# Patient Record
Sex: FEMALE | Race: BLACK OR AFRICAN AMERICAN | NOT HISPANIC OR LATINO | Employment: OTHER | URBAN - METROPOLITAN AREA
[De-identification: names, ages, dates, MRNs, and addresses within clinical notes are randomized per-mention and may not be internally consistent; named-entity substitution may affect disease eponyms.]

---

## 2018-08-12 LAB
CHOLESTEROL, TOTAL: 132
CREAT SERPL-MCNC: 274.1 MG/DL
HDLC SERPL-MCNC: 46 MG/DL
HEMOGLOBIN A1: 7.9
LDLC SERPL CALC-MCNC: 78 MG/DL
MICROALBUMIN URINE: 9.4
MICROALBUMIN/CREATININE RATIO: 3.4
NON HDL CHOL. (LDL+VLDL): 86
TRIGL SERPL-MCNC: 72 MG/DL

## 2018-11-21 ENCOUNTER — INITIAL CONSULT (OUTPATIENT)
Dept: HEMATOLOGY/ONCOLOGY | Facility: CLINIC | Age: 63
End: 2018-11-21
Payer: COMMERCIAL

## 2018-11-21 VITALS
HEART RATE: 106 BPM | BODY MASS INDEX: 44.04 KG/M2 | RESPIRATION RATE: 18 BRPM | DIASTOLIC BLOOD PRESSURE: 70 MMHG | OXYGEN SATURATION: 99 % | HEIGHT: 64 IN | WEIGHT: 257.94 LBS | TEMPERATURE: 98 F | SYSTOLIC BLOOD PRESSURE: 142 MMHG

## 2018-11-21 DIAGNOSIS — Z85.3 HISTORY OF BREAST CANCER IN FEMALE: ICD-10-CM

## 2018-11-21 PROCEDURE — 99204 OFFICE O/P NEW MOD 45 MIN: CPT | Mod: S$GLB,,, | Performed by: INTERNAL MEDICINE

## 2018-11-21 PROCEDURE — 3008F BODY MASS INDEX DOCD: CPT | Mod: CPTII,S$GLB,, | Performed by: INTERNAL MEDICINE

## 2018-11-21 PROCEDURE — 99999 PR PBB SHADOW E&M-NEW PATIENT-LVL III: CPT | Mod: PBBFAC,,, | Performed by: INTERNAL MEDICINE

## 2018-11-21 NOTE — PROGRESS NOTES
Subjective:       Patient ID: Regine Alfred is a 63 y.o. female.    Chief Complaint: new consult    HPI     The patient is self-referred.    REASON FOR REFERRAL:  History of breast cancer, assumption of care.    HISTORY OF PRESENT ILLNESS:  Mrs. Alfred is a very pleasant 63-year-old female   who recently relocated to [place] from [place] to live closer to her   family.  She has a history of a stage I triple negative breast cancer that was   diagnosed a year ago.  She underwent a left modified radical mastectomy in   [facility] on 2017 and had a stage IA carcinoma measuring 1.2 cm in   greatest diameter.  Resection margins were clear, while four sentinel lymph   nodes were negative.  Postoperatively, she received four cycles of docetaxel and   cyclophosphamide.  The first cycle was administered on 2017 and she   completed her treatment by 2018.  She has been followed expectantly and   has been MERRY since then.  As mentioned above, she recently relocated to [place] and she wants us to assume her oncologic care.    PAST MEDICAL HISTORY:  As above.  She has a history of hypertension and   diabetes.  She is on metformin and glipizide.  She also has a history of severe   vitamin D deficiency and she states that she almost became unable to walk and   had extensive physical therapy lasting 2-1/2 years.  She subsequently improved   and is now able to walk without a cane.    FAMILY HISTORY:  There is no cancer in the immediate family, however, a maternal   aunt  of breast cancer and a third paternal cousin had breast cancer.    SOCIAL HISTORY:  She is .  She used to work as a  and she has   not looked for a job yet here in [place].  She does not smoke and does not   drink alcohol.    GYN HISTORY:  Menarche was at 11 and first live birth at 21.  She underwent a   hysterectomy 12 years ago.      Review of Systems    Overall she feels well.  She complains of mild fatigue  since she finished her chemotherapy and also mentions that she has been experiencing memory loss that she attributes to her chemotherapy.  She denies any anxiety, depression, easy bruising, fevers, chills, night  sweats, weight loss, nausea, vomiting, diarrhea, constipation, diplopia, blurred vision, headache, chest pain, palpitations, shortness of breath, breast pain, abdominal pain, extremity pain, or difficulty ambulating.  The remainder of the ten-point ROS, including general, skin, lymph, H/N, cardiorespiratory, GI, , Neuro, Endocrine, and psychiatric is negative.     Objective:      Physical Exam      She is alert, oriented to time, place, person, pleasant, well      nourished, in no acute physical distress.                                    VITAL SIGNS:  Reviewed                                      HEENT:  Normal.  There are no nasal, oral, lip, gingival, auricular, lid,    or conjunctival lesions.  Mucosae are moist and pink, and there is no        thrush.  Pupils are equal, reactive to light and accommodation.              Extraocular muscle movements are intact.  Dentition is good.  There is no frontal or maxillary tenderness.                                     NECK:  Supple without JVD, adenopathy, or thyromegaly.                       LUNGS:  Clear to auscultation without wheezing, rales, or rhonchi.           CARDIOVASCULAR:  Reveals an S1, S2, no murmurs, no rubs, no gallops.         ABDOMEN:  Soft, nontender, without organomegaly.  Bowel sounds are    present.                                                                     EXTREMITIES:  No cyanosis, clubbing, or edema.                               BREASTS:  She is status post left mastectomy with a well-healed periareolar incision.    There are no masses in the right breast.     LYMPHATIC:  There is no cervical, axillary, or supraclavicular adenopathy.   SKIN:  Warm and moist, without petechiae, rashes, induration, or ecchymoses.            NEUROLOGIC:  DTRs are 0-1+ bilaterally, symmetrical, motor function is 5/5,  and cranial nerves are  within normal limits.    Assessment:       1. History of breast cancer in female, clinically MERRY, doing well.      2.    History of hypertension and diabetes.   Plan:        I had a long discussion with her; her care has been very reasonable and appropriate, and I told her so.  We went over the ASCO guidelines for follow up.  I will see her every three months for the next year, and then every 4 months.  She is not due for a mammogram until August 2019.  We will arrange as the time gets closer.  Her multiple questions were answered to her satisfaction.    I spent approximately 45 minutes reviewing the available records and evaluating the patient, out of which over 50% of the time was spent face to face with the patient in counseling and coordinating this patient's care.

## 2018-11-26 ENCOUNTER — OFFICE VISIT (OUTPATIENT)
Dept: FAMILY MEDICINE | Facility: CLINIC | Age: 63
End: 2018-11-26
Payer: COMMERCIAL

## 2018-11-26 VITALS
WEIGHT: 259.25 LBS | RESPIRATION RATE: 17 BRPM | BODY MASS INDEX: 44.26 KG/M2 | DIASTOLIC BLOOD PRESSURE: 82 MMHG | HEIGHT: 64 IN | TEMPERATURE: 98 F | HEART RATE: 102 BPM | SYSTOLIC BLOOD PRESSURE: 146 MMHG | OXYGEN SATURATION: 97 %

## 2018-11-26 DIAGNOSIS — J30.9 CHRONIC ALLERGIC RHINITIS: ICD-10-CM

## 2018-11-26 DIAGNOSIS — E55.9 VITAMIN D DEFICIENCY: ICD-10-CM

## 2018-11-26 DIAGNOSIS — G43.709 CHRONIC MIGRAINE WITHOUT AURA WITHOUT STATUS MIGRAINOSUS, NOT INTRACTABLE: ICD-10-CM

## 2018-11-26 DIAGNOSIS — M48.062 SPINAL STENOSIS OF LUMBAR REGION WITH NEUROGENIC CLAUDICATION: ICD-10-CM

## 2018-11-26 DIAGNOSIS — Z23 NEED FOR TETANUS BOOSTER: ICD-10-CM

## 2018-11-26 DIAGNOSIS — E11.65 TYPE 2 DIABETES MELLITUS WITH HYPERGLYCEMIA, WITHOUT LONG-TERM CURRENT USE OF INSULIN: ICD-10-CM

## 2018-11-26 DIAGNOSIS — Z90.12 S/P LEFT MASTECTOMY: ICD-10-CM

## 2018-11-26 DIAGNOSIS — Z91.89 AT RISK FOR GLAUCOMA: ICD-10-CM

## 2018-11-26 DIAGNOSIS — Z00.00 WELL ADULT EXAM: Primary | ICD-10-CM

## 2018-11-26 DIAGNOSIS — Z85.3 HISTORY OF BREAST CANCER IN FEMALE: ICD-10-CM

## 2018-11-26 DIAGNOSIS — I10 ESSENTIAL HYPERTENSION: ICD-10-CM

## 2018-11-26 DIAGNOSIS — Z11.59 NEED FOR HEPATITIS C SCREENING TEST: ICD-10-CM

## 2018-11-26 PROCEDURE — 99386 PREV VISIT NEW AGE 40-64: CPT | Mod: 25,S$GLB,, | Performed by: FAMILY MEDICINE

## 2018-11-26 PROCEDURE — 90471 IMMUNIZATION ADMIN: CPT | Mod: S$GLB,,, | Performed by: FAMILY MEDICINE

## 2018-11-26 PROCEDURE — 90715 TDAP VACCINE 7 YRS/> IM: CPT | Mod: S$GLB,,, | Performed by: FAMILY MEDICINE

## 2018-11-26 PROCEDURE — 99999 PR PBB SHADOW E&M-EST. PATIENT-LVL V: CPT | Mod: PBBFAC,,, | Performed by: FAMILY MEDICINE

## 2018-11-26 RX ORDER — GLIPIZIDE 5 MG/1
5 TABLET, FILM COATED, EXTENDED RELEASE ORAL
Qty: 30 TABLET | Refills: 2 | Status: SHIPPED | OUTPATIENT
Start: 2018-11-26 | End: 2018-12-21 | Stop reason: SDUPTHER

## 2018-11-26 RX ORDER — IPRATROPIUM BROMIDE 21 UG/1
2 SPRAY, METERED NASAL 2 TIMES DAILY
Qty: 30 ML | Refills: 5 | Status: SHIPPED | OUTPATIENT
Start: 2018-11-26 | End: 2020-06-08

## 2018-11-26 NOTE — PROGRESS NOTES
Chief Complaint   Patient presents with    [facility]       Regine Alfred is a 63 y.o. female who presents to [facility].  Chronic medical issues, if present, have been documented.  Acute medical issues, if present have been documented in the Chief Complaint.     Diabetes   She presents for her initial diabetic visit. She has type 2 diabetes mellitus. Her disease course has been stable. There are no hypoglycemic associated symptoms. Pertinent negatives for hypoglycemia include no headaches, nervousness/anxiousness or sweats. There are no diabetic associated symptoms. Pertinent negatives for diabetes include no blurred vision, no chest pain, no fatigue and no weakness. There are no hypoglycemic complications. Symptoms are stable. There are no diabetic complications. Pertinent negatives for diabetic complications include no CVA, PVD or retinopathy. Risk factors for coronary artery disease include diabetes mellitus, hypertension, obesity and post-menopausal. Current diabetic treatment includes oral agent (dual therapy). She is compliant with treatment most of the time. Her weight is stable. She is following a generally healthy diet. When asked about meal planning, she reported none. She has not had a previous visit with a dietitian. She participates in exercise intermittently. There is no change in her home blood glucose trend. An ACE inhibitor/angiotensin II receptor blocker is being taken. She does not see a podiatrist.Eye exam is current.   Hypertension   This is a chronic problem. The current episode started more than 1 year ago. The problem is unchanged. The problem is controlled. Pertinent negatives include no anxiety, blurred vision, chest pain, headaches, malaise/fatigue, neck pain, orthopnea, palpitations, peripheral edema, PND, shortness of breath or sweats. Agents associated with hypertension include steroids. Risk factors for coronary artery disease include diabetes mellitus, obesity and  "post-menopausal state. Past treatments include angiotensin blockers and diuretics. There are no compliance problems.  There is no history of angina, kidney disease, CAD/MI, CVA, heart failure, left ventricular hypertrophy, PVD or retinopathy.       ROS  Review of Systems   Constitutional: Negative.  Negative for activity change, appetite change, chills, fatigue, fever and malaise/fatigue.   HENT: Negative for congestion, ear pain, hearing loss, postnasal drip, rhinorrhea, sinus pressure, sore throat and trouble swallowing.    Eyes: Negative.  Negative for blurred vision, pain and visual disturbance.   Respiratory: Negative for cough, chest tightness and shortness of breath.    Cardiovascular: Negative for chest pain, palpitations, orthopnea, leg swelling and PND.   Gastrointestinal: Positive for constipation (occasional) and diarrhea (occasional). Negative for abdominal distention, abdominal pain, anal bleeding, blood in stool, nausea, rectal pain and vomiting.   Endocrine: Negative.    Genitourinary: Negative.  Negative for difficulty urinating, dysuria, flank pain, menstrual problem, pelvic pain and urgency.   Musculoskeletal: Positive for back pain. Negative for arthralgias, gait problem, myalgias, neck pain and neck stiffness.   Skin: Negative.  Negative for rash.   Allergic/Immunologic: Negative.  Negative for environmental allergies, food allergies and immunocompromised state.   Neurological: Negative.  Negative for weakness, light-headedness and headaches.   Hematological: Negative.    Psychiatric/Behavioral: Negative.  Negative for decreased concentration, dysphoric mood and sleep disturbance. The patient is not nervous/anxious.        Physical Exam  Vitals:    11/26/18 0849   BP: (!) 146/82   Pulse: 102   Resp: 17   Temp: 98.3 °F (36.8 °C)    Body mass index is 44.5 kg/m².  Weight: 117.6 kg (259 lb 4.2 oz)   Height: 5' 4" (162.6 cm)     Physical Exam   Constitutional: She is oriented to person, place, and " time. She appears well-developed and well-nourished. She is active and cooperative.  Non-toxic appearance. She does not have a sickly appearance. She does not appear ill. No distress.   HENT:   Head: Normocephalic and atraumatic.   Right Ear: Hearing, tympanic membrane, external ear and ear canal normal. No tenderness. No foreign bodies. Tympanic membrane is not injected, not scarred, not perforated, not erythematous, not retracted and not bulging. No decreased hearing is noted.   Left Ear: Hearing, tympanic membrane, external ear and ear canal normal. No tenderness. No foreign bodies. Tympanic membrane is not injected, not scarred, not perforated, not erythematous, not retracted and not bulging. No decreased hearing is noted.   Nose: Nose normal. No rhinorrhea or nasal deformity.   Mouth/Throat: Uvula is midline, oropharynx is clear and moist and mucous membranes are normal. She does not have dentures. No dental caries.   Eyes: Conjunctivae, EOM and lids are normal. Pupils are equal, round, and reactive to light. Right eye exhibits no chemosis, no discharge and no exudate. No foreign body present in the right eye. Left eye exhibits no chemosis, no discharge and no exudate. No foreign body present in the left eye. No scleral icterus.   Neck: Normal range of motion and full passive range of motion without pain. Neck supple. No thyroid mass and no thyromegaly present.   Cardiovascular: Normal rate, regular rhythm, S1 normal, S2 normal and normal heart sounds. Exam reveals no gallop and no friction rub.   No murmur heard.  Pulmonary/Chest: Effort normal. She has no decreased breath sounds. She has no wheezes. She has no rhonchi. She has no rales. She exhibits no mass, no tenderness and no deformity.   Abdominal: Soft. Normal appearance and bowel sounds are normal. She exhibits no distension, no ascites and no mass. There is no hepatosplenomegaly. There is no tenderness. There is no rigidity, no rebound and no guarding.    Musculoskeletal: Normal range of motion.        Lumbar back: She exhibits tenderness. She exhibits normal range of motion, no bony tenderness, no swelling, no edema, no deformity, no laceration, no pain, no spasm and normal pulse.        Right upper leg: She exhibits tenderness. She exhibits no bony tenderness, no swelling, no edema, no deformity and no laceration.        Left upper leg: She exhibits tenderness. She exhibits no bony tenderness, no swelling, no edema, no deformity and no laceration.   Lymphadenopathy:        Head (right side): No submental, no submandibular, no tonsillar, no preauricular and no posterior auricular adenopathy present.        Head (left side): No submental, no submandibular, no tonsillar, no preauricular and no posterior auricular adenopathy present.     She has no cervical adenopathy.   Neurological: She is alert and oriented to person, place, and time. She has normal strength. No cranial nerve deficit or sensory deficit. She exhibits normal muscle tone. She displays no seizure activity.   Skin: Skin is warm, dry and intact. No rash noted. She is not diaphoretic. No pallor.   Psychiatric: She has a normal mood and affect. Her speech is normal and behavior is normal. Judgment and thought content normal. Cognition and memory are normal. She is attentive.   Vitals reviewed.      Assessment & Plan    1. Well adult exam  Discussed age and gender appropriate screenings at this visit and encouraged a healthy diet with low saturated fats, and increased physical activity.  Counseled on medically appropriate vaccines based on age and current health condition.  Screening test will be ordered and once completed, patient will be notified of results when available.  If necessary, will follow up to discuss and manage further.   - CBC auto differential; Future  - Comprehensive metabolic panel; Future  - Lipid panel; Future  - TSH; Future  - T4, free; Future    2. Type 2 diabetes mellitus with  hyperglycemia, without long-term current use of insulin  Patient is encouraged to follow a diet low in carbohydrates and simple sugars.  Discussed simple vs. complex carbohydrates as well as eating times of certain meals. Advised to focus on good food choices and increased physical activity and encouraged to adhere to medication regimen and lifestyle adjustments, and to check glucose level as recommended.  Contact office if glucose levels are not improving over time.  Screening blood test is due at this time.     - Hemoglobin A1c; Future  - glipiZIDE (GLUCOTROL) 5 MG TR24; Take 1 tablet (5 mg total) by mouth daily with breakfast.  Dispense: 30 tablet; Refill: 2    3. Essential hypertension  Patient was counseled and encouraged to maintain a low sodium diet, as well as increasing physical activity.  Recommend random BP checks at home on a regular basis.  Repeat BP at end of visit was not necessary. Will continue medication at this time, and follow up in 3-6 months, or sooner if blood pressure begins to increase.       4. Spinal stenosis of lumbar region with neurogenic claudication  Managed with gabapentin; will continue medication for chronic pain.  Will refer to Pain Management if necessary.    5. Chronic migraine without aura without status migrainosus, not intractable  Stable; no therapeutic changes at this time.  Referral to appropriate specialist for evaluation and management placed in Epic.   - Ambulatory referral to Neurology    6. Chronic allergic rhinitis  The current medical regimen is effective at this time and no changes to present plan or medications will be made at this visit.     - ipratropium (ATROVENT) 0.03 % nasal spray; 2 sprays by Nasal route 2 (two) times daily.  Dispense: 30 mL; Refill: 5    7. Need for hepatitis C screening test  Patient advised that a one time screen for Hepatitis C is recommended and will be ordered today. Once results available, patient will be notified of results.   -  Hepatitis C antibody; Future    8. History of breast cancer in female  Diagnosed and treated in [place]; completed chemotherapy.  Managed by Breast Surgery.    9. S/P left mastectomy  Managed by Breast Surgeon.    10. Vitamin D deficiency  Screening test will be ordered and once results available patient will be notified of results and managed accordingly.  Patient was advised to begin an OTC Vitamin D3 regimen of 1681-2194 IU daily as most people of color have a Vitamin D3 deficiency due to poor absorption of UV rays, which is necessary to activate the inactive Vitamin D2 to the active Vitamin D3, and prescription medication is not effective.   - Vitamin D; Future    11. At risk for glaucoma  Referral to appropriate specialist for evaluation and management placed in [facility].   - Ambulatory referral to Ophthalmology    12. Need for tetanus booster  Recommended vaccines were given to boost immunity and prevent spread of communicable diseases.   - (In Office Administered) Tdap Vaccine      Follow up documented    ACTIVE MEDICAL ISSUES:  Documented in Problem List    PAST MEDICAL HISTORY  Documented  .  PAST SURGICAL HISTORY:  Documented    SOCIAL HISTORY:  Documented    FAMILY HISTORY:  Documented    ALLERGIES AND MEDICATIONS: updated and reviewed.  Documented    Health Maintenance       Date Due Completion Date    Hepatitis C Screening 1955 ---    Lipid Panel 1955 ---    TETANUS VACCINE 01/12/1975 ---    Mammogram 01/12/1997 ---    Colonoscopy 01/12/2007 ---    Zoster Vaccine 01/12/2017 ---    Influenza Vaccine 08/01/2018 ---

## 2018-11-26 NOTE — PROGRESS NOTES
Tdap vaccine administered IM to right deltoid. VIS form given. Tolerated well. No adverse reaction noted.

## 2018-11-27 ENCOUNTER — TELEPHONE (OUTPATIENT)
Dept: ADMINISTRATIVE | Facility: HOSPITAL | Age: 63
End: 2018-11-27

## 2018-11-29 ENCOUNTER — LAB VISIT (OUTPATIENT)
Dept: LAB | Facility: HOSPITAL | Age: 63
End: 2018-11-29
Attending: FAMILY MEDICINE
Payer: COMMERCIAL

## 2018-11-29 DIAGNOSIS — E55.9 VITAMIN D DEFICIENCY: ICD-10-CM

## 2018-11-29 DIAGNOSIS — Z11.59 NEED FOR HEPATITIS C SCREENING TEST: ICD-10-CM

## 2018-11-29 DIAGNOSIS — Z00.00 WELL ADULT EXAM: ICD-10-CM

## 2018-11-29 DIAGNOSIS — E11.65 TYPE 2 DIABETES MELLITUS WITH HYPERGLYCEMIA, WITHOUT LONG-TERM CURRENT USE OF INSULIN: ICD-10-CM

## 2018-11-29 LAB
25(OH)D3+25(OH)D2 SERPL-MCNC: 61 NG/ML
ALBUMIN SERPL BCP-MCNC: 3.9 G/DL
ALP SERPL-CCNC: 80 U/L
ALT SERPL W/O P-5'-P-CCNC: 30 U/L
ANION GAP SERPL CALC-SCNC: 14 MMOL/L
AST SERPL-CCNC: 26 U/L
BASOPHILS # BLD AUTO: 0.03 K/UL
BASOPHILS NFR BLD: 0.5 %
BILIRUB SERPL-MCNC: 0.5 MG/DL
BUN SERPL-MCNC: 13 MG/DL
CALCIUM SERPL-MCNC: 10.1 MG/DL
CHLORIDE SERPL-SCNC: 102 MMOL/L
CHOLEST SERPL-MCNC: 155 MG/DL
CHOLEST/HDLC SERPL: 3.1 {RATIO}
CO2 SERPL-SCNC: 25 MMOL/L
CREAT SERPL-MCNC: 0.8 MG/DL
DIFFERENTIAL METHOD: ABNORMAL
EOSINOPHIL # BLD AUTO: 0.2 K/UL
EOSINOPHIL NFR BLD: 3.5 %
ERYTHROCYTE [DISTWIDTH] IN BLOOD BY AUTOMATED COUNT: 14.8 %
EST. GFR  (AFRICAN AMERICAN): >60 ML/MIN/1.73 M^2
EST. GFR  (NON AFRICAN AMERICAN): >60 ML/MIN/1.73 M^2
ESTIMATED AVG GLUCOSE: 166 MG/DL
GLUCOSE SERPL-MCNC: 151 MG/DL
HBA1C MFR BLD HPLC: 7.4 %
HCT VFR BLD AUTO: 38.7 %
HCV AB SERPL QL IA: NEGATIVE
HDLC SERPL-MCNC: 50 MG/DL
HDLC SERPL: 32.3 %
HGB BLD-MCNC: 12.1 G/DL
IMM GRANULOCYTES # BLD AUTO: 0.04 K/UL
IMM GRANULOCYTES NFR BLD AUTO: 0.6 %
LDLC SERPL CALC-MCNC: 89.4 MG/DL
LYMPHOCYTES # BLD AUTO: 1.5 K/UL
LYMPHOCYTES NFR BLD: 23.1 %
MCH RBC QN AUTO: 25.2 PG
MCHC RBC AUTO-ENTMCNC: 31.3 G/DL
MCV RBC AUTO: 81 FL
MONOCYTES # BLD AUTO: 0.4 K/UL
MONOCYTES NFR BLD: 7 %
NEUTROPHILS # BLD AUTO: 4.1 K/UL
NEUTROPHILS NFR BLD: 65.3 %
NONHDLC SERPL-MCNC: 105 MG/DL
NRBC BLD-RTO: 0 /100 WBC
PLATELET # BLD AUTO: 315 K/UL
PMV BLD AUTO: 10.1 FL
POTASSIUM SERPL-SCNC: 3.7 MMOL/L
PROT SERPL-MCNC: 7.5 G/DL
RBC # BLD AUTO: 4.81 M/UL
SODIUM SERPL-SCNC: 141 MMOL/L
T4 FREE SERPL-MCNC: 0.94 NG/DL
TRIGL SERPL-MCNC: 78 MG/DL
TSH SERPL DL<=0.005 MIU/L-ACNC: 2.04 UIU/ML
WBC # BLD AUTO: 6.31 K/UL

## 2018-11-29 PROCEDURE — 83036 HEMOGLOBIN GLYCOSYLATED A1C: CPT

## 2018-11-29 PROCEDURE — 84439 ASSAY OF FREE THYROXINE: CPT

## 2018-11-29 PROCEDURE — 84443 ASSAY THYROID STIM HORMONE: CPT

## 2018-11-29 PROCEDURE — 36415 COLL VENOUS BLD VENIPUNCTURE: CPT

## 2018-11-29 PROCEDURE — 80061 LIPID PANEL: CPT

## 2018-11-29 PROCEDURE — 80053 COMPREHEN METABOLIC PANEL: CPT

## 2018-11-29 PROCEDURE — 82306 VITAMIN D 25 HYDROXY: CPT

## 2018-11-29 PROCEDURE — 86803 HEPATITIS C AB TEST: CPT

## 2018-11-29 PROCEDURE — 85025 COMPLETE CBC W/AUTO DIFF WBC: CPT

## 2018-12-10 ENCOUNTER — OFFICE VISIT (OUTPATIENT)
Dept: SURGERY | Facility: CLINIC | Age: 63
End: 2018-12-10
Payer: COMMERCIAL

## 2018-12-10 VITALS
BODY MASS INDEX: 44.39 KG/M2 | HEIGHT: 64 IN | HEART RATE: 101 BPM | DIASTOLIC BLOOD PRESSURE: 67 MMHG | SYSTOLIC BLOOD PRESSURE: 140 MMHG | TEMPERATURE: 98 F | WEIGHT: 260 LBS

## 2018-12-10 DIAGNOSIS — Z85.3 PERSONAL HISTORY OF BREAST CANCER: Primary | ICD-10-CM

## 2018-12-10 DIAGNOSIS — Z12.31 ENCOUNTER FOR SCREENING MAMMOGRAM FOR BREAST CANCER: ICD-10-CM

## 2018-12-10 PROCEDURE — 3008F BODY MASS INDEX DOCD: CPT | Mod: CPTII,S$GLB,, | Performed by: SURGERY

## 2018-12-10 PROCEDURE — 99204 OFFICE O/P NEW MOD 45 MIN: CPT | Mod: S$GLB,,, | Performed by: SURGERY

## 2018-12-10 PROCEDURE — 99999 PR PBB SHADOW E&M-EST. PATIENT-LVL III: CPT | Mod: PBBFAC,,, | Performed by: SURGERY

## 2018-12-10 RX ORDER — HYDROCHLOROTHIAZIDE 25 MG/1
25 TABLET ORAL DAILY
COMMUNITY
End: 2019-02-26 | Stop reason: SDUPTHER

## 2018-12-10 RX ORDER — LOSARTAN POTASSIUM 50 MG/1
TABLET ORAL
COMMUNITY
End: 2019-02-26 | Stop reason: SDUPTHER

## 2018-12-10 RX ORDER — NAPROXEN SODIUM 550 MG/1
TABLET ORAL
COMMUNITY
End: 2018-12-11 | Stop reason: SDUPTHER

## 2018-12-10 RX ORDER — GABAPENTIN 300 MG/1
300 CAPSULE ORAL 4 TIMES DAILY
COMMUNITY
End: 2018-12-11 | Stop reason: SDUPTHER

## 2018-12-10 RX ORDER — ACETAMINOPHEN 500 MG
1 TABLET ORAL DAILY
Status: ON HOLD | COMMUNITY
End: 2022-07-29 | Stop reason: HOSPADM

## 2018-12-10 RX ORDER — METFORMIN HYDROCHLORIDE 1000 MG/1
1000 TABLET ORAL 2 TIMES DAILY WITH MEALS
COMMUNITY
End: 2019-02-26 | Stop reason: SDUPTHER

## 2018-12-10 NOTE — PROGRESS NOTES
New Breast Cancer  History and Physical  [facility]  Department of Surgery    REFERRING PROVIDER: Aaareferral Self  No address on file    CHIEF COMPLAINT: left breast cancer    Subjective:      Regine Alfred is a 63 y.o. postmenopausal female who is a new patient to this practice, here to establish care for previously-treated left breast cancer.  The patient was initially treated in [place], [place]. Moved to [place] in 2018. Her breast cancer was first diagnosed on screening mammogram in 2017. Underwent core biopsy that showed Triple negative invasive ductal carcinoma.  She underwent left simple mastectomy, axillary SLNB on 17 for stage IA invasive ductal carcinoma ([id number]) with 0/5 lymph nodes positive. She underwent chemotherapy with TC x4 cycles. No radiation.  She had a follow up screening bilateral mammogram on 18, which was negative for any new suspicious changes.      She has a past medical history of HTN, DMT2, and migraines. 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     Patient does routinely do self breast exams.  Patient has not noted a change on breast exam.  Patient denies nipple discharge on the right. Patient admits to to previous breast biopsy. Patient admits to a personal history of breast cancer.      GYN History:  Age of menarche was 11. Hysterectomy in , left ovaries. Believes she went through menopause with chemotherapy last year.  Patient denies hormonal therapy. Patient is . Age of first live birth was 21. Patient did not breast feed.    FAMILY History:  Maternal aunt breast cancer ( age 65)  Paternal cousin breast cancer ( age 39, the late 90s)  Maternal cousin with ovarian cancer (diagnosed 38,  age 39)  Maternal uncle with prostate (diagnosed and  of in his 80s, treatment unknown)      No past medical history on file.  Past Surgical History:   Procedure Laterality Date    BREAST SURGERY       "breast ca lt side     HYSTERECTOMY      TONSILLECTOMY       Current Outpatient Medications on File Prior to Visit   Medication Sig Dispense Refill    cholecalciferol, vitamin D3, (VITAMIN D3) 2,000 unit Cap       gabapentin (NEURONTIN) 300 MG capsule       glipiZIDE (GLUCOTROL) 5 MG TR24 Take 1 tablet (5 mg total) by mouth daily with breakfast. 30 tablet 2    hydroCHLOROthiazide (HYDRODIURIL) 25 MG tablet       ipratropium (ATROVENT) 0.03 % nasal spray 2 sprays by Nasal route 2 (two) times daily. 30 mL 5    losartan (COZAAR) 50 MG tablet       metFORMIN (GLUCOPHAGE) 1000 MG tablet       naproxen sodium (ANAPROX) 550 MG tablet        No current facility-administered medications on file prior to visit.      Social History     Socioeconomic History    Marital status: Single     Spouse name: Not on file    Number of children: Not on file    Years of education: Not on file    Highest education level: Not on file   Social Needs    Financial resource strain: Not on file    Food insecurity - worry: Not on file    Food insecurity - inability: Not on file    Transportation needs - medical: Not on file    Transportation needs - non-medical: Not on file   Occupational History    Not on file   Tobacco Use    Smoking status: Never Smoker    Smokeless tobacco: Never Used   Substance and Sexual Activity    Alcohol use: No     Frequency: Never    Drug use: No    Sexual activity: Not on file   Other Topics Concern    Not on file   Social History Narrative    Not on file     Family History   Problem Relation Age of Onset    Dementia Mother     Lung cancer Father         Review of Systems  Review of Systems   Constitutional: Positive for fatigue. Negative for chills, fever and unexpected weight change (wait gain with decadron before chemo).        Admits to "chemo brain," trouble remembering things. Getting better.    Respiratory: Negative for cough, chest tightness and shortness of breath.  " "  Cardiovascular: Negative for chest pain, palpitations and leg swelling.   Endocrine: Positive for heat intolerance.        Hot flashes   Genitourinary:        Irritable bowel syndrome        Objective:   PHYSICAL EXAM:  BP (!) 140/67 (BP Location: Right arm, Patient Position: Sitting, BP Method: X-Large (Automatic))   Pulse 101   Temp 98.4 °F (36.9 °C) (Oral)   Ht 5' 4" (1.626 m)   Wt 117.9 kg (260 lb)   BMI 44.63 kg/m²     Physical Exam   Constitutional: She is oriented to person, place, and time. She appears well-developed and well-nourished.   Neck: Neck supple. No thyromegaly present.   Cardiovascular: Normal rate and regular rhythm.    Pulmonary/Chest: Effort normal and breath sounds normal. No respiratory distress. Right breast exhibits no inverted nipple, no mass, no nipple discharge, no skin change and no tenderness. Left breast exhibits tenderness (under the arm). Left breast exhibits no mass, no nipple discharge and no skin change.       Well-healed left simple mastectomy scar that extends into the axilla. Normal breast exam on the right.    Abdominal: Soft. She exhibits no distension. There is no tenderness.   Musculoskeletal: She exhibits no edema.   Neurological: She is alert and oriented to person, place, and time.   Skin: Skin is warm and dry.           Radiology review: due for right mammogram June 2018    Assessment:      Regine Alfred is a 63 y.o. postmenopausal female with recently diagnosed carcinoma of the left breast.      Plan:       - Will see the patient back with screening mammogram on the right in June 2018  - Follow up if any new concerns arise  - discussed genetic testing  "

## 2018-12-10 NOTE — PROGRESS NOTES
SUBJECTIVE:  Patient ID: Regine Alfred   MRN: [id number]  Referred By: Dr. Azikiwe K. Lombard  Chief Complaint: Consult    History of Present Illness:   63 y.o. female with migraines, T2DM, HTN, morbid obesity, hx of breast cancer s/p left mastectomy, and spinal stenosis, who presents to clinic alone for evaluation of headaches. Migraines initially in her teens and early twenties, reports she has been under the care of a Neurologist for the last 40 years to control her migraines.  She had a hysterectomy 10 years ago, and migraines have been significantly better since.  Migraines are occurring 1-2 times per month on average.  Migraines typically wake her from her sleep around 1-3 AM, rarely do they begin during the daytime.  Typically last hours in duration, occasionally her migraines will return the next day or later in the day.  Migraines are described as a severe, stabbing, pounding pain typically located behind one eye or the other and radiates into the occipitalis, can be located on the left or right, always unilateral.  Associated symptoms include nausea, vomiting, diarrhea, photo/phonophobia, sensitive to certain colors of green. She typically treats her migraines with naproxen 550 mg, if naproxen does not abort her migraine, she will then use sumatriptan.    Triggers - weather changes, severe fatigue   Family Hx of Migraines <-- mother     Treatments Tried and Response  Naproxen 550 mg -   Sumatriptan 100 mg tabs - helping   Gabapentin - helps     Current Medications:    cholecalciferol, vitamin D3, (VITAMIN D3) 2,000 unit Cap, , Disp: , Rfl:     gabapentin (NEURONTIN) 300 MG capsule, Take 1 capsule (300 mg total) by mouth 4 (four) times daily., Disp: 360 capsule, Rfl: 3    glipiZIDE (GLUCOTROL) 5 MG TR24, Take 1 tablet (5 mg total) by mouth daily with breakfast., Disp: 30 tablet, Rfl: 2    hydroCHLOROthiazide (HYDRODIURIL) 25 MG tablet, , Disp: , Rfl:     ipratropium (ATROVENT) 0.03 % nasal spray, 2  "sprays by Nasal route 2 (two) times daily., Disp: 30 mL, Rfl: 5    losartan (COZAAR) 50 MG tablet, , Disp: , Rfl:     metFORMIN (GLUCOPHAGE) 1000 MG tablet, , Disp: , Rfl:     naproxen sodium (ANAPROX) 550 MG tablet, Take 1 tablet (550 mg total) by mouth 2 (two) times daily as needed., Disp: 40 tablet, Rfl: 5    sumatriptan (IMITREX) 100 MG tablet, Take 1 tab at onset of migraine, may repeat dose in 2 hours if needed. Max 2 tabs/day. Max 3 days/week., Disp: 9 tablet, Rfl: 11    Review of Systems - as per HPI, otherwise a balanced 10 systems review is negative.    OBJECTIVE:  Vitals:  /75 (BP Location: Right arm, Patient Position: Sitting, BP Method: X-Large (Automatic))   Pulse 93   Ht 5' 5" (1.651 m)   Wt 113.4 kg (250 lb) Comment: stated  BMI 41.60 kg/m²     Physical Exam   Constitutional: She appears well-developed and well-nourished. She is well groomed. NAD  HENT:    Head: Normocephalic and atraumatic, oral and nasal mucosa intact.  Frontalis was NTTP, temporalis was NTTP   Eyes: Conjunctivae and EOM are normal. Pupils are equal, round, and reactive to light   Neck: Neck supple. Occiput and trapezius NTTP bilaterally.  DROM of neck in all directions.    Musculoskeletal: Normal range of motion. No joint stiffness. No vertebral point tenderness.  Skin: Skin is warm and dry.  Psychiatric: Normal mood and affect.     Neuro exam:    Mental status:  The patient is alert and oriented to person, place and time.  Language is intact and fluent  Remote and recent memory are intact  Normal attention and concentration  Mood is stable    Cranial Nerves:  Pupils are equal and reactive to light.    Extraocular movements are intact and without nystagmus.    Visual fields are full to confrontation testing.   Facial movement is symmetric.  Facial sensation is intact.    Hearing is intact to finger rub   Uvula in midline. Tongue in midline without fasciculation.   Shoulder shrug symmetrical.  Motor:  Normal muscle " bulk and symmetry. No fasciculations were noted.   Tremor not apparent   Pronator drift not apparent.    strength was strong and symmetric   Finger extension strength was moderate and symmetric   RUE:appropriate against gravity and medium force as tested 5-/5  LUE: appropriate against gravity and medium force as tested 5-/5  RLE:appropriate against gravity and medium force as tested 5-/5              LLE: appropriate against gravity and medium force as tested 5-/5    Reflexes:  Right Brachioradialis 2+  Left Brachioradialis 2+  Right Biceps 2+  Left Biceps 2+  Right Patellar1+  Left Patellar 1+    Peoples was negative bilaterally      Sensory:  RUE  intact light touch  LUE intact light touch    RLE intact light touch  LLE intact light touch    Review of Data:   Notes from breast surgery, family medicine reviewed   Labs:  Lab Visit on 11/29/2018   Component Date Value Ref Range Status    Hemoglobin A1C 11/29/2018 7.4* 4.0 - 5.6 % Final    Estimated Avg Glucose 11/29/2018 166* 68 - 131 mg/dL Final    Vit D, 25-Hydroxy 11/29/2018 61  30 - 96 ng/mL Final    WBC 11/29/2018 6.31  3.90 - 12.70 K/uL Final    RBC 11/29/2018 4.81  4.00 - 5.40 M/uL Final    Hemoglobin 11/29/2018 12.1  12.0 - 16.0 g/dL Final    Hematocrit 11/29/2018 38.7  37.0 - 48.5 % Final    MCV 11/29/2018 81* 82 - 98 fL Final    MCH 11/29/2018 25.2* 27.0 - 31.0 pg Final    MCHC 11/29/2018 31.3* 32.0 - 36.0 g/dL Final    RDW 11/29/2018 14.8* 11.5 - 14.5 % Final    Platelets 11/29/2018 315  150 - 350 K/uL Final    MPV 11/29/2018 10.1  9.2 - 12.9 fL Final    Immature Granulocytes 11/29/2018 0.6* 0.0 - 0.5 % Final    Gran # (ANC) 11/29/2018 4.1  1.8 - 7.7 K/uL Final    Immature Grans (Abs) 11/29/2018 0.04  0.00 - 0.04 K/uL Final    Lymph # 11/29/2018 1.5  1.0 - 4.8 K/uL Final    Mono # 11/29/2018 0.4  0.3 - 1.0 K/uL Final    Eos # 11/29/2018 0.2  0.0 - 0.5 K/uL Final    Baso # 11/29/2018 0.03  0.00 - 0.20 K/uL Final    nRBC 11/29/2018 0   0 /100 WBC Final    Gran% 11/29/2018 65.3  38.0 - 73.0 % Final    Lymph% 11/29/2018 23.1  18.0 - 48.0 % Final    Mono% 11/29/2018 7.0  4.0 - 15.0 % Final    Eosinophil% 11/29/2018 3.5  0.0 - 8.0 % Final    Basophil% 11/29/2018 0.5  0.0 - 1.9 % Final    Differential Method 11/29/2018 Automated   Final    Sodium 11/29/2018 141  136 - 145 mmol/L Final    Potassium 11/29/2018 3.7  3.5 - 5.1 mmol/L Final    Chloride 11/29/2018 102  95 - 110 mmol/L Final    CO2 11/29/2018 25  23 - 29 mmol/L Final    Glucose 11/29/2018 151* 70 - 110 mg/dL Final    BUN, Bld 11/29/2018 13  8 - 23 mg/dL Final    Creatinine 11/29/2018 0.8  0.5 - 1.4 mg/dL Final    Calcium 11/29/2018 10.1  8.7 - 10.5 mg/dL Final    Total Protein 11/29/2018 7.5  6.0 - 8.4 g/dL Final    Albumin 11/29/2018 3.9  3.5 - 5.2 g/dL Final    Total Bilirubin 11/29/2018 0.5  0.1 - 1.0 mg/dL Final    Alkaline Phosphatase 11/29/2018 80  55 - 135 U/L Final    AST 11/29/2018 26  10 - 40 U/L Final    ALT 11/29/2018 30  10 - 44 U/L Final    Anion Gap 11/29/2018 14  8 - 16 mmol/L Final    eGFR if African American 11/29/2018 >60.0  >60 mL/min/1.73 m^2 Final    eGFR if non African American 11/29/2018 >60.0  >60 mL/min/1.73 m^2 Final    Cholesterol 11/29/2018 155  120 - 199 mg/dL Final    Triglycerides 11/29/2018 78  30 - 150 mg/dL Final    HDL 11/29/2018 50  40 - 75 mg/dL Final    LDL Cholesterol 11/29/2018 89.4  63.0 - 159.0 mg/dL Final    HDL/Chol Ratio 11/29/2018 32.3  20.0 - 50.0 % Final    Total Cholesterol/HDL Ratio 11/29/2018 3.1  2.0 - 5.0 Final    Non-HDL Cholesterol 11/29/2018 105  mg/dL Final    TSH 11/29/2018 2.045  0.400 - 4.000 uIU/mL Final    Free T4 11/29/2018 0.94  0.71 - 1.51 ng/dL Final    Hepatitis C Ab 11/29/2018 Negative   Final   Telephone on 11/27/2018   Component Date Value Ref Range Status    Hemoglobin A1 08/12/2018 7.9   Final    Cholesterol, Total 08/12/2018 132   Final    HDL 08/12/2018 46  mg/dL Final    LDL  Cholesterol 08/12/2018 78  mg/dL Final    Triglycerides 08/12/2018 72  mg/dL Final    Non HDL Chol. (LDL+VLDL) 08/12/2018 86   Final    Microalb, Ur 08/12/2018 9.4   Final    Creatinine 08/12/2018 274.1  mg/dL Final    Microalb Creat Ratio 08/12/2018 3.4   Final     Note: I have independently reviewed any/all imaging/labs/tests and agree with the report (s) as documented.  Any discrepancies will be as noted/demarcated by free text.  JO KENT 12/11/2018    ASSESSMENT:  1. Migraine without aura and without status migrainosus, not intractable    2. Essential hypertension    3. Type 2 diabetes mellitus with hyperglycemia, without long-term current use of insulin    4. Spinal stenosis of lumbar region with neurogenic claudication    5. Morbid obesity with BMI of 40.0-44.9, adult       PLAN:  - Continue Gabapentin 300 mg QID for migraine prevention   - For migraine abortive - refilled sumatriptan 100 mg tabs  - For rescue - refilled naproxen 550 mg tabs   - Offered to start CGRP inhibitor which she will consider, literature on Aimovig provided to patient   - Start tracking headaches via Migraine Kojo mahesh on phone   - HTN - blood pressure well controlled on current regimen, management per PCP   - T2DM - A1c 7.4 on 11/29, management per PCP   - Spinal Stenosis - symptoms well controlled on gabapentin 300 mg QID   - Morbid Obesity - encouraged her to begin exercising regularly and follow a healthy diet   - RTC in 1 year or sooner if needed      Orders Placed This Encounter    gabapentin (NEURONTIN) 300 MG capsule    naproxen sodium (ANAPROX) 550 MG tablet    sumatriptan (IMITREX) 100 MG tablet     I have discussed realistic goals of care with patient at length as well as medication options, and need for lifestyle adjustment. I have explained that treatment will take time. We have agreed that the goal will be to reduce frequency/intensity/quantity of HA, not to be completely HA free. I have explained my non  narcotic policy regarding headache treatment.    Patient to track frequency of headaches.  Patient agreeable to work on lifestyle adjustments.    Questions and concerns were sought and answered to the patient's stated verbal satisfaction.  The patient verbalizes understanding and agreement with the above stated treatment plan.     CC: Azikiwe K Lombard, MD Elizabeth Vulevich, FNP-C  [facility]  [phone]    Dr. Mota was available during today's encounter.

## 2018-12-11 ENCOUNTER — OFFICE VISIT (OUTPATIENT)
Dept: NEUROLOGY | Facility: CLINIC | Age: 63
End: 2018-12-11
Payer: COMMERCIAL

## 2018-12-11 VITALS
WEIGHT: 250 LBS | SYSTOLIC BLOOD PRESSURE: 128 MMHG | DIASTOLIC BLOOD PRESSURE: 75 MMHG | BODY MASS INDEX: 41.65 KG/M2 | HEART RATE: 93 BPM | HEIGHT: 65 IN

## 2018-12-11 DIAGNOSIS — G43.009 MIGRAINE WITHOUT AURA AND WITHOUT STATUS MIGRAINOSUS, NOT INTRACTABLE: Primary | ICD-10-CM

## 2018-12-11 DIAGNOSIS — E11.65 TYPE 2 DIABETES MELLITUS WITH HYPERGLYCEMIA, WITHOUT LONG-TERM CURRENT USE OF INSULIN: ICD-10-CM

## 2018-12-11 DIAGNOSIS — M48.062 SPINAL STENOSIS OF LUMBAR REGION WITH NEUROGENIC CLAUDICATION: ICD-10-CM

## 2018-12-11 DIAGNOSIS — I10 ESSENTIAL HYPERTENSION: ICD-10-CM

## 2018-12-11 DIAGNOSIS — E66.01 MORBID OBESITY WITH BMI OF 40.0-44.9, ADULT: ICD-10-CM

## 2018-12-11 PROCEDURE — 3008F BODY MASS INDEX DOCD: CPT | Mod: CPTII,S$GLB,, | Performed by: NURSE PRACTITIONER

## 2018-12-11 PROCEDURE — 99999 PR PBB SHADOW E&M-EST. PATIENT-LVL III: CPT | Mod: PBBFAC,,, | Performed by: NURSE PRACTITIONER

## 2018-12-11 PROCEDURE — 99204 OFFICE O/P NEW MOD 45 MIN: CPT | Mod: S$GLB,,, | Performed by: NURSE PRACTITIONER

## 2018-12-11 PROCEDURE — 3045F PR MOST RECENT HEMOGLOBIN A1C LEVEL 7.0-9.0%: CPT | Mod: CPTII,S$GLB,, | Performed by: NURSE PRACTITIONER

## 2018-12-11 RX ORDER — SUMATRIPTAN SUCCINATE 100 MG/1
100 TABLET ORAL CONTINUOUS PRN
COMMUNITY
End: 2018-12-11 | Stop reason: SDUPTHER

## 2018-12-11 RX ORDER — NAPROXEN SODIUM 550 MG/1
550 TABLET ORAL 2 TIMES DAILY PRN
Qty: 40 TABLET | Refills: 5 | Status: SHIPPED | OUTPATIENT
Start: 2018-12-11 | End: 2019-11-07 | Stop reason: SDUPTHER

## 2018-12-11 RX ORDER — SUMATRIPTAN SUCCINATE 100 MG/1
TABLET ORAL
Qty: 9 TABLET | Refills: 11 | Status: SHIPPED | OUTPATIENT
Start: 2018-12-11 | End: 2019-11-07 | Stop reason: SDUPTHER

## 2018-12-11 RX ORDER — GABAPENTIN 300 MG/1
300 CAPSULE ORAL 4 TIMES DAILY
Qty: 360 CAPSULE | Refills: 3 | Status: SHIPPED | OUTPATIENT
Start: 2018-12-11 | End: 2019-04-26

## 2018-12-11 NOTE — LETTER
December 11, 2018      Azikiwe K. Lombard, MD  [address] [place] [address]           [facility]  [address]  [place] [address]  Phone: [phone]  Fax: [phone]          Patient: Regine Alfred   MR Number: [id number]   YOB: 1955   Date of Visit: 12/11/2018       Dear Dr. Azikiwe K. Lombard:    Thank you for referring Regine Alfred to me for evaluation. Attached you will find relevant portions of my assessment and plan of care.    If you have questions, please do not hesitate to call me. I look forward to following Regine Alfred along with you.    Sincerely,    Riana Bueno, P    Enclosure  CC:  No Recipients    If you would like to receive this communication electronically, please contact [email or web address] or [phone] to request more information on [employer] access.    For providers and/or their staff who would like to refer a patient to [facility], please contact us through our one-stop-shop provider referral line, [facility], at [phone].    If you feel you have received this communication in error or would no longer like to receive these types of communications, please e-mail [email or web address]

## 2018-12-17 ENCOUNTER — PATIENT MESSAGE (OUTPATIENT)
Dept: FAMILY MEDICINE | Facility: CLINIC | Age: 63
End: 2018-12-17

## 2018-12-17 DIAGNOSIS — E11.65 TYPE 2 DIABETES MELLITUS WITH HYPERGLYCEMIA, WITHOUT LONG-TERM CURRENT USE OF INSULIN: Primary | ICD-10-CM

## 2018-12-19 NOTE — TELEPHONE ENCOUNTER
Spoke with patient was told message was sent to provider still waiting for response another message meredith sent to him , patient verbalized understand .

## 2018-12-19 NOTE — TELEPHONE ENCOUNTER
----- Message from Siria Brown sent at 12/19/2018 10:32 AM CST -----  Contact: Self  Pt is calling to speak with staff regarding medication. She emailed on Monday and didn't hear anything back yet. Please call pt at [phone]

## 2018-12-21 RX ORDER — GLIPIZIDE 5 MG/1
10 TABLET, FILM COATED, EXTENDED RELEASE ORAL
Qty: 60 TABLET | Refills: 2 | Status: SHIPPED | OUTPATIENT
Start: 2018-12-21 | End: 2019-03-09 | Stop reason: SDUPTHER

## 2018-12-28 ENCOUNTER — OFFICE VISIT (OUTPATIENT)
Dept: OPHTHALMOLOGY | Facility: CLINIC | Age: 63
End: 2018-12-28
Payer: COMMERCIAL

## 2018-12-28 DIAGNOSIS — H52.13 MYOPIA OF BOTH EYES WITH ASTIGMATISM AND PRESBYOPIA: ICD-10-CM

## 2018-12-28 DIAGNOSIS — H52.203 MYOPIA OF BOTH EYES WITH ASTIGMATISM AND PRESBYOPIA: ICD-10-CM

## 2018-12-28 DIAGNOSIS — H25.13 NUCLEAR SCLEROTIC CATARACT OF BOTH EYES: ICD-10-CM

## 2018-12-28 DIAGNOSIS — H40.023 OPEN ANGLE WITH BORDERLINE FINDINGS AND HIGH GLAUCOMA RISK IN BOTH EYES: Primary | ICD-10-CM

## 2018-12-28 DIAGNOSIS — H35.361 FAMILIAL DRUSEN OF MACULA OF BOTH EYES: ICD-10-CM

## 2018-12-28 DIAGNOSIS — H35.362 FAMILIAL DRUSEN OF MACULA OF BOTH EYES: ICD-10-CM

## 2018-12-28 DIAGNOSIS — H52.4 MYOPIA OF BOTH EYES WITH ASTIGMATISM AND PRESBYOPIA: ICD-10-CM

## 2018-12-28 PROCEDURE — 92004 COMPRE OPH EXAM NEW PT 1/>: CPT | Mod: S$GLB,,, | Performed by: OPHTHALMOLOGY

## 2018-12-28 PROCEDURE — 92004 PR EYE EXAM, NEW PATIENT,COMPREHESV: ICD-10-PCS | Mod: S$GLB,,, | Performed by: OPHTHALMOLOGY

## 2018-12-28 PROCEDURE — 92020 GONIOSCOPY: CPT | Mod: S$GLB,,, | Performed by: OPHTHALMOLOGY

## 2018-12-28 PROCEDURE — 92250 COLOR FUNDUS PHOTOGRAPHY - OU - BOTH EYES: ICD-10-PCS | Mod: S$GLB,,, | Performed by: OPHTHALMOLOGY

## 2018-12-28 PROCEDURE — 99999 PR PBB SHADOW E&M-EST. PATIENT-LVL II: CPT | Mod: PBBFAC,,, | Performed by: OPHTHALMOLOGY

## 2018-12-28 PROCEDURE — 92020 PR SPECIAL EYE EVAL,GONIOSCOPY: ICD-10-PCS | Mod: S$GLB,,, | Performed by: OPHTHALMOLOGY

## 2018-12-28 PROCEDURE — 92250 FUNDUS PHOTOGRAPHY W/I&R: CPT | Mod: S$GLB,,, | Performed by: OPHTHALMOLOGY

## 2018-12-28 PROCEDURE — 99999 PR PBB SHADOW E&M-EST. PATIENT-LVL II: ICD-10-PCS | Mod: PBBFAC,,, | Performed by: OPHTHALMOLOGY

## 2018-12-28 NOTE — PROGRESS NOTES
HPI     Pt here for Glaucoma Consult per Dr. Lombard;  Pt states her mother has glaucoma. Pt states she just moved her from   [place] and see was seeing Dr. Soren Neely there. Pt brought in notes   from previous office visit there.     Meds: No GTTS        Last edited by Margie Lakhani on 12/28/2018  8:11 AM. (History)            Assessment /Plan     For exam results, see Encounter Report.    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   First photos   2018   Treatment / Drops started   none           Family history    + mother / grtand father / uncle / cousins         Glaucoma meds    nne        H/O adverse rxn to glaucoma drops    none        LASERS    none        GLAUCOMA SURGERIES    none        OTHER EYE SURGERIES    none        CDR    0.8/0.7        Tbase    18-20  od // 16-20 os        Tmax    20/20 (outside records)            Ttarget    ?             HVF    / test 20/ to  20/ - ? od // ? os        Gonio    +3-4 ou         CCT    588/585        OCT    ? test 20/ to 20/ - RNFL - / od // / os        HRT    ? test 20? to 20? - MR - ? od // ? os        Disc photos    2018     - Ttoday    18/18  - Test done today    Establish care / gonio / DFE / photos     2. Dominate familial druse   Mostly outside the arcades ou    See photos - 2018     3. NS   Mild -monitor     4. Breast CA       PLAN   F/U with HVF / OCT

## 2019-01-09 ENCOUNTER — OFFICE VISIT (OUTPATIENT)
Dept: URGENT CARE | Facility: CLINIC | Age: 64
End: 2019-01-09
Payer: COMMERCIAL

## 2019-01-09 VITALS
WEIGHT: 250 LBS | TEMPERATURE: 98 F | BODY MASS INDEX: 41.65 KG/M2 | HEART RATE: 103 BPM | OXYGEN SATURATION: 97 % | DIASTOLIC BLOOD PRESSURE: 72 MMHG | SYSTOLIC BLOOD PRESSURE: 155 MMHG | RESPIRATION RATE: 16 BRPM | HEIGHT: 65 IN

## 2019-01-09 DIAGNOSIS — H69.92 EUSTACHIAN TUBE DYSFUNCTION, LEFT: ICD-10-CM

## 2019-01-09 DIAGNOSIS — L08.9 INFECTED CYST OF SKIN: Primary | ICD-10-CM

## 2019-01-09 DIAGNOSIS — L02.214 ABSCESS OF GROIN, LEFT: ICD-10-CM

## 2019-01-09 DIAGNOSIS — L72.9 INFECTED CYST OF SKIN: Primary | ICD-10-CM

## 2019-01-09 DIAGNOSIS — J30.9 ALLERGIC RHINITIS, UNSPECIFIED SEASONALITY, UNSPECIFIED TRIGGER: ICD-10-CM

## 2019-01-09 PROCEDURE — 3008F PR BODY MASS INDEX (BMI) DOCUMENTED: ICD-10-PCS | Mod: CPTII,S$GLB,, | Performed by: NURSE PRACTITIONER

## 2019-01-09 PROCEDURE — 3078F DIAST BP <80 MM HG: CPT | Mod: CPTII,S$GLB,, | Performed by: NURSE PRACTITIONER

## 2019-01-09 PROCEDURE — 3077F PR MOST RECENT SYSTOLIC BLOOD PRESSURE >= 140 MM HG: ICD-10-PCS | Mod: CPTII,S$GLB,, | Performed by: NURSE PRACTITIONER

## 2019-01-09 PROCEDURE — 99214 OFFICE O/P EST MOD 30 MIN: CPT | Mod: S$GLB,,, | Performed by: NURSE PRACTITIONER

## 2019-01-09 PROCEDURE — 3078F PR MOST RECENT DIASTOLIC BLOOD PRESSURE < 80 MM HG: ICD-10-PCS | Mod: CPTII,S$GLB,, | Performed by: NURSE PRACTITIONER

## 2019-01-09 PROCEDURE — 3077F SYST BP >= 140 MM HG: CPT | Mod: CPTII,S$GLB,, | Performed by: NURSE PRACTITIONER

## 2019-01-09 PROCEDURE — 3008F BODY MASS INDEX DOCD: CPT | Mod: CPTII,S$GLB,, | Performed by: NURSE PRACTITIONER

## 2019-01-09 PROCEDURE — 99214 PR OFFICE/OUTPT VISIT, EST, LEVL IV, 30-39 MIN: ICD-10-PCS | Mod: S$GLB,,, | Performed by: NURSE PRACTITIONER

## 2019-01-09 RX ORDER — SULFAMETHOXAZOLE AND TRIMETHOPRIM 800; 160 MG/1; MG/1
1 TABLET ORAL 2 TIMES DAILY
Qty: 14 TABLET | Refills: 0 | Status: SHIPPED | OUTPATIENT
Start: 2019-01-09 | End: 2019-01-16

## 2019-01-09 RX ORDER — BENZONATATE 100 MG/1
200 CAPSULE ORAL 3 TIMES DAILY PRN
Qty: 30 CAPSULE | Refills: 0 | Status: SHIPPED | OUTPATIENT
Start: 2019-01-09 | End: 2019-04-26

## 2019-01-09 RX ORDER — MUPIROCIN 20 MG/G
OINTMENT TOPICAL
Qty: 22 G | Refills: 0 | Status: SHIPPED | OUTPATIENT
Start: 2019-01-09 | End: 2019-04-26

## 2019-01-09 NOTE — PROGRESS NOTES
"Subjective:       Patient ID: Regine Alfred is a 63 y.o. female.    Vitals:  height is 5' 5" (1.651 m) and weight is 113.4 kg (250 lb). Her temperature is 98 °F (36.7 °C). Her blood pressure is 155/72 (abnormal) and her pulse is 103. Her respiration is 16 and oxygen saturation is 97%.     Chief Complaint: Otalgia and Abscess    Left ear pain, dry cough, and abscess on groin for 1 week       Otalgia    There is pain in the left ear. This is a new problem. The current episode started in the past 7 days. The problem occurs constantly. The problem has been unchanged. There has been no fever. Associated symptoms include coughing. Pertinent negatives include no rash, sore throat or vomiting. She has tried NSAIDs for the symptoms. The treatment provided mild relief.   Abscess   Associated Symptoms: no fever, no chills      Constitution: Negative for chills, sweating, fatigue and fever.   HENT: Positive for ear pain and postnasal drip. Negative for congestion, sinus pain, sinus pressure, sore throat and voice change.    Neck: Negative for painful lymph nodes.   Eyes: Negative for eye redness.   Respiratory: Positive for cough. Negative for chest tightness, sputum production, bloody sputum, COPD, shortness of breath, stridor, wheezing and asthma.    Gastrointestinal: Negative for nausea and vomiting.   Musculoskeletal: Negative for muscle ache.   Skin: Positive for erythema and abscess. Negative for rash.   Allergic/Immunologic: Negative for seasonal allergies and asthma.   Hematologic/Lymphatic: Negative for swollen lymph nodes.       Objective:      Physical Exam   Constitutional: She is oriented to person, place, and time. She appears well-developed and well-nourished. She is cooperative.  Non-toxic appearance. She does not appear ill. No distress.   NAD   Afebrile    HENT:   Head: Normocephalic and atraumatic. Head is without abrasion, without contusion and without laceration.   Right Ear: Hearing, tympanic membrane, " external ear and ear canal normal.   Left Ear: Hearing, external ear and ear canal normal. No drainage, swelling or tenderness. No foreign bodies. No mastoid tenderness. Tympanic membrane is not injected, not scarred, not perforated, not erythematous, not retracted and not bulging. A middle ear effusion (clear) is present. No decreased hearing is noted.   Nose: Mucosal edema and rhinorrhea (clear) present. No sinus tenderness or nasal deformity. No epistaxis. Right sinus exhibits no maxillary sinus tenderness and no frontal sinus tenderness. Left sinus exhibits no maxillary sinus tenderness and no frontal sinus tenderness.   Mouth/Throat: Uvula is midline and mucous membranes are normal. No trismus in the jaw. Normal dentition. No uvula swelling. Posterior oropharyngeal edema and posterior oropharyngeal erythema present. No oropharyngeal exudate or tonsillar abscesses.   Cobblestone appearance to posterior pharynx.    Eyes: Conjunctivae, EOM and lids are normal. Pupils are equal, round, and reactive to light. No scleral icterus.   Sclera clear bilat   Neck: Trachea normal, normal range of motion, full passive range of motion without pain and phonation normal. Neck supple.   Cardiovascular: Normal rate, regular rhythm, normal heart sounds, intact distal pulses and normal pulses.   Pulmonary/Chest: Effort normal and breath sounds normal. No stridor. No respiratory distress.   Abdominal: Soft. Normal appearance and bowel sounds are normal. She exhibits no distension. There is no tenderness.   Musculoskeletal: Normal range of motion. She exhibits no edema or deformity.   Lymphadenopathy:        Head (right side): No submandibular and no tonsillar adenopathy present.        Head (left side): No submandibular and no tonsillar adenopathy present.     She has no cervical adenopathy.        Right cervical: No superficial cervical and no posterior cervical adenopathy present.       Left cervical: No superficial cervical and  "no posterior cervical adenopathy present.   Neurological: She is alert and oriented to person, place, and time. She exhibits normal muscle tone. Coordination normal.   Skin: Skin is warm, dry and intact. Capillary refill takes less than 2 seconds. Lesion noted. No abrasion, no bruising, no burn, no ecchymosis, no laceration and no rash noted. She is not diaphoretic. There is erythema. No pallor.        Psychiatric: She has a normal mood and affect. Her speech is normal and behavior is normal. Judgment and thought content normal. Cognition and memory are normal.   Nursing note and vitals reviewed.        Assessment:       1. Infected cyst of skin    2. Abscess of groin, left    3. Eustachian tube dysfunction, left    4. Allergic rhinitis, unspecified seasonality, unspecified trigger        Plan:         Infected cyst of skin  Comments:  groin   Orders:  -     mupirocin (BACTROBAN) 2 % ointment; Apply to affected area 2 times daily  Dispense: 22 g; Refill: 0  -     sulfamethoxazole-trimethoprim 800-160mg (BACTRIM DS) 800-160 mg Tab; Take 1 tablet by mouth 2 (two) times daily. for 7 days  Dispense: 14 tablet; Refill: 0  -     Incision & Drainage    Abscess of groin, left  -     mupirocin (BACTROBAN) 2 % ointment; Apply to affected area 2 times daily  Dispense: 22 g; Refill: 0  -     sulfamethoxazole-trimethoprim 800-160mg (BACTRIM DS) 800-160 mg Tab; Take 1 tablet by mouth 2 (two) times daily. for 7 days  Dispense: 14 tablet; Refill: 0  -     Incision & Drainage    Eustachian tube dysfunction, left    Allergic rhinitis, unspecified seasonality, unspecified trigger  -     benzonatate (TESSALON PERLES) 100 MG capsule; Take 2 capsules (200 mg total) by mouth 3 (three) times daily as needed for Cough.  Dispense: 30 capsule; Refill: 0    Incision & Drainage  Date/Time: 1/9/2019 11:46 AM  Performed by: Christie Jiménez NP  Authorized by: Christie Jiménez NP     Time out: Immediately prior to procedure a "time out" was called to " verify the correct patient, procedure, equipment, support staff and site/side marked as required.    Consent Done?:  Yes (Verbal)    Type:  Abscess  Body area:  Anogenital (left groin)  Anesthesia:  Local infiltration  Local anesthetic: lidocaine 2% without epinephrine  Anesthetic total (ml):  3  Risk factor:  Underlying major vessel and underlying major nerve  Scalpel size:  11  Incision type:  Single straight  Complexity:  Simple  Drainage:  Purulent and bloody  Drainage amount: small.  Wound treatment:  Incision, wound left open, drainage, expression of material and removal of cyst capsule  Patient tolerance:  Patient tolerated the procedure well with no immediate complications    Cyst completely removed.          Her Tetanus is UTD   Discussed home wound care and warning s.s for reevaluation.     Patient Instructions   Zyrtec   Nasal spray that you have at home.   Take Coricidin HBP for decongestant instead of OTC cough/cold medicine due to your high blood pressure.     Bactrim DS for infected cyst.   Clean wound 2 times per day with antibacterial soap and water and apply Bactroban ointment. Monitor for s/s of infection as we discussed in clinic today.   Follow up for any s/s of infection- increased pain, swelling, redness, drainage with pus, fever or chills.       Please return here or go to the Emergency Department for any concerns or worsening of condition.  If you were prescribed antibiotics, please take them to completion.  If you were prescribed a narcotic medication, do not drive or operate heavy equipment or machinery while taking these medications.  Please follow up with your primary care doctor or specialist as needed.    If you  smoke, please stop smoking.      Abscess (Incision & Drainage)  An abscess is sometimes called a boil. It happens when bacteria get trapped under the skin and start to grow. Pus forms inside the abscess as the body responds to the bacteria. An abscess can happen with an insect  bite, ingrown hair, blocked oil gland, pimple, cyst, or puncture wound.  Your healthcare provider has drained the pus from your abscess. If the abscess pocket was large, your healthcare provider may have put in gauze packing. Your provider will need to remove it on your next visit. He or she may also replace it at that time. You may not need antibiotics to treat a simple abscess, unless the infection is spreading into the skin around the wound (cellulitis).  The wound will take about 1 to 2 weeks to heal, depending on the size of the abscess. Healthy tissue will grow from the bottom and sides of the opening until it seals over.  Home care  These tips can help your wound heal:  · The wound may drain for the first 2 days. Cover the wound with a clean dry dressing. Change the dressing if it becomes soaked with blood or pus.  · If a gauze packing was placed inside the abscess pocket, you may be told to remove it yourself. You may do this in the shower. Once the packing is removed, you should wash the area in the shower, or clean the area as directed by your provider. Continue to do this until the skin opening has closed. Make sure you wash your hands after changing the packing or cleaning the wound.  · If you were prescribed antibiotics, take them as directed until they are all gone.  · You may use acetaminophen or ibuprofen to control pain, unless another pain medicine was prescribed. If you have liver disease or ever had a stomach ulcer, talk with your doctor before using these medicines.  Follow-up care  Follow up with your healthcare provider, or as advised. If a gauze packing was put in your wound, it should be removed in 1 to 2 days. Check your wound every day for any signs that the infection is getting worse. The signs are listed below.  When to seek medical advice  Call your healthcare provider right away if any of these occur:  · Increasing redness or swelling  · Red streaks in the skin leading away from the  wound  · Increasing local pain or swelling  · Continued pus draining from the wound 2 days after treatment  · Fever of 100.4ºF (38ºC) or higher, or as directed by your healthcare provider  · Boil returns when you are at home  Date Last Reviewed: 9/1/2016  © 1537-4079 [employer]. [address] [place] [address], [place] [address]. All rights reserved. This information is not intended as a substitute for professional medical care. Always follow your healthcare professional's instructions.        Allergic Rhinitis  Allergic rhinitis is an allergic reaction that affects the nose, and often the eyes. Its often known as nasal allergies. Nasal allergies are often due to things in the environment that are breathed in. Depending what you are sensitive to, nasal allergies may occur only during certain seasons. Or they may occur year round. Common indoor allergens include house dust mites, mold, cockroaches, and pet dander. Outdoor allergens include pollen from trees, grasses, and weeds.   Symptoms include a drippy, stuffy, and itchy nose. They also include sneezing and red and itchy eyes. You may feel tired more often. Severe allergies may also affect your breathing and trigger a condition called asthma.   Tests can be done to see what allergens are affecting you. You may be referred to an allergy specialist for testing and further evaluation.  Home care  Your healthcare provider may prescribe medicines to help relieve allergy symptoms. These may include oral medicines, nasal sprays, or eye drops.  Ask your provider for advice on how to avoid substances that you are allergic to. Below are a few tips for each type of allergen.  Pet dander:  · Do not have pets with fur and feathers.  · If you can't avoid having a pet, keep it out of your bedroom and off upholstered furniture.  Pollen:  · When pollen counts are high, keep windows of your car and home closed. If possible, use an air conditioner instead.  · Wear a filter  mask when mowing or doing yard work.  House dust mites:  · Wash bedding every week in warm water and detergent and dry on a hot setting.  · Cover the mattress, box spring, and pillows with allergy covers.   · If possible, sleep in a room with no carpet, curtains, or upholstered furniture.  Cockroaches:  · Store food in sealed containers.  · Remove garbage from the home promptly.  · Fix water leaks  Mold:  · Keep humidity low by using a dehumidifier or air conditioner. Keep the dehumidifier and air conditioner clean and free of mold.  · Clean moldy areas with bleach and water.  In general:  · Vacuum once or twice a week. If possible, use a vacuum with a high-efficiency particulate air (HEPA) filter.  · Do not smoke. Avoid cigarette smoke. Cigarette smoke is an irritant that can make symptoms worse.  Follow-up care  Follow up as advised by the healthcare provider or our staff. If you were referred to an allergy specialist, make this appointment promptly.  When to seek medical advice  Call your healthcare provider right away if the following occur:  · Coughing or wheezing  · Fever greater than 100.4°F (38°C)  · Hives (raised red bumps)  · Continuing symptoms, new symptoms, or worsening symptoms  Call 911 right away if you have:  · Trouble breathing  · Severe swelling of the face or severe itching of the eyes or mouth  Date Last Reviewed: 3/1/2017  © 7289-1049 [employer]. [address] [place] [address]. All rights reserved. This information is not intended as a substitute for professional medical care. Always follow your healthcare professional's instructions.

## 2019-01-09 NOTE — PATIENT INSTRUCTIONS
Zyrtec   Nasal spray that you have at home.   Take Coricidin HBP for decongestant instead of OTC cough/cold medicine due to your high blood pressure.     Bactrim DS for infected cyst.   Clean wound 2 times per day with antibacterial soap and water and apply Bactroban ointment. Monitor for s/s of infection as we discussed in clinic today.   Follow up for any s/s of infection- increased pain, swelling, redness, drainage with pus, fever or chills.       Please return here or go to the Emergency Department for any concerns or worsening of condition.  If you were prescribed antibiotics, please take them to completion.  If you were prescribed a narcotic medication, do not drive or operate heavy equipment or machinery while taking these medications.  Please follow up with your primary care doctor or specialist as needed.    If you  smoke, please stop smoking.      Abscess (Incision & Drainage)  An abscess is sometimes called a boil. It happens when bacteria get trapped under the skin and start to grow. Pus forms inside the abscess as the body responds to the bacteria. An abscess can happen with an insect bite, ingrown hair, blocked oil gland, pimple, cyst, or puncture wound.  Your healthcare provider has drained the pus from your abscess. If the abscess pocket was large, your healthcare provider may have put in gauze packing. Your provider will need to remove it on your next visit. He or she may also replace it at that time. You may not need antibiotics to treat a simple abscess, unless the infection is spreading into the skin around the wound (cellulitis).  The wound will take about 1 to 2 weeks to heal, depending on the size of the abscess. Healthy tissue will grow from the bottom and sides of the opening until it seals over.  Home care  These tips can help your wound heal:  · The wound may drain for the first 2 days. Cover the wound with a clean dry dressing. Change the dressing if it becomes soaked with blood or  pus.  · If a gauze packing was placed inside the abscess pocket, you may be told to remove it yourself. You may do this in the shower. Once the packing is removed, you should wash the area in the shower, or clean the area as directed by your provider. Continue to do this until the skin opening has closed. Make sure you wash your hands after changing the packing or cleaning the wound.  · If you were prescribed antibiotics, take them as directed until they are all gone.  · You may use acetaminophen or ibuprofen to control pain, unless another pain medicine was prescribed. If you have liver disease or ever had a stomach ulcer, talk with your doctor before using these medicines.  Follow-up care  Follow up with your healthcare provider, or as advised. If a gauze packing was put in your wound, it should be removed in 1 to 2 days. Check your wound every day for any signs that the infection is getting worse. The signs are listed below.  When to seek medical advice  Call your healthcare provider right away if any of these occur:  · Increasing redness or swelling  · Red streaks in the skin leading away from the wound  · Increasing local pain or swelling  · Continued pus draining from the wound 2 days after treatment  · Fever of 100.4ºF (38ºC) or higher, or as directed by your healthcare provider  · Boil returns when you are at home  Date Last Reviewed: 9/1/2016 © 2000-2017 [employer]. [address] [place] [address]. All rights reserved. This information is not intended as a substitute for professional medical care. Always follow your healthcare professional's instructions.        Allergic Rhinitis  Allergic rhinitis is an allergic reaction that affects the nose, and often the eyes. Its often known as nasal allergies. Nasal allergies are often due to things in the environment that are breathed in. Depending what you are sensitive to, nasal allergies may occur only during certain seasons. Or they may  occur year round. Common indoor allergens include house dust mites, mold, cockroaches, and pet dander. Outdoor allergens include pollen from trees, grasses, and weeds.   Symptoms include a drippy, stuffy, and itchy nose. They also include sneezing and red and itchy eyes. You may feel tired more often. Severe allergies may also affect your breathing and trigger a condition called asthma.   Tests can be done to see what allergens are affecting you. You may be referred to an allergy specialist for testing and further evaluation.  Home care  Your healthcare provider may prescribe medicines to help relieve allergy symptoms. These may include oral medicines, nasal sprays, or eye drops.  Ask your provider for advice on how to avoid substances that you are allergic to. Below are a few tips for each type of allergen.  Pet dander:  · Do not have pets with fur and feathers.  · If you can't avoid having a pet, keep it out of your bedroom and off upholstered furniture.  Pollen:  · When pollen counts are high, keep windows of your car and home closed. If possible, use an air conditioner instead.  · Wear a filter mask when mowing or doing yard work.  House dust mites:  · Wash bedding every week in warm water and detergent and dry on a hot setting.  · Cover the mattress, box spring, and pillows with allergy covers.   · If possible, sleep in a room with no carpet, curtains, or upholstered furniture.  Cockroaches:  · Store food in sealed containers.  · Remove garbage from the home promptly.  · Fix water leaks  Mold:  · Keep humidity low by using a dehumidifier or air conditioner. Keep the dehumidifier and air conditioner clean and free of mold.  · Clean moldy areas with bleach and water.  In general:  · Vacuum once or twice a week. If possible, use a vacuum with a high-efficiency particulate air (HEPA) filter.  · Do not smoke. Avoid cigarette smoke. Cigarette smoke is an irritant that can make symptoms worse.  Follow-up care  Follow  up as advised by the healthcare provider or our staff. If you were referred to an allergy specialist, make this appointment promptly.  When to seek medical advice  Call your healthcare provider right away if the following occur:  · Coughing or wheezing  · Fever greater than 100.4°F (38°C)  · Hives (raised red bumps)  · Continuing symptoms, new symptoms, or worsening symptoms  Call 911 right away if you have:  · Trouble breathing  · Severe swelling of the face or severe itching of the eyes or mouth  Date Last Reviewed: 3/1/2017  © 6212-9342 [employer]. [address] [place] [address]. All rights reserved. This information is not intended as a substitute for professional medical care. Always follow your healthcare professional's instructions.

## 2019-01-25 ENCOUNTER — PATIENT MESSAGE (OUTPATIENT)
Dept: FAMILY MEDICINE | Facility: CLINIC | Age: 64
End: 2019-01-25

## 2019-01-25 DIAGNOSIS — M48.062 SPINAL STENOSIS OF LUMBAR REGION WITH NEUROGENIC CLAUDICATION: Primary | ICD-10-CM

## 2019-01-29 ENCOUNTER — PATIENT MESSAGE (OUTPATIENT)
Dept: FAMILY MEDICINE | Facility: CLINIC | Age: 64
End: 2019-01-29

## 2019-01-29 RX ORDER — GABAPENTIN 600 MG/1
600 TABLET ORAL 3 TIMES DAILY
Qty: 90 TABLET | Refills: 2 | Status: SHIPPED | OUTPATIENT
Start: 2019-01-29 | End: 2019-04-21 | Stop reason: SDUPTHER

## 2019-01-29 NOTE — TELEPHONE ENCOUNTER
Spoke with patient. States she sent a message on 1/25/2019 letting Dr.Lombard know she changed her medication regimen and she has not heard a response. Would like to know if he is ok with what she changed and if so to send in a new prescription or does he have something else he would like for her to do. Please advise.

## 2019-01-29 NOTE — TELEPHONE ENCOUNTER
----- Message from Daniel Park sent at 1/29/2019 11:38 AM CST -----  Contact: Self/[phone]  The patient would like to speak to the staff regarding a message she sent on 1/25.        Thank you

## 2019-02-04 ENCOUNTER — OFFICE VISIT (OUTPATIENT)
Dept: HEMATOLOGY/ONCOLOGY | Facility: CLINIC | Age: 64
End: 2019-02-04
Payer: COMMERCIAL

## 2019-02-04 VITALS
HEIGHT: 65 IN | HEART RATE: 107 BPM | SYSTOLIC BLOOD PRESSURE: 142 MMHG | DIASTOLIC BLOOD PRESSURE: 79 MMHG | RESPIRATION RATE: 16 BRPM | OXYGEN SATURATION: 96 % | WEIGHT: 264.31 LBS | BODY MASS INDEX: 44.04 KG/M2 | TEMPERATURE: 99 F

## 2019-02-04 DIAGNOSIS — Z85.3 HISTORY OF BREAST CANCER IN FEMALE: Primary | ICD-10-CM

## 2019-02-04 PROCEDURE — 3008F BODY MASS INDEX DOCD: CPT | Mod: CPTII,S$GLB,, | Performed by: INTERNAL MEDICINE

## 2019-02-04 PROCEDURE — 3008F PR BODY MASS INDEX (BMI) DOCUMENTED: ICD-10-PCS | Mod: CPTII,S$GLB,, | Performed by: INTERNAL MEDICINE

## 2019-02-04 PROCEDURE — 99999 PR PBB SHADOW E&M-EST. PATIENT-LVL III: CPT | Mod: PBBFAC,,, | Performed by: INTERNAL MEDICINE

## 2019-02-04 PROCEDURE — 3077F SYST BP >= 140 MM HG: CPT | Mod: CPTII,S$GLB,, | Performed by: INTERNAL MEDICINE

## 2019-02-04 PROCEDURE — 3077F PR MOST RECENT SYSTOLIC BLOOD PRESSURE >= 140 MM HG: ICD-10-PCS | Mod: CPTII,S$GLB,, | Performed by: INTERNAL MEDICINE

## 2019-02-04 PROCEDURE — 3078F DIAST BP <80 MM HG: CPT | Mod: CPTII,S$GLB,, | Performed by: INTERNAL MEDICINE

## 2019-02-04 PROCEDURE — 99214 PR OFFICE/OUTPT VISIT, EST, LEVL IV, 30-39 MIN: ICD-10-PCS | Mod: S$GLB,,, | Performed by: INTERNAL MEDICINE

## 2019-02-04 PROCEDURE — 99214 OFFICE O/P EST MOD 30 MIN: CPT | Mod: S$GLB,,, | Performed by: INTERNAL MEDICINE

## 2019-02-04 PROCEDURE — 3078F PR MOST RECENT DIASTOLIC BLOOD PRESSURE < 80 MM HG: ICD-10-PCS | Mod: CPTII,S$GLB,, | Performed by: INTERNAL MEDICINE

## 2019-02-04 PROCEDURE — 99999 PR PBB SHADOW E&M-EST. PATIENT-LVL III: ICD-10-PCS | Mod: PBBFAC,,, | Performed by: INTERNAL MEDICINE

## 2019-02-04 RX ORDER — NAPROXEN SODIUM 220 MG/1
81 TABLET, FILM COATED ORAL DAILY
COMMUNITY
End: 2022-07-29

## 2019-02-04 NOTE — PROGRESS NOTES
Subjective:       Patient ID: Regine Alfred is a 64 y.o. female.    Chief Complaint: No chief complaint on file.    HPI      Mrs. Alfred returns today for follow up.  Briefly, she is a very pleasant 63-year-old female who recently relocated to [place] from [place] to live closer to her family.  She has a history of a stage I triple negative breast cancer that was   diagnosed a year ago.  She underwent a left modified radical mastectomy in [facility] on 11/01/2017 and had a stage IA carcinoma measuring 1.2 cm in greatest diameter.  Resection margins were clear, while four sentinel lymph   nodes were negative.  Postoperatively, she received four cycles of docetaxel and cyclophosphamide.  The first cycle was administered on 11/30/2017 and she completed her treatment by 02/01/2018.  She has been followed expectantly and   has been MERRY since then.  As mentioned above, last year she relocated to [place] and I have assumed her care.      Review of Systems    Overall she feels well.  She only complains of symptoms related to her fibromyalgia. .  She denies any anxiety, depression, easy bruising, fevers, chills, night  sweats, weight loss, nausea, vomiting, diarrhea, constipation, diplopia, blurred vision, headache, chest pain, palpitations, shortness of breath, breast pain, abdominal pain, extremity pain, or difficulty ambulating.  The remainder of the ten-point ROS, including general, skin, lymph, H/N, cardiorespiratory, GI, , Neuro, Endocrine, and psychiatric is negative.     Objective:      Physical Exam      She is alert, oriented to time, place, person, pleasant, well      nourished, in no acute physical distress.                                    VITAL SIGNS:  Reviewed                                      HEENT:  Normal.  There are no nasal, oral, lip, gingival, auricular, lid,    or conjunctival lesions.  Mucosae are moist and pink, and there is no        thrush.  Pupils are equal, reactive to  light and accommodation.              Extraocular muscle movements are intact.  Dentition is good.  There is no frontal or maxillary tenderness.                                     NECK:  Supple without JVD, adenopathy, but she does have thyromegaly.                       LUNGS:  Clear to auscultation without wheezing, rales, or rhonchi.           CARDIOVASCULAR:  Reveals an S1, S2, no murmurs, no rubs, no gallops.         ABDOMEN:  Soft, nontender, without organomegaly.  Bowel sounds are    present.                                                                     EXTREMITIES:  No cyanosis, clubbing, or edema.                               BREASTS:  She is status post left mastectomy with a well-healed periareolar incision.    There are no masses in the right breast.     LYMPHATIC:  There is no cervical, axillary, or supraclavicular adenopathy.   SKIN:  Warm and moist, without petechiae, rashes, induration, or ecchymoses.           NEUROLOGIC:  DTRs are 0-1+ bilaterally, symmetrical, motor function is 5/5,  and cranial nerves are  within normal limits.    Assessment:       1. History of breast cancer in female, clinically MERRY, doing well.      2.    History of hypertension and diabetes.   3.      Thyromegaly  Plan:        Mrs Alfred is doing well, and remains MERRY.  I have asked her to return and see me in four months with a mammogram.  In regards to her thyromegaly, I asked her to address that through her PCP, who is being ccd on this note.   Her multiple questions were answered to her satisfaction.

## 2019-02-21 ENCOUNTER — HOSPITAL ENCOUNTER (OUTPATIENT)
Dept: RADIOLOGY | Facility: HOSPITAL | Age: 64
Discharge: HOME | End: 2019-02-21
Attending: INTERNAL MEDICINE
Payer: COMMERCIAL

## 2019-02-21 DIAGNOSIS — Z85.3 HISTORY OF BREAST CANCER IN FEMALE: ICD-10-CM

## 2019-02-21 PROCEDURE — 77065 MAMMO DIGITAL DIAGNOSTIC RIGHT WITH TOMOSYNTHESIS_CAD: ICD-10-PCS | Mod: 26,,, | Performed by: RADIOLOGY

## 2019-02-21 PROCEDURE — 77061 MAMMO DIGITAL DIAGNOSTIC RIGHT WITH TOMOSYNTHESIS_CAD: ICD-10-PCS | Mod: 26,,, | Performed by: RADIOLOGY

## 2019-02-21 PROCEDURE — 77061 BREAST TOMOSYNTHESIS UNI: CPT | Mod: 26,,, | Performed by: RADIOLOGY

## 2019-02-21 PROCEDURE — 77065 DX MAMMO INCL CAD UNI: CPT | Mod: TC,PO

## 2019-02-21 PROCEDURE — 77065 DX MAMMO INCL CAD UNI: CPT | Mod: 26,,, | Performed by: RADIOLOGY

## 2019-02-26 ENCOUNTER — OFFICE VISIT (OUTPATIENT)
Dept: FAMILY MEDICINE | Facility: CLINIC | Age: 64
End: 2019-02-26
Payer: COMMERCIAL

## 2019-02-26 VITALS
RESPIRATION RATE: 17 BRPM | HEIGHT: 65 IN | SYSTOLIC BLOOD PRESSURE: 128 MMHG | HEART RATE: 100 BPM | TEMPERATURE: 98 F | BODY MASS INDEX: 43.12 KG/M2 | WEIGHT: 258.81 LBS | OXYGEN SATURATION: 98 % | DIASTOLIC BLOOD PRESSURE: 72 MMHG

## 2019-02-26 DIAGNOSIS — I10 ESSENTIAL HYPERTENSION: Primary | ICD-10-CM

## 2019-02-26 DIAGNOSIS — G43.009 MIGRAINE WITHOUT AURA AND WITHOUT STATUS MIGRAINOSUS, NOT INTRACTABLE: ICD-10-CM

## 2019-02-26 DIAGNOSIS — M79.7 FIBROMYALGIA: ICD-10-CM

## 2019-02-26 DIAGNOSIS — E04.1 THYROID NODULE: ICD-10-CM

## 2019-02-26 DIAGNOSIS — E11.65 TYPE 2 DIABETES MELLITUS WITH HYPERGLYCEMIA, WITHOUT LONG-TERM CURRENT USE OF INSULIN: ICD-10-CM

## 2019-02-26 DIAGNOSIS — M48.062 SPINAL STENOSIS OF LUMBAR REGION WITH NEUROGENIC CLAUDICATION: ICD-10-CM

## 2019-02-26 PROCEDURE — 99214 PR OFFICE/OUTPT VISIT, EST, LEVL IV, 30-39 MIN: ICD-10-PCS | Mod: S$GLB,,, | Performed by: FAMILY MEDICINE

## 2019-02-26 PROCEDURE — 3078F DIAST BP <80 MM HG: CPT | Mod: CPTII,S$GLB,, | Performed by: FAMILY MEDICINE

## 2019-02-26 PROCEDURE — 3074F PR MOST RECENT SYSTOLIC BLOOD PRESSURE < 130 MM HG: ICD-10-PCS | Mod: CPTII,S$GLB,, | Performed by: FAMILY MEDICINE

## 2019-02-26 PROCEDURE — 99999 PR PBB SHADOW E&M-EST. PATIENT-LVL IV: CPT | Mod: PBBFAC,,, | Performed by: FAMILY MEDICINE

## 2019-02-26 PROCEDURE — 3045F PR MOST RECENT HEMOGLOBIN A1C LEVEL 7.0-9.0%: CPT | Mod: CPTII,S$GLB,, | Performed by: FAMILY MEDICINE

## 2019-02-26 PROCEDURE — 3074F SYST BP LT 130 MM HG: CPT | Mod: CPTII,S$GLB,, | Performed by: FAMILY MEDICINE

## 2019-02-26 PROCEDURE — 3078F PR MOST RECENT DIASTOLIC BLOOD PRESSURE < 80 MM HG: ICD-10-PCS | Mod: CPTII,S$GLB,, | Performed by: FAMILY MEDICINE

## 2019-02-26 PROCEDURE — 3008F PR BODY MASS INDEX (BMI) DOCUMENTED: ICD-10-PCS | Mod: CPTII,S$GLB,, | Performed by: FAMILY MEDICINE

## 2019-02-26 PROCEDURE — 3008F BODY MASS INDEX DOCD: CPT | Mod: CPTII,S$GLB,, | Performed by: FAMILY MEDICINE

## 2019-02-26 PROCEDURE — 99214 OFFICE O/P EST MOD 30 MIN: CPT | Mod: S$GLB,,, | Performed by: FAMILY MEDICINE

## 2019-02-26 PROCEDURE — 3045F PR MOST RECENT HEMOGLOBIN A1C LEVEL 7.0-9.0%: ICD-10-PCS | Mod: CPTII,S$GLB,, | Performed by: FAMILY MEDICINE

## 2019-02-26 PROCEDURE — 99999 PR PBB SHADOW E&M-EST. PATIENT-LVL IV: ICD-10-PCS | Mod: PBBFAC,,, | Performed by: FAMILY MEDICINE

## 2019-02-26 RX ORDER — METFORMIN HYDROCHLORIDE 1000 MG/1
1000 TABLET ORAL 2 TIMES DAILY WITH MEALS
Qty: 180 TABLET | Refills: 1 | Status: SHIPPED | OUTPATIENT
Start: 2019-02-26 | End: 2019-08-19 | Stop reason: SDUPTHER

## 2019-02-26 RX ORDER — LOSARTAN POTASSIUM 50 MG/1
50 TABLET ORAL DAILY
Qty: 90 TABLET | Refills: 0 | Status: SHIPPED | OUTPATIENT
Start: 2019-02-26 | End: 2019-05-21 | Stop reason: SDUPTHER

## 2019-02-26 RX ORDER — HYDROCHLOROTHIAZIDE 25 MG/1
25 TABLET ORAL DAILY
Qty: 90 TABLET | Refills: 1 | Status: SHIPPED | OUTPATIENT
Start: 2019-02-26 | End: 2019-08-19 | Stop reason: SDUPTHER

## 2019-02-26 NOTE — PROGRESS NOTES
Chief Complaint   Patient presents with    Hypertension     3 months follow up    Fibromyalgia    Otalgia     lt ear       Regine Alfred is a 64 y.o. female who presents per the Chief Complaint.  Pt is known to me and was last seen by me on 11/26/2018.  All known chronic medical issues have been documented.       Hypertension   This is a chronic problem. The current episode started more than 1 year ago. The problem is unchanged. The problem is controlled. Pertinent negatives include no anxiety, blurred vision, chest pain, headaches, malaise/fatigue, neck pain, orthopnea, palpitations, peripheral edema, PND, shortness of breath or sweats. Agents associated with hypertension include steroids. Risk factors for coronary artery disease include diabetes mellitus, obesity and post-menopausal state. Past treatments include angiotensin blockers and diuretics. The current treatment provides moderate improvement. There are no compliance problems.  There is no history of angina, kidney disease, CAD/MI, CVA, heart failure, left ventricular hypertrophy, PVD or retinopathy. There is no history of chronic renal disease, coarctation of the aorta, hyperaldosteronism, hypercortisolism, hyperparathyroidism, a hypertension causing med, pheochromocytoma, renovascular disease, sleep apnea or a thyroid problem.   Fibromyalgia   This is a chronic problem. The current episode started more than 1 year ago. The problem occurs daily. The problem has been waxing and waning. Associated symptoms include arthralgias, coughing and myalgias. Pertinent negatives include no abdominal pain, anorexia, change in bowel habit, chest pain, chills, congestion, diaphoresis, fatigue, fever, headaches, joint swelling, nausea, neck pain, numbness, rash, sore throat, swollen glands, urinary symptoms, vertigo, visual change, vomiting or weakness. The symptoms are aggravated by bending, exertion, standing and walking. She has tried acetaminophen, oral  narcotics, NSAIDs, rest and position changes for the symptoms. The treatment provided moderate relief.   Otalgia    There is pain in the left ear. This is a recurrent problem. The current episode started 1 to 4 weeks ago. The problem occurs hourly. The problem has been waxing and waning. There has been no fever. The pain is at a severity of 2/10. The pain is mild. Associated symptoms include coughing, diarrhea and rhinorrhea. Pertinent negatives include no abdominal pain, ear discharge, headaches, hearing loss, neck pain, rash, sore throat or vomiting. She has tried nothing for the symptoms. There is no history of a chronic ear infection, hearing loss or a tympanostomy tube.   Diabetes   She presents for her follow-up diabetic visit. She has type 2 diabetes mellitus. Her disease course has been stable. There are no hypoglycemic associated symptoms. Pertinent negatives for hypoglycemia include no confusion, dizziness, headaches, nervousness/anxiousness, seizures or sweats. There are no diabetic associated symptoms. Pertinent negatives for diabetes include no blurred vision, no chest pain, no fatigue, no polydipsia, no polyuria, no visual change and no weakness. There are no hypoglycemic complications. Symptoms are stable. There are no diabetic complications. Pertinent negatives for diabetic complications include no CVA, PVD or retinopathy. Risk factors for coronary artery disease include diabetes mellitus, hypertension, obesity and post-menopausal. Current diabetic treatment includes oral agent (monotherapy). She is compliant with treatment most of the time. Her weight is stable. She is following a generally healthy diet. When asked about meal planning, she reported none. She has not had a previous visit with a dietitian. She participates in exercise intermittently. There is no change in her home blood glucose trend. An ACE inhibitor/angiotensin II receptor blocker is being taken. She does not see a podiatrist.Eye  "exam is current.        ROS  Review of Systems   Constitutional: Positive for activity change. Negative for appetite change, chills, diaphoresis, fatigue, fever, malaise/fatigue and unexpected weight change.   HENT: Positive for ear pain, postnasal drip and rhinorrhea. Negative for congestion, dental problem, drooling, ear discharge, facial swelling, hearing loss, mouth sores, nosebleeds, sinus pressure, sneezing, sore throat and trouble swallowing.    Eyes: Negative for blurred vision, pain, discharge and visual disturbance.   Respiratory: Positive for cough. Negative for choking, chest tightness, shortness of breath and wheezing.    Cardiovascular: Negative for chest pain, palpitations, orthopnea, leg swelling and PND.   Gastrointestinal: Positive for constipation and diarrhea. Negative for abdominal pain, anorexia, blood in stool, change in bowel habit, nausea and vomiting.   Endocrine: Negative for polydipsia and polyuria.   Genitourinary: Negative for difficulty urinating, dysuria, frequency, hematuria, menstrual problem and urgency.   Musculoskeletal: Positive for arthralgias, gait problem and myalgias. Negative for back pain, joint swelling and neck pain.   Skin: Negative.  Negative for rash.   Allergic/Immunologic: Negative for environmental allergies and food allergies.   Neurological: Negative for dizziness, vertigo, seizures, syncope, weakness, light-headedness, numbness and headaches.   Psychiatric/Behavioral: Negative.  Negative for confusion, decreased concentration, dysphoric mood and sleep disturbance. The patient is not nervous/anxious.        Physical Exam  Vitals:    02/26/19 0855   BP: 128/72   Pulse: 100   Resp: 17   Temp: 98 °F (36.7 °C)    Body mass index is 43.07 kg/m².  Weight: 117.4 kg (258 lb 13.1 oz)   Height: 5' 5" (165.1 cm)     Physical Exam   Constitutional: She is oriented to person, place, and time. She appears well-developed and well-nourished. She is active and cooperative.  " Non-toxic appearance. She does not have a sickly appearance. She does not appear ill. No distress.   HENT:   Head: Normocephalic and atraumatic.   Right Ear: Hearing and external ear normal. No decreased hearing is noted.   Left Ear: Hearing and external ear normal. No decreased hearing is noted.   Nose: Nose normal. No rhinorrhea or nasal deformity.   Mouth/Throat: Uvula is midline and oropharynx is clear and moist. She does not have dentures. Normal dentition.   Eyes: Conjunctivae, EOM and lids are normal. Pupils are equal, round, and reactive to light. Right eye exhibits no chemosis, no discharge and no exudate. No foreign body present in the right eye. Left eye exhibits no chemosis, no discharge and no exudate. No foreign body present in the left eye. No scleral icterus.   Neck: Normal range of motion and full passive range of motion without pain. Neck supple.   Cardiovascular: Normal rate, regular rhythm, S1 normal, S2 normal and normal heart sounds. Exam reveals no gallop and no friction rub.   No murmur heard.  Pulmonary/Chest: Effort normal and breath sounds normal. No accessory muscle usage. No respiratory distress. She has no decreased breath sounds. She has no wheezes. She has no rhonchi. She has no rales.   Abdominal: Soft. Normal appearance. She exhibits no distension. There is no hepatosplenomegaly. There is no tenderness. There is no rigidity, no rebound and no guarding.   Musculoskeletal:        Lumbar back: She exhibits decreased range of motion, tenderness and pain. She exhibits no bony tenderness, no swelling, no edema, no deformity, no laceration, no spasm and normal pulse.        Right upper leg: She exhibits tenderness. She exhibits no bony tenderness, no swelling, no edema, no deformity and no laceration.        Left upper leg: She exhibits tenderness. She exhibits no bony tenderness, no swelling, no edema, no deformity and no laceration.   Neurological: She is alert and oriented to person,  place, and time. She has normal strength. No cranial nerve deficit or sensory deficit. She exhibits normal muscle tone. She displays no seizure activity. Coordination and gait normal.   Skin: Skin is warm, dry and intact. No rash noted. She is not diaphoretic.   Psychiatric: She has a normal mood and affect. Her speech is normal and behavior is normal. Judgment and thought content normal. Cognition and memory are normal. She is attentive.       Assessment & Plan    1. Essential hypertension  Patient was counseled and encouraged to maintain a low sodium diet, as well as increasing physical activity.  Recommend random BP checks at home on a regular basis.  Repeat BP at end of visit was not necessary. Will continue medication at this time, and follow up in 3-6 months, or sooner if blood pressure begins to increase.     - losartan (COZAAR) 50 MG tablet; Take 1 tablet (50 mg total) by mouth once daily.  Dispense: 90 tablet; Refill: 0  - hydroCHLOROthiazide (HYDRODIURIL) 25 MG tablet; Take 1 tablet (25 mg total) by mouth once daily.  Dispense: 90 tablet; Refill: 1    2. Type 2 diabetes mellitus with hyperglycemia, without long-term current use of insulin  Patient is encouraged to follow a diet low in carbohydrates and simple sugars.  Discussed simple vs. complex carbohydrates as well as eating times of certain meals. Advised to focus on good food choices and increased physical activity and encouraged to adhere to medication regimen and/or lifestyle adjustments, and to check glucose level as recommended.  Contact office if glucose levels are not improving over time.  Screening blood test is due in 3 months.     - metFORMIN (GLUCOPHAGE) 1000 MG tablet; Take 1 tablet (1,000 mg total) by mouth 2 (two) times daily with meals.  Dispense: 180 tablet; Refill: 1    3. Fibromyalgia      4. Spinal stenosis of lumbar region with neurogenic claudication  Stable; no therapeutic changes at this time.  MRI shows disc disease; patient not  interested in surgery at this time.    5. Migraine without aura and without status migrainosus, not intractable  Managed by Neurology.  Stable; no therapeutic changes at this time.     6. Thyroid nodule  Will order imaging to further evaluate and manage accordingly.  Nodules detected on previous ultrasound in 2006.  - US Soft Tissue Head Neck Thyroid; Future      Follow up documented    ACTIVE MEDICAL ISSUES:  Documented in Problem List    PAST MEDICAL HISTORY  Documented    PAST SURGICAL HISTORY:  Documented    SOCIAL HISTORY:  Documented    FAMILY HISTORY:  Documented    ALLERGIES AND MEDICATIONS: updated and reviewed.  Documented    Health Maintenance       Date Due Completion Date    Foot Exam 01/12/1967 ---    Low Dose Statin 01/12/1978 ---    Zoster Vaccine 01/12/2017 ---    Hemoglobin A1c 05/29/2019 11/29/2018    Lipid Panel 11/29/2019 11/29/2018    Eye Exam 12/28/2019 12/28/2018    Mammogram 02/21/2021 2/21/2019    Colonoscopy 07/09/2023 7/9/2018    TETANUS VACCINE 11/26/2028 11/26/2018    Override on 1/30/2008: Done

## 2019-03-09 DIAGNOSIS — E11.65 TYPE 2 DIABETES MELLITUS WITH HYPERGLYCEMIA, WITHOUT LONG-TERM CURRENT USE OF INSULIN: ICD-10-CM

## 2019-03-11 ENCOUNTER — TELEPHONE (OUTPATIENT)
Dept: FAMILY MEDICINE | Facility: CLINIC | Age: 64
End: 2019-03-11

## 2019-03-11 NOTE — TELEPHONE ENCOUNTER
----- Message from Erika Roa sent at 3/11/2019 10:56 AM CDT -----  Contact: Self   Patient is asking to speak with Ms.Daisha in ref to coming to drop off forms for disability. Please call at [phone].  She's bringing them over today.

## 2019-03-11 NOTE — TELEPHONE ENCOUNTER
Patient states at last appointment with Dr. Lombard she told him she was going to apply for disability. She was advised to bring in paperwork.Explained to patient that provider has up to 5 business days to complete paperwork.Verbalzied understanding.

## 2019-03-12 RX ORDER — GLIPIZIDE 5 MG/1
TABLET, FILM COATED, EXTENDED RELEASE ORAL
Qty: 60 TABLET | Refills: 5 | Status: SHIPPED | OUTPATIENT
Start: 2019-03-12 | End: 2019-09-03 | Stop reason: SDUPTHER

## 2019-03-20 ENCOUNTER — TELEPHONE (OUTPATIENT)
Dept: FAMILY MEDICINE | Facility: CLINIC | Age: 64
End: 2019-03-20

## 2019-03-20 NOTE — TELEPHONE ENCOUNTER
----- Message from Andrzej Mcmullen sent at 3/20/2019 10:55 AM CDT -----  Contact: Self/[phone]  Type: Patient Call Back    Who called: Patient    What is the request in detail:  Patient called to follow up on Disability paperwork that was requested. Thank you.    Can the clinic reply by [facility]? No    Best call back number: [phone]

## 2019-03-20 NOTE — TELEPHONE ENCOUNTER
Spoke with patient asking if  Disability form is ready to  , was told to patient provider is not here at this present time message will sent to him , please reply

## 2019-03-22 ENCOUNTER — PATIENT MESSAGE (OUTPATIENT)
Dept: NEUROLOGY | Facility: CLINIC | Age: 64
End: 2019-03-22

## 2019-03-22 ENCOUNTER — TELEPHONE (OUTPATIENT)
Dept: FAMILY MEDICINE | Facility: CLINIC | Age: 64
End: 2019-03-22

## 2019-03-22 NOTE — TELEPHONE ENCOUNTER
Patient picked up paperwork and brought it back with some clarification needed per the Social Security office. Advised patient will give to provider and will call when completed. Verbalized understanding.

## 2019-03-25 ENCOUNTER — PATIENT MESSAGE (OUTPATIENT)
Dept: NEUROLOGY | Facility: CLINIC | Age: 64
End: 2019-03-25

## 2019-03-25 ENCOUNTER — TELEPHONE (OUTPATIENT)
Dept: HEMATOLOGY/ONCOLOGY | Facility: CLINIC | Age: 64
End: 2019-03-25

## 2019-03-25 NOTE — TELEPHONE ENCOUNTER
Returned call to patient to discuss her documents and her social security disability completion request.   Explained to patient that Dr. Campbell will be happy to complete though does not feel her memory changes are solely triggered from history of chemo. He advised patient to have a complete, thorough evaluation and see if neurology referral recommended and make sure no other medical issues factoring, she voiced understanding. Patient insisted that he complete the documents, as they had discussed in detail at her previous visit and feels she is unable to return to work. She stated that her PCP is completing additional documents in regards to some physical disabilities that are also causing her inability to perform some of her job duties.     Explained to patient that documents to be completed and will contact her when completed. She expressed gratitude.

## 2019-03-25 NOTE — TELEPHONE ENCOUNTER
----- Message from Danielle Land sent at 3/25/2019 10:09 AM CDT -----  Contact: Pt   Pt called to speak with nurse Macey have some questions about some paper work on 3/12 Callback#[phone]  Thank You  ELPIDIO Land

## 2019-04-01 ENCOUNTER — TELEPHONE (OUTPATIENT)
Dept: FAMILY MEDICINE | Facility: CLINIC | Age: 64
End: 2019-04-01

## 2019-04-01 ENCOUNTER — HOSPITAL ENCOUNTER (OUTPATIENT)
Dept: RADIOLOGY | Facility: HOSPITAL | Age: 64
Discharge: HOME | End: 2019-04-01
Attending: FAMILY MEDICINE
Payer: COMMERCIAL

## 2019-04-01 DIAGNOSIS — E04.1 THYROID NODULE: ICD-10-CM

## 2019-04-01 PROCEDURE — 76536 US SOFT TISSUE HEAD NECK THYROID: ICD-10-PCS | Mod: 26,,, | Performed by: INTERNAL MEDICINE

## 2019-04-01 PROCEDURE — 76536 US EXAM OF HEAD AND NECK: CPT | Mod: 26,,, | Performed by: INTERNAL MEDICINE

## 2019-04-01 PROCEDURE — 76536 US EXAM OF HEAD AND NECK: CPT | Mod: TC

## 2019-04-01 NOTE — TELEPHONE ENCOUNTER
Spoke to patient. Notified of paperwork completed and ready to be picked up.Verbalized understanding.

## 2019-04-01 NOTE — TELEPHONE ENCOUNTER
----- Message from Katlin Barone sent at 4/1/2019  3:09 PM CDT -----  Contact: self  ..Type: Patient Call Back    Who called:self    What is the request in detail: Pt calling to get the status of disability form she dropped off on 03/19/2019    Can the clinic reply by [facility]? No    Would the patient rather a call back or a response via [facility]? Call back    Best call back number:[phone]

## 2019-04-03 ENCOUNTER — PATIENT MESSAGE (OUTPATIENT)
Dept: HEMATOLOGY/ONCOLOGY | Facility: CLINIC | Age: 64
End: 2019-04-03

## 2019-04-04 DIAGNOSIS — R41.89 NEUROCOGNITIVE DEFICITS: Primary | ICD-10-CM

## 2019-04-04 DIAGNOSIS — R29.818 NEUROCOGNITIVE DEFICITS: Primary | ICD-10-CM

## 2019-04-07 ENCOUNTER — PATIENT MESSAGE (OUTPATIENT)
Dept: FAMILY MEDICINE | Facility: CLINIC | Age: 64
End: 2019-04-07

## 2019-04-07 DIAGNOSIS — E04.1 THYROID NODULE: Primary | ICD-10-CM

## 2019-04-08 ENCOUNTER — OFFICE VISIT (OUTPATIENT)
Dept: PODIATRY | Facility: CLINIC | Age: 64
End: 2019-04-08
Payer: COMMERCIAL

## 2019-04-08 VITALS
SYSTOLIC BLOOD PRESSURE: 122 MMHG | BODY MASS INDEX: 42.99 KG/M2 | HEIGHT: 65 IN | WEIGHT: 258 LBS | DIASTOLIC BLOOD PRESSURE: 68 MMHG | HEART RATE: 81 BPM

## 2019-04-08 DIAGNOSIS — E11.49 OTHER DIABETIC NEUROLOGICAL COMPLICATION ASSOCIATED WITH TYPE 2 DIABETES MELLITUS: Primary | ICD-10-CM

## 2019-04-08 PROCEDURE — 3008F PR BODY MASS INDEX (BMI) DOCUMENTED: ICD-10-PCS | Mod: CPTII,S$GLB,, | Performed by: PODIATRIST

## 2019-04-08 PROCEDURE — 99203 PR OFFICE/OUTPT VISIT, NEW, LEVL III, 30-44 MIN: ICD-10-PCS | Mod: S$GLB,,, | Performed by: PODIATRIST

## 2019-04-08 PROCEDURE — 99203 OFFICE O/P NEW LOW 30 MIN: CPT | Mod: S$GLB,,, | Performed by: PODIATRIST

## 2019-04-08 PROCEDURE — 3074F SYST BP LT 130 MM HG: CPT | Mod: CPTII,S$GLB,, | Performed by: PODIATRIST

## 2019-04-08 PROCEDURE — 3045F PR MOST RECENT HEMOGLOBIN A1C LEVEL 7.0-9.0%: ICD-10-PCS | Mod: CPTII,S$GLB,, | Performed by: PODIATRIST

## 2019-04-08 PROCEDURE — 3074F PR MOST RECENT SYSTOLIC BLOOD PRESSURE < 130 MM HG: ICD-10-PCS | Mod: CPTII,S$GLB,, | Performed by: PODIATRIST

## 2019-04-08 PROCEDURE — 3078F PR MOST RECENT DIASTOLIC BLOOD PRESSURE < 80 MM HG: ICD-10-PCS | Mod: CPTII,S$GLB,, | Performed by: PODIATRIST

## 2019-04-08 PROCEDURE — 99999 PR PBB SHADOW E&M-EST. PATIENT-LVL III: CPT | Mod: PBBFAC,,, | Performed by: PODIATRIST

## 2019-04-08 PROCEDURE — 99999 PR PBB SHADOW E&M-EST. PATIENT-LVL III: ICD-10-PCS | Mod: PBBFAC,,, | Performed by: PODIATRIST

## 2019-04-08 PROCEDURE — 3078F DIAST BP <80 MM HG: CPT | Mod: CPTII,S$GLB,, | Performed by: PODIATRIST

## 2019-04-08 PROCEDURE — 3008F BODY MASS INDEX DOCD: CPT | Mod: CPTII,S$GLB,, | Performed by: PODIATRIST

## 2019-04-08 PROCEDURE — 3045F PR MOST RECENT HEMOGLOBIN A1C LEVEL 7.0-9.0%: CPT | Mod: CPTII,S$GLB,, | Performed by: PODIATRIST

## 2019-04-09 ENCOUNTER — PATIENT MESSAGE (OUTPATIENT)
Dept: FAMILY MEDICINE | Facility: CLINIC | Age: 64
End: 2019-04-09

## 2019-04-09 NOTE — PROGRESS NOTES
Subjective:      Patient ID: Regine Alfred is a 64 y.o. female.    Chief Complaint: Diabetic Foot Exam (2/26/19 dr azikwe lombard) and Heel Pain    Regine is a 64 y.o. female who presents to the clinic upon referral from Dr. Mccollum  for evaluation and treatment of diabetic feet. Regine has a past medical history of Breast cancer (2017), Diabetes mellitus, type 2, and Hypertension. Patient relates no major problem with feet. Only complaints today consist of routine diabetic foot evaluation as well as right heel pain which has resolved on its own..    PCP: Azikiwe K Lombard, MD    Date Last Seen by PCP:   Chief Complaint   Patient presents with    Diabetic Foot Exam     2/26/19 dr azikwe lombard    Heel Pain         Current shoe gear: Casual shoes    Hemoglobin A1C   Date Value Ref Range Status   11/29/2018 7.4 (H) 4.0 - 5.6 % Final     Comment:     ADA Screening Guidelines:  5.7-6.4%  Consistent with prediabetes  >or=6.5%  Consistent with diabetes  High levels of fetal hemoglobin interfere with the HbA1C  assay. Heterozygous hemoglobin variants (HbS, HgC, etc)do  not significantly interfere with this assay.   However, presence of multiple variants may affect accuracy.             Review of Systems   Constitution: Negative for chills and fever.   HENT: Negative for congestion and tinnitus.    Eyes: Negative for double vision and visual disturbance.   Cardiovascular: Negative for chest pain and claudication.   Respiratory: Negative for hemoptysis and shortness of breath.    Endocrine: Negative for cold intolerance and heat intolerance.   Hematologic/Lymphatic: Negative for adenopathy and bleeding problem.   Skin: Negative for color change and nail changes.   Musculoskeletal: Positive for back pain and joint pain. Negative for myalgias and stiffness.   Gastrointestinal: Negative for nausea and vomiting.   Genitourinary: Negative for dysuria and hematuria.   Neurological: Positive for paresthesias. Negative for  numbness.   Psychiatric/Behavioral: Negative for altered mental status and suicidal ideas.   Allergic/Immunologic: Negative for environmental allergies and persistent infections.           Objective:      Physical Exam   Constitutional: She is oriented to person, place, and time. She appears well-developed and well-nourished.   Cardiovascular:   Pulses:       Dorsalis pedis pulses are 2+ on the right side, and 2+ on the left side.        Posterior tibial pulses are 2+ on the right side, and 2+ on the left side.   Pulmonary/Chest: Effort normal.   Musculoskeletal: Normal range of motion.   Inspection and palpation of the muscles joints and bones of both lower extremities reveal that muscle strength for the anterior lateral and posterior muscle groups and intrinsic muscle groups of the foot are all 5 over 5 symmetrical.  Ankle subtalar midtarsal and digital joint range of motion are within normal limits, nonpainful, without crepitus or effusion.  Patient exhibits a normal angle and base of gait.  Palpation of the tendons reveal no defects.   Neurological: She is alert and oriented to person, place, and time.   Sharp dull light touch vibratory proprioceptive sensation are intact bilaterally.  Deep tendon reflexes to patellar and Achilles tendon are symmetrical 2 over 4 bilaterally.  No ankle clonus or Babinski reflexes noted bilaterally.  Coordination is normal to both feet and lower extremities.   Skin: Skin is warm and dry. Capillary refill takes 2 to 3 seconds.   Skin turgor is normal bilaterally.  Skin texture is well hydrated to both lower extremities.  No lesions or rashes or wounds appreciated bilaterally.  Nail plates 1 through 5 bilaterally are within normal limits for length and thickness.  No nail clubbing or incurvation noted.   Psychiatric: She has a normal mood and affect.   Vitals reviewed.            Assessment:       Encounter Diagnosis   Name Primary?    Other diabetic neurological complication  associated with type 2 diabetes mellitus Yes         Plan:       Regine was seen today for diabetic foot exam and heel pain.    Diagnoses and all orders for this visit:    Other diabetic neurological complication associated with type 2 diabetes mellitus      I counseled the patient on her conditions, their implications and medical management.      Shoe inspection. Diabetic Foot Education. Patient reminded of the importance of good nutrition and blood sugar control to help prevent podiatric complications of diabetes. Patient instructed on proper foot hygeine. We discussed wearing proper shoe gear, daily foot inspections and Diabetic foot education in detail.    Return to clinic in 12 months or sooner if problems arise   .

## 2019-04-19 ENCOUNTER — PATIENT MESSAGE (OUTPATIENT)
Dept: FAMILY MEDICINE | Facility: CLINIC | Age: 64
End: 2019-04-19

## 2019-04-21 DIAGNOSIS — M48.062 SPINAL STENOSIS OF LUMBAR REGION WITH NEUROGENIC CLAUDICATION: ICD-10-CM

## 2019-04-22 RX ORDER — GABAPENTIN 600 MG/1
TABLET ORAL
Qty: 90 TABLET | Refills: 0 | Status: SHIPPED | OUTPATIENT
Start: 2019-04-22 | End: 2019-05-21 | Stop reason: SDUPTHER

## 2019-04-23 ENCOUNTER — TELEPHONE (OUTPATIENT)
Dept: SURGERY | Facility: CLINIC | Age: 64
End: 2019-04-23

## 2019-04-23 ENCOUNTER — TELEPHONE (OUTPATIENT)
Dept: ENDOCRINOLOGY | Facility: CLINIC | Age: 64
End: 2019-04-23

## 2019-04-23 ENCOUNTER — TELEPHONE (OUTPATIENT)
Dept: FAMILY MEDICINE | Facility: CLINIC | Age: 64
End: 2019-04-23

## 2019-04-23 DIAGNOSIS — E04.1 THYROID NODULE: Primary | ICD-10-CM

## 2019-04-23 NOTE — TELEPHONE ENCOUNTER
Pt called to schedule appt with Dr. Marrero for consult for thyroid nodule. Pt scheduled for poss FNA with Dr. Bishop, as well.

## 2019-04-23 NOTE — TELEPHONE ENCOUNTER
----- Message from Erika Roa sent at 4/23/2019  9:22 AM CDT -----  Contact: Self   Type: Patient Call Back    Who called: Self     What is the request in detail: Patient is asking to speak privately with one of the nurses, Daisha is possible. If not anyone else is fine     Can the clinic reply by [facility]? Call     Would the patient rather a call back or a response via [facility]?  Call     Best call back number: [phone]    Additional Information:  Patient says she was messaged about a test she needs to take but hasnt had success in contacting any one. She wants to add that if you all are able to schedule please do and she is located in [facility] so if she can go to another [facility] for the testing she would love to.

## 2019-04-23 NOTE — TELEPHONE ENCOUNTER
Called pt to advise Dr. Bishop recommended that she stop taking her daily Baby ASA 81 mg, the day before and the day of her possible FNA of the thyroid. Pt stated she  Will make a note of it.

## 2019-04-23 NOTE — TELEPHONE ENCOUNTER
Spoke to patient. States that she called the referral team and they told her that is not the correct number for her to call. She then received a different number tried it and they told her the same thing. She was given a 3rd number and that number was disconnected. Apologized to patient for any inconvenience this may have caused her. Number provided to Dr. Rowe office.She will call.

## 2019-04-26 ENCOUNTER — CLINICAL SUPPORT (OUTPATIENT)
Dept: OPHTHALMOLOGY | Facility: CLINIC | Age: 64
End: 2019-04-26
Payer: COMMERCIAL

## 2019-04-26 ENCOUNTER — OFFICE VISIT (OUTPATIENT)
Dept: OPHTHALMOLOGY | Facility: CLINIC | Age: 64
End: 2019-04-26
Payer: COMMERCIAL

## 2019-04-26 DIAGNOSIS — H52.4 MYOPIA OF BOTH EYES WITH ASTIGMATISM AND PRESBYOPIA: ICD-10-CM

## 2019-04-26 DIAGNOSIS — H40.023 OPEN ANGLE WITH BORDERLINE FINDINGS AND HIGH GLAUCOMA RISK IN BOTH EYES: Primary | ICD-10-CM

## 2019-04-26 DIAGNOSIS — H40.023 OPEN ANGLE WITH BORDERLINE FINDINGS AND HIGH GLAUCOMA RISK IN BOTH EYES: ICD-10-CM

## 2019-04-26 DIAGNOSIS — H25.13 NUCLEAR SCLEROTIC CATARACT OF BOTH EYES: ICD-10-CM

## 2019-04-26 DIAGNOSIS — H35.361 FAMILIAL DRUSEN OF MACULA OF BOTH EYES: ICD-10-CM

## 2019-04-26 DIAGNOSIS — H52.203 MYOPIA OF BOTH EYES WITH ASTIGMATISM AND PRESBYOPIA: ICD-10-CM

## 2019-04-26 DIAGNOSIS — H35.362 FAMILIAL DRUSEN OF MACULA OF BOTH EYES: ICD-10-CM

## 2019-04-26 DIAGNOSIS — H52.13 MYOPIA OF BOTH EYES WITH ASTIGMATISM AND PRESBYOPIA: ICD-10-CM

## 2019-04-26 PROCEDURE — 92012 INTRM OPH EXAM EST PATIENT: CPT | Mod: S$GLB,,, | Performed by: OPHTHALMOLOGY

## 2019-04-26 PROCEDURE — 92083 HUMPHREY VISUAL FIELD - OU - BOTH EYES: ICD-10-PCS | Mod: S$GLB,,, | Performed by: OPHTHALMOLOGY

## 2019-04-26 PROCEDURE — 92133 POSTERIOR SEGMENT OCT OPTIC NERVE(OCULAR COHERENCE TOMOGRAPHY) - OU - BOTH EYES: ICD-10-PCS | Mod: S$GLB,,, | Performed by: OPHTHALMOLOGY

## 2019-04-26 PROCEDURE — 92133 CPTRZD OPH DX IMG PST SGM ON: CPT | Mod: S$GLB,,, | Performed by: OPHTHALMOLOGY

## 2019-04-26 PROCEDURE — 99999 PR PBB SHADOW E&M-EST. PATIENT-LVL II: CPT | Mod: PBBFAC,,, | Performed by: OPHTHALMOLOGY

## 2019-04-26 PROCEDURE — 92012 PR EYE EXAM, EST PATIENT,INTERMED: ICD-10-PCS | Mod: S$GLB,,, | Performed by: OPHTHALMOLOGY

## 2019-04-26 PROCEDURE — 92083 EXTENDED VISUAL FIELD XM: CPT | Mod: S$GLB,,, | Performed by: OPHTHALMOLOGY

## 2019-04-26 PROCEDURE — 99999 PR PBB SHADOW E&M-EST. PATIENT-LVL II: ICD-10-PCS | Mod: PBBFAC,,, | Performed by: OPHTHALMOLOGY

## 2019-04-29 NOTE — PROGRESS NOTES
CC: 63 yo  female, here for AE    HPI: Regnie is overall well today. Moved to [place] from [place].  She is a . Last pap smear was 2018 and NILM/HRHPV negative. Denies history of abnormal pap smears. She is s/p supracervical hysterectomy for fibroids (LSC). Also had myomectomy (open). Has known right sided hydrosalpinx - last had US in 2017 - 9.7 x 3.0 x 4.2 cm - stable from CT in . No pain.     She has a history of a stage I triple negative breast cancer.  She underwent a left modified radical mastectomy in [facility] on 2017 and had a stage IA carcinoma measuring 1.2 cm in greatest diameter.  Resection margins were clear, while four sentinel lymph nodes were negative.  Postoperatively, she received four cycles of docetaxel and cyclophosphamide.  The first cycle was administered on 2017 and she completed her treatment by 2018.  She has been followed expectantly and has been MERRY since then.      Reports diarrhea, constipation and headaches.     She is a former nurse - worked in nephrology. Not SA, but hopes to be in the future. No vaginal bleeding. Walks on occasion. Suffers from fibromyalgia, making walking more difficult.     Gynecologic exam  genital itching: No  genital lesions: No  genital odor: No  genital rash: No  missed menses: No  pelvic pain: No  vaginal bleeding: No  vaginal discharge: No  abdominal pain: No  anorexia: No  back pain: No  chills: No  constipation: Yes  diarrhea: Yes  discolored urine: No  dysuria: No  fever: No  flank pain: No  frequency: No  headaches: Yes  hematuria: No  nausea: No  painful intercourse: No  rash: No  urgency: No  vomiting: No  STD: No  abdominal surgery: No   section: No  Ectopic pregnancy: No  Endometriosis: No  herpes simplex: No  gynecological surgery: Yes  menorrhagia: Yes  metrorrhagia: Yes  miscarriage: Yes  ovarian cysts: No  perineal abscess: No  PID: No  terminated pregnancy: Yes  vaginosis: No    Past  Medical History:   Diagnosis Date    Breast cancer 2017    Cataract     Diabetes mellitus, type 2     Type 2    Fibromyalgia     Glaucoma     Hypertension        Past Surgical History:   Procedure Laterality Date    BREAST BIOPSY      BREAST SURGERY      breast ca lt side     LAPAROSCOPIC SUPRACERVICAL HYSTERECTOMY  2005    fibroids    MASTECTOMY Left 2017    chemo    MYOMECTOMY      TONSILLECTOMY      VAGINAL DELIVERY         OB History        4    Para   4    Term   4            AB        Living           SAB        TAB        Ectopic        Multiple        Live Births                     Current Outpatient Medications on File Prior to Visit   Medication Sig Dispense Refill    aspirin 81 MG Chew       cholecalciferol, vitamin D3, (VITAMIN D3) 2,000 unit Cap Take 1 capsule by mouth once daily.       gabapentin (NEURONTIN) 600 MG tablet TAKE 1 TABLET(600 MG) BY MOUTH THREE TIMES DAILY 90 tablet 0    glipiZIDE (GLUCOTROL) 5 MG TR24 TAKE 2 TABLETS(10 MG) BY MOUTH DAILY WITH BREAKFAST 60 tablet 5    hydroCHLOROthiazide (HYDRODIURIL) 25 MG tablet Take 1 tablet (25 mg total) by mouth once daily. 90 tablet 1    ipratropium (ATROVENT) 0.03 % nasal spray 2 sprays by Nasal route 2 (two) times daily. 30 mL 5    losartan (COZAAR) 50 MG tablet Take 1 tablet (50 mg total) by mouth once daily. 90 tablet 0    metFORMIN (GLUCOPHAGE) 1000 MG tablet Take 1 tablet (1,000 mg total) by mouth 2 (two) times daily with meals. 180 tablet 1    naproxen sodium (ANAPROX) 550 MG tablet Take 1 tablet (550 mg total) by mouth 2 (two) times daily as needed. 40 tablet 5    sumatriptan (IMITREX) 100 MG tablet Take 1 tab at onset of migraine, may repeat dose in 2 hours if needed. Max 2 tabs/day. Max 3 days/week. 9 tablet 11     No current facility-administered medications on file prior to visit.          ROS:  GENERAL: Feeling well overall.   SKIN: Denies rash or lesions.   HEAD: Denies head injury or headache.   "  CHEST: Denies chest pain or shortness of breath.   CARDIOVASCULAR: Denies palpitations or left sided chest pain.   ABDOMEN: No abdominal pain  REPRODUCTIVE: See HPI.   HEMATOLOGIC: No easy bruisability or excessive bleeding.   MUSCULOSKELETAL: Denies joint pain or swelling.     Physical Exam:   /84   Ht 5' 5" (1.651 m)   Wt 118 kg (260 lb 2.3 oz)   BMI 43.29 kg/m²   General: No distress, well appearing  HEENT: normocephalic, atraumatic   Heart: Regular rate  Lungs: No increased work of breathing  Abdomen: soft, nontender, no masses  MS: lower extremeties symmetrical, no edema  Pelvic Exam:     GENITALIA: Normal external genitalia, no lesions   URETHRA: normal appearing   Bladder non tender   VAGINA: normal vaginal mucosa, no lesions   CERVIX: no lesions visible, no CMT    UTERUS: surgically absent   ADNEXA: There is some right sided fullness, although difficult to palpate given body habitus, non tender  PSYCH: Normal affect, mood appropriate     ASSESSMENT/PLAN: 65 yo  here for WWE and problem visit -hydrosalpinx. She is s/p supracervical hysterectomy for uterine fibroids. Has known hydrosalpinx since  (post hysterectomy). She has history of breast cancer, negative genetic screening.     WWE  1. Last pap 2018 NILM/HRHPV negative. Reviewed ASCCP guidelines, but she prefers to continue pap smear screening annually. Pap with HPV co-testing collected and sent. She is also hesitant about stopping pap smear screening at age 67.  2. Mammogram annually per oncology  3. Colonoscopy up to date  4. Exercise recommended  5. Routine health maintenance per PCP    Hydrosalpinx   1. She has known right sided hydrosalpinx, stable since  - measuring up to 9 cm. Overall asymptomatic. We discussed expectant management if stable vs surgical management. We did discuss option for tumor markers.   2. Will obtain baseline US today. If stable from prior without any suspicious findings, will proceed with expectant " management.     Yana Herrmann MD  Obstetrics and Gynecology  [facility]

## 2019-04-30 ENCOUNTER — TELEPHONE (OUTPATIENT)
Dept: OBSTETRICS AND GYNECOLOGY | Facility: CLINIC | Age: 64
End: 2019-04-30

## 2019-04-30 ENCOUNTER — OFFICE VISIT (OUTPATIENT)
Dept: OBSTETRICS AND GYNECOLOGY | Facility: CLINIC | Age: 64
End: 2019-04-30
Payer: COMMERCIAL

## 2019-04-30 ENCOUNTER — HOSPITAL ENCOUNTER (OUTPATIENT)
Dept: RADIOLOGY | Facility: OTHER | Age: 64
Discharge: HOME | End: 2019-04-30
Attending: OBSTETRICS & GYNECOLOGY
Payer: COMMERCIAL

## 2019-04-30 VITALS
DIASTOLIC BLOOD PRESSURE: 84 MMHG | WEIGHT: 260.13 LBS | BODY MASS INDEX: 43.34 KG/M2 | SYSTOLIC BLOOD PRESSURE: 138 MMHG | HEIGHT: 65 IN

## 2019-04-30 DIAGNOSIS — N70.11 HYDROSALPINX: ICD-10-CM

## 2019-04-30 DIAGNOSIS — Z01.419 WELL WOMAN EXAM: ICD-10-CM

## 2019-04-30 DIAGNOSIS — N70.11 HYDROSALPINX: Primary | ICD-10-CM

## 2019-04-30 PROCEDURE — 3075F SYST BP GE 130 - 139MM HG: CPT | Mod: CPTII,S$GLB,, | Performed by: OBSTETRICS & GYNECOLOGY

## 2019-04-30 PROCEDURE — 88175 CYTOPATH C/V AUTO FLUID REDO: CPT

## 2019-04-30 PROCEDURE — 87624 HPV HI-RISK TYP POOLED RSLT: CPT

## 2019-04-30 PROCEDURE — 76856 US EXAM PELVIC COMPLETE: CPT | Mod: 26,,, | Performed by: RADIOLOGY

## 2019-04-30 PROCEDURE — 3079F DIAST BP 80-89 MM HG: CPT | Mod: CPTII,S$GLB,, | Performed by: OBSTETRICS & GYNECOLOGY

## 2019-04-30 PROCEDURE — 99999 PR PBB SHADOW E&M-EST. PATIENT-LVL III: CPT | Mod: PBBFAC,,, | Performed by: OBSTETRICS & GYNECOLOGY

## 2019-04-30 PROCEDURE — 76830 US PELVIS COMP WITH TRANSVAG NON-OB (XPD): ICD-10-PCS | Mod: 26,,, | Performed by: RADIOLOGY

## 2019-04-30 PROCEDURE — 3079F PR MOST RECENT DIASTOLIC BLOOD PRESSURE 80-89 MM HG: ICD-10-PCS | Mod: CPTII,S$GLB,, | Performed by: OBSTETRICS & GYNECOLOGY

## 2019-04-30 PROCEDURE — 99386 PREV VISIT NEW AGE 40-64: CPT | Mod: 25,S$GLB,, | Performed by: OBSTETRICS & GYNECOLOGY

## 2019-04-30 PROCEDURE — 99386 PR PREVENTIVE VISIT,NEW,40-64: ICD-10-PCS | Mod: 25,S$GLB,, | Performed by: OBSTETRICS & GYNECOLOGY

## 2019-04-30 PROCEDURE — 99999 PR PBB SHADOW E&M-EST. PATIENT-LVL III: ICD-10-PCS | Mod: PBBFAC,,, | Performed by: OBSTETRICS & GYNECOLOGY

## 2019-04-30 PROCEDURE — 76830 TRANSVAGINAL US NON-OB: CPT | Mod: TC

## 2019-04-30 PROCEDURE — 3075F PR MOST RECENT SYSTOLIC BLOOD PRESS GE 130-139MM HG: ICD-10-PCS | Mod: CPTII,S$GLB,, | Performed by: OBSTETRICS & GYNECOLOGY

## 2019-04-30 PROCEDURE — 76830 TRANSVAGINAL US NON-OB: CPT | Mod: 26,,, | Performed by: RADIOLOGY

## 2019-04-30 PROCEDURE — 76856 US PELVIS COMP WITH TRANSVAG NON-OB (XPD): ICD-10-PCS | Mod: 26,,, | Performed by: RADIOLOGY

## 2019-04-30 NOTE — TELEPHONE ENCOUNTER
Spoke with patient. Reviewed US results. Hydrosalpinx stable from prior, and possibly even slightly smaller. She is asymptomatic. Will proceed with expectant management.     FINDINGS:  Uterus:    Absent.    Neither ovary is identified on today's exam.  There is a dilated tubular structure in the right adnexa measuring 7.1 x 1.9 x 1.6 cm.    Free Fluid:    None.      Impression       Dilated right adnexal tubular structure may indicate a dilated fallopian tube.  Neither ovary is identified on today's exam.    Prior hysterectomy.

## 2019-05-06 ENCOUNTER — TELEPHONE (OUTPATIENT)
Dept: FAMILY MEDICINE | Facility: CLINIC | Age: 64
End: 2019-05-06

## 2019-05-06 LAB
HPV HR 12 DNA CVX QL NAA+PROBE: NEGATIVE
HPV16 AG SPEC QL: NEGATIVE
HPV18 DNA SPEC QL NAA+PROBE: NEGATIVE

## 2019-05-08 ENCOUNTER — TELEPHONE (OUTPATIENT)
Dept: HEMATOLOGY/ONCOLOGY | Facility: CLINIC | Age: 64
End: 2019-05-08

## 2019-05-08 NOTE — TELEPHONE ENCOUNTER
Contacted patient to let her know that her message was received.  Outside records available as patient had her annual high risk breast mammogram completed on 2/21/2019 with no evidence of malignancy patient due once again on or after 2/22/2019. Patient to keep 4 month follow up as scheduled with Dr. Campbell for physical examination. She expressed gratitude.

## 2019-05-08 NOTE — TELEPHONE ENCOUNTER
----- Message from Tiffany Pompa sent at 5/8/2019 10:09 AM CDT -----  Contact: Patient  Pt would like a call regarding 06/20 mammo appt, pt states that she just had a mammo on 02/21. And she wants to confirm this was not a scheduling error.      Contact:: [phone]

## 2019-05-14 ENCOUNTER — PATIENT OUTREACH (OUTPATIENT)
Dept: ADMINISTRATIVE | Facility: HOSPITAL | Age: 64
End: 2019-05-14

## 2019-05-14 DIAGNOSIS — E11.9 DIABETES MELLITUS WITHOUT COMPLICATION: Primary | ICD-10-CM

## 2019-05-20 ENCOUNTER — APPOINTMENT (OUTPATIENT)
Dept: RADIOLOGY | Facility: CLINIC | Age: 64
End: 2019-05-20
Attending: INTERNAL MEDICINE
Payer: COMMERCIAL

## 2019-05-20 ENCOUNTER — INITIAL CONSULT (OUTPATIENT)
Dept: SURGERY | Facility: CLINIC | Age: 64
End: 2019-05-20
Attending: SURGERY
Payer: COMMERCIAL

## 2019-05-20 VITALS
BODY MASS INDEX: 43.75 KG/M2 | SYSTOLIC BLOOD PRESSURE: 149 MMHG | HEART RATE: 98 BPM | WEIGHT: 262.56 LBS | DIASTOLIC BLOOD PRESSURE: 86 MMHG | HEIGHT: 65 IN | RESPIRATION RATE: 18 BRPM | TEMPERATURE: 98 F

## 2019-05-20 DIAGNOSIS — E04.1 THYROID NODULE: ICD-10-CM

## 2019-05-20 DIAGNOSIS — E04.1 THYROID NODULE: Primary | ICD-10-CM

## 2019-05-20 PROCEDURE — 88173 CYTOPATH EVAL FNA REPORT: CPT | Performed by: PATHOLOGY

## 2019-05-20 PROCEDURE — 10006 FNA BX W/US GDN EA ADDL: CPT | Mod: LT,S$GLB,, | Performed by: INTERNAL MEDICINE

## 2019-05-20 PROCEDURE — 3077F PR MOST RECENT SYSTOLIC BLOOD PRESSURE >= 140 MM HG: ICD-10-PCS | Mod: CPTII,S$GLB,, | Performed by: SURGERY

## 2019-05-20 PROCEDURE — 3079F PR MOST RECENT DIASTOLIC BLOOD PRESSURE 80-89 MM HG: ICD-10-PCS | Mod: CPTII,S$GLB,, | Performed by: SURGERY

## 2019-05-20 PROCEDURE — 99204 PR OFFICE/OUTPT VISIT, NEW, LEVL IV, 45-59 MIN: ICD-10-PCS | Mod: S$GLB,,, | Performed by: SURGERY

## 2019-05-20 PROCEDURE — 10006 US FINE NEEDLE ASPIRATION BIOPSY , ADDL LESION: ICD-10-PCS | Mod: LT,S$GLB,, | Performed by: INTERNAL MEDICINE

## 2019-05-20 PROCEDURE — 88173 CYTOPATH EVAL FNA REPORT: CPT | Mod: 26,,, | Performed by: PATHOLOGY

## 2019-05-20 PROCEDURE — 99204 OFFICE O/P NEW MOD 45 MIN: CPT | Mod: S$GLB,,, | Performed by: SURGERY

## 2019-05-20 PROCEDURE — 88173 CYTOLOGY SPECIMEN-FNA NON-RADIOLOGY CLINICIAN PERFORMED W/O ON SITE: ICD-10-PCS | Mod: 26,,, | Performed by: PATHOLOGY

## 2019-05-20 PROCEDURE — 3008F BODY MASS INDEX DOCD: CPT | Mod: CPTII,S$GLB,, | Performed by: SURGERY

## 2019-05-20 PROCEDURE — 10005 PR FINE NEEDLE ASP BIOPSY, W/US GUIDANCE, 1ST LESION: ICD-10-PCS | Mod: RT,S$GLB,, | Performed by: INTERNAL MEDICINE

## 2019-05-20 PROCEDURE — 3077F SYST BP >= 140 MM HG: CPT | Mod: CPTII,S$GLB,, | Performed by: SURGERY

## 2019-05-20 PROCEDURE — 3079F DIAST BP 80-89 MM HG: CPT | Mod: CPTII,S$GLB,, | Performed by: SURGERY

## 2019-05-20 PROCEDURE — 10005 FNA BX W/US GDN 1ST LES: CPT | Mod: RT,S$GLB,, | Performed by: INTERNAL MEDICINE

## 2019-05-20 PROCEDURE — 3008F PR BODY MASS INDEX (BMI) DOCUMENTED: ICD-10-PCS | Mod: CPTII,S$GLB,, | Performed by: SURGERY

## 2019-05-20 NOTE — PROGRESS NOTES
Consult Note  Endocrine Surgery    Visit Diagnosis: Thyroid nodule [E04.1]    SUBJECTIVE:     Regine Alfred is a 64 y.o. female seen at the request of Dr. Azikiwe K. Lombard today in the Endocrine Surgery Clinic for evaluation of thyroid nodule(s). Her history dates back several years when thyroid nodules were noted(no prior biopsies).  Most recently the patient relocated here from [place] and established with a new PCP.  Subsequent evaluation included neck ultrasound. Regine Alfred complains of nothing currently other than choking with water intermittently(once a month) . The patient denies hot/cold intolerance, fatigue, unexplained weight changes, difficulty with shortness of breath especially with postural changes and palpable neck mass. She does not have a history of head and neck radiation therapy. She does have a family history of thyroid disease(granmother-maternal with goiter s/p thyroidectomy and mother with thyroid medication requirement). She does not have a family history of other known endocrinopathies. She is not taking thyroid hormone supplementation(though patient with hypothyroidism on meds for approximately 2 years in her 20's). She has not undergone radioactive iodine treatment.      Review of patient's allergies indicates:   Allergen Reactions    Codeine Shortness Of Breath     Pt states she had a reaction as a teenager and was taken to the ER.    Lisinopril      cough    Nortriptyline      sleepy       Current Outpatient Medications   Medication Sig Dispense Refill    aspirin 81 MG Chew       cholecalciferol, vitamin D3, (VITAMIN D3) 2,000 unit Cap Take 1 capsule by mouth once daily.       gabapentin (NEURONTIN) 600 MG tablet TAKE 1 TABLET(600 MG) BY MOUTH THREE TIMES DAILY 90 tablet 0    glipiZIDE (GLUCOTROL) 5 MG TR24 TAKE 2 TABLETS(10 MG) BY MOUTH DAILY WITH BREAKFAST 60 tablet 5    hydroCHLOROthiazide (HYDRODIURIL) 25 MG tablet Take 1 tablet (25 mg total) by mouth once daily.  90 tablet 1    ipratropium (ATROVENT) 0.03 % nasal spray 2 sprays by Nasal route 2 (two) times daily. 30 mL 5    losartan (COZAAR) 50 MG tablet Take 1 tablet (50 mg total) by mouth once daily. 90 tablet 0    metFORMIN (GLUCOPHAGE) 1000 MG tablet Take 1 tablet (1,000 mg total) by mouth 2 (two) times daily with meals. 180 tablet 1    naproxen sodium (ANAPROX) 550 MG tablet Take 1 tablet (550 mg total) by mouth 2 (two) times daily as needed. 40 tablet 5    sumatriptan (IMITREX) 100 MG tablet Take 1 tab at onset of migraine, may repeat dose in 2 hours if needed. Max 2 tabs/day. Max 3 days/week. 9 tablet 11     No current facility-administered medications for this visit.        Past Medical History:   Diagnosis Date    Breast cancer 2017    Cataract     Diabetes mellitus, type 2     Type 2    Fibromyalgia     Glaucoma     Hypertension      Past Surgical History:   Procedure Laterality Date    BREAST BIOPSY      BREAST SURGERY      breast ca lt side     LAPAROSCOPIC SUPRACERVICAL HYSTERECTOMY  2005    fibroids    MASTECTOMY Left 2017    chemo    MYOMECTOMY      TONSILLECTOMY      VAGINAL DELIVERY       Social History     Tobacco Use    Smoking status: Never Smoker    Smokeless tobacco: Never Used   Substance Use Topics    Alcohol use: No     Frequency: Never    Drug use: No          Review of Systems:    Review of Systems  Constitutional: negative for chills, fatigue, fevers, malaise and night sweats  Eyes: negative for icterus and irritation  Respiratory: negative for cough and dyspnea on exertion  Cardiovascular: negative for chest pain and palpitations  Gastrointestinal: negative for constipation, diarrhea and dysphagia  Genitourinary:negative for dysuria, hematuria and nocturia  Integument/breast: negative for rash and skin color change  Hematologic/lymphatic: negative for bleeding and easy bruising  Neurological: negative for gait problems, headaches and seizures  Behavioral/Psych: negative for  "anxiety and depression  Endocrine: negative    ENT ROS: negative      OBJECTIVE:     Vital Signs:  BP (!) 149/86 (BP Location: Right arm, Patient Position: Sitting, BP Method: Large (Automatic))   Pulse 98   Temp 97.8 °F (36.6 °C)   Resp 18   Ht 5' 5" (1.651 m)   Wt 119.1 kg (262 lb 9.1 oz)   BMI 43.69 kg/m²    Body mass index is 43.69 kg/m².      Physical Exam:    General:  no distress, see vitals for BMI    Eyes:  conjunctivae/corneas clear   Neck: trachea midline and symmetric, no adenopathy    Thyroid:  thyroid enlarged and nodular   Lung: clear to auscultation bilaterally   Heart:  regular rate and rhythm   Abdomen: soft, non-tender; bowel sounds normal; no masses,  no organomegaly   Skin/Extremities: warm and well-perfused   Pulses: 2+ and symmetric   Neuro: normal without focal findings and mental status, speech normal, alert and oriented x3       Laboratory/Radiologic Studies    Studies:  Date:       Results:    Ultrasound:  4/1/2019 FINDINGS:  The thyroid is enlarged in size.    Right lobe of the thyroid measures 7 x 2.2 x 3.1 cm.  Hypoechoic solid nodule measuring 1.6 x 0.7 x 1.2 cm.  It meets TIRAD 4 criteria and FNA is recommended.  Two additional subcentimeter nodules which do not meet criteria for FNA or follow-up.    Left lobe of the thyroid measures 7.1 x 2.2 x 3 cm.  There is a 2.4 x 1.3 x 2.4 cm mixed solid and cystic nodule with macrocalcifications and irregular/lobulated margins which is TIRADS 4 and FNA is recommended.  1.3 x 1.5 x 1.5 cm isoechoic solid nodule with punctate calcifications 1.7 x 1 x 1.5 cm solid isoechoic nodule.  Both nodules meet TIRADS 4 criteria and follow up is recommended.    Isthmus: Measures 0.8 cm in thickness. Hypoechoic solid nodule measuring 1.3 x 0.6 x 1.2 cm.  This nodule meets TIRADS 4 criteria and follow up ultrasound is recommended    Cervical lymph nodes demonstrate normal morphology and size           Component Value Date    TSH 2.045 11/29/2018    Free " T4 0.94 11/29/2018    Vit D, 25-Hydroxy 61 11/29/2018    Sodium 141 11/29/2018    Potassium 3.7 11/29/2018    Chloride 102 11/29/2018    CO2 25 11/29/2018    Glucose 151 (H) 11/29/2018    BUN, Bld 13 11/29/2018    Creatinine 0.8 11/29/2018    Creatinine 274.1 08/12/2018    Calcium 10.1 11/29/2018    Anion Gap 14 11/29/2018    eGFR if African American >60.0 11/29/2018    eGFR if non African American >60.0 11/29/2018       ASSESSMENT/PLAN:       Assessment    Ms. Alfred is a 64 y.o. female who presents with evidence of Thyroid nodule [E04.1].    Plan    During this visit, lab and imaging results were reviewed with the patient. Thyroid anatomy and physiology were reviewed and she was given a copy of our patient education booklet, Understanding Thyroid Disease. The above testing and examination support the diagnosis of multinodular thyroid goiter.     Thyroid FNA of the dominant nodules is recommended. Potential complications of this option was reviewed with the patient. Further recommendations determined following final pathology.

## 2019-05-20 NOTE — LETTER
Endocrine/General Surgery  [address] [place] [address]  Phone: [phone]  Fax: [phone] May 21, 2019         Azikiwe K. Lombard, MD  [address] [place] [address]    Patient: Regine Alfred   YOB: 1955   Date of Visit: 5/20/2019     Dear Dr. Lombard:    I saw Ms. Alfred in clinic. Attached you will find a copy of my note.    As you know, she is a 64 y.o. female who presented with enlarging nodules in the setting of multi-nodular goiter. I was able to discuss the typical workup thyroid nodules. The current plan includes fine-needle aspiration clinic today.  Further recommendations to be determined pending final pathology.    If you have any questions or concerns, please don't hesitate to contact my office. Again, thank you kindly for this referral.    Sincerely,      Leticia Rowe MD

## 2019-05-20 NOTE — Clinical Note
May 21, 2019      Azikiwe K. Lombard, MD  [address] [place] [address]           [facility] [address] [facility] [address]  [address] [place] [address], [address]  [facility] [address]  Phone: [phone]  Fax: [phone]          Patient: Regine Alfred   MR Number: [id number]   YOB: 1955   Date of Visit: 5/20/2019       Dear Dr. Azikiwe K. Lombard:    Thank you for referring Regine Alfred to me for evaluation. Attached you will find relevant portions of my assessment and plan of care.    If you have questions, please do not hesitate to call me. I look forward to following Regine Alfred along with you.    Sincerely,    Leticia Rowe MD    Enclosure  CC:  No Recipients    If you would like to receive this communication electronically, please contact [email or web address] or [phone] to request more information on [email or web address] Link access.    For providers and/or their staff who would like to refer a patient to [facility], please contact us through our one-stop-shop provider referral line, [facility], at [phone].    If you feel you have received this communication in error or would no longer like to receive these types of communications, please e-mail [email or web address]

## 2019-05-21 DIAGNOSIS — I10 ESSENTIAL HYPERTENSION: ICD-10-CM

## 2019-05-21 DIAGNOSIS — M48.062 SPINAL STENOSIS OF LUMBAR REGION WITH NEUROGENIC CLAUDICATION: ICD-10-CM

## 2019-05-21 RX ORDER — LOSARTAN POTASSIUM 50 MG/1
TABLET ORAL
Qty: 90 TABLET | Refills: 0 | Status: SHIPPED | OUTPATIENT
Start: 2019-05-21 | End: 2019-08-19 | Stop reason: SDUPTHER

## 2019-05-21 RX ORDER — GABAPENTIN 600 MG/1
TABLET ORAL
Qty: 90 TABLET | Refills: 2 | Status: SHIPPED | OUTPATIENT
Start: 2019-05-21 | End: 2019-08-19 | Stop reason: SDUPTHER

## 2019-05-23 ENCOUNTER — TELEPHONE (OUTPATIENT)
Dept: SURGERY | Facility: CLINIC | Age: 64
End: 2019-05-23

## 2019-05-23 DIAGNOSIS — E04.1 THYROID NODULE: Primary | ICD-10-CM

## 2019-05-23 NOTE — TELEPHONE ENCOUNTER
Called to review pathology with patient(see below)- Had FNA x2:    FINAL PATHOLOGIC DIAGNOSIS  THYROID, RIGHT, FINE NEEDLE ASPIRATION:  [facility] Thyroid Cytology Category: Benign.  Benign follicular cells, thin watery colloid, and acute and chronic inflammatory cells, compatible with  hyperplastic/adenomatoid nodule.  No cytologic features of papillary thyroid carcinoma.      FINAL PATHOLOGIC DIAGNOSIS  THYROID, LEFT, FINE NEEDLE ASPIRATION:  [facility] Thyroid Cytology Category: Benign.  Benign follicular cells, macrophages, thin watery colloid, blood, acute and chronic inflammatory cells,  compatible with hyperplastic/adenomatoid nodule.  No cytologic features of papillary thyroid carcinoma.    Would recommend repeat US in one year to ensure stability.  Patient voiced her understanding.

## 2019-05-27 ENCOUNTER — PATIENT MESSAGE (OUTPATIENT)
Dept: NEUROLOGY | Facility: CLINIC | Age: 64
End: 2019-05-27

## 2019-05-28 ENCOUNTER — OFFICE VISIT (OUTPATIENT)
Dept: FAMILY MEDICINE | Facility: CLINIC | Age: 64
End: 2019-05-28
Payer: COMMERCIAL

## 2019-05-28 ENCOUNTER — LAB VISIT (OUTPATIENT)
Dept: LAB | Facility: HOSPITAL | Age: 64
End: 2019-05-28
Payer: COMMERCIAL

## 2019-05-28 VITALS
SYSTOLIC BLOOD PRESSURE: 146 MMHG | WEIGHT: 260.13 LBS | TEMPERATURE: 99 F | OXYGEN SATURATION: 97 % | RESPIRATION RATE: 17 BRPM | BODY MASS INDEX: 43.34 KG/M2 | HEART RATE: 104 BPM | HEIGHT: 65 IN | DIASTOLIC BLOOD PRESSURE: 78 MMHG

## 2019-05-28 DIAGNOSIS — E11.9 DIABETES MELLITUS WITHOUT COMPLICATION: ICD-10-CM

## 2019-05-28 DIAGNOSIS — E11.65 TYPE 2 DIABETES MELLITUS WITH HYPERGLYCEMIA, WITHOUT LONG-TERM CURRENT USE OF INSULIN: Primary | ICD-10-CM

## 2019-05-28 DIAGNOSIS — D50.9 MICROCYTIC ANEMIA: ICD-10-CM

## 2019-05-28 DIAGNOSIS — G43.009 MIGRAINE WITHOUT AURA AND WITHOUT STATUS MIGRAINOSUS, NOT INTRACTABLE: ICD-10-CM

## 2019-05-28 DIAGNOSIS — M48.062 SPINAL STENOSIS OF LUMBAR REGION WITH NEUROGENIC CLAUDICATION: ICD-10-CM

## 2019-05-28 DIAGNOSIS — I10 ESSENTIAL HYPERTENSION: ICD-10-CM

## 2019-05-28 DIAGNOSIS — E04.1 THYROID NODULE: ICD-10-CM

## 2019-05-28 DIAGNOSIS — M79.7 FIBROMYALGIA: ICD-10-CM

## 2019-05-28 DIAGNOSIS — K21.9 GASTROESOPHAGEAL REFLUX DISEASE, ESOPHAGITIS PRESENCE NOT SPECIFIED: ICD-10-CM

## 2019-05-28 LAB
BASOPHILS # BLD AUTO: 0.04 K/UL (ref 0–0.2)
BASOPHILS NFR BLD: 0.5 % (ref 0–1.9)
DIFFERENTIAL METHOD: ABNORMAL
EOSINOPHIL # BLD AUTO: 0.2 K/UL (ref 0–0.5)
EOSINOPHIL NFR BLD: 2 % (ref 0–8)
ERYTHROCYTE [DISTWIDTH] IN BLOOD BY AUTOMATED COUNT: 14.9 % (ref 11.5–14.5)
ESTIMATED AVG GLUCOSE: 186 MG/DL (ref 68–131)
HBA1C MFR BLD HPLC: 8.1 % (ref 4–5.6)
HCT VFR BLD AUTO: 40.4 % (ref 37–48.5)
HGB BLD-MCNC: 12.4 G/DL (ref 12–16)
IMM GRANULOCYTES # BLD AUTO: 0.04 K/UL (ref 0–0.04)
IMM GRANULOCYTES NFR BLD AUTO: 0.5 % (ref 0–0.5)
LYMPHOCYTES # BLD AUTO: 1.9 K/UL (ref 1–4.8)
LYMPHOCYTES NFR BLD: 22.7 % (ref 18–48)
MCH RBC QN AUTO: 25.2 PG (ref 27–31)
MCHC RBC AUTO-ENTMCNC: 30.7 G/DL (ref 32–36)
MCV RBC AUTO: 82 FL (ref 82–98)
MONOCYTES # BLD AUTO: 0.6 K/UL (ref 0.3–1)
MONOCYTES NFR BLD: 7 % (ref 4–15)
NEUTROPHILS # BLD AUTO: 5.5 K/UL (ref 1.8–7.7)
NEUTROPHILS NFR BLD: 67.3 % (ref 38–73)
NRBC BLD-RTO: 0 /100 WBC
PLATELET # BLD AUTO: 381 K/UL (ref 150–350)
PMV BLD AUTO: 10.5 FL (ref 9.2–12.9)
RBC # BLD AUTO: 4.93 M/UL (ref 4–5.4)
WBC # BLD AUTO: 8.19 K/UL (ref 3.9–12.7)

## 2019-05-28 PROCEDURE — 3078F DIAST BP <80 MM HG: CPT | Mod: CPTII,S$GLB,, | Performed by: FAMILY MEDICINE

## 2019-05-28 PROCEDURE — 3077F PR MOST RECENT SYSTOLIC BLOOD PRESSURE >= 140 MM HG: ICD-10-PCS | Mod: CPTII,S$GLB,, | Performed by: FAMILY MEDICINE

## 2019-05-28 PROCEDURE — 99214 OFFICE O/P EST MOD 30 MIN: CPT | Mod: S$GLB,,, | Performed by: FAMILY MEDICINE

## 2019-05-28 PROCEDURE — 3045F PR MOST RECENT HEMOGLOBIN A1C LEVEL 7.0-9.0%: ICD-10-PCS | Mod: CPTII,S$GLB,, | Performed by: FAMILY MEDICINE

## 2019-05-28 PROCEDURE — 3078F PR MOST RECENT DIASTOLIC BLOOD PRESSURE < 80 MM HG: ICD-10-PCS | Mod: CPTII,S$GLB,, | Performed by: FAMILY MEDICINE

## 2019-05-28 PROCEDURE — 3045F PR MOST RECENT HEMOGLOBIN A1C LEVEL 7.0-9.0%: CPT | Mod: CPTII,S$GLB,, | Performed by: FAMILY MEDICINE

## 2019-05-28 PROCEDURE — 36415 COLL VENOUS BLD VENIPUNCTURE: CPT | Mod: PO

## 2019-05-28 PROCEDURE — 83036 HEMOGLOBIN GLYCOSYLATED A1C: CPT

## 2019-05-28 PROCEDURE — 3008F PR BODY MASS INDEX (BMI) DOCUMENTED: ICD-10-PCS | Mod: CPTII,S$GLB,, | Performed by: FAMILY MEDICINE

## 2019-05-28 PROCEDURE — 85025 COMPLETE CBC W/AUTO DIFF WBC: CPT

## 2019-05-28 PROCEDURE — 99999 PR PBB SHADOW E&M-EST. PATIENT-LVL III: ICD-10-PCS | Mod: PBBFAC,,, | Performed by: FAMILY MEDICINE

## 2019-05-28 PROCEDURE — 99999 PR PBB SHADOW E&M-EST. PATIENT-LVL III: CPT | Mod: PBBFAC,,, | Performed by: FAMILY MEDICINE

## 2019-05-28 PROCEDURE — 3008F BODY MASS INDEX DOCD: CPT | Mod: CPTII,S$GLB,, | Performed by: FAMILY MEDICINE

## 2019-05-28 PROCEDURE — 99214 PR OFFICE/OUTPT VISIT, EST, LEVL IV, 30-39 MIN: ICD-10-PCS | Mod: S$GLB,,, | Performed by: FAMILY MEDICINE

## 2019-05-28 PROCEDURE — 3077F SYST BP >= 140 MM HG: CPT | Mod: CPTII,S$GLB,, | Performed by: FAMILY MEDICINE

## 2019-05-28 RX ORDER — PANTOPRAZOLE SODIUM 20 MG/1
20 TABLET, DELAYED RELEASE ORAL DAILY
Qty: 30 TABLET | Refills: 5 | Status: SHIPPED | OUTPATIENT
Start: 2019-05-28 | End: 2019-08-29 | Stop reason: SDUPTHER

## 2019-05-28 RX ORDER — ONDANSETRON 8 MG/1
8 TABLET, ORALLY DISINTEGRATING ORAL EVERY 8 HOURS PRN
Qty: 30 TABLET | Refills: 2 | Status: SHIPPED | OUTPATIENT
Start: 2019-05-28 | End: 2021-12-17 | Stop reason: SDUPTHER

## 2019-05-28 NOTE — PROGRESS NOTES
Chief Complaint   Patient presents with    Hypertension    Fibromyalgia    Diabetes       Regine Alfred is a 64 y.o. female who presents per the Chief Complaint.  Pt is known to me and was last seen by me on 2/26/2019.  All known chronic medical issues have been documented.       Hypertension   This is a chronic problem. The current episode started more than 1 year ago. The problem is unchanged. The problem is controlled. Pertinent negatives include no anxiety, blurred vision, chest pain, headaches, malaise/fatigue, neck pain, orthopnea, palpitations, peripheral edema, PND, shortness of breath or sweats. Agents associated with hypertension include steroids. Risk factors for coronary artery disease include diabetes mellitus, obesity and post-menopausal state. Past treatments include angiotensin blockers and diuretics. The current treatment provides moderate improvement. There are no compliance problems.  There is no history of angina, kidney disease, CAD/MI, CVA, heart failure, left ventricular hypertrophy, PVD or retinopathy. There is no history of chronic renal disease, coarctation of the aorta, hyperaldosteronism, hypercortisolism, hyperparathyroidism, a hypertension causing med, pheochromocytoma, renovascular disease, sleep apnea or a thyroid problem.   Fibromyalgia   This is a chronic problem. The current episode started more than 1 year ago. The problem occurs daily. The problem has been waxing and waning. Associated symptoms include abdominal pain, arthralgias, coughing and myalgias. Pertinent negatives include no anorexia, change in bowel habit, chest pain, chills, congestion, diaphoresis, fatigue, fever, headaches, joint swelling, nausea, neck pain, numbness, rash, sore throat, swollen glands, urinary symptoms, vertigo, visual change, vomiting or weakness. The symptoms are aggravated by bending, exertion, standing and walking. She has tried acetaminophen, oral narcotics, NSAIDs, rest and position  changes for the symptoms. The treatment provided moderate relief.   Diabetes   She presents for her follow-up diabetic visit. She has type 2 diabetes mellitus. Her disease course has been stable. There are no hypoglycemic associated symptoms. Pertinent negatives for hypoglycemia include no confusion, dizziness, headaches, nervousness/anxiousness, seizures or sweats. There are no diabetic associated symptoms. Pertinent negatives for diabetes include no blurred vision, no chest pain, no fatigue, no polydipsia, no polyuria, no visual change and no weakness. There are no hypoglycemic complications. Symptoms are stable. There are no diabetic complications. Pertinent negatives for diabetic complications include no CVA, PVD or retinopathy. Risk factors for coronary artery disease include diabetes mellitus, hypertension, obesity and post-menopausal. Current diabetic treatment includes oral agent (monotherapy). She is compliant with treatment most of the time. Her weight is stable. She is following a generally healthy diet. When asked about meal planning, she reported none. She has not had a previous visit with a dietitian. She participates in exercise intermittently. There is no change in her home blood glucose trend. An ACE inhibitor/angiotensin II receptor blocker is being taken. She does not see a podiatrist.Eye exam is current.   Otalgia    There is pain in the left ear. This is a recurrent problem. The current episode started 1 to 4 weeks ago. The problem occurs hourly. The problem has been waxing and waning. There has been no fever. The pain is at a severity of 2/10. The pain is mild. Associated symptoms include abdominal pain, coughing, diarrhea and rhinorrhea. Pertinent negatives include no ear discharge, headaches, hearing loss, neck pain, rash, sore throat or vomiting. She has tried nothing for the symptoms. There is no history of a chronic ear infection, hearing loss or a tympanostomy tube.        ROS  Review of  "Systems   Constitutional: Negative.  Negative for activity change, appetite change, chills, diaphoresis, fatigue, fever, malaise/fatigue and unexpected weight change.   HENT: Positive for ear pain and rhinorrhea. Negative for congestion, ear discharge, hearing loss, nosebleeds, postnasal drip, sinus pressure, sneezing, sore throat and trouble swallowing.    Eyes: Negative for blurred vision, pain, discharge and visual disturbance.   Respiratory: Positive for cough. Negative for choking, chest tightness, shortness of breath and wheezing.    Cardiovascular: Negative for chest pain, palpitations, orthopnea, leg swelling and PND.   Gastrointestinal: Positive for abdominal pain, constipation and diarrhea. Negative for anorexia, blood in stool, change in bowel habit, nausea and vomiting.   Endocrine: Negative for polydipsia and polyuria.   Genitourinary: Negative for difficulty urinating, dysuria, frequency, hematuria, menstrual problem and urgency.   Musculoskeletal: Positive for arthralgias and myalgias. Negative for back pain, gait problem, joint swelling and neck pain.   Skin: Negative.  Negative for rash.   Allergic/Immunologic: Negative for environmental allergies and food allergies.   Neurological: Negative.  Negative for dizziness, vertigo, seizures, syncope, weakness, light-headedness, numbness and headaches.   Psychiatric/Behavioral: Negative.  Negative for confusion, decreased concentration, dysphoric mood and sleep disturbance. The patient is not nervous/anxious.        Physical Exam  Vitals:    05/28/19 0845   BP: (!) 146/78   Pulse: 104   Resp: 17   Temp: 98.5 °F (36.9 °C)    Body mass index is 43.29 kg/m².  Weight: 118 kg (260 lb 2.3 oz)   Height: 5' 5" (165.1 cm)     Physical Exam   Constitutional: She is oriented to person, place, and time. She appears well-developed and well-nourished. She is active and cooperative.  Non-toxic appearance. She does not have a sickly appearance. She does not appear ill. No " distress.   HENT:   Head: Normocephalic and atraumatic.   Right Ear: Hearing and external ear normal. No decreased hearing is noted.   Left Ear: Hearing and external ear normal. No decreased hearing is noted.   Nose: Nose normal. No rhinorrhea or nasal deformity.   Mouth/Throat: Uvula is midline and oropharynx is clear and moist. She does not have dentures. Normal dentition.   Eyes: Pupils are equal, round, and reactive to light. Conjunctivae, EOM and lids are normal. Right eye exhibits no chemosis, no discharge and no exudate. No foreign body present in the right eye. Left eye exhibits no chemosis, no discharge and no exudate. No foreign body present in the left eye. No scleral icterus.   Neck: Normal range of motion and full passive range of motion without pain. Neck supple.   Cardiovascular: Normal rate, regular rhythm, S1 normal, S2 normal and normal heart sounds. Exam reveals no gallop and no friction rub.   No murmur heard.  Pulmonary/Chest: Effort normal and breath sounds normal. No accessory muscle usage. No respiratory distress. She has no decreased breath sounds. She has no wheezes. She has no rhonchi. She has no rales.   Abdominal: Soft. Normal appearance. She exhibits no distension. There is no hepatosplenomegaly. There is no tenderness. There is no rigidity, no rebound and no guarding.   Musculoskeletal:        Lumbar back: She exhibits decreased range of motion, tenderness and pain. She exhibits no bony tenderness, no swelling, no edema, no deformity, no laceration, no spasm and normal pulse.        Right upper leg: She exhibits tenderness. She exhibits no bony tenderness, no swelling, no edema, no deformity and no laceration.        Left upper leg: She exhibits tenderness. She exhibits no bony tenderness, no swelling, no edema, no deformity and no laceration.   Neurological: She is alert and oriented to person, place, and time. She has normal strength. No cranial nerve deficit or sensory deficit. She  exhibits normal muscle tone. She displays no seizure activity. Coordination and gait normal.   Skin: Skin is warm, dry and intact. No rash noted. She is not diaphoretic.   Psychiatric: She has a normal mood and affect. Her speech is normal and behavior is normal. Judgment and thought content normal. Cognition and memory are normal. She is attentive.       Assessment & Plan    Discussion of plan of care including treatment options regarding health and wellness were reviewed and discussed with patient.  Any changes to medication or treatment plan, as well as any screening blood test, imaging, or referrals to specialist, are documented.  Follow up as indicated.     1. Type 2 diabetes mellitus with hyperglycemia, without long-term current use of insulin  Patient is encouraged to follow a diet low in carbohydrates and simple sugars.  Discussed simple vs. complex carbohydrates as well as eating times of certain meals. Advised to focus on good food choices and increased physical activity and encouraged to adhere to medication regimen and/or lifestyle adjustments, and to check glucose level as recommended.  Contact office if glucose levels are not improving over time.  Screening blood test is due at this time.       2. Essential hypertension  Patient was counseled and encouraged to maintain a low sodium diet, as well as increasing physical activity.  Recommend random BP checks at home on a regular basis.  Repeat BP at end of visit was not necessary. Will continue medication at this time, and follow up in 3-6 months, or sooner if blood pressure begins to increase.       3. Fibromyalgia  Stable; no therapeutic changes at this time.  Encouraged to use NSAID therapy as needed.    4. Spinal stenosis of lumbar region with neurogenic claudication  Stable; no therapeutic changes at this time.     5. Migraine without aura and without status migrainosus, not intractable  Stable; no therapeutic changes at this time.  Medication  prescribed for use only as symptoms require.   - ondansetron (ZOFRAN-ODT) 8 MG TbDL; Take 1 tablet (8 mg total) by mouth every 8 (eight) hours as needed.  Dispense: 30 tablet; Refill: 2    6. Microcytic anemia  Will recheck blood in 3 months; asymptomatic.  - CBC auto differential; Future    7. Gastroesophageal reflux disease, esophagitis presence not specified  Patient was advised to start medication and to decrease intake of caffeine and spicy foods and to elevate head while lying down.  Also advised to avoid lying down within 3 hours of last meal.  If symptoms continue with treatment, follow up for further evaluation.     - pantoprazole (PROTONIX) 20 MG tablet; Take 1 tablet (20 mg total) by mouth once daily.  Dispense: 30 tablet; Refill: 5    8. Thyroid nodule  Recent biopsy benign; will manage with routine imaging every 6 months to a year.       Follow up in about 3 months (around 8/28/2019), or if symptoms worsen or fail to improve.        ACTIVE MEDICAL ISSUES:  Documented in Problem List    PAST MEDICAL HISTORY  Documented    PAST SURGICAL HISTORY:  Documented    SOCIAL HISTORY:  Documented    FAMILY HISTORY:  Documented    ALLERGIES AND MEDICATIONS: updated and reviewed.  Documented    Health Maintenance       Date Due Completion Date    Foot Exam 01/12/1967 ---    Shingles Vaccine (1 of 2) 01/12/2007 ---    Hemoglobin A1c 05/29/2019 11/29/2018    Influenza Vaccine 08/01/2019 8/28/2018    Override on 8/28/2018: Done    Lipid Panel 11/29/2019 11/29/2018    Eye Exam 04/26/2020 4/26/2019    Mammogram 02/21/2021 2/21/2019    Colonoscopy 07/09/2023 7/9/2018    TETANUS VACCINE 11/26/2028 11/26/2018    Override on 1/30/2008: Done

## 2019-05-29 ENCOUNTER — OFFICE VISIT (OUTPATIENT)
Dept: NEUROLOGY | Facility: CLINIC | Age: 64
End: 2019-05-29
Attending: INTERNAL MEDICINE
Payer: COMMERCIAL

## 2019-05-29 ENCOUNTER — LAB VISIT (OUTPATIENT)
Dept: LAB | Facility: OTHER | Age: 64
End: 2019-05-29
Attending: PSYCHIATRY & NEUROLOGY
Payer: COMMERCIAL

## 2019-05-29 VITALS
BODY MASS INDEX: 43.45 KG/M2 | HEIGHT: 65 IN | WEIGHT: 260.81 LBS | SYSTOLIC BLOOD PRESSURE: 128 MMHG | DIASTOLIC BLOOD PRESSURE: 79 MMHG | HEART RATE: 89 BPM

## 2019-05-29 DIAGNOSIS — M79.7 FIBROMYALGIA: ICD-10-CM

## 2019-05-29 DIAGNOSIS — R41.9 UNSP SYMPTOMS AND SIGNS W COGNITIVE FUNCTIONS AND AWARENESS: ICD-10-CM

## 2019-05-29 DIAGNOSIS — R41.9 UNSP SYMPTOMS AND SIGNS W COGNITIVE FUNCTIONS AND AWARENESS: Primary | ICD-10-CM

## 2019-05-29 DIAGNOSIS — E11.65 TYPE 2 DIABETES MELLITUS WITH HYPERGLYCEMIA, WITHOUT LONG-TERM CURRENT USE OF INSULIN: ICD-10-CM

## 2019-05-29 DIAGNOSIS — Z85.3 HISTORY OF BREAST CANCER IN FEMALE: ICD-10-CM

## 2019-05-29 DIAGNOSIS — I10 ESSENTIAL HYPERTENSION: ICD-10-CM

## 2019-05-29 DIAGNOSIS — Z92.21 HISTORY OF ANTINEOPLASTIC CHEMOTHERAPY: ICD-10-CM

## 2019-05-29 DIAGNOSIS — G43.009 MIGRAINE WITHOUT AURA AND WITHOUT STATUS MIGRAINOSUS, NOT INTRACTABLE: ICD-10-CM

## 2019-05-29 LAB
FOLATE SERPL-MCNC: 7.9 NG/ML (ref 4–24)
VIT B12 SERPL-MCNC: 353 PG/ML (ref 210–950)

## 2019-05-29 PROCEDURE — 82607 VITAMIN B-12: CPT

## 2019-05-29 PROCEDURE — 3008F PR BODY MASS INDEX (BMI) DOCUMENTED: ICD-10-PCS | Mod: CPTII,S$GLB,, | Performed by: PSYCHIATRY & NEUROLOGY

## 2019-05-29 PROCEDURE — 3045F PR MOST RECENT HEMOGLOBIN A1C LEVEL 7.0-9.0%: ICD-10-PCS | Mod: CPTII,S$GLB,, | Performed by: PSYCHIATRY & NEUROLOGY

## 2019-05-29 PROCEDURE — 99999 PR PBB SHADOW E&M-EST. PATIENT-LVL III: CPT | Mod: PBBFAC,,, | Performed by: PSYCHIATRY & NEUROLOGY

## 2019-05-29 PROCEDURE — 3008F BODY MASS INDEX DOCD: CPT | Mod: CPTII,S$GLB,, | Performed by: PSYCHIATRY & NEUROLOGY

## 2019-05-29 PROCEDURE — 82746 ASSAY OF FOLIC ACID SERUM: CPT

## 2019-05-29 PROCEDURE — 3074F PR MOST RECENT SYSTOLIC BLOOD PRESSURE < 130 MM HG: ICD-10-PCS | Mod: CPTII,S$GLB,, | Performed by: PSYCHIATRY & NEUROLOGY

## 2019-05-29 PROCEDURE — 99215 OFFICE O/P EST HI 40 MIN: CPT | Mod: S$GLB,,, | Performed by: PSYCHIATRY & NEUROLOGY

## 2019-05-29 PROCEDURE — 36415 COLL VENOUS BLD VENIPUNCTURE: CPT

## 2019-05-29 PROCEDURE — 99215 PR OFFICE/OUTPT VISIT, EST, LEVL V, 40-54 MIN: ICD-10-PCS | Mod: S$GLB,,, | Performed by: PSYCHIATRY & NEUROLOGY

## 2019-05-29 PROCEDURE — 99999 PR PBB SHADOW E&M-EST. PATIENT-LVL III: ICD-10-PCS | Mod: PBBFAC,,, | Performed by: PSYCHIATRY & NEUROLOGY

## 2019-05-29 PROCEDURE — 3078F DIAST BP <80 MM HG: CPT | Mod: CPTII,S$GLB,, | Performed by: PSYCHIATRY & NEUROLOGY

## 2019-05-29 PROCEDURE — 3078F PR MOST RECENT DIASTOLIC BLOOD PRESSURE < 80 MM HG: ICD-10-PCS | Mod: CPTII,S$GLB,, | Performed by: PSYCHIATRY & NEUROLOGY

## 2019-05-29 PROCEDURE — 3045F PR MOST RECENT HEMOGLOBIN A1C LEVEL 7.0-9.0%: CPT | Mod: CPTII,S$GLB,, | Performed by: PSYCHIATRY & NEUROLOGY

## 2019-05-29 PROCEDURE — 3074F SYST BP LT 130 MM HG: CPT | Mod: CPTII,S$GLB,, | Performed by: PSYCHIATRY & NEUROLOGY

## 2019-05-29 NOTE — PATIENT INSTRUCTIONS
Discussed with patient. Her memory difficulties are minimal but primarily related to difficulty with multitasking, attention span and concentration difficulty and difficulty with retrieval time.  She is otherwise quite functional and independent.  Symptom onset was following chemotherapy about year ago with gradual improvement however she is not quite back to her usual baseline.  Advised B12 folate levels today.  Advised walking for exercise and continue present level of activities.  OTC B12 supplementation sublingual 2500 mcg daily.  Advised her that her cognitive difficulties are most likely post chemotherapy and should gradually improve over but may not completely resolved.  She may be left with some difficulty with processing time and with multitasking.  She does not want to be on any memory medications at this as she has been gradually improving.  Will follow up in 6 months.  She is advised to continue the headache Clinic for her migraine headaches.

## 2019-05-29 NOTE — PROGRESS NOTES
Subjective:       Patient ID: Regine Alfred is a 64 y.o. female.    Chief Complaint:  Memory Loss      History of Present Illness  HPI   This is a 64-year-old female who was referred for evaluation of memory difficulties.  The patient reports that she 1st noted cognitive difficulties after she was started on chemotherapy following diagnosis of breast cancer.  She underwent a left mastectomy in November 2017 and subsequently had 4 sessions of chemotherapy.  Since then she reported intermittent difficulty with focusing and attention span, not remembering names or conversation she may have had but she did have delayed recall.  She did report that she had difficulty recalling recent information but eventually was able to do so.  The patient is otherwise able to take care of her day-to-day needs at home without any problems.  She came to the clinic alone.  She has no difficulty with driving.  She manages her medications, her meals and her finances without any problems.  She reports that she was initially told that symptoms would resolve within 6 months or so, but they have continued dose slowly getting better.  The patient also has chronic pain related to fibromyalgia and lumbar spine disease and history of migraine headaches being followed by the headache Clinic.  Her headaches have been stable.  Vascular risk factors include type 2 diabetes and hypertension.  Her last hemoglobin A1c done yesterday was 8.1.       Review of Systems  Review of Systems   Constitutional: Negative.    HENT: Negative.  Negative for hearing loss.    Eyes: Negative.  Negative for visual disturbance.   Respiratory: Negative.  Negative for shortness of breath.    Cardiovascular: Negative.  Negative for chest pain and palpitations.   Gastrointestinal: Negative.    Genitourinary: Negative.    Musculoskeletal: Positive for arthralgias, back pain and myalgias. Negative for gait problem and neck pain.   Skin: Negative.    Neurological: Negative.   Negative for tremors, seizures, syncope, speech difficulty, weakness, numbness and headaches.   Psychiatric/Behavioral: Positive for decreased concentration and sleep disturbance. Negative for confusion.       Objective:      Neurologic Exam     Mental Status   Oriented to person, place, and time.   Registration: recalls 3 of 3 objects. Follows 3 step commands.   Attention: normal. Concentration: normal.   Speech: speech is normal   Level of consciousness: alert  Knowledge: good.   Able to name object. Able to read. Able to repeat. Able to write. Normal comprehension.   Patient is alert, verbal and coherent and in no distress.  She is able to give an adequate recent and past medical history.  Affect is appropriate and mood is even.     Cranial Nerves   Cranial nerves II through XII intact.     CN II   Visual fields full to confrontation.     CN III, IV, VI   Pupils are equal, round, and reactive to light.  Extraocular motions are normal.   Right pupil: Size: 3 mm. Shape: regular. Reactivity: brisk. Consensual response: intact. Accommodation: intact.   Left pupil: Size: 3 mm. Shape: regular. Reactivity: brisk. Consensual response: intact. Accommodation: intact.   CN III: no CN III palsy  CN VI: no CN VI palsy  Nystagmus: none   Diplopia: none  Ophthalmoparesis: none    CN V   Facial sensation intact.     CN VII   Facial expression full, symmetric.     CN VIII   CN VIII normal.     CN IX, X   CN IX normal.     CN XI   CN XI normal.     CN XII   CN XII normal.   Fundus examination:  Sharp disc margins.  Normal vessels on nondilated exam.     Motor Exam   Muscle bulk: normal  Overall muscle tone: normal    Strength   Strength 5/5 throughout. Examination of extremities revealed normal tone and power in both upper and lower extremities with no focal weakness, involuntary movements or atrophy.     Sensory Exam   Light touch normal.   Proprioception normal.   Pinprick normal.     Gait, Coordination, and Reflexes      Gait  Gait: normal    Coordination   Romberg: negative  Finger to nose coordination: normal    Tremor   Resting tremor: absent  Intention tremor: absent  Action tremor: absent    Reflexes   Right brachioradialis: 1+  Left brachioradialis: 1+  Right biceps: 1+  Left biceps: 1+  Right triceps: 1+  Left triceps: 1+  Right patellar: 1+  Left patellar: 1+  Right achilles: 1+  Left achilles: 1+  Right plantar: normal  Left plantar: normal      Physical Exam   Constitutional: She is oriented to person, place, and time. She appears well-developed and well-nourished.   HENT:   Head: Normocephalic and atraumatic.   Eyes: Pupils are equal, round, and reactive to light. EOM are normal.   Neck: Normal range of motion. Neck supple. Carotid bruit is not present.   Cardiovascular: Normal rate and regular rhythm.   Neurological: She is oriented to person, place, and time. She has normal strength. She has a normal Finger-Nose-Finger Test and a normal Romberg Test. Gait normal.   Reflex Scores:       Tricep reflexes are 1+ on the right side and 1+ on the left side.       Bicep reflexes are 1+ on the right side and 1+ on the left side.       Brachioradialis reflexes are 1+ on the right side and 1+ on the left side.       Patellar reflexes are 1+ on the right side and 1+ on the left side.       Achilles reflexes are 1+ on the right side and 1+ on the left side.  Psychiatric: Her speech is normal.   Vitals reviewed.        Assessment:        1. Unsp symptoms and signs w cognitive functions and awareness    2. History of antineoplastic chemotherapy    3. History of breast cancer in female    4. Type 2 diabetes mellitus with hyperglycemia, without long-term current use of insulin    5. Migraine without aura and without status migrainosus, not intractable    6. Fibromyalgia    7. Essential hypertension            Plan:       Discussed with patient. Her memory difficulties are minimal but primarily related to difficulty with  multitasking, attention span and concentration difficulty and difficulty with retrieval time.  She is otherwise quite functional and independent.  Symptom onset was following chemotherapy about year ago with gradual improvement however she is not quite back to her usual baseline.  Advised B12 folate levels today.  Advised walking for exercise and continue present level of activities.  OTC B12 supplementation sublingual 2500 mcg daily.  Advised her that her cognitive difficulties are most likely post chemotherapy and should gradually improve over but may not completely resolved.  She may be left with some difficulty with processing time and with multitasking.  She does not want to be on any memory medications at this as she has been gradually improving.  Will follow up in 6 months.  She is advised to continue the headache Clinic for her migraine headaches.

## 2019-05-30 ENCOUNTER — PATIENT MESSAGE (OUTPATIENT)
Dept: NEUROLOGY | Facility: CLINIC | Age: 64
End: 2019-05-30

## 2019-06-24 ENCOUNTER — OFFICE VISIT (OUTPATIENT)
Dept: HEMATOLOGY/ONCOLOGY | Facility: CLINIC | Age: 64
End: 2019-06-24
Payer: COMMERCIAL

## 2019-06-24 VITALS
BODY MASS INDEX: 43.67 KG/M2 | WEIGHT: 262.13 LBS | OXYGEN SATURATION: 96 % | TEMPERATURE: 99 F | SYSTOLIC BLOOD PRESSURE: 138 MMHG | RESPIRATION RATE: 18 BRPM | HEIGHT: 65 IN | DIASTOLIC BLOOD PRESSURE: 63 MMHG | HEART RATE: 99 BPM

## 2019-06-24 DIAGNOSIS — Z85.3 HISTORY OF BREAST CANCER IN FEMALE: Primary | ICD-10-CM

## 2019-06-24 PROCEDURE — 3008F PR BODY MASS INDEX (BMI) DOCUMENTED: ICD-10-PCS | Mod: CPTII,S$GLB,, | Performed by: INTERNAL MEDICINE

## 2019-06-24 PROCEDURE — 99999 PR PBB SHADOW E&M-EST. PATIENT-LVL IV: ICD-10-PCS | Mod: PBBFAC,,, | Performed by: INTERNAL MEDICINE

## 2019-06-24 PROCEDURE — 3075F PR MOST RECENT SYSTOLIC BLOOD PRESS GE 130-139MM HG: ICD-10-PCS | Mod: CPTII,S$GLB,, | Performed by: INTERNAL MEDICINE

## 2019-06-24 PROCEDURE — 3075F SYST BP GE 130 - 139MM HG: CPT | Mod: CPTII,S$GLB,, | Performed by: INTERNAL MEDICINE

## 2019-06-24 PROCEDURE — 99999 PR PBB SHADOW E&M-EST. PATIENT-LVL IV: CPT | Mod: PBBFAC,,, | Performed by: INTERNAL MEDICINE

## 2019-06-24 PROCEDURE — 3078F DIAST BP <80 MM HG: CPT | Mod: CPTII,S$GLB,, | Performed by: INTERNAL MEDICINE

## 2019-06-24 PROCEDURE — 99213 PR OFFICE/OUTPT VISIT, EST, LEVL III, 20-29 MIN: ICD-10-PCS | Mod: S$GLB,,, | Performed by: INTERNAL MEDICINE

## 2019-06-24 PROCEDURE — 3078F PR MOST RECENT DIASTOLIC BLOOD PRESSURE < 80 MM HG: ICD-10-PCS | Mod: CPTII,S$GLB,, | Performed by: INTERNAL MEDICINE

## 2019-06-24 PROCEDURE — 3008F BODY MASS INDEX DOCD: CPT | Mod: CPTII,S$GLB,, | Performed by: INTERNAL MEDICINE

## 2019-06-24 PROCEDURE — 99213 OFFICE O/P EST LOW 20 MIN: CPT | Mod: S$GLB,,, | Performed by: INTERNAL MEDICINE

## 2019-06-24 NOTE — PROGRESS NOTES
Subjective:       Patient ID: Regine Alfred is a 64 y.o. female.    Chief Complaint: No chief complaint on file.    HPI      Mrs. Alfred returns today for follow up.  Briefly, she is a 64-year-old female who has a history of a stage I triple negative breast cancer that was diagnosed in late 2017.  She underwent a left modified radical mastectomy in [facility] on 11/01/2017 and had a stage IA carcinoma measuring 1.2 cm in greatest diameter.  Resection margins were clear, while four sentinel lymph nodes were negative.  Postoperatively, she received four cycles of docetaxel and cyclophosphamide.  The first cycle was administered on 11/30/2017 and she completed her treatment by 02/01/2018.  She has been followed expectantly and   has been MERRY since then.  As mentioned above, last year she relocated to [place] and we have assumed her care.    Hr mammogram in February 2019 was read as BIRADS II, and a one year follow up was recommended.  Last months she underwent an FNA of her tyroid; there was no evidence of malignancy.      Review of Systems    Overall she feels well.  She complains of being forgetful at times and if experiencing intermittent left chest wall pains.   She denies any anxiety, depression, easy bruising, fevers, chills, night  sweats, weight loss, nausea, vomiting, diarrhea, constipation, diplopia, blurred vision, headache, chest pain, palpitations, shortness of breath, breast pain, abdominal pain, extremity pain, or difficulty ambulating.  The remainder of the ten-point ROS, including general, skin, lymph, H/N, cardiorespiratory, GI, , Neuro, Endocrine, and psychiatric is negative.     Objective:      Physical Exam      She is alert, oriented to time, place, person, pleasant, well      nourished, in no acute physical distress.                                    VITAL SIGNS:  Reviewed                                      HEENT:  Normal.  There are no nasal, oral, lip, gingival, auricular, lid,     or conjunctival lesions.  Mucosae are moist and pink, and there is no        thrush.  Pupils are equal, reactive to light and accommodation.              Extraocular muscle movements are intact.  Dentition is good.  There is no frontal or maxillary tenderness.                                     NECK:  Supple without JVD, adenopathy, but she does have thyromegaly.                       LUNGS:  Clear to auscultation without wheezing, rales, or rhonchi.           CARDIOVASCULAR:  Reveals an S1, S2, no murmurs, no rubs, no gallops.         ABDOMEN:  Soft, nontender, without organomegaly.  Bowel sounds are    present.                                                                     EXTREMITIES:  No cyanosis, clubbing, or edema.                               BREASTS:  She is status post left mastectomy with a well-healed periareolar incision.    There are no masses in the right breast.     LYMPHATIC:  There is no cervical, axillary, or supraclavicular adenopathy.   SKIN:  Warm and moist, without petechiae, rashes, induration, or ecchymoses.           NEUROLOGIC:  DTRs are 0-1+ bilaterally, symmetrical, motor function is 5/5,  and cranial nerves are  within normal limits.    Assessment:       1. History of breast cancer in female, clinically MERRY, doing well.      2.    History of hypertension and diabetes.   3.      Thyromegaly  Plan:        Mrs Alfred is doing well, and remains MERRY.  I have asked her to return and see me in four months with a mammogram.  In regards to her thyromegaly, I asked her to address that through her PCP, who is being ccd on this note.   Her multiple questions were answered to her satisfaction.

## 2019-08-19 DIAGNOSIS — M48.062 SPINAL STENOSIS OF LUMBAR REGION WITH NEUROGENIC CLAUDICATION: ICD-10-CM

## 2019-08-19 DIAGNOSIS — E11.65 TYPE 2 DIABETES MELLITUS WITH HYPERGLYCEMIA, WITHOUT LONG-TERM CURRENT USE OF INSULIN: ICD-10-CM

## 2019-08-19 DIAGNOSIS — I10 ESSENTIAL HYPERTENSION: ICD-10-CM

## 2019-08-21 DIAGNOSIS — E11.65 TYPE 2 DIABETES MELLITUS WITH HYPERGLYCEMIA, WITHOUT LONG-TERM CURRENT USE OF INSULIN: ICD-10-CM

## 2019-08-21 DIAGNOSIS — M48.062 SPINAL STENOSIS OF LUMBAR REGION WITH NEUROGENIC CLAUDICATION: ICD-10-CM

## 2019-08-21 DIAGNOSIS — I10 ESSENTIAL HYPERTENSION: ICD-10-CM

## 2019-08-21 NOTE — PROGRESS NOTES
HPI     DLS: 4/26/19    Pt here for 4 month check; (HRT machine not working)  Pt states she had ocular migraines last week.     Meds; No GTTS     1. Glaucoma Suspect   2. Drusen OU   3. NS OU   4. Hx Breast CA    Last edited by Margie Lakhani on 8/23/2019  9:01 AM. (History)            Assessment /Plan     For exam results, see Encounter Report.    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   First photos   2018   Treatment / Drops started   none           Family history    + mother / grtand father / uncle / cousins         Glaucoma meds    nne        H/O adverse rxn to glaucoma drops    none        LASERS    none        GLAUCOMA SURGERIES    none        OTHER EYE SURGERIES    none        CDR    0.8/0.7        Tbase    18-20  od // 16-20 os        Tmax    20/20 (outside records)            Ttarget    ?             HVF    1 test 2019 to  2019 - full od // full os        Gonio    +3-4 ou         CCT    588/585        OCT    1 test 2019 to 2019 - RNFL -  Bord TIod // nl  os        HRT    ? test 20? to 20? - MR - ? od // ? os        Disc photos    2018     - Ttoday    19/17  - Test done today     IOP // HRT - not working today     2. Dominate familial drusen   Mostly outside the arcades ou    See photos - 2018     3. NS   Mild -monitor     4. flaoter (one floater - string like)    Warned of signs of PVD and RD    Pt to call if marked increase in floaters or flashes or curtain defect    5. Breast CA       PLAN   F/U with HRT / gonio  - 6 months -   cosider decreasing visits to every 9 months

## 2019-08-21 NOTE — TELEPHONE ENCOUNTER
----- Message from Isa Worthy sent at 8/21/2019 12:34 PM CDT -----  Contact: self [phone]  .Type: RX Refill Request    Who Called: self    Refill or New Rx: refill    RX Name and Strength: metFORMIN (GLUCOPHAGE) 1000 MG tablet, losartan (COZAAR) 50 MG tablet, gabapentin (NEURONTIN) 600 MG tablet & hydroCHLOROthiazide (HYDRODIURIL) 25 MG tablet      Is this a 30 day or 90 day RX: 30 day    Preferred Pharmacy with phone number: .  [id number] - [place], [place] - [address]  [address] [facility] [address]  Phone: [phone] Fax: [phone]    Local or Mail Order: local     Would the patient rather a call back or a response via [facility]? Call back     Best Call Back Number: [phone]

## 2019-08-22 NOTE — TELEPHONE ENCOUNTER
----- Message from Ioana Almeida sent at 8/22/2019 11:03 AM CDT -----  Contact: Self   Type: Patient Call Back    What is the request in detail: Pt calling to speak to Daisha regarding status on medications.    Can the clinic reply by [facility]? No    Would the patient rather a call back or a response via [facility]? Call back     Best call back number: [phone]

## 2019-08-22 NOTE — TELEPHONE ENCOUNTER
Let pt know refill requests had been sent.  States she has been waiting 4 days and is going to run out of medication.  I let her know I would remind doctor.

## 2019-08-23 ENCOUNTER — OFFICE VISIT (OUTPATIENT)
Dept: OPHTHALMOLOGY | Facility: CLINIC | Age: 64
End: 2019-08-23
Payer: COMMERCIAL

## 2019-08-23 DIAGNOSIS — H35.361 FAMILIAL DRUSEN OF MACULA OF BOTH EYES: ICD-10-CM

## 2019-08-23 DIAGNOSIS — H52.203 MYOPIA OF BOTH EYES WITH ASTIGMATISM AND PRESBYOPIA: ICD-10-CM

## 2019-08-23 DIAGNOSIS — H52.4 MYOPIA OF BOTH EYES WITH ASTIGMATISM AND PRESBYOPIA: ICD-10-CM

## 2019-08-23 DIAGNOSIS — H25.13 NUCLEAR SCLEROTIC CATARACT OF BOTH EYES: ICD-10-CM

## 2019-08-23 DIAGNOSIS — H52.13 MYOPIA OF BOTH EYES WITH ASTIGMATISM AND PRESBYOPIA: ICD-10-CM

## 2019-08-23 DIAGNOSIS — H40.023 OPEN ANGLE WITH BORDERLINE FINDINGS AND HIGH GLAUCOMA RISK IN BOTH EYES: Primary | ICD-10-CM

## 2019-08-23 DIAGNOSIS — H35.362 FAMILIAL DRUSEN OF MACULA OF BOTH EYES: ICD-10-CM

## 2019-08-23 PROCEDURE — 99999 PR PBB SHADOW E&M-EST. PATIENT-LVL II: CPT | Mod: PBBFAC,,, | Performed by: OPHTHALMOLOGY

## 2019-08-23 PROCEDURE — 92012 INTRM OPH EXAM EST PATIENT: CPT | Mod: S$GLB,,, | Performed by: OPHTHALMOLOGY

## 2019-08-23 PROCEDURE — 99999 PR PBB SHADOW E&M-EST. PATIENT-LVL II: ICD-10-PCS | Mod: PBBFAC,,, | Performed by: OPHTHALMOLOGY

## 2019-08-23 PROCEDURE — 92012 PR EYE EXAM, EST PATIENT,INTERMED: ICD-10-PCS | Mod: S$GLB,,, | Performed by: OPHTHALMOLOGY

## 2019-08-23 RX ORDER — METFORMIN HYDROCHLORIDE 1000 MG/1
TABLET ORAL
Qty: 180 TABLET | Refills: 0 | Status: SHIPPED | OUTPATIENT
Start: 2019-08-23 | End: 2019-11-18 | Stop reason: SDUPTHER

## 2019-08-23 RX ORDER — GABAPENTIN 600 MG/1
TABLET ORAL
Qty: 90 TABLET | Refills: 0 | Status: SHIPPED | OUTPATIENT
Start: 2019-08-23 | End: 2019-09-17 | Stop reason: SDUPTHER

## 2019-08-23 RX ORDER — LOSARTAN POTASSIUM 50 MG/1
TABLET ORAL
Qty: 90 TABLET | Refills: 0 | Status: SHIPPED | OUTPATIENT
Start: 2019-08-23 | End: 2019-11-18 | Stop reason: SDUPTHER

## 2019-08-23 RX ORDER — HYDROCHLOROTHIAZIDE 25 MG/1
TABLET ORAL
Qty: 90 TABLET | Refills: 0 | Status: SHIPPED | OUTPATIENT
Start: 2019-08-23 | End: 2019-11-18 | Stop reason: SDUPTHER

## 2019-08-26 RX ORDER — LOSARTAN POTASSIUM 50 MG/1
TABLET ORAL
Qty: 90 TABLET | Refills: 0 | OUTPATIENT
Start: 2019-08-26

## 2019-08-26 RX ORDER — HYDROCHLOROTHIAZIDE 25 MG/1
25 TABLET ORAL DAILY
Qty: 90 TABLET | Refills: 1 | OUTPATIENT
Start: 2019-08-26

## 2019-08-26 RX ORDER — GABAPENTIN 600 MG/1
TABLET ORAL
Qty: 90 TABLET | Refills: 2 | OUTPATIENT
Start: 2019-08-26

## 2019-08-26 RX ORDER — METFORMIN HYDROCHLORIDE 1000 MG/1
1000 TABLET ORAL 2 TIMES DAILY WITH MEALS
Qty: 180 TABLET | Refills: 1 | OUTPATIENT
Start: 2019-08-26

## 2019-08-28 ENCOUNTER — LAB VISIT (OUTPATIENT)
Dept: LAB | Facility: HOSPITAL | Age: 64
End: 2019-08-28
Attending: FAMILY MEDICINE
Payer: COMMERCIAL

## 2019-08-28 DIAGNOSIS — E11.65 TYPE 2 DIABETES MELLITUS WITH HYPERGLYCEMIA, WITHOUT LONG-TERM CURRENT USE OF INSULIN: ICD-10-CM

## 2019-08-28 LAB
ESTIMATED AVG GLUCOSE: 206 MG/DL (ref 68–131)
HBA1C MFR BLD HPLC: 8.8 % (ref 4–5.6)

## 2019-08-28 PROCEDURE — 36415 COLL VENOUS BLD VENIPUNCTURE: CPT

## 2019-08-28 PROCEDURE — 83036 HEMOGLOBIN GLYCOSYLATED A1C: CPT

## 2019-08-29 ENCOUNTER — OFFICE VISIT (OUTPATIENT)
Dept: FAMILY MEDICINE | Facility: CLINIC | Age: 64
End: 2019-08-29
Payer: COMMERCIAL

## 2019-08-29 VITALS
DIASTOLIC BLOOD PRESSURE: 80 MMHG | HEART RATE: 106 BPM | RESPIRATION RATE: 20 BRPM | SYSTOLIC BLOOD PRESSURE: 146 MMHG | HEIGHT: 65 IN | TEMPERATURE: 98 F | BODY MASS INDEX: 43.19 KG/M2 | WEIGHT: 259.25 LBS | OXYGEN SATURATION: 97 %

## 2019-08-29 DIAGNOSIS — E11.65 TYPE 2 DIABETES MELLITUS WITH HYPERGLYCEMIA, WITHOUT LONG-TERM CURRENT USE OF INSULIN: Primary | ICD-10-CM

## 2019-08-29 DIAGNOSIS — Z23 NEED FOR INFLUENZA VACCINATION: ICD-10-CM

## 2019-08-29 DIAGNOSIS — M48.062 SPINAL STENOSIS OF LUMBAR REGION WITH NEUROGENIC CLAUDICATION: ICD-10-CM

## 2019-08-29 DIAGNOSIS — I10 ESSENTIAL HYPERTENSION: ICD-10-CM

## 2019-08-29 DIAGNOSIS — M79.7 FIBROMYALGIA: ICD-10-CM

## 2019-08-29 DIAGNOSIS — K21.9 GASTROESOPHAGEAL REFLUX DISEASE, ESOPHAGITIS PRESENCE NOT SPECIFIED: ICD-10-CM

## 2019-08-29 DIAGNOSIS — Z00.00 WELL ADULT EXAM: ICD-10-CM

## 2019-08-29 DIAGNOSIS — L21.9 SEBORRHEIC DERMATITIS OF SCALP: ICD-10-CM

## 2019-08-29 PROCEDURE — 3077F PR MOST RECENT SYSTOLIC BLOOD PRESSURE >= 140 MM HG: ICD-10-PCS | Mod: CPTII,S$GLB,, | Performed by: FAMILY MEDICINE

## 2019-08-29 PROCEDURE — 90686 FLU VACCINE (QUAD) GREATER THAN OR EQUAL TO 3YO PRESERVATIVE FREE IM: ICD-10-PCS | Mod: S$GLB,,, | Performed by: FAMILY MEDICINE

## 2019-08-29 PROCEDURE — 99999 PR PBB SHADOW E&M-EST. PATIENT-LVL IV: CPT | Mod: PBBFAC,,, | Performed by: FAMILY MEDICINE

## 2019-08-29 PROCEDURE — 3045F PR MOST RECENT HEMOGLOBIN A1C LEVEL 7.0-9.0%: CPT | Mod: CPTII,S$GLB,, | Performed by: FAMILY MEDICINE

## 2019-08-29 PROCEDURE — 3008F BODY MASS INDEX DOCD: CPT | Mod: CPTII,S$GLB,, | Performed by: FAMILY MEDICINE

## 2019-08-29 PROCEDURE — 99999 PR PBB SHADOW E&M-EST. PATIENT-LVL IV: ICD-10-PCS | Mod: PBBFAC,,, | Performed by: FAMILY MEDICINE

## 2019-08-29 PROCEDURE — 3077F SYST BP >= 140 MM HG: CPT | Mod: CPTII,S$GLB,, | Performed by: FAMILY MEDICINE

## 2019-08-29 PROCEDURE — 99214 PR OFFICE/OUTPT VISIT, EST, LEVL IV, 30-39 MIN: ICD-10-PCS | Mod: 25,S$GLB,, | Performed by: FAMILY MEDICINE

## 2019-08-29 PROCEDURE — 3008F PR BODY MASS INDEX (BMI) DOCUMENTED: ICD-10-PCS | Mod: CPTII,S$GLB,, | Performed by: FAMILY MEDICINE

## 2019-08-29 PROCEDURE — 90471 IMMUNIZATION ADMIN: CPT | Mod: S$GLB,,, | Performed by: FAMILY MEDICINE

## 2019-08-29 PROCEDURE — 3079F PR MOST RECENT DIASTOLIC BLOOD PRESSURE 80-89 MM HG: ICD-10-PCS | Mod: CPTII,S$GLB,, | Performed by: FAMILY MEDICINE

## 2019-08-29 PROCEDURE — 99214 OFFICE O/P EST MOD 30 MIN: CPT | Mod: 25,S$GLB,, | Performed by: FAMILY MEDICINE

## 2019-08-29 PROCEDURE — 3045F PR MOST RECENT HEMOGLOBIN A1C LEVEL 7.0-9.0%: ICD-10-PCS | Mod: CPTII,S$GLB,, | Performed by: FAMILY MEDICINE

## 2019-08-29 PROCEDURE — 3079F DIAST BP 80-89 MM HG: CPT | Mod: CPTII,S$GLB,, | Performed by: FAMILY MEDICINE

## 2019-08-29 PROCEDURE — 90471 FLU VACCINE (QUAD) GREATER THAN OR EQUAL TO 3YO PRESERVATIVE FREE IM: ICD-10-PCS | Mod: S$GLB,,, | Performed by: FAMILY MEDICINE

## 2019-08-29 PROCEDURE — 90686 IIV4 VACC NO PRSV 0.5 ML IM: CPT | Mod: S$GLB,,, | Performed by: FAMILY MEDICINE

## 2019-08-29 RX ORDER — PANTOPRAZOLE SODIUM 20 MG/1
20 TABLET, DELAYED RELEASE ORAL DAILY
Qty: 30 TABLET | Refills: 2 | Status: SHIPPED | OUTPATIENT
Start: 2019-08-29 | End: 2021-03-07

## 2019-08-29 RX ORDER — LANCETS
EACH MISCELLANEOUS
Qty: 200 EACH | Refills: 3 | Status: SHIPPED | OUTPATIENT
Start: 2019-08-29 | End: 2019-11-01

## 2019-08-29 RX ORDER — CLOBETASOL PROPIONATE 0.05 G/100ML
SHAMPOO TOPICAL WEEKLY
Qty: 118 ML | Refills: 5 | Status: SHIPPED | OUTPATIENT
Start: 2019-08-29 | End: 2020-09-01 | Stop reason: SDUPTHER

## 2019-08-29 NOTE — PROGRESS NOTES
Chief Complaint   Patient presents with    Hypertension     3 mo follow up     Diabetes     3 mo follow up     Diabetic Foot Exam       Regine Alfred is a 64 y.o. female who presents per the Chief Complaint.  Pt is known to me and was last seen by me on 5/28/2019.  All known chronic medical issues have been documented.       Hypertension   This is a chronic problem. The current episode started more than 1 year ago. The problem is unchanged. The problem is controlled. Associated symptoms include headaches. Pertinent negatives include no anxiety, blurred vision, chest pain, malaise/fatigue, neck pain, orthopnea, palpitations, peripheral edema, PND, shortness of breath or sweats. Agents associated with hypertension include steroids. Risk factors for coronary artery disease include diabetes mellitus, obesity and post-menopausal state. Past treatments include angiotensin blockers and diuretics. The current treatment provides moderate improvement. There are no compliance problems.  There is no history of angina, kidney disease, CAD/MI, CVA, heart failure, left ventricular hypertrophy, PVD or retinopathy. There is no history of chronic renal disease, coarctation of the aorta, hyperaldosteronism, hypercortisolism, hyperparathyroidism, a hypertension causing med, pheochromocytoma, renovascular disease, sleep apnea or a thyroid problem.   Diabetes   She presents for her follow-up diabetic visit. She has type 2 diabetes mellitus. No MedicAlert identification noted. The initial diagnosis of diabetes was made 10 years ago. Her disease course has been stable. Hypoglycemia symptoms include confusion and headaches. Pertinent negatives for hypoglycemia include no dizziness, hunger, mood changes, nervousness/anxiousness, pallor, seizures, sleepiness, speech difficulty, sweats or tremors. Associated symptoms include fatigue. Pertinent negatives for diabetes include no blurred vision, no chest pain, no foot paresthesias, no foot  ulcerations, no polydipsia, no polyphagia, no polyuria, no visual change, no weakness and no weight loss. There are no hypoglycemic complications. Pertinent negatives for hypoglycemia complications include no blackouts, no hospitalization, no nocturnal hypoglycemia, no required assistance and no required glucagon injection. Symptoms are stable. There are no diabetic complications. Pertinent negatives for diabetic complications include no autonomic neuropathy, CVA, heart disease, impotence, nephropathy, peripheral neuropathy, PVD or retinopathy. Risk factors for coronary artery disease include hypertension, obesity, post-menopausal, stress and diabetes mellitus. Current diabetic treatment includes oral agent (dual therapy). She is compliant with treatment all of the time. Her weight is fluctuating minimally. She is following a generally healthy diet. Meal planning includes avoidance of concentrated sweets. She has not had a previous visit with a dietitian. She participates in exercise intermittently. She monitors blood glucose at home 1-2 x per day. Blood glucose monitoring compliance is good. There is no change in her home blood glucose trend. An ACE inhibitor/angiotensin II receptor blocker is being taken. She sees a podiatrist.Eye exam is current.   Fibromyalgia   This is a chronic problem. The current episode started more than 1 year ago. The problem occurs daily. The problem has been waxing and waning. Associated symptoms include arthralgias, fatigue, headaches and myalgias. Pertinent negatives include no abdominal pain, anorexia, change in bowel habit, chest pain, chills, congestion, coughing, diaphoresis, fever, joint swelling, nausea, neck pain, numbness, rash, sore throat, swollen glands, urinary symptoms, vertigo, visual change, vomiting or weakness. The symptoms are aggravated by bending, exertion, standing and walking. She has tried acetaminophen, oral narcotics, NSAIDs, rest and position changes for the  symptoms. The treatment provided moderate relief.   Otalgia    There is pain in the left ear. This is a recurrent problem. The current episode started 1 to 4 weeks ago. The problem occurs hourly. The problem has been waxing and waning. There has been no fever. The pain is at a severity of 2/10. The pain is mild. Associated symptoms include headaches. Pertinent negatives include no abdominal pain, coughing, diarrhea, ear discharge, hearing loss, neck pain, rash, rhinorrhea, sore throat or vomiting. She has tried nothing for the symptoms. There is no history of a chronic ear infection, hearing loss or a tympanostomy tube.        ROS  Review of Systems   Constitutional: Positive for fatigue. Negative for activity change, appetite change, chills, diaphoresis, fever, malaise/fatigue, unexpected weight change and weight loss.   HENT: Negative.  Negative for congestion, dental problem, ear discharge, ear pain, hearing loss, nosebleeds, postnasal drip, rhinorrhea, sinus pressure, sneezing, sore throat and trouble swallowing.    Eyes: Negative for blurred vision, pain, discharge and visual disturbance.   Respiratory: Negative for cough, choking, chest tightness, shortness of breath and wheezing.    Cardiovascular: Positive for leg swelling. Negative for chest pain, palpitations, orthopnea and PND.   Gastrointestinal: Negative for abdominal pain, anorexia, blood in stool, change in bowel habit, constipation, diarrhea, nausea and vomiting.   Endocrine: Negative for polydipsia, polyphagia and polyuria.   Genitourinary: Negative for difficulty urinating, dysuria, frequency, hematuria, impotence, menstrual problem and urgency.   Musculoskeletal: Positive for arthralgias and myalgias. Negative for back pain, gait problem, joint swelling and neck pain.   Skin: Negative.  Negative for pallor and rash.   Allergic/Immunologic: Negative for environmental allergies and food allergies.   Neurological: Positive for headaches. Negative for  "dizziness, vertigo, tremors, seizures, syncope, speech difficulty, weakness, light-headedness and numbness.   Psychiatric/Behavioral: Positive for confusion. Negative for decreased concentration, dysphoric mood and sleep disturbance. The patient is not nervous/anxious.        Physical Exam  Vitals:    08/29/19 0847   BP: (!) 146/80   Pulse: 106   Resp: 20   Temp: 98.4 °F (36.9 °C)    Body mass index is 43.14 kg/m².  Weight: 117.6 kg (259 lb 4.2 oz)   Height: 5' 5" (165.1 cm)     Physical Exam   Constitutional: She is oriented to person, place, and time. She appears well-developed and well-nourished. She is active and cooperative.  Non-toxic appearance. She does not have a sickly appearance. She does not appear ill. No distress.   HENT:   Head: Normocephalic and atraumatic.   Right Ear: Hearing and external ear normal. No decreased hearing is noted.   Left Ear: Hearing and external ear normal. No decreased hearing is noted.   Nose: Nose normal. No rhinorrhea or nasal deformity.   Mouth/Throat: Uvula is midline and oropharynx is clear and moist. She does not have dentures. Normal dentition.   Eyes: Pupils are equal, round, and reactive to light. Conjunctivae, EOM and lids are normal. Right eye exhibits no chemosis, no discharge and no exudate. No foreign body present in the right eye. Left eye exhibits no chemosis, no discharge and no exudate. No foreign body present in the left eye. No scleral icterus.   Neck: Normal range of motion and full passive range of motion without pain. Neck supple.   Cardiovascular: Normal rate, regular rhythm, S1 normal, S2 normal and normal heart sounds. Exam reveals no gallop and no friction rub.   No murmur heard.  Pulmonary/Chest: Effort normal and breath sounds normal. No accessory muscle usage. No respiratory distress. She has no decreased breath sounds. She has no wheezes. She has no rhonchi. She has no rales.   Abdominal: Soft. Normal appearance. She exhibits no distension. There " is no hepatosplenomegaly. There is no tenderness. There is no rigidity, no rebound and no guarding.   Musculoskeletal:        Lumbar back: She exhibits decreased range of motion, tenderness and pain. She exhibits no bony tenderness, no swelling, no edema, no deformity, no laceration, no spasm and normal pulse.        Right upper leg: She exhibits tenderness. She exhibits no bony tenderness, no swelling, no edema, no deformity and no laceration.        Left upper leg: She exhibits tenderness. She exhibits no bony tenderness, no swelling, no edema, no deformity and no laceration.   Neurological: She is alert and oriented to person, place, and time. She has normal strength. No cranial nerve deficit or sensory deficit. She exhibits normal muscle tone. She displays no seizure activity. Coordination and gait normal.   Skin: Skin is warm, dry and intact. No rash noted. She is not diaphoretic.   Psychiatric: She has a normal mood and affect. Her speech is normal and behavior is normal. Judgment and thought content normal. Cognition and memory are normal. She is attentive.       Assessment & Plan    Discussion of plan of care including treatment options regarding health and wellness were reviewed and discussed with patient.  Any changes to medication or treatment plan, as well as any screening blood test, imaging, or referrals to specialist, are documented.  Follow up as indicated.     1. Type 2 diabetes mellitus with hyperglycemia, without long-term current use of insulin  Patient is encouraged to follow a diet low in carbohydrates and simple sugars.  Discussed simple vs. complex carbohydrates as well as eating times of certain meals. Advised to focus on good food choices and increased physical activity and encouraged to adhere to medication regimen and/or lifestyle adjustments, and to check glucose level as recommended.  Contact office if glucose levels are not improving over time.  Screening blood test is due in 3-4  months.     - lancets Misc; To check BG 2 times daily, to use with insurance preferred meter.  Dispense: 200 each; Refill: 3  - blood sugar diagnostic Strp; To check BG 2 times daily, to use with insurance preferred meter  Dispense: 200 strip; Refill: 3  - Hemoglobin A1c; Future    2. Essential hypertension  Patient was counseled and encouraged to maintain a low sodium diet, as well as increasing physical activity.  Recommend random BP checks at home on a regular basis.  Repeat BP at end of visit was not necessary. Will continue medication at this time, and follow up in 3-6 months, or sooner if blood pressure begins to increase.       3. Fibromyalgia  Stable; no therapeutic changes at this time.     4. Spinal stenosis of lumbar region with neurogenic claudication  Stable; no therapeutic changes at this time.     5. Gastroesophageal reflux disease, esophagitis presence not specified  Patient was advised to continue medication and to decrease intake of caffeine and spicy foods and to elevate head while lying down.  Also advised to avoid lying down within 3 hours of last meal.  If symptoms continue with treatment, follow up for further evaluation.     - pantoprazole (PROTONIX) 20 MG tablet; Take 1 tablet (20 mg total) by mouth once daily.  Dispense: 30 tablet; Refill: 2    6. Seborrheic dermatitis of scalp  The current medical regimen is effective at this time and no changes to present plan or medications will be made at this visit.     - clobetasol (CLOBEX) 0.05 % shampoo; Apply topically once a week.  Dispense: 118 mL; Refill: 5    7. Well adult exam  Screening test will be ordered and once results available patient will be notified of results and managed accordingly.    - CBC auto differential; Future  - Comprehensive metabolic panel; Future  - Lipid panel; Future  - Vitamin D; Future  - TSH; Future  - T4, free; Future    8. Need for influenza vaccination  Patient requested Flu vaccine, and was consented and vaccine  given in office without complication.   - Influenza - Quadrivalent (6 months+) (PF)       Follow up in about 3 months (around 11/29/2019), or if symptoms worsen or fail to improve.        ACTIVE MEDICAL ISSUES:  Documented in Problem List    PAST MEDICAL HISTORY  Documented    PAST SURGICAL HISTORY:  Documented    SOCIAL HISTORY:  Documented    FAMILY HISTORY:  Documented    ALLERGIES AND MEDICATIONS: updated and reviewed.  Documented    Health Maintenance       Date Due Completion Date    Foot Exam 01/12/1965 ---    Shingles Vaccine (1 of 2) 01/12/2007 ---    Influenza Vaccine (1) 09/01/2019 8/28/2018    Hemoglobin A1c 11/28/2019 8/28/2019    Lipid Panel 11/29/2019 11/29/2018    Eye Exam 08/23/2020 8/23/2019    Mammogram 02/21/2021 2/21/2019    Colonoscopy 07/09/2023 7/9/2018    TETANUS VACCINE 11/26/2028 11/26/2018

## 2019-08-29 NOTE — PROGRESS NOTES
Flu vaccine administered IM to right deltoid. VIS form given .Tolerated well. No adverse reaction noted.

## 2019-08-30 ENCOUNTER — PATIENT MESSAGE (OUTPATIENT)
Dept: FAMILY MEDICINE | Facility: CLINIC | Age: 64
End: 2019-08-30

## 2019-08-30 DIAGNOSIS — E11.65 TYPE 2 DIABETES MELLITUS WITH HYPERGLYCEMIA, WITHOUT LONG-TERM CURRENT USE OF INSULIN: Primary | ICD-10-CM

## 2019-09-03 DIAGNOSIS — E11.65 TYPE 2 DIABETES MELLITUS WITH HYPERGLYCEMIA, WITHOUT LONG-TERM CURRENT USE OF INSULIN: ICD-10-CM

## 2019-09-04 NOTE — TELEPHONE ENCOUNTER
----- Message from Tanya Wolf sent at 9/4/2019  1:18 PM CDT -----  Contact: Patient ph [phone]  Type: Patient Call Back    Who called: Patient    What is the request in detail: Would like to speak to Daisha in regards to a refill on glipiZIDE (GLUCOTROL) 5 MG TR24 that she's having a problem getting. Please call pt in regards to the medication.     Would the patient rather a call back or a response via [facility]? Call back    Best call back number: [phone]    Additional Information: Patient wants Daisha to call her. No other nurse.

## 2019-09-04 NOTE — TELEPHONE ENCOUNTER
Spoke to patient. States that the pharmacy informed her they have been waiting for provider to approve refill request. States it has been 3 days. Informed patient request was just sent yesterday.Advised patient in the future if refills are needed it is best to call and inform office.Verbalized understanding. Also states she needs a prescription for lancing device for the One Touch Delica Plus. Please advise.

## 2019-09-05 ENCOUNTER — PATIENT MESSAGE (OUTPATIENT)
Dept: FAMILY MEDICINE | Facility: CLINIC | Age: 64
End: 2019-09-05

## 2019-09-05 RX ORDER — GLIPIZIDE 5 MG/1
TABLET, FILM COATED, EXTENDED RELEASE ORAL
Qty: 60 TABLET | Refills: 0 | Status: SHIPPED | OUTPATIENT
Start: 2019-09-05 | End: 2019-10-01 | Stop reason: SDUPTHER

## 2019-09-05 RX ORDER — LANCETS 26 GAUGE
1 EACH MISCELLANEOUS 3 TIMES DAILY
Qty: 1 EACH | Refills: 0 | Status: SHIPPED | OUTPATIENT
Start: 2019-09-05 | End: 2020-03-09

## 2019-09-13 ENCOUNTER — CLINICAL SUPPORT (OUTPATIENT)
Dept: FAMILY MEDICINE | Facility: CLINIC | Age: 64
End: 2019-09-13
Payer: COMMERCIAL

## 2019-09-13 VITALS — DIASTOLIC BLOOD PRESSURE: 70 MMHG | SYSTOLIC BLOOD PRESSURE: 136 MMHG

## 2019-09-13 DIAGNOSIS — I10 ESSENTIAL HYPERTENSION: Primary | ICD-10-CM

## 2019-09-13 PROCEDURE — 99999 PR PBB SHADOW E&M-EST. PATIENT-LVL I: CPT | Mod: PBBFAC,,,

## 2019-09-13 PROCEDURE — 99999 PR PBB SHADOW E&M-EST. PATIENT-LVL I: ICD-10-PCS | Mod: PBBFAC,,,

## 2019-09-13 NOTE — PROGRESS NOTES
Regine Alfred 64 y.o. female is here today for Blood Pressure check.   History of HTN yes.    Review of patient's allergies indicates:   Allergen Reactions    Codeine Shortness Of Breath     Pt states she had a reaction as a teenager and was taken to the ER.    Lisinopril      cough    Nortriptyline      sleepy     Creatinine   Date Value Ref Range Status   11/29/2018 0.8 0.5 - 1.4 mg/dL Final   08/12/2018 274.1 mg/dL Final     Sodium   Date Value Ref Range Status   11/29/2018 141 136 - 145 mmol/L Final     Potassium   Date Value Ref Range Status   11/29/2018 3.7 3.5 - 5.1 mmol/L Final   ]  Patient verifies taking blood pressure medications on a regular basis at the same time of the day.     Current Outpatient Medications:     aspirin 81 MG Chew, Take 81 mg by mouth once daily. , Disp: , Rfl:     blood sugar diagnostic Strp, To check BG 2 times daily, to use with insurance preferred meter, Disp: 200 strip, Rfl: 3    cholecalciferol, vitamin D3, (VITAMIN D3) 2,000 unit Cap, Take 1 capsule by mouth once daily. , Disp: , Rfl:     clobetasol (CLOBEX) 0.05 % shampoo, Apply topically once a week., Disp: 118 mL, Rfl: 5    cyanocobalamin, vitamin B-12, (VITAMIN B-12 ORAL), Take by mouth once daily., Disp: , Rfl:     gabapentin (NEURONTIN) 600 MG tablet, TAKE 1 TABLET(600 MG) BY MOUTH THREE TIMES DAILY, Disp: 90 tablet, Rfl: 0    glipiZIDE (GLUCOTROL) 5 MG TR24, TAKE 2 TABLETS(10 MG) BY MOUTH DAILY WITH BREAKFAST, Disp: 60 tablet, Rfl: 0    hydroCHLOROthiazide (HYDRODIURIL) 25 MG tablet, TAKE 1 TABLET(25 MG) BY MOUTH EVERY DAY, Disp: 90 tablet, Rfl: 0    ipratropium (ATROVENT) 0.03 % nasal spray, 2 sprays by Nasal route 2 (two) times daily., Disp: 30 mL, Rfl: 5    lancets Misc, To check BG 2 times daily, to use with insurance preferred meter., Disp: 200 each, Rfl: 3    lancing device with lancets Kit, 1 each by Misc.(Non-Drug; Combo Route) route 3 (three) times daily., Disp: 1 each, Rfl: 0    losartan  (COZAAR) 50 MG tablet, TAKE 1 TABLET(50 MG) BY MOUTH EVERY DAY, Disp: 90 tablet, Rfl: 0    metFORMIN (GLUCOPHAGE) 1000 MG tablet, TAKE 1 TABLET(1000 MG) BY MOUTH TWICE DAILY WITH MEALS, Disp: 180 tablet, Rfl: 0    naproxen sodium (ANAPROX) 550 MG tablet, Take 1 tablet (550 mg total) by mouth 2 (two) times daily as needed., Disp: 40 tablet, Rfl: 5    ondansetron (ZOFRAN-ODT) 8 MG TbDL, Take 1 tablet (8 mg total) by mouth every 8 (eight) hours as needed., Disp: 30 tablet, Rfl: 2    pantoprazole (PROTONIX) 20 MG tablet, Take 1 tablet (20 mg total) by mouth once daily., Disp: 30 tablet, Rfl: 2    sumatriptan (IMITREX) 100 MG tablet, Take 1 tab at onset of migraine, may repeat dose in 2 hours if needed. Max 2 tabs/day. Max 3 days/week., Disp: 9 tablet, Rfl: 11  Does patient have record of home blood pressure readings no. Readings have been averaging not done.   Last dose of blood pressure medication was taken at yesterday evening.  Patient is asymptomatic.   Complains of no complaints.    Vitals:    09/13/19 0855   BP: 136/70   BP Location: Right arm   Patient Position: Sitting   BP Method: Large (Manual)         Dr. Lombard informed of nurse visit.

## 2019-09-17 DIAGNOSIS — M48.062 SPINAL STENOSIS OF LUMBAR REGION WITH NEUROGENIC CLAUDICATION: ICD-10-CM

## 2019-09-17 RX ORDER — GABAPENTIN 600 MG/1
TABLET ORAL
Qty: 90 TABLET | Refills: 0 | Status: SHIPPED | OUTPATIENT
Start: 2019-09-17 | End: 2019-10-14 | Stop reason: SDUPTHER

## 2019-10-01 DIAGNOSIS — E11.65 TYPE 2 DIABETES MELLITUS WITH HYPERGLYCEMIA, WITHOUT LONG-TERM CURRENT USE OF INSULIN: ICD-10-CM

## 2019-10-02 RX ORDER — GLIPIZIDE 5 MG/1
TABLET, FILM COATED, EXTENDED RELEASE ORAL
Qty: 60 TABLET | Refills: 0 | Status: SHIPPED | OUTPATIENT
Start: 2019-10-02 | End: 2019-10-29 | Stop reason: SDUPTHER

## 2019-10-11 ENCOUNTER — OFFICE VISIT (OUTPATIENT)
Dept: URGENT CARE | Facility: CLINIC | Age: 64
End: 2019-10-11
Payer: COMMERCIAL

## 2019-10-11 VITALS
HEIGHT: 65 IN | WEIGHT: 260.13 LBS | RESPIRATION RATE: 20 BRPM | SYSTOLIC BLOOD PRESSURE: 158 MMHG | TEMPERATURE: 98 F | DIASTOLIC BLOOD PRESSURE: 79 MMHG | HEART RATE: 103 BPM | OXYGEN SATURATION: 99 % | BODY MASS INDEX: 43.34 KG/M2
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
## 2019-10-11 DIAGNOSIS — M54.12 CERVICAL RADICULOPATHY: ICD-10-CM

## 2019-10-11 DIAGNOSIS — R03.0 ELEVATED BP WITHOUT DIAGNOSIS OF HYPERTENSION: ICD-10-CM

## 2019-10-11 DIAGNOSIS — M62.838 TRAPEZIUS MUSCLE SPASM: ICD-10-CM

## 2019-10-11 PROCEDURE — 3078F DIAST BP <80 MM HG: CPT | Mod: CPTII,S$GLB,, | Performed by: NURSE PRACTITIONER

## 2019-10-11 PROCEDURE — 99214 OFFICE O/P EST MOD 30 MIN: CPT | Mod: S$GLB,,, | Performed by: NURSE PRACTITIONER

## 2019-10-11 PROCEDURE — 99214 PR OFFICE/OUTPT VISIT, EST, LEVL IV, 30-39 MIN: ICD-10-PCS | Mod: S$GLB,,, | Performed by: NURSE PRACTITIONER

## 2019-10-11 PROCEDURE — 3008F BODY MASS INDEX DOCD: CPT | Mod: CPTII,S$GLB,, | Performed by: NURSE PRACTITIONER

## 2019-10-11 PROCEDURE — 3008F PR BODY MASS INDEX (BMI) DOCUMENTED: ICD-10-PCS | Mod: CPTII,S$GLB,, | Performed by: NURSE PRACTITIONER

## 2019-10-11 PROCEDURE — 3077F PR MOST RECENT SYSTOLIC BLOOD PRESSURE >= 140 MM HG: ICD-10-PCS | Mod: CPTII,S$GLB,, | Performed by: NURSE PRACTITIONER

## 2019-10-11 PROCEDURE — 3077F SYST BP >= 140 MM HG: CPT | Mod: CPTII,S$GLB,, | Performed by: NURSE PRACTITIONER

## 2019-10-11 PROCEDURE — 3078F PR MOST RECENT DIASTOLIC BLOOD PRESSURE < 80 MM HG: ICD-10-PCS | Mod: CPTII,S$GLB,, | Performed by: NURSE PRACTITIONER

## 2019-10-11 RX ORDER — METHOCARBAMOL 750 MG/1
1500 TABLET, FILM COATED ORAL 4 TIMES DAILY
Qty: 56 TABLET | Refills: 0 | Status: SHIPPED | OUTPATIENT
Start: 2019-10-11 | End: 2019-10-18

## 2019-10-11 RX ORDER — LANCETS 33 GAUGE
EACH MISCELLANEOUS
Refills: 3 | COMMUNITY
Start: 2019-09-27 | End: 2022-01-26

## 2019-10-11 RX ORDER — TIZANIDINE 4 MG/1
4 TABLET ORAL
COMMUNITY
Start: 2019-10-05 | End: 2020-02-06

## 2019-10-11 RX ORDER — CELECOXIB 100 MG/1
100 CAPSULE ORAL
COMMUNITY
Start: 2019-10-05 | End: 2019-11-01

## 2019-10-11 RX ORDER — HYDROCODONE BITARTRATE AND ACETAMINOPHEN 5; 325 MG/1; MG/1
2 TABLET ORAL
COMMUNITY
Start: 2019-10-05 | End: 2019-10-17
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
## 2019-10-14 DIAGNOSIS — M48.062 SPINAL STENOSIS OF LUMBAR REGION WITH NEUROGENIC CLAUDICATION: ICD-10-CM

## 2019-10-14 RX ORDER — GABAPENTIN 600 MG/1
TABLET ORAL
Qty: 90 TABLET | Refills: 0 | Status: SHIPPED | OUTPATIENT
Start: 2019-10-14 | End: 2019-11-18 | Stop reason: SDUPTHER

## 2019-10-18 ENCOUNTER — TELEPHONE (OUTPATIENT)
Dept: FAMILY MEDICINE | Facility: CLINIC | Age: 64
End: 2019-10-18

## 2019-10-18 NOTE — TELEPHONE ENCOUNTER
Patient was prescribedClobetasol Propionate 0.05% shampoo , Pharmacy is stating that med need's PA, sent to [facility] for PA .

## 2019-10-29 DIAGNOSIS — E11.65 TYPE 2 DIABETES MELLITUS WITH HYPERGLYCEMIA, WITHOUT LONG-TERM CURRENT USE OF INSULIN: ICD-10-CM

## 2019-10-30 RX ORDER — GLIPIZIDE 5 MG/1
TABLET, FILM COATED, EXTENDED RELEASE ORAL
Qty: 60 TABLET | Refills: 0 | Status: SHIPPED | OUTPATIENT
Start: 2019-10-30 | End: 2019-12-06 | Stop reason: SDUPTHER

## 2019-11-01 ENCOUNTER — OFFICE VISIT (OUTPATIENT)
Dept: HEMATOLOGY/ONCOLOGY | Facility: CLINIC | Age: 64
End: 2019-11-01
Payer: COMMERCIAL

## 2019-11-01 VITALS
DIASTOLIC BLOOD PRESSURE: 75 MMHG | SYSTOLIC BLOOD PRESSURE: 162 MMHG | WEIGHT: 257.69 LBS | HEART RATE: 91 BPM | BODY MASS INDEX: 42.93 KG/M2 | TEMPERATURE: 98 F | HEIGHT: 65 IN | OXYGEN SATURATION: 99 %

## 2019-11-01 DIAGNOSIS — Z85.3 HISTORY OF BREAST CANCER IN FEMALE: Primary | ICD-10-CM

## 2019-11-01 PROCEDURE — 3078F PR MOST RECENT DIASTOLIC BLOOD PRESSURE < 80 MM HG: ICD-10-PCS | Mod: CPTII,S$GLB,, | Performed by: INTERNAL MEDICINE

## 2019-11-01 PROCEDURE — 3078F DIAST BP <80 MM HG: CPT | Mod: CPTII,S$GLB,, | Performed by: INTERNAL MEDICINE

## 2019-11-01 PROCEDURE — 99213 OFFICE O/P EST LOW 20 MIN: CPT | Mod: S$GLB,,, | Performed by: INTERNAL MEDICINE

## 2019-11-01 PROCEDURE — 3008F PR BODY MASS INDEX (BMI) DOCUMENTED: ICD-10-PCS | Mod: CPTII,S$GLB,, | Performed by: INTERNAL MEDICINE

## 2019-11-01 PROCEDURE — 99999 PR PBB SHADOW E&M-EST. PATIENT-LVL IV: ICD-10-PCS | Mod: PBBFAC,,, | Performed by: INTERNAL MEDICINE

## 2019-11-01 PROCEDURE — 3077F SYST BP >= 140 MM HG: CPT | Mod: CPTII,S$GLB,, | Performed by: INTERNAL MEDICINE

## 2019-11-01 PROCEDURE — 99999 PR PBB SHADOW E&M-EST. PATIENT-LVL IV: CPT | Mod: PBBFAC,,, | Performed by: INTERNAL MEDICINE

## 2019-11-01 PROCEDURE — 3077F PR MOST RECENT SYSTOLIC BLOOD PRESSURE >= 140 MM HG: ICD-10-PCS | Mod: CPTII,S$GLB,, | Performed by: INTERNAL MEDICINE

## 2019-11-01 PROCEDURE — 99213 PR OFFICE/OUTPT VISIT, EST, LEVL III, 20-29 MIN: ICD-10-PCS | Mod: S$GLB,,, | Performed by: INTERNAL MEDICINE

## 2019-11-01 PROCEDURE — 3008F BODY MASS INDEX DOCD: CPT | Mod: CPTII,S$GLB,, | Performed by: INTERNAL MEDICINE
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
## 2019-11-05 ENCOUNTER — TELEPHONE (OUTPATIENT)
Dept: FAMILY MEDICINE | Facility: CLINIC | Age: 64
End: 2019-11-05

## 2019-11-05 ENCOUNTER — TELEPHONE (OUTPATIENT)
Dept: OBSTETRICS AND GYNECOLOGY | Facility: CLINIC | Age: 64
End: 2019-11-05

## 2019-11-05 NOTE — TELEPHONE ENCOUNTER
Michelle Vallejo,    This patient is a breast cancer survivor and would prefer to see Dr. Allen.     Thank you!

## 2019-11-05 NOTE — TELEPHONE ENCOUNTER
----- Message from Zeus Villar sent at 11/5/2019  1:35 PM CST -----  Contact: Self  Type: Patient Call Back    Who called:Self    What is the request in detail: Patient states there was an increase in blood pressure (162/75) and she noticed the increase occurs when she is in pain. She also adjusted her (gabapentin (NEURONTIN) 600 MG tablet(1800MG PER DAY) to 2400 MG per day. She wants to be advise on if she should be adjusting losartan (COZAAR) 50 MG tablet and hydroCHLOROthiazide (HYDRODIURIL) 25 MG tablet.    Can the clinic reply by [facility]?No    Would the patient rather a call back or a response via [facility]? Callback    Best call back number:[phone]    Additional Information:n/a
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
## 2019-11-05 NOTE — TELEPHONE ENCOUNTER
----- Message from Paulina Hoff MA sent at 11/5/2019  3:33 PM CST -----  Patient request call back in reference to information on  [facility]  Please contact to further discuss and advise       Can the clinic reply by [facility]:  yes     What Number to Call Back if not in [facility]:
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
## 2019-11-07 ENCOUNTER — OFFICE VISIT (OUTPATIENT)
Dept: NEUROLOGY | Facility: CLINIC | Age: 64
End: 2019-11-07
Payer: COMMERCIAL

## 2019-11-07 VITALS
BODY MASS INDEX: 42.71 KG/M2 | HEART RATE: 98 BPM | DIASTOLIC BLOOD PRESSURE: 80 MMHG | HEIGHT: 65 IN | WEIGHT: 256.38 LBS | SYSTOLIC BLOOD PRESSURE: 136 MMHG

## 2019-11-07 DIAGNOSIS — M79.7 FIBROMYALGIA: ICD-10-CM

## 2019-11-07 DIAGNOSIS — I10 ESSENTIAL HYPERTENSION: ICD-10-CM

## 2019-11-07 DIAGNOSIS — E11.65 TYPE 2 DIABETES MELLITUS WITH HYPERGLYCEMIA, WITHOUT LONG-TERM CURRENT USE OF INSULIN: ICD-10-CM

## 2019-11-07 DIAGNOSIS — E66.01 MORBID OBESITY: ICD-10-CM

## 2019-11-07 DIAGNOSIS — G43.009 MIGRAINE WITHOUT AURA AND WITHOUT STATUS MIGRAINOSUS, NOT INTRACTABLE: Primary | ICD-10-CM

## 2019-11-07 PROCEDURE — 99999 PR PBB SHADOW E&M-EST. PATIENT-LVL III: CPT | Mod: PBBFAC,,, | Performed by: NURSE PRACTITIONER

## 2019-11-07 PROCEDURE — 99999 PR PBB SHADOW E&M-EST. PATIENT-LVL III: ICD-10-PCS | Mod: PBBFAC,,, | Performed by: NURSE PRACTITIONER

## 2019-11-07 PROCEDURE — 99214 PR OFFICE/OUTPT VISIT, EST, LEVL IV, 30-39 MIN: ICD-10-PCS | Mod: S$GLB,,, | Performed by: NURSE PRACTITIONER

## 2019-11-07 PROCEDURE — 3079F PR MOST RECENT DIASTOLIC BLOOD PRESSURE 80-89 MM HG: ICD-10-PCS | Mod: CPTII,S$GLB,, | Performed by: NURSE PRACTITIONER

## 2019-11-07 PROCEDURE — 99214 OFFICE O/P EST MOD 30 MIN: CPT | Mod: S$GLB,,, | Performed by: NURSE PRACTITIONER

## 2019-11-07 PROCEDURE — 3079F DIAST BP 80-89 MM HG: CPT | Mod: CPTII,S$GLB,, | Performed by: NURSE PRACTITIONER

## 2019-11-07 PROCEDURE — 3008F BODY MASS INDEX DOCD: CPT | Mod: CPTII,S$GLB,, | Performed by: NURSE PRACTITIONER

## 2019-11-07 PROCEDURE — 3008F PR BODY MASS INDEX (BMI) DOCUMENTED: ICD-10-PCS | Mod: CPTII,S$GLB,, | Performed by: NURSE PRACTITIONER

## 2019-11-07 PROCEDURE — 3075F PR MOST RECENT SYSTOLIC BLOOD PRESS GE 130-139MM HG: ICD-10-PCS | Mod: CPTII,S$GLB,, | Performed by: NURSE PRACTITIONER

## 2019-11-07 PROCEDURE — 3075F SYST BP GE 130 - 139MM HG: CPT | Mod: CPTII,S$GLB,, | Performed by: NURSE PRACTITIONER

## 2019-11-07 RX ORDER — TOPIRAMATE 25 MG/1
TABLET ORAL
Qty: 75 TABLET | Refills: 0 | Status: SHIPPED | OUTPATIENT
Start: 2019-11-07 | End: 2019-12-06

## 2019-11-07 RX ORDER — SUMATRIPTAN SUCCINATE 100 MG/1
TABLET ORAL
Qty: 9 TABLET | Refills: 11 | Status: SHIPPED | OUTPATIENT
Start: 2019-11-07 | End: 2021-04-06 | Stop reason: SDUPTHER

## 2019-11-07 RX ORDER — NAPROXEN SODIUM 550 MG/1
550 TABLET ORAL 2 TIMES DAILY PRN
Qty: 40 TABLET | Refills: 5 | Status: SHIPPED | OUTPATIENT
Start: 2019-11-07 | End: 2021-05-24 | Stop reason: SDUPTHER

## 2019-11-15 ENCOUNTER — TELEPHONE (OUTPATIENT)
Dept: INFUSION THERAPY | Facility: HOSPITAL | Age: 64
End: 2019-11-15

## 2019-11-15 ENCOUNTER — PATIENT MESSAGE (OUTPATIENT)
Dept: NEUROLOGY | Facility: CLINIC | Age: 64
End: 2019-11-15

## 2019-11-15 ENCOUNTER — OFFICE VISIT (OUTPATIENT)
Dept: OBSTETRICS AND GYNECOLOGY | Facility: CLINIC | Age: 64
End: 2019-11-15
Attending: OBSTETRICS & GYNECOLOGY
Payer: COMMERCIAL

## 2019-11-15 VITALS
DIASTOLIC BLOOD PRESSURE: 60 MMHG | HEIGHT: 65 IN | WEIGHT: 258.19 LBS | BODY MASS INDEX: 43.02 KG/M2 | SYSTOLIC BLOOD PRESSURE: 130 MMHG

## 2019-11-15 DIAGNOSIS — Z17.1 MALIGNANT NEOPLASM OF BREAST IN FEMALE, ESTROGEN RECEPTOR NEGATIVE, UNSPECIFIED LATERALITY, UNSPECIFIED SITE OF BREAST: ICD-10-CM

## 2019-11-15 DIAGNOSIS — C50.919 MALIGNANT NEOPLASM OF BREAST IN FEMALE, ESTROGEN RECEPTOR NEGATIVE, UNSPECIFIED LATERALITY, UNSPECIFIED SITE OF BREAST: ICD-10-CM

## 2019-11-15 DIAGNOSIS — R41.3 MEMORY LOSS: Primary | ICD-10-CM

## 2019-11-15 PROCEDURE — 99214 OFFICE O/P EST MOD 30 MIN: CPT | Mod: 25,S$GLB,, | Performed by: OBSTETRICS & GYNECOLOGY

## 2019-11-15 PROCEDURE — 99214 PR OFFICE/OUTPT VISIT, EST, LEVL IV, 30-39 MIN: ICD-10-PCS | Mod: 25,S$GLB,, | Performed by: OBSTETRICS & GYNECOLOGY

## 2019-11-15 RX ORDER — CYANOCOBALAMIN 1000 UG/ML
1000 INJECTION, SOLUTION INTRAMUSCULAR; SUBCUTANEOUS
Qty: 1 ML | Refills: 3 | Status: SHIPPED | OUTPATIENT
Start: 2019-11-15 | End: 2020-02-07

## 2019-11-15 RX ORDER — CYANOCOBALAMIN 1000 UG/ML
1000 INJECTION, SOLUTION INTRAMUSCULAR; SUBCUTANEOUS
Status: COMPLETED | OUTPATIENT
Start: 2019-11-15 | End: 2019-11-15

## 2019-11-15 RX ADMIN — CYANOCOBALAMIN 1000 MCG: 1000 INJECTION, SOLUTION INTRAMUSCULAR; SUBCUTANEOUS at 01:11

## 2019-11-18 DIAGNOSIS — I10 ESSENTIAL HYPERTENSION: ICD-10-CM

## 2019-11-18 DIAGNOSIS — M48.062 SPINAL STENOSIS OF LUMBAR REGION WITH NEUROGENIC CLAUDICATION: ICD-10-CM

## 2019-11-18 DIAGNOSIS — E11.65 TYPE 2 DIABETES MELLITUS WITH HYPERGLYCEMIA, WITHOUT LONG-TERM CURRENT USE OF INSULIN: ICD-10-CM

## 2019-11-18 RX ORDER — GABAPENTIN 600 MG/1
TABLET ORAL
Qty: 90 TABLET | Refills: 0 | Status: SHIPPED | OUTPATIENT
Start: 2019-11-18 | End: 2019-12-10 | Stop reason: SDUPTHER

## 2019-11-18 RX ORDER — LOSARTAN POTASSIUM 50 MG/1
TABLET ORAL
Qty: 90 TABLET | Refills: 0 | Status: SHIPPED | OUTPATIENT
Start: 2019-11-18 | End: 2020-02-07

## 2019-11-18 RX ORDER — METFORMIN HYDROCHLORIDE 1000 MG/1
TABLET ORAL
Qty: 180 TABLET | Refills: 0 | Status: SHIPPED | OUTPATIENT
Start: 2019-11-18 | End: 2019-12-06 | Stop reason: SDUPTHER

## 2019-11-18 RX ORDER — HYDROCHLOROTHIAZIDE 25 MG/1
TABLET ORAL
Qty: 90 TABLET | Refills: 0 | Status: SHIPPED | OUTPATIENT
Start: 2019-11-18 | End: 2019-12-06 | Stop reason: SDUPTHER

## 2019-11-18 NOTE — PROGRESS NOTES
SUBJECTIVE:   64 y.o. female  presents to establish care in survivorship. . No LMP recorded. Patient has had a hysterectomy..  She has a history of triple negative breast cancer..  She was diagnosed initially treated in [facility] and moved to [place] in 2018  She had a left breast mastectomy in 2017.  Then had 4 cycles of adjuvant chemotherapy and no radiation  She wants to discuss meeting with a nurse navigator.  She would like to hear about breast cancer support groups and would like to meet with a SW.  She would like a new bra prosthesis.    She is most bothered by chemo brain.  She reports that she was not able to continue her job because she could not concentrate.  She reports that her memory is bad-she was missing guidelines she forgets where she is when she is driving at  times  She saw Dr. Smalls and he said that she did.  Not have dementia she started on B12 sublingual.   She was also seen in the migraine Clinic  She reports having is a history of severe vitamin-D deficiency that impacted her ability to walk.  She has supplement with vitamin-D in her last level was normal    Past Medical History:   Diagnosis Date    Breast cancer     Cataract     Diabetes mellitus, type 2     Type 2    Fibromyalgia     Glaucoma     Hypertension      Past Surgical History:   Procedure Laterality Date    BREAST BIOPSY      BREAST SURGERY      breast ca lt side     HYSTERECTOMY      supacervical hysterectomy    LAPAROSCOPIC SUPRACERVICAL HYSTERECTOMY  2005    fibroids    MASTECTOMY Left 2017    chemo    MYOMECTOMY      TONSILLECTOMY      VAGINAL DELIVERY       Social History     Socioeconomic History    Marital status: Single     Spouse name: Not on file    Number of children: Not on file    Years of education: Not on file    Highest education level: Not on file   Occupational History    Not on file   Social Needs    Financial resource strain: Somewhat hard     Food insecurity:     Worry: Not on file     Inability: Never true    Transportation needs:     Medical: No     Non-medical: No   Tobacco Use    Smoking status: Never Smoker    Smokeless tobacco: Never Used   Substance and Sexual Activity    Alcohol use: No     Frequency: Never    Drug use: No    Sexual activity: Not Currently   Lifestyle    Physical activity:     Days per week: 1 day     Minutes per session: 20 min    Stress: To some extent   Relationships    Social connections:     Talks on phone: More than three times a week     Gets together: Once a week     Attends Buddhism service: Not on file     Active member of club or organization: Yes     Attends meetings of clubs or organizations: More than 4 times per year     Relationship status:    Other Topics Concern    Not on file   Social History Narrative    Not on file     Family History   Problem Relation Age of Onset    Dementia Mother     Glaucoma Mother     Lung cancer Father     Breast cancer Maternal Aunt     Glaucoma Maternal Uncle     Blindness Maternal Grandfather     Glaucoma Maternal Grandfather     Breast cancer Cousin 39        paternal     Amblyopia Neg Hx     Cataracts Neg Hx     Macular degeneration Neg Hx     Retinal detachment Neg Hx     Strabismus Neg Hx     Cancer Neg Hx     Colon cancer Neg Hx     Ovarian cancer Neg Hx      OB History    Para Term  AB Living   4 1 1   3 1   SAB TAB Ectopic Multiple Live Births                  # Outcome Date GA Lbr Frankie/2nd Weight Sex Delivery Anes PTL Lv   4 AB            3 AB            2 AB            1 Term                    Current Outpatient Medications   Medication Sig Dispense Refill    aspirin 81 MG Chew Take 81 mg by mouth once daily.       blood sugar diagnostic Strp To check BG 2 times daily, to use with insurance preferred meter 200 strip 3    cholecalciferol, vitamin D3, (VITAMIN D3) 2,000 unit Cap Take 1 capsule by mouth once daily.        clobetasol (CLOBEX) 0.05 % shampoo Apply topically once a week. 118 mL 5    gabapentin (NEURONTIN) 600 MG tablet TAKE 1 TABLET BY MOUTH THREE TIMES DAILY (Patient taking differently: Take 2,400 mg by mouth once daily. ) 90 tablet 0    glipiZIDE (GLUCOTROL) 5 MG TR24 TAKE 2 TABLETS BY MOUTH EVERY MORNING WITH BREAKFAST 60 tablet 0    hydroCHLOROthiazide (HYDRODIURIL) 25 MG tablet TAKE 1 TABLET(25 MG) BY MOUTH EVERY DAY 90 tablet 0    ipratropium (ATROVENT) 0.03 % nasal spray 2 sprays by Nasal route 2 (two) times daily. 30 mL 5    lancing device with lancets Kit 1 each by Misc.(Non-Drug; Combo Route) route 3 (three) times daily. 1 each 0    losartan (COZAAR) 50 MG tablet TAKE 1 TABLET(50 MG) BY MOUTH EVERY DAY 90 tablet 0    metFORMIN (GLUCOPHAGE) 1000 MG tablet TAKE 1 TABLET(1000 MG) BY MOUTH TWICE DAILY WITH MEALS 180 tablet 0    naproxen sodium (ANAPROX) 550 MG tablet Take 1 tablet (550 mg total) by mouth 2 (two) times daily as needed. 40 tablet 5    ondansetron (ZOFRAN-ODT) 8 MG TbDL Take 1 tablet (8 mg total) by mouth every 8 (eight) hours as needed. 30 tablet 2    ONETOUCH DELICA PLUS LANCET 33 gauge Misc USE TO TEST BID  3    pantoprazole (PROTONIX) 20 MG tablet Take 1 tablet (20 mg total) by mouth once daily. 30 tablet 2    sumatriptan (IMITREX) 100 MG tablet Take 1 tab at onset of migraine, may repeat dose in 2 hours if needed. Max 2 tabs/day. Max 3 days/week. 9 tablet 11    topiramate (TOPAMAX) 25 MG tablet Take 1 tab nightly x 1 week, then 2 tabs nightly x 1 week, then 3 tabs PM x 1 week, then 4 tabs nightly. 75 tablet 0    cyanocobalamin 1,000 mcg/mL injection Inject 1 mL (1,000 mcg total) into the muscle every 28 days. 1 mL 3    tiZANidine (ZANAFLEX) 4 MG tablet Take 4 mg by mouth.       No current facility-administered medications for this visit.      Allergies: Codeine; Lisinopril; and Nortriptyline     The 10-year ASCVD risk score (Abram ZHAO Jr., et al., 2013) is: 15.2%    Values used to  calculate the score:      Age: 64 years      Sex: Female      Is Non- : Yes      Diabetic: Yes      Tobacco smoker: No      Systolic Blood Pressure: 130 mmHg      Is BP treated: Yes      HDL Cholesterol: 50 mg/dL      Total Cholesterol: 155 mg/dL      ROS:  Constitutional: no weight loss, weight gain, fever, fatigue  Eyes:  No vision changes, glasses/contacts  ENT/Mouth: No ulcers, sinus problems, ears ringing, headache  Cardiovascular: No inability to lie flat, chest pain, exercise intolerance, swelling, heart palpitations  Respiratory: No wheezing, coughing blood, shortness of breath, or cough  Gastrointestinal: No diarrhea, bloody stool, nausea/vomiting, constipation, gas, hemorrhoids  Genitourinary: No blood in urine, painful urination, urgency of urination, frequency of urination, incomplete emptying, incontinence, abnormal bleeding, painful periods, heavy periods, vaginal discharge, vaginal odor, painful intercourse, sexual problems, bleeding after intercourse.  Musculoskeletal: No muscle weakness, +fibromyalgia  Skin/Breast: +breast cancer  Neurological: No passing out, seizures, numbness, headache  Endocrine: + diabetes, hypothyroid, hyperthyroid, hot flashes, hair loss, abnormal hair growth, acne  Psychiatric: No depression, crying  Hematologic: No bruises, bleeding, swollen lymph nodes, anemia.      Physical Exam    deferred  ASSESSMENT:   Breast cancer  Memory loss  Fibromyalgia    PLAN:   Face to face time 35 minutes- majority spent in counseling and arranging follow up  Recommend B 12 injection today then restart her sublingual B 12  Nurse navigator met with patient for over 45 minutes- arranging for bra prosthesis, consult with Dr. Bourgeois, discussed support groups with allyn  Counseled patient on diet and exercise- she is planning to start water aerobics

## 2019-11-21 ENCOUNTER — INITIAL CONSULT (OUTPATIENT)
Dept: PSYCHIATRY | Facility: CLINIC | Age: 64
End: 2019-11-21
Payer: COMMERCIAL

## 2019-11-21 DIAGNOSIS — G47.01 INSOMNIA DUE TO MEDICAL CONDITION: Primary | Chronic | ICD-10-CM

## 2019-11-21 PROBLEM — F51.01 PRIMARY INSOMNIA: Chronic | Status: ACTIVE | Noted: 2019-11-21

## 2019-11-21 PROCEDURE — 99999 PR PBB SHADOW E&M-EST. PATIENT-LVL II: ICD-10-PCS | Mod: PBBFAC,,, | Performed by: PSYCHOLOGIST

## 2019-11-21 PROCEDURE — 90791 PSYCH DIAGNOSTIC EVALUATION: CPT | Mod: ,,, | Performed by: PSYCHOLOGIST

## 2019-11-21 PROCEDURE — 99999 PR PBB SHADOW E&M-EST. PATIENT-LVL II: CPT | Mod: PBBFAC,,, | Performed by: PSYCHOLOGIST

## 2019-11-21 PROCEDURE — 90791 PR PSYCHIATRIC DIAGNOSTIC EVALUATION: ICD-10-PCS | Mod: ,,, | Performed by: PSYCHOLOGIST

## 2019-11-21 NOTE — PROGRESS NOTES
PSYCHO-ONCOLOGY INTAKE    Diagnostic Interview - CPT 31197    Date: 11/21/2019  Site: [facility]     Evaluation Length (direct face-to-face time):  1 hour     Referral Source: Riana Allen, *   Oncologist: VICTORIA Lund MD   PCP: Azikiwe K Lombard, MD    Clinical status of patient: Outpatient    Regine Osorio, a 64 y.o. female, seen for initial evaluation visit.  Met with patient.    Chief complaint/reason for encounter: adjustment to illness, sleep    Medical/Surgical History:    Patient Active Problem List   Diagnosis    History of breast cancer in female    Type 2 diabetes mellitus with hyperglycemia, without long-term current use of insulin    Essential hypertension    S/P left mastectomy    Spinal stenosis of lumbar region with neurogenic claudication    Vitamin D deficiency    Migraine without aura and without status migrainosus, not intractable    Thyroid nodule    Fibromyalgia    Microcytic anemia    Unsp symptoms and signs w cognitive functions and awareness    History of antineoplastic chemotherapy    Seborrheic dermatitis of scalp    Insomnia due to medical condition       Health Behaviors:       ETOH Use: No       Tobacco Use: No   Illicit Drug Use:  No     Prescription Misuse:No   Caffeine: minimal (1 cup coffee in am)   Exercise:The patient engages in 2x/week PT.   Advanced directives:No     Family History:   Psychiatric illness: Yes (mother, several maternal aunts, son-bipolar disorder)    Alcohol/Drug Abuse: No     Suicide: No      Past Psychiatric History:   Inpatient treatment: No     Outpatient treatment: No     Prior substance abuse treatment: No     Suicide Attempts: No     Psychotropic Medications:  Current: none       Past: none    Current medications as per below, allergies reviewed in chart.    Current Outpatient Medications   Medication    aspirin 81 MG Chew    blood sugar diagnostic Strp    cholecalciferol, vitamin D3, (VITAMIN D3) 2,000 unit Cap     "clobetasol (CLOBEX) 0.05 % shampoo    cyanocobalamin 1,000 mcg/mL injection    gabapentin (NEURONTIN) 600 MG tablet    glipiZIDE (GLUCOTROL) 5 MG TR24    hydroCHLOROthiazide (HYDRODIURIL) 25 MG tablet    ipratropium (ATROVENT) 0.03 % nasal spray    lancing device with lancets Kit    losartan (COZAAR) 50 MG tablet    metFORMIN (GLUCOPHAGE) 1000 MG tablet    naproxen sodium (ANAPROX) 550 MG tablet    ondansetron (ZOFRAN-ODT) 8 MG TbDL    ONETOUCH DELICA PLUS LANCET 33 gauge Misc    pantoprazole (PROTONIX) 20 MG tablet    sumatriptan (IMITREX) 100 MG tablet    tiZANidine (ZANAFLEX) 4 MG tablet    topiramate (TOPAMAX) 25 MG tablet     No current facility-administered medications for this visit.         CAM Therapies: None     Screening:   Daya: Denies Psychosis: Denies    Generalized anxiety: Denies   Panic Disorder: Denies    Social/specific phobia: Denies    Trauma: Denies (but does acknowledge "different" and "odd" childhood due to mother's bipolar disorder)      Social situation/Stressors: Regine Osorio lives alone in [place], [place].  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 She has not worked in 3 years due to mobility difficulties after Vitamin D deficiency and then breast cancer diagnosis/treatment.  Regine Osorio has been  1x ( 40+ years ago) and has 1 son and 3 children.  The patient reports good social support in other places, but minimal local social support (a few new acquaintances through genealogy research, extended family that she is "getting to know."). She has not seen her son in several years ("disappeared" due to struggles with PTSD/bipolar after  discharge).  Regine Osorio's hobbies include genealogy .  Additional stressors: financial strain, does not know where son is, mother's death from dementia January 2018, lack of local social supports    Strengths:Able to vocalize needs, Values and " traditions, Motivation, readiness for change and Setting and pursuing goals, hopes, dreams, aspirations  Liabilities: Complicated medical survivorship and illness, Lack of social supports and Financial strain    Current Evaluation:     Mental Status Exam: Regine Osorio arrived promptly for the assessment session. The patient was fully cooperative throughout the interview and was an adequate historian   Appearance: age appropriate, casually  dressed, well groomed  Behavior/Cooperation: friendly and cooperative  Speech: normal in rate, volume, and tone and appropriate quality, quantity and organization of sentences  Mood: euthymic  Affect: euthymic  Thought Process: goal-directed, logical  Thought Content: normal, no suicidality, no homicidality, delusions, or paranoia;did not appear to be responding to internal stimuli during the interview.   Orientation: grossly intact  Memory: Grossly intact  Attention Span/Concentration: Attends to interview without distraction; reports no difficulty  Fund of Knowledge: average  Estimate of Intelligence: above average from verbal skills and history  Cognition: grossly intact  Insight: patient has awareness of illness; good insight into own behavior and behavior of others  Judgment: the patient's behavior is adequate to circumstances    Distress Score    Distress Score: 2        Practical Problems Physical Problems                                                   Family Problems                                         Emotional Problems                                                         Spiritual/Religions Concerns               Other Problems            MMSE:     Pain: 6   Pain catastrophizing: PCS not completed    History of present illness:  She has a history of triple negative breast cancer. She had a left breast mastectomy in November of 2017.  Then had 4 cycles of adjuvant chemotherapy and no radiation She was diagnosed initially treated in [facility] and moved to  "[place] in September of 2018.  She also reports a history of migraine and fibromyalgia.     Rgeine Osorio has adjusted to illness adequately  primarily through active coping strategies. She has engaged in appropriate information gathering. Ms Osorio describes significant psychosocial strain related to her difficulty connecting with a nurse navigator and survivorship care after moving to [place]. She states she was very worried about missing needed follow-up care and difficulty obtaining prostheses, etc. The patient has good friend support elsewhere, but limited local support. She had started to wonder if she should move back to [place] ("but I don't have the finances to do that").  Illness-related psychosocial stressors include financial strain , absence from work, difficulty meeting family responsibilities and changes in ability to engage in leisure activities.  The patient has a limited but growing partnership with her [facility] oncology treatment team. The patient reports the following barriers to cancer care: move from [place] and challenges establishing survivorship care here.   Symptoms:   · Mood: depressed mood, insomnia and fatigue; "feeling much less overwhelmed now that I am connected to a team"  no prior and no SI/HI; PHQ-9=7  · Anxiety: denied; no prior;    · Substance abuse: denied  · Cognitive functioning: "chemo brain" prevented her from returning to her job ("I kept missing deadlines and couldn't keep things straight.")  · Health behaviors: noncontributory  · Sleep: patient reports chronically disrupted sleep (since childhood) due to ocular migraines which wake her from sleep in the early morning hours 1-2x/week ; gets 2-4 hours sleep on those nights, 6-8 hours on non-migraine nights, occasional "catch-up" weekends where she "sleeps for most of 2 days";  restless sleep  and non-restorative sleep , no sleep onset difficulty  and early AM awakening without return to sleep, (+) EDS , no reported apneic " events and occasional naps, AM caffeine and (+) sleep hygiene considerations no use of OTC/melatonin/hypnotics/benzodiazepines ; she has had several sleep studies during which no apnea was diagnosed  · Pain: Ms. Osorio reports 6/10 pain due to fibromyalgia and LBP. Pain interferes with exercise, walking, and social activities    Assessment - Diagnosis - Goals:       ICD-10-CM ICD-9-CM   1. Insomnia due to medical condition G47.01 327.01       Plan: referral to nurse navigator, breast cancer support group    Summary and Recommendations  Regine Osorio is a 64 y.o. female referred by Dr. Allen for psychological evaluation and treatment.  Ms. Osorio appears to be coping adequately (though possibly not ideally) with her survivorship despite many biopsychosocial strains. She is not currently interested in supportive psychotherapy or CBTi. She was given information for other cancer supportive care services to be contacted, as needed. Given patient's long history of disrupted sleep and the interactions between her sleep, migraines and fibromyalgia pain, she may benefit from a referral to sleep medicine. She may also benefit from participation in the functional restoration or healthy back program once her current physical therapy plan has been completed.     GOALS:   RTC as needed  Breast cancer support group  Sleep medicine evaluation    Checo Velasco, PhD  Clinical Psychologist  [place] License #[id number]

## 2019-11-21 NOTE — LETTER
November 23, 2019        Riana Allen MD  [address]  [address]  [place] [address]             [facility]  [address] [address]  Phone: [phone]  Fax: [phone]   Patient: Regine Osorio   MR Number: [id number]   YOB: 1955   Date of Visit: 11/21/2019       Dear Dr. Allen:    Thank you for referring Regine Osorio to me for evaluation. Below are the relevant portions of my assessment and plan of care.     Regine Osorio is a 64 y.o. female referred by Dr. Allen for psychological evaluation and treatment.  Ms. Osorio appears to be coping adequately (though possibly not ideally) with her survivorship despite many biopsychosocial strains. She is not currently interested in supportive psychotherapy or CBTi. She was given information for other cancer supportive care services to be contacted, as needed. Given patient's long history of disrupted sleep and the interactions between her sleep, migraines and fibromyalgia pain, she may benefit from a referral to sleep medicine. She may also benefit from participation in the functional restoration or healthy back program once her current physical therapy plan has been completed.      If you have questions, please do not hesitate to call me. I look forward to following Regine along with you.    Sincerely,      Checo Bourgeois, PhD           CC  Corinne E Coniglio, MATTHEW Bhat, [facility]

## 2019-11-23 PROBLEM — G47.01 INSOMNIA DUE TO MEDICAL CONDITION: Chronic | Status: ACTIVE | Noted: 2019-11-21

## 2019-11-25 ENCOUNTER — TELEPHONE (OUTPATIENT)
Dept: HEMATOLOGY/ONCOLOGY | Facility: CLINIC | Age: 64
End: 2019-11-25

## 2019-11-25 ENCOUNTER — PATIENT MESSAGE (OUTPATIENT)
Dept: NEUROLOGY | Facility: CLINIC | Age: 64
End: 2019-11-25

## 2019-11-25 DIAGNOSIS — Z90.12 HISTORY OF LEFT MASTECTOMY: ICD-10-CM

## 2019-11-25 DIAGNOSIS — Z85.3 HISTORY OF BREAST CANCER IN FEMALE: Primary | ICD-10-CM

## 2019-11-25 NOTE — TELEPHONE ENCOUNTER
Called patient today to discuss her inability to be connected with navigation services in past. Patient states she tried on multiple occasions to have her provider's office send her contact info to a nurse navigator, but never was able to be connected to one. She states that she finally heard about Dr. Allen's survivorship clinic through a coworker. She is now in need of a new breast prosthesis and isn't sure where to turn. She also had interest in finding a breast cancer support group. We discussed at length the available resources such as support group and PT. Apologized profusely for the lack of navigation services during her care.     Patient was emailed a new prosthesis prescription and my contact info, as well as the contact info for Corinne Coniglio, the breast cancer nurse navigator. Encouraged patient to call anytime with questions/concerns/feeback on her experience. Verbalized understanding to all information.

## 2019-12-02 ENCOUNTER — LAB VISIT (OUTPATIENT)
Dept: LAB | Facility: HOSPITAL | Age: 64
End: 2019-12-02
Attending: FAMILY MEDICINE
Payer: COMMERCIAL

## 2019-12-02 ENCOUNTER — TELEPHONE (OUTPATIENT)
Dept: SURGERY | Facility: CLINIC | Age: 64
End: 2019-12-02

## 2019-12-02 DIAGNOSIS — Z00.00 WELL ADULT EXAM: ICD-10-CM

## 2019-12-02 DIAGNOSIS — E11.65 TYPE 2 DIABETES MELLITUS WITH HYPERGLYCEMIA, WITHOUT LONG-TERM CURRENT USE OF INSULIN: ICD-10-CM

## 2019-12-02 LAB
25(OH)D3+25(OH)D2 SERPL-MCNC: 70 NG/ML (ref 30–96)
ALBUMIN SERPL BCP-MCNC: 4.3 G/DL (ref 3.5–5.2)
ALP SERPL-CCNC: 71 U/L (ref 55–135)
ALT SERPL W/O P-5'-P-CCNC: 60 U/L (ref 10–44)
ANION GAP SERPL CALC-SCNC: 13 MMOL/L (ref 8–16)
AST SERPL-CCNC: 59 U/L (ref 10–40)
BASOPHILS # BLD AUTO: 0.03 K/UL (ref 0–0.2)
BASOPHILS NFR BLD: 0.4 % (ref 0–1.9)
BILIRUB SERPL-MCNC: 0.6 MG/DL (ref 0.1–1)
BUN SERPL-MCNC: 13 MG/DL (ref 8–23)
CALCIUM SERPL-MCNC: 10.3 MG/DL (ref 8.7–10.5)
CHLORIDE SERPL-SCNC: 99 MMOL/L (ref 95–110)
CHOLEST SERPL-MCNC: 151 MG/DL (ref 120–199)
CHOLEST/HDLC SERPL: 3.6 {RATIO} (ref 2–5)
CO2 SERPL-SCNC: 29 MMOL/L (ref 23–29)
CREAT SERPL-MCNC: 0.8 MG/DL (ref 0.5–1.4)
DIFFERENTIAL METHOD: ABNORMAL
EOSINOPHIL # BLD AUTO: 0.3 K/UL (ref 0–0.5)
EOSINOPHIL NFR BLD: 3.6 % (ref 0–8)
ERYTHROCYTE [DISTWIDTH] IN BLOOD BY AUTOMATED COUNT: 14.5 % (ref 11.5–14.5)
EST. GFR  (AFRICAN AMERICAN): >60 ML/MIN/1.73 M^2
EST. GFR  (NON AFRICAN AMERICAN): >60 ML/MIN/1.73 M^2
ESTIMATED AVG GLUCOSE: 183 MG/DL (ref 68–131)
GLUCOSE SERPL-MCNC: 129 MG/DL (ref 70–110)
HBA1C MFR BLD HPLC: 8 % (ref 4–5.6)
HCT VFR BLD AUTO: 37.2 % (ref 37–48.5)
HDLC SERPL-MCNC: 42 MG/DL (ref 40–75)
HDLC SERPL: 27.8 % (ref 20–50)
HGB BLD-MCNC: 12.1 G/DL (ref 12–16)
LDLC SERPL CALC-MCNC: 91.8 MG/DL (ref 63–159)
LYMPHOCYTES # BLD AUTO: 2 K/UL (ref 1–4.8)
LYMPHOCYTES NFR BLD: 27.5 % (ref 18–48)
MCH RBC QN AUTO: 25.6 PG (ref 27–31)
MCHC RBC AUTO-ENTMCNC: 32.5 G/DL (ref 32–36)
MCV RBC AUTO: 79 FL (ref 82–98)
MONOCYTES # BLD AUTO: 0.5 K/UL (ref 0.3–1)
MONOCYTES NFR BLD: 7.3 % (ref 4–15)
NEUTROPHILS # BLD AUTO: 4.5 K/UL (ref 1.8–7.7)
NEUTROPHILS NFR BLD: 61.2 % (ref 38–73)
NONHDLC SERPL-MCNC: 109 MG/DL
PLATELET # BLD AUTO: 334 K/UL (ref 150–350)
PMV BLD AUTO: 9.8 FL (ref 9.2–12.9)
POTASSIUM SERPL-SCNC: 3.7 MMOL/L (ref 3.5–5.1)
PROT SERPL-MCNC: 7.5 G/DL (ref 6–8.4)
RBC # BLD AUTO: 4.72 M/UL (ref 4–5.4)
SODIUM SERPL-SCNC: 141 MMOL/L (ref 136–145)
T4 FREE SERPL-MCNC: 0.96 NG/DL (ref 0.71–1.51)
TRIGL SERPL-MCNC: 86 MG/DL (ref 30–150)
TSH SERPL DL<=0.005 MIU/L-ACNC: 1.58 UIU/ML (ref 0.4–4)
WBC # BLD AUTO: 7.42 K/UL (ref 3.9–12.7)

## 2019-12-02 PROCEDURE — 85025 COMPLETE CBC W/AUTO DIFF WBC: CPT

## 2019-12-02 PROCEDURE — 84443 ASSAY THYROID STIM HORMONE: CPT

## 2019-12-02 PROCEDURE — 80053 COMPREHEN METABOLIC PANEL: CPT

## 2019-12-02 PROCEDURE — 80061 LIPID PANEL: CPT

## 2019-12-02 PROCEDURE — 83036 HEMOGLOBIN GLYCOSYLATED A1C: CPT

## 2019-12-02 PROCEDURE — 82306 VITAMIN D 25 HYDROXY: CPT

## 2019-12-02 PROCEDURE — 36415 COLL VENOUS BLD VENIPUNCTURE: CPT

## 2019-12-02 PROCEDURE — 84439 ASSAY OF FREE THYROXINE: CPT

## 2019-12-02 NOTE — TELEPHONE ENCOUNTER
Pt called regarding not receiving e mail for MARY Carpio. Reached out to Shey, she forwarded me the e mail and I resent it to the patient. Called patient to let her know I sent the order for the prosthesis in the e mail along with place she can go to get the prosthesis.Asked her to call with any other concerns. Pt verbalized understanding.

## 2019-12-06 ENCOUNTER — OFFICE VISIT (OUTPATIENT)
Dept: FAMILY MEDICINE | Facility: CLINIC | Age: 64
End: 2019-12-06
Payer: COMMERCIAL

## 2019-12-06 VITALS
WEIGHT: 256.38 LBS | OXYGEN SATURATION: 97 % | SYSTOLIC BLOOD PRESSURE: 138 MMHG | HEIGHT: 65 IN | TEMPERATURE: 98 F | HEART RATE: 95 BPM | DIASTOLIC BLOOD PRESSURE: 80 MMHG | BODY MASS INDEX: 42.71 KG/M2 | RESPIRATION RATE: 20 BRPM

## 2019-12-06 DIAGNOSIS — M48.062 SPINAL STENOSIS OF LUMBAR REGION WITH NEUROGENIC CLAUDICATION: ICD-10-CM

## 2019-12-06 DIAGNOSIS — G43.009 MIGRAINE WITHOUT AURA AND WITHOUT STATUS MIGRAINOSUS, NOT INTRACTABLE: ICD-10-CM

## 2019-12-06 DIAGNOSIS — I10 ESSENTIAL HYPERTENSION: ICD-10-CM

## 2019-12-06 DIAGNOSIS — E11.65 TYPE 2 DIABETES MELLITUS WITH HYPERGLYCEMIA, WITHOUT LONG-TERM CURRENT USE OF INSULIN: Primary | ICD-10-CM

## 2019-12-06 DIAGNOSIS — Z92.21 HISTORY OF ANTINEOPLASTIC CHEMOTHERAPY: ICD-10-CM

## 2019-12-06 PROBLEM — E55.9 VITAMIN D DEFICIENCY: Status: RESOLVED | Noted: 2018-11-26 | Resolved: 2019-12-06

## 2019-12-06 PROCEDURE — 99214 OFFICE O/P EST MOD 30 MIN: CPT | Mod: S$GLB,,, | Performed by: FAMILY MEDICINE

## 2019-12-06 PROCEDURE — 99214 PR OFFICE/OUTPT VISIT, EST, LEVL IV, 30-39 MIN: ICD-10-PCS | Mod: S$GLB,,, | Performed by: FAMILY MEDICINE

## 2019-12-06 RX ORDER — GLIPIZIDE 5 MG/1
5 TABLET, FILM COATED, EXTENDED RELEASE ORAL 2 TIMES DAILY
Qty: 60 TABLET | Refills: 5 | Status: SHIPPED | OUTPATIENT
Start: 2019-12-06 | End: 2020-06-02

## 2019-12-06 RX ORDER — METFORMIN HYDROCHLORIDE 1000 MG/1
1000 TABLET ORAL 2 TIMES DAILY WITH MEALS
Qty: 60 TABLET | Refills: 2 | Status: SHIPPED | OUTPATIENT
Start: 2019-12-06 | End: 2020-03-08

## 2019-12-06 RX ORDER — HYDROCHLOROTHIAZIDE 25 MG/1
25 TABLET ORAL DAILY
Qty: 30 TABLET | Refills: 5 | Status: SHIPPED | OUTPATIENT
Start: 2019-12-06 | End: 2020-06-02

## 2019-12-06 NOTE — PROGRESS NOTES
Chief Complaint   Patient presents with    Diabetes     follow up     Results     recent labs        Regine Osorio is a 64 y.o. female who presents per the Chief Complaint.  Pt is known to me and was last seen by me on 8/29/2019.  All known chronic medical issues have been documented.       Diabetes   She presents for her follow-up diabetic visit. She has type 2 diabetes mellitus. No MedicAlert identification noted. Her disease course has been stable. Pertinent negatives for hypoglycemia include no confusion, dizziness, headaches, hunger, mood changes, nervousness/anxiousness, pallor, seizures, sleepiness, speech difficulty, sweats or tremors. Associated symptoms include fatigue. Pertinent negatives for diabetes include no blurred vision, no chest pain, no foot paresthesias, no foot ulcerations, no polydipsia, no polyphagia, no polyuria, no visual change, no weakness and no weight loss. Pertinent negatives for hypoglycemia complications include no blackouts, no hospitalization, no nocturnal hypoglycemia, no required assistance and no required glucagon injection. Symptoms are stable. Pertinent negatives for diabetic complications include no autonomic neuropathy, CVA, heart disease, impotence, nephropathy, peripheral neuropathy, PVD or retinopathy. Risk factors for coronary artery disease include hypertension, obesity, stress and diabetes mellitus. Current diabetic treatment includes oral agent (dual therapy). She is compliant with treatment all of the time. Her weight is stable. She is following a generally healthy diet. When asked about meal planning, she reported none. She has not had a previous visit with a dietitian. She participates in exercise intermittently. She monitors blood glucose at home 1-2 x per day. Blood glucose monitoring compliance is excellent. Her home blood glucose trend is decreasing steadily. She sees a podiatrist.Eye exam is current.        ROS  Review of Systems   Constitutional:  "Positive for fatigue. Negative for activity change, appetite change, chills, diaphoresis, fever, unexpected weight change and weight loss.   HENT: Negative.  Negative for congestion, dental problem, ear discharge, ear pain, hearing loss, nosebleeds, postnasal drip, rhinorrhea, sinus pressure, sneezing, sore throat and trouble swallowing.    Eyes: Negative.  Negative for blurred vision, pain, discharge and visual disturbance.   Respiratory: Negative for cough, choking, chest tightness, shortness of breath and wheezing.    Cardiovascular: Positive for leg swelling. Negative for chest pain and palpitations.   Gastrointestinal: Negative for abdominal pain, blood in stool, constipation, diarrhea, nausea and vomiting.   Endocrine: Negative.  Negative for polydipsia, polyphagia and polyuria.   Genitourinary: Negative.  Negative for difficulty urinating, dysuria, flank pain, frequency, hematuria, impotence, menstrual problem, pelvic pain and urgency.   Musculoskeletal: Positive for arthralgias and myalgias. Negative for back pain, gait problem, joint swelling, neck pain and neck stiffness.   Skin: Negative.  Negative for pallor and rash.   Allergic/Immunologic: Negative.  Negative for environmental allergies, food allergies and immunocompromised state.   Neurological: Negative.  Negative for dizziness, tremors, seizures, syncope, speech difficulty, weakness, light-headedness, numbness and headaches.   Hematological: Negative.    Psychiatric/Behavioral: Negative.  Negative for confusion, decreased concentration, dysphoric mood and sleep disturbance. The patient is not nervous/anxious.        Physical Exam  Vitals:    12/06/19 0906   BP: 138/80   Pulse: 95   Resp: 20   Temp: 98.4 °F (36.9 °C)    Body mass index is 42.67 kg/m².  Weight: 116.3 kg (256 lb 6.3 oz)   Height: 5' 5" (165.1 cm)     Physical Exam   Constitutional: She is oriented to person, place, and time. She appears well-developed and well-nourished. She is active " and cooperative.  Non-toxic appearance. She does not have a sickly appearance. She does not appear ill. No distress.   HENT:   Head: Normocephalic and atraumatic.   Right Ear: Hearing and external ear normal. No decreased hearing is noted.   Left Ear: Hearing and external ear normal. No decreased hearing is noted.   Nose: Nose normal. No rhinorrhea or nasal deformity.   Mouth/Throat: Uvula is midline and oropharynx is clear and moist. She does not have dentures. Normal dentition.   Eyes: Pupils are equal, round, and reactive to light. Conjunctivae, EOM and lids are normal. Right eye exhibits no chemosis, no discharge and no exudate. No foreign body present in the right eye. Left eye exhibits no chemosis, no discharge and no exudate. No foreign body present in the left eye. No scleral icterus.   Neck: Normal range of motion and full passive range of motion without pain. Neck supple.   Cardiovascular: Normal rate, regular rhythm, S1 normal, S2 normal and normal heart sounds. Exam reveals no gallop and no friction rub.   No murmur heard.  Pulmonary/Chest: Effort normal and breath sounds normal. No accessory muscle usage. No respiratory distress. She has no decreased breath sounds. She has no wheezes. She has no rhonchi. She has no rales.   Abdominal: Soft. Normal appearance. She exhibits no distension. There is no hepatosplenomegaly. There is no tenderness. There is no rigidity, no rebound and no guarding.   Musculoskeletal:        Lumbar back: She exhibits decreased range of motion, tenderness and pain. She exhibits no bony tenderness, no swelling, no edema, no deformity, no laceration, no spasm and normal pulse.        Right upper leg: She exhibits tenderness. She exhibits no bony tenderness, no swelling, no edema, no deformity and no laceration.        Left upper leg: She exhibits tenderness. She exhibits no bony tenderness, no swelling, no edema, no deformity and no laceration.   Neurological: She is alert and  oriented to person, place, and time. She has normal strength. No cranial nerve deficit or sensory deficit. She exhibits normal muscle tone. She displays no seizure activity. Coordination and gait normal.   Skin: Skin is warm, dry and intact. No rash noted. She is not diaphoretic.   Psychiatric: She has a normal mood and affect. Her speech is normal and behavior is normal. Judgment and thought content normal. Cognition and memory are normal. She is attentive.       Assessment & Plan    Discussion of plan of care including treatment options regarding health and wellness were reviewed and discussed with patient.  Any changes to medication or treatment plan, as well as any screening blood test, imaging, or referrals to specialist, are documented.  Follow up as indicated.     1. Type 2 diabetes mellitus with hyperglycemia, without long-term current use of insulin  Patient is encouraged to follow a diet low in carbohydrates and simple sugars.  Discussed simple vs. complex carbohydrates as well as eating times of certain meals. Advised to focus on good food choices and increased physical activity and encouraged to adhere to medication regimen and/or lifestyle adjustments, and to check glucose level as recommended.  Contact office if glucose levels are not improving over time.  Screening blood test is due in 3 months.     - glipiZIDE (GLUCOTROL) 5 MG TR24; Take 1 tablet (5 mg total) by mouth 2 (two) times daily.  Dispense: 60 tablet; Refill: 5  - metFORMIN (GLUCOPHAGE) 1000 MG tablet; Take 1 tablet (1,000 mg total) by mouth 2 (two) times daily with meals.  Dispense: 60 tablet; Refill: 2  - Hemoglobin A1c; Future    2. Essential hypertension  Patient was counseled and encouraged to maintain a low sodium diet, as well as increasing physical activity.  Recommend random BP checks at home on a regular basis.  Repeat BP at end of visit was not necessary. Will continue medication at this time, and follow up in 3-6 months, or  sooner if blood pressure begins to increase.     - hydroCHLOROthiazide (HYDRODIURIL) 25 MG tablet; Take 1 tablet (25 mg total) by mouth once daily.  Dispense: 30 tablet; Refill: 5    3. Spinal stenosis of lumbar region with neurogenic claudication  Stable; no therapeutic changes at this time.   DME ordered to assist with performance of ADL's.   - CANE FOR HOME USE    4. Migraine without aura and without status migrainosus, not intractable  Managed by Neurology.  Stable; no therapeutic changes at this time.     5. 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       Follow up in about 3 months (around 3/6/2020).        ACTIVE MEDICAL ISSUES:  Documented in Problem List    PAST MEDICAL HISTORY  Documented    PAST SURGICAL HISTORY:  Documented    SOCIAL HISTORY:  Documented    FAMILY HISTORY:  Documented    ALLERGIES AND MEDICATIONS: updated and reviewed.  Documented    Health Maintenance       Date Due Completion Date    Foot Exam 01/12/1967 ---    Shingles Vaccine (1 of 2) 01/12/2007 ---    Hemoglobin A1c 03/02/2020 12/2/2019    Eye Exam 08/23/2020 8/23/2019    Lipid Panel 12/02/2020 12/2/2019    Mammogram 02/21/2021 2/21/2019    Colonoscopy 07/09/2023 7/9/2018    TETANUS VACCINE 11/26/2028 11/26/2018

## 2019-12-06 NOTE — PROGRESS NOTES
Health Maintenance Due   Topic     Foot Exam  4/8/2019 Done with podiatry      Zoster: hx chickenpox ; inform pt can get vaccine at pharmacy.

## 2019-12-08 ENCOUNTER — PATIENT MESSAGE (OUTPATIENT)
Dept: OBSTETRICS AND GYNECOLOGY | Facility: CLINIC | Age: 64
End: 2019-12-08

## 2019-12-10 DIAGNOSIS — M48.062 SPINAL STENOSIS OF LUMBAR REGION WITH NEUROGENIC CLAUDICATION: ICD-10-CM

## 2019-12-10 RX ORDER — GABAPENTIN 600 MG/1
TABLET ORAL
Qty: 90 TABLET | Refills: 0 | Status: SHIPPED | OUTPATIENT
Start: 2019-12-10 | End: 2020-01-06

## 2019-12-19 ENCOUNTER — CLINICAL SUPPORT (OUTPATIENT)
Dept: OBSTETRICS AND GYNECOLOGY | Facility: CLINIC | Age: 64
End: 2019-12-19
Payer: COMMERCIAL

## 2019-12-19 ENCOUNTER — OFFICE VISIT (OUTPATIENT)
Dept: URGENT CARE | Facility: CLINIC | Age: 64
End: 2019-12-19
Payer: COMMERCIAL

## 2019-12-19 VITALS
HEART RATE: 116 BPM | WEIGHT: 256 LBS | OXYGEN SATURATION: 98 % | TEMPERATURE: 99 F | HEIGHT: 65 IN | SYSTOLIC BLOOD PRESSURE: 118 MMHG | BODY MASS INDEX: 42.65 KG/M2 | RESPIRATION RATE: 16 BRPM | DIASTOLIC BLOOD PRESSURE: 80 MMHG

## 2019-12-19 DIAGNOSIS — H66.002 NON-RECURRENT ACUTE SUPPURATIVE OTITIS MEDIA OF LEFT EAR WITHOUT SPONTANEOUS RUPTURE OF TYMPANIC MEMBRANE: Primary | ICD-10-CM

## 2019-12-19 DIAGNOSIS — H69.92 EUSTACHIAN TUBE DYSFUNCTION, LEFT: ICD-10-CM

## 2019-12-19 DIAGNOSIS — E53.8 B12 DEFICIENCY: Primary | ICD-10-CM

## 2019-12-19 DIAGNOSIS — H92.02 LEFT EAR PAIN: ICD-10-CM

## 2019-12-19 PROCEDURE — 96372 PR INJECTION,THERAP/PROPH/DIAG2ST, IM OR SUBCUT: ICD-10-PCS | Mod: S$GLB,,, | Performed by: OBSTETRICS & GYNECOLOGY

## 2019-12-19 PROCEDURE — 99214 PR OFFICE/OUTPT VISIT, EST, LEVL IV, 30-39 MIN: ICD-10-PCS | Mod: S$GLB,,, | Performed by: NURSE PRACTITIONER

## 2019-12-19 PROCEDURE — 96372 THER/PROPH/DIAG INJ SC/IM: CPT | Mod: S$GLB,,, | Performed by: OBSTETRICS & GYNECOLOGY

## 2019-12-19 PROCEDURE — 99214 OFFICE O/P EST MOD 30 MIN: CPT | Mod: S$GLB,,, | Performed by: NURSE PRACTITIONER

## 2019-12-19 RX ORDER — AMOXICILLIN AND CLAVULANATE POTASSIUM 875; 125 MG/1; MG/1
1 TABLET, FILM COATED ORAL 2 TIMES DAILY
Qty: 20 TABLET | Refills: 0 | Status: SHIPPED | OUTPATIENT
Start: 2019-12-19 | End: 2019-12-29

## 2019-12-19 RX ORDER — CYANOCOBALAMIN 1000 UG/ML
1000 INJECTION, SOLUTION INTRAMUSCULAR; SUBCUTANEOUS
Status: COMPLETED | OUTPATIENT
Start: 2019-12-19 | End: 2019-12-19

## 2019-12-19 RX ADMIN — CYANOCOBALAMIN 1000 MCG: 1000 INJECTION, SOLUTION INTRAMUSCULAR; SUBCUTANEOUS at 08:12

## 2019-12-19 NOTE — PROGRESS NOTES
"Subjective:       Patient ID: Regine Osorio is a 64 y.o. female.    Vitals:  height is 5' 5" (1.651 m) and weight is 116.1 kg (256 lb). Her temperature is 98.5 °F (36.9 °C). Her blood pressure is 118/80 and her pulse is 116 (abnormal). Her respiration is 16 and oxygen saturation is 98%.     Chief Complaint: Otalgia (left ear 2 days ago naproxen )    Left ear pain for 2 days.  States it has been becoming worse over the last 3 weeks.  She does have eustachian tube dysfunction and has been taking Zyrtec and using Flonase daily without relief.    Otalgia    There is pain in the left ear. This is a new problem. The current episode started in the past 7 days. The problem has been waxing and waning. There has been no fever. The pain is at a severity of 3/10. The pain is mild. Pertinent negatives include no coughing, ear discharge, headaches, neck pain, rash, sore throat or vomiting. She has tried NSAIDs for the symptoms. The treatment provided mild relief.       Constitution: Negative for chills, sweating, fatigue and fever.   HENT: Positive for ear pain. Negative for ear discharge, congestion, sinus pain, sinus pressure, sore throat and voice change.    Neck: Positive for painful lymph nodes. Negative for neck pain.   Eyes: Negative for eye redness.   Respiratory: Negative for chest tightness, cough, sputum production, bloody sputum, COPD, shortness of breath, stridor, wheezing and asthma.    Gastrointestinal: Negative for nausea and vomiting.   Musculoskeletal: Negative for muscle ache.   Skin: Negative for rash.   Allergic/Immunologic: Negative for seasonal allergies and asthma.   Neurological: Negative for headaches.   Hematologic/Lymphatic: Positive for swollen lymph nodes.       Objective:      Physical Exam   Constitutional: She is oriented to person, place, and time. She appears well-developed and well-nourished. She is cooperative.  Non-toxic appearance. She does not have a sickly appearance. She does not " appear ill. No distress.   HENT:   Head: Normocephalic and atraumatic.   Right Ear: Hearing, external ear and ear canal normal. A middle ear effusion is present.   Left Ear: Hearing, external ear and ear canal normal. Tympanic membrane is injected. Tympanic membrane mobility is abnormal.   Nose: Rhinorrhea present. No mucosal edema or nasal deformity. No epistaxis. Right sinus exhibits no maxillary sinus tenderness and no frontal sinus tenderness. Left sinus exhibits no maxillary sinus tenderness and no frontal sinus tenderness.   Mouth/Throat: Uvula is midline and mucous membranes are normal. No trismus in the jaw. Normal dentition. No uvula swelling. Posterior oropharyngeal erythema and cobblestoning present. No oropharyngeal exudate or posterior oropharyngeal edema.   Eyes: Pupils are equal, round, and reactive to light. Conjunctivae, EOM and lids are normal. No scleral icterus.   Neck: Trachea normal, normal range of motion, full passive range of motion without pain and phonation normal. Neck supple. No spinous process tenderness and no muscular tenderness present. No neck rigidity. No edema, no erythema and normal range of motion present.   Cardiovascular: Regular rhythm, normal heart sounds and normal pulses.   Pulmonary/Chest: Effort normal and breath sounds normal. No respiratory distress. She has no decreased breath sounds. She has no rhonchi.   Abdominal: Normal appearance.   Musculoskeletal: Normal range of motion. She exhibits no edema or deformity.   Lymphadenopathy:        Head (left side): Preauricular and posterior auricular adenopathy present.     She has cervical adenopathy.        Left cervical: Superficial cervical and posterior cervical adenopathy present.   Neurological: She is alert and oriented to person, place, and time. She exhibits normal muscle tone. Coordination normal.   Skin: Skin is warm, dry, intact, not diaphoretic and not pale. Capillary refill takes less than 2 seconds.    Psychiatric: She has a normal mood and affect. Her speech is normal and behavior is normal. Judgment and thought content normal. Cognition and memory are normal.   Nursing note and vitals reviewed.        Assessment:       1. Non-recurrent acute suppurative otitis media of left ear without spontaneous rupture of tympanic membrane    2. Eustachian tube dysfunction, left    3. Left ear pain        Plan:         Non-recurrent acute suppurative otitis media of left ear without spontaneous rupture of tympanic membrane  -     Ambulatory referral to ENT  -     amoxicillin-clavulanate 875-125mg (AUGMENTIN) 875-125 mg per tablet; Take 1 tablet by mouth 2 (two) times daily. for 10 days  Dispense: 20 tablet; Refill: 0    Eustachian tube dysfunction, left  -     Ambulatory referral to ENT    Left ear pain  -     Ambulatory referral to ENT         Patient Instructions     referral line number is [phone]  Please return here or go to the Emergency Department for any concerns or worsening of condition.  If you were prescribed antibiotics, please take them to completion.  If you were prescribed a narcotic medication, do not drive or operate heavy equipment or machinery while taking these medications.  Please follow up with your primary care doctor or specialist as needed.    If you  smoke, please stop smoking.    Middle Ear Infection (Adult)  You have an infection of the middle ear, the space behind the eardrum. This is also called acute otitis media (AOM). Sometimes it is caused by the common cold. This is because congestion can block the internal passage (eustachian tube) that drains fluid from the middle ear. When the middle ear fills with fluid, bacteria can grow there and cause an infection. Oral antibiotics are used to treat this illness, not ear drops. Symptoms usually start to improve within 1 to 2 days of treatment.    Home care  The following are general care guidelines:  · Finish all of the antibiotic medicine  given, even though you may feel better after the first few days.  · You may use over-the-counter medicine, such as acetaminophen or ibuprofen, to control pain and fever, unless something else was prescribed. If you have chronic liver or kidney disease or have ever had a stomach ulcer or gastrointestinal bleeding, talk with your healthcare provider before using these medicines. Do not give aspirin to anyone under 18 years of age who has a fever. It may cause severe illness or death.  Follow-up care  Follow up with your healthcare provider, or as advised, in 2 weeks if all symptoms have not gotten better, or if hearing doesn't go back to normal within 1 month.  When to seek medical advice  Call your healthcare provider right away if any of these occur:  · Ear pain gets worse or does not improve after 3 days of treatment  · Unusual drowsiness or confusion  · Neck pain, stiff neck, or headache  · Fluid or blood draining from the ear canal  · Fever of 100.4°F (38°C) or as advised   · Seizure  Date Last Reviewed: 6/1/2016 © 2000-2017 [employer]. [address] [place] [address], [place] [address]. All rights reserved. This information is not intended as a substitute for professional medical care. Always follow your healthcare professional's instructions.

## 2019-12-19 NOTE — PATIENT INSTRUCTIONS
referral line number is [phone]  Please return here or go to the Emergency Department for any concerns or worsening of condition.  If you were prescribed antibiotics, please take them to completion.  If you were prescribed a narcotic medication, do not drive or operate heavy equipment or machinery while taking these medications.  Please follow up with your primary care doctor or specialist as needed.    If you  smoke, please stop smoking.    Middle Ear Infection (Adult)  You have an infection of the middle ear, the space behind the eardrum. This is also called acute otitis media (AOM). Sometimes it is caused by the common cold. This is because congestion can block the internal passage (eustachian tube) that drains fluid from the middle ear. When the middle ear fills with fluid, bacteria can grow there and cause an infection. Oral antibiotics are used to treat this illness, not ear drops. Symptoms usually start to improve within 1 to 2 days of treatment.    Home care  The following are general care guidelines:  · Finish all of the antibiotic medicine given, even though you may feel better after the first few days.  · You may use over-the-counter medicine, such as acetaminophen or ibuprofen, to control pain and fever, unless something else was prescribed. If you have chronic liver or kidney disease or have ever had a stomach ulcer or gastrointestinal bleeding, talk with your healthcare provider before using these medicines. Do not give aspirin to anyone under 18 years of age who has a fever. It may cause severe illness or death.  Follow-up care  Follow up with your healthcare provider, or as advised, in 2 weeks if all symptoms have not gotten better, or if hearing doesn't go back to normal within 1 month.  When to seek medical advice  Call your healthcare provider right away if any of these occur:  · Ear pain gets worse or does not improve after 3 days of treatment  · Unusual drowsiness or confusion  · Neck  pain, stiff neck, or headache  · Fluid or blood draining from the ear canal  · Fever of 100.4°F (38°C) or as advised   · Seizure  Date Last Reviewed: 6/1/2016  © 3803-3442 [employer]. [address] [place] [address], [place] [address]. All rights reserved. This information is not intended as a substitute for professional medical care. Always follow your healthcare professional's instructions.

## 2019-12-19 NOTE — PROGRESS NOTES
Patient arrives today for her B12  injection. No complaints today, notes increased energy after injection but has declined since last dose. Dose #2 today.     B12 1000mcg administered IM to RIGHT deltoid using aseptic technique and 22 gauge 1.5 inch needle.     Site secured with Band-Aid, needle tip remains intact, patient tolerated well without pain. Patient observed for 15 minutes post injection.      Patient's next injection scheduled prior to clinic departure. MATTHEW Clements

## 2019-12-20 ENCOUNTER — DOCUMENTATION ONLY (OUTPATIENT)
Dept: HEMATOLOGY/ONCOLOGY | Facility: CLINIC | Age: 64
End: 2019-12-20

## 2019-12-20 NOTE — PROGRESS NOTES
Received referral from the clinic that patient was in need of resources. Reached out and spoke to the patient. She stated that she currently has no income due to not being employed. She is on [employer] which will  in February. [employer] currently only covers her at 80%. She stated that she had multiple receipts for medications and dme that she was hoping to get assistance with. She recently obtained prescriptions for new prothesis. She reports not being on treatment since 2018. Currently, unaware of resources for patients not on active treatment. Made her aware. I have reached out to [facility] to see if they can assist in anyway or see if the could provide me with any resources that may. Patient is aware. Provided her my direct number so that she can follow up with me as needed.

## 2020-01-04 DIAGNOSIS — M48.062 SPINAL STENOSIS OF LUMBAR REGION WITH NEUROGENIC CLAUDICATION: ICD-10-CM

## 2020-01-06 RX ORDER — GABAPENTIN 600 MG/1
TABLET ORAL
Qty: 90 TABLET | Refills: 0 | Status: SHIPPED | OUTPATIENT
Start: 2020-01-06 | End: 2020-02-07

## 2020-01-13 ENCOUNTER — PATIENT MESSAGE (OUTPATIENT)
Dept: OBSTETRICS AND GYNECOLOGY | Facility: CLINIC | Age: 65
End: 2020-01-13

## 2020-01-21 ENCOUNTER — PATIENT MESSAGE (OUTPATIENT)
Dept: FAMILY MEDICINE | Facility: CLINIC | Age: 65
End: 2020-01-21

## 2020-01-27 ENCOUNTER — CLINICAL SUPPORT (OUTPATIENT)
Dept: AUDIOLOGY | Facility: CLINIC | Age: 65
End: 2020-01-27
Payer: COMMERCIAL

## 2020-01-27 ENCOUNTER — OFFICE VISIT (OUTPATIENT)
Dept: FAMILY MEDICINE | Facility: CLINIC | Age: 65
End: 2020-01-27
Payer: COMMERCIAL

## 2020-01-27 ENCOUNTER — OFFICE VISIT (OUTPATIENT)
Dept: OTOLARYNGOLOGY | Facility: CLINIC | Age: 65
End: 2020-01-27
Payer: COMMERCIAL

## 2020-01-27 VITALS — HEART RATE: 95 BPM | DIASTOLIC BLOOD PRESSURE: 73 MMHG | SYSTOLIC BLOOD PRESSURE: 128 MMHG

## 2020-01-27 VITALS
HEART RATE: 97 BPM | WEIGHT: 255.75 LBS | HEIGHT: 65 IN | RESPIRATION RATE: 20 BRPM | TEMPERATURE: 98 F | SYSTOLIC BLOOD PRESSURE: 130 MMHG | DIASTOLIC BLOOD PRESSURE: 80 MMHG | OXYGEN SATURATION: 97 % | BODY MASS INDEX: 42.61 KG/M2

## 2020-01-27 DIAGNOSIS — R74.8 ELEVATED LIVER ENZYMES: ICD-10-CM

## 2020-01-27 DIAGNOSIS — R10.11 RUQ PAIN: ICD-10-CM

## 2020-01-27 DIAGNOSIS — R09.82 POST-NASAL DRIP: ICD-10-CM

## 2020-01-27 DIAGNOSIS — L29.9 ITCHING OF EAR: ICD-10-CM

## 2020-01-27 DIAGNOSIS — Z90.89 S/P T&A (STATUS POST TONSILLECTOMY AND ADENOIDECTOMY): ICD-10-CM

## 2020-01-27 DIAGNOSIS — H90.3 BILATERAL SENSORINEURAL HEARING LOSS: Primary | ICD-10-CM

## 2020-01-27 DIAGNOSIS — M48.062 SPINAL STENOSIS OF LUMBAR REGION WITH NEUROGENIC CLAUDICATION: Primary | ICD-10-CM

## 2020-01-27 DIAGNOSIS — I10 ESSENTIAL HYPERTENSION: ICD-10-CM

## 2020-01-27 DIAGNOSIS — E11.65 TYPE 2 DIABETES MELLITUS WITH HYPERGLYCEMIA, WITHOUT LONG-TERM CURRENT USE OF INSULIN: ICD-10-CM

## 2020-01-27 DIAGNOSIS — Z98.890 HISTORY OF MYRINGOTOMY: ICD-10-CM

## 2020-01-27 DIAGNOSIS — Z91.09 HISTORY OF ENVIRONMENTAL ALLERGIES: Primary | ICD-10-CM

## 2020-01-27 DIAGNOSIS — M79.7 FIBROMYALGIA: ICD-10-CM

## 2020-01-27 DIAGNOSIS — Z86.69 HISTORY OF EAR INFECTIONS: ICD-10-CM

## 2020-01-27 DIAGNOSIS — H90.3 SENSORINEURAL HEARING LOSS, BILATERAL: ICD-10-CM

## 2020-01-27 DIAGNOSIS — N28.1 RENAL CYST: ICD-10-CM

## 2020-01-27 DIAGNOSIS — H69.92 NEGATIVE MIDDLE EAR PRESSURE OF LEFT EAR: ICD-10-CM

## 2020-01-27 DIAGNOSIS — Z86.69 HISTORY OF HEARING LOSS: ICD-10-CM

## 2020-01-27 PROCEDURE — 99999 PR PBB SHADOW E&M-EST. PATIENT-LVL III: CPT | Mod: PBBFAC,,, | Performed by: FAMILY MEDICINE

## 2020-01-27 PROCEDURE — 99214 PR OFFICE/OUTPT VISIT, EST, LEVL IV, 30-39 MIN: ICD-10-PCS | Mod: S$GLB,,, | Performed by: FAMILY MEDICINE

## 2020-01-27 PROCEDURE — 3079F DIAST BP 80-89 MM HG: CPT | Mod: CPTII,S$GLB,, | Performed by: FAMILY MEDICINE

## 2020-01-27 PROCEDURE — 92567 PR TYMPA2METRY: ICD-10-PCS | Mod: S$GLB,,, | Performed by: AUDIOLOGIST

## 2020-01-27 PROCEDURE — 99999 PR PBB SHADOW E&M-EST. PATIENT-LVL I: ICD-10-PCS | Mod: PBBFAC,,, | Performed by: AUDIOLOGIST

## 2020-01-27 PROCEDURE — 3078F DIAST BP <80 MM HG: CPT | Mod: CPTII,S$GLB,, | Performed by: OTOLARYNGOLOGY

## 2020-01-27 PROCEDURE — 99999 PR PBB SHADOW E&M-EST. PATIENT-LVL V: ICD-10-PCS | Mod: PBBFAC,,, | Performed by: OTOLARYNGOLOGY

## 2020-01-27 PROCEDURE — 3074F PR MOST RECENT SYSTOLIC BLOOD PRESSURE < 130 MM HG: ICD-10-PCS | Mod: CPTII,S$GLB,, | Performed by: OTOLARYNGOLOGY

## 2020-01-27 PROCEDURE — 99999 PR PBB SHADOW E&M-EST. PATIENT-LVL I: CPT | Mod: PBBFAC,,, | Performed by: AUDIOLOGIST

## 2020-01-27 PROCEDURE — 99999 PR PBB SHADOW E&M-EST. PATIENT-LVL V: CPT | Mod: PBBFAC,,, | Performed by: OTOLARYNGOLOGY

## 2020-01-27 PROCEDURE — 3075F PR MOST RECENT SYSTOLIC BLOOD PRESS GE 130-139MM HG: ICD-10-PCS | Mod: CPTII,S$GLB,, | Performed by: FAMILY MEDICINE

## 2020-01-27 PROCEDURE — 92557 COMPREHENSIVE HEARING TEST: CPT | Mod: S$GLB,,, | Performed by: AUDIOLOGIST

## 2020-01-27 PROCEDURE — 3008F PR BODY MASS INDEX (BMI) DOCUMENTED: ICD-10-PCS | Mod: CPTII,S$GLB,, | Performed by: FAMILY MEDICINE

## 2020-01-27 PROCEDURE — 92557 PR COMPREHENSIVE HEARING TEST: ICD-10-PCS | Mod: S$GLB,,, | Performed by: AUDIOLOGIST

## 2020-01-27 PROCEDURE — 3074F SYST BP LT 130 MM HG: CPT | Mod: CPTII,S$GLB,, | Performed by: OTOLARYNGOLOGY

## 2020-01-27 PROCEDURE — 3075F SYST BP GE 130 - 139MM HG: CPT | Mod: CPTII,S$GLB,, | Performed by: FAMILY MEDICINE

## 2020-01-27 PROCEDURE — 3079F PR MOST RECENT DIASTOLIC BLOOD PRESSURE 80-89 MM HG: ICD-10-PCS | Mod: CPTII,S$GLB,, | Performed by: FAMILY MEDICINE

## 2020-01-27 PROCEDURE — 99203 OFFICE O/P NEW LOW 30 MIN: CPT | Mod: S$GLB,,, | Performed by: OTOLARYNGOLOGY

## 2020-01-27 PROCEDURE — 3078F PR MOST RECENT DIASTOLIC BLOOD PRESSURE < 80 MM HG: ICD-10-PCS | Mod: CPTII,S$GLB,, | Performed by: OTOLARYNGOLOGY

## 2020-01-27 PROCEDURE — 99203 PR OFFICE/OUTPT VISIT, NEW, LEVL III, 30-44 MIN: ICD-10-PCS | Mod: S$GLB,,, | Performed by: OTOLARYNGOLOGY

## 2020-01-27 PROCEDURE — 99999 PR PBB SHADOW E&M-EST. PATIENT-LVL III: ICD-10-PCS | Mod: PBBFAC,,, | Performed by: FAMILY MEDICINE

## 2020-01-27 PROCEDURE — 3008F BODY MASS INDEX DOCD: CPT | Mod: CPTII,S$GLB,, | Performed by: FAMILY MEDICINE

## 2020-01-27 PROCEDURE — 99214 OFFICE O/P EST MOD 30 MIN: CPT | Mod: S$GLB,,, | Performed by: FAMILY MEDICINE

## 2020-01-27 PROCEDURE — 92567 TYMPANOMETRY: CPT | Mod: S$GLB,,, | Performed by: AUDIOLOGIST

## 2020-01-27 NOTE — PATIENT INSTRUCTIONS
Audiometry reviewed: mild mid-frequency SNHL + negative AS pressure   Gentle middle ear insufflation techniques encouraged; E.T literature provided  Trial of Astelin nasal spray ( written Rx) ; one spray @ lateral nostril BID for allergic rhinitis sx     ( may use with Flonase, one spray @ nostril BID)   Discontinue Atrovent nasal spray for now  Consider allergy consultation pending course; [phone]  Written Rx for fluocinolone 0.01% otic oil drops 20 ml; 4 drops to affected ear canals BID x 1-2 weeks  Nasopharyngoscopy on return prn

## 2020-01-27 NOTE — PROGRESS NOTES
Health Maintenance Due   Topic     Foot Exam  Done 4/2019     Zoster: hx chickenpox ; inform pt can get vaccine at pharmacy.

## 2020-01-27 NOTE — PROGRESS NOTES
Regine Osorio was seen today in the clinic for an audiologic evaluation.  Patients main complaint was itching of the left ear.  Ms. Osorio reported that she has always had trouble with her left ear and reported having several ear infections in that ear as a child. Mrs. Osorio denied any true hearing difficulties and denied tinnitus.    Tympanometry revealed Type A in the right ear and Type A in the left ear.  Audiogram results revealed normal hearing (250-500 Hz) to a mild sensorineural hearing loss (0996-1875 Hz) rising to normal limits (2188-5281 Hz) in the right ear and normal hearing (250-500 Hz) to a mild sensorineural hearing loss (8321-5729 Hz) to normal hearing (2638-2889 Hz) in the left ear.  Speech reception thresholds were noted at 30 dB in the right ear and 30 dB in the left ear.  Speech discrimination scores were 100% in the right ear and 96% in the left ear.    Recommendations:  1. Otologic evaluation  2. Annual audiogram  3. Noise protection when in noise

## 2020-01-27 NOTE — PROGRESS NOTES
No chief complaint on file.      Regine Osorio is a 65 y.o. female who presents per the Chief Complaint.  Pt is known to me and was last seen by me on 12/6/2019.  All known chronic medical issues have been documented.       Diabetes   She presents for her follow-up diabetic visit. She has type 2 diabetes mellitus. No MedicAlert identification noted. The initial diagnosis of diabetes was made 10 years ago. Her disease course has been stable. Hypoglycemia symptoms include headaches. Pertinent negatives for hypoglycemia include no confusion, dizziness, hunger, mood changes, nervousness/anxiousness, pallor, seizures, sleepiness, speech difficulty, sweats or tremors. Associated symptoms include fatigue. Pertinent negatives for diabetes include no blurred vision, no chest pain, no foot paresthesias, no foot ulcerations, no polydipsia, no polyphagia, no polyuria, no visual change, no weakness and no weight loss. There are no hypoglycemic complications. Pertinent negatives for hypoglycemia complications include no blackouts, no hospitalization, no nocturnal hypoglycemia, no required assistance and no required glucagon injection. Symptoms are stable. There are no diabetic complications. Pertinent negatives for diabetic complications include no autonomic neuropathy, CVA, heart disease, impotence, nephropathy, peripheral neuropathy, PVD or retinopathy. Risk factors for coronary artery disease include hypertension, obesity, stress and diabetes mellitus. Current diabetic treatment includes oral agent (dual therapy). She is compliant with treatment all of the time. Her weight is stable. She is following a generally healthy diet. When asked about meal planning, she reported none. She has not had a previous visit with a dietitian. She participates in exercise intermittently. She monitors blood glucose at home 1-2 x per day. Blood glucose monitoring compliance is excellent. Her home blood glucose trend is decreasing steadily. An  ACE inhibitor/angiotensin II receptor blocker is being taken. She sees a podiatrist.Eye exam is current.   Hypertension   This is a chronic problem. The current episode started more than 1 year ago. The problem is unchanged. The problem is controlled. Associated symptoms include headaches and neck pain. Pertinent negatives include no anxiety, blurred vision, chest pain, malaise/fatigue, orthopnea, palpitations, peripheral edema, PND, shortness of breath or sweats. Agents associated with hypertension include steroids. Risk factors for coronary artery disease include diabetes mellitus, obesity and post-menopausal state. Past treatments include angiotensin blockers and diuretics. The current treatment provides moderate improvement. There are no compliance problems.  There is no history of angina, kidney disease, CAD/MI, CVA, heart failure, left ventricular hypertrophy, PVD or retinopathy. There is no history of chronic renal disease, coarctation of the aorta, hyperaldosteronism, hypercortisolism, hyperparathyroidism, a hypertension causing med, pheochromocytoma, renovascular disease, sleep apnea or a thyroid problem.   Fibromyalgia   This is a chronic problem. The current episode started more than 1 year ago. The problem occurs daily. The problem has been waxing and waning. Associated symptoms include arthralgias, fatigue, headaches, myalgias and neck pain. Pertinent negatives include no abdominal pain, anorexia, change in bowel habit, chest pain, chills, congestion, coughing, diaphoresis, fever, joint swelling, nausea, numbness, rash, sore throat, swollen glands, urinary symptoms, vertigo, visual change, vomiting or weakness. The symptoms are aggravated by bending, exertion, standing and walking. She has tried acetaminophen, oral narcotics, NSAIDs, rest and position changes for the symptoms. The treatment provided moderate relief.        ROS  Review of Systems   Constitutional: Positive for fatigue. Negative for  "activity change, chills, diaphoresis, fever, malaise/fatigue, unexpected weight change and weight loss.   HENT: Positive for rhinorrhea. Negative for congestion, ear discharge, ear pain, hearing loss, sore throat and trouble swallowing.    Eyes: Negative for blurred vision, discharge and visual disturbance.   Respiratory: Negative for cough, chest tightness, shortness of breath and wheezing.    Cardiovascular: Negative for chest pain, palpitations, orthopnea and PND.   Gastrointestinal: Negative for abdominal pain, anorexia, blood in stool, change in bowel habit, constipation, diarrhea, nausea and vomiting.   Endocrine: Negative for polydipsia, polyphagia and polyuria.   Genitourinary: Negative for difficulty urinating, dysuria, hematuria, impotence and menstrual problem.   Musculoskeletal: Positive for arthralgias, back pain, myalgias and neck pain. Negative for joint swelling.   Skin: Negative for pallor and rash.   Neurological: Positive for headaches. Negative for dizziness, vertigo, tremors, seizures, speech difficulty, weakness and numbness.   Psychiatric/Behavioral: Negative for confusion and dysphoric mood. The patient is not nervous/anxious.        Physical Exam  Vitals:    01/27/20 1014   BP: 130/80   Pulse: 97   Resp: 20   Temp: 98.3 °F (36.8 °C)    Body mass index is 42.56 kg/m².  Weight: 116 kg (255 lb 11.7 oz)   Height: 5' 5" (165.1 cm)     Physical Exam   Constitutional: She is oriented to person, place, and time. She appears well-developed and well-nourished. She is active and cooperative.  Non-toxic appearance. She does not have a sickly appearance. She does not appear ill. No distress.   HENT:   Head: Normocephalic and atraumatic.   Right Ear: Hearing and external ear normal. No decreased hearing is noted.   Left Ear: Hearing and external ear normal. No decreased hearing is noted.   Nose: Nose normal. No rhinorrhea or nasal deformity.   Mouth/Throat: Uvula is midline and oropharynx is clear and " moist. She does not have dentures. Normal dentition.   Eyes: Pupils are equal, round, and reactive to light. Conjunctivae, EOM and lids are normal. Right eye exhibits no chemosis, no discharge and no exudate. No foreign body present in the right eye. Left eye exhibits no chemosis, no discharge and no exudate. No foreign body present in the left eye. No scleral icterus.   Neck: Normal range of motion and full passive range of motion without pain. Neck supple.   Cardiovascular: Normal rate, regular rhythm, S1 normal, S2 normal and normal heart sounds. Exam reveals no gallop and no friction rub.   No murmur heard.  Pulmonary/Chest: Effort normal and breath sounds normal. No accessory muscle usage. No respiratory distress. She has no decreased breath sounds. She has no wheezes. She has no rhonchi. She has no rales.   Abdominal: Soft. Normal appearance. She exhibits no distension. There is no hepatosplenomegaly. There is no tenderness. There is no rigidity, no rebound and no guarding.   Musculoskeletal:        Lumbar back: She exhibits decreased range of motion, tenderness and pain. She exhibits no bony tenderness, no swelling, no edema, no deformity, no laceration, no spasm and normal pulse.        Right upper leg: She exhibits tenderness. She exhibits no bony tenderness, no swelling, no edema, no deformity and no laceration.        Left upper leg: She exhibits tenderness. She exhibits no bony tenderness, no swelling, no edema, no deformity and no laceration.   Neurological: She is alert and oriented to person, place, and time. She has normal strength. No cranial nerve deficit or sensory deficit. She exhibits normal muscle tone. She displays no seizure activity. Coordination and gait normal.   Skin: Skin is warm, dry and intact. No rash noted. She is not diaphoretic.   Psychiatric: She has a normal mood and affect. Her speech is normal and behavior is normal. Judgment and thought content normal. Cognition and memory are  normal. She is attentive.       Assessment & Plan    Discussion of plan of care including treatment options regarding health and wellness were reviewed and discussed with patient.  Any changes to medication or treatment plan, as well as any screening blood test, imaging, or referrals to specialist, are documented.  Follow up as indicated.     1. Spinal stenosis of lumbar region with neurogenic claudication  Recent MRI shows continued multilevel disc disease; progressed since last MRI.  Managed by Pain Specialist; she will be referred by them to specialist to discuss further evaluation or treatment.  Recommended patient be evaluated by Neurosurgery.        2. Type 2 diabetes mellitus with hyperglycemia, without long-term current use of insulin  Patient is encouraged to follow a diet low in carbohydrates and simple sugars.  Discussed simple vs. complex carbohydrates as well as eating times of certain meals. Advised to focus on good food choices and increased physical activity and encouraged to adhere to medication regimen and/or lifestyle adjustments, and to check glucose level as recommended.  Contact office if glucose levels are not improving over time.  Screening blood test is due in 2 months.     - Comprehensive metabolic panel; Future  - Microalbumin/creatinine urine ratio    3. Essential hypertension  Patient was counseled and encouraged to maintain a low sodium diet, as well as increasing physical activity.  Recommend random BP checks at home on a regular basis.  Repeat BP at end of visit was not necessary. Will continue medication at this time, and follow up in 3-6 months, or sooner if blood pressure begins to increase.       4. Elevated liver enzymes  May be associated with RUQ pain.  Will order imaging to further evaluate and manage accordingly.   - CT Abdomen Without Contrast; Future  - Comprehensive metabolic panel; Future    5. RUQ pain  Will order imaging to further evaluate and manage accordingly.   -  CT Abdomen Without Contrast; Future  - Comprehensive metabolic panel; Future    6. Fibromyalgia  Stable; no therapeutic changes at this time.      7. Renal cyst  Stable; no therapeutic changes at this time.   - CT Abdomen Without Contrast; Future       Follow up in about 2 months (around 3/27/2020), or if symptoms worsen or fail to improve.        ACTIVE MEDICAL ISSUES:  Documented in Problem List    PAST MEDICAL HISTORY  Documented    PAST SURGICAL HISTORY:  Documented    SOCIAL HISTORY:  Documented    FAMILY HISTORY:  Documented    ALLERGIES AND MEDICATIONS: updated and reviewed.  Documented    Health Maintenance       Date Due Completion Date    Foot Exam 01/12/1967 ---    HIV Screening 01/12/1972 ---    Shingles Vaccine (1 of 2) 01/12/2007 ---    Hemoglobin A1c 03/02/2020 12/2/2019    Eye Exam 08/23/2020 8/23/2019    Lipid Panel 12/02/2020 12/2/2019    Mammogram 02/21/2021 2/21/2019    Colonoscopy 07/09/2023 7/9/2018    TETANUS VACCINE 11/26/2028 11/26/2018

## 2020-01-27 NOTE — LETTER
January 28, 2020      Mary Cervantes, REZA  [address] [facility] [place] [address]           [facility] - Otorhinolaryngology  [address]  [place] [address]  Phone: [phone]  Fax: [phone]          Patient: Regine Osorio   MR Number: [id number]   YOB: 1955   Date of Visit: 1/27/2020       Dear Mary Cervantes:    Thank you for referring Regine Osorio to me for evaluation. Attached you will find relevant portions of my assessment and plan of care.    If you have questions, please do not hesitate to call me. I look forward to following Regine Osorio along with you.    Sincerely,    Austin Edwards III, MD    Enclosure  CC:  No Recipients    If you would like to receive this communication electronically, please contact [email or web address] or [phone] to request more information on [employer] Link access.    For providers and/or their staff who would like to refer a patient to [facility], please contact us through our one-stop-shop provider referral line, [facility], at [phone].    If you feel you have received this communication in error or would no longer like to receive these types of communications, please e-mail [email or web address]

## 2020-01-28 ENCOUNTER — TELEPHONE (OUTPATIENT)
Dept: FAMILY MEDICINE | Facility: CLINIC | Age: 65
End: 2020-01-28

## 2020-01-28 NOTE — PROGRESS NOTES
Subjective:       Patient ID: Regine Osorio is a 65 y.o. female.    Chief Complaint: Cerumen Impaction and Otalgia    HPI: Ms. Cooper is a 65-year-old  female with a history of type 2 diabetes, migrsaines, essential hypertension and breast cancer who resided in [place] for many years.  She now lives here.  Her chief complaint is itching of her left ear canal. She also indicates an itching sensation deep in her left ear perhaps related to her throat.  She indicates being born with ear infections requiring lanced sings and PE intubation procedures as a child.  Procedures were painful and she later avoided otologic care for years.  She indicates postnasal drip symptoms presently.  She coughs up some mucus at times now.    She indicates a sore throat sensation in the roof of her mouth.    She was recently evaluated for left-sided facial swelling at the [facility] urgent care center 12/19/2019, for pain left ear x7 days the severity measuring 3/10.  She admitted to eustachian tube dysfunction and utilization of Zyrtec and Atrovent nasal spray.  She was from a with Flonase spray as well.  She was diagnosed with left ear pain, left eustachian tube dysfunction and non recurrent acute suppurative otitis media of the left ear.    She was treated with a 10 day course of Augmentin.  She was given an ambulatory referral to the ENT service.    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    She remembers previous allergy testing and positive responses to we trees and grass pollens.  She has never received immunotherapy.      Past Medical History:   Diagnosis Date    Breast cancer 2017    Cataract     Diabetes mellitus, type 2     Type 2    Fibromyalgia     Glaucoma     Hypertension      Past Surgical History:   Procedure Laterality Date    BREAST BIOPSY      BREAST SURGERY      breast ca lt side     HYSTERECTOMY      supacervical hysterectomy     LAPAROSCOPIC SUPRACERVICAL HYSTERECTOMY  2005    fibroids    MASTECTOMY Left 2017    chemo    MYOMECTOMY      TONSILLECTOMY      VAGINAL DELIVERY       Current Outpatient Medications on File Prior to Visit   Medication Sig Dispense Refill    aspirin 81 MG Chew Take 81 mg by mouth once daily.       blood sugar diagnostic Strp To check BG 2 times daily, to use with insurance preferred meter 200 strip 3    cholecalciferol, vitamin D3, (VITAMIN D3) 2,000 unit Cap Take 1 capsule by mouth once daily.       clobetasol (CLOBEX) 0.05 % shampoo Apply topically once a week. 118 mL 5    cyanocobalamin 1,000 mcg/mL injection Inject 1 mL (1,000 mcg total) into the muscle every 28 days. 1 mL 3    cyanocobalamin/cobamamide (B12 SL) Place 3,000 mcg under the tongue once daily.      gabapentin (NEURONTIN) 600 MG tablet TAKE 1 TABLET BY MOUTH THREE TIMES DAILY 90 tablet 0    glipiZIDE (GLUCOTROL) 5 MG TR24 Take 1 tablet (5 mg total) by mouth 2 (two) times daily. 60 tablet 5    hydroCHLOROthiazide (HYDRODIURIL) 25 MG tablet Take 1 tablet (25 mg total) by mouth once daily. 30 tablet 5    ipratropium (ATROVENT) 0.03 % nasal spray 2 sprays by Nasal route 2 (two) times daily. 30 mL 5    lancing device with lancets Kit 1 each by Misc.(Non-Drug; Combo Route) route 3 (three) times daily. 1 each 0    losartan (COZAAR) 50 MG tablet TAKE 1 TABLET BY MOUTH EVERY DAY 90 tablet 0    metFORMIN (GLUCOPHAGE) 1000 MG tablet Take 1 tablet (1,000 mg total) by mouth 2 (two) times daily with meals. 60 tablet 2    naproxen sodium (ANAPROX) 550 MG tablet Take 1 tablet (550 mg total) by mouth 2 (two) times daily as needed. 40 tablet 5    ondansetron (ZOFRAN-ODT) 8 MG TbDL Take 1 tablet (8 mg total) by mouth every 8 (eight) hours as needed. 30 tablet 2    ONETOUCH DELICA PLUS LANCET 33 gauge Misc USE TO TEST BID  3    pantoprazole (PROTONIX) 20 MG tablet Take 1 tablet (20 mg total) by mouth once daily. 30 tablet 2    sumatriptan (IMITREX)  100 MG tablet Take 1 tab at onset of migraine, may repeat dose in 2 hours if needed. Max 2 tabs/day. Max 3 days/week. 9 tablet 11    tiZANidine (ZANAFLEX) 4 MG tablet Take 4 mg by mouth.       No current facility-administered medications on file prior to visit.            Review of Systems     Other:  Negative for rash.         The patient completed an audiometric study performed by the [facility] audiology service today.  The study is duplicated below and the results are reviewed with her in detail  Objective:       Blood pressure 128/73 pulse 95 height 5 ft 5 in weight 255 lb  General:  Alert and oriented lady in no acute distress  Both ears were examined under the microscope in the micro procedure room  Physical Exam   Constitutional: She is oriented to person, place, and time. She appears well-developed and well-nourished.   HENT:   Head: Normocephalic.   Right Ear: Tympanic membrane and external ear normal. No drainage. No foreign bodies. No mastoid tenderness. Tympanic membrane is not perforated. No decreased hearing is noted.   Left Ear: Tympanic membrane and external ear normal. No drainage. No foreign bodies. No mastoid tenderness. Tympanic membrane is not perforated. No decreased hearing is noted.   Ears:    Nose: Nose normal. No nasal deformity, septal deviation or nasal septal hematoma. No epistaxis. Right sinus exhibits no maxillary sinus tenderness and no frontal sinus tenderness. Left sinus exhibits no maxillary sinus tenderness and no frontal sinus tenderness.   Mouth/Throat: Uvula is midline, oropharynx is clear and moist and mucous membranes are normal. No oral lesions. No trismus in the jaw. No uvula swelling. No oropharyngeal exudate or tonsillar abscesses.       Neck: Neck supple. No tracheal deviation present. No thyromegaly present.   Pulmonary/Chest: Effort normal. No stridor.   Lymphadenopathy:     She has no cervical adenopathy.   Neurological: She is alert and oriented to person, place, and  time.   Skin: No rash noted.       Assessment:       1. History of environmental allergies : grasses, trees, weeds   2. Itching of ear    3. History of ear infections    4. Post-nasal drip    5. Negative middle ear pressure of left ear    6. Sensorineural hearing loss, bilateral    7. S/P T&A (hx of  tonsillectomy and adenoidectomy)    8. History of myringotomies/PE tubes    9. History of hearing loss since childhood        Plan:     Audiometry reviewed: mild mid-frequency SNHL + negative AS pressure   Gentle middle ear insufflation techniques encouraged; E.T literature provided  Trial of Astelin nasal spray ( written Rx) ; one spray @ lateral nostril BID for allergic rhinitis sx     ( may use with Flonase, one spray @ nostril BID)   Discontinue Atrovent nasal spray for now  Consider allergy consultation pending course; [phone]  Written Rx for fluocinolone 0.01% otic oil drops 20 ml; 4 drops to affected ear canals BID x 1-2 weeks  Nasopharyngoscopy on return prn

## 2020-01-28 NOTE — TELEPHONE ENCOUNTER
Patient was seen yesterday. Order placed for micro for urine. Pt stated was noted to get at time of next labs. Placed note for next lab to collect urine.

## 2020-02-06 ENCOUNTER — TELEPHONE (OUTPATIENT)
Dept: SURGERY | Facility: CLINIC | Age: 65
End: 2020-02-06

## 2020-02-06 ENCOUNTER — OFFICE VISIT (OUTPATIENT)
Dept: NEUROLOGY | Facility: CLINIC | Age: 65
End: 2020-02-06
Payer: COMMERCIAL

## 2020-02-06 ENCOUNTER — HOSPITAL ENCOUNTER (OUTPATIENT)
Dept: RADIOLOGY | Facility: HOSPITAL | Age: 65
Discharge: HOME | End: 2020-02-06
Attending: FAMILY MEDICINE
Payer: COMMERCIAL

## 2020-02-06 VITALS
HEART RATE: 99 BPM | BODY MASS INDEX: 42.56 KG/M2 | DIASTOLIC BLOOD PRESSURE: 80 MMHG | HEIGHT: 65 IN | SYSTOLIC BLOOD PRESSURE: 135 MMHG

## 2020-02-06 DIAGNOSIS — R10.11 RUQ PAIN: ICD-10-CM

## 2020-02-06 DIAGNOSIS — E66.01 MORBID OBESITY: ICD-10-CM

## 2020-02-06 DIAGNOSIS — E11.65 TYPE 2 DIABETES MELLITUS WITH HYPERGLYCEMIA, WITHOUT LONG-TERM CURRENT USE OF INSULIN: ICD-10-CM

## 2020-02-06 DIAGNOSIS — M79.7 FIBROMYALGIA: ICD-10-CM

## 2020-02-06 DIAGNOSIS — R74.8 ELEVATED LIVER ENZYMES: ICD-10-CM

## 2020-02-06 DIAGNOSIS — G43.009 MIGRAINE WITHOUT AURA AND WITHOUT STATUS MIGRAINOSUS, NOT INTRACTABLE: Primary | ICD-10-CM

## 2020-02-06 DIAGNOSIS — I10 ESSENTIAL HYPERTENSION: ICD-10-CM

## 2020-02-06 DIAGNOSIS — N28.1 RENAL CYST: ICD-10-CM

## 2020-02-06 PROCEDURE — 74150 CT ABDOMEN WITHOUT CONTRAST: ICD-10-PCS | Mod: 26,,, | Performed by: RADIOLOGY

## 2020-02-06 PROCEDURE — 3008F BODY MASS INDEX DOCD: CPT | Mod: CPTII,S$GLB,, | Performed by: NURSE PRACTITIONER

## 2020-02-06 PROCEDURE — 3075F PR MOST RECENT SYSTOLIC BLOOD PRESS GE 130-139MM HG: ICD-10-PCS | Mod: CPTII,S$GLB,, | Performed by: NURSE PRACTITIONER

## 2020-02-06 PROCEDURE — 99999 PR PBB SHADOW E&M-EST. PATIENT-LVL III: CPT | Mod: PBBFAC,,, | Performed by: NURSE PRACTITIONER

## 2020-02-06 PROCEDURE — 3008F PR BODY MASS INDEX (BMI) DOCUMENTED: ICD-10-PCS | Mod: CPTII,S$GLB,, | Performed by: NURSE PRACTITIONER

## 2020-02-06 PROCEDURE — 3052F HG A1C>EQUAL 8.0%<EQUAL 9.0%: CPT | Mod: CPTII,S$GLB,, | Performed by: NURSE PRACTITIONER

## 2020-02-06 PROCEDURE — 3079F DIAST BP 80-89 MM HG: CPT | Mod: CPTII,S$GLB,, | Performed by: NURSE PRACTITIONER

## 2020-02-06 PROCEDURE — 3052F PR MOST RECENT HEMOGLOBIN A1C LEVEL 8.0 - < 9.0%: ICD-10-PCS | Mod: CPTII,S$GLB,, | Performed by: NURSE PRACTITIONER

## 2020-02-06 PROCEDURE — 3075F SYST BP GE 130 - 139MM HG: CPT | Mod: CPTII,S$GLB,, | Performed by: NURSE PRACTITIONER

## 2020-02-06 PROCEDURE — 3079F PR MOST RECENT DIASTOLIC BLOOD PRESSURE 80-89 MM HG: ICD-10-PCS | Mod: CPTII,S$GLB,, | Performed by: NURSE PRACTITIONER

## 2020-02-06 PROCEDURE — 74150 CT ABDOMEN W/O CONTRAST: CPT | Mod: TC

## 2020-02-06 PROCEDURE — 74150 CT ABDOMEN W/O CONTRAST: CPT | Mod: 26,,, | Performed by: RADIOLOGY

## 2020-02-06 PROCEDURE — 99214 PR OFFICE/OUTPT VISIT, EST, LEVL IV, 30-39 MIN: ICD-10-PCS | Mod: S$GLB,,, | Performed by: NURSE PRACTITIONER

## 2020-02-06 PROCEDURE — 99214 OFFICE O/P EST MOD 30 MIN: CPT | Mod: S$GLB,,, | Performed by: NURSE PRACTITIONER

## 2020-02-06 PROCEDURE — 99999 PR PBB SHADOW E&M-EST. PATIENT-LVL III: ICD-10-PCS | Mod: PBBFAC,,, | Performed by: NURSE PRACTITIONER
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
## 2020-02-06 NOTE — TELEPHONE ENCOUNTER
Pt called for a f/u appt.  Per Dr. Rowe's note from 5/23/2019, she needs 'a repeat US in one year to ensure stability'.  Order is in Epic.  Thyroid US scheduled for Monday 2/10/2020 at 2:45pm at the [facility] on [facility].  She verbalized understanding and is aware of appt date, time, and location.

## 2020-02-06 NOTE — TELEPHONE ENCOUNTER
----- Message from Ronaldo Morgan sent at 2/6/2020 10:16 AM CST -----  Pt would like a call back regarding scheduling follow up appointment       Pt can be reached at [phone]

## 2020-02-07 ENCOUNTER — OFFICE VISIT (OUTPATIENT)
Dept: OBSTETRICS AND GYNECOLOGY | Facility: CLINIC | Age: 65
End: 2020-02-07
Attending: OBSTETRICS & GYNECOLOGY
Payer: MEDICAID

## 2020-02-07 ENCOUNTER — TELEPHONE (OUTPATIENT)
Dept: OBSTETRICS AND GYNECOLOGY | Facility: CLINIC | Age: 65
End: 2020-02-07

## 2020-02-07 VITALS
DIASTOLIC BLOOD PRESSURE: 64 MMHG | RESPIRATION RATE: 18 BRPM | BODY MASS INDEX: 42.61 KG/M2 | WEIGHT: 255.75 LBS | SYSTOLIC BLOOD PRESSURE: 132 MMHG | HEIGHT: 65 IN

## 2020-02-07 DIAGNOSIS — M48.062 SPINAL STENOSIS OF LUMBAR REGION WITH NEUROGENIC CLAUDICATION: ICD-10-CM

## 2020-02-07 DIAGNOSIS — I10 ESSENTIAL HYPERTENSION: ICD-10-CM

## 2020-02-07 DIAGNOSIS — C50.919 MALIGNANT NEOPLASM OF FEMALE BREAST, UNSPECIFIED ESTROGEN RECEPTOR STATUS, UNSPECIFIED LATERALITY, UNSPECIFIED SITE OF BREAST: Primary | ICD-10-CM

## 2020-02-07 DIAGNOSIS — E11.65 TYPE 2 DIABETES MELLITUS WITH HYPERGLYCEMIA, WITHOUT LONG-TERM CURRENT USE OF INSULIN: ICD-10-CM

## 2020-02-07 PROCEDURE — 99213 OFFICE O/P EST LOW 20 MIN: CPT | Mod: S$GLB,,, | Performed by: OBSTETRICS & GYNECOLOGY

## 2020-02-07 PROCEDURE — 99999 PR PBB SHADOW E&M-EST. PATIENT-LVL III: ICD-10-PCS | Mod: PBBFAC,,, | Performed by: OBSTETRICS & GYNECOLOGY

## 2020-02-07 PROCEDURE — 99999 PR PBB SHADOW E&M-EST. PATIENT-LVL III: CPT | Mod: PBBFAC,,, | Performed by: OBSTETRICS & GYNECOLOGY

## 2020-02-07 PROCEDURE — 99213 PR OFFICE/OUTPT VISIT, EST, LEVL III, 20-29 MIN: ICD-10-PCS | Mod: S$GLB,,, | Performed by: OBSTETRICS & GYNECOLOGY

## 2020-02-07 PROCEDURE — 99213 OFFICE O/P EST LOW 20 MIN: CPT | Mod: PBBFAC | Performed by: OBSTETRICS & GYNECOLOGY

## 2020-02-07 RX ORDER — GABAPENTIN 600 MG/1
TABLET ORAL
Qty: 90 TABLET | Refills: 0 | Status: SHIPPED | OUTPATIENT
Start: 2020-02-07 | End: 2020-05-04

## 2020-02-07 RX ORDER — LOSARTAN POTASSIUM 50 MG/1
TABLET ORAL
Qty: 90 TABLET | Refills: 0 | Status: SHIPPED | OUTPATIENT
Start: 2020-02-07 | End: 2020-05-04

## 2020-02-07 NOTE — TELEPHONE ENCOUNTER
RN phoned patient to discuss her appt with PJ Kim next Tuesday 02/11/2020. RN had accidentally mentioned 10am and scheduled incorrectly. RN attempted to reconfirm appt for patient next Tuesday, 02/11/2020 at 11:20am. RN lvm to this effect, requesting opportunity for patient to confirm. MATTHEW Clements.

## 2020-02-07 NOTE — PROGRESS NOTES
"SUBJECTIVE:   65 y.o. female  presents today for follow up and to discuss "chemo brain and memory loss".   No LMP recorded. Patient has had a hysterectomy..  She reports that she is taking B12 daily.  She did 2 B12 injections and did not notice much improvement. She is interested in weight loss. She will be starting water aerobics  She is concerned because she loses her insurance at the end of the month. She has not an income since 2018.   She reports that her memory is better- not as bad as when she was going through chemo but it still bothers her.       Past Medical History:   Diagnosis Date    Breast cancer 2017    Cataract     Diabetes mellitus, type 2     Type 2    Fibromyalgia     Glaucoma     Hypertension     Renal cyst, right 2020    Steatosis of liver 2020     Past Surgical History:   Procedure Laterality Date    BREAST BIOPSY      BREAST SURGERY      breast ca lt side     HYSTERECTOMY      supacervical hysterectomy    LAPAROSCOPIC SUPRACERVICAL HYSTERECTOMY  2005    fibroids    MASTECTOMY Left 2017    chemo    MYOMECTOMY      TONSILLECTOMY      VAGINAL DELIVERY       Social History     Socioeconomic History    Marital status:      Spouse name: Not on file    Number of children: 1    Years of education: Not on file    Highest education level: Not on file   Occupational History    Not on file   Social Needs    Financial resource strain: Somewhat hard    Food insecurity:     Worry: Not on file     Inability: Never true    Transportation needs:     Medical: No     Non-medical: No   Tobacco Use    Smoking status: Never Smoker    Smokeless tobacco: Never Used   Substance and Sexual Activity    Alcohol use: No     Frequency: Never    Drug use: No    Sexual activity: Not Currently   Lifestyle    Physical activity:     Days per week: 1 day     Minutes per session: 20 min    Stress: To some extent   Relationships    Social connections:     Talks on " phone: More than three times a week     Gets together: Once a week     Attends Yarsanism service: Not on file     Active member of club or organization: Yes     Attends meetings of clubs or organizations: More than 4 times per year     Relationship status:    Other Topics Concern    Patient feels they ought to cut down on drinking/drug use Not Asked    Patient annoyed by others criticizing their drinking/drug use Not Asked    Patient has felt bad or guilty about drinking/drug use Not Asked    Patient has had a drink/used drugs as an eye opener in the AM Not Asked   Social History Narrative        1 son (estranged)    3 grandchildren ([place])    Born and raised in [place] (moved to [facility] )     Family History   Problem Relation Age of Onset    Dementia Mother     Glaucoma Mother     Bipolar disorder Mother     Lung cancer Father     Bipolar disorder Son     Post-traumatic stress disorder Son     Breast cancer Maternal Aunt     Bipolar disorder Maternal Aunt     Glaucoma Maternal Uncle     Blindness Maternal Grandfather     Glaucoma Maternal Grandfather     Breast cancer Cousin 39        paternal     Amblyopia Neg Hx     Cataracts Neg Hx     Macular degeneration Neg Hx     Retinal detachment Neg Hx     Strabismus Neg Hx     Cancer Neg Hx     Colon cancer Neg Hx     Ovarian cancer Neg Hx      OB History    Para Term  AB Living   4 1 1   3 1   SAB TAB Ectopic Multiple Live Births                  # Outcome Date GA Lbr Frankie/2nd Weight Sex Delivery Anes PTL Lv   4 AB            3 AB            2 AB            1 Term                    Current Outpatient Medications   Medication Sig Dispense Refill    aspirin 81 MG Chew Take 81 mg by mouth once daily.       blood sugar diagnostic Strp To check BG 2 times daily, to use with insurance preferred meter 200 strip 3    cholecalciferol, vitamin D3, (VITAMIN D3) 2,000 unit Cap Take 1 capsule by mouth once daily.        clobetasol (CLOBEX) 0.05 % shampoo Apply topically once a week. 118 mL 5    cyanocobalamin/cobamamide (B12 SL) Place 3,000 mcg under the tongue once daily.      gabapentin (NEURONTIN) 600 MG tablet TAKE 1 TABLET BY MOUTH THREE TIMES DAILY 90 tablet 0    glipiZIDE (GLUCOTROL) 5 MG TR24 Take 1 tablet (5 mg total) by mouth 2 (two) times daily. 60 tablet 5    hydroCHLOROthiazide (HYDRODIURIL) 25 MG tablet Take 1 tablet (25 mg total) by mouth once daily. 30 tablet 5    ipratropium (ATROVENT) 0.03 % nasal spray 2 sprays by Nasal route 2 (two) times daily. 30 mL 5    lancing device with lancets Kit 1 each by Misc.(Non-Drug; Combo Route) route 3 (three) times daily. 1 each 0    losartan (COZAAR) 50 MG tablet TAKE 1 TABLET BY MOUTH EVERY DAY 90 tablet 0    metFORMIN (GLUCOPHAGE) 1000 MG tablet Take 1 tablet (1,000 mg total) by mouth 2 (two) times daily with meals. 60 tablet 2    naproxen sodium (ANAPROX) 550 MG tablet Take 1 tablet (550 mg total) by mouth 2 (two) times daily as needed. 40 tablet 5    ondansetron (ZOFRAN-ODT) 8 MG TbDL Take 1 tablet (8 mg total) by mouth every 8 (eight) hours as needed. 30 tablet 2    ONETOUCH DELICA PLUS LANCET 33 gauge Misc USE TO TEST BID  3    sumatriptan (IMITREX) 100 MG tablet Take 1 tab at onset of migraine, may repeat dose in 2 hours if needed. Max 2 tabs/day. Max 3 days/week. 9 tablet 11    pantoprazole (PROTONIX) 20 MG tablet Take 1 tablet (20 mg total) by mouth once daily. 30 tablet 2     No current facility-administered medications for this visit.      Allergies: Codeine; Lisinopril; and Nortriptyline     The 10-year ASCVD risk score (Abram ZHAO Jr., et al., 2013) is: 17.2%    Values used to calculate the score:      Age: 65 years      Sex: Female      Is Non- : Yes      Diabetic: Yes      Tobacco smoker: No      Systolic Blood Pressure: 132 mmHg      Is BP treated: Yes      HDL Cholesterol: 42 mg/dL      Total Cholesterol: 151  mg/dL      ROS:  Constitutional: no weight loss, weight gain, fever,+ fatigue  Eyes:  No vision changes, glasses/contacts  ENT/Mouth: No ulcers, sinus problems, ears ringing, headache  Cardiovascular: No inability to lie flat, chest pain, exercise intolerance, swelling, heart palpitations  Respiratory: No wheezing, coughing blood, shortness of breath, or cough  Gastrointestinal: No diarrhea, bloody stool, nausea/vomiting, constipation, gas, hemorrhoids  Genitourinary: No blood in urine, painful urination, urgency of urination, frequency of urination, incomplete emptying, incontinence, abnormal bleeding, painful periods, heavy periods, vaginal discharge, vaginal odor, painful intercourse, sexual problems, bleeding after intercourse.  Musculoskeletal: No muscle weakness  Skin/Breast:+breast cancer  Neurological: No passing out, seizures, numbness, headache  Endocrine: +diabetes, hypothyroid, hyperthyroid, hot flashes, hair loss, abnormal hair growth, acne  Psychiatric: No depression, crying, +memory issues  Hematologic: No bruises, bleeding, swollen lymph nodes, anemia.      Physical Exam  deferred    ASSESSMENT:   Breast cancer  Fatigue  diabetes    PLAN:   Face to face time 25 minutes - majority spent in counseling and arranging follow up  Referred patient to Brenda Sagastume to discuss meal planning and weight loss  Counseled her on importance of diabetes control

## 2020-02-09 ENCOUNTER — PATIENT OUTREACH (OUTPATIENT)
Dept: ADMINISTRATIVE | Facility: OTHER | Age: 65
End: 2020-02-09

## 2020-02-10 ENCOUNTER — HOSPITAL ENCOUNTER (OUTPATIENT)
Dept: RADIOLOGY | Facility: HOSPITAL | Age: 65
Discharge: HOME | End: 2020-02-10
Attending: SURGERY
Payer: COMMERCIAL

## 2020-02-10 DIAGNOSIS — E04.1 THYROID NODULE: ICD-10-CM

## 2020-02-10 PROCEDURE — 76536 US SOFT TISSUE HEAD NECK THYROID: ICD-10-PCS | Mod: 26,,, | Performed by: INTERNAL MEDICINE

## 2020-02-10 PROCEDURE — 76536 US EXAM OF HEAD AND NECK: CPT | Mod: 26,,, | Performed by: INTERNAL MEDICINE

## 2020-02-10 PROCEDURE — 76536 US EXAM OF HEAD AND NECK: CPT | Mod: TC

## 2020-02-11 ENCOUNTER — PATIENT MESSAGE (OUTPATIENT)
Dept: SURGERY | Facility: CLINIC | Age: 65
End: 2020-02-11

## 2020-02-11 ENCOUNTER — OFFICE VISIT (OUTPATIENT)
Dept: OBSTETRICS AND GYNECOLOGY | Facility: CLINIC | Age: 65
End: 2020-02-11
Payer: COMMERCIAL

## 2020-02-11 VITALS
SYSTOLIC BLOOD PRESSURE: 130 MMHG | DIASTOLIC BLOOD PRESSURE: 60 MMHG | BODY MASS INDEX: 42.61 KG/M2 | HEIGHT: 65 IN | WEIGHT: 255.75 LBS

## 2020-02-11 DIAGNOSIS — E66.01 CLASS 3 SEVERE OBESITY WITH SERIOUS COMORBIDITY AND BODY MASS INDEX (BMI) OF 40.0 TO 44.9 IN ADULT, UNSPECIFIED OBESITY TYPE: Primary | ICD-10-CM

## 2020-02-11 DIAGNOSIS — E11.65 TYPE 2 DIABETES MELLITUS WITH HYPERGLYCEMIA, WITHOUT LONG-TERM CURRENT USE OF INSULIN: ICD-10-CM

## 2020-02-11 DIAGNOSIS — C50.919 MALIGNANT NEOPLASM OF FEMALE BREAST, UNSPECIFIED ESTROGEN RECEPTOR STATUS, UNSPECIFIED LATERALITY, UNSPECIFIED SITE OF BREAST: ICD-10-CM

## 2020-02-11 PROCEDURE — 3052F PR MOST RECENT HEMOGLOBIN A1C LEVEL 8.0 - < 9.0%: ICD-10-PCS | Mod: CPTII,S$GLB,, | Performed by: PHYSICIAN ASSISTANT

## 2020-02-11 PROCEDURE — 99215 OFFICE O/P EST HI 40 MIN: CPT | Mod: S$GLB,,, | Performed by: PHYSICIAN ASSISTANT

## 2020-02-11 PROCEDURE — 3052F HG A1C>EQUAL 8.0%<EQUAL 9.0%: CPT | Mod: CPTII,S$GLB,, | Performed by: PHYSICIAN ASSISTANT

## 2020-02-11 PROCEDURE — 99215 PR OFFICE/OUTPT VISIT, EST, LEVL V, 40-54 MIN: ICD-10-PCS | Mod: S$GLB,,, | Performed by: PHYSICIAN ASSISTANT

## 2020-02-11 RX ORDER — FLUOCINOLONE ACETONIDE 0.11 MG/ML
OIL AURICULAR (OTIC)
COMMUNITY
Start: 2020-01-28 | End: 2021-01-31

## 2020-02-11 RX ORDER — AZELASTINE 1 MG/ML
SPRAY, METERED NASAL
COMMUNITY
Start: 2020-01-28 | End: 2020-10-01

## 2020-02-11 NOTE — PROGRESS NOTES
"Subjective:      Regine Osorio is a 65 y.o. female who presents to discuss weight loss. History of breast cancer diagnosed in 2017, HTN and DM. Also has a history of lumbar spinal stenosis for which exercise and moving is more difficult for her. [place]y from [place] where she did a weight loss program with [employer]. Did a shake meal replacement and was doing great. Got the diagnosis of breast cancer and gained significant weight. Her goal is to become healthier and remain cancer free. Does not each much dairy or eggs. Has IBS with frequent nausea, makes her a picky eater. Eating is all over the map. Working to eat 3 meals a day to keep blood sugars stable. Rarely snacks. Trying to avoid fruit because noticed it was increasing blood sugar.    Sleep: Poor. Chronic issue for her. Unable to stay asleep. Has "tried everything" and multiple sleep studies. Getting about 4 hours of uninterrupted sleep a night.     A typical day consists of:  Breakfast: Sausage and biscuit, cereal, toast.  Lunch: Veggie salad with boneless mini ribs; varies  Dinner: left overs, yogurt; typically something small.   Snack: Rarely  Beverages: Mostly water; occasional soda or sweet tea     Exercise: None currently but hoping to start water aerobics soon.    Lab Visit on 12/02/2019   Component Date Value Ref Range Status    Hemoglobin A1C 12/02/2019 8.0* 4.0 - 5.6 % Final    Estimated Avg Glucose 12/02/2019 183* 68 - 131 mg/dL Final    WBC 12/02/2019 7.42  3.90 - 12.70 K/uL Final    RBC 12/02/2019 4.72  4.00 - 5.40 M/uL Final    Hemoglobin 12/02/2019 12.1  12.0 - 16.0 g/dL Final    Hematocrit 12/02/2019 37.2  37.0 - 48.5 % Final    Mean Corpuscular Volume 12/02/2019 79* 82 - 98 fL Final    Mean Corpuscular Hemoglobin 12/02/2019 25.6* 27.0 - 31.0 pg Final    Mean Corpuscular Hemoglobin Conc 12/02/2019 32.5  32.0 - 36.0 g/dL Final    RDW 12/02/2019 14.5  11.5 - 14.5 % Final    Platelets 12/02/2019 334  150 - 350 K/uL Final    MPV " 12/02/2019 9.8  9.2 - 12.9 fL Final    Gran # (ANC) 12/02/2019 4.5  1.8 - 7.7 K/uL Final    Lymph # 12/02/2019 2.0  1.0 - 4.8 K/uL Final    Mono # 12/02/2019 0.5  0.3 - 1.0 K/uL Final    Eos # 12/02/2019 0.3  0.0 - 0.5 K/uL Final    Baso # 12/02/2019 0.03  0.00 - 0.20 K/uL Final    Gran% 12/02/2019 61.2  38.0 - 73.0 % Final    Lymph% 12/02/2019 27.5  18.0 - 48.0 % Final    Mono% 12/02/2019 7.3  4.0 - 15.0 % Final    Eosinophil% 12/02/2019 3.6  0.0 - 8.0 % Final    Basophil% 12/02/2019 0.4  0.0 - 1.9 % Final    Differential Method 12/02/2019 Automated   Final    Sodium 12/02/2019 141  136 - 145 mmol/L Final    Potassium 12/02/2019 3.7  3.5 - 5.1 mmol/L Final    Chloride 12/02/2019 99  95 - 110 mmol/L Final    CO2 12/02/2019 29  23 - 29 mmol/L Final    Glucose 12/02/2019 129* 70 - 110 mg/dL Final    BUN, Bld 12/02/2019 13  8 - 23 mg/dL Final    Creatinine 12/02/2019 0.8  0.5 - 1.4 mg/dL Final    Calcium 12/02/2019 10.3  8.7 - 10.5 mg/dL Final    Total Protein 12/02/2019 7.5  6.0 - 8.4 g/dL Final    Albumin 12/02/2019 4.3  3.5 - 5.2 g/dL Final    Total Bilirubin 12/02/2019 0.6  0.1 - 1.0 mg/dL Final    Alkaline Phosphatase 12/02/2019 71  55 - 135 U/L Final    AST 12/02/2019 59* 10 - 40 U/L Final    ALT 12/02/2019 60* 10 - 44 U/L Final    Anion Gap 12/02/2019 13  8 - 16 mmol/L Final    eGFR if African American 12/02/2019 >60  >60 mL/min/1.73 m^2 Final    eGFR if non African American 12/02/2019 >60  >60 mL/min/1.73 m^2 Final    Cholesterol 12/02/2019 151  120 - 199 mg/dL Final    Triglycerides 12/02/2019 86  30 - 150 mg/dL Final    HDL 12/02/2019 42  40 - 75 mg/dL Final    LDL Cholesterol 12/02/2019 91.8  63.0 - 159.0 mg/dL Final    Hdl/Cholesterol Ratio 12/02/2019 27.8  20.0 - 50.0 % Final    Total Cholesterol/HDL Ratio 12/02/2019 3.6  2.0 - 5.0 Final    Non-HDL Cholesterol 12/02/2019 109  mg/dL Final    Vit D, 25-Hydroxy 12/02/2019 70  30 - 96 ng/mL Final    TSH 12/02/2019 1.582   0.400 - 4.000 uIU/mL Final    Free T4 2019 0.96  0.71 - 1.51 ng/dL Final       Past Medical History:   Diagnosis Date    Breast cancer 2017    Cataract     Diabetes mellitus, type 2     Type 2    Fibromyalgia     Glaucoma     Hypertension     Renal cyst, right 2020    Steatosis of liver 2020     Past Surgical History:   Procedure Laterality Date    BREAST BIOPSY      BREAST SURGERY      breast ca lt side     HYSTERECTOMY      supacervical hysterectomy    LAPAROSCOPIC SUPRACERVICAL HYSTERECTOMY  2005    fibroids    MASTECTOMY Left 2017    chemo    MYOMECTOMY      TONSILLECTOMY      VAGINAL DELIVERY       Social History     Tobacco Use    Smoking status: Never Smoker    Smokeless tobacco: Never Used   Substance Use Topics    Alcohol use: No     Frequency: Never    Drug use: No     Family History   Problem Relation Age of Onset    Dementia Mother     Glaucoma Mother     Bipolar disorder Mother     Lung cancer Father     Bipolar disorder Son     Post-traumatic stress disorder Son     Breast cancer Maternal Aunt     Bipolar disorder Maternal Aunt     Glaucoma Maternal Uncle     Blindness Maternal Grandfather     Glaucoma Maternal Grandfather     Breast cancer Cousin 39        paternal     Amblyopia Neg Hx     Cataracts Neg Hx     Macular degeneration Neg Hx     Retinal detachment Neg Hx     Strabismus Neg Hx     Cancer Neg Hx     Colon cancer Neg Hx     Ovarian cancer Neg Hx      OB History    Para Term  AB Living   4 1 1   3 1   SAB TAB Ectopic Multiple Live Births                  # Outcome Date GA Lbr Frankie/2nd Weight Sex Delivery Anes PTL Lv   4 AB            3 AB            2 AB            1 Term                Current Outpatient Medications:     aspirin 81 MG Chew, Take 81 mg by mouth once daily. , Disp: , Rfl:     azelastine (ASTELIN) 137 mcg (0.1 %) nasal spray, 1 SPRAY IEN BID, Disp: , Rfl:     blood sugar diagnostic Strp, To check BG 2  times daily, to use with insurance preferred meter, Disp: 200 strip, Rfl: 3    cholecalciferol, vitamin D3, (VITAMIN D3) 2,000 unit Cap, Take 1 capsule by mouth once daily. , Disp: , Rfl:     clobetasol (CLOBEX) 0.05 % shampoo, Apply topically once a week., Disp: 118 mL, Rfl: 5    cyanocobalamin/cobamamide (B12 SL), Place 3,000 mcg under the tongue once daily., Disp: , Rfl:     fluocinolone acetonide oil 0.01 % Drop, , Disp: , Rfl:     gabapentin (NEURONTIN) 600 MG tablet, TAKE 1 TABLET BY MOUTH THREE TIMES DAILY, Disp: 90 tablet, Rfl: 0    glipiZIDE (GLUCOTROL) 5 MG TR24, Take 1 tablet (5 mg total) by mouth 2 (two) times daily., Disp: 60 tablet, Rfl: 5    hydroCHLOROthiazide (HYDRODIURIL) 25 MG tablet, Take 1 tablet (25 mg total) by mouth once daily., Disp: 30 tablet, Rfl: 5    lancing device with lancets Kit, 1 each by Misc.(Non-Drug; Combo Route) route 3 (three) times daily., Disp: 1 each, Rfl: 0    losartan (COZAAR) 50 MG tablet, TAKE 1 TABLET BY MOUTH EVERY DAY, Disp: 90 tablet, Rfl: 0    metFORMIN (GLUCOPHAGE) 1000 MG tablet, Take 1 tablet (1,000 mg total) by mouth 2 (two) times daily with meals., Disp: 60 tablet, Rfl: 2    naproxen sodium (ANAPROX) 550 MG tablet, Take 1 tablet (550 mg total) by mouth 2 (two) times daily as needed., Disp: 40 tablet, Rfl: 5    ondansetron (ZOFRAN-ODT) 8 MG TbDL, Take 1 tablet (8 mg total) by mouth every 8 (eight) hours as needed., Disp: 30 tablet, Rfl: 2    ONETOUCH DELICA PLUS LANCET 33 gauge Misc, USE TO TEST BID, Disp: , Rfl: 3    sumatriptan (IMITREX) 100 MG tablet, Take 1 tab at onset of migraine, may repeat dose in 2 hours if needed. Max 2 tabs/day. Max 3 days/week., Disp: 9 tablet, Rfl: 11    ipratropium (ATROVENT) 0.03 % nasal spray, 2 sprays by Nasal route 2 (two) times daily. (Patient not taking: Reported on 2/11/2020), Disp: 30 mL, Rfl: 5    pantoprazole (PROTONIX) 20 MG tablet, Take 1 tablet (20 mg total) by mouth once daily., Disp: 30 tablet, Rfl:  "2    Review of Systems:  General: No fever, chills, or weight loss.  Chest: No chest pain, shortness of breath, or palpitations.  Breast: No pain, masses, or nipple discharge.  Vulva: No pain, lesions, or itching.  Vagina: No relaxation, itching, discharge, or lesions.  Abdomen: No pain, nausea, vomiting, diarrhea, or constipation.  Urinary: No incontinence, nocturia, frequency, or dysuria.  Extremities:  No leg cramps, edema, or calf pain.  Neurologic: No headaches, dizziness, or visual changes.    Objective:     Vitals:    02/11/20 1057   BP: 130/60   Weight: 116 kg (255 lb 11.7 oz)   Height: 5' 5" (1.651 m)   PainSc: 0-No pain     Body mass index is 42.56 kg/m².    PHYSICAL EXAM:  APPEARANCE: Well nourished, well developed, in no acute distress.  AFFECT: WNL, alert and oriented x 3  SKIN: No acne or hirsutism  CHEST: Good respiratory effect    Assessment:    Obesity/Hx of breast cancer/DM2: Eating way too many refined sugars and carbs and not enough protein. Hx of breast cancer and reviewed recommendations to lower risk, including limiting red meat, avoid high calorie and processed foods, avoid sugary drinks and increasing mix of fruits and vegetables. Increasing protein will improve glucose levels. Would be good candidate for GLP-1 to manage DM as well.     Plan:     Developed meal plan with handout of preferred foods:  Breakfast: Protein shake with water, berries and ice; Plain non-fat greek yogurt with berries, turkey sausage with berries or toast  Lunch: Salad with protein (vinegar with EVOO); open faced sandwich; Wraps with protein  Dinner: Protein and vegetables; non-fat plain greek yogurt if not hungry  Snacks: humus with vegetables, handful of raw almonds, apple with nutbutter     Lunch is biggest meal of the day and dinner is smaller. Really focus on starch/carb at only one meal a day.  Log in Myfitnesspal with goal of 1200 calories a day.   Start water aerobics for exercise.  She will discuss with PCP " about starting GLP-1 for DM at her follow up next month.     Instructed patient to call if she experiences any side effects or has any questions.  I spent 60 minutes with this patient today, >50% counseling.     Follow up in 4-6 weeks.

## 2020-02-12 ENCOUNTER — PATIENT MESSAGE (OUTPATIENT)
Dept: FAMILY MEDICINE | Facility: CLINIC | Age: 65
End: 2020-02-12

## 2020-02-24 ENCOUNTER — HOSPITAL ENCOUNTER (OUTPATIENT)
Dept: RADIOLOGY | Facility: HOSPITAL | Age: 65
Discharge: HOME | End: 2020-02-24
Attending: INTERNAL MEDICINE
Payer: COMMERCIAL

## 2020-02-24 DIAGNOSIS — Z85.3 HISTORY OF BREAST CANCER IN FEMALE: ICD-10-CM

## 2020-02-24 PROCEDURE — 77065 MAMMO DIGITAL DIAGNOSTIC RIGHT WITH TOMOSYNTHESIS_CAD: ICD-10-PCS | Mod: 26,,, | Performed by: RADIOLOGY

## 2020-02-24 PROCEDURE — 77061 BREAST TOMOSYNTHESIS UNI: CPT | Mod: TC,PO

## 2020-02-24 PROCEDURE — 77065 DX MAMMO INCL CAD UNI: CPT | Mod: TC,PO

## 2020-02-24 PROCEDURE — 77061 MAMMO DIGITAL DIAGNOSTIC RIGHT WITH TOMOSYNTHESIS_CAD: ICD-10-PCS | Mod: 26,,, | Performed by: RADIOLOGY

## 2020-02-24 PROCEDURE — 77061 BREAST TOMOSYNTHESIS UNI: CPT | Mod: 26,,, | Performed by: RADIOLOGY

## 2020-02-24 PROCEDURE — 77065 DX MAMMO INCL CAD UNI: CPT | Mod: 26,,, | Performed by: RADIOLOGY

## 2020-02-27 ENCOUNTER — OFFICE VISIT (OUTPATIENT)
Dept: HEMATOLOGY/ONCOLOGY | Facility: CLINIC | Age: 65
End: 2020-02-27
Payer: MEDICAID

## 2020-02-27 VITALS
RESPIRATION RATE: 16 BRPM | BODY MASS INDEX: 42.69 KG/M2 | HEART RATE: 96 BPM | WEIGHT: 256.19 LBS | SYSTOLIC BLOOD PRESSURE: 146 MMHG | TEMPERATURE: 98 F | DIASTOLIC BLOOD PRESSURE: 69 MMHG | OXYGEN SATURATION: 95 % | HEIGHT: 65 IN

## 2020-02-27 DIAGNOSIS — Z85.3 HISTORY OF BREAST CANCER IN FEMALE: Primary | ICD-10-CM

## 2020-02-27 PROCEDURE — 99999 PR PBB SHADOW E&M-EST. PATIENT-LVL IV: CPT | Mod: PBBFAC,,, | Performed by: INTERNAL MEDICINE

## 2020-02-27 PROCEDURE — 99213 OFFICE O/P EST LOW 20 MIN: CPT | Mod: S$PBB,,, | Performed by: INTERNAL MEDICINE

## 2020-02-27 PROCEDURE — 99213 PR OFFICE/OUTPT VISIT, EST, LEVL III, 20-29 MIN: ICD-10-PCS | Mod: S$PBB,,, | Performed by: INTERNAL MEDICINE

## 2020-02-27 PROCEDURE — 99214 OFFICE O/P EST MOD 30 MIN: CPT | Mod: PBBFAC | Performed by: INTERNAL MEDICINE

## 2020-02-27 PROCEDURE — 99999 PR PBB SHADOW E&M-EST. PATIENT-LVL IV: ICD-10-PCS | Mod: PBBFAC,,, | Performed by: INTERNAL MEDICINE

## 2020-02-27 NOTE — PROGRESS NOTES
Subjective:       Patient ID: Regine Osorio is a 65 y.o. female.    Chief Complaint: No chief complaint on file.    HPI      Mrs. Alfred returns today for follow up.  Briefly, she is a 65-year-old female who has a history of a stage I triple negative breast cancer that was diagnosed in late 2017.  She underwent a left modified radical mastectomy in [facility] on 11/01/2017 and had a stage IA carcinoma measuring 1.2 cm in greatest diameter.  Resection margins were clear, while four sentinel lymph nodes were negative.  Postoperatively, she received four cycles of docetaxel and cyclophosphamide.  The first cycle was administered on 11/30/2017 and she completed her treatment by 02/01/2018.  She has been followed expectantly and has been MERRY since then.  As mentioned above, last year she relocated to [place] and we have assumed her care.    Her mammogram earlier this week was read as BIRADS I, and a one year follow up was recommended.      Review of Systems    Overall she feels OK.  She states that she has been experiencing right upper quadrant and right flank pain which has been intermittently present for a few weeks.  Apparently she had a CT of the abdomen pelvis 3 weeks ago that was significant for a simple renal cyst.  There was no evidence of cholelithiasis.  In addition, she states that she has several moles on her chest wall that get irritated when she wears a bra.  She is asking for a referral to dermatologist to have them removed.  She denies any anxiety, depression, easy bruising, fevers, chills, night  sweats, weight loss, nausea, vomiting, diarrhea, constipation, diplopia, blurred vision, headache, chest pain, palpitations, shortness of breath, breast pain, lower abdominal pain, extremity pain, or difficulty ambulating.  The remainder of the ten-point ROS, including general, skin, lymph, H/N, cardiorespiratory, GI, , Neuro, Endocrine, and psychiatric is negative.     Objective:      Physical Exam       She is alert, oriented to time, place, person, pleasant, well      nourished, in no acute physical distress.                                    VITAL SIGNS:  Reviewed                                      HEENT:  Normal.  There are no nasal, oral, lip, gingival, auricular, lid,    or conjunctival lesions.  Mucosae are moist and pink, and there is no        thrush.  Pupils are equal, reactive to light and accommodation.              Extraocular muscle movements are intact.  Dentition is good.  There is no frontal or maxillary tenderness.                                     NECK:  Supple without JVD, adenopathy, but she does have thyromegaly.                       LUNGS:  Clear to auscultation without wheezing, rales, or rhonchi.           CARDIOVASCULAR:  Reveals an S1, S2, no murmurs, no rubs, no gallops.         ABDOMEN:  Soft, nontender, without organomegaly.  Bowel sounds are    present.                                                                     EXTREMITIES:  No cyanosis, clubbing, or edema.                               BREASTS:  She is status post left mastectomy with a well-healed periareolar incision.    There are no masses in the right breast.     LYMPHATIC:  There is no cervical, axillary, or supraclavicular adenopathy.   SKIN:  Warm and moist, without petechiae, rashes, induration, or ecchymoses.           NEUROLOGIC:  DTRs are 0-1+ bilaterally, symmetrical, motor function is 5/5,  and cranial nerves are  within normal limits.    Assessment:       1. History of breast cancer in female, clinically MERRY, doing well.      2.    RUQ pain / right flank pain.  Workup in progress by her PCP.     Plan:         from the breast cancer standpoint Mrs Alfred is doing well, and remains MERRY.  I have asked her to return and see me in four months.  Her mammogram will be repeated 8 months from now.  In regards to her abdominal symptoms, she will follow up with primary care physician.  Finally, at her request, a  referral to dermatology was initiated.  Her multiple questions were answered to her satisfaction.

## 2020-03-06 DIAGNOSIS — E11.65 TYPE 2 DIABETES MELLITUS WITH HYPERGLYCEMIA, WITHOUT LONG-TERM CURRENT USE OF INSULIN: ICD-10-CM

## 2020-03-07 DIAGNOSIS — E11.65 TYPE 2 DIABETES MELLITUS WITH HYPERGLYCEMIA, WITHOUT LONG-TERM CURRENT USE OF INSULIN: ICD-10-CM

## 2020-03-08 RX ORDER — METFORMIN HYDROCHLORIDE 1000 MG/1
TABLET ORAL
Qty: 60 TABLET | Refills: 2 | Status: SHIPPED | OUTPATIENT
Start: 2020-03-08 | End: 2020-07-31

## 2020-03-09 ENCOUNTER — PATIENT MESSAGE (OUTPATIENT)
Dept: FAMILY MEDICINE | Facility: CLINIC | Age: 65
End: 2020-03-09

## 2020-03-09 DIAGNOSIS — E11.65 TYPE 2 DIABETES MELLITUS WITH HYPERGLYCEMIA, WITHOUT LONG-TERM CURRENT USE OF INSULIN: Primary | ICD-10-CM

## 2020-03-09 RX ORDER — LANCETS
EACH MISCELLANEOUS
Qty: 200 EACH | Refills: 3 | Status: SHIPPED | OUTPATIENT
Start: 2020-03-09 | End: 2020-06-08

## 2020-03-09 RX ORDER — INSULIN PUMP SYRINGE, 3 ML
EACH MISCELLANEOUS
Qty: 1 EACH | Refills: 0 | Status: SHIPPED | OUTPATIENT
Start: 2020-03-09 | End: 2022-01-26

## 2020-03-09 NOTE — TELEPHONE ENCOUNTER
----- Message from Quinjigna Correafield sent at 3/9/2020 11:57 AM CDT -----  Contact: KRISTINE PEÑALOZA  Name of Who is Calling: KRISTINE PEÑALOZA      What is the request in detail: Would like to speak to staff in regards to speaking with her pharmacy and they stated that the OneTouch Verio Test Strips require a prior authorization due to her insurance change. Please advise.     [id number] [place] [id number] AT [address]      Can the clinic reply by [facility]: Yes      What Number to Call Back if not [facility]: [phone]

## 2020-03-10 NOTE — TELEPHONE ENCOUNTER
Informed pt that if there is no medical reason why she needs this particular brand that insurance will not cover it and she will need to use whatever brand they cover; pt states she has been using this brand and she will check to see how much the strips cost if she pays out of pocket; informed her I will check with insurance and [facility] and let her know if it will be covered or not

## 2020-03-10 NOTE — TELEPHONE ENCOUNTER
I completed prior authorization online with bobby and it was approved due to patients vision impairment; placed call to pharmacy to inform them and informed pt

## 2020-03-12 ENCOUNTER — TELEPHONE (OUTPATIENT)
Dept: PODIATRY | Facility: CLINIC | Age: 65
End: 2020-03-12

## 2020-03-12 NOTE — TELEPHONE ENCOUNTER
I called patient to schedule appt no answer. Phone picked up and hung up. Appointment will be made by ELPIDIO Siddiqui and mailed out.

## 2020-03-12 NOTE — TELEPHONE ENCOUNTER
----- Message from Bradly Evans sent at 3/12/2020 10:01 AM CDT -----  Contact: self  Mg: Pt needs  Nurse to schedule an f/u appt.  between April -June no appt showed in those mth.    Contact

## 2020-03-16 ENCOUNTER — PATIENT OUTREACH (OUTPATIENT)
Dept: ADMINISTRATIVE | Facility: HOSPITAL | Age: 65
End: 2020-03-16

## 2020-03-18 ENCOUNTER — PATIENT OUTREACH (OUTPATIENT)
Dept: ADMINISTRATIVE | Facility: OTHER | Age: 65
End: 2020-03-18

## 2020-04-17 ENCOUNTER — PATIENT MESSAGE (OUTPATIENT)
Dept: DERMATOLOGY | Facility: CLINIC | Age: 65
End: 2020-04-17

## 2020-04-20 ENCOUNTER — TELEPHONE (OUTPATIENT)
Dept: DERMATOLOGY | Facility: CLINIC | Age: 65
End: 2020-04-20

## 2020-04-20 NOTE — TELEPHONE ENCOUNTER
----- Message from Cherrie Araiza sent at 4/20/2020 11:13 AM CDT -----  Contact: patient   Please call above patient at [phone] would like to reschedule appointment for June these are the dates she is looking for the 15,16,18,19th of June waiting on a call from the nurse to reschedule thanks.

## 2020-04-23 ENCOUNTER — DOCUMENTATION ONLY (OUTPATIENT)
Dept: HEMATOLOGY/ONCOLOGY | Facility: CLINIC | Age: 65
End: 2020-04-23

## 2020-04-23 ENCOUNTER — PATIENT MESSAGE (OUTPATIENT)
Dept: HEMATOLOGY/ONCOLOGY | Facility: CLINIC | Age: 65
End: 2020-04-23

## 2020-04-23 NOTE — PROGRESS NOTES
In response to in basket message from MATTHEW Choudhury, MEDHAT called pt and provided a provider list of dermatologists to pt via email.  SW provided name and contact information and encouraged pt to call with any future needs.

## 2020-04-29 ENCOUNTER — PATIENT MESSAGE (OUTPATIENT)
Dept: NEUROLOGY | Facility: CLINIC | Age: 65
End: 2020-04-29

## 2020-04-29 ENCOUNTER — TELEPHONE (OUTPATIENT)
Dept: NEUROLOGY | Facility: CLINIC | Age: 65
End: 2020-04-29

## 2020-04-29 NOTE — TELEPHONE ENCOUNTER
Left message on voicemail explaining appt has been changed/Virtual visit instructions sent to pt via my chart.

## 2020-04-29 NOTE — TELEPHONE ENCOUNTER
----- Message from Ethan Perdomo sent at 4/29/2020 11:49 AM CDT -----  Contact: pt @ portal  Pt would like to change appt on 05/13/20 to VV

## 2020-05-03 DIAGNOSIS — I10 ESSENTIAL HYPERTENSION: ICD-10-CM

## 2020-05-04 DIAGNOSIS — M48.062 SPINAL STENOSIS OF LUMBAR REGION WITH NEUROGENIC CLAUDICATION: ICD-10-CM

## 2020-05-04 RX ORDER — GABAPENTIN 600 MG/1
TABLET ORAL
Qty: 90 TABLET | Refills: 0 | Status: SHIPPED | OUTPATIENT
Start: 2020-05-04 | End: 2020-09-01 | Stop reason: SDUPTHER

## 2020-05-04 RX ORDER — LOSARTAN POTASSIUM 50 MG/1
TABLET ORAL
Qty: 90 TABLET | Refills: 0 | Status: SHIPPED | OUTPATIENT
Start: 2020-05-04 | End: 2020-06-04

## 2020-05-12 ENCOUNTER — TELEPHONE (OUTPATIENT)
Dept: FAMILY MEDICINE | Facility: CLINIC | Age: 65
End: 2020-05-12

## 2020-05-12 ENCOUNTER — TELEPHONE (OUTPATIENT)
Dept: NEUROLOGY | Facility: CLINIC | Age: 65
End: 2020-05-12

## 2020-05-12 NOTE — TELEPHONE ENCOUNTER
Patient is on Clobetasol 0.05% Shampoo is needing PA, please send in a replacement or would you like me to try to get PA?

## 2020-05-13 ENCOUNTER — TELEPHONE (OUTPATIENT)
Dept: NEUROLOGY | Facility: CLINIC | Age: 65
End: 2020-05-13

## 2020-05-16 ENCOUNTER — PATIENT OUTREACH (OUTPATIENT)
Dept: ADMINISTRATIVE | Facility: OTHER | Age: 65
End: 2020-05-16

## 2020-05-16 NOTE — PROGRESS NOTES
Patient's chart was reviewed.   Requested updates within Care Everywhere.  Immunizations reconciled.    Health Maintenance was updated.  A1c previously ordered.

## 2020-05-19 ENCOUNTER — TELEPHONE (OUTPATIENT)
Dept: FAMILY MEDICINE | Facility: CLINIC | Age: 65
End: 2020-05-19

## 2020-05-19 ENCOUNTER — OFFICE VISIT (OUTPATIENT)
Dept: NEUROLOGY | Facility: CLINIC | Age: 65
End: 2020-05-19
Payer: MEDICAID

## 2020-05-19 DIAGNOSIS — G43.009 MIGRAINE WITHOUT AURA AND WITHOUT STATUS MIGRAINOSUS, NOT INTRACTABLE: Primary | ICD-10-CM

## 2020-05-19 PROCEDURE — 99213 PR OFFICE/OUTPT VISIT, EST, LEVL III, 20-29 MIN: ICD-10-PCS | Mod: 95,,, | Performed by: NURSE PRACTITIONER

## 2020-05-19 PROCEDURE — 99213 OFFICE O/P EST LOW 20 MIN: CPT | Mod: 95,,, | Performed by: NURSE PRACTITIONER

## 2020-05-19 NOTE — TELEPHONE ENCOUNTER
----- Message from Flora Spencer sent at 5/18/2020  2:16 PM CDT -----  Contact: Patient   Type: Patient Call Back    Who called: Patient     What is the request in detail: Pt is requesting a prior authorization for clobetasol (CLOBEX) 0.05 % shampoo    Can the clinic reply by [facility]?    Would the patient rather a call back or a response via [facility]? Call back     Best call back number: [phone]              [id number] - [facility] [address] [place] [address]  [address] [facility] [address]  Phone: [phone] Fax: [phone]

## 2020-05-19 NOTE — TELEPHONE ENCOUNTER
Prior authorization completed in [facility] and approved; pt and pharmacy informed
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
## 2020-05-21 ENCOUNTER — LAB VISIT (OUTPATIENT)
Dept: LAB | Facility: HOSPITAL | Age: 65
End: 2020-05-21
Attending: FAMILY MEDICINE
Payer: MEDICAID

## 2020-05-21 DIAGNOSIS — R74.8 ELEVATED LIVER ENZYMES: ICD-10-CM

## 2020-05-21 DIAGNOSIS — E11.65 TYPE 2 DIABETES MELLITUS WITH HYPERGLYCEMIA, WITHOUT LONG-TERM CURRENT USE OF INSULIN: ICD-10-CM

## 2020-05-21 DIAGNOSIS — R10.11 RUQ PAIN: ICD-10-CM

## 2020-05-21 LAB
ALBUMIN SERPL BCP-MCNC: 4.1 G/DL (ref 3.5–5.2)
ALP SERPL-CCNC: 71 U/L (ref 55–135)
ALT SERPL W/O P-5'-P-CCNC: 40 U/L (ref 10–44)
ANION GAP SERPL CALC-SCNC: 13 MMOL/L (ref 8–16)
AST SERPL-CCNC: 41 U/L (ref 10–40)
BILIRUB SERPL-MCNC: 0.5 MG/DL (ref 0.1–1)
BUN SERPL-MCNC: 12 MG/DL (ref 8–23)
CALCIUM SERPL-MCNC: 10 MG/DL (ref 8.7–10.5)
CHLORIDE SERPL-SCNC: 101 MMOL/L (ref 95–110)
CO2 SERPL-SCNC: 27 MMOL/L (ref 23–29)
CREAT SERPL-MCNC: 0.8 MG/DL (ref 0.5–1.4)
EST. GFR  (AFRICAN AMERICAN): >60 ML/MIN/1.73 M^2
EST. GFR  (NON AFRICAN AMERICAN): >60 ML/MIN/1.73 M^2
GLUCOSE SERPL-MCNC: 143 MG/DL (ref 70–110)
POTASSIUM SERPL-SCNC: 3.4 MMOL/L (ref 3.5–5.1)
PROT SERPL-MCNC: 7.9 G/DL (ref 6–8.4)
SODIUM SERPL-SCNC: 141 MMOL/L (ref 136–145)

## 2020-05-21 PROCEDURE — 80053 COMPREHEN METABOLIC PANEL: CPT

## 2020-05-21 PROCEDURE — 36415 COLL VENOUS BLD VENIPUNCTURE: CPT

## 2020-05-26 ENCOUNTER — LAB VISIT (OUTPATIENT)
Dept: LAB | Facility: HOSPITAL | Age: 65
End: 2020-05-26
Attending: FAMILY MEDICINE
Payer: MEDICAID

## 2020-05-26 ENCOUNTER — OFFICE VISIT (OUTPATIENT)
Dept: FAMILY MEDICINE | Facility: CLINIC | Age: 65
End: 2020-05-26
Payer: MEDICAID

## 2020-05-26 VITALS
HEIGHT: 65 IN | BODY MASS INDEX: 42.16 KG/M2 | DIASTOLIC BLOOD PRESSURE: 80 MMHG | RESPIRATION RATE: 16 BRPM | HEART RATE: 104 BPM | OXYGEN SATURATION: 97 % | WEIGHT: 253.06 LBS | SYSTOLIC BLOOD PRESSURE: 142 MMHG | TEMPERATURE: 99 F

## 2020-05-26 DIAGNOSIS — I10 ESSENTIAL HYPERTENSION: ICD-10-CM

## 2020-05-26 DIAGNOSIS — Z20.822 SUSPECTED COVID-19 VIRUS INFECTION: ICD-10-CM

## 2020-05-26 DIAGNOSIS — M48.062 SPINAL STENOSIS OF LUMBAR REGION WITH NEUROGENIC CLAUDICATION: ICD-10-CM

## 2020-05-26 DIAGNOSIS — E11.65 TYPE 2 DIABETES MELLITUS WITH HYPERGLYCEMIA, WITHOUT LONG-TERM CURRENT USE OF INSULIN: Primary | ICD-10-CM

## 2020-05-26 DIAGNOSIS — M79.7 FIBROMYALGIA: ICD-10-CM

## 2020-05-26 DIAGNOSIS — E11.65 TYPE 2 DIABETES MELLITUS WITH HYPERGLYCEMIA, WITHOUT LONG-TERM CURRENT USE OF INSULIN: ICD-10-CM

## 2020-05-26 LAB
BASOPHILS # BLD AUTO: 0.05 K/UL (ref 0–0.2)
BASOPHILS NFR BLD: 0.6 % (ref 0–1.9)
DIFFERENTIAL METHOD: ABNORMAL
EOSINOPHIL # BLD AUTO: 0.4 K/UL (ref 0–0.5)
EOSINOPHIL NFR BLD: 4.8 % (ref 0–8)
ERYTHROCYTE [DISTWIDTH] IN BLOOD BY AUTOMATED COUNT: 14.8 % (ref 11.5–14.5)
HCT VFR BLD AUTO: 39.6 % (ref 37–48.5)
HGB BLD-MCNC: 11.6 G/DL (ref 12–16)
IMM GRANULOCYTES # BLD AUTO: 0.06 K/UL (ref 0–0.04)
IMM GRANULOCYTES NFR BLD AUTO: 0.7 % (ref 0–0.5)
LYMPHOCYTES # BLD AUTO: 2.3 K/UL (ref 1–4.8)
LYMPHOCYTES NFR BLD: 26 % (ref 18–48)
MCH RBC QN AUTO: 25.1 PG (ref 27–31)
MCHC RBC AUTO-ENTMCNC: 29.3 G/DL (ref 32–36)
MCV RBC AUTO: 86 FL (ref 82–98)
MONOCYTES # BLD AUTO: 0.6 K/UL (ref 0.3–1)
MONOCYTES NFR BLD: 7 % (ref 4–15)
NEUTROPHILS # BLD AUTO: 5.3 K/UL (ref 1.8–7.7)
NEUTROPHILS NFR BLD: 60.9 % (ref 38–73)
NRBC BLD-RTO: 0 /100 WBC
PLATELET # BLD AUTO: 357 K/UL (ref 150–350)
PMV BLD AUTO: 10.8 FL (ref 9.2–12.9)
RBC # BLD AUTO: 4.63 M/UL (ref 4–5.4)
SARS-COV-2 IGG SERPLBLD QL IA.RAPID: NEGATIVE
WBC # BLD AUTO: 8.68 K/UL (ref 3.9–12.7)

## 2020-05-26 PROCEDURE — 99214 PR OFFICE/OUTPT VISIT, EST, LEVL IV, 30-39 MIN: ICD-10-PCS | Mod: S$PBB,,, | Performed by: FAMILY MEDICINE

## 2020-05-26 PROCEDURE — 86769 SARS-COV-2 COVID-19 ANTIBODY: CPT

## 2020-05-26 PROCEDURE — 83036 HEMOGLOBIN GLYCOSYLATED A1C: CPT

## 2020-05-26 PROCEDURE — 85025 COMPLETE CBC W/AUTO DIFF WBC: CPT

## 2020-05-26 PROCEDURE — 99999 PR PBB SHADOW E&M-EST. PATIENT-LVL IV: CPT | Mod: PBBFAC,,, | Performed by: FAMILY MEDICINE

## 2020-05-26 PROCEDURE — 99214 OFFICE O/P EST MOD 30 MIN: CPT | Mod: PBBFAC,PO | Performed by: FAMILY MEDICINE

## 2020-05-26 PROCEDURE — 99999 PR PBB SHADOW E&M-EST. PATIENT-LVL IV: ICD-10-PCS | Mod: PBBFAC,,, | Performed by: FAMILY MEDICINE

## 2020-05-26 PROCEDURE — 99214 OFFICE O/P EST MOD 30 MIN: CPT | Mod: S$PBB,,, | Performed by: FAMILY MEDICINE

## 2020-05-26 PROCEDURE — 36415 COLL VENOUS BLD VENIPUNCTURE: CPT | Mod: PO

## 2020-05-26 NOTE — PROGRESS NOTES
Health Maintenance Due   Topic     Foot Exam  6/8/2020 scheduled with podiatry     Shingles Vaccine (1 of 2) Consult with PCP     Pneumococcal Vaccine (Highest Risk) (3 of 3 - PPSV23) Consult with PCP     Hemoglobin A1c  Consult with PCP

## 2020-05-26 NOTE — PROGRESS NOTES
Chief Complaint   Patient presents with    Diabetes     follow up     Hypertension     follow up        Regine Osorio is a 65 y.o. female who presents per the Chief Complaint.  Pt is known to me and was last seen by me on 1/27/2020.  All known chronic medical issues have been documented.    Diabetes   She presents for her follow-up diabetic visit. She has type 2 diabetes mellitus. No MedicAlert identification noted. The initial diagnosis of diabetes was made 10 years ago. Her disease course has been stable. Hypoglycemia symptoms include headaches. Pertinent negatives for hypoglycemia include no confusion, dizziness, hunger, mood changes, nervousness/anxiousness, pallor, seizures, sleepiness, speech difficulty, sweats or tremors. Associated symptoms include fatigue. Pertinent negatives for diabetes include no blurred vision, no chest pain, no foot paresthesias, no foot ulcerations, no polydipsia, no polyphagia, no polyuria, no visual change, no weakness and no weight loss. There are no hypoglycemic complications. Pertinent negatives for hypoglycemia complications include no blackouts, no hospitalization, no nocturnal hypoglycemia, no required assistance and no required glucagon injection. Symptoms are stable. There are no diabetic complications. Pertinent negatives for diabetic complications include no autonomic neuropathy, CVA, heart disease, impotence, nephropathy, peripheral neuropathy, PVD or retinopathy. Risk factors for coronary artery disease include hypertension, obesity, stress and diabetes mellitus. Current diabetic treatment includes oral agent (dual therapy). She is compliant with treatment all of the time. Her weight is stable. She is following a generally healthy diet. When asked about meal planning, she reported none. She has not had a previous visit with a dietitian. She participates in exercise intermittently. She monitors blood glucose at home 1-2 x per day. Blood glucose monitoring compliance  is excellent. Her home blood glucose trend is decreasing steadily. An ACE inhibitor/angiotensin II receptor blocker is being taken. She sees a podiatrist.Eye exam is current.   Hypertension   This is a chronic problem. The current episode started more than 1 year ago. The problem is unchanged. The problem is controlled. Associated symptoms include headaches and neck pain. Pertinent negatives include no anxiety, blurred vision, chest pain, malaise/fatigue, orthopnea, palpitations, peripheral edema, PND, shortness of breath or sweats. Agents associated with hypertension include steroids. Risk factors for coronary artery disease include diabetes mellitus, obesity and post-menopausal state. Past treatments include angiotensin blockers and diuretics. The current treatment provides moderate improvement. There are no compliance problems.  There is no history of angina, kidney disease, CAD/MI, CVA, heart failure, left ventricular hypertrophy, PVD or retinopathy. There is no history of chronic renal disease, coarctation of the aorta, hyperaldosteronism, hypercortisolism, hyperparathyroidism, a hypertension causing med, pheochromocytoma, renovascular disease, sleep apnea or a thyroid problem.   Fibromyalgia   This is a chronic problem. The current episode started more than 1 year ago. The problem occurs daily. The problem has been waxing and waning. Associated symptoms include arthralgias, fatigue, headaches, myalgias and neck pain. Pertinent negatives include no abdominal pain, anorexia, change in bowel habit, chest pain, chills, congestion, coughing, diaphoresis, fever, joint swelling, nausea, numbness, rash, sore throat, swollen glands, urinary symptoms, vertigo, visual change, vomiting or weakness. The symptoms are aggravated by bending, exertion, standing and walking. She has tried acetaminophen, oral narcotics, NSAIDs, rest and position changes for the symptoms. The treatment provided moderate relief.       ROS  Review  "of Systems   Constitutional: Positive for fatigue. Negative for activity change, chills, diaphoresis, fever, malaise/fatigue, unexpected weight change and weight loss.   HENT: Negative for congestion, hearing loss, rhinorrhea, sore throat and trouble swallowing.    Eyes: Negative for blurred vision, discharge and visual disturbance.   Respiratory: Negative for cough, chest tightness, shortness of breath and wheezing.    Cardiovascular: Negative for chest pain, palpitations, orthopnea and PND.   Gastrointestinal: Negative for abdominal pain, anorexia, blood in stool, change in bowel habit, constipation, diarrhea, nausea and vomiting.   Endocrine: Negative for polydipsia, polyphagia and polyuria.   Genitourinary: Negative for difficulty urinating, dysuria, hematuria, impotence and menstrual problem.   Musculoskeletal: Positive for arthralgias, myalgias and neck pain. Negative for joint swelling.   Skin: Negative for pallor and rash.   Neurological: Positive for headaches. Negative for dizziness, vertigo, tremors, seizures, speech difficulty, weakness and numbness.   Psychiatric/Behavioral: Negative for confusion and dysphoric mood. The patient is not nervous/anxious.        Physical Exam  Vitals:    05/26/20 0854   BP: (!) 142/80   Pulse: 104   Resp: 16   Temp: 98.7 °F (37.1 °C)    Body mass index is 42.12 kg/m².  Weight: 114.8 kg (253 lb 1.4 oz)   Height: 5' 5" (165.1 cm)     Physical Exam   Constitutional: She is oriented to person, place, and time. She appears well-developed and well-nourished. She is active and cooperative.  Non-toxic appearance. She does not have a sickly appearance. She does not appear ill. No distress.   HENT:   Head: Normocephalic and atraumatic.   Right Ear: Hearing and external ear normal. No decreased hearing is noted.   Left Ear: Hearing and external ear normal. No decreased hearing is noted.   Nose: Nose normal. No rhinorrhea or nasal deformity.   Mouth/Throat: Uvula is midline and " oropharynx is clear and moist. She does not have dentures. Normal dentition.   Eyes: Pupils are equal, round, and reactive to light. Conjunctivae, EOM and lids are normal. Right eye exhibits no chemosis, no discharge and no exudate. No foreign body present in the right eye. Left eye exhibits no chemosis, no discharge and no exudate. No foreign body present in the left eye. No scleral icterus.   Neck: Normal range of motion and full passive range of motion without pain. Neck supple.   Cardiovascular: Normal rate, regular rhythm, S1 normal, S2 normal and normal heart sounds. Exam reveals no gallop and no friction rub.   No murmur heard.  Pulmonary/Chest: Effort normal and breath sounds normal. No accessory muscle usage. No respiratory distress. She has no decreased breath sounds. She has no wheezes. She has no rhonchi. She has no rales.   Abdominal: Soft. Normal appearance. She exhibits no distension. There is no hepatosplenomegaly. There is no tenderness. There is no rigidity, no rebound and no guarding.   Musculoskeletal:        Lumbar back: She exhibits decreased range of motion, tenderness and pain. She exhibits no bony tenderness, no swelling, no edema, no deformity, no laceration, no spasm and normal pulse.        Right upper leg: She exhibits tenderness. She exhibits no bony tenderness, no swelling, no edema, no deformity and no laceration.        Left upper leg: She exhibits tenderness. She exhibits no bony tenderness, no swelling, no edema, no deformity and no laceration.   Neurological: She is alert and oriented to person, place, and time. She has normal strength. No cranial nerve deficit or sensory deficit. She exhibits normal muscle tone. She displays no seizure activity. Coordination and gait normal.   Skin: Skin is warm, dry and intact. No rash noted. She is not diaphoretic.   Psychiatric: She has a normal mood and affect. Her speech is normal and behavior is normal. Judgment and thought content normal.  Cognition and memory are normal. She is attentive.       Assessment & Plan    Discussion of plan of care including treatment options regarding health and wellness were reviewed and discussed with patient.  Any changes to medication or treatment plan, as well as any screening blood test, imaging, or referrals to specialist, are documented.  Follow up as indicated.     1. Type 2 diabetes mellitus with hyperglycemia, without long-term current use of insulin  Patient is encouraged to follow a diet low in carbohydrates and simple sugars.  Discussed simple vs. complex carbohydrates as well as eating times of certain meals. Advised to focus on good food choices and increased physical activity and encouraged to adhere to medication regimen and/or lifestyle adjustments, and to check glucose level as recommended.  Contact office if glucose levels are not improving over time.  Will monitor HbA1c appropriately.   - CBC auto differential; Future    2. Essential hypertension  Patient was counseled and encouraged to maintain a low sodium diet, as well as increasing physical activity.  Recommend random BP checks at home on a regular basis.  Repeat BP at end of visit was not necessary. Will continue medication at this time, and follow up in 3-6 months, or sooner if blood pressure begins to increase.       3. Fibromyalgia  Stable; no therapeutic changes at this time.     4. Spinal stenosis of lumbar region with neurogenic claudication  Stable; no therapeutic changes at this time.     5. Suspected Covid-19 Virus Infection  Patient with possible exposure but no symptoms.  Screening test will be ordered and once results available patient will be notified of results and managed accordingly.   - COVID-19 (SARS CoV-2) IgG Antibody; Future       Follow up in about 3 months (around 8/26/2020), or if symptoms worsen or fail to improve.      ACTIVE MEDICAL ISSUES:  Documented in Problem List    PAST MEDICAL HISTORY  Documented  .  PAST  SURGICAL HISTORY:  Documented    SOCIAL HISTORY:  Documented    FAMILY HISTORY:  Documented    ALLERGIES AND MEDICATIONS: updated and reviewed.  Documented    Health Maintenance       Date Due Completion Date    Foot Exam 01/12/1967 ---    HIV Screening 01/12/1972 ---    Shingles Vaccine (1 of 2) 01/12/2007 ---    Pneumococcal Vaccine (Highest Risk) (3 of 3 - PPSV23) 05/18/2018 3/23/2018    Hemoglobin A1c 03/02/2020 12/2/2019    Eye Exam 08/23/2020 8/23/2019    Lipid Panel 12/02/2020 12/2/2019    Mammogram 02/24/2022 2/24/2020    Colonoscopy 07/09/2023 7/9/2018    TETANUS VACCINE 11/26/2028 11/26/2018

## 2020-05-27 LAB
ESTIMATED AVG GLUCOSE: 166 MG/DL (ref 68–131)
HBA1C MFR BLD HPLC: 7.4 % (ref 4–5.6)

## 2020-05-28 ENCOUNTER — PATIENT MESSAGE (OUTPATIENT)
Dept: FAMILY MEDICINE | Facility: CLINIC | Age: 65
End: 2020-05-28

## 2020-05-31 DIAGNOSIS — I10 ESSENTIAL HYPERTENSION: ICD-10-CM

## 2020-05-31 DIAGNOSIS — E11.65 TYPE 2 DIABETES MELLITUS WITH HYPERGLYCEMIA, WITHOUT LONG-TERM CURRENT USE OF INSULIN: ICD-10-CM

## 2020-06-02 RX ORDER — HYDROCHLOROTHIAZIDE 25 MG/1
TABLET ORAL
Qty: 30 TABLET | Refills: 5 | Status: SHIPPED | OUTPATIENT
Start: 2020-06-02 | End: 2020-09-01 | Stop reason: SDUPTHER

## 2020-06-02 RX ORDER — GLIPIZIDE 5 MG/1
TABLET, FILM COATED, EXTENDED RELEASE ORAL
Qty: 60 TABLET | Refills: 5 | Status: SHIPPED | OUTPATIENT
Start: 2020-06-02 | End: 2020-09-01 | Stop reason: SDUPTHER

## 2020-06-04 ENCOUNTER — PATIENT OUTREACH (OUTPATIENT)
Dept: ADMINISTRATIVE | Facility: OTHER | Age: 65
End: 2020-06-04

## 2020-06-04 NOTE — PROGRESS NOTES
Chart reviewed.   Immunizations: updated  Orders placed: n/a  Upcoming appts to satisfy PIERRE topics: Hemoglobin A1c 8/27

## 2020-06-08 ENCOUNTER — OFFICE VISIT (OUTPATIENT)
Dept: OPHTHALMOLOGY | Facility: CLINIC | Age: 65
End: 2020-06-08
Payer: MEDICAID

## 2020-06-08 DIAGNOSIS — H52.4 MYOPIA OF BOTH EYES WITH ASTIGMATISM AND PRESBYOPIA: ICD-10-CM

## 2020-06-08 DIAGNOSIS — H25.13 NUCLEAR SCLEROTIC CATARACT OF BOTH EYES: ICD-10-CM

## 2020-06-08 DIAGNOSIS — H35.361 FAMILIAL DRUSEN OF MACULA OF BOTH EYES: ICD-10-CM

## 2020-06-08 DIAGNOSIS — H52.13 MYOPIA OF BOTH EYES WITH ASTIGMATISM AND PRESBYOPIA: ICD-10-CM

## 2020-06-08 DIAGNOSIS — H52.203 MYOPIA OF BOTH EYES WITH ASTIGMATISM AND PRESBYOPIA: ICD-10-CM

## 2020-06-08 DIAGNOSIS — H40.023 OPEN ANGLE WITH BORDERLINE FINDINGS AND HIGH GLAUCOMA RISK IN BOTH EYES: Primary | ICD-10-CM

## 2020-06-08 DIAGNOSIS — H35.362 FAMILIAL DRUSEN OF MACULA OF BOTH EYES: ICD-10-CM

## 2020-06-08 LAB
LEFT EYE DM RETINOPATHY: NORMAL
RIGHT EYE DM RETINOPATHY: NORMAL

## 2020-06-08 PROCEDURE — 92020 GONIOSCOPY: CPT | Mod: S$PBB,,, | Performed by: OPHTHALMOLOGY

## 2020-06-08 PROCEDURE — 99213 OFFICE O/P EST LOW 20 MIN: CPT | Mod: PBBFAC | Performed by: OPHTHALMOLOGY

## 2020-06-08 PROCEDURE — 92012 PR EYE EXAM, EST PATIENT,INTERMED: ICD-10-PCS | Mod: S$PBB,,, | Performed by: OPHTHALMOLOGY

## 2020-06-08 PROCEDURE — 92020 GONIOSCOPY: CPT | Mod: PBBFAC | Performed by: OPHTHALMOLOGY

## 2020-06-08 PROCEDURE — 92020 PR SPECIAL EYE EVAL,GONIOSCOPY: ICD-10-PCS | Mod: S$PBB,,, | Performed by: OPHTHALMOLOGY

## 2020-06-08 PROCEDURE — 92133 HEIDELBERG RETINA TOMOGRAPHY (HRT) - OU - BOTH EYES: ICD-10-PCS | Mod: 26,S$PBB,, | Performed by: OPHTHALMOLOGY

## 2020-06-08 PROCEDURE — 92133 CPTRZD OPH DX IMG PST SGM ON: CPT | Mod: PBBFAC | Performed by: OPHTHALMOLOGY

## 2020-06-08 PROCEDURE — 99999 PR PBB SHADOW E&M-EST. PATIENT-LVL III: CPT | Mod: PBBFAC,,, | Performed by: OPHTHALMOLOGY

## 2020-06-08 PROCEDURE — 92012 INTRM OPH EXAM EST PATIENT: CPT | Mod: S$PBB,,, | Performed by: OPHTHALMOLOGY

## 2020-06-08 PROCEDURE — 99999 PR PBB SHADOW E&M-EST. PATIENT-LVL III: ICD-10-PCS | Mod: PBBFAC,,, | Performed by: OPHTHALMOLOGY

## 2020-06-08 NOTE — PROGRESS NOTES
HPI     Patient here for 10 mo f/u for gonio/HRT. Patient c/o ocular migraines od   > os and denies any other complaints.    Open angle w borderline findings ou  High risk glaucoma ou    HTN  Migraine wo aura and wo status migrainosus, not intractable  DM  Thyroid nodule    Last edited by Keegan Almeida on 6/8/2020 10:37 AM. (History)              Assessment /Plan     For exam results, see Encounter Report.    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   First photos   2018   Treatment / Drops started   none           Family history    + mother / grtand father / uncle / cousins         Glaucoma meds    nne        H/O adverse rxn to glaucoma drops    none        LASERS    none        GLAUCOMA SURGERIES    none        OTHER EYE SURGERIES    none        CDR    0.8/0.7        Tbase    18-20  od // 16-20 os        Tmax    20/20 (outside records)            Ttarget    ?             HVF    1 test 2019 to  2019 - full od // full os        Gonio    +3-4 ou         CCT    588/585        OCT    1 test 2019 to 2019 - RNFL -  Bord TIod // nl  os        HRT    1 test 2020 to 2020 -  MR -  nl od // nl os /// CDR 0.63 od // 0.51 os        Disc photos    2018     - Ttoday    18/18  - Test done today     IOP // HRT - gonio     2. Dominate familial drusen   Mostly outside the arcades ou    See photos - 2018     3. NS   Mild -monitor     4. flaoter (one floater - string like)    Warned of signs of PVD and RD    Pt to call if marked increase in floaters or flashes or curtain defect    5. Breast CA       PLAN   F/U -6- 9 months with HVF / DFE / OCT

## 2020-06-26 NOTE — PROGRESS NOTES
Subjective:       Patient ID: Regine Osorio is a 65 y.o. female.    Chief Complaint: No chief complaint on file.    HPI      Mrs. Alfred returns today for follow up.  Briefly, she is a 65-year-old female who has a history of a stage I triple negative breast cancer that was diagnosed in late 2017.  She underwent a left modified radical mastectomy in [facility] on 11/01/2017 and had a stage IA carcinoma measuring 1.2 cm in greatest diameter.  Resection margins were clear, while four sentinel lymph nodes were negative.  Postoperatively, she received four cycles of docetaxel and cyclophosphamide.  The first cycle was administered on 11/30/2017 and she completed her treatment by 02/01/2018.  She has been followed expectantly and has been MERRY since then.  As mentioned above, last year she relocated to [place] and we have assumed her care.    Her mammogram last February was read as BIRADS I, and a one year follow up was recommended.      Review of Systems    Overall she feels OK.  She states that she has been experiencing intermittent pain in mild discomfort on her left chest wall and the left axilla.  She denies any anxiety, depression, easy bruising, fevers, chills, night  sweats, weight loss, nausea, vomiting, diarrhea, constipation, diplopia, blurred vision, headache, chest pain, palpitations, shortness of breath, breast pain, lower abdominal pain, extremity pain, or difficulty ambulating.  The remainder of the ten-point ROS, including general, skin, lymph, H/N, cardiorespiratory, GI, , Neuro, Endocrine, and psychiatric is negative.     Objective:      Physical Exam      She is alert, oriented to time, place, person, pleasant, well      nourished, in no acute physical distress.                                    VITAL SIGNS:  Reviewed                                      HEENT:  Normal.  There are no nasal, oral, lip, gingival, auricular, lid,    or conjunctival lesions.  Mucosae are moist and pink, and  there is no        thrush.  Pupils are equal, reactive to light and accommodation.              Extraocular muscle movements are intact.  Dentition is good.  There is no frontal or maxillary tenderness.                                     NECK:  Supple without JVD, adenopathy, but she does have thyromegaly.                       LUNGS:  Clear to auscultation without wheezing, rales, or rhonchi.           CARDIOVASCULAR:  Reveals an S1, S2, no murmurs, no rubs, no gallops.         ABDOMEN:  Soft, nontender, without organomegaly.  Bowel sounds are    present.                                                                     EXTREMITIES:  No cyanosis, clubbing, or edema.                               BREASTS:  She is status post left mastectomy with a well-healed periareolar incision.    There are no masses in the right breast.     LYMPHATIC:  There is no cervical, axillary, or supraclavicular adenopathy.   SKIN:  Warm and moist, without petechiae, rashes, induration, or ecchymoses.           NEUROLOGIC:  DTRs are 0-1+ bilaterally, symmetrical, motor function is 5/5,  and cranial nerves are  within normal limits.    Assessment:       1. History of breast cancer in female, clinically MERRY, doing well.      2    Plan:         from the breast cancer standpoint Mrs Alfred is doing well, and remains MERRY.  I have asked her to return and see me in four months.  Her mammogram will be repeated 8 months from now.  Her multiple questions were answered to her satisfaction.

## 2020-06-29 ENCOUNTER — OFFICE VISIT (OUTPATIENT)
Dept: HEMATOLOGY/ONCOLOGY | Facility: CLINIC | Age: 65
End: 2020-06-29
Payer: MEDICAID

## 2020-06-29 VITALS
DIASTOLIC BLOOD PRESSURE: 68 MMHG | BODY MASS INDEX: 42.28 KG/M2 | OXYGEN SATURATION: 98 % | TEMPERATURE: 98 F | WEIGHT: 253.75 LBS | SYSTOLIC BLOOD PRESSURE: 138 MMHG | HEIGHT: 65 IN | RESPIRATION RATE: 18 BRPM | HEART RATE: 100 BPM

## 2020-06-29 DIAGNOSIS — Z85.3 HISTORY OF BREAST CANCER IN FEMALE: Primary | ICD-10-CM

## 2020-06-29 PROCEDURE — 99213 PR OFFICE/OUTPT VISIT, EST, LEVL III, 20-29 MIN: ICD-10-PCS | Mod: S$PBB,,, | Performed by: INTERNAL MEDICINE

## 2020-06-29 PROCEDURE — 99213 OFFICE O/P EST LOW 20 MIN: CPT | Mod: S$PBB,,, | Performed by: INTERNAL MEDICINE

## 2020-06-29 PROCEDURE — 99999 PR PBB SHADOW E&M-EST. PATIENT-LVL V: ICD-10-PCS | Mod: PBBFAC,,, | Performed by: INTERNAL MEDICINE

## 2020-06-29 PROCEDURE — 99999 PR PBB SHADOW E&M-EST. PATIENT-LVL V: CPT | Mod: PBBFAC,,, | Performed by: INTERNAL MEDICINE

## 2020-06-29 PROCEDURE — 99215 OFFICE O/P EST HI 40 MIN: CPT | Mod: PBBFAC | Performed by: INTERNAL MEDICINE

## 2020-08-10 ENCOUNTER — TELEPHONE (OUTPATIENT)
Dept: NEUROLOGY | Facility: CLINIC | Age: 65
End: 2020-08-10

## 2020-08-10 NOTE — TELEPHONE ENCOUNTER
----- Message from Lizzie Nash sent at 8/10/2020  3:50 PM CDT -----  Contact: self @ [phone]  Pt says sh loc returning Vicky's call concerning her appt on 8-20-20 being rescheduled for 8-21-20.  Pls call.

## 2020-08-18 ENCOUNTER — PATIENT OUTREACH (OUTPATIENT)
Dept: ADMINISTRATIVE | Facility: HOSPITAL | Age: 65
End: 2020-08-18

## 2020-08-20 ENCOUNTER — PATIENT OUTREACH (OUTPATIENT)
Dept: ADMINISTRATIVE | Facility: OTHER | Age: 65
End: 2020-08-20

## 2020-08-21 ENCOUNTER — OFFICE VISIT (OUTPATIENT)
Dept: NEUROLOGY | Facility: CLINIC | Age: 65
End: 2020-08-21
Payer: MEDICAID

## 2020-08-21 ENCOUNTER — TELEPHONE (OUTPATIENT)
Dept: SLEEP MEDICINE | Facility: CLINIC | Age: 65
End: 2020-08-21

## 2020-08-21 DIAGNOSIS — G43.009 MIGRAINE WITHOUT AURA AND WITHOUT STATUS MIGRAINOSUS, NOT INTRACTABLE: Primary | ICD-10-CM

## 2020-08-21 DIAGNOSIS — M79.7 FIBROMYALGIA: ICD-10-CM

## 2020-08-21 DIAGNOSIS — E66.01 MORBID OBESITY: ICD-10-CM

## 2020-08-21 DIAGNOSIS — G47.00 INSOMNIA, UNSPECIFIED TYPE: ICD-10-CM

## 2020-08-21 DIAGNOSIS — I10 ESSENTIAL HYPERTENSION: ICD-10-CM

## 2020-08-21 PROCEDURE — 99214 OFFICE O/P EST MOD 30 MIN: CPT | Mod: 95,,, | Performed by: NURSE PRACTITIONER

## 2020-08-21 PROCEDURE — 99214 PR OFFICE/OUTPT VISIT, EST, LEVL IV, 30-39 MIN: ICD-10-PCS | Mod: 95,,, | Performed by: NURSE PRACTITIONER

## 2020-08-21 NOTE — PROGRESS NOTES
Care Everywhere: updated  Immunization: updated  Health Maintenance: updated  Media Review:   Legacy Review:   Order placed:   Upcoming appts:hemoglobin 8/27/2020
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
## 2020-08-21 NOTE — Clinical Note
3 month f/up with me... I put in a referral to sleep  medicine as well if you don't mind scheduling that consult. Thanks!

## 2020-08-21 NOTE — TELEPHONE ENCOUNTER
----- Message from Vicky Ham MA sent at 8/21/2020 11:33 AM CDT -----  Please contact pt in regards to scheduling an appt for sleep consult/referral placed.     Thanks!

## 2020-08-27 ENCOUNTER — LAB VISIT (OUTPATIENT)
Dept: LAB | Facility: HOSPITAL | Age: 65
End: 2020-08-27
Attending: FAMILY MEDICINE
Payer: MEDICAID

## 2020-08-27 DIAGNOSIS — E11.65 TYPE 2 DIABETES MELLITUS WITH HYPERGLYCEMIA, WITHOUT LONG-TERM CURRENT USE OF INSULIN: ICD-10-CM

## 2020-08-27 LAB
ESTIMATED AVG GLUCOSE: 163 MG/DL (ref 68–131)
HBA1C MFR BLD HPLC: 7.3 % (ref 4–5.6)

## 2020-08-27 PROCEDURE — 83036 HEMOGLOBIN GLYCOSYLATED A1C: CPT

## 2020-08-27 PROCEDURE — 36415 COLL VENOUS BLD VENIPUNCTURE: CPT

## 2020-08-28 ENCOUNTER — OFFICE VISIT (OUTPATIENT)
Dept: SLEEP MEDICINE | Facility: CLINIC | Age: 65
End: 2020-08-28
Payer: MEDICAID

## 2020-08-28 DIAGNOSIS — G47.00 INSOMNIA, UNSPECIFIED TYPE: ICD-10-CM

## 2020-08-28 DIAGNOSIS — G47.01 INSOMNIA DUE TO MEDICAL CONDITION: ICD-10-CM

## 2020-08-28 DIAGNOSIS — E11.65 TYPE 2 DIABETES MELLITUS WITH HYPERGLYCEMIA, WITHOUT LONG-TERM CURRENT USE OF INSULIN: ICD-10-CM

## 2020-08-28 DIAGNOSIS — G47.33 OSA (OBSTRUCTIVE SLEEP APNEA): Primary | ICD-10-CM

## 2020-08-28 DIAGNOSIS — M79.7 FIBROMYALGIA: ICD-10-CM

## 2020-08-28 DIAGNOSIS — I10 ESSENTIAL HYPERTENSION: ICD-10-CM

## 2020-08-28 PROCEDURE — 99203 OFFICE O/P NEW LOW 30 MIN: CPT | Mod: 95,,, | Performed by: INTERNAL MEDICINE

## 2020-08-28 PROCEDURE — 99203 PR OFFICE/OUTPT VISIT, NEW, LEVL III, 30-44 MIN: ICD-10-PCS | Mod: 95,,, | Performed by: INTERNAL MEDICINE

## 2020-08-28 NOTE — LETTER
August 28, 2020      Riana Bueno, FN  [address]  [facility] [address]           [id number]  [address]  [facility] [address]  Phone: [phone]          Patient: Regine Osorio   MR Number: [id number]   YOB: 1955   Date of Visit: 8/28/2020       Dear Riana Bueno:    Thank you for referring Regine Osorio to me for evaluation. Attached you will find relevant portions of my assessment and plan of care.    If you have questions, please do not hesitate to call me. I look forward to following Regine Osorio along with you.    Sincerely,    Angelina Stallings MD    Enclosure  CC:  No Recipients    If you would like to receive this communication electronically, please contact [email or web address] or [phone] to request more information on [employer] Link access.    For providers and/or their staff who would like to refer a patient to [facility], please contact us through our one-stop-shop provider referral line, [facility], at [phone].    If you feel you have received this communication in error or would no longer like to receive these types of communications, please e-mail [email or web address]

## 2020-08-31 ENCOUNTER — TELEPHONE (OUTPATIENT)
Dept: SLEEP MEDICINE | Facility: OTHER | Age: 65
End: 2020-08-31

## 2020-09-01 ENCOUNTER — OFFICE VISIT (OUTPATIENT)
Dept: FAMILY MEDICINE | Facility: CLINIC | Age: 65
End: 2020-09-01
Payer: MEDICAID

## 2020-09-01 ENCOUNTER — TELEPHONE (OUTPATIENT)
Dept: SLEEP MEDICINE | Facility: OTHER | Age: 65
End: 2020-09-01

## 2020-09-01 VITALS
TEMPERATURE: 99 F | HEIGHT: 65 IN | BODY MASS INDEX: 41.47 KG/M2 | HEART RATE: 105 BPM | DIASTOLIC BLOOD PRESSURE: 80 MMHG | SYSTOLIC BLOOD PRESSURE: 138 MMHG | WEIGHT: 248.88 LBS | OXYGEN SATURATION: 98 % | RESPIRATION RATE: 16 BRPM

## 2020-09-01 DIAGNOSIS — I10 ESSENTIAL HYPERTENSION: ICD-10-CM

## 2020-09-01 DIAGNOSIS — E11.65 TYPE 2 DIABETES MELLITUS WITH HYPERGLYCEMIA, WITHOUT LONG-TERM CURRENT USE OF INSULIN: Primary | ICD-10-CM

## 2020-09-01 DIAGNOSIS — M48.062 SPINAL STENOSIS OF LUMBAR REGION WITH NEUROGENIC CLAUDICATION: ICD-10-CM

## 2020-09-01 DIAGNOSIS — L21.9 SEBORRHEIC DERMATITIS OF SCALP: ICD-10-CM

## 2020-09-01 DIAGNOSIS — Z00.00 WELL ADULT EXAM: ICD-10-CM

## 2020-09-01 PROCEDURE — 99999 PR PBB SHADOW E&M-EST. PATIENT-LVL V: ICD-10-PCS | Mod: PBBFAC,,, | Performed by: FAMILY MEDICINE

## 2020-09-01 PROCEDURE — 99214 PR OFFICE/OUTPT VISIT, EST, LEVL IV, 30-39 MIN: ICD-10-PCS | Mod: S$PBB,,, | Performed by: FAMILY MEDICINE

## 2020-09-01 PROCEDURE — 99214 OFFICE O/P EST MOD 30 MIN: CPT | Mod: S$PBB,,, | Performed by: FAMILY MEDICINE

## 2020-09-01 PROCEDURE — 99215 OFFICE O/P EST HI 40 MIN: CPT | Mod: PBBFAC,PO | Performed by: FAMILY MEDICINE

## 2020-09-01 PROCEDURE — 99999 PR PBB SHADOW E&M-EST. PATIENT-LVL V: CPT | Mod: PBBFAC,,, | Performed by: FAMILY MEDICINE

## 2020-09-01 RX ORDER — METFORMIN HYDROCHLORIDE 1000 MG/1
1000 TABLET ORAL 2 TIMES DAILY WITH MEALS
Qty: 60 TABLET | Refills: 2 | Status: SHIPPED | OUTPATIENT
Start: 2020-09-01 | End: 2020-12-01 | Stop reason: SDUPTHER

## 2020-09-01 RX ORDER — HYDROCHLOROTHIAZIDE 25 MG/1
25 TABLET ORAL DAILY
Qty: 30 TABLET | Refills: 2 | Status: SHIPPED | OUTPATIENT
Start: 2020-09-01 | End: 2020-12-01 | Stop reason: SDUPTHER

## 2020-09-01 RX ORDER — CLOBETASOL PROPIONATE 0.05 G/100ML
SHAMPOO TOPICAL WEEKLY
Qty: 118 ML | Refills: 5 | Status: SHIPPED | OUTPATIENT
Start: 2020-09-01 | End: 2021-08-02 | Stop reason: SDUPTHER

## 2020-09-01 RX ORDER — LOSARTAN POTASSIUM 50 MG/1
50 TABLET ORAL DAILY
Qty: 90 TABLET | Refills: 0 | Status: SHIPPED | OUTPATIENT
Start: 2020-09-01 | End: 2020-12-01 | Stop reason: SDUPTHER

## 2020-09-01 RX ORDER — GABAPENTIN 600 MG/1
600 TABLET ORAL 3 TIMES DAILY
Qty: 90 TABLET | Refills: 0 | Status: SHIPPED | OUTPATIENT
Start: 2020-09-01 | End: 2021-02-17 | Stop reason: SDUPTHER

## 2020-09-01 RX ORDER — GLIPIZIDE 5 MG/1
5 TABLET, FILM COATED, EXTENDED RELEASE ORAL 2 TIMES DAILY
Qty: 60 TABLET | Refills: 2 | Status: SHIPPED | OUTPATIENT
Start: 2020-09-01 | End: 2020-12-01 | Stop reason: SDUPTHER

## 2020-09-01 NOTE — PROGRESS NOTES
Health Maintenance Due   Topic     Foot Exam  Foot prep to be examine today if PCP have time       Shingles Vaccine (1 of 2) hx chickenpox ; inform pt can get vaccine at pharmacy.    Pneumococcal Vaccine (Highest Risk) (3 of 3 - PPSV23) Consult with PCP     Influenza Vaccine (1) Will call pt once flu vaccine come in to schedule with nurse.

## 2020-09-01 NOTE — PROGRESS NOTES
Chief Complaint   Patient presents with    Hypertension     follow up     Diabetes     follow up     Diabetic Foot Exam       Regine Osorio is a 65 y.o. female who presents per the Chief Complaint.  Pt is known to me and was last seen by me on 5/26/2020.  All known chronic medical issues have been documented.    Hypertension  This is a chronic problem. The current episode started more than 1 year ago. The problem is unchanged. The problem is controlled. Associated symptoms include neck pain. Pertinent negatives include no anxiety, blurred vision, chest pain, headaches, malaise/fatigue, orthopnea, palpitations, peripheral edema, PND, shortness of breath or sweats. Agents associated with hypertension include steroids. Risk factors for coronary artery disease include diabetes mellitus, obesity and post-menopausal state. Past treatments include angiotensin blockers and diuretics. The current treatment provides moderate improvement. There are no compliance problems.  There is no history of angina, kidney disease, CAD/MI, CVA, heart failure, left ventricular hypertrophy, PVD or retinopathy. There is no history of chronic renal disease, coarctation of the aorta, hyperaldosteronism, hypercortisolism, hyperparathyroidism, a hypertension causing med, pheochromocytoma, renovascular disease, sleep apnea or a thyroid problem.   Diabetes  She presents for her follow-up diabetic visit. She has type 2 diabetes mellitus. No MedicAlert identification noted. The initial diagnosis of diabetes was made 10 years ago. Her disease course has been stable. Hypoglycemia symptoms include hunger. Pertinent negatives for hypoglycemia include no confusion, dizziness, headaches, mood changes, nervousness/anxiousness, pallor, seizures, sleepiness, speech difficulty, sweats or tremors. Associated symptoms include fatigue and polyphagia. Pertinent negatives for diabetes include no blurred vision, no chest pain, no foot paresthesias, no foot  ulcerations, no polydipsia, no polyuria, no visual change, no weakness and no weight loss. There are no hypoglycemic complications. Pertinent negatives for hypoglycemia complications include no blackouts, no hospitalization, no nocturnal hypoglycemia, no required assistance and no required glucagon injection. Symptoms are stable. There are no diabetic complications. Pertinent negatives for diabetic complications include no autonomic neuropathy, CVA, heart disease, impotence, nephropathy, peripheral neuropathy, PVD or retinopathy. Risk factors for coronary artery disease include hypertension, obesity, post-menopausal, stress and diabetes mellitus. Current diabetic treatment includes oral agent (dual therapy). She is compliant with treatment all of the time. Her weight is stable. She is following a generally healthy diet. When asked about meal planning, she reported none. She has not had a previous visit with a dietitian. She rarely participates in exercise. She monitors blood glucose at home 1-2 x per day. Blood glucose monitoring compliance is excellent. There is no change in her home blood glucose trend. An ACE inhibitor/angiotensin II receptor blocker is being taken. She sees a podiatrist.Eye exam is current.   Fibromyalgia  This is a chronic problem. The current episode started more than 1 year ago. The problem occurs daily. The problem has been waxing and waning. Associated symptoms include arthralgias, fatigue, myalgias and neck pain. Pertinent negatives include no abdominal pain, anorexia, change in bowel habit, chest pain, chills, congestion, coughing, diaphoresis, fever, headaches, joint swelling, nausea, numbness, rash, sore throat, swollen glands, urinary symptoms, vertigo, visual change, vomiting or weakness. The symptoms are aggravated by bending, exertion, standing and walking. She has tried acetaminophen, oral narcotics, NSAIDs, rest and position changes for the symptoms. The treatment provided  "moderate relief.       ROS  Review of Systems   Constitutional: Positive for fatigue. Negative for activity change, appetite change, chills, diaphoresis, fever, malaise/fatigue, unexpected weight change and weight loss.   HENT: Negative.  Negative for congestion, ear pain, hearing loss, nosebleeds, postnasal drip, rhinorrhea, sinus pressure, sneezing, sore throat and trouble swallowing.    Eyes: Negative for blurred vision, pain, discharge and visual disturbance.   Respiratory: Negative for cough, choking, chest tightness, shortness of breath and wheezing.    Cardiovascular: Negative for chest pain, palpitations, orthopnea, leg swelling and PND.   Gastrointestinal: Negative for abdominal pain, anorexia, blood in stool, change in bowel habit, constipation, diarrhea, nausea and vomiting.   Endocrine: Positive for polyphagia. Negative for polydipsia and polyuria.   Genitourinary: Negative for difficulty urinating, dysuria, frequency, hematuria, impotence, menstrual problem and urgency.   Musculoskeletal: Positive for arthralgias, myalgias and neck pain. Negative for back pain, gait problem and joint swelling.   Skin: Negative.  Negative for pallor and rash.   Allergic/Immunologic: Negative for environmental allergies and food allergies.   Neurological: Negative.  Negative for dizziness, vertigo, tremors, seizures, syncope, speech difficulty, weakness, light-headedness, numbness and headaches.   Psychiatric/Behavioral: Negative.  Negative for confusion, decreased concentration, dysphoric mood and sleep disturbance. The patient is not nervous/anxious.        Physical Exam  Vitals:    09/01/20 0909   BP: 138/80   Pulse:    Resp:    Temp:     Body mass index is 41.42 kg/m².  Weight: 112.9 kg (248 lb 14.4 oz)   Height: 5' 5" (165.1 cm)     Physical Exam  Constitutional:       General: She is not in acute distress.     Appearance: Normal appearance. She is well-developed. She is not ill-appearing, toxic-appearing or " diaphoretic.   HENT:      Head: Normocephalic and atraumatic.      Right Ear: Hearing and external ear normal. No decreased hearing noted.      Left Ear: Hearing and external ear normal. No decreased hearing noted.      Nose: Nose normal. No nasal deformity or rhinorrhea.      Mouth/Throat:      Dentition: Normal dentition. Does not have dentures.      Pharynx: Uvula midline.   Eyes:      General: Lids are normal. No scleral icterus.        Right eye: No foreign body or discharge.         Left eye: No foreign body or discharge.      Conjunctiva/sclera: Conjunctivae normal.      Right eye: No chemosis or exudate.     Left eye: No chemosis or exudate.     Pupils: Pupils are equal, round, and reactive to light.   Neck:      Musculoskeletal: Full passive range of motion without pain, normal range of motion and neck supple.   Cardiovascular:      Rate and Rhythm: Normal rate and regular rhythm.      Heart sounds: Normal heart sounds, S1 normal and S2 normal. No murmur. No friction rub. No gallop.    Pulmonary:      Effort: Pulmonary effort is normal. No accessory muscle usage or respiratory distress.      Breath sounds: Normal breath sounds. No decreased breath sounds, wheezing, rhonchi or rales.   Abdominal:      General: There is no distension.      Palpations: Abdomen is soft. Abdomen is not rigid.      Tenderness: There is no abdominal tenderness. There is no guarding or rebound.   Musculoskeletal:      Lumbar back: She exhibits decreased range of motion, tenderness and pain. She exhibits no bony tenderness, no swelling, no edema, no deformity, no laceration, no spasm and normal pulse.      Right upper leg: She exhibits tenderness. She exhibits no bony tenderness, no swelling, no edema, no deformity and no laceration.      Left upper leg: She exhibits tenderness. She exhibits no bony tenderness, no swelling, no edema, no deformity and no laceration.      Right foot: Normal range of motion. No deformity, bunion,  Charcot foot, foot drop or prominent metatarsal heads.      Left foot: Normal range of motion. No deformity, bunion, Charcot foot, foot drop or prominent metatarsal heads.   Feet:      Right foot:      Protective Sensation: 7 sites tested. 7 sites sensed.      Skin integrity: Skin integrity normal.      Toenail Condition: Right toenails are normal.      Left foot:      Protective Sensation: 7 sites tested. 7 sites sensed.      Skin integrity: Skin integrity normal.      Toenail Condition: Left toenails are normal.   Skin:     General: Skin is warm and dry.      Findings: No rash.   Neurological:      Mental Status: She is alert and oriented to person, place, and time.      Cranial Nerves: No cranial nerve deficit.      Sensory: No sensory deficit.      Motor: No abnormal muscle tone or seizure activity.      Coordination: Coordination normal.      Gait: Gait normal.   Psychiatric:         Attention and Perception: She is attentive.         Speech: Speech normal.         Behavior: Behavior normal. Behavior is cooperative.         Thought Content: Thought content normal.         Judgment: Judgment normal.       Assessment & Plan    Discussion of plan of care including treatment options regarding health and wellness were reviewed and discussed with patient.  Any changes to medication or treatment plan, as well as any screening blood test, imaging, or referrals to specialist, are documented.  Follow up as indicated.     1. Type 2 diabetes mellitus with hyperglycemia, without long-term current use of insulin  Patient is encouraged to follow a diet low in carbohydrates and simple sugars.  Discussed simple vs. complex carbohydrates as well as eating times of certain meals. Advised to focus on good food choices and increased physical activity and encouraged to adhere to medication regimen and/or lifestyle adjustments, and to check glucose level as recommended.  Contact office if glucose levels are not improving over time.   Will monitor HbA1c appropriately.  Sensory exam of the foot is normal, tested with the monofilament. Good pulses, no lesions or ulcers, good peripheral pulses.   - glipiZIDE (GLUCOTROL) 5 MG TR24; Take 1 tablet (5 mg total) by mouth 2 (two) times a day.  Dispense: 60 tablet; Refill: 2  - metFORMIN (GLUCOPHAGE) 1000 MG tablet; Take 1 tablet (1,000 mg total) by mouth 2 (two) times daily with meals.  Dispense: 60 tablet; Refill: 2    2. Essential hypertension  Patient was counseled and encouraged to maintain a low sodium diet, as well as increasing physical activity.  Recommend random BP checks at home on a regular basis.  Repeat BP at end of visit was not necessary. Will continue medication at this time, and follow up in 3-6 months, or sooner if blood pressure begins to increase.     - hydroCHLOROthiazide (HYDRODIURIL) 25 MG tablet; Take 1 tablet (25 mg total) by mouth once daily.  Dispense: 30 tablet; Refill: 2  - losartan (COZAAR) 50 MG tablet; Take 1 tablet (50 mg total) by mouth once daily.  Dispense: 90 tablet; Refill: 0    3. Spinal stenosis of lumbar region with neurogenic claudication  The current medical regimen will be continued at this time as discussed.  Medication prescribed for use only as symptoms require.   - gabapentin (NEURONTIN) 600 MG tablet; Take 1 tablet (600 mg total) by mouth 3 (three) times daily.  Dispense: 90 tablet; Refill: 0    4. Seborrheic dermatitis of scalp  Medication prescribed for use only as symptoms require.   - clobetasoL (CLOBEX) 0.05 % shampoo; Apply topically once a week.  Dispense: 118 mL; Refill: 5    5. Well adult exam  Due for annual exam in 3 months.  Screening test will be ordered and once results available patient will be notified of results and managed accordingly.   - CBC auto differential; Future  - Comprehensive metabolic panel; Future  - Vitamin D; Future  - TSH; Future  - T4, free; Future  - Hemoglobin A1C; Future  - Lipid Panel; Future       Follow up in about 3  months (around 12/1/2020).      ACTIVE MEDICAL ISSUES:  Documented in Problem List    PAST MEDICAL HISTORY  Documented  .  PAST SURGICAL HISTORY:  Documented    SOCIAL HISTORY:  Documented    FAMILY HISTORY:  Documented    ALLERGIES AND MEDICATIONS: updated and reviewed.  Documented    Health Maintenance       Date Due Completion Date    Foot Exam 01/12/1967 ---    HIV Screening 01/12/1972 ---    Shingles Vaccine (1 of 2) 01/12/2007 ---    Pneumococcal Vaccine (Highest Risk) (3 of 3 - PPSV23) 05/18/2018 3/23/2018    Influenza Vaccine (1) 09/01/2020 8/29/2019    Hemoglobin A1c 11/27/2020 8/27/2020    Lipid Panel 12/02/2020 12/2/2019    Eye Exam 06/08/2021 6/8/2020    Mammogram 02/24/2022 2/24/2020    Colorectal Cancer Screening 07/09/2023 7/9/2018    TETANUS VACCINE 11/26/2028 11/26/2018

## 2020-09-03 ENCOUNTER — TELEPHONE (OUTPATIENT)
Dept: HEMATOLOGY/ONCOLOGY | Facility: CLINIC | Age: 65
End: 2020-09-03

## 2020-09-03 ENCOUNTER — TELEPHONE (OUTPATIENT)
Dept: NEUROLOGY | Facility: CLINIC | Age: 65
End: 2020-09-03

## 2020-09-03 NOTE — TELEPHONE ENCOUNTER
Message fwd to .       ----- Message from Luz Hernández sent at 9/3/2020 12:31 PM CDT -----  Regarding: PT  Contact: PT  PT called to make a 4 month follow up appointment. Please call back to schedule     Callback: [phone]

## 2020-09-03 NOTE — TELEPHONE ENCOUNTER
----- Message from Mary Harrington sent at 9/3/2020  1:01 PM CDT -----  Regarding: FW: PT  Contact: PT  Dr. Campbell didn't route chart back in June- due for follow up in Oct   Thanks  ----- Message -----  From: Luz Hernández  Sent: 9/3/2020  12:31 PM CDT  To: Kevon Anaya Staff  Subject: PT                                               PT called to make a 4 month follow up appointment. Please call back to schedule     Callback: [phone]

## 2020-09-03 NOTE — TELEPHONE ENCOUNTER
----- Message from Ethan Perdomo sent at 9/3/2020 12:35 PM CDT -----  Contact: pt @ [phone]  Pt calling to schedule 3 month f/u as [facility] would not allow me to schedule, pt using medicaid.

## 2020-09-04 ENCOUNTER — HOSPITAL ENCOUNTER (OUTPATIENT)
Dept: SLEEP MEDICINE | Facility: OTHER | Age: 65
Discharge: HOME | End: 2020-09-04
Attending: INTERNAL MEDICINE
Payer: MEDICAID

## 2020-09-04 DIAGNOSIS — M79.7 FIBROMYALGIA: ICD-10-CM

## 2020-09-04 DIAGNOSIS — G47.00 INSOMNIA, UNSPECIFIED TYPE: ICD-10-CM

## 2020-09-04 DIAGNOSIS — I10 ESSENTIAL HYPERTENSION: ICD-10-CM

## 2020-09-04 DIAGNOSIS — G47.01 INSOMNIA DUE TO MEDICAL CONDITION: ICD-10-CM

## 2020-09-04 DIAGNOSIS — E11.65 TYPE 2 DIABETES MELLITUS WITH HYPERGLYCEMIA, WITHOUT LONG-TERM CURRENT USE OF INSULIN: ICD-10-CM

## 2020-09-04 DIAGNOSIS — G47.33 OSA (OBSTRUCTIVE SLEEP APNEA): ICD-10-CM

## 2020-09-04 PROCEDURE — 95810 POLYSOM 6/> YRS 4/> PARAM: CPT | Mod: 26,,, | Performed by: INTERNAL MEDICINE

## 2020-09-04 PROCEDURE — 95810 POLYSOM 6/> YRS 4/> PARAM: CPT

## 2020-09-04 PROCEDURE — 95810 PR POLYSOMNOGRAPHY, 4 OR MORE: ICD-10-PCS | Mod: 26,,, | Performed by: INTERNAL MEDICINE

## 2020-09-05 NOTE — PROGRESS NOTES
Regine Cooper  to [facility] on 9/04/2020 for an overnight baseline study. Pt educated on setup and CPAP and answered all of patients questions prior to the start of the study.      Soft snoring observed. Pt did not meet criteria for CPAP. Pt have very few events, mainly hypopneas.     EKG Lead II appears in NSR with some PAC's.    TIR to fix leg lead and nasal cannula.    Technical issues with thorax belt and leg leads    Post study information given to pt in AM.

## 2020-09-08 ENCOUNTER — CLINICAL SUPPORT (OUTPATIENT)
Dept: FAMILY MEDICINE | Facility: CLINIC | Age: 65
End: 2020-09-08
Payer: MEDICAID

## 2020-09-08 DIAGNOSIS — Z23 FLU VACCINE NEED: Primary | ICD-10-CM

## 2020-09-08 PROCEDURE — 90686 IIV4 VACC NO PRSV 0.5 ML IM: CPT | Mod: PBBFAC,PO

## 2020-09-08 PROCEDURE — 99999 PR PBB SHADOW E&M-EST. PATIENT-LVL I: CPT | Mod: PBBFAC,,,

## 2020-09-08 PROCEDURE — 99999 PR PBB SHADOW E&M-EST. PATIENT-LVL I: ICD-10-PCS | Mod: PBBFAC,,,

## 2020-09-08 NOTE — PROGRESS NOTES
Pt tolerated flu vaccine to left deltoid without difficulty; no adverse reaction noted; VIS given

## 2020-09-10 ENCOUNTER — TELEPHONE (OUTPATIENT)
Dept: FAMILY MEDICINE | Facility: CLINIC | Age: 65
End: 2020-09-10

## 2020-09-10 ENCOUNTER — PATIENT MESSAGE (OUTPATIENT)
Dept: SLEEP MEDICINE | Facility: CLINIC | Age: 65
End: 2020-09-10

## 2020-09-10 NOTE — PROCEDURES
[facility]  [facility]  Tel: [phone]    Regine Osorio  1955  BMI 41.3  Study date 2020  [id number]      Diagnostic polysomnogram  Hypopnea definition used AASM 1b (4% desat)    Sleep parameters:  Total recording time 429 minutes, total sleep time 266 minutes and sleep efficiency 62%.   Sleep latency 49.5 minutes and REM sleep latency 232 minutes.   Stage NREM1 17.1%, stage NREM2 76.3%, stage NREM 3 0.2% and REM 6.4% of total sleep time.   Wake after sleep onset 113.5 minutes.    Respiratory parameters:  23 obstructive respiratory events were present with AHI 5.2 events per hour of sleep.   Oxygen saturation cyndee with events was 81%.   Baseline oxygen saturation ranged from 94% to 97%.   Baseline hypoxemia was not present.   mild snoring was present.   The Respiratory Effort Related Arousal Index was 2.3 events per hour of sleep.   Events were more frequent during REM sleep, with REM AHI 52.9 events per hour of REM sleep.   Sleep in the supine position was present.    The supine AHI was 14.1 and lateral AHI 5.4.            EM periodic limb movements during sleep were present with Periodic Limb Movement Index 20.5 per hour of sleep.   11 were associated with arousal, with Periodic Limb Movement Arousal Index 2.5 per hour of sleep.       ECG:  Mean pulse rate during sleep was 75 beats per minute with Occasional PVCs    EEG:  Expanded seizure montage was not used.   No seizure activity was noted.    Impression:  mild Obstructive Sleep Apnea  mild Periodic Limb Movement Disorder      Recommendations:  Trial of home auto CPAP is recommended.   The patient will be followed up after the initiation of therapy to assess for symptom resolution, tolerance and compliance., Patient has follow up with Sleep Medicine and Treatment could also be considered with mandibular repositioning device.       (This Sleep Study was interpreted by a Board Certified Sleep Specialist who conducted an  epoch-by-epoch review of the entire raw data recording.)     (The indication for this sleep study was reviewed and deemed appropriate by AASM Practice Parameters or other reasons by a Board Certified Sleep Specialist.)

## 2020-09-10 NOTE — TELEPHONE ENCOUNTER
Prior authorization completed in covermymeds for onetouch verio test strips; awaiting response from insurance

## 2020-09-16 ENCOUNTER — TELEPHONE (OUTPATIENT)
Dept: FAMILY MEDICINE | Facility: CLINIC | Age: 65
End: 2020-09-16

## 2020-09-16 NOTE — TELEPHONE ENCOUNTER
Pt called stating she received letter regarding prior authorization and that a peer to peer is needed, ref# [id number]; phone number [phone]; I called and LM requesting peer to peer; voicemail states should receive return call in 48-72 hours    Pt also stated that letter said they will cover 50 strips for 30 day period; I called pharmacy to change directions to once daily with qty 50 and was told not covered

## 2020-09-16 NOTE — TELEPHONE ENCOUNTER
----- Message from Isa Worthy sent at 9/15/2020 10:59 AM CDT -----  Regarding: self [phone]  .Type: Patient Call Back    Who called: self     What is the request in detail: Pt its requesting to speak with Bailey in regards to PA onetouch verio test strips    Can the clinic reply by [facility]? Call back     Would the patient rather a call back or a response via [facility]?  Call back     Best call back number: [phone]

## 2020-09-18 ENCOUNTER — PATIENT MESSAGE (OUTPATIENT)
Dept: SLEEP MEDICINE | Facility: CLINIC | Age: 65
End: 2020-09-18

## 2020-09-18 DIAGNOSIS — I10 ESSENTIAL HYPERTENSION: ICD-10-CM

## 2020-09-18 DIAGNOSIS — G47.01 INSOMNIA DUE TO MEDICAL CONDITION: ICD-10-CM

## 2020-09-18 DIAGNOSIS — G47.33 OSA (OBSTRUCTIVE SLEEP APNEA): Primary | ICD-10-CM

## 2020-09-22 ENCOUNTER — PATIENT MESSAGE (OUTPATIENT)
Dept: SLEEP MEDICINE | Facility: CLINIC | Age: 65
End: 2020-09-22

## 2020-10-23 NOTE — PROGRESS NOTES
Subjective:       Patient ID: Regine Osroio is a 65 y.o. female.    Chief Complaint: No chief complaint on file.    HPI      Mrs. Alfred returns today for follow up.  Briefly, she is a 65-year-old female who has a history of a stage I triple negative breast cancer that was diagnosed in late 2017.  She underwent a left modified radical mastectomy in [facility] on 11/01/2017 and had a stage IA carcinoma measuring 1.2 cm in greatest diameter.  Resection margins were clear, while four sentinel lymph nodes were negative.  Postoperatively, she received four cycles of docetaxel and cyclophosphamide.  The first cycle was administered on 11/30/2017 and she completed her treatment by 02/01/2018.  She has been followed expectantly and has been MERRY since then.  As mentioned above, last year she relocated to [place] and we have assumed her care.    Her mammogram last February was read as BIRADS I, and a one year follow up was recommended.      Review of Systems    Overall she feels OK.  She states that she has been experiencing intermittent pain in mild discomfort on her left chest wall and the left axilla.  She denies any anxiety, depression, easy bruising, fevers, chills, night  sweats, weight loss, nausea, vomiting, diarrhea, constipation, diplopia, blurred vision, headache, chest pain, palpitations, shortness of breath, breast pain, lower abdominal pain, extremity pain, or difficulty ambulating.  The remainder of the ten-point ROS, including general, skin, lymph, H/N, cardiorespiratory, GI, , Neuro, Endocrine, and psychiatric is negative.     Objective:      Physical Exam      She is alert, oriented to time, place, person, pleasant, well      nourished, in no acute physical distress.                                    VITAL SIGNS:  Reviewed                                      HEENT:  Normal.  There are no nasal, oral, lip, gingival, auricular, lid,    or conjunctival lesions.  Mucosae are moist and pink, and  there is no        thrush.  Pupils are equal, reactive to light and accommodation.              Extraocular muscle movements are intact.  Dentition is good.  There is no frontal or maxillary tenderness.                                     NECK:  Supple without JVD, adenopathy, but she does have thyromegaly.                       LUNGS:  Clear to auscultation without wheezing, rales, or rhonchi.           CARDIOVASCULAR:  Reveals an S1, S2, no murmurs, no rubs, no gallops.         ABDOMEN:  Soft, nontender, without organomegaly.  Bowel sounds are    present.                                                                     EXTREMITIES:  No cyanosis, clubbing, or edema.                               BREASTS:  She is status post left mastectomy with a well-healed periareolar incision.    There are no masses in the right breast.     LYMPHATIC:  There is no cervical, axillary, or supraclavicular adenopathy.   SKIN:  Warm and moist, without petechiae, rashes, induration, or ecchymoses.           NEUROLOGIC:  DTRs are 0-1+ bilaterally, symmetrical, motor function is 5/5,  and cranial nerves are  within normal limits.    Assessment:       1. History of breast cancer in female, clinically MERRY, doing well.      2    Plan:         From the breast cancer standpoint Mrs Alfred is doing well, and remains MERRY.  I have asked her to return and see me in four months with her yearly mammogram.  Her multiple questions were answered to her satisfaction.

## 2020-10-26 ENCOUNTER — OFFICE VISIT (OUTPATIENT)
Dept: HEMATOLOGY/ONCOLOGY | Facility: CLINIC | Age: 65
End: 2020-10-26
Payer: MEDICAID

## 2020-10-26 VITALS
BODY MASS INDEX: 40.91 KG/M2 | OXYGEN SATURATION: 96 % | HEART RATE: 94 BPM | HEIGHT: 65 IN | TEMPERATURE: 98 F | SYSTOLIC BLOOD PRESSURE: 132 MMHG | RESPIRATION RATE: 16 BRPM | WEIGHT: 245.56 LBS | DIASTOLIC BLOOD PRESSURE: 63 MMHG

## 2020-10-26 DIAGNOSIS — Z85.3 HISTORY OF BREAST CANCER IN FEMALE: Primary | ICD-10-CM

## 2020-10-26 PROCEDURE — 99213 OFFICE O/P EST LOW 20 MIN: CPT | Mod: S$PBB,,, | Performed by: INTERNAL MEDICINE

## 2020-10-26 PROCEDURE — 99999 PR PBB SHADOW E&M-EST. PATIENT-LVL V: ICD-10-PCS | Mod: PBBFAC,,, | Performed by: INTERNAL MEDICINE

## 2020-10-26 PROCEDURE — 99215 OFFICE O/P EST HI 40 MIN: CPT | Mod: PBBFAC | Performed by: INTERNAL MEDICINE

## 2020-10-26 PROCEDURE — 99999 PR PBB SHADOW E&M-EST. PATIENT-LVL V: CPT | Mod: PBBFAC,,, | Performed by: INTERNAL MEDICINE

## 2020-10-26 PROCEDURE — 99213 PR OFFICE/OUTPT VISIT, EST, LEVL III, 20-29 MIN: ICD-10-PCS | Mod: S$PBB,,, | Performed by: INTERNAL MEDICINE

## 2020-11-06 ENCOUNTER — TELEPHONE (OUTPATIENT)
Dept: SLEEP MEDICINE | Facility: CLINIC | Age: 65
End: 2020-11-06

## 2020-11-06 NOTE — TELEPHONE ENCOUNTER
----- Message from Andre Baptiste sent at 11/6/2020 11:53 AM CST -----  Regarding: call back  Ms.Le Centeno called in to speak with someone at the office regarding scheduling an appointment. She would like a call back and can be reached at    [phone]

## 2020-11-24 ENCOUNTER — LAB VISIT (OUTPATIENT)
Dept: LAB | Facility: HOSPITAL | Age: 65
End: 2020-11-24
Attending: FAMILY MEDICINE
Payer: MEDICAID

## 2020-11-24 DIAGNOSIS — Z00.00 WELL ADULT EXAM: ICD-10-CM

## 2020-11-24 LAB
25(OH)D3+25(OH)D2 SERPL-MCNC: 60 NG/ML (ref 30–96)
ALBUMIN SERPL BCP-MCNC: 4 G/DL (ref 3.5–5.2)
ALP SERPL-CCNC: 61 U/L (ref 55–135)
ALT SERPL W/O P-5'-P-CCNC: 37 U/L (ref 10–44)
ANION GAP SERPL CALC-SCNC: 12 MMOL/L (ref 8–16)
AST SERPL-CCNC: 38 U/L (ref 10–40)
BASOPHILS # BLD AUTO: 0.03 K/UL (ref 0–0.2)
BASOPHILS NFR BLD: 0.4 % (ref 0–1.9)
BILIRUB SERPL-MCNC: 0.7 MG/DL (ref 0.1–1)
BUN SERPL-MCNC: 9 MG/DL (ref 8–23)
CALCIUM SERPL-MCNC: 9.3 MG/DL (ref 8.7–10.5)
CHLORIDE SERPL-SCNC: 102 MMOL/L (ref 95–110)
CHOLEST SERPL-MCNC: 145 MG/DL (ref 120–199)
CHOLEST/HDLC SERPL: 3.5 {RATIO} (ref 2–5)
CO2 SERPL-SCNC: 29 MMOL/L (ref 23–29)
CREAT SERPL-MCNC: 0.7 MG/DL (ref 0.5–1.4)
DIFFERENTIAL METHOD: ABNORMAL
EOSINOPHIL # BLD AUTO: 0.3 K/UL (ref 0–0.5)
EOSINOPHIL NFR BLD: 3.7 % (ref 0–8)
ERYTHROCYTE [DISTWIDTH] IN BLOOD BY AUTOMATED COUNT: 14.6 % (ref 11.5–14.5)
EST. GFR  (AFRICAN AMERICAN): >60 ML/MIN/1.73 M^2
EST. GFR  (NON AFRICAN AMERICAN): >60 ML/MIN/1.73 M^2
ESTIMATED AVG GLUCOSE: 126 MG/DL (ref 68–131)
GLUCOSE SERPL-MCNC: 107 MG/DL (ref 70–110)
HBA1C MFR BLD HPLC: 6 % (ref 4–5.6)
HCT VFR BLD AUTO: 39.3 % (ref 37–48.5)
HDLC SERPL-MCNC: 42 MG/DL (ref 40–75)
HDLC SERPL: 29 % (ref 20–50)
HGB BLD-MCNC: 11.9 G/DL (ref 12–16)
IMM GRANULOCYTES # BLD AUTO: 0.02 K/UL (ref 0–0.04)
IMM GRANULOCYTES NFR BLD AUTO: 0.3 % (ref 0–0.5)
LDLC SERPL CALC-MCNC: 83.6 MG/DL (ref 63–159)
LYMPHOCYTES # BLD AUTO: 1.9 K/UL (ref 1–4.8)
LYMPHOCYTES NFR BLD: 27.5 % (ref 18–48)
MCH RBC QN AUTO: 25.3 PG (ref 27–31)
MCHC RBC AUTO-ENTMCNC: 30.3 G/DL (ref 32–36)
MCV RBC AUTO: 83 FL (ref 82–98)
MONOCYTES # BLD AUTO: 0.4 K/UL (ref 0.3–1)
MONOCYTES NFR BLD: 6.4 % (ref 4–15)
NEUTROPHILS # BLD AUTO: 4.2 K/UL (ref 1.8–7.7)
NEUTROPHILS NFR BLD: 61.7 % (ref 38–73)
NONHDLC SERPL-MCNC: 103 MG/DL
NRBC BLD-RTO: 0 /100 WBC
PLATELET # BLD AUTO: 356 K/UL (ref 150–350)
PMV BLD AUTO: 10.4 FL (ref 9.2–12.9)
POTASSIUM SERPL-SCNC: 3.6 MMOL/L (ref 3.5–5.1)
PROT SERPL-MCNC: 7.4 G/DL (ref 6–8.4)
RBC # BLD AUTO: 4.71 M/UL (ref 4–5.4)
SODIUM SERPL-SCNC: 143 MMOL/L (ref 136–145)
T4 FREE SERPL-MCNC: 0.92 NG/DL (ref 0.71–1.51)
TRIGL SERPL-MCNC: 97 MG/DL (ref 30–150)
TSH SERPL DL<=0.005 MIU/L-ACNC: 1.22 UIU/ML (ref 0.4–4)
WBC # BLD AUTO: 6.77 K/UL (ref 3.9–12.7)

## 2020-11-24 PROCEDURE — 83036 HEMOGLOBIN GLYCOSYLATED A1C: CPT

## 2020-11-24 PROCEDURE — 84439 ASSAY OF FREE THYROXINE: CPT

## 2020-11-24 PROCEDURE — 85025 COMPLETE CBC W/AUTO DIFF WBC: CPT

## 2020-11-24 PROCEDURE — 84443 ASSAY THYROID STIM HORMONE: CPT

## 2020-11-24 PROCEDURE — 80053 COMPREHEN METABOLIC PANEL: CPT

## 2020-11-24 PROCEDURE — 36415 COLL VENOUS BLD VENIPUNCTURE: CPT

## 2020-11-24 PROCEDURE — 80061 LIPID PANEL: CPT

## 2020-11-24 PROCEDURE — 82306 VITAMIN D 25 HYDROXY: CPT

## 2020-11-30 ENCOUNTER — TELEPHONE (OUTPATIENT)
Dept: OBSTETRICS AND GYNECOLOGY | Facility: CLINIC | Age: 65
End: 2020-11-30

## 2020-11-30 DIAGNOSIS — R10.2 PELVIC PAIN: Primary | ICD-10-CM

## 2020-11-30 NOTE — TELEPHONE ENCOUNTER
Patient phones to schedule her annual well woman exam. She would like to repeat pelvic ultrasound given last year's findings (dilated right fallopian tube) as right pelvis is causing mild and intermittent discomfort.     Patient's Pelvic US scheduled for 12/4/20 at 1245, [facility] location and Dr. Allen's appt for WWE scheduled 12/11/20 at 1430. Appts made per patient's requested availability.     Patient is aware to phone our office if symptoms worsen or persist for prompt follow-up. She is also aware of s/s warranting ER attention. MATTHEW Clements.

## 2020-11-30 NOTE — TELEPHONE ENCOUNTER
----- Message from Tiffanie Foss MA sent at 11/27/2020  2:01 PM CST -----    ----- Message -----  From: Francesco Preciado  Sent: 11/27/2020   1:55 PM CST  To: Tiffany RAHMAN Staff    PLEASE CALL PT SHE IS HAVING SOME  PROBLEMS NEEDS TO SCHEDULE A APPT NOTHING AVAILABLE THAT I CAN SCHEDULE [phone]

## 2020-12-01 ENCOUNTER — OFFICE VISIT (OUTPATIENT)
Dept: FAMILY MEDICINE | Facility: CLINIC | Age: 65
End: 2020-12-01
Payer: MEDICAID

## 2020-12-01 ENCOUNTER — TELEPHONE (OUTPATIENT)
Dept: HEMATOLOGY/ONCOLOGY | Facility: CLINIC | Age: 65
End: 2020-12-01

## 2020-12-01 VITALS
WEIGHT: 240.94 LBS | SYSTOLIC BLOOD PRESSURE: 132 MMHG | HEIGHT: 65 IN | BODY MASS INDEX: 40.14 KG/M2 | DIASTOLIC BLOOD PRESSURE: 80 MMHG | RESPIRATION RATE: 16 BRPM | HEART RATE: 107 BPM | OXYGEN SATURATION: 98 % | TEMPERATURE: 99 F

## 2020-12-01 DIAGNOSIS — I10 ESSENTIAL HYPERTENSION: Primary | ICD-10-CM

## 2020-12-01 DIAGNOSIS — G43.009 MIGRAINE WITHOUT AURA AND WITHOUT STATUS MIGRAINOSUS, NOT INTRACTABLE: ICD-10-CM

## 2020-12-01 DIAGNOSIS — M79.7 FIBROMYALGIA: ICD-10-CM

## 2020-12-01 DIAGNOSIS — G47.33 OBSTRUCTIVE SLEEP APNEA HYPOPNEA, MILD: ICD-10-CM

## 2020-12-01 DIAGNOSIS — E11.65 TYPE 2 DIABETES MELLITUS WITH HYPERGLYCEMIA, WITHOUT LONG-TERM CURRENT USE OF INSULIN: ICD-10-CM

## 2020-12-01 PROCEDURE — 99213 OFFICE O/P EST LOW 20 MIN: CPT | Mod: PBBFAC,PO | Performed by: FAMILY MEDICINE

## 2020-12-01 PROCEDURE — 99214 OFFICE O/P EST MOD 30 MIN: CPT | Mod: S$PBB,,, | Performed by: FAMILY MEDICINE

## 2020-12-01 PROCEDURE — 99999 PR PBB SHADOW E&M-EST. PATIENT-LVL III: CPT | Mod: PBBFAC,,, | Performed by: FAMILY MEDICINE

## 2020-12-01 PROCEDURE — 99214 PR OFFICE/OUTPT VISIT, EST, LEVL IV, 30-39 MIN: ICD-10-PCS | Mod: S$PBB,,, | Performed by: FAMILY MEDICINE

## 2020-12-01 PROCEDURE — 99999 PR PBB SHADOW E&M-EST. PATIENT-LVL III: ICD-10-PCS | Mod: PBBFAC,,, | Performed by: FAMILY MEDICINE

## 2020-12-01 RX ORDER — LOSARTAN POTASSIUM 50 MG/1
50 TABLET ORAL DAILY
Qty: 90 TABLET | Refills: 0 | Status: SHIPPED | OUTPATIENT
Start: 2020-12-01 | End: 2021-03-05 | Stop reason: SDUPTHER

## 2020-12-01 RX ORDER — HYDROCHLOROTHIAZIDE 25 MG/1
25 TABLET ORAL DAILY
Qty: 90 TABLET | Refills: 1 | Status: SHIPPED | OUTPATIENT
Start: 2020-12-01 | End: 2021-03-05 | Stop reason: SDUPTHER

## 2020-12-01 RX ORDER — METFORMIN HYDROCHLORIDE 1000 MG/1
1000 TABLET ORAL 2 TIMES DAILY WITH MEALS
Qty: 180 TABLET | Refills: 1 | Status: SHIPPED | OUTPATIENT
Start: 2020-12-01 | End: 2021-07-27

## 2020-12-01 RX ORDER — GLIPIZIDE 5 MG/1
5 TABLET, FILM COATED, EXTENDED RELEASE ORAL 2 TIMES DAILY
Qty: 180 TABLET | Refills: 1 | Status: SHIPPED | OUTPATIENT
Start: 2020-12-01 | End: 2021-05-26 | Stop reason: SDUPTHER

## 2020-12-01 NOTE — PROGRESS NOTES
Chief Complaint   Patient presents with    3 MO F/U    STOMACH ISSUES       Regine Osorio is a 65 y.o. female who presents per the Chief Complaint.  Pt is known to me and was last seen by me on 9/1/2020.  All known chronic medical issues have been documented.    Hypertension  This is a chronic problem. The current episode started more than 1 year ago. The problem is unchanged. The problem is controlled. Associated symptoms include neck pain. Pertinent negatives include no anxiety, blurred vision, chest pain, headaches, malaise/fatigue, orthopnea, palpitations, peripheral edema, PND, shortness of breath or sweats. Agents associated with hypertension include steroids. Risk factors for coronary artery disease include diabetes mellitus, obesity and post-menopausal state. Past treatments include angiotensin blockers and diuretics. The current treatment provides moderate improvement. There are no compliance problems.  There is no history of angina, kidney disease, CAD/MI, CVA, heart failure, left ventricular hypertrophy, PVD or retinopathy. There is no history of chronic renal disease, coarctation of the aorta, hyperaldosteronism, hypercortisolism, hyperparathyroidism, a hypertension causing med, pheochromocytoma, renovascular disease, sleep apnea or a thyroid problem.   Diabetes  She presents for her follow-up diabetic visit. She has type 2 diabetes mellitus. No MedicAlert identification noted. The initial diagnosis of diabetes was made 10 years ago. Her disease course has been stable. Hypoglycemia symptoms include hunger. Pertinent negatives for hypoglycemia include no confusion, dizziness, headaches, mood changes, nervousness/anxiousness, pallor, seizures, sleepiness, speech difficulty, sweats or tremors. Associated symptoms include fatigue and polyphagia. Pertinent negatives for diabetes include no blurred vision, no chest pain, no foot paresthesias, no foot ulcerations, no polydipsia, no polyuria, no visual  change, no weakness and no weight loss. There are no hypoglycemic complications. Pertinent negatives for hypoglycemia complications include no blackouts, no hospitalization, no nocturnal hypoglycemia, no required assistance and no required glucagon injection. Symptoms are stable. There are no diabetic complications. Pertinent negatives for diabetic complications include no autonomic neuropathy, CVA, heart disease, impotence, nephropathy, peripheral neuropathy, PVD or retinopathy. Risk factors for coronary artery disease include hypertension, obesity, post-menopausal, stress and diabetes mellitus. Current diabetic treatment includes oral agent (dual therapy). She is compliant with treatment all of the time. Her weight is stable. She is following a generally healthy diet. When asked about meal planning, she reported none. She has not had a previous visit with a dietitian. She rarely participates in exercise. She monitors blood glucose at home 1-2 x per day. Blood glucose monitoring compliance is excellent. There is no change in her home blood glucose trend. An ACE inhibitor/angiotensin II receptor blocker is being taken. She sees a podiatrist.Eye exam is current.   Fibromyalgia  This is a chronic problem. The current episode started more than 1 year ago. The problem occurs daily. The problem has been waxing and waning. Associated symptoms include arthralgias, fatigue, myalgias and neck pain. Pertinent negatives include no abdominal pain, anorexia, change in bowel habit, chest pain, chills, congestion, coughing, diaphoresis, fever, headaches, joint swelling, nausea, numbness, rash, sore throat, swollen glands, urinary symptoms, vertigo, visual change, vomiting or weakness. The symptoms are aggravated by bending, exertion, standing and walking. She has tried acetaminophen, oral narcotics, NSAIDs, rest and position changes for the symptoms. The treatment provided moderate relief.     ROS  Review of Systems  "  Constitutional: Positive for fatigue. Negative for chills, diaphoresis, fever, malaise/fatigue and weight loss.   HENT: Negative for congestion and sore throat.    Eyes: Negative for blurred vision.   Respiratory: Negative for cough and shortness of breath.    Cardiovascular: Negative for chest pain, palpitations, orthopnea and PND.   Gastrointestinal: Negative for abdominal pain, anorexia, change in bowel habit, nausea and vomiting.   Endocrine: Positive for polyphagia. Negative for polydipsia and polyuria.   Genitourinary: Negative for impotence.   Musculoskeletal: Positive for arthralgias, myalgias and neck pain. Negative for joint swelling.   Skin: Negative for pallor and rash.   Neurological: Negative for dizziness, vertigo, tremors, seizures, speech difficulty, weakness, numbness and headaches.   Psychiatric/Behavioral: Negative for confusion. The patient is not nervous/anxious.      Physical Exam  Vitals:    12/01/20 0918   BP: 132/80   Pulse: 107   Resp: 16   Temp: 98.6 °F (37 °C)    Body mass index is 40.1 kg/m².  Weight: 109.3 kg (240 lb 15.4 oz)   Height: 5' 5" (165.1 cm)     Physical Exam  Constitutional:       General: She is not in acute distress.     Appearance: Normal appearance. She is well-developed. She is not ill-appearing, toxic-appearing or diaphoretic.   HENT:      Head: Normocephalic and atraumatic.      Right Ear: Hearing and external ear normal. No decreased hearing noted.      Left Ear: Hearing and external ear normal. No decreased hearing noted.      Nose: Nose normal. No nasal deformity or rhinorrhea.      Mouth/Throat:      Dentition: Normal dentition. Does not have dentures.      Pharynx: Uvula midline.   Eyes:      General: Lids are normal. No scleral icterus.        Right eye: No foreign body or discharge.         Left eye: No foreign body or discharge.      Conjunctiva/sclera: Conjunctivae normal.      Right eye: No chemosis or exudate.     Left eye: No chemosis or exudate.     " Pupils: Pupils are equal, round, and reactive to light.   Neck:      Musculoskeletal: Full passive range of motion without pain, normal range of motion and neck supple.   Cardiovascular:      Rate and Rhythm: Normal rate and regular rhythm.      Heart sounds: Normal heart sounds, S1 normal and S2 normal. No murmur. No friction rub. No gallop.    Pulmonary:      Effort: Pulmonary effort is normal. No accessory muscle usage or respiratory distress.      Breath sounds: Normal breath sounds. No decreased breath sounds, wheezing, rhonchi or rales.   Abdominal:      General: There is no distension.      Palpations: Abdomen is soft. Abdomen is not rigid.      Tenderness: There is no abdominal tenderness. There is no guarding or rebound.       Musculoskeletal:      Lumbar back: She exhibits decreased range of motion, tenderness and pain. She exhibits no bony tenderness, no swelling, no edema, no deformity, no laceration, no spasm and normal pulse.      Right upper leg: She exhibits tenderness. She exhibits no bony tenderness, no swelling, no edema, no deformity and no laceration.      Left upper leg: She exhibits tenderness. She exhibits no bony tenderness, no swelling, no edema, no deformity and no laceration.      Right foot: Normal range of motion. No deformity, bunion, Charcot foot, foot drop or prominent metatarsal heads.      Left foot: Normal range of motion. No deformity, bunion, Charcot foot, foot drop or prominent metatarsal heads.   Feet:      Right foot:      Protective Sensation: 7 sites tested. 7 sites sensed.      Skin integrity: Skin integrity normal.      Toenail Condition: Right toenails are normal.      Left foot:      Protective Sensation: 7 sites tested. 7 sites sensed.      Skin integrity: Skin integrity normal.      Toenail Condition: Left toenails are normal.   Skin:     General: Skin is warm and dry.      Findings: No rash.   Neurological:      Mental Status: She is alert and oriented to person,  place, and time.      Cranial Nerves: No cranial nerve deficit.      Sensory: No sensory deficit.      Motor: No abnormal muscle tone or seizure activity.      Coordination: Coordination normal.      Gait: Gait normal.   Psychiatric:         Attention and Perception: She is attentive.         Speech: Speech normal.         Behavior: Behavior normal. Behavior is cooperative.         Thought Content: Thought content normal.         Judgment: Judgment normal.       Assessment & Plan    Discussion of plan of care including treatment options regarding health and wellness were reviewed and discussed with patient.  Any changes to medication or treatment plan, as well as any screening blood test, imaging, or referrals to specialist, are documented.  Follow up as indicated.     1. Essential hypertension  Patient was counseled and encouraged to maintain a low sodium diet, as well as increasing physical activity.  Recommend random BP checks at home on a regular basis.  Repeat BP at end of visit was not necessary. Will continue medication at this time, and follow up in 3-6 months, or sooner if blood pressure begins to increase.     - losartan (COZAAR) 50 MG tablet; Take 1 tablet (50 mg total) by mouth once daily.  Dispense: 90 tablet; Refill: 0  - hydroCHLOROthiazide (HYDRODIURIL) 25 MG tablet; Take 1 tablet (25 mg total) by mouth once daily.  Dispense: 90 tablet; Refill: 1    2. Type 2 diabetes mellitus with hyperglycemia, without long-term current use of insulin  Patient is encouraged to follow a diet low in carbohydrates and simple sugars.  Discussed simple vs. complex carbohydrates as well as eating times of certain meals. Advised to focus on good food choices and increased physical activity and encouraged to adhere to medication regimen and/or lifestyle adjustments, and to check glucose level as recommended.  Contact office if glucose levels are not improving over time.  Will monitor HbA1c appropriately.   - glipiZIDE  (GLUCOTROL) 5 MG TR24; Take 1 tablet (5 mg total) by mouth 2 (two) times a day.  Dispense: 180 tablet; Refill: 1  - metFORMIN (GLUCOPHAGE) 1000 MG tablet; Take 1 tablet (1,000 mg total) by mouth 2 (two) times daily with meals.  Dispense: 180 tablet; Refill: 1  - Hemoglobin A1C; Future  - Basic Metabolic Panel; Future    3. Fibromyalgia  Stable; no therapeutic changes at this time.     4. Migraine without aura and without status migrainosus, not intractable  Stable; no therapeutic changes at this time.  The current medical regimen will be continued at this time as discussed.     5. Obstructive sleep apnea hypopnea, mild  Stable; no therapeutic changes at this time.  Patient recently was started on CPAP with noted benefit.       Follow up in about 3 months (around 3/1/2021) for Chronic Disease Management.      ACTIVE MEDICAL ISSUES:  Documented in Problem List    PAST MEDICAL HISTORY  Documented    PAST SURGICAL HISTORY:  Documented    SOCIAL HISTORY:  Documented    FAMILY HISTORY:  Documented    ALLERGIES AND MEDICATIONS: updated and reviewed.  Documented    Health Maintenance       Date Due Completion Date    Foot Exam 01/12/1967 ---    HIV Screening 01/12/1972 ---    Shingles Vaccine (1 of 2) 01/12/2007 ---    Pneumococcal Vaccine (Highest Risk) (3 of 3 - PPSV23) 05/18/2018 3/23/2018    Hemoglobin A1c 02/24/2021 11/24/2020    Eye Exam 06/08/2021 6/8/2020    Lipid Panel 11/24/2021 11/24/2020    Mammogram 02/24/2022 2/24/2020    Colorectal Cancer Screening 07/09/2023 7/9/2018    TETANUS VACCINE 11/26/2028 11/26/2018

## 2020-12-01 NOTE — TELEPHONE ENCOUNTER
"----- Message from Farida Elizabeth sent at 12/1/2020  7:57 AM CST -----  Scheduling Request    Patient Status: Establish  Scheduling Appt :Regine  Time/Date Preference: Mornings unavailable on Wednesdays   [employer] Active User?: Yes   Relationship to Patient?: Self   Contact Preference?: [phone]  Treating Provider: Dr Campbell   Do you feel you need to be seen today? No     Additional Notes:  4 mth follow up in Feb for Labs & Establish     "Thank you for all that you do for our patients'"           "

## 2020-12-03 ENCOUNTER — PATIENT MESSAGE (OUTPATIENT)
Dept: FAMILY MEDICINE | Facility: CLINIC | Age: 65
End: 2020-12-03

## 2020-12-03 ENCOUNTER — PATIENT OUTREACH (OUTPATIENT)
Dept: ADMINISTRATIVE | Facility: OTHER | Age: 65
End: 2020-12-03

## 2020-12-04 ENCOUNTER — OFFICE VISIT (OUTPATIENT)
Dept: NEUROLOGY | Facility: CLINIC | Age: 65
End: 2020-12-04
Payer: MEDICAID

## 2020-12-04 ENCOUNTER — HOSPITAL ENCOUNTER (OUTPATIENT)
Dept: RADIOLOGY | Facility: OTHER | Age: 65
Discharge: HOME | End: 2020-12-04
Attending: OBSTETRICS & GYNECOLOGY
Payer: MEDICAID

## 2020-12-04 DIAGNOSIS — G47.33 OBSTRUCTIVE SLEEP APNEA HYPOPNEA, MILD: ICD-10-CM

## 2020-12-04 DIAGNOSIS — G43.009 MIGRAINE WITHOUT AURA AND WITHOUT STATUS MIGRAINOSUS, NOT INTRACTABLE: Primary | ICD-10-CM

## 2020-12-04 DIAGNOSIS — I10 ESSENTIAL HYPERTENSION: ICD-10-CM

## 2020-12-04 DIAGNOSIS — Z85.3 HISTORY OF BREAST CANCER IN FEMALE: ICD-10-CM

## 2020-12-04 DIAGNOSIS — R10.2 PELVIC PAIN: ICD-10-CM

## 2020-12-04 DIAGNOSIS — E11.65 TYPE 2 DIABETES MELLITUS WITH HYPERGLYCEMIA, WITHOUT LONG-TERM CURRENT USE OF INSULIN: ICD-10-CM

## 2020-12-04 PROCEDURE — 76856 US PELVIS COMP WITH TRANSVAG NON-OB (XPD): ICD-10-PCS | Mod: 26,,, | Performed by: RADIOLOGY

## 2020-12-04 PROCEDURE — 76830 TRANSVAGINAL US NON-OB: CPT | Mod: TC

## 2020-12-04 PROCEDURE — 76830 TRANSVAGINAL US NON-OB: CPT | Mod: 26,,, | Performed by: RADIOLOGY

## 2020-12-04 PROCEDURE — 76856 US EXAM PELVIC COMPLETE: CPT | Mod: 26,,, | Performed by: RADIOLOGY

## 2020-12-04 PROCEDURE — 76830 US PELVIS COMP WITH TRANSVAG NON-OB (XPD): ICD-10-PCS | Mod: 26,,, | Performed by: RADIOLOGY

## 2020-12-04 PROCEDURE — 99214 PR OFFICE/OUTPT VISIT, EST, LEVL IV, 30-39 MIN: ICD-10-PCS | Mod: 95,,, | Performed by: NURSE PRACTITIONER

## 2020-12-04 PROCEDURE — 99214 OFFICE O/P EST MOD 30 MIN: CPT | Mod: 95,,, | Performed by: NURSE PRACTITIONER

## 2020-12-04 RX ORDER — RIMEGEPANT SULFATE 75 MG/75MG
75 TABLET, ORALLY DISINTEGRATING ORAL DAILY PRN
Qty: 8 TABLET | Refills: 2 | Status: SHIPPED | OUTPATIENT
Start: 2020-12-04 | End: 2021-02-24
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
## 2020-12-06 ENCOUNTER — PATIENT MESSAGE (OUTPATIENT)
Dept: FAMILY MEDICINE | Facility: CLINIC | Age: 65
End: 2020-12-06

## 2020-12-07 ENCOUNTER — PATIENT MESSAGE (OUTPATIENT)
Dept: PHARMACY | Facility: CLINIC | Age: 65
End: 2020-12-07

## 2020-12-07 ENCOUNTER — IMMUNIZATION (OUTPATIENT)
Dept: PHARMACY | Facility: CLINIC | Age: 65
End: 2020-12-07
Payer: MEDICAID

## 2020-12-07 ENCOUNTER — OFFICE VISIT (OUTPATIENT)
Dept: SLEEP MEDICINE | Facility: CLINIC | Age: 65
End: 2020-12-07
Payer: MEDICAID

## 2020-12-07 VITALS
BODY MASS INDEX: 40.04 KG/M2 | DIASTOLIC BLOOD PRESSURE: 71 MMHG | SYSTOLIC BLOOD PRESSURE: 126 MMHG | HEIGHT: 65 IN | HEART RATE: 91 BPM | WEIGHT: 240.31 LBS

## 2020-12-07 DIAGNOSIS — E11.65 TYPE 2 DIABETES MELLITUS WITH HYPERGLYCEMIA, WITHOUT LONG-TERM CURRENT USE OF INSULIN: ICD-10-CM

## 2020-12-07 DIAGNOSIS — M79.7 FIBROMYALGIA: ICD-10-CM

## 2020-12-07 DIAGNOSIS — G47.33 OSA (OBSTRUCTIVE SLEEP APNEA): Primary | ICD-10-CM

## 2020-12-07 DIAGNOSIS — I10 ESSENTIAL HYPERTENSION: ICD-10-CM

## 2020-12-07 PROCEDURE — 99215 OFFICE O/P EST HI 40 MIN: CPT | Mod: PBBFAC | Performed by: INTERNAL MEDICINE

## 2020-12-07 PROCEDURE — 99213 OFFICE O/P EST LOW 20 MIN: CPT | Mod: S$PBB,,, | Performed by: INTERNAL MEDICINE

## 2020-12-07 PROCEDURE — 99999 PR PBB SHADOW E&M-EST. PATIENT-LVL V: CPT | Mod: PBBFAC,,, | Performed by: INTERNAL MEDICINE

## 2020-12-07 PROCEDURE — 99999 PR PBB SHADOW E&M-EST. PATIENT-LVL V: ICD-10-PCS | Mod: PBBFAC,,, | Performed by: INTERNAL MEDICINE

## 2020-12-07 PROCEDURE — 99213 PR OFFICE/OUTPT VISIT, EST, LEVL III, 20-29 MIN: ICD-10-PCS | Mod: S$PBB,,, | Performed by: INTERNAL MEDICINE

## 2020-12-07 NOTE — PROGRESS NOTES
Subjective:       Patient ID: Regine Osorio is a 65 y.o. female.    Chief Complaint: Sleeping Problem    HPI     Regine Osorio returns for PAP follow up for compliance documentation    Regine Osorio has a history of mild Obstructive Sleep Apnea diagnosed in 9/2020 (AHI 5.2).   The last PAP titration was NA with AHI NA at NA cm H20.   Device is 1-2 month(s) old.   Current setting 5-11 cm H20.   D[facility] is [facility].      Regine Osorio is using CPAP 100% of nights and averages 5 hours and 6 minutes.   Device use greater than 4 hours is 93.3%.   Patient does not have problems with the pressure setting.   Mask interface issues are not experienced.   A nasal mask interface is used.   The patient does not experience side effects from therapy.   The patient experiences the following benefits of therapy:  reduced morbidity, reduced accident risk, reduced mortality, reduced cardiovascular risk and less sleep disruption   There are not equipment issues.   Mean pressure 6.5, average peak pressure 8.2 and 90th percentile pressure 8.2.   Estimated AHI 1.2.        Scheduled Meds:  Continuous Infusions:  PRN Meds:.           Importance of PAP compliance discussed.   Care and use of equipment discussed.   Download and relevant sleep studies reviewed with patient    Discussed therapeutic goals for positive airway pressure therapy(CPAP or BiPAP).  Ideal is usage 100% of nights for at least 6 hours per night.  Minimum usage is 70% of night for at least 4 hours per night used    The patient was given open opportunity to ask questions and/or express concerns about treatment plan.   All questions/concerns were discussed.            EPWORTH SLEEPINESS SCALE 8/25/2020   Sitting and reading 3   Watching TV 3   Sitting, inactive in a public place (e.g. a theatre or a meeting) 3   As a passenger in a car for an hour without a break 3   Lying down to rest in the afternoon when circumstances permit 2   Sitting and talking to someone 1    Sitting quietly after a lunch without alcohol 2   In a car, while stopped for a few minutes in traffic 1   Total score 18         Current Outpatient Medications:     aspirin 81 MG Chew, Take 81 mg by mouth once daily. , Disp: , Rfl:     azelastine (ASTELIN) 137 mcg (0.1 %) nasal spray, 1 SPRAY IN EACH NOSTRIL TWICE DAILY, Disp: 30 mL, Rfl: 2    blood sugar diagnostic (ONETOUCH VERIO TEST STRIPS) Strp, Check blood glucose 3 times daily, Disp: 100 each, Rfl: 5    blood sugar diagnostic Strp, To check BG 2 times daily, to use with insurance preferred meter, Disp: 200 strip, Rfl: 3    blood sugar diagnostic Strp, To check BG 2 times daily, to use with insurance preferred meter, Disp: 200 each, Rfl: 3    blood-glucose meter kit, To check BG 2 times daily, to use with insurance preferred meter, Disp: 1 each, Rfl: 0    cholecalciferol, vitamin D3, (VITAMIN D3) 2,000 unit Cap, Take 1 capsule by mouth once daily. , Disp: , Rfl:     clobetasoL (CLOBEX) 0.05 % shampoo, Apply topically once a week., Disp: 118 mL, Rfl: 5    cyanocobalamin/cobamamide (B12 SL), Place 3,000 mcg under the tongue once daily., Disp: , Rfl:     fluocinolone acetonide oil 0.01 % Drop, as needed. , Disp: , Rfl:     gabapentin (NEURONTIN) 600 MG tablet, Take 1 tablet (600 mg total) by mouth 3 (three) times daily., Disp: 90 tablet, Rfl: 0    glipiZIDE (GLUCOTROL) 5 MG TR24, Take 1 tablet (5 mg total) by mouth 2 (two) times a day., Disp: 180 tablet, Rfl: 1    hydroCHLOROthiazide (HYDRODIURIL) 25 MG tablet, Take 1 tablet (25 mg total) by mouth once daily., Disp: 90 tablet, Rfl: 1    losartan (COZAAR) 50 MG tablet, Take 1 tablet (50 mg total) by mouth once daily., Disp: 90 tablet, Rfl: 0    metFORMIN (GLUCOPHAGE) 1000 MG tablet, Take 1 tablet (1,000 mg total) by mouth 2 (two) times daily with meals., Disp: 180 tablet, Rfl: 1    naproxen sodium (ANAPROX) 550 MG tablet, Take 1 tablet (550 mg total) by mouth 2 (two) times daily as needed.,  Disp: 40 tablet, Rfl: 5    ondansetron (ZOFRAN-ODT) 8 MG TbDL, Take 1 tablet (8 mg total) by mouth every 8 (eight) hours as needed., Disp: 30 tablet, Rfl: 2    ONETOUCH DELICA LANC DEVICE Kit, USE TO TEST BLOOD SUGAR THREE TIMES DAILY, Disp: 1 each, Rfl: 0    ONETOUCH DELICA PLUS LANCET 33 gauge Misc, USE TO TEST BID, Disp: , Rfl: 3    rimegepant (NURTEC) 75 mg odt, Take 1 tablet (75 mg total) by mouth daily as needed for Migraine. Place ODT tablet on the tongue; alternatively the ODT tablet may be placed under the tongue, Disp: 8 tablet, Rfl: 2    sumatriptan (IMITREX) 100 MG tablet, Take 1 tab at onset of migraine, may repeat dose in 2 hours if needed. Max 2 tabs/day. Max 3 days/week., Disp: 9 tablet, Rfl: 11    pantoprazole (PROTONIX) 20 MG tablet, Take 1 tablet (20 mg total) by mouth once daily., Disp: 30 tablet, Rfl: 2    varicella-zoster gE-AS01B, PF, (SHINGRIX, PF,) 50 mcg/0.5 mL injection, Inject 0.5 mLs into the muscle once. for 1 dose, Disp: 1 each, Rfl: 0     Review of patient's allergies indicates:   Allergen Reactions    Codeine Shortness Of Breath     Pt states she had a reaction as a teenager and was taken to the ER.    Lisinopril      cough         Past Medical History:   Diagnosis Date    Breast cancer 2017    Cataract     Diabetes mellitus, type 2     Type 2    Fibromyalgia     Glaucoma     Hypertension     Renal cyst, right 02/06/2020    Steatosis of liver 02/06/2020       Past Surgical History:   Procedure Laterality Date    BREAST BIOPSY      BREAST SURGERY      breast ca lt side     HYSTERECTOMY      supacervical hysterectomy    LAPAROSCOPIC SUPRACERVICAL HYSTERECTOMY  2005    fibroids    MASTECTOMY Left 2017    chemo    MYOMECTOMY      TONSILLECTOMY      VAGINAL DELIVERY         Family History   Problem Relation Age of Onset    Dementia Mother     Glaucoma Mother     Bipolar disorder Mother     Lung cancer Father     Bipolar disorder Son     Post-traumatic stress  disorder Son     Breast cancer Maternal Aunt     Bipolar disorder Maternal Aunt     Glaucoma Maternal Uncle     Blindness Maternal Grandfather     Glaucoma Maternal Grandfather     Breast cancer Cousin 39        paternal     Amblyopia Neg Hx     Cataracts Neg Hx     Macular degeneration Neg Hx     Retinal detachment Neg Hx     Strabismus Neg Hx     Cancer Neg Hx     Colon cancer Neg Hx     Ovarian cancer Neg Hx        Social History     Socioeconomic History    Marital status:      Spouse name: Not on file    Number of children: 1    Years of education: Not on file    Highest education level: Not on file   Occupational History    Not on file   Social Needs    Financial resource strain: Somewhat hard    Food insecurity     Worry: Not on file     Inability: Never true    Transportation needs     Medical: No     Non-medical: No   Tobacco Use    Smoking status: Never Smoker    Smokeless tobacco: Never Used   Substance and Sexual Activity    Alcohol use: No     Frequency: Never    Drug use: No    Sexual activity: Not Currently   Lifestyle    Physical activity     Days per week: 1 day     Minutes per session: 20 min    Stress: To some extent   Relationships    Social connections     Talks on phone: More than three times a week     Gets together: Once a week     Attends Congregation service: Not on file     Active member of club or organization: Yes     Attends meetings of clubs or organizations: More than 4 times per year     Relationship status:    Other Topics Concern    Patient feels they ought to cut down on drinking/drug use Not Asked    Patient annoyed by others criticizing their drinking/drug use Not Asked    Patient has felt bad or guilty about drinking/drug use Not Asked    Patient has had a drink/used drugs as an eye opener in the AM Not Asked   Social History Narrative        1 son (estranged)    3 grandchildren ([place])    Born and raised in [place]  (moved to [facility] 2017)           Old medical records.    Vitals:    12/07/20 0855   BP: 126/71   Pulse: 91              The patient was given open opportunity to ask questions and/or express concerns about treatment plan.   All questions/concerns were discussed.   Driving precautions were provided.     Two patient identifiers used prior to evaluation.    Thank you for referring Regine Osorio for evaluation.           Review of Systems      Objective:      Physical Exam    Assessment:       1. KENDRA (obstructive sleep apnea)    2. Essential hypertension    3. Fibromyalgia    4. Type 2 diabetes mellitus with hyperglycemia, without long-term current use of insulin        Plan:       No change.   Download reviewed.   Reviewed treatment options.   Follow up 1 year or sooner if sleepiness does not improve.    16-minute visit. >50% spent counseling patient and coordination of care.

## 2020-12-09 ENCOUNTER — TELEPHONE (OUTPATIENT)
Dept: PHARMACY | Facility: CLINIC | Age: 65
End: 2020-12-09

## 2020-12-09 DIAGNOSIS — G43.009 MIGRAINE WITHOUT AURA AND WITHOUT STATUS MIGRAINOSUS, NOT INTRACTABLE: Primary | ICD-10-CM

## 2020-12-11 ENCOUNTER — OFFICE VISIT (OUTPATIENT)
Dept: HEMATOLOGY/ONCOLOGY | Facility: CLINIC | Age: 65
End: 2020-12-11
Payer: MEDICAID

## 2020-12-11 VITALS
DIASTOLIC BLOOD PRESSURE: 60 MMHG | BODY MASS INDEX: 39.67 KG/M2 | HEART RATE: 93 BPM | SYSTOLIC BLOOD PRESSURE: 132 MMHG | OXYGEN SATURATION: 96 % | HEIGHT: 65 IN | TEMPERATURE: 98 F | WEIGHT: 238.13 LBS

## 2020-12-11 DIAGNOSIS — Z85.3 HISTORY OF BREAST CANCER: ICD-10-CM

## 2020-12-11 DIAGNOSIS — Z11.51 SCREENING FOR HUMAN PAPILLOMAVIRUS (HPV): Primary | ICD-10-CM

## 2020-12-11 DIAGNOSIS — Z01.419 ENCOUNTER FOR GYNECOLOGICAL EXAMINATION WITHOUT ABNORMAL FINDING: ICD-10-CM

## 2020-12-11 DIAGNOSIS — Z01.419 WELL WOMAN EXAM WITH ROUTINE GYNECOLOGICAL EXAM: ICD-10-CM

## 2020-12-11 DIAGNOSIS — N70.11 HYDROSALPINX: ICD-10-CM

## 2020-12-11 DIAGNOSIS — Z12.4 PAP SMEAR FOR CERVICAL CANCER SCREENING: ICD-10-CM

## 2020-12-11 PROCEDURE — 99999 PR PBB SHADOW E&M-EST. PATIENT-LVL III: CPT | Mod: PBBFAC,,, | Performed by: OBSTETRICS & GYNECOLOGY

## 2020-12-11 PROCEDURE — 99213 OFFICE O/P EST LOW 20 MIN: CPT | Mod: PBBFAC | Performed by: OBSTETRICS & GYNECOLOGY

## 2020-12-11 PROCEDURE — 99396 PREV VISIT EST AGE 40-64: CPT | Mod: S$PBB,,, | Performed by: OBSTETRICS & GYNECOLOGY

## 2020-12-11 PROCEDURE — 87624 HPV HI-RISK TYP POOLED RSLT: CPT

## 2020-12-11 PROCEDURE — 99396 PR PREVENTIVE VISIT,EST,40-64: ICD-10-PCS | Mod: S$PBB,,, | Performed by: OBSTETRICS & GYNECOLOGY

## 2020-12-11 PROCEDURE — 99999 PR PBB SHADOW E&M-EST. PATIENT-LVL III: ICD-10-PCS | Mod: PBBFAC,,, | Performed by: OBSTETRICS & GYNECOLOGY

## 2020-12-11 PROCEDURE — 88175 CYTOPATH C/V AUTO FLUID REDO: CPT

## 2020-12-11 NOTE — PROGRESS NOTES
SUBJECTIVE:   65 y.o. female   for annual routine Pap and checkup. No LMP recorded. Patient has had a hysterectomy..  She originally scheduled this appointment secondary to pelvic pain on right greater than left. She has a known hydrosalpinx on the right. She reports 2 weeks ago having 2 hours of pain in the right lower quadrant that would come and go. She has not had any pain since. 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  She said they used to be very sharp mentally and is very frustrating  She reports having trouble with recall and is forgetful. She was seeing a Neurologist who  1 month after her initial appointment. She tried Vitamin B12 per his recommendation with no improvement.  .    She has tried acupuncture in the past for her fibromyalgia with no improvement  She was started on CPAP recently  She desires pap smear.   Past Medical History:   Diagnosis Date    Breast cancer 2017    Cataract     Diabetes mellitus, type 2     Type 2    Fibromyalgia     Glaucoma     Hypertension     Renal cyst, right 2020    Steatosis of liver 2020     Past Surgical History:   Procedure Laterality Date    BREAST BIOPSY      BREAST SURGERY      breast ca lt side     HYSTERECTOMY      supacervical hysterectomy    LAPAROSCOPIC SUPRACERVICAL HYSTERECTOMY  2005    fibroids    MASTECTOMY Left 2017    chemo    MYOMECTOMY      TONSILLECTOMY      VAGINAL DELIVERY       Social History     Socioeconomic History    Marital status:      Spouse name: Not on file    Number of children: 1    Years of education: Not on file    Highest education level: Not on file   Occupational History    Not on file   Social Needs    Financial resource strain: Somewhat hard    Food insecurity     Worry: Not on file     Inability: Never true    Transportation needs     Medical: No     Non-medical: No   Tobacco Use    Smoking  status: Never Smoker    Smokeless tobacco: Never Used   Substance and Sexual Activity    Alcohol use: No     Frequency: Never    Drug use: No    Sexual activity: Not Currently   Lifestyle    Physical activity     Days per week: 1 day     Minutes per session: 20 min    Stress: To some extent   Relationships    Social connections     Talks on phone: More than three times a week     Gets together: Once a week     Attends Caodaism service: Not on file     Active member of club or organization: Yes     Attends meetings of clubs or organizations: More than 4 times per year     Relationship status:    Other Topics Concern    Patient feels they ought to cut down on drinking/drug use Not Asked    Patient annoyed by others criticizing their drinking/drug use Not Asked    Patient has felt bad or guilty about drinking/drug use Not Asked    Patient has had a drink/used drugs as an eye opener in the AM Not Asked   Social History Narrative        1 son (estranged)    3 grandchildren ([place])    Born and raised in [place] (moved to [facility] )     Family History   Problem Relation Age of Onset    Dementia Mother     Glaucoma Mother     Bipolar disorder Mother     Lung cancer Father     Bipolar disorder Son     Post-traumatic stress disorder Son     Breast cancer Maternal Aunt     Bipolar disorder Maternal Aunt     Glaucoma Maternal Uncle     Blindness Maternal Grandfather     Glaucoma Maternal Grandfather     Breast cancer Cousin 39        paternal     Amblyopia Neg Hx     Cataracts Neg Hx     Macular degeneration Neg Hx     Retinal detachment Neg Hx     Strabismus Neg Hx     Cancer Neg Hx     Colon cancer Neg Hx     Ovarian cancer Neg Hx      OB History    Para Term  AB Living   4 1 1   3 1   SAB TAB Ectopic Multiple Live Births                  # Outcome Date GA Lbr Frankie/2nd Weight Sex Delivery Anes PTL Lv   4 AB            3 AB            2 AB            1 Term                     Current Outpatient Medications   Medication Sig Dispense Refill    aspirin 81 MG Chew Take 81 mg by mouth once daily.       azelastine (ASTELIN) 137 mcg (0.1 %) nasal spray 1 SPRAY IN EACH NOSTRIL TWICE DAILY 30 mL 2    blood sugar diagnostic (ONETOUCH VERIO TEST STRIPS) Strp Check blood glucose 3 times daily 100 each 5    blood sugar diagnostic Strp To check BG 2 times daily, to use with insurance preferred meter 200 strip 3    blood sugar diagnostic Strp To check BG 2 times daily, to use with insurance preferred meter 200 each 3    blood-glucose meter kit To check BG 2 times daily, to use with insurance preferred meter 1 each 0    cholecalciferol, vitamin D3, (VITAMIN D3) 2,000 unit Cap Take 1 capsule by mouth once daily.       clobetasoL (CLOBEX) 0.05 % shampoo Apply topically once a week. 118 mL 5    cyanocobalamin/cobamamide (B12 SL) Place 3,000 mcg under the tongue once daily.      fluocinolone acetonide oil 0.01 % Drop as needed.       gabapentin (NEURONTIN) 600 MG tablet Take 1 tablet (600 mg total) by mouth 3 (three) times daily. 90 tablet 0    glipiZIDE (GLUCOTROL) 5 MG TR24 Take 1 tablet (5 mg total) by mouth 2 (two) times a day. 180 tablet 1    hydroCHLOROthiazide (HYDRODIURIL) 25 MG tablet Take 1 tablet (25 mg total) by mouth once daily. 90 tablet 1    losartan (COZAAR) 50 MG tablet Take 1 tablet (50 mg total) by mouth once daily. 90 tablet 0    metFORMIN (GLUCOPHAGE) 1000 MG tablet Take 1 tablet (1,000 mg total) by mouth 2 (two) times daily with meals. 180 tablet 1    naproxen sodium (ANAPROX) 550 MG tablet Take 1 tablet (550 mg total) by mouth 2 (two) times daily as needed. 40 tablet 5    ondansetron (ZOFRAN-ODT) 8 MG TbDL Take 1 tablet (8 mg total) by mouth every 8 (eight) hours as needed. 30 tablet 2    ONETOUCH DELICA LANC DEVICE Kit USE TO TEST BLOOD SUGAR THREE TIMES DAILY 1 each 0    ONETOUCH DELICA PLUS LANCET 33 gauge Misc USE TO TEST BID  3    pantoprazole  (PROTONIX) 20 MG tablet Take 1 tablet (20 mg total) by mouth once daily. 30 tablet 2    rimegepant (NURTEC) 75 mg odt Take 1 tablet (75 mg total) by mouth daily as needed for Migraine. Place ODT tablet on the tongue; alternatively the ODT tablet may be placed under the tongue 8 tablet 2    sumatriptan (IMITREX) 100 MG tablet Take 1 tab at onset of migraine, may repeat dose in 2 hours if needed. Max 2 tabs/day. Max 3 days/week. 9 tablet 11     No current facility-administered medications for this visit.      Allergies: Codeine and Lisinopril     The 10-year ASCVD risk score (Abram ZHAO Jr., et al., 2013) is: 16.6%    Values used to calculate the score:      Age: 65 years      Sex: Female      Is Non- : Yes      Diabetic: Yes      Tobacco smoker: No      Systolic Blood Pressure: 132 mmHg      Is BP treated: Yes      HDL Cholesterol: 42 mg/dL      Total Cholesterol: 145 mg/dL      ROS:  Constitutional: no weight loss, weight gain, fever, fatigue  Eyes:  No vision changes, glasses/contacts  ENT/Mouth: No ulcers, sinus problems, ears ringing, headache  Cardiovascular: No inability to lie flat, chest pain, exercise intolerance, swelling, heart palpitations  Respiratory: No wheezing, coughing blood, shortness of breath, or cough  Gastrointestinal: No diarrhea, bloody stool, nausea/vomiting, constipation, gas, hemorrhoids  Genitourinary: No blood in urine, painful urination, urgency of urination, frequency of urination, incomplete emptying, incontinence, abnormal bleeding, painful periods, heavy periods, vaginal discharge, vaginal odor, painful intercourse, sexual problems, bleeding after intercourse, +pelvic pain.  Musculoskeletal: No muscle weakness, + fibromyalgia  Skin/Breast: +breast cancer  Neurological: No passing out, seizures, numbness, headache  Endocrine: No diabetes, hypothyroid, hyperthyroid, hot flashes, hair loss, abnormal hair growth, acne  Psychiatric: No depression,  crying  Hematologic: No bruises, bleeding, swollen lymph nodes, anemia.      Physical Exam:   Constitutional: She is oriented to person, place, and time. She appears well-developed and well-nourished.      Neck: Normal range of motion. No tracheal deviation present. No thyromegaly present.    Cardiovascular: Exam reveals no edema.     Pulmonary/Chest: Effort normal. She exhibits no mass, no tenderness, no deformity and no retraction. Right breast exhibits no inverted nipple, no mass, no nipple discharge, no skin change, no tenderness, presence, no bleeding and no swelling. Left breast exhibits absence. Breasts are asymmetrical.        Abdominal: Soft. She exhibits no distension and no mass. There is no abdominal tenderness. There is no rebound and no guarding. No hernia. Hernia confirmed negative in the left inguinal area.     Genitourinary:    Vagina normal.   Rectum:      No external hemorrhoid.   There is no rash, tenderness or lesion on the right labia. There is no rash, tenderness or lesion on the left labia. Uterus is absent. Cervix is normal. No no adexnal prolapse. Right adnexum displays fullness. Right adnexum displays no mass and no tenderness. Left adnexum displays no mass, no tenderness and no fullness. No tenderness, bleeding, rectocele, cystocele or unspecified prolapse of vaginal walls in the vagina. Cervix exhibits no motion tenderness, no discharge and no friability. negative for vaginal discharge          Musculoskeletal: Normal range of motion and moves all extremeties. No edema.       Neurological: She is alert and oriented to person, place, and time.    Skin: No rash noted. No erythema. No pallor.    Psychiatric: She has a normal mood and affect. Her behavior is normal. Judgment and thought content normal.         ASSESSMENT:   well woman  History of breast cancer  Hydrosalpinx  PLAN:   mammogram  pap smear and HPV per patient request  Will discuss chemo brain with acupuncturise  Reviewed  ultrasound results with patent- hydrosalpinx present- patient was recently symptomatic but no longer is. She does not desire surgery at this time  return annually or prn

## 2020-12-16 ENCOUNTER — PATIENT MESSAGE (OUTPATIENT)
Dept: NEUROLOGY | Facility: CLINIC | Age: 65
End: 2020-12-16

## 2020-12-17 ENCOUNTER — PATIENT MESSAGE (OUTPATIENT)
Dept: NEUROLOGY | Facility: CLINIC | Age: 65
End: 2020-12-17

## 2020-12-17 NOTE — TELEPHONE ENCOUNTER
Called and notified patient of new script Ubrelvy sent into [facility]. Explained directions of use, side effects, etc. Patient verbalized understanding. Denies new questions at this time.

## 2020-12-18 LAB
HPV HR 12 DNA SPEC QL NAA+PROBE: NEGATIVE
HPV16 AG SPEC QL: NEGATIVE
HPV18 DNA SPEC QL NAA+PROBE: NEGATIVE

## 2020-12-28 ENCOUNTER — TELEPHONE (OUTPATIENT)
Dept: PODIATRY | Facility: CLINIC | Age: 65
End: 2020-12-28

## 2020-12-28 NOTE — TELEPHONE ENCOUNTER
Spoke with patient regarding scheduling of appointment.----- Message from Mele Evans sent at 12/28/2020 10:44 AM CST -----  Regarding: Appointment Inquiry  Pt called requesting a call back to schedule an appointment with physician for Annual Diabetic Foot Check       [phone] (home)

## 2021-01-05 ENCOUNTER — TELEPHONE (OUTPATIENT)
Dept: FAMILY MEDICINE | Facility: CLINIC | Age: 66
End: 2021-01-05

## 2021-01-19 ENCOUNTER — PATIENT OUTREACH (OUTPATIENT)
Dept: ADMINISTRATIVE | Facility: OTHER | Age: 66
End: 2021-01-19

## 2021-01-21 ENCOUNTER — OFFICE VISIT (OUTPATIENT)
Dept: PODIATRY | Facility: CLINIC | Age: 66
End: 2021-01-21
Payer: MEDICAID

## 2021-01-21 VITALS
HEIGHT: 65 IN | HEART RATE: 82 BPM | BODY MASS INDEX: 39.65 KG/M2 | RESPIRATION RATE: 18 BRPM | DIASTOLIC BLOOD PRESSURE: 67 MMHG | SYSTOLIC BLOOD PRESSURE: 125 MMHG | WEIGHT: 238 LBS

## 2021-01-21 DIAGNOSIS — E11.49 OTHER DIABETIC NEUROLOGICAL COMPLICATION ASSOCIATED WITH TYPE 2 DIABETES MELLITUS: Primary | ICD-10-CM

## 2021-01-21 DIAGNOSIS — E11.65 TYPE 2 DIABETES MELLITUS WITH HYPERGLYCEMIA, WITHOUT LONG-TERM CURRENT USE OF INSULIN: ICD-10-CM

## 2021-01-21 PROCEDURE — 99999 PR PBB SHADOW E&M-EST. PATIENT-LVL V: ICD-10-PCS | Mod: PBBFAC,,, | Performed by: PODIATRIST

## 2021-01-21 PROCEDURE — 99213 OFFICE O/P EST LOW 20 MIN: CPT | Mod: S$PBB,,, | Performed by: PODIATRIST

## 2021-01-21 PROCEDURE — 99999 PR PBB SHADOW E&M-EST. PATIENT-LVL V: CPT | Mod: PBBFAC,,, | Performed by: PODIATRIST

## 2021-01-21 PROCEDURE — 99213 PR OFFICE/OUTPT VISIT, EST, LEVL III, 20-29 MIN: ICD-10-PCS | Mod: S$PBB,,, | Performed by: PODIATRIST

## 2021-01-21 PROCEDURE — 99215 OFFICE O/P EST HI 40 MIN: CPT | Mod: PBBFAC | Performed by: PODIATRIST

## 2021-01-21 RX ORDER — KETOCONAZOLE 20 MG/G
CREAM TOPICAL DAILY
Qty: 1 TUBE | Refills: 2 | Status: SHIPPED | OUTPATIENT
Start: 2021-01-21 | End: 2022-03-14

## 2021-01-25 ENCOUNTER — OFFICE VISIT (OUTPATIENT)
Dept: OTOLARYNGOLOGY | Facility: CLINIC | Age: 66
End: 2021-01-25
Payer: MEDICAID

## 2021-01-25 ENCOUNTER — CLINICAL SUPPORT (OUTPATIENT)
Dept: AUDIOLOGY | Facility: CLINIC | Age: 66
End: 2021-01-25
Payer: MEDICAID

## 2021-01-25 VITALS — SYSTOLIC BLOOD PRESSURE: 141 MMHG | HEART RATE: 85 BPM | DIASTOLIC BLOOD PRESSURE: 83 MMHG

## 2021-01-25 DIAGNOSIS — H90.3 SENSORINEURAL HEARING LOSS, BILATERAL: ICD-10-CM

## 2021-01-25 DIAGNOSIS — H90.3 SENSORINEURAL HEARING LOSS, BILATERAL: Primary | ICD-10-CM

## 2021-01-25 DIAGNOSIS — Z86.69 HISTORY OF EUSTACHIAN TUBE DYSFUNCTION: Primary | ICD-10-CM

## 2021-01-25 DIAGNOSIS — H92.02 EAR DISCOMFORT, LEFT: ICD-10-CM

## 2021-01-25 DIAGNOSIS — L29.9 ITCHING OF EAR: ICD-10-CM

## 2021-01-25 DIAGNOSIS — Z96.22 S/P MYRINGOTOMY WITH INSERTION OF TUBE: ICD-10-CM

## 2021-01-25 DIAGNOSIS — Z91.09 HISTORY OF AIRBORNE ALLERGIES: ICD-10-CM

## 2021-01-25 LAB
FINAL PATHOLOGIC DIAGNOSIS: NORMAL
Lab: NORMAL

## 2021-01-25 PROCEDURE — 99999 PR PBB SHADOW E&M-EST. PATIENT-LVL IV: ICD-10-PCS | Mod: PBBFAC,,, | Performed by: OTOLARYNGOLOGY

## 2021-01-25 PROCEDURE — 99999 PR PBB SHADOW E&M-EST. PATIENT-LVL IV: CPT | Mod: PBBFAC,,, | Performed by: OTOLARYNGOLOGY

## 2021-01-25 PROCEDURE — 99213 OFFICE O/P EST LOW 20 MIN: CPT | Mod: S$PBB,,, | Performed by: OTOLARYNGOLOGY

## 2021-01-25 PROCEDURE — 99214 OFFICE O/P EST MOD 30 MIN: CPT | Mod: PBBFAC,25 | Performed by: OTOLARYNGOLOGY

## 2021-01-25 PROCEDURE — 92557 COMPREHENSIVE HEARING TEST: CPT | Mod: PBBFAC | Performed by: AUDIOLOGIST

## 2021-01-25 PROCEDURE — 99213 PR OFFICE/OUTPT VISIT, EST, LEVL III, 20-29 MIN: ICD-10-PCS | Mod: S$PBB,,, | Performed by: OTOLARYNGOLOGY

## 2021-01-25 PROCEDURE — 92567 TYMPANOMETRY: CPT | Mod: PBBFAC | Performed by: AUDIOLOGIST

## 2021-01-26 ENCOUNTER — PATIENT MESSAGE (OUTPATIENT)
Dept: HEMATOLOGY/ONCOLOGY | Facility: CLINIC | Age: 66
End: 2021-01-26

## 2021-01-29 ENCOUNTER — PATIENT MESSAGE (OUTPATIENT)
Dept: OBSTETRICS AND GYNECOLOGY | Facility: CLINIC | Age: 66
End: 2021-01-29

## 2021-02-08 ENCOUNTER — PATIENT MESSAGE (OUTPATIENT)
Dept: OBSTETRICS AND GYNECOLOGY | Facility: CLINIC | Age: 66
End: 2021-02-08

## 2021-02-17 DIAGNOSIS — M48.062 SPINAL STENOSIS OF LUMBAR REGION WITH NEUROGENIC CLAUDICATION: ICD-10-CM

## 2021-02-17 RX ORDER — GABAPENTIN 600 MG/1
600 TABLET ORAL 3 TIMES DAILY
Qty: 90 TABLET | Refills: 0 | Status: SHIPPED | OUTPATIENT
Start: 2021-02-17 | End: 2021-04-30 | Stop reason: SDUPTHER

## 2021-02-19 ENCOUNTER — PATIENT OUTREACH (OUTPATIENT)
Dept: ADMINISTRATIVE | Facility: HOSPITAL | Age: 66
End: 2021-02-19

## 2021-02-23 ENCOUNTER — PATIENT OUTREACH (OUTPATIENT)
Dept: ADMINISTRATIVE | Facility: OTHER | Age: 66
End: 2021-02-23

## 2021-02-24 ENCOUNTER — IMMUNIZATION (OUTPATIENT)
Dept: PHARMACY | Facility: CLINIC | Age: 66
End: 2021-02-24
Payer: MEDICAID

## 2021-02-24 ENCOUNTER — OFFICE VISIT (OUTPATIENT)
Dept: ALLERGY | Facility: CLINIC | Age: 66
End: 2021-02-24
Payer: MEDICAID

## 2021-02-24 VITALS — BODY MASS INDEX: 39.12 KG/M2 | HEIGHT: 65 IN | TEMPERATURE: 98 F | WEIGHT: 234.81 LBS

## 2021-02-24 DIAGNOSIS — J31.0 CHRONIC RHINITIS: ICD-10-CM

## 2021-02-24 DIAGNOSIS — R05.9 COUGH: Primary | ICD-10-CM

## 2021-02-24 DIAGNOSIS — L29.9 ITCHING: ICD-10-CM

## 2021-02-24 DIAGNOSIS — Z86.69 HISTORY OF EUSTACHIAN TUBE DYSFUNCTION: ICD-10-CM

## 2021-02-24 PROCEDURE — 99205 OFFICE O/P NEW HI 60 MIN: CPT | Mod: S$PBB,,, | Performed by: STUDENT IN AN ORGANIZED HEALTH CARE EDUCATION/TRAINING PROGRAM

## 2021-02-24 PROCEDURE — 99205 PR OFFICE/OUTPT VISIT, NEW, LEVL V, 60-74 MIN: ICD-10-PCS | Mod: S$PBB,,, | Performed by: STUDENT IN AN ORGANIZED HEALTH CARE EDUCATION/TRAINING PROGRAM

## 2021-02-24 PROCEDURE — 99215 OFFICE O/P EST HI 40 MIN: CPT | Mod: PBBFAC,PO | Performed by: STUDENT IN AN ORGANIZED HEALTH CARE EDUCATION/TRAINING PROGRAM

## 2021-02-24 PROCEDURE — 99999 PR PBB SHADOW E&M-EST. PATIENT-LVL V: CPT | Mod: PBBFAC,,, | Performed by: STUDENT IN AN ORGANIZED HEALTH CARE EDUCATION/TRAINING PROGRAM

## 2021-02-24 PROCEDURE — 99999 PR PBB SHADOW E&M-EST. PATIENT-LVL V: ICD-10-PCS | Mod: PBBFAC,,, | Performed by: STUDENT IN AN ORGANIZED HEALTH CARE EDUCATION/TRAINING PROGRAM

## 2021-02-24 RX ORDER — ACETIC ACID 20.65 MG/ML
4 SOLUTION AURICULAR (OTIC)
COMMUNITY
Start: 2021-02-10 | End: 2024-03-25

## 2021-02-24 RX ORDER — FLUTICASONE PROPIONATE 50 MCG
2 SPRAY, SUSPENSION (ML) NASAL 2 TIMES DAILY
Qty: 31.6 ML | Refills: 5 | Status: SHIPPED | OUTPATIENT
Start: 2021-02-24 | End: 2022-01-26

## 2021-02-24 RX ORDER — CETIRIZINE HYDROCHLORIDE 10 MG/1
10 TABLET ORAL DAILY
COMMUNITY
End: 2021-02-24 | Stop reason: DRUGHIGH

## 2021-02-24 RX ORDER — CETIRIZINE HYDROCHLORIDE 10 MG/1
20 TABLET ORAL DAILY
Qty: 60 TABLET | Refills: 3 | Status: SHIPPED | OUTPATIENT
Start: 2021-02-24 | End: 2021-11-22

## 2021-02-24 RX ORDER — GUAIFENESIN/DEXTROMETHORPHAN 100-10MG/5
5 SYRUP ORAL
COMMUNITY
End: 2023-05-16

## 2021-02-24 RX ORDER — BENZONATATE 200 MG/1
CAPSULE ORAL
Qty: 50 CAPSULE | Refills: 1 | Status: SHIPPED | OUTPATIENT
Start: 2021-02-24 | End: 2022-09-11

## 2021-02-26 ENCOUNTER — HOSPITAL ENCOUNTER (OUTPATIENT)
Dept: RADIOLOGY | Facility: HOSPITAL | Age: 66
Discharge: HOME | End: 2021-02-26
Attending: INTERNAL MEDICINE
Payer: MEDICAID

## 2021-02-26 DIAGNOSIS — Z85.3 HISTORY OF BREAST CANCER IN FEMALE: ICD-10-CM

## 2021-02-26 PROCEDURE — 77065 DX MAMMO INCL CAD UNI: CPT | Mod: 26,RT,, | Performed by: RADIOLOGY

## 2021-02-26 PROCEDURE — 77065 MAMMO DIGITAL DIAGNOSTIC RIGHT WITH TOMO: ICD-10-PCS | Mod: 26,RT,, | Performed by: RADIOLOGY

## 2021-02-26 PROCEDURE — 77061 MAMMO DIGITAL DIAGNOSTIC RIGHT WITH TOMO: ICD-10-PCS | Mod: 26,RT,, | Performed by: RADIOLOGY

## 2021-02-26 PROCEDURE — 77061 BREAST TOMOSYNTHESIS UNI: CPT | Mod: 26,RT,, | Performed by: RADIOLOGY

## 2021-02-26 PROCEDURE — 77061 BREAST TOMOSYNTHESIS UNI: CPT | Mod: TC,RT

## 2021-03-01 ENCOUNTER — LAB VISIT (OUTPATIENT)
Dept: LAB | Facility: HOSPITAL | Age: 66
End: 2021-03-01
Attending: FAMILY MEDICINE
Payer: MEDICAID

## 2021-03-01 ENCOUNTER — OFFICE VISIT (OUTPATIENT)
Dept: HEMATOLOGY/ONCOLOGY | Facility: CLINIC | Age: 66
End: 2021-03-01
Payer: MEDICAID

## 2021-03-01 ENCOUNTER — OFFICE VISIT (OUTPATIENT)
Dept: SLEEP MEDICINE | Facility: CLINIC | Age: 66
End: 2021-03-01
Payer: MEDICAID

## 2021-03-01 ENCOUNTER — TELEPHONE (OUTPATIENT)
Dept: NEUROLOGY | Facility: CLINIC | Age: 66
End: 2021-03-01

## 2021-03-01 VITALS
RESPIRATION RATE: 18 BRPM | SYSTOLIC BLOOD PRESSURE: 138 MMHG | DIASTOLIC BLOOD PRESSURE: 66 MMHG | HEIGHT: 65 IN | BODY MASS INDEX: 39.27 KG/M2 | TEMPERATURE: 98 F | HEART RATE: 71 BPM | OXYGEN SATURATION: 100 % | WEIGHT: 235.69 LBS

## 2021-03-01 DIAGNOSIS — I10 ESSENTIAL HYPERTENSION: ICD-10-CM

## 2021-03-01 DIAGNOSIS — G47.33 OSA (OBSTRUCTIVE SLEEP APNEA): Primary | ICD-10-CM

## 2021-03-01 DIAGNOSIS — E11.65 TYPE 2 DIABETES MELLITUS WITH HYPERGLYCEMIA, WITHOUT LONG-TERM CURRENT USE OF INSULIN: ICD-10-CM

## 2021-03-01 DIAGNOSIS — Z85.3 HISTORY OF BREAST CANCER IN FEMALE: Primary | ICD-10-CM

## 2021-03-01 DIAGNOSIS — M79.7 FIBROMYALGIA: ICD-10-CM

## 2021-03-01 LAB
ANION GAP SERPL CALC-SCNC: 10 MMOL/L (ref 8–16)
BUN SERPL-MCNC: 10 MG/DL (ref 8–23)
CALCIUM SERPL-MCNC: 9.8 MG/DL (ref 8.7–10.5)
CHLORIDE SERPL-SCNC: 104 MMOL/L (ref 95–110)
CO2 SERPL-SCNC: 28 MMOL/L (ref 23–29)
CREAT SERPL-MCNC: 0.7 MG/DL (ref 0.5–1.4)
EST. GFR  (AFRICAN AMERICAN): >60 ML/MIN/1.73 M^2
EST. GFR  (NON AFRICAN AMERICAN): >60 ML/MIN/1.73 M^2
ESTIMATED AVG GLUCOSE: 111 MG/DL (ref 68–131)
GLUCOSE SERPL-MCNC: 93 MG/DL (ref 70–110)
HBA1C MFR BLD: 5.5 % (ref 4–5.6)
POTASSIUM SERPL-SCNC: 3.9 MMOL/L (ref 3.5–5.1)
SODIUM SERPL-SCNC: 142 MMOL/L (ref 136–145)

## 2021-03-01 PROCEDURE — 99212 OFFICE O/P EST SF 10 MIN: CPT | Mod: 95,,, | Performed by: INTERNAL MEDICINE

## 2021-03-01 PROCEDURE — 36415 COLL VENOUS BLD VENIPUNCTURE: CPT

## 2021-03-01 PROCEDURE — 99999 PR PBB SHADOW E&M-EST. PATIENT-LVL V: CPT | Mod: PBBFAC,,, | Performed by: INTERNAL MEDICINE

## 2021-03-01 PROCEDURE — 99212 PR OFFICE/OUTPT VISIT, EST, LEVL II, 10-19 MIN: ICD-10-PCS | Mod: 95,,, | Performed by: INTERNAL MEDICINE

## 2021-03-01 PROCEDURE — 99999 PR PBB SHADOW E&M-EST. PATIENT-LVL V: ICD-10-PCS | Mod: PBBFAC,,, | Performed by: INTERNAL MEDICINE

## 2021-03-01 PROCEDURE — 99213 OFFICE O/P EST LOW 20 MIN: CPT | Mod: S$PBB,,, | Performed by: INTERNAL MEDICINE

## 2021-03-01 PROCEDURE — 83036 HEMOGLOBIN GLYCOSYLATED A1C: CPT

## 2021-03-01 PROCEDURE — 80048 BASIC METABOLIC PNL TOTAL CA: CPT

## 2021-03-01 PROCEDURE — 99213 PR OFFICE/OUTPT VISIT, EST, LEVL III, 20-29 MIN: ICD-10-PCS | Mod: S$PBB,,, | Performed by: INTERNAL MEDICINE

## 2021-03-01 PROCEDURE — 99215 OFFICE O/P EST HI 40 MIN: CPT | Mod: PBBFAC | Performed by: INTERNAL MEDICINE

## 2021-03-03 ENCOUNTER — PATIENT MESSAGE (OUTPATIENT)
Dept: ALLERGY | Facility: CLINIC | Age: 66
End: 2021-03-03

## 2021-03-04 ENCOUNTER — LAB VISIT (OUTPATIENT)
Dept: LAB | Facility: HOSPITAL | Age: 66
End: 2021-03-04
Attending: FAMILY MEDICINE
Payer: MEDICAID

## 2021-03-04 DIAGNOSIS — J31.0 CHRONIC RHINITIS: ICD-10-CM

## 2021-03-04 LAB — IGE SERPL-ACNC: <35 IU/ML (ref 0–100)

## 2021-03-04 PROCEDURE — 86003 ALLG SPEC IGE CRUDE XTRC EA: CPT | Performed by: STUDENT IN AN ORGANIZED HEALTH CARE EDUCATION/TRAINING PROGRAM

## 2021-03-04 PROCEDURE — 36415 COLL VENOUS BLD VENIPUNCTURE: CPT | Mod: PN | Performed by: STUDENT IN AN ORGANIZED HEALTH CARE EDUCATION/TRAINING PROGRAM

## 2021-03-04 PROCEDURE — 86003 ALLG SPEC IGE CRUDE XTRC EA: CPT | Mod: 59 | Performed by: STUDENT IN AN ORGANIZED HEALTH CARE EDUCATION/TRAINING PROGRAM

## 2021-03-04 PROCEDURE — 82785 ASSAY OF IGE: CPT | Performed by: STUDENT IN AN ORGANIZED HEALTH CARE EDUCATION/TRAINING PROGRAM

## 2021-03-05 ENCOUNTER — OFFICE VISIT (OUTPATIENT)
Dept: FAMILY MEDICINE | Facility: CLINIC | Age: 66
End: 2021-03-05
Payer: MEDICAID

## 2021-03-05 VITALS
DIASTOLIC BLOOD PRESSURE: 80 MMHG | OXYGEN SATURATION: 96 % | RESPIRATION RATE: 16 BRPM | TEMPERATURE: 98 F | WEIGHT: 231.5 LBS | HEART RATE: 100 BPM | SYSTOLIC BLOOD PRESSURE: 128 MMHG | BODY MASS INDEX: 38.57 KG/M2 | HEIGHT: 65 IN

## 2021-03-05 DIAGNOSIS — M79.7 FIBROMYALGIA: ICD-10-CM

## 2021-03-05 DIAGNOSIS — E11.65 TYPE 2 DIABETES MELLITUS WITH HYPERGLYCEMIA, WITHOUT LONG-TERM CURRENT USE OF INSULIN: Primary | ICD-10-CM

## 2021-03-05 DIAGNOSIS — I10 ESSENTIAL HYPERTENSION: ICD-10-CM

## 2021-03-05 PROCEDURE — 99214 OFFICE O/P EST MOD 30 MIN: CPT | Mod: S$PBB,,, | Performed by: FAMILY MEDICINE

## 2021-03-05 PROCEDURE — 99214 PR OFFICE/OUTPT VISIT, EST, LEVL IV, 30-39 MIN: ICD-10-PCS | Mod: S$PBB,,, | Performed by: FAMILY MEDICINE

## 2021-03-05 PROCEDURE — 99215 OFFICE O/P EST HI 40 MIN: CPT | Mod: PBBFAC,PO | Performed by: FAMILY MEDICINE

## 2021-03-05 PROCEDURE — 99999 PR PBB SHADOW E&M-EST. PATIENT-LVL V: CPT | Mod: PBBFAC,,, | Performed by: FAMILY MEDICINE

## 2021-03-05 PROCEDURE — 99999 PR PBB SHADOW E&M-EST. PATIENT-LVL V: ICD-10-PCS | Mod: PBBFAC,,, | Performed by: FAMILY MEDICINE

## 2021-03-05 RX ORDER — LOSARTAN POTASSIUM 50 MG/1
50 TABLET ORAL DAILY
Qty: 90 TABLET | Refills: 0 | Status: SHIPPED | OUTPATIENT
Start: 2021-03-05 | End: 2021-04-30 | Stop reason: SDUPTHER

## 2021-03-05 RX ORDER — HYDROCHLOROTHIAZIDE 25 MG/1
25 TABLET ORAL DAILY
Qty: 90 TABLET | Refills: 0 | Status: SHIPPED | OUTPATIENT
Start: 2021-03-05 | End: 2021-08-25

## 2021-03-08 LAB
A ALTERNATA IGE QN: <0.1 KU/L
A FUMIGATUS IGE QN: <0.1 KU/L
BAHIA GRASS IGE QN: <0.1 KU/L
BERMUDA GRASS IGE QN: <0.1 KU/L
CAT DANDER IGE QN: <0.1 KU/L
CEDAR IGE QN: <0.1 KU/L
COTTONWOOD IGE QN: <0.1 KU/L
D FARINAE IGE QN: <0.1 KU/L
D PTERONYSS IGE QN: <0.1 KU/L
DEPRECATED A ALTERNATA IGE RAST QL: NORMAL
DEPRECATED A FUMIGATUS IGE RAST QL: NORMAL
DEPRECATED BAHIA GRASS IGE RAST QL: NORMAL
DEPRECATED BERMUDA GRASS IGE RAST QL: NORMAL
DEPRECATED CAT DANDER IGE RAST QL: NORMAL
DEPRECATED CEDAR IGE RAST QL: NORMAL
DEPRECATED COTTONWOOD IGE RAST QL: NORMAL
DEPRECATED D FARINAE IGE RAST QL: NORMAL
DEPRECATED D PTERONYSS IGE RAST QL: NORMAL
DEPRECATED DOG DANDER IGE RAST QL: NORMAL
DEPRECATED ENGL PLANTAIN IGE RAST QL: NORMAL
DEPRECATED JOHNSON GRASS IGE RAST QL: NORMAL
DEPRECATED PECAN/HICK TREE IGE RAST QL: NORMAL
DEPRECATED ROACH IGE RAST QL: ABNORMAL
DEPRECATED SILVER BIRCH IGE RAST QL: NORMAL
DEPRECATED TIMOTHY IGE RAST QL: NORMAL
DEPRECATED WEST RAGWEED IGE RAST QL: NORMAL
DEPRECATED WHITE OAK IGE RAST QL: NORMAL
DOG DANDER IGE QN: <0.1 KU/L
ENGL PLANTAIN IGE QN: <0.1 KU/L
JOHNSON GRASS IGE QN: <0.1 KU/L
PECAN/HICK TREE IGE QN: <0.1 KU/L
ROACH IGE QN: 0.16 KU/L
SILVER BIRCH IGE QN: <0.1 KU/L
TIMOTHY IGE QN: <0.1 KU/L
WEST RAGWEED IGE QN: <0.1 KU/L
WHITE OAK IGE QN: <0.1 KU/L

## 2021-03-11 ENCOUNTER — PATIENT MESSAGE (OUTPATIENT)
Dept: SURGERY | Facility: CLINIC | Age: 66
End: 2021-03-11

## 2021-03-11 ENCOUNTER — TELEPHONE (OUTPATIENT)
Dept: FAMILY MEDICINE | Facility: CLINIC | Age: 66
End: 2021-03-11

## 2021-03-11 DIAGNOSIS — E11.65 TYPE 2 DIABETES MELLITUS WITH HYPERGLYCEMIA, WITHOUT LONG-TERM CURRENT USE OF INSULIN: Primary | ICD-10-CM

## 2021-03-14 ENCOUNTER — IMMUNIZATION (OUTPATIENT)
Dept: INTERNAL MEDICINE | Facility: CLINIC | Age: 66
End: 2021-03-14
Payer: MEDICAID

## 2021-03-14 DIAGNOSIS — Z23 NEED FOR VACCINATION: Primary | ICD-10-CM

## 2021-03-14 PROCEDURE — 0031A COVID-19,VECTOR-NR,RS-AD26,PF,0.5 ML DOSE VACCINE (JANSSEN): CPT | Mod: PBBFAC

## 2021-03-15 ENCOUNTER — PATIENT MESSAGE (OUTPATIENT)
Dept: FAMILY MEDICINE | Facility: CLINIC | Age: 66
End: 2021-03-15

## 2021-03-17 ENCOUNTER — OFFICE VISIT (OUTPATIENT)
Dept: ALLERGY | Facility: CLINIC | Age: 66
End: 2021-03-17
Payer: MEDICAID

## 2021-03-17 VITALS — WEIGHT: 232.13 LBS | BODY MASS INDEX: 38.67 KG/M2 | TEMPERATURE: 97 F | HEIGHT: 65 IN

## 2021-03-17 DIAGNOSIS — L29.9 ITCHING: ICD-10-CM

## 2021-03-17 DIAGNOSIS — J31.0 CHRONIC NONALLERGIC RHINITIS: Primary | ICD-10-CM

## 2021-03-17 DIAGNOSIS — R05.9 COUGH: ICD-10-CM

## 2021-03-17 DIAGNOSIS — Z86.69 HISTORY OF EAR PAIN: ICD-10-CM

## 2021-03-17 PROCEDURE — 99214 OFFICE O/P EST MOD 30 MIN: CPT | Mod: PBBFAC,PO | Performed by: STUDENT IN AN ORGANIZED HEALTH CARE EDUCATION/TRAINING PROGRAM

## 2021-03-17 PROCEDURE — 99215 OFFICE O/P EST HI 40 MIN: CPT | Mod: S$PBB,,, | Performed by: STUDENT IN AN ORGANIZED HEALTH CARE EDUCATION/TRAINING PROGRAM

## 2021-03-17 PROCEDURE — 99999 PR PBB SHADOW E&M-EST. PATIENT-LVL IV: ICD-10-PCS | Mod: PBBFAC,,, | Performed by: STUDENT IN AN ORGANIZED HEALTH CARE EDUCATION/TRAINING PROGRAM

## 2021-03-17 PROCEDURE — 99999 PR PBB SHADOW E&M-EST. PATIENT-LVL IV: CPT | Mod: PBBFAC,,, | Performed by: STUDENT IN AN ORGANIZED HEALTH CARE EDUCATION/TRAINING PROGRAM

## 2021-03-17 PROCEDURE — 99215 PR OFFICE/OUTPT VISIT, EST, LEVL V, 40-54 MIN: ICD-10-PCS | Mod: S$PBB,,, | Performed by: STUDENT IN AN ORGANIZED HEALTH CARE EDUCATION/TRAINING PROGRAM

## 2021-03-22 DIAGNOSIS — E04.1 THYROID NODULE: Primary | ICD-10-CM

## 2021-04-06 ENCOUNTER — OFFICE VISIT (OUTPATIENT)
Dept: NEUROLOGY | Facility: CLINIC | Age: 66
End: 2021-04-06
Payer: MEDICAID

## 2021-04-06 DIAGNOSIS — E11.65 TYPE 2 DIABETES MELLITUS WITH HYPERGLYCEMIA, WITHOUT LONG-TERM CURRENT USE OF INSULIN: ICD-10-CM

## 2021-04-06 DIAGNOSIS — G47.33 OBSTRUCTIVE SLEEP APNEA HYPOPNEA, MILD: ICD-10-CM

## 2021-04-06 DIAGNOSIS — I10 ESSENTIAL HYPERTENSION: ICD-10-CM

## 2021-04-06 DIAGNOSIS — G43.009 MIGRAINE WITHOUT AURA AND WITHOUT STATUS MIGRAINOSUS, NOT INTRACTABLE: Primary | ICD-10-CM

## 2021-04-06 PROCEDURE — 99214 OFFICE O/P EST MOD 30 MIN: CPT | Mod: 95,,, | Performed by: NURSE PRACTITIONER

## 2021-04-06 PROCEDURE — 99214 PR OFFICE/OUTPT VISIT, EST, LEVL IV, 30-39 MIN: ICD-10-PCS | Mod: 95,,, | Performed by: NURSE PRACTITIONER

## 2021-04-06 RX ORDER — SUMATRIPTAN SUCCINATE 100 MG/1
TABLET ORAL
Qty: 9 TABLET | Refills: 11 | Status: SHIPPED | OUTPATIENT
Start: 2021-04-06 | End: 2022-07-26

## 2021-04-07 ENCOUNTER — PATIENT OUTREACH (OUTPATIENT)
Dept: ADMINISTRATIVE | Facility: OTHER | Age: 66
End: 2021-04-07

## 2021-04-09 ENCOUNTER — HOSPITAL ENCOUNTER (OUTPATIENT)
Dept: RADIOLOGY | Facility: HOSPITAL | Age: 66
Discharge: HOME | End: 2021-04-09
Attending: FAMILY MEDICINE
Payer: MEDICAID

## 2021-04-09 ENCOUNTER — CLINICAL SUPPORT (OUTPATIENT)
Dept: OPHTHALMOLOGY | Facility: CLINIC | Age: 66
End: 2021-04-09
Payer: MEDICAID

## 2021-04-09 ENCOUNTER — OFFICE VISIT (OUTPATIENT)
Dept: OPHTHALMOLOGY | Facility: CLINIC | Age: 66
End: 2021-04-09
Payer: MEDICAID

## 2021-04-09 DIAGNOSIS — H52.203 MYOPIA OF BOTH EYES WITH ASTIGMATISM AND PRESBYOPIA: ICD-10-CM

## 2021-04-09 DIAGNOSIS — H35.362 FAMILIAL DRUSEN OF MACULA OF BOTH EYES: ICD-10-CM

## 2021-04-09 DIAGNOSIS — E04.1 THYROID NODULE: ICD-10-CM

## 2021-04-09 DIAGNOSIS — H52.13 MYOPIA OF BOTH EYES WITH ASTIGMATISM AND PRESBYOPIA: ICD-10-CM

## 2021-04-09 DIAGNOSIS — H35.361 FAMILIAL DRUSEN OF MACULA OF BOTH EYES: ICD-10-CM

## 2021-04-09 DIAGNOSIS — H40.023 OPEN ANGLE WITH BORDERLINE FINDINGS AND HIGH GLAUCOMA RISK IN BOTH EYES: Primary | ICD-10-CM

## 2021-04-09 DIAGNOSIS — H25.13 NUCLEAR SCLEROTIC CATARACT OF BOTH EYES: ICD-10-CM

## 2021-04-09 DIAGNOSIS — H52.4 MYOPIA OF BOTH EYES WITH ASTIGMATISM AND PRESBYOPIA: ICD-10-CM

## 2021-04-09 PROCEDURE — 76536 US EXAM OF HEAD AND NECK: CPT | Mod: TC

## 2021-04-09 PROCEDURE — 76536 US SOFT TISSUE HEAD NECK THYROID: ICD-10-PCS | Mod: 26,,, | Performed by: RADIOLOGY

## 2021-04-09 PROCEDURE — 92083 HUMPHREY VISUAL FIELD - OU - BOTH EYES: ICD-10-PCS | Mod: 26,S$PBB,, | Performed by: OPHTHALMOLOGY

## 2021-04-09 PROCEDURE — 99999 PR PBB SHADOW E&M-EST. PATIENT-LVL III: CPT | Mod: PBBFAC,,, | Performed by: OPHTHALMOLOGY

## 2021-04-09 PROCEDURE — 99999 PR PBB SHADOW E&M-EST. PATIENT-LVL III: ICD-10-PCS | Mod: PBBFAC,,, | Performed by: OPHTHALMOLOGY

## 2021-04-09 PROCEDURE — 99213 OFFICE O/P EST LOW 20 MIN: CPT | Mod: PBBFAC | Performed by: OPHTHALMOLOGY

## 2021-04-09 PROCEDURE — 92014 PR EYE EXAM, EST PATIENT,COMPREHESV: ICD-10-PCS | Mod: S$PBB,,, | Performed by: OPHTHALMOLOGY

## 2021-04-09 PROCEDURE — 92133 CPTRZD OPH DX IMG PST SGM ON: CPT | Mod: PBBFAC | Performed by: OPHTHALMOLOGY

## 2021-04-09 PROCEDURE — 92014 COMPRE OPH EXAM EST PT 1/>: CPT | Mod: S$PBB,,, | Performed by: OPHTHALMOLOGY

## 2021-04-09 PROCEDURE — 76536 US EXAM OF HEAD AND NECK: CPT | Mod: 26,,, | Performed by: RADIOLOGY

## 2021-04-09 PROCEDURE — 92133 POSTERIOR SEGMENT OCT OPTIC NERVE(OCULAR COHERENCE TOMOGRAPHY) - OU - BOTH EYES: ICD-10-PCS | Mod: 26,S$PBB,, | Performed by: OPHTHALMOLOGY

## 2021-04-09 PROCEDURE — 92083 EXTENDED VISUAL FIELD XM: CPT | Mod: PBBFAC | Performed by: OPHTHALMOLOGY

## 2021-04-12 ENCOUNTER — TELEPHONE (OUTPATIENT)
Dept: NEUROLOGY | Facility: CLINIC | Age: 66
End: 2021-04-12

## 2021-04-12 NOTE — TELEPHONE ENCOUNTER
----- Message from Sarahi Willson sent at 4/12/2021  9:26 AM CDT -----  Contact: Regine #[phone]  Pt needs to schedule an appt in July for a fu for headaches

## 2021-04-28 DIAGNOSIS — M48.062 SPINAL STENOSIS OF LUMBAR REGION WITH NEUROGENIC CLAUDICATION: ICD-10-CM

## 2021-04-28 DIAGNOSIS — I10 ESSENTIAL HYPERTENSION: ICD-10-CM

## 2021-04-28 RX ORDER — LOSARTAN POTASSIUM 50 MG/1
50 TABLET ORAL DAILY
Qty: 90 TABLET | Refills: 1 | Status: CANCELLED | OUTPATIENT
Start: 2021-04-28

## 2021-04-28 RX ORDER — GABAPENTIN 600 MG/1
600 TABLET ORAL 3 TIMES DAILY
Qty: 90 TABLET | Refills: 5 | Status: CANCELLED | OUTPATIENT
Start: 2021-04-28

## 2021-04-29 ENCOUNTER — PATIENT MESSAGE (OUTPATIENT)
Dept: FAMILY MEDICINE | Facility: CLINIC | Age: 66
End: 2021-04-29

## 2021-04-29 DIAGNOSIS — M48.062 SPINAL STENOSIS OF LUMBAR REGION WITH NEUROGENIC CLAUDICATION: ICD-10-CM

## 2021-04-30 DIAGNOSIS — I10 ESSENTIAL HYPERTENSION: ICD-10-CM

## 2021-04-30 DIAGNOSIS — M48.062 SPINAL STENOSIS OF LUMBAR REGION WITH NEUROGENIC CLAUDICATION: ICD-10-CM

## 2021-04-30 RX ORDER — GABAPENTIN 600 MG/1
600 TABLET ORAL 3 TIMES DAILY
Qty: 90 TABLET | Refills: 0 | Status: CANCELLED | OUTPATIENT
Start: 2021-04-30

## 2021-05-02 RX ORDER — GABAPENTIN 600 MG/1
600 TABLET ORAL 3 TIMES DAILY
Qty: 90 TABLET | Refills: 0 | Status: SHIPPED | OUTPATIENT
Start: 2021-05-02 | End: 2021-07-29

## 2021-05-02 RX ORDER — LOSARTAN POTASSIUM 50 MG/1
50 TABLET ORAL DAILY
Qty: 90 TABLET | Refills: 0 | Status: SHIPPED | OUTPATIENT
Start: 2021-05-02 | End: 2021-09-14 | Stop reason: SDUPTHER

## 2021-05-23 ENCOUNTER — PATIENT MESSAGE (OUTPATIENT)
Dept: NEUROLOGY | Facility: CLINIC | Age: 66
End: 2021-05-23

## 2021-05-23 DIAGNOSIS — G43.009 MIGRAINE WITHOUT AURA AND WITHOUT STATUS MIGRAINOSUS, NOT INTRACTABLE: ICD-10-CM

## 2021-05-24 ENCOUNTER — PATIENT MESSAGE (OUTPATIENT)
Dept: NEUROLOGY | Facility: CLINIC | Age: 66
End: 2021-05-24

## 2021-05-24 RX ORDER — NAPROXEN SODIUM 550 MG/1
550 TABLET ORAL 2 TIMES DAILY PRN
Qty: 20 TABLET | Refills: 0 | Status: SHIPPED | OUTPATIENT
Start: 2021-05-24 | End: 2021-05-24

## 2021-05-24 RX ORDER — NAPROXEN SODIUM 550 MG/1
550 TABLET ORAL 2 TIMES DAILY PRN
Qty: 20 TABLET | Refills: 0 | Status: SHIPPED | OUTPATIENT
Start: 2021-05-24 | End: 2021-07-13 | Stop reason: SDUPTHER

## 2021-05-25 ENCOUNTER — PATIENT MESSAGE (OUTPATIENT)
Dept: FAMILY MEDICINE | Facility: CLINIC | Age: 66
End: 2021-05-25

## 2021-05-25 DIAGNOSIS — E11.65 TYPE 2 DIABETES MELLITUS WITH HYPERGLYCEMIA, WITHOUT LONG-TERM CURRENT USE OF INSULIN: ICD-10-CM

## 2021-05-26 ENCOUNTER — PATIENT MESSAGE (OUTPATIENT)
Dept: NEUROLOGY | Facility: CLINIC | Age: 66
End: 2021-05-26

## 2021-05-26 DIAGNOSIS — E11.65 TYPE 2 DIABETES MELLITUS WITH HYPERGLYCEMIA, WITHOUT LONG-TERM CURRENT USE OF INSULIN: ICD-10-CM

## 2021-05-26 RX ORDER — GLIPIZIDE 5 MG/1
5 TABLET, FILM COATED, EXTENDED RELEASE ORAL 2 TIMES DAILY
Qty: 180 TABLET | Refills: 1 | OUTPATIENT
Start: 2021-05-26

## 2021-05-26 RX ORDER — GLIPIZIDE 5 MG/1
5 TABLET, FILM COATED, EXTENDED RELEASE ORAL 2 TIMES DAILY
Qty: 180 TABLET | Refills: 1 | Status: SHIPPED | OUTPATIENT
Start: 2021-05-26 | End: 2021-11-26

## 2021-06-10 ENCOUNTER — LAB VISIT (OUTPATIENT)
Dept: LAB | Facility: HOSPITAL | Age: 66
End: 2021-06-10
Attending: FAMILY MEDICINE
Payer: MEDICAID

## 2021-06-10 DIAGNOSIS — E11.65 TYPE 2 DIABETES MELLITUS WITH HYPERGLYCEMIA, WITHOUT LONG-TERM CURRENT USE OF INSULIN: ICD-10-CM

## 2021-06-10 LAB
ESTIMATED AVG GLUCOSE: 114 MG/DL (ref 68–131)
HBA1C MFR BLD: 5.6 % (ref 4–5.6)

## 2021-06-10 PROCEDURE — 83036 HEMOGLOBIN GLYCOSYLATED A1C: CPT | Performed by: FAMILY MEDICINE

## 2021-06-10 PROCEDURE — 36415 COLL VENOUS BLD VENIPUNCTURE: CPT | Mod: PN | Performed by: FAMILY MEDICINE

## 2021-06-15 ENCOUNTER — OFFICE VISIT (OUTPATIENT)
Dept: FAMILY MEDICINE | Facility: CLINIC | Age: 66
End: 2021-06-15
Payer: MEDICAID

## 2021-06-15 VITALS
RESPIRATION RATE: 16 BRPM | SYSTOLIC BLOOD PRESSURE: 134 MMHG | DIASTOLIC BLOOD PRESSURE: 80 MMHG | TEMPERATURE: 99 F | HEIGHT: 65 IN | HEART RATE: 90 BPM | WEIGHT: 226.63 LBS | OXYGEN SATURATION: 97 % | BODY MASS INDEX: 37.76 KG/M2

## 2021-06-15 DIAGNOSIS — I10 ESSENTIAL HYPERTENSION: ICD-10-CM

## 2021-06-15 DIAGNOSIS — G43.009 MIGRAINE WITHOUT AURA AND WITHOUT STATUS MIGRAINOSUS, NOT INTRACTABLE: ICD-10-CM

## 2021-06-15 DIAGNOSIS — M48.02 CERVICAL STENOSIS OF SPINE: ICD-10-CM

## 2021-06-15 DIAGNOSIS — M48.062 SPINAL STENOSIS OF LUMBAR REGION WITH NEUROGENIC CLAUDICATION: ICD-10-CM

## 2021-06-15 DIAGNOSIS — E11.65 TYPE 2 DIABETES MELLITUS WITH HYPERGLYCEMIA, WITHOUT LONG-TERM CURRENT USE OF INSULIN: Primary | ICD-10-CM

## 2021-06-15 PROCEDURE — 99999 PR PBB SHADOW E&M-EST. PATIENT-LVL V: ICD-10-PCS | Mod: PBBFAC,,, | Performed by: FAMILY MEDICINE

## 2021-06-15 PROCEDURE — 99214 PR OFFICE/OUTPT VISIT, EST, LEVL IV, 30-39 MIN: ICD-10-PCS | Mod: S$PBB,,, | Performed by: FAMILY MEDICINE

## 2021-06-15 PROCEDURE — 99999 PR PBB SHADOW E&M-EST. PATIENT-LVL V: CPT | Mod: PBBFAC,,, | Performed by: FAMILY MEDICINE

## 2021-06-15 PROCEDURE — 99215 OFFICE O/P EST HI 40 MIN: CPT | Mod: PBBFAC,PO | Performed by: FAMILY MEDICINE

## 2021-06-15 PROCEDURE — 99214 OFFICE O/P EST MOD 30 MIN: CPT | Mod: S$PBB,,, | Performed by: FAMILY MEDICINE

## 2021-06-28 ENCOUNTER — OFFICE VISIT (OUTPATIENT)
Dept: HEMATOLOGY/ONCOLOGY | Facility: CLINIC | Age: 66
End: 2021-06-28
Payer: MEDICAID

## 2021-06-28 VITALS
HEART RATE: 84 BPM | HEIGHT: 65 IN | BODY MASS INDEX: 38.05 KG/M2 | OXYGEN SATURATION: 94 % | DIASTOLIC BLOOD PRESSURE: 72 MMHG | SYSTOLIC BLOOD PRESSURE: 132 MMHG | TEMPERATURE: 98 F | RESPIRATION RATE: 18 BRPM | WEIGHT: 228.38 LBS

## 2021-06-28 DIAGNOSIS — Z85.3 HISTORY OF BREAST CANCER IN FEMALE: Primary | ICD-10-CM

## 2021-06-28 PROCEDURE — 99999 PR PBB SHADOW E&M-EST. PATIENT-LVL V: CPT | Mod: PBBFAC,,, | Performed by: INTERNAL MEDICINE

## 2021-06-28 PROCEDURE — 99213 PR OFFICE/OUTPT VISIT, EST, LEVL III, 20-29 MIN: ICD-10-PCS | Mod: S$PBB,,, | Performed by: INTERNAL MEDICINE

## 2021-06-28 PROCEDURE — 99215 OFFICE O/P EST HI 40 MIN: CPT | Mod: PBBFAC | Performed by: INTERNAL MEDICINE

## 2021-06-28 PROCEDURE — 99213 OFFICE O/P EST LOW 20 MIN: CPT | Mod: S$PBB,,, | Performed by: INTERNAL MEDICINE

## 2021-06-28 PROCEDURE — 99999 PR PBB SHADOW E&M-EST. PATIENT-LVL V: ICD-10-PCS | Mod: PBBFAC,,, | Performed by: INTERNAL MEDICINE

## 2021-07-05 ENCOUNTER — PATIENT MESSAGE (OUTPATIENT)
Dept: HEMATOLOGY/ONCOLOGY | Facility: CLINIC | Age: 66
End: 2021-07-05

## 2021-07-06 ENCOUNTER — PATIENT MESSAGE (OUTPATIENT)
Dept: FAMILY MEDICINE | Facility: CLINIC | Age: 66
End: 2021-07-06

## 2021-07-12 ENCOUNTER — OFFICE VISIT (OUTPATIENT)
Dept: HEMATOLOGY/ONCOLOGY | Facility: CLINIC | Age: 66
End: 2021-07-12
Payer: MEDICAID

## 2021-07-12 VITALS
HEIGHT: 65 IN | WEIGHT: 226.88 LBS | SYSTOLIC BLOOD PRESSURE: 126 MMHG | BODY MASS INDEX: 37.8 KG/M2 | HEART RATE: 76 BPM | DIASTOLIC BLOOD PRESSURE: 66 MMHG

## 2021-07-12 DIAGNOSIS — N76.4 VULVAR ABSCESS: Primary | ICD-10-CM

## 2021-07-12 PROCEDURE — 87070 CULTURE OTHR SPECIMN AEROBIC: CPT | Performed by: PHYSICIAN ASSISTANT

## 2021-07-12 PROCEDURE — 99213 OFFICE O/P EST LOW 20 MIN: CPT | Mod: PBBFAC | Performed by: PHYSICIAN ASSISTANT

## 2021-07-12 PROCEDURE — 87075 CULTR BACTERIA EXCEPT BLOOD: CPT | Performed by: PHYSICIAN ASSISTANT

## 2021-07-12 PROCEDURE — 99999 PR PBB SHADOW E&M-EST. PATIENT-LVL III: ICD-10-PCS | Mod: PBBFAC,,, | Performed by: PHYSICIAN ASSISTANT

## 2021-07-12 PROCEDURE — 99999 PR PBB SHADOW E&M-EST. PATIENT-LVL III: CPT | Mod: PBBFAC,,, | Performed by: PHYSICIAN ASSISTANT

## 2021-07-12 PROCEDURE — 99213 PR OFFICE/OUTPT VISIT, EST, LEVL III, 20-29 MIN: ICD-10-PCS | Mod: S$PBB,,, | Performed by: PHYSICIAN ASSISTANT

## 2021-07-12 PROCEDURE — 99213 OFFICE O/P EST LOW 20 MIN: CPT | Mod: S$PBB,,, | Performed by: PHYSICIAN ASSISTANT

## 2021-07-12 RX ORDER — MUPIROCIN 20 MG/G
OINTMENT TOPICAL 2 TIMES DAILY
Qty: 15 G | Refills: 0 | Status: SHIPPED | OUTPATIENT
Start: 2021-07-12 | End: 2021-07-27

## 2021-07-12 RX ORDER — SULFAMETHOXAZOLE AND TRIMETHOPRIM 800; 160 MG/1; MG/1
1 TABLET ORAL 2 TIMES DAILY
Qty: 14 TABLET | Refills: 0 | Status: SHIPPED | OUTPATIENT
Start: 2021-07-12 | End: 2021-07-19

## 2021-07-13 ENCOUNTER — OFFICE VISIT (OUTPATIENT)
Dept: NEUROLOGY | Facility: CLINIC | Age: 66
End: 2021-07-13
Payer: MEDICAID

## 2021-07-13 DIAGNOSIS — E11.65 TYPE 2 DIABETES MELLITUS WITH HYPERGLYCEMIA, WITHOUT LONG-TERM CURRENT USE OF INSULIN: ICD-10-CM

## 2021-07-13 DIAGNOSIS — I10 ESSENTIAL HYPERTENSION: ICD-10-CM

## 2021-07-13 DIAGNOSIS — G43.009 MIGRAINE WITHOUT AURA AND WITHOUT STATUS MIGRAINOSUS, NOT INTRACTABLE: Primary | ICD-10-CM

## 2021-07-13 PROCEDURE — 99214 OFFICE O/P EST MOD 30 MIN: CPT | Mod: 95,,, | Performed by: NURSE PRACTITIONER

## 2021-07-13 PROCEDURE — 99214 PR OFFICE/OUTPT VISIT, EST, LEVL IV, 30-39 MIN: ICD-10-PCS | Mod: 95,,, | Performed by: NURSE PRACTITIONER

## 2021-07-13 RX ORDER — NAPROXEN SODIUM 550 MG/1
550 TABLET ORAL 2 TIMES DAILY PRN
Qty: 30 TABLET | Refills: 5 | Status: SHIPPED | OUTPATIENT
Start: 2021-07-13 | End: 2022-04-21

## 2021-07-15 LAB — BACTERIA SPEC AEROBE CULT: NORMAL

## 2021-07-19 LAB — BACTERIA SPEC ANAEROBE CULT: NORMAL

## 2021-07-22 ENCOUNTER — PATIENT MESSAGE (OUTPATIENT)
Dept: HEMATOLOGY/ONCOLOGY | Facility: CLINIC | Age: 66
End: 2021-07-22

## 2021-07-23 ENCOUNTER — TELEPHONE (OUTPATIENT)
Dept: OBSTETRICS AND GYNECOLOGY | Facility: CLINIC | Age: 66
End: 2021-07-23

## 2021-07-26 ENCOUNTER — TELEPHONE (OUTPATIENT)
Dept: OBSTETRICS AND GYNECOLOGY | Facility: CLINIC | Age: 66
End: 2021-07-26

## 2021-07-27 ENCOUNTER — OFFICE VISIT (OUTPATIENT)
Dept: URGENT CARE | Facility: CLINIC | Age: 66
End: 2021-07-27
Payer: MEDICAID

## 2021-07-27 ENCOUNTER — TELEPHONE (OUTPATIENT)
Dept: FAMILY MEDICINE | Facility: CLINIC | Age: 66
End: 2021-07-27

## 2021-07-27 VITALS
HEART RATE: 75 BPM | HEIGHT: 65 IN | DIASTOLIC BLOOD PRESSURE: 70 MMHG | WEIGHT: 225 LBS | TEMPERATURE: 98 F | OXYGEN SATURATION: 98 % | BODY MASS INDEX: 37.49 KG/M2 | RESPIRATION RATE: 18 BRPM | SYSTOLIC BLOOD PRESSURE: 147 MMHG
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
## 2021-07-27 DIAGNOSIS — E11.65 TYPE 2 DIABETES MELLITUS WITH HYPERGLYCEMIA, WITHOUT LONG-TERM CURRENT USE OF INSULIN: ICD-10-CM

## 2021-07-27 DIAGNOSIS — M48.062 SPINAL STENOSIS OF LUMBAR REGION WITH NEUROGENIC CLAUDICATION: ICD-10-CM

## 2021-07-27 PROCEDURE — 73564 X-RAY EXAM KNEE 4 OR MORE: CPT | Mod: LT,S$GLB,, | Performed by: RADIOLOGY

## 2021-07-27 PROCEDURE — 72040 XR CERVICAL SPINE 2 OR 3 VIEWS: ICD-10-PCS | Mod: S$GLB,,, | Performed by: RADIOLOGY

## 2021-07-27 PROCEDURE — 73564 XR KNEE COMP 4 OR MORE VIEWS LEFT: ICD-10-PCS | Mod: LT,S$GLB,, | Performed by: RADIOLOGY

## 2021-07-27 PROCEDURE — 99214 PR OFFICE/OUTPT VISIT, EST, LEVL IV, 30-39 MIN: ICD-10-PCS | Mod: S$GLB,,, | Performed by: INTERNAL MEDICINE

## 2021-07-27 PROCEDURE — 99214 OFFICE O/P EST MOD 30 MIN: CPT | Mod: S$GLB,,, | Performed by: INTERNAL MEDICINE

## 2021-07-27 PROCEDURE — 72040 X-RAY EXAM NECK SPINE 2-3 VW: CPT | Mod: S$GLB,,, | Performed by: RADIOLOGY

## 2021-07-27 RX ORDER — MUPIROCIN 20 MG/G
OINTMENT TOPICAL 3 TIMES DAILY
Qty: 1 G | Refills: 0 | Status: SHIPPED | OUTPATIENT
Start: 2021-07-27 | End: 2021-07-28 | Stop reason: SDUPTHER

## 2021-07-27 RX ORDER — TIZANIDINE 4 MG/1
4 TABLET ORAL EVERY 8 HOURS
Qty: 15 TABLET | Refills: 0 | Status: SHIPPED | OUTPATIENT
Start: 2021-07-27 | End: 2021-09-14 | Stop reason: SDUPTHER

## 2021-07-27 RX ORDER — DICLOFENAC SODIUM 10 MG/G
2 GEL TOPICAL 3 TIMES DAILY
Qty: 100 G | Refills: 0 | Status: SHIPPED | OUTPATIENT
Start: 2021-07-27 | End: 2022-07-29

## 2021-07-27 RX ORDER — KETOROLAC TROMETHAMINE 30 MG/ML
30 INJECTION, SOLUTION INTRAMUSCULAR; INTRAVENOUS
Status: COMPLETED | OUTPATIENT
Start: 2021-07-27 | End: 2021-07-27

## 2021-07-27 RX ADMIN — KETOROLAC TROMETHAMINE 30 MG: 30 INJECTION, SOLUTION INTRAMUSCULAR; INTRAVENOUS at 08:07

## 2021-07-28 ENCOUNTER — OFFICE VISIT (OUTPATIENT)
Dept: OBSTETRICS AND GYNECOLOGY | Facility: CLINIC | Age: 66
End: 2021-07-28
Payer: MEDICAID

## 2021-07-28 VITALS
SYSTOLIC BLOOD PRESSURE: 102 MMHG | WEIGHT: 228.63 LBS | DIASTOLIC BLOOD PRESSURE: 64 MMHG | HEIGHT: 65 IN | BODY MASS INDEX: 38.09 KG/M2
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
## 2021-07-28 DIAGNOSIS — N76.4 VULVAR ABSCESS: Primary | ICD-10-CM

## 2021-07-28 PROCEDURE — 87075 CULTR BACTERIA EXCEPT BLOOD: CPT | Performed by: PHYSICIAN ASSISTANT

## 2021-07-28 PROCEDURE — 87070 CULTURE OTHR SPECIMN AEROBIC: CPT | Performed by: PHYSICIAN ASSISTANT

## 2021-07-28 PROCEDURE — 99213 PR OFFICE/OUTPT VISIT, EST, LEVL III, 20-29 MIN: ICD-10-PCS | Mod: S$GLB,,, | Performed by: PHYSICIAN ASSISTANT

## 2021-07-28 PROCEDURE — 99213 OFFICE O/P EST LOW 20 MIN: CPT | Mod: S$GLB,,, | Performed by: PHYSICIAN ASSISTANT

## 2021-07-28 RX ORDER — SULFAMETHOXAZOLE AND TRIMETHOPRIM 800; 160 MG/1; MG/1
1 TABLET ORAL 2 TIMES DAILY
Qty: 20 TABLET | Refills: 0 | Status: SHIPPED | OUTPATIENT
Start: 2021-07-28 | End: 2021-08-07

## 2021-07-28 RX ORDER — MUPIROCIN 20 MG/G
OINTMENT TOPICAL 3 TIMES DAILY
Qty: 30 G | Refills: 0 | Status: SHIPPED | OUTPATIENT
Start: 2021-07-28 | End: 2021-08-02

## 2021-07-29 ENCOUNTER — PATIENT MESSAGE (OUTPATIENT)
Dept: OBSTETRICS AND GYNECOLOGY | Facility: CLINIC | Age: 66
End: 2021-07-29

## 2021-07-29 ENCOUNTER — PATIENT MESSAGE (OUTPATIENT)
Dept: FAMILY MEDICINE | Facility: CLINIC | Age: 66
End: 2021-07-29

## 2021-07-30 DIAGNOSIS — E11.9 TYPE 2 DIABETES MELLITUS WITHOUT COMPLICATION: ICD-10-CM

## 2021-07-31 LAB — BACTERIA SPEC AEROBE CULT: NORMAL

## 2021-08-02 ENCOUNTER — OFFICE VISIT (OUTPATIENT)
Dept: FAMILY MEDICINE | Facility: CLINIC | Age: 66
End: 2021-08-02
Payer: MEDICAID

## 2021-08-02 VITALS
OXYGEN SATURATION: 97 % | DIASTOLIC BLOOD PRESSURE: 80 MMHG | HEIGHT: 65 IN | TEMPERATURE: 99 F | SYSTOLIC BLOOD PRESSURE: 122 MMHG | HEART RATE: 75 BPM | BODY MASS INDEX: 37.61 KG/M2 | RESPIRATION RATE: 16 BRPM | WEIGHT: 225.75 LBS

## 2021-08-02 DIAGNOSIS — S09.90XD: ICD-10-CM

## 2021-08-02 DIAGNOSIS — L21.9 SEBORRHEIC DERMATITIS OF SCALP: ICD-10-CM
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
## 2021-08-02 DIAGNOSIS — E11.65 TYPE 2 DIABETES MELLITUS WITH HYPERGLYCEMIA, WITHOUT LONG-TERM CURRENT USE OF INSULIN: ICD-10-CM
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
## 2021-08-02 DIAGNOSIS — M79.7 FIBROMYALGIA: ICD-10-CM

## 2021-08-02 PROCEDURE — 99213 OFFICE O/P EST LOW 20 MIN: CPT | Mod: PBBFAC,PO | Performed by: FAMILY MEDICINE

## 2021-08-02 PROCEDURE — 99999 PR PBB SHADOW E&M-EST. PATIENT-LVL III: CPT | Mod: PBBFAC,,, | Performed by: FAMILY MEDICINE

## 2021-08-02 PROCEDURE — 99214 OFFICE O/P EST MOD 30 MIN: CPT | Mod: S$PBB,,, | Performed by: FAMILY MEDICINE

## 2021-08-02 PROCEDURE — 99999 PR PBB SHADOW E&M-EST. PATIENT-LVL III: ICD-10-PCS | Mod: PBBFAC,,, | Performed by: FAMILY MEDICINE

## 2021-08-02 PROCEDURE — 99214 PR OFFICE/OUTPT VISIT, EST, LEVL IV, 30-39 MIN: ICD-10-PCS | Mod: S$PBB,,, | Performed by: FAMILY MEDICINE

## 2021-08-02 RX ORDER — CLOBETASOL PROPIONATE 0.05 G/100ML
SHAMPOO TOPICAL WEEKLY
Qty: 118 ML | Refills: 5 | Status: ON HOLD | OUTPATIENT
Start: 2021-08-02 | End: 2022-07-29 | Stop reason: HOSPADM

## 2021-08-04 ENCOUNTER — PATIENT MESSAGE (OUTPATIENT)
Dept: OBSTETRICS AND GYNECOLOGY | Facility: CLINIC | Age: 66
End: 2021-08-04

## 2021-08-04 ENCOUNTER — PATIENT MESSAGE (OUTPATIENT)
Dept: FAMILY MEDICINE | Facility: CLINIC | Age: 66
End: 2021-08-04

## 2021-08-04 LAB — BACTERIA SPEC ANAEROBE CULT: NORMAL

## 2021-08-05 ENCOUNTER — TELEPHONE (OUTPATIENT)
Dept: INFECTIOUS DISEASES | Facility: HOSPITAL | Age: 66
End: 2021-08-05

## 2021-08-05 RX ORDER — GABAPENTIN 600 MG/1
600 TABLET ORAL 3 TIMES DAILY
Qty: 90 TABLET | Refills: 1 | OUTPATIENT
Start: 2021-08-05

## 2021-08-05 RX ORDER — METFORMIN HYDROCHLORIDE 1000 MG/1
1000 TABLET ORAL 2 TIMES DAILY WITH MEALS
Qty: 180 TABLET | Refills: 1 | OUTPATIENT
Start: 2021-08-05

## 2021-08-06 ENCOUNTER — PATIENT MESSAGE (OUTPATIENT)
Dept: OBSTETRICS AND GYNECOLOGY | Facility: CLINIC | Age: 66
End: 2021-08-06

## 2021-08-09 ENCOUNTER — PATIENT MESSAGE (OUTPATIENT)
Dept: FAMILY MEDICINE | Facility: CLINIC | Age: 66
End: 2021-08-09

## 2021-08-10 NOTE — LETTER
December 31, 2018      Azikiwe K. Lombard, MD  [address] [facility] [address]           [facility]  [address]  [place] [address]  Phone: [phone]  Fax: [phone]          Patient: Regine Alfred   MR Number: [id number]   YOB: 1955   Date of Visit: 12/28/2018       Dear Dr. Azikiwe K. Lombard:    Thank you for referring Regine Alfred to me for evaluation. Attached you will find relevant portions of my assessment and plan of care.    If you have questions, please do not hesitate to call me. I look forward to following Regine Alfred along with you.    Sincerely,    Jen Prado MD    Enclosure  CC:  No Recipients    If you would like to receive this communication electronically, please contact [email or web address] or [phone] to request more information on [facility] access.    For providers and/or their staff who would like to refer a patient to [facility], please contact us through our one-stop-shop provider referral line, [facility], at [phone].    If you feel you have received this communication in error or would no longer like to receive these types of communications, please e-mail [email or web address]          Plan: Patient advised to use a white towel to dry face when using benzoyl peroxide cleanser\\n\\nBriefly discuss Accutane Detail Level: Zone Initiate Treatment: tretinoin 0.025 % topical cream QHS\\nQuantity: 1.0 Tube  Days Supply: 30\\nSig: Apply a pea sized amount to face at bedtime. Start off every other night once tolerated bump up to every night.\\n\\nclindamycin 1 % topical gel \\nQuantity: 1.0 Tube  Days Supply: 30\\nSig: Apply pea size amount to face every morning\\n\\nbenzoyl peroxide 2.5 % topical cleanser \\nQuantity: 1.0 Container  Days Supply: 30\\nSig: Wash face once daily Discontinue Regimen: OTC Benzoyl Peroxide 10% Render In Strict Bullet Format?: No

## 2021-08-26 ENCOUNTER — LAB VISIT (OUTPATIENT)
Dept: LAB | Facility: HOSPITAL | Age: 66
End: 2021-08-26
Attending: FAMILY MEDICINE
Payer: MEDICAID

## 2021-08-26 ENCOUNTER — PATIENT MESSAGE (OUTPATIENT)
Dept: FAMILY MEDICINE | Facility: CLINIC | Age: 66
End: 2021-08-26

## 2021-08-26 ENCOUNTER — TELEPHONE (OUTPATIENT)
Dept: FAMILY MEDICINE | Facility: CLINIC | Age: 66
End: 2021-08-26

## 2021-08-26 DIAGNOSIS — E11.9 TYPE 2 DIABETES MELLITUS WITHOUT COMPLICATION: ICD-10-CM

## 2021-08-26 DIAGNOSIS — R30.0 DYSURIA: ICD-10-CM

## 2021-08-26 DIAGNOSIS — R30.0 DYSURIA: Primary | ICD-10-CM

## 2021-08-26 LAB
BACTERIA #/AREA URNS AUTO: ABNORMAL /HPF
BILIRUB UR QL STRIP: NEGATIVE
CLARITY UR REFRACT.AUTO: ABNORMAL
COLOR UR AUTO: YELLOW
GLUCOSE UR QL STRIP: NEGATIVE
HGB UR QL STRIP: ABNORMAL
HYALINE CASTS UR QL AUTO: 0 /LPF
KETONES UR QL STRIP: ABNORMAL
LEUKOCYTE ESTERASE UR QL STRIP: ABNORMAL
MICROSCOPIC COMMENT: ABNORMAL
NITRITE UR QL STRIP: POSITIVE
PH UR STRIP: 5 [PH] (ref 5–8)
PROT UR QL STRIP: ABNORMAL
RBC #/AREA URNS AUTO: 22 /HPF (ref 0–4)
SP GR UR STRIP: 1.02 (ref 1–1.03)
SQUAMOUS #/AREA URNS AUTO: 1 /HPF
URN SPEC COLLECT METH UR: ABNORMAL
WBC #/AREA URNS AUTO: >100 /HPF (ref 0–5)
WBC CLUMPS UR QL AUTO: ABNORMAL

## 2021-08-26 PROCEDURE — 87077 CULTURE AEROBIC IDENTIFY: CPT | Performed by: FAMILY MEDICINE

## 2021-08-26 PROCEDURE — 87186 SC STD MICRODIL/AGAR DIL: CPT | Performed by: FAMILY MEDICINE

## 2021-08-26 PROCEDURE — 81001 URINALYSIS AUTO W/SCOPE: CPT | Performed by: FAMILY MEDICINE

## 2021-08-26 PROCEDURE — 87088 URINE BACTERIA CULTURE: CPT | Performed by: FAMILY MEDICINE

## 2021-08-26 PROCEDURE — 82570 ASSAY OF URINE CREATININE: CPT | Performed by: FAMILY MEDICINE

## 2021-08-26 PROCEDURE — 87086 URINE CULTURE/COLONY COUNT: CPT | Performed by: FAMILY MEDICINE

## 2021-08-26 RX ORDER — SULFAMETHOXAZOLE AND TRIMETHOPRIM 800; 160 MG/1; MG/1
1 TABLET ORAL 2 TIMES DAILY
Qty: 10 TABLET | Refills: 0 | Status: SHIPPED | OUTPATIENT
Start: 2021-08-26 | End: 2021-08-31

## 2021-08-27 LAB
ALBUMIN/CREAT UR: 45.7 UG/MG (ref 0–30)
CREAT UR-MCNC: 219 MG/DL (ref 15–325)
MICROALBUMIN UR DL<=1MG/L-MCNC: 100 UG/ML

## 2021-08-29 LAB — BACTERIA UR CULT: ABNORMAL

## 2021-09-01 ENCOUNTER — PATIENT MESSAGE (OUTPATIENT)
Dept: FAMILY MEDICINE | Facility: CLINIC | Age: 66
End: 2021-09-01

## 2021-09-01 DIAGNOSIS — N30.00 ACUTE CYSTITIS WITHOUT HEMATURIA: Primary | ICD-10-CM

## 2021-09-02 RX ORDER — AMOXICILLIN AND CLAVULANATE POTASSIUM 875; 125 MG/1; MG/1
1 TABLET, FILM COATED ORAL 2 TIMES DAILY
Qty: 10 TABLET | Refills: 0 | Status: SHIPPED | OUTPATIENT
Start: 2021-09-02 | End: 2021-09-04 | Stop reason: SDUPTHER

## 2021-09-03 ENCOUNTER — PATIENT MESSAGE (OUTPATIENT)
Dept: NEUROLOGY | Facility: CLINIC | Age: 66
End: 2021-09-03

## 2021-09-04 ENCOUNTER — PATIENT MESSAGE (OUTPATIENT)
Dept: FAMILY MEDICINE | Facility: CLINIC | Age: 66
End: 2021-09-04

## 2021-09-04 DIAGNOSIS — N30.00 ACUTE CYSTITIS WITHOUT HEMATURIA: ICD-10-CM

## 2021-09-04 RX ORDER — AMOXICILLIN AND CLAVULANATE POTASSIUM 875; 125 MG/1; MG/1
1 TABLET, FILM COATED ORAL 2 TIMES DAILY
Qty: 10 TABLET | Refills: 0 | Status: SHIPPED | OUTPATIENT
Start: 2021-09-04 | End: 2021-09-12

## 2021-09-04 RX ORDER — AMOXICILLIN AND CLAVULANATE POTASSIUM 875; 125 MG/1; MG/1
1 TABLET, FILM COATED ORAL 2 TIMES DAILY
Qty: 10 TABLET | Refills: 0 | Status: SHIPPED | OUTPATIENT
Start: 2021-09-04 | End: 2021-09-04 | Stop reason: SDUPTHER

## 2021-09-06 ENCOUNTER — PATIENT MESSAGE (OUTPATIENT)
Dept: FAMILY MEDICINE | Facility: CLINIC | Age: 66
End: 2021-09-06

## 2021-09-10 ENCOUNTER — LAB VISIT (OUTPATIENT)
Dept: LAB | Facility: HOSPITAL | Age: 66
End: 2021-09-10
Attending: FAMILY MEDICINE
Payer: MEDICAID

## 2021-09-10 DIAGNOSIS — M79.7 FIBROMYALGIA: ICD-10-CM

## 2021-09-10 DIAGNOSIS — E11.65 TYPE 2 DIABETES MELLITUS WITH HYPERGLYCEMIA, WITHOUT LONG-TERM CURRENT USE OF INSULIN: ICD-10-CM

## 2021-09-10 LAB
ANION GAP SERPL CALC-SCNC: 13 MMOL/L (ref 8–16)
BUN SERPL-MCNC: 13 MG/DL (ref 8–23)
CALCIUM SERPL-MCNC: 9.7 MG/DL (ref 8.7–10.5)
CHLORIDE SERPL-SCNC: 103 MMOL/L (ref 95–110)
CO2 SERPL-SCNC: 25 MMOL/L (ref 23–29)
CREAT SERPL-MCNC: 0.7 MG/DL (ref 0.5–1.4)
EST. GFR  (AFRICAN AMERICAN): >60 ML/MIN/1.73 M^2
EST. GFR  (NON AFRICAN AMERICAN): >60 ML/MIN/1.73 M^2
ESTIMATED AVG GLUCOSE: 128 MG/DL (ref 68–131)
GLUCOSE SERPL-MCNC: 118 MG/DL (ref 70–110)
HBA1C MFR BLD: 6.1 % (ref 4–5.6)
HIV 1+2 AB+HIV1 P24 AG SERPL QL IA: NEGATIVE
POTASSIUM SERPL-SCNC: 3.6 MMOL/L (ref 3.5–5.1)
SODIUM SERPL-SCNC: 141 MMOL/L (ref 136–145)

## 2021-09-10 PROCEDURE — 87389 HIV-1 AG W/HIV-1&-2 AB AG IA: CPT | Performed by: FAMILY MEDICINE

## 2021-09-10 PROCEDURE — 80048 BASIC METABOLIC PNL TOTAL CA: CPT | Performed by: FAMILY MEDICINE

## 2021-09-10 PROCEDURE — 83036 HEMOGLOBIN GLYCOSYLATED A1C: CPT | Performed by: FAMILY MEDICINE

## 2021-09-10 PROCEDURE — 36415 COLL VENOUS BLD VENIPUNCTURE: CPT | Mod: PN | Performed by: FAMILY MEDICINE

## 2021-09-14 ENCOUNTER — OFFICE VISIT (OUTPATIENT)
Dept: FAMILY MEDICINE | Facility: CLINIC | Age: 66
End: 2021-09-14
Payer: MEDICAID

## 2021-09-14 ENCOUNTER — TELEPHONE (OUTPATIENT)
Dept: FAMILY MEDICINE | Facility: CLINIC | Age: 66
End: 2021-09-14

## 2021-09-14 VITALS
SYSTOLIC BLOOD PRESSURE: 116 MMHG | HEART RATE: 85 BPM | RESPIRATION RATE: 16 BRPM | DIASTOLIC BLOOD PRESSURE: 70 MMHG | TEMPERATURE: 98 F | BODY MASS INDEX: 38.31 KG/M2 | WEIGHT: 229.94 LBS | HEIGHT: 65 IN | OXYGEN SATURATION: 97 %

## 2021-09-14 DIAGNOSIS — I10 ESSENTIAL HYPERTENSION: ICD-10-CM

## 2021-09-14 DIAGNOSIS — E11.9 TYPE 2 DIABETES MELLITUS WITHOUT COMPLICATION, WITHOUT LONG-TERM CURRENT USE OF INSULIN: Primary | ICD-10-CM

## 2021-09-14 DIAGNOSIS — Z00.00 WELL ADULT EXAM: ICD-10-CM

## 2021-09-14 DIAGNOSIS — M79.7 FIBROMYALGIA: ICD-10-CM

## 2021-09-14 DIAGNOSIS — M62.838 MUSCLE SPASM: ICD-10-CM

## 2021-09-14 DIAGNOSIS — M48.062 SPINAL STENOSIS OF LUMBAR REGION WITH NEUROGENIC CLAUDICATION: ICD-10-CM

## 2021-09-14 PROCEDURE — 99999 PR PBB SHADOW E&M-EST. PATIENT-LVL V: ICD-10-PCS | Mod: PBBFAC,,, | Performed by: FAMILY MEDICINE

## 2021-09-14 PROCEDURE — 99999 PR PBB SHADOW E&M-EST. PATIENT-LVL V: CPT | Mod: PBBFAC,,, | Performed by: FAMILY MEDICINE

## 2021-09-14 PROCEDURE — 99215 OFFICE O/P EST HI 40 MIN: CPT | Mod: PBBFAC,PO | Performed by: FAMILY MEDICINE

## 2021-09-14 PROCEDURE — 99214 PR OFFICE/OUTPT VISIT, EST, LEVL IV, 30-39 MIN: ICD-10-PCS | Mod: S$PBB,,, | Performed by: FAMILY MEDICINE

## 2021-09-14 PROCEDURE — 99214 OFFICE O/P EST MOD 30 MIN: CPT | Mod: S$PBB,,, | Performed by: FAMILY MEDICINE

## 2021-09-14 RX ORDER — LOSARTAN POTASSIUM 50 MG/1
50 TABLET ORAL DAILY
Qty: 90 TABLET | Refills: 1 | Status: SHIPPED | OUTPATIENT
Start: 2021-09-14 | End: 2021-09-17 | Stop reason: SDUPTHER

## 2021-09-14 RX ORDER — TIZANIDINE 4 MG/1
4 TABLET ORAL EVERY 8 HOURS PRN
Qty: 90 TABLET | Refills: 0 | Status: SHIPPED | OUTPATIENT
Start: 2021-09-14 | End: 2021-09-17 | Stop reason: SDUPTHER

## 2021-09-14 RX ORDER — HYDROCHLOROTHIAZIDE 25 MG/1
25 TABLET ORAL DAILY
Qty: 90 TABLET | Refills: 1 | Status: SHIPPED | OUTPATIENT
Start: 2021-09-14 | End: 2021-09-17 | Stop reason: SDUPTHER

## 2021-09-14 RX ORDER — GABAPENTIN 600 MG/1
600 TABLET ORAL 3 TIMES DAILY
Qty: 90 TABLET | Refills: 5 | Status: SHIPPED | OUTPATIENT
Start: 2021-09-14 | End: 2021-09-17 | Stop reason: SDUPTHER

## 2021-09-17 ENCOUNTER — PATIENT MESSAGE (OUTPATIENT)
Dept: FAMILY MEDICINE | Facility: CLINIC | Age: 66
End: 2021-09-17

## 2021-09-17 DIAGNOSIS — M62.838 MUSCLE SPASM: ICD-10-CM

## 2021-09-17 DIAGNOSIS — M48.062 SPINAL STENOSIS OF LUMBAR REGION WITH NEUROGENIC CLAUDICATION: ICD-10-CM

## 2021-09-17 DIAGNOSIS — M79.7 FIBROMYALGIA: ICD-10-CM

## 2021-09-17 DIAGNOSIS — I10 ESSENTIAL HYPERTENSION: ICD-10-CM

## 2021-09-20 RX ORDER — HYDROCHLOROTHIAZIDE 25 MG/1
25 TABLET ORAL DAILY
Qty: 90 TABLET | Refills: 1 | Status: SHIPPED | OUTPATIENT
Start: 2021-09-20 | End: 2022-03-22 | Stop reason: SDUPTHER

## 2021-09-20 RX ORDER — LOSARTAN POTASSIUM 50 MG/1
50 TABLET ORAL DAILY
Qty: 90 TABLET | Refills: 1 | Status: SHIPPED | OUTPATIENT
Start: 2021-09-20 | End: 2022-03-22 | Stop reason: SDUPTHER

## 2021-09-20 RX ORDER — TIZANIDINE 4 MG/1
4 TABLET ORAL EVERY 8 HOURS PRN
Qty: 90 TABLET | Refills: 0 | Status: SHIPPED | OUTPATIENT
Start: 2021-09-20 | End: 2021-12-19

## 2021-09-20 RX ORDER — GABAPENTIN 600 MG/1
600 TABLET ORAL 3 TIMES DAILY
Qty: 90 TABLET | Refills: 5 | Status: SHIPPED | OUTPATIENT
Start: 2021-09-20 | End: 2022-06-30 | Stop reason: CLARIF

## 2021-10-07 ENCOUNTER — PATIENT MESSAGE (OUTPATIENT)
Dept: FAMILY MEDICINE | Facility: CLINIC | Age: 66
End: 2021-10-07

## 2021-10-09 ENCOUNTER — IMMUNIZATION (OUTPATIENT)
Dept: PRIMARY CARE CLINIC | Facility: CLINIC | Age: 66
End: 2021-10-09
Payer: MEDICAID

## 2021-10-09 PROCEDURE — 90686 IIV4 VACC NO PRSV 0.5 ML IM: CPT | Mod: PBBFAC,PN

## 2021-10-11 ENCOUNTER — TELEPHONE (OUTPATIENT)
Dept: FAMILY MEDICINE | Facility: CLINIC | Age: 66
End: 2021-10-11

## 2021-10-15 ENCOUNTER — PATIENT MESSAGE (OUTPATIENT)
Dept: FAMILY MEDICINE | Facility: CLINIC | Age: 66
End: 2021-10-15
Payer: MEDICAID

## 2021-10-18 ENCOUNTER — PATIENT MESSAGE (OUTPATIENT)
Dept: FAMILY MEDICINE | Facility: CLINIC | Age: 66
End: 2021-10-18
Payer: MEDICAID

## 2021-10-18 DIAGNOSIS — N39.0 URINARY TRACT INFECTION WITHOUT HEMATURIA, SITE UNSPECIFIED: Primary | ICD-10-CM

## 2021-11-01 ENCOUNTER — OFFICE VISIT (OUTPATIENT)
Dept: HEMATOLOGY/ONCOLOGY | Facility: CLINIC | Age: 66
End: 2021-11-01
Payer: MEDICAID

## 2021-11-01 ENCOUNTER — OFFICE VISIT (OUTPATIENT)
Dept: OPHTHALMOLOGY | Facility: CLINIC | Age: 66
End: 2021-11-01
Payer: MEDICAID

## 2021-11-01 VITALS
DIASTOLIC BLOOD PRESSURE: 70 MMHG | HEART RATE: 76 BPM | RESPIRATION RATE: 18 BRPM | TEMPERATURE: 98 F | HEIGHT: 65 IN | OXYGEN SATURATION: 96 % | SYSTOLIC BLOOD PRESSURE: 148 MMHG | WEIGHT: 234.81 LBS | BODY MASS INDEX: 39.12 KG/M2

## 2021-11-01 DIAGNOSIS — H35.362 FAMILIAL DRUSEN OF MACULA OF BOTH EYES: ICD-10-CM

## 2021-11-01 DIAGNOSIS — H25.13 NUCLEAR SCLEROTIC CATARACT OF BOTH EYES: ICD-10-CM

## 2021-11-01 DIAGNOSIS — H52.4 MYOPIA OF BOTH EYES WITH ASTIGMATISM AND PRESBYOPIA: ICD-10-CM

## 2021-11-01 DIAGNOSIS — H52.13 MYOPIA OF BOTH EYES WITH ASTIGMATISM AND PRESBYOPIA: ICD-10-CM

## 2021-11-01 DIAGNOSIS — H40.023 OPEN ANGLE WITH BORDERLINE FINDINGS AND HIGH GLAUCOMA RISK IN BOTH EYES: Primary | ICD-10-CM

## 2021-11-01 DIAGNOSIS — H35.361 FAMILIAL DRUSEN OF MACULA OF BOTH EYES: ICD-10-CM

## 2021-11-01 DIAGNOSIS — Z85.3 HISTORY OF BREAST CANCER IN FEMALE: Primary | ICD-10-CM

## 2021-11-01 DIAGNOSIS — H52.203 MYOPIA OF BOTH EYES WITH ASTIGMATISM AND PRESBYOPIA: ICD-10-CM

## 2021-11-01 PROCEDURE — 92133 CPTRZD OPH DX IMG PST SGM ON: CPT | Mod: PBBFAC | Performed by: OPHTHALMOLOGY

## 2021-11-01 PROCEDURE — 99215 OFFICE O/P EST HI 40 MIN: CPT | Mod: PBBFAC | Performed by: INTERNAL MEDICINE

## 2021-11-01 PROCEDURE — 92015 DETERMINE REFRACTIVE STATE: CPT | Mod: ,,, | Performed by: OPHTHALMOLOGY

## 2021-11-01 PROCEDURE — 92133 POSTERIOR SEGMENT OCT OPTIC NERVE(OCULAR COHERENCE TOMOGRAPHY) - OU - BOTH EYES: ICD-10-PCS | Mod: 26,S$PBB,, | Performed by: OPHTHALMOLOGY

## 2021-11-01 PROCEDURE — 92012 INTRM OPH EXAM EST PATIENT: CPT | Mod: S$PBB,,, | Performed by: OPHTHALMOLOGY

## 2021-11-01 PROCEDURE — 99999 PR PBB SHADOW E&M-EST. PATIENT-LVL II: CPT | Mod: PBBFAC,,, | Performed by: OPHTHALMOLOGY

## 2021-11-01 PROCEDURE — 99213 OFFICE O/P EST LOW 20 MIN: CPT | Mod: S$PBB,,, | Performed by: INTERNAL MEDICINE

## 2021-11-01 PROCEDURE — 99213 PR OFFICE/OUTPT VISIT, EST, LEVL III, 20-29 MIN: ICD-10-PCS | Mod: S$PBB,,, | Performed by: INTERNAL MEDICINE

## 2021-11-01 PROCEDURE — 99212 OFFICE O/P EST SF 10 MIN: CPT | Mod: PBBFAC,27 | Performed by: OPHTHALMOLOGY

## 2021-11-01 PROCEDURE — 92015 PR REFRACTION: ICD-10-PCS | Mod: ,,, | Performed by: OPHTHALMOLOGY

## 2021-11-01 PROCEDURE — 99999 PR PBB SHADOW E&M-EST. PATIENT-LVL V: ICD-10-PCS | Mod: PBBFAC,,, | Performed by: INTERNAL MEDICINE

## 2021-11-01 PROCEDURE — 99999 PR PBB SHADOW E&M-EST. PATIENT-LVL II: ICD-10-PCS | Mod: PBBFAC,,, | Performed by: OPHTHALMOLOGY

## 2021-11-01 PROCEDURE — 99999 PR PBB SHADOW E&M-EST. PATIENT-LVL V: CPT | Mod: PBBFAC,,, | Performed by: INTERNAL MEDICINE

## 2021-11-01 PROCEDURE — 92012 PR EYE EXAM, EST PATIENT,INTERMED: ICD-10-PCS | Mod: S$PBB,,, | Performed by: OPHTHALMOLOGY

## 2021-11-01 RX ORDER — PHENAZOPYRIDINE HYDROCHLORIDE 200 MG/1
TABLET, FILM COATED ORAL
COMMUNITY
Start: 2021-10-17 | End: 2021-11-01 | Stop reason: ALTCHOICE

## 2021-11-01 RX ORDER — CEPHALEXIN 500 MG/1
500 CAPSULE ORAL EVERY 12 HOURS
COMMUNITY
Start: 2021-10-17 | End: 2021-11-01 | Stop reason: ALTCHOICE

## 2021-11-03 ENCOUNTER — IMMUNIZATION (OUTPATIENT)
Dept: INTERNAL MEDICINE | Facility: CLINIC | Age: 66
End: 2021-11-03
Payer: MEDICAID

## 2021-11-03 DIAGNOSIS — Z23 NEED FOR VACCINATION: Primary | ICD-10-CM

## 2021-11-03 PROCEDURE — 0034A COVID-19,VECTOR-NR,RS-AD26,PF,0.5 ML DOSE VACCINE (JANSSEN): CPT | Mod: PBBFAC,CV19

## 2021-11-03 PROCEDURE — 91303 COVID-19,VECTOR-NR,RS-AD26,PF,0.5 ML DOSE VACCINE (JANSSEN): CPT | Mod: PBBFAC

## 2021-11-15 ENCOUNTER — TELEPHONE (OUTPATIENT)
Dept: PODIATRY | Facility: CLINIC | Age: 66
End: 2021-11-15
Payer: MEDICAID

## 2021-11-15 ENCOUNTER — TELEPHONE (OUTPATIENT)
Dept: NEUROLOGY | Facility: CLINIC | Age: 66
End: 2021-11-15
Payer: MEDICAID

## 2021-11-18 ENCOUNTER — PATIENT MESSAGE (OUTPATIENT)
Dept: FAMILY MEDICINE | Facility: CLINIC | Age: 66
End: 2021-11-18
Payer: MEDICAID

## 2021-12-10 ENCOUNTER — LAB VISIT (OUTPATIENT)
Dept: LAB | Facility: HOSPITAL | Age: 66
End: 2021-12-10
Attending: FAMILY MEDICINE
Payer: MEDICAID

## 2021-12-10 DIAGNOSIS — E11.9 TYPE 2 DIABETES MELLITUS WITHOUT COMPLICATION, WITHOUT LONG-TERM CURRENT USE OF INSULIN: ICD-10-CM

## 2021-12-10 DIAGNOSIS — Z00.00 WELL ADULT EXAM: ICD-10-CM

## 2021-12-10 LAB
25(OH)D3+25(OH)D2 SERPL-MCNC: 63 NG/ML (ref 30–96)
ALBUMIN SERPL BCP-MCNC: 3.9 G/DL (ref 3.5–5.2)
ALP SERPL-CCNC: 69 U/L (ref 55–135)
ALT SERPL W/O P-5'-P-CCNC: 15 U/L (ref 10–44)
ANION GAP SERPL CALC-SCNC: 14 MMOL/L (ref 8–16)
AST SERPL-CCNC: 17 U/L (ref 10–40)
BASOPHILS # BLD AUTO: 0.04 K/UL (ref 0–0.2)
BASOPHILS NFR BLD: 0.6 % (ref 0–1.9)
BILIRUB SERPL-MCNC: 0.6 MG/DL (ref 0.1–1)
BUN SERPL-MCNC: 13 MG/DL (ref 8–23)
CALCIUM SERPL-MCNC: 10.1 MG/DL (ref 8.7–10.5)
CHLORIDE SERPL-SCNC: 102 MMOL/L (ref 95–110)
CHOLEST SERPL-MCNC: 151 MG/DL (ref 120–199)
CHOLEST/HDLC SERPL: 2.9 {RATIO} (ref 2–5)
CO2 SERPL-SCNC: 26 MMOL/L (ref 23–29)
CREAT SERPL-MCNC: 0.6 MG/DL (ref 0.5–1.4)
DIFFERENTIAL METHOD: ABNORMAL
EOSINOPHIL # BLD AUTO: 0.2 K/UL (ref 0–0.5)
EOSINOPHIL NFR BLD: 3.2 % (ref 0–8)
ERYTHROCYTE [DISTWIDTH] IN BLOOD BY AUTOMATED COUNT: 14.3 % (ref 11.5–14.5)
EST. GFR  (AFRICAN AMERICAN): >60 ML/MIN/1.73 M^2
EST. GFR  (NON AFRICAN AMERICAN): >60 ML/MIN/1.73 M^2
ESTIMATED AVG GLUCOSE: 128 MG/DL (ref 68–131)
GLUCOSE SERPL-MCNC: 109 MG/DL (ref 70–110)
HBA1C MFR BLD: 6.1 % (ref 4–5.6)
HCT VFR BLD AUTO: 40.1 % (ref 37–48.5)
HDLC SERPL-MCNC: 52 MG/DL (ref 40–75)
HDLC SERPL: 34.4 % (ref 20–50)
HGB BLD-MCNC: 12 G/DL (ref 12–16)
IMM GRANULOCYTES # BLD AUTO: 0.03 K/UL (ref 0–0.04)
IMM GRANULOCYTES NFR BLD AUTO: 0.4 % (ref 0–0.5)
LDLC SERPL CALC-MCNC: 82.8 MG/DL (ref 63–159)
LYMPHOCYTES # BLD AUTO: 2.3 K/UL (ref 1–4.8)
LYMPHOCYTES NFR BLD: 31.8 % (ref 18–48)
MCH RBC QN AUTO: 25.3 PG (ref 27–31)
MCHC RBC AUTO-ENTMCNC: 29.9 G/DL (ref 32–36)
MCV RBC AUTO: 84 FL (ref 82–98)
MONOCYTES # BLD AUTO: 0.5 K/UL (ref 0.3–1)
MONOCYTES NFR BLD: 7.5 % (ref 4–15)
NEUTROPHILS # BLD AUTO: 4.1 K/UL (ref 1.8–7.7)
NEUTROPHILS NFR BLD: 56.5 % (ref 38–73)
NONHDLC SERPL-MCNC: 99 MG/DL
NRBC BLD-RTO: 0 /100 WBC
PLATELET # BLD AUTO: 331 K/UL (ref 150–450)
PMV BLD AUTO: 10.7 FL (ref 9.2–12.9)
POTASSIUM SERPL-SCNC: 4.3 MMOL/L (ref 3.5–5.1)
PROT SERPL-MCNC: 7.6 G/DL (ref 6–8.4)
RBC # BLD AUTO: 4.75 M/UL (ref 4–5.4)
SODIUM SERPL-SCNC: 142 MMOL/L (ref 136–145)
T4 FREE SERPL-MCNC: 0.9 NG/DL (ref 0.71–1.51)
TRIGL SERPL-MCNC: 81 MG/DL (ref 30–150)
TSH SERPL DL<=0.005 MIU/L-ACNC: 1.43 UIU/ML (ref 0.4–4)
WBC # BLD AUTO: 7.23 K/UL (ref 3.9–12.7)

## 2021-12-10 PROCEDURE — 84443 ASSAY THYROID STIM HORMONE: CPT | Performed by: FAMILY MEDICINE

## 2021-12-10 PROCEDURE — 36415 COLL VENOUS BLD VENIPUNCTURE: CPT | Performed by: FAMILY MEDICINE

## 2021-12-10 PROCEDURE — 85025 COMPLETE CBC W/AUTO DIFF WBC: CPT | Performed by: FAMILY MEDICINE

## 2021-12-10 PROCEDURE — 84439 ASSAY OF FREE THYROXINE: CPT | Performed by: FAMILY MEDICINE

## 2021-12-10 PROCEDURE — 83036 HEMOGLOBIN GLYCOSYLATED A1C: CPT | Performed by: FAMILY MEDICINE

## 2021-12-10 PROCEDURE — 80053 COMPREHEN METABOLIC PANEL: CPT | Performed by: FAMILY MEDICINE

## 2021-12-10 PROCEDURE — 82306 VITAMIN D 25 HYDROXY: CPT | Performed by: FAMILY MEDICINE

## 2021-12-10 PROCEDURE — 80061 LIPID PANEL: CPT | Performed by: FAMILY MEDICINE

## 2021-12-14 ENCOUNTER — PATIENT MESSAGE (OUTPATIENT)
Dept: FAMILY MEDICINE | Facility: CLINIC | Age: 66
End: 2021-12-14

## 2021-12-14 ENCOUNTER — OFFICE VISIT (OUTPATIENT)
Dept: FAMILY MEDICINE | Facility: CLINIC | Age: 66
End: 2021-12-14
Payer: MEDICAID

## 2021-12-14 VITALS
RESPIRATION RATE: 16 BRPM | DIASTOLIC BLOOD PRESSURE: 80 MMHG | HEIGHT: 65 IN | HEART RATE: 75 BPM | OXYGEN SATURATION: 97 % | WEIGHT: 231.06 LBS | SYSTOLIC BLOOD PRESSURE: 134 MMHG | BODY MASS INDEX: 38.5 KG/M2 | TEMPERATURE: 99 F

## 2021-12-14 DIAGNOSIS — M79.7 FIBROMYALGIA: ICD-10-CM

## 2021-12-14 DIAGNOSIS — G43.009 MIGRAINE WITHOUT AURA AND WITHOUT STATUS MIGRAINOSUS, NOT INTRACTABLE: ICD-10-CM

## 2021-12-14 DIAGNOSIS — M48.02 CERVICAL STENOSIS OF SPINE: ICD-10-CM

## 2021-12-14 DIAGNOSIS — M48.062 SPINAL STENOSIS OF LUMBAR REGION WITH NEUROGENIC CLAUDICATION: ICD-10-CM

## 2021-12-14 DIAGNOSIS — I10 ESSENTIAL HYPERTENSION: ICD-10-CM

## 2021-12-14 DIAGNOSIS — E11.9 TYPE 2 DIABETES MELLITUS WITHOUT COMPLICATION, WITHOUT LONG-TERM CURRENT USE OF INSULIN: Primary | ICD-10-CM

## 2021-12-14 PROCEDURE — 99214 PR OFFICE/OUTPT VISIT, EST, LEVL IV, 30-39 MIN: ICD-10-PCS | Mod: S$PBB,,, | Performed by: FAMILY MEDICINE

## 2021-12-14 PROCEDURE — 4010F PR ACE/ARB THEARPY RXD/TAKEN: ICD-10-PCS | Mod: CPTII,,, | Performed by: FAMILY MEDICINE

## 2021-12-14 PROCEDURE — 3066F PR DOCUMENTATION OF TREATMENT FOR NEPHROPATHY: ICD-10-PCS | Mod: CPTII,,, | Performed by: FAMILY MEDICINE

## 2021-12-14 PROCEDURE — 4010F ACE/ARB THERAPY RXD/TAKEN: CPT | Mod: CPTII,,, | Performed by: FAMILY MEDICINE

## 2021-12-14 PROCEDURE — 99213 OFFICE O/P EST LOW 20 MIN: CPT | Mod: PBBFAC,PO | Performed by: FAMILY MEDICINE

## 2021-12-14 PROCEDURE — 3066F NEPHROPATHY DOC TX: CPT | Mod: CPTII,,, | Performed by: FAMILY MEDICINE

## 2021-12-14 PROCEDURE — 3060F POS MICROALBUMINURIA REV: CPT | Mod: CPTII,,, | Performed by: FAMILY MEDICINE

## 2021-12-14 PROCEDURE — 99999 PR PBB SHADOW E&M-EST. PATIENT-LVL III: ICD-10-PCS | Mod: PBBFAC,,, | Performed by: FAMILY MEDICINE

## 2021-12-14 PROCEDURE — 3060F PR POS MICROALBUMINURIA RESULT DOCUMENTED/REVIEW: ICD-10-PCS | Mod: CPTII,,, | Performed by: FAMILY MEDICINE

## 2021-12-14 PROCEDURE — 99999 PR PBB SHADOW E&M-EST. PATIENT-LVL III: CPT | Mod: PBBFAC,,, | Performed by: FAMILY MEDICINE

## 2021-12-14 PROCEDURE — 99214 OFFICE O/P EST MOD 30 MIN: CPT | Mod: S$PBB,,, | Performed by: FAMILY MEDICINE

## 2021-12-17 ENCOUNTER — PATIENT MESSAGE (OUTPATIENT)
Dept: NEUROLOGY | Facility: CLINIC | Age: 66
End: 2021-12-17
Payer: MEDICAID

## 2021-12-17 DIAGNOSIS — G43.009 MIGRAINE WITHOUT AURA AND WITHOUT STATUS MIGRAINOSUS, NOT INTRACTABLE: ICD-10-CM

## 2021-12-17 DIAGNOSIS — M62.838 MUSCLE SPASM: ICD-10-CM

## 2021-12-19 RX ORDER — TIZANIDINE 4 MG/1
TABLET ORAL
Qty: 90 TABLET | Refills: 0 | Status: ON HOLD | OUTPATIENT
Start: 2021-12-19 | End: 2022-07-29 | Stop reason: HOSPADM

## 2021-12-20 RX ORDER — ONDANSETRON 8 MG/1
8 TABLET, ORALLY DISINTEGRATING ORAL EVERY 8 HOURS PRN
Qty: 30 TABLET | Refills: 2 | Status: SHIPPED | OUTPATIENT
Start: 2021-12-20 | End: 2023-01-20 | Stop reason: SDUPTHER

## 2022-01-21 ENCOUNTER — PATIENT OUTREACH (OUTPATIENT)
Dept: ADMINISTRATIVE | Facility: OTHER | Age: 67
End: 2022-01-21
Payer: MEDICARE

## 2022-01-21 NOTE — PROGRESS NOTES
Health Maintenance Due   Topic Date Due    Foot Exam  Never done    DEXA SCAN  Never done    Pneumococcal Vaccines (Age 65+) (3 of 4 - PPSV23) 05/18/2018    Mammogram  02/26/2022     Updates were requested from care everywhere.  Chart was reviewed for overdue Proactive [facility] Encounters (PIERRE) topics (CRS, Breast Cancer Screening, Eye exam)  Health Maintenance has been updated.  LINKS immunization registry triggered.  Immunizations were reconciled.

## 2022-01-24 ENCOUNTER — OFFICE VISIT (OUTPATIENT)
Dept: OTOLARYNGOLOGY | Facility: CLINIC | Age: 67
End: 2022-01-24
Payer: MEDICARE

## 2022-01-24 ENCOUNTER — OFFICE VISIT (OUTPATIENT)
Dept: PODIATRY | Facility: CLINIC | Age: 67
End: 2022-01-24
Payer: MEDICARE

## 2022-01-24 ENCOUNTER — OFFICE VISIT (OUTPATIENT)
Dept: NEUROLOGY | Facility: CLINIC | Age: 67
End: 2022-01-24
Payer: MEDICARE

## 2022-01-24 VITALS
BODY MASS INDEX: 38.82 KG/M2 | DIASTOLIC BLOOD PRESSURE: 75 MMHG | WEIGHT: 233 LBS | HEIGHT: 65 IN | SYSTOLIC BLOOD PRESSURE: 118 MMHG | HEART RATE: 77 BPM

## 2022-01-24 VITALS
BODY MASS INDEX: 38.93 KG/M2 | SYSTOLIC BLOOD PRESSURE: 131 MMHG | HEART RATE: 89 BPM | DIASTOLIC BLOOD PRESSURE: 79 MMHG | HEIGHT: 65 IN | WEIGHT: 233.69 LBS

## 2022-01-24 VITALS — HEART RATE: 101 BPM | DIASTOLIC BLOOD PRESSURE: 75 MMHG | SYSTOLIC BLOOD PRESSURE: 145 MMHG

## 2022-01-24 DIAGNOSIS — L29.9 ITCHING OF EAR: Primary | ICD-10-CM

## 2022-01-24 DIAGNOSIS — E11.9 TYPE 2 DIABETES MELLITUS WITHOUT COMPLICATION, WITHOUT LONG-TERM CURRENT USE OF INSULIN: ICD-10-CM

## 2022-01-24 DIAGNOSIS — E11.65 TYPE 2 DIABETES MELLITUS WITH HYPERGLYCEMIA, WITHOUT LONG-TERM CURRENT USE OF INSULIN: ICD-10-CM

## 2022-01-24 DIAGNOSIS — G43.009 MIGRAINE WITHOUT AURA AND WITHOUT STATUS MIGRAINOSUS, NOT INTRACTABLE: Primary | ICD-10-CM

## 2022-01-24 DIAGNOSIS — M79.7 FIBROMYALGIA: ICD-10-CM

## 2022-01-24 DIAGNOSIS — I10 ESSENTIAL HYPERTENSION: ICD-10-CM

## 2022-01-24 DIAGNOSIS — E11.49 OTHER DIABETIC NEUROLOGICAL COMPLICATION ASSOCIATED WITH TYPE 2 DIABETES MELLITUS: Primary | ICD-10-CM

## 2022-01-24 PROCEDURE — 99212 OFFICE O/P EST SF 10 MIN: CPT | Mod: S$GLB,,, | Performed by: OTOLARYNGOLOGY

## 2022-01-24 PROCEDURE — 4010F PR ACE/ARB THEARPY RXD/TAKEN: ICD-10-PCS | Mod: CPTII,S$GLB,, | Performed by: OTOLARYNGOLOGY

## 2022-01-24 PROCEDURE — 3008F BODY MASS INDEX DOCD: CPT | Mod: CPTII,S$GLB,, | Performed by: NURSE PRACTITIONER

## 2022-01-24 PROCEDURE — 3288F FALL RISK ASSESSMENT DOCD: CPT | Mod: CPTII,S$GLB,, | Performed by: OTOLARYNGOLOGY

## 2022-01-24 PROCEDURE — 3077F SYST BP >= 140 MM HG: CPT | Mod: CPTII,S$GLB,, | Performed by: OTOLARYNGOLOGY

## 2022-01-24 PROCEDURE — 4010F ACE/ARB THERAPY RXD/TAKEN: CPT | Mod: CPTII,S$GLB,, | Performed by: OTOLARYNGOLOGY

## 2022-01-24 PROCEDURE — 3078F PR MOST RECENT DIASTOLIC BLOOD PRESSURE < 80 MM HG: ICD-10-PCS | Mod: CPTII,S$GLB,, | Performed by: NURSE PRACTITIONER

## 2022-01-24 PROCEDURE — 3288F PR FALLS RISK ASSESSMENT DOCUMENTED: ICD-10-PCS | Mod: CPTII,S$GLB,, | Performed by: OTOLARYNGOLOGY

## 2022-01-24 PROCEDURE — 99212 PR OFFICE/OUTPT VISIT, EST, LEVL II, 10-19 MIN: ICD-10-PCS | Mod: S$GLB,,, | Performed by: OTOLARYNGOLOGY

## 2022-01-24 PROCEDURE — 99999 PR PBB SHADOW E&M-EST. PATIENT-LVL IV: CPT | Mod: PBBFAC,,, | Performed by: OTOLARYNGOLOGY

## 2022-01-24 PROCEDURE — 3077F PR MOST RECENT SYSTOLIC BLOOD PRESSURE >= 140 MM HG: ICD-10-PCS | Mod: CPTII,S$GLB,, | Performed by: OTOLARYNGOLOGY

## 2022-01-24 PROCEDURE — 1160F PR REVIEW ALL MEDS BY PRESCRIBER/CLIN PHARMACIST DOCUMENTED: ICD-10-PCS | Mod: CPTII,S$GLB,, | Performed by: NURSE PRACTITIONER

## 2022-01-24 PROCEDURE — 4010F ACE/ARB THERAPY RXD/TAKEN: CPT | Mod: CPTII,S$GLB,, | Performed by: NURSE PRACTITIONER

## 2022-01-24 PROCEDURE — 3078F DIAST BP <80 MM HG: CPT | Mod: CPTII,S$GLB,, | Performed by: NURSE PRACTITIONER

## 2022-01-24 PROCEDURE — 4010F PR ACE/ARB THEARPY RXD/TAKEN: ICD-10-PCS | Mod: CPTII,S$GLB,, | Performed by: NURSE PRACTITIONER

## 2022-01-24 PROCEDURE — 1160F RVW MEDS BY RX/DR IN RCRD: CPT | Mod: CPTII,S$GLB,, | Performed by: NURSE PRACTITIONER

## 2022-01-24 PROCEDURE — 1159F MED LIST DOCD IN RCRD: CPT | Mod: CPTII,S$GLB,, | Performed by: OTOLARYNGOLOGY

## 2022-01-24 PROCEDURE — 99214 PR OFFICE/OUTPT VISIT, EST, LEVL IV, 30-39 MIN: ICD-10-PCS | Mod: S$GLB,,, | Performed by: NURSE PRACTITIONER

## 2022-01-24 PROCEDURE — 3008F PR BODY MASS INDEX (BMI) DOCUMENTED: ICD-10-PCS | Mod: CPTII,S$GLB,, | Performed by: NURSE PRACTITIONER

## 2022-01-24 PROCEDURE — 1101F PR PT FALLS ASSESS DOC 0-1 FALLS W/OUT INJ PAST YR: ICD-10-PCS | Mod: CPTII,S$GLB,, | Performed by: NURSE PRACTITIONER

## 2022-01-24 PROCEDURE — 3074F PR MOST RECENT SYSTOLIC BLOOD PRESSURE < 130 MM HG: ICD-10-PCS | Mod: CPTII,S$GLB,, | Performed by: PODIATRIST

## 2022-01-24 PROCEDURE — 3078F PR MOST RECENT DIASTOLIC BLOOD PRESSURE < 80 MM HG: ICD-10-PCS | Mod: CPTII,S$GLB,, | Performed by: PODIATRIST

## 2022-01-24 PROCEDURE — 99999 PR PBB SHADOW E&M-EST. PATIENT-LVL III: ICD-10-PCS | Mod: PBBFAC,,, | Performed by: NURSE PRACTITIONER

## 2022-01-24 PROCEDURE — 3288F PR FALLS RISK ASSESSMENT DOCUMENTED: ICD-10-PCS | Mod: CPTII,S$GLB,, | Performed by: NURSE PRACTITIONER

## 2022-01-24 PROCEDURE — 99999 PR PBB SHADOW E&M-EST. PATIENT-LVL III: CPT | Mod: PBBFAC,,, | Performed by: NURSE PRACTITIONER

## 2022-01-24 PROCEDURE — 99214 OFFICE O/P EST MOD 30 MIN: CPT | Mod: S$GLB,,, | Performed by: NURSE PRACTITIONER

## 2022-01-24 PROCEDURE — 1101F PR PT FALLS ASSESS DOC 0-1 FALLS W/OUT INJ PAST YR: ICD-10-PCS | Mod: CPTII,S$GLB,, | Performed by: OTOLARYNGOLOGY

## 2022-01-24 PROCEDURE — 1101F PT FALLS ASSESS-DOCD LE1/YR: CPT | Mod: CPTII,S$GLB,, | Performed by: NURSE PRACTITIONER

## 2022-01-24 PROCEDURE — 1159F MED LIST DOCD IN RCRD: CPT | Mod: CPTII,S$GLB,, | Performed by: NURSE PRACTITIONER

## 2022-01-24 PROCEDURE — 3288F FALL RISK ASSESSMENT DOCD: CPT | Mod: CPTII,S$GLB,, | Performed by: NURSE PRACTITIONER

## 2022-01-24 PROCEDURE — 99999 PR PBB SHADOW E&M-EST. PATIENT-LVL IV: ICD-10-PCS | Mod: PBBFAC,,, | Performed by: OTOLARYNGOLOGY

## 2022-01-24 PROCEDURE — 3075F SYST BP GE 130 - 139MM HG: CPT | Mod: CPTII,S$GLB,, | Performed by: NURSE PRACTITIONER

## 2022-01-24 PROCEDURE — 4010F ACE/ARB THERAPY RXD/TAKEN: CPT | Mod: CPTII,S$GLB,, | Performed by: PODIATRIST

## 2022-01-24 PROCEDURE — 1159F PR MEDICATION LIST DOCUMENTED IN MEDICAL RECORD: ICD-10-PCS | Mod: CPTII,S$GLB,, | Performed by: NURSE PRACTITIONER

## 2022-01-24 PROCEDURE — 3075F PR MOST RECENT SYSTOLIC BLOOD PRESS GE 130-139MM HG: ICD-10-PCS | Mod: CPTII,S$GLB,, | Performed by: NURSE PRACTITIONER

## 2022-01-24 PROCEDURE — 3074F SYST BP LT 130 MM HG: CPT | Mod: CPTII,S$GLB,, | Performed by: PODIATRIST

## 2022-01-24 PROCEDURE — 99999 PR PBB SHADOW E&M-EST. PATIENT-LVL V: ICD-10-PCS | Mod: PBBFAC,,, | Performed by: PODIATRIST

## 2022-01-24 PROCEDURE — 1101F PT FALLS ASSESS-DOCD LE1/YR: CPT | Mod: CPTII,S$GLB,, | Performed by: OTOLARYNGOLOGY

## 2022-01-24 PROCEDURE — 99499 RISK ADDL DX/OHS AUDIT: ICD-10-PCS | Mod: S$GLB,,, | Performed by: NURSE PRACTITIONER

## 2022-01-24 PROCEDURE — 1159F PR MEDICATION LIST DOCUMENTED IN MEDICAL RECORD: ICD-10-PCS | Mod: CPTII,S$GLB,, | Performed by: OTOLARYNGOLOGY

## 2022-01-24 PROCEDURE — 99213 OFFICE O/P EST LOW 20 MIN: CPT | Mod: S$GLB,,, | Performed by: PODIATRIST

## 2022-01-24 PROCEDURE — 99499 UNLISTED E&M SERVICE: CPT | Mod: S$GLB,,, | Performed by: NURSE PRACTITIONER

## 2022-01-24 PROCEDURE — 3008F PR BODY MASS INDEX (BMI) DOCUMENTED: ICD-10-PCS | Mod: CPTII,S$GLB,, | Performed by: PODIATRIST

## 2022-01-24 PROCEDURE — 4010F PR ACE/ARB THEARPY RXD/TAKEN: ICD-10-PCS | Mod: CPTII,S$GLB,, | Performed by: PODIATRIST

## 2022-01-24 PROCEDURE — 3078F PR MOST RECENT DIASTOLIC BLOOD PRESSURE < 80 MM HG: ICD-10-PCS | Mod: CPTII,S$GLB,, | Performed by: OTOLARYNGOLOGY

## 2022-01-24 PROCEDURE — 3078F DIAST BP <80 MM HG: CPT | Mod: CPTII,S$GLB,, | Performed by: PODIATRIST

## 2022-01-24 PROCEDURE — 99213 PR OFFICE/OUTPT VISIT, EST, LEVL III, 20-29 MIN: ICD-10-PCS | Mod: S$GLB,,, | Performed by: PODIATRIST

## 2022-01-24 PROCEDURE — 3078F DIAST BP <80 MM HG: CPT | Mod: CPTII,S$GLB,, | Performed by: OTOLARYNGOLOGY

## 2022-01-24 PROCEDURE — 99999 PR PBB SHADOW E&M-EST. PATIENT-LVL V: CPT | Mod: PBBFAC,,, | Performed by: PODIATRIST

## 2022-01-24 PROCEDURE — 3008F BODY MASS INDEX DOCD: CPT | Mod: CPTII,S$GLB,, | Performed by: PODIATRIST

## 2022-01-24 RX ORDER — RIZATRIPTAN BENZOATE 10 MG/1
10 TABLET, ORALLY DISINTEGRATING ORAL
Qty: 12 TABLET | Refills: 5 | Status: SHIPPED | OUTPATIENT
Start: 2022-01-24 | End: 2022-07-26 | Stop reason: SDUPTHER

## 2022-01-24 RX ORDER — RIMEGEPANT SULFATE 75 MG/75MG
75 TABLET, ORALLY DISINTEGRATING ORAL DAILY PRN
Qty: 8 TABLET | Refills: 2 | Status: SHIPPED | OUTPATIENT
Start: 2022-01-24 | End: 2022-03-22

## 2022-01-24 NOTE — PROGRESS NOTES
Patient, Regine Osorio (MRN #[id number]), presented with a recorded BMI of 38.89 kg/m^2 and a documented comorbidity(s):  - Diabetes Mellitus Type 2  - Hypertension  to which the severe obesity is a contributing factor. This is consistent with the definition of severe obesity (BMI 35.0-39.9) with comorbidity (ICD-10 E66.01, Z68.35). The patient's severe obesity was monitored, evaluated, addressed and/or treated. This addendum to the medical record is made on 01/24/2022.

## 2022-01-24 NOTE — PROGRESS NOTES
CC: AS itching +   HPI:Ms. Cooper is a 67-year-old type 2 diabetic  female with eczema and allergies who indicates a significant itching sensation in her left ear canal recently ( this past Saturday 2 days ago) to the point of having to use the previously prescribed fluocinolone otic drops BID.  The medication is suppressing the left ear symptoms at this point.  She had been prescribed VoSol otic solution in the past.   However, she also indicates a tickling or itching sensation in the left side of her throat possibly precipitated by postnasal drip there.  She has been prescribed both Astelin and Flonase nasal sprays in the past.  She gets occasional GERD symptoms.  She used to take Protonix for control.    Past Medical History:   Diagnosis Date    Allergy     Breast cancer 2017    Cataract     Diabetes mellitus, type 2     Type 2    Eczema     Fibromyalgia     Glaucoma     Hypertension     Renal cyst, right 02/06/2020    Steatosis of liver 02/06/2020   ALL: codeine, Lisinopril  Current Outpatient Medications on File Prior to Visit   Medication Sig Dispense Refill    acetic acid (VOSOL) 2 % otic solution Place 4 drops into both ears as needed.      aspirin 81 MG Chew Take 81 mg by mouth once daily.       azelastine (ASTELIN) 137 mcg (0.1 %) nasal spray 1 SPRAY IN EACH NOSTRIL TWICE DAILY 30 mL 2    benzonatate (TESSALON) 200 MG capsule 1 capsule up to three times a day as needed for cough 50 capsule 1    blood sugar diagnostic (ONETOUCH VERIO TEST STRIPS) Strp Check blood glucose 3 times daily 100 each 5    blood sugar diagnostic Strp To check BG 2 times daily, to use with insurance preferred meter 200 strip 3    blood sugar diagnostic Strp To check BG 2 times daily, to use with insurance preferred meter 200 each 3    cetirizine (ZYRTEC) 10 MG tablet TAKE 2 TABLETS BY MOUTH ONCE DAILY 60 tablet 3    cholecalciferol, vitamin D3, (VITAMIN D3) 50 mcg (2,000 unit) Cap Take 1 capsule by  mouth once daily.       clobetasoL (CLOBEX) 0.05 % shampoo Apply topically once a week. 118 mL 5    cyanocobalamin/cobamamide (B12 SL) Place 3,000 mcg under the tongue once daily.      dextromethorphan-guaiFENesin  mg/5 ml (ROBITUSSIN-DM)  mg/5 mL liquid Take 5 mLs by mouth every 12 (twelve) hours.      diclofenac sodium (VOLTAREN) 1 % Gel Apply 2 g topically 3 (three) times daily. 100 g 0    fluocinolone acetonide oiL 0.01 % Drop INSTILL 4 DROPS IN EAR(S) TWICE DAILY FOR 1 TO 2 WEEKS 20 mL 1    fluticasone propionate (FLONASE) 50 mcg/actuation nasal spray 2 sprays (100 mcg total) by Each Nostril route 2 (two) times daily. 31.6 mL 5    gabapentin (NEURONTIN) 600 MG tablet Take 1 tablet (600 mg total) by mouth 3 (three) times daily. 90 tablet 5    glipiZIDE (GLUCOTROL) 5 MG TR24 TAKE 1 TABLET(5 MG) BY MOUTH TWICE DAILY 180 tablet 1    hydroCHLOROthiazide (HYDRODIURIL) 25 MG tablet Take 1 tablet (25 mg total) by mouth once daily. 90 tablet 1    ketoconazole (NIZORAL) 2 % cream Apply topically once daily. 1 Tube 2    losartan (COZAAR) 50 MG tablet Take 1 tablet (50 mg total) by mouth once daily. 90 tablet 1    metFORMIN (GLUCOPHAGE) 1000 MG tablet TAKE 1 TABLET(1000 MG) BY MOUTH TWICE DAILY WITH MEALS 180 tablet 1    naproxen sodium (ANAPROX) 550 MG tablet Take 1 tablet (550 mg total) by mouth 2 (two) times daily as needed (for headaches). 30 tablet 5    ondansetron (ZOFRAN-ODT) 8 MG TbDL Take 1 tablet (8 mg total) by mouth every 8 (eight) hours as needed (Nausea). 30 tablet 2    ONETOUCH DELICA LANC DEVICE Kit USE TO TEST BLOOD SUGAR THREE TIMES DAILY 1 each 0    ONETOUCH DELICA PLUS LANCET 33 gauge Misc USE TO TEST BID  3    sumatriptan (IMITREX) 100 MG tablet Take 1 tab at onset of migraine, may repeat dose in 2 hours if needed. Max 2 tabs/day. 9 tablet 11    tiZANidine (ZANAFLEX) 4 MG tablet TAKE 1 TABLET(4 MG) BY MOUTH EVERY 8 HOURS AS NEEDED FOR SPASM 90 tablet 0    blood-glucose  meter kit To check BG 2 times daily, to use with insurance preferred meter 1 each 0     No current facility-administered medications on file prior to visit.   Answers for HPI/ROS submitted by the patient on 1/19/2022  Fatigue (Tiredness)?: Yes  hearing loss: Yes  None of these : Yes  None of these: Yes  None of these : Yes  diarrhea: Yes  constipation: Yes  Acid Reflux?: Yes  None of these: Yes  Muscle aches / pain?: Yes  back pain: Yes  neck pain: Yes  None of these : Yes  Seasonal Allergies?: Yes  Cold all of the time? : Yes  headaches: Yes  None of these: Yes  None of these: Yes          PE:Gen.:  Alert and oriented bespectacled  female in no acute distress  Both ears are examined under the microscope in the micro procedure room.  Left ear canal is devoid of wax and left ear canal is not inflamed.  There is no evidence of otomycosis there.  Left TM is clear and intact and  The left middle ear space appears well aerated.  The right ear canal is devoid of wax.  The right ear canal is not inflamed.  The right TM is clear and intact the right middle ear space appears well aerated.  Nasal exam reveals erythema of the left middle turbinate without gena evidence of purulent nasal discharge.  The left inferior turbinate is slightly edematous.  Oropharyngeal exam is unremarkable for inflammation, infection, ulceration or thrush with particular attention paid to the left side of the throat.       DIAGNOSIS:   1. Itching of ear, AS; responsive to fluocinolone otic drops     2.      Itching of left throat/left post nasal drip sx  3.      Hx GERD    PLAN:  Trial of both Flonase nasal steroid spray and Astelin nasal spray; 1 spray of each to the left nostril ( or both nostrils) b.i.d. may help  May swallow 1 tsp liquid Benadryl +/- 1 tsp Mylanta slowly for topical throat itching symptoms; may use h.s  and TID prn; (may use OTC Prilosec 20 mg daily for several days p.r.n. as on Mylanta substitute )  May  continue use of Dermotic type oil drops to left ear b.i.d. x 1-2 weeks then discontinued  Call for any significant change in status.  She may use Tessalon Perles for tickling cough control p.r.n.
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
## 2022-01-25 NOTE — PATIENT INSTRUCTIONS
Trial of both Flonase nasal steroid spray and Astelin nasal spray; 1 spray of each to the left nostril ( or both nostrils) b.i.d. may help  May swallow 1 tsp liquid Benadryl +/- 1 tsp Mylanta slowly for topical throat itching symptoms; may use h.s  and TID prn; (may use OTC Prilosec 20 mg daily for several days p.r.n. as on Mylanta substitute )  May continue use of Dermotic type oil drops to left ear b.i.d. x 1-2 weeks then discontinued  Call for any significant change in status.  She may use Tessalon Perles for  tickling cough control p.r.n.

## 2022-01-26 ENCOUNTER — TELEPHONE (OUTPATIENT)
Dept: HEMATOLOGY/ONCOLOGY | Facility: CLINIC | Age: 67
End: 2022-01-26
Payer: MEDICARE

## 2022-01-26 ENCOUNTER — PATIENT MESSAGE (OUTPATIENT)
Dept: NEUROLOGY | Facility: CLINIC | Age: 67
End: 2022-01-26
Payer: MEDICARE

## 2022-01-26 RX ORDER — INSULIN PUMP SYRINGE, 3 ML
EACH MISCELLANEOUS
Qty: 1 EACH | Refills: 0 | Status: SHIPPED | OUTPATIENT
Start: 2022-01-26 | End: 2023-03-20

## 2022-01-26 RX ORDER — LANCETS
EACH MISCELLANEOUS
Qty: 200 EACH | Refills: 3 | Status: SHIPPED | OUTPATIENT
Start: 2022-01-26 | End: 2023-06-30 | Stop reason: SDUPTHER

## 2022-01-26 NOTE — PROGRESS NOTES
Subjective:      Patient ID: Regine Osorio is a 67 y.o. female.    Chief Complaint: PCP (/Azikiwe K. Lombard, MD 12/14/21/), Diabetic Foot Exam, and Foot Problem (5th toe numbness )    Regine is a 67 y.o. female who presents to the clinic upon referral from Dr. Tracy aguilar. provider found  for evaluation and treatment of diabetic feet. Regine has a past medical history of Allergy, Breast cancer (2017), Cataract, Diabetes mellitus, type 2, Eczema, Fibromyalgia, Glaucoma, Hypertension, Renal cyst, right (02/06/2020), and Steatosis of liver (02/06/2020). Patient relates no major problem with feet. Only complaints today consist of routine diabetic foot evaluation.  PCP: Azikiwe K Lombard, MD    Date Last Seen by PCP:   Chief Complaint   Patient presents with    PCP       Azikiwe K. Lombard, MD 12/14/21      Diabetic Foot Exam    Foot Problem     5th toe numbness          Current shoe gear: Casual shoes    Hemoglobin A1C   Date Value Ref Range Status   12/10/2021 6.1 (H) 4.0 - 5.6 % Final     Comment:     ADA Screening Guidelines:  5.7-6.4%  Consistent with prediabetes  >or=6.5%  Consistent with diabetes    High levels of fetal hemoglobin interfere with the HbA1C  assay. Heterozygous hemoglobin variants (HbS, HgC, etc)do  not significantly interfere with this assay.   However, presence of multiple variants may affect accuracy.     09/10/2021 6.1 (H) 4.0 - 5.6 % Final     Comment:     ADA Screening Guidelines:  5.7-6.4%  Consistent with prediabetes  >or=6.5%  Consistent with diabetes    High levels of fetal hemoglobin interfere with the HbA1C  assay. Heterozygous hemoglobin variants (HbS, HgC, etc)do  not significantly interfere with this assay.   However, presence of multiple variants may affect accuracy.     06/10/2021 5.6 4.0 - 5.6 % Final     Comment:     ADA Screening Guidelines:  5.7-6.4%  Consistent with prediabetes  >or=6.5%  Consistent with diabetes    High levels of fetal hemoglobin interfere with the  HbA1C  assay. Heterozygous hemoglobin variants (HbS, HgC, etc)do  not significantly interfere with this assay.   However, presence of multiple variants may affect accuracy.             Review of Systems   Constitutional: Negative for chills and fever.   HENT: Negative for congestion and tinnitus.    Eyes: Negative for double vision and visual disturbance.   Cardiovascular: Negative for chest pain and claudication.   Respiratory: Negative for hemoptysis and shortness of breath.    Endocrine: Negative for cold intolerance and heat intolerance.   Hematologic/Lymphatic: Negative for adenopathy and bleeding problem.   Skin: Negative for color change and nail changes.   Musculoskeletal: Positive for back pain and joint pain. Negative for myalgias and stiffness.   Gastrointestinal: Negative for nausea and vomiting.   Genitourinary: Negative for dysuria and hematuria.   Neurological: Positive for paresthesias. Negative for numbness.   Psychiatric/Behavioral: Negative for altered mental status and suicidal ideas.   Allergic/Immunologic: Negative for environmental allergies and persistent infections.           Objective:      Physical Exam  Vitals reviewed.   Constitutional:       Appearance: She is well-developed.   Cardiovascular:      Pulses:           Dorsalis pedis pulses are 2+ on the right side and 2+ on the left side.        Posterior tibial pulses are 2+ on the right side and 2+ on the left side.   Pulmonary:      Effort: Pulmonary effort is normal.   Musculoskeletal:         General: Normal range of motion.      Comments: Inspection and palpation of the muscles joints and bones of both lower extremities reveal that muscle strength for the anterior lateral and posterior muscle groups and intrinsic muscle groups of the foot are all 5 over 5 symmetrical.  Ankle subtalar midtarsal and digital joint range of motion are within normal limits, nonpainful, without crepitus or effusion.  Patient exhibits a normal angle and  base of gait.  Palpation of the tendons reveal no defects.   Skin:     General: Skin is warm and dry.      Capillary Refill: Capillary refill takes 2 to 3 seconds.      Comments: Skin turgor is normal bilaterally.  Skin texture is well hydrated to both lower extremities.  No lesions or rashes or wounds appreciated bilaterally.  Nail plates 1 through 5 bilaterally are within normal limits for length and thickness.  No nail clubbing or incurvation noted.   Neurological:      Mental Status: She is alert and oriented to person, place, and time.      Comments: Sharp dull light touch vibratory proprioceptive sensation are intact bilaterally.  Deep tendon reflexes to patellar and Achilles tendon are symmetrical 2 over 4 bilaterally.  No ankle clonus or Babinski reflexes noted bilaterally.  Coordination is normal to both feet and lower extremities.               Assessment:       Encounter Diagnoses   Name Primary?    Other diabetic neurological complication associated with type 2 diabetes mellitus Yes    Type 2 diabetes mellitus with hyperglycemia, without long-term current use of insulin          Plan:       Regine was seen today for pcp, diabetic foot exam and foot problem.    Diagnoses and all orders for this visit:    Other diabetic neurological complication associated with type 2 diabetes mellitus    Type 2 diabetes mellitus with hyperglycemia, without long-term current use of insulin      I counseled the patient on her conditions, their implications and medical management.      Shoe inspection. Diabetic Foot Education. Patient reminded of the importance of good nutrition and blood sugar control to help prevent podiatric complications of diabetes. Patient instructed on proper foot hygeine. We discussed wearing proper shoe gear, daily foot inspections and Diabetic foot education in detail.    Return to clinic in 12 months or sooner if problems arise   .

## 2022-01-26 NOTE — TELEPHONE ENCOUNTER
No new care gaps identified.  Powered by [employer]. Reference number: [id number].   1/26/2022 11:20:00 AM CST

## 2022-01-26 NOTE — TELEPHONE ENCOUNTER
----- Message from Jignesh Terrell sent at 1/26/2022  1:26 PM CST -----  Regarding: Appt Access  Contact: [phone]  Request Appt    Who Called: Regine Timmonst Type: Hematology And Oncology/ 4m F/U  Call Back Number:[phone]  Additional Information: Pt is calling to schedule her 4m f/u and to see if orders can be put in for a mammogram before the actual appt.

## 2022-01-27 ENCOUNTER — TELEPHONE (OUTPATIENT)
Dept: HEMATOLOGY/ONCOLOGY | Facility: CLINIC | Age: 67
End: 2022-01-27
Payer: MEDICARE

## 2022-01-27 NOTE — TELEPHONE ENCOUNTER
"----- Message from Ashly Magana sent at 1/27/2022  3:50 PM CST -----  Regarding: Scheduling Request  Patient Status: active    Scheduling Appt : mammo and 4 mo follow up    Time/Date Preference: March 2022    Ortiz Active User?:yes    Relationship to Patient?: self    Contact Preference?: [phone]    Treating Provider: Dr Lund    Do you feel you need to be seen today? no       Additional Notes: 2nd call      "Thank you for all that you do for our patients'     "

## 2022-02-11 ENCOUNTER — OFFICE VISIT (OUTPATIENT)
Dept: ALLERGY | Facility: CLINIC | Age: 67
End: 2022-02-11
Payer: MEDICARE

## 2022-02-11 VITALS — BODY MASS INDEX: 38.86 KG/M2 | WEIGHT: 233.25 LBS | HEIGHT: 65 IN

## 2022-02-11 DIAGNOSIS — Z86.69 HISTORY OF EUSTACHIAN TUBE DYSFUNCTION: ICD-10-CM

## 2022-02-11 DIAGNOSIS — E11.9 TYPE 2 DIABETES MELLITUS WITHOUT COMPLICATION, WITHOUT LONG-TERM CURRENT USE OF INSULIN: ICD-10-CM

## 2022-02-11 DIAGNOSIS — J31.0 CHRONIC RHINITIS: ICD-10-CM

## 2022-02-11 DIAGNOSIS — I10 ESSENTIAL HYPERTENSION: Primary | ICD-10-CM

## 2022-02-11 PROCEDURE — 1159F MED LIST DOCD IN RCRD: CPT | Mod: CPTII,S$GLB,, | Performed by: STUDENT IN AN ORGANIZED HEALTH CARE EDUCATION/TRAINING PROGRAM

## 2022-02-11 PROCEDURE — 3008F BODY MASS INDEX DOCD: CPT | Mod: CPTII,S$GLB,, | Performed by: STUDENT IN AN ORGANIZED HEALTH CARE EDUCATION/TRAINING PROGRAM

## 2022-02-11 PROCEDURE — 99214 PR OFFICE/OUTPT VISIT, EST, LEVL IV, 30-39 MIN: ICD-10-PCS | Mod: S$GLB,,, | Performed by: STUDENT IN AN ORGANIZED HEALTH CARE EDUCATION/TRAINING PROGRAM

## 2022-02-11 PROCEDURE — 1159F PR MEDICATION LIST DOCUMENTED IN MEDICAL RECORD: ICD-10-PCS | Mod: CPTII,S$GLB,, | Performed by: STUDENT IN AN ORGANIZED HEALTH CARE EDUCATION/TRAINING PROGRAM

## 2022-02-11 PROCEDURE — 99999 PR PBB SHADOW E&M-EST. PATIENT-LVL IV: CPT | Mod: PBBFAC,,, | Performed by: STUDENT IN AN ORGANIZED HEALTH CARE EDUCATION/TRAINING PROGRAM

## 2022-02-11 PROCEDURE — 4010F ACE/ARB THERAPY RXD/TAKEN: CPT | Mod: CPTII,S$GLB,, | Performed by: STUDENT IN AN ORGANIZED HEALTH CARE EDUCATION/TRAINING PROGRAM

## 2022-02-11 PROCEDURE — 99999 PR PBB SHADOW E&M-EST. PATIENT-LVL IV: ICD-10-PCS | Mod: PBBFAC,,, | Performed by: STUDENT IN AN ORGANIZED HEALTH CARE EDUCATION/TRAINING PROGRAM

## 2022-02-11 PROCEDURE — 4010F PR ACE/ARB THEARPY RXD/TAKEN: ICD-10-PCS | Mod: CPTII,S$GLB,, | Performed by: STUDENT IN AN ORGANIZED HEALTH CARE EDUCATION/TRAINING PROGRAM

## 2022-02-11 PROCEDURE — 3008F PR BODY MASS INDEX (BMI) DOCUMENTED: ICD-10-PCS | Mod: CPTII,S$GLB,, | Performed by: STUDENT IN AN ORGANIZED HEALTH CARE EDUCATION/TRAINING PROGRAM

## 2022-02-11 PROCEDURE — 99214 OFFICE O/P EST MOD 30 MIN: CPT | Mod: S$GLB,,, | Performed by: STUDENT IN AN ORGANIZED HEALTH CARE EDUCATION/TRAINING PROGRAM

## 2022-02-11 RX ORDER — FLUTICASONE PROPIONATE 50 MCG
SPRAY, SUSPENSION (ML) NASAL
Qty: 48 G | Refills: 3 | Status: SHIPPED | OUTPATIENT
Start: 2022-02-11 | End: 2022-11-03 | Stop reason: SDUPTHER

## 2022-02-11 NOTE — PATIENT INSTRUCTIONS
Continue azelastine morning +/- night  Depending on how post nasal drip is doing.    Change Flonase = fluticasone to 3 squirts on the left and 2 squirts on the right.

## 2022-02-11 NOTE — PROGRESS NOTES
"Allergy Clinic Note  [facility]    This note was created by combination of typed  and M-Modal dictation. Transcription errors may be present.  If there are any questions, please contact me.    Subjective:      Patient ID: Regine Osorio is a 67 y.o. female.    Chief Complaint: Allergies, Follow-up, and Nasal Congestion      Allergy problem list:     Chronic rhinitis with negative Immunocaps  Ear pain consistent with eustachian tube dysfunction  -- followed at ENT ([place])  Ear itching and palate itching  Cough @ throat    History of Present Illness:  66-year-old female with chronic rhinitis complicated by eustachian tube dysfunction   She was last seen 3/17/2021.  She is here alone, and she is a very good historian.      Related medications/therapies  Astelin 1 squirt  In the mornings.  Sometimes another at night   Flonase 2 squirts  In the morning   Zyrtec  2 tablets daily   Robitussin as needed for cough  Tessalon Perles as needed per for cough  Insufflation techniques for middle ear   (ARB)    2/11/2022:   Client is here for routine follow-up without complaints.  She recently saw ENT physician Dr. Edwards for her annual follow-up.  He adjusted her azelastine and Flonase doses as above.    She reports a recent "attack" of rhinitis that she attributes to a windstorm.  This consisted of ear itching, throat tickle, runny nose, postnasal drip sensation cough productive of colored material, runny nose, and some blood mixed with nasal mucus.  She treated herself with Robitussin and is essentially back to baseline.    She continues to complain of supine cough.  When this occurs she takes a 2nd dose of azelastine.  She is happy with her current medications.    3/17/2021:  At previous visit I increased her dose of Flonase to 2 squirts twice a day and suggested she use her Astelin only at hs.   I also recommended increasing her Zyrtec to 20 mg daily, up to 40 mg if needed.  This visit she " reported significant overall improvement.  Specifically she notice less congestion, less choking, less coughing and less phlegm.  She says what phlegm she does have is not as thick as previously.  No side effects or other problems with any of the medications.  She is happy with this current regimen.      02/24/2021: At her initial visit client reported lifetime history of rhinitis, were since she has been in [place] for the last 2.5 years.  Her chief complaint was left ear pain and fullness with decreased hearing.  The ear pain is severe and causes her to travel by train rather than by plane.  Other symptoms include nasal congestion, runny nose, postnasal drip sensation, sneezing, dry/itchy eyes, cough at the level of her throat, throat clearing, itchy ears, and diffuse itching.  She denies any chest symptoms or rashes.  Her symptoms are daily with worsening spells lasting 2-3 weeks at a time.  These spells begin with intense itching in her left ear and itching of her palate and then her followed by throat clearing, cough, choking, postnasal drip sensation.  She localizes the cough to the level of her throat and states it is worse at night when supine.  She reports early in the morning it is productive of clear/white chunks.  Which also cause some choking.  Symptoms are perennial with worsening in the late spring, summer, and early fall.  She is worse at night and also worse outdoors.  Window appears to be a precipitant as well as outdoor dust.  There are no other clear precipitants.  She is not adequately controlled on multiple medications as above.  She does get some relief from the fluocinolone IN ear drops but can only take them for 1-2 weeks if time. She is also followed by Dr. Edwards for her ear problems.  She had allergy testing done in the distant past.  She recalls being positive for grasses trees and possibly mold and dust.    Patient has cough at the level of her throat as described in the  previous paragraph. She has no history of wheezing, shortness of breath, or chest tightness.  She does admit to heartburn manifest primarily by indigestion.  She was on Protonix for several years.  She states there was no difference in her cough or throat symptoms when she was on the Protonix.      Additional History:   Past medical history is significant for  diabetes, hypertension, chronic pain, sleep apnea.   She is also a breast cancer survivor.   She is status post tonsillectomy and at least 1 set of ear tubes, most recently as a teenager.  Family history is significant for rhinitis in her mother and son and reactive airways disease as a child in her son..  Client  reports that she has never smoked. She has never used smokeless tobacco.  Exposures are unremarkable.  No regular exposure to passive smoke, pets, young children, or mold.    Patient Active Problem List   Diagnosis    History of breast cancer in female    Type 2 diabetes mellitus without complication, without long-term current use of insulin    Essential hypertension    S/P left mastectomy    Spinal stenosis of lumbar region with neurogenic claudication    Migraine without aura and without status migrainosus, not intractable    Thyroid nodule    Fibromyalgia    Microcytic anemia    Unsp symptoms and signs w cognitive functions and awareness    History of antineoplastic chemotherapy    Seborrheic dermatitis of scalp    Insomnia due to medical condition    Insomnia    Obstructive sleep apnea hypopnea, mild    Cervical stenosis of spine     Current Outpatient Medications on File Prior to Visit   Medication Sig Dispense Refill    aspirin 81 MG Chew Take 81 mg by mouth once daily.       azelastine (ASTELIN) 137 mcg (0.1 %) nasal spray 1 SPRAY IN EACH NOSTRIL TWICE DAILY 30 mL 2    benzonatate (TESSALON) 200 MG capsule 1 capsule up to three times a day as needed for cough 50 capsule 1    blood sugar diagnostic Strp To check BG 2 times  daily, to use with insurance preferred meter 200 each 3    blood-glucose meter kit To check BG 2 times daily, to use with insurance preferred meter 1 each 0    cetirizine (ZYRTEC) 10 MG tablet TAKE 2 TABLETS BY MOUTH ONCE DAILY (Patient taking differently: 10 mg once daily.) 60 tablet 3    cholecalciferol, vitamin D3, (VITAMIN D3) 50 mcg (2,000 unit) Cap Take 1 capsule by mouth once daily.       clobetasoL (CLOBEX) 0.05 % shampoo Apply topically once a week. 118 mL 5    cyanocobalamin/cobamamide (B12 SL) Place 3,000 mcg under the tongue once daily.      dextromethorphan-guaiFENesin  mg/5 ml (ROBITUSSIN-DM)  mg/5 mL liquid Take 5 mLs by mouth every 12 (twelve) hours.      diclofenac sodium (VOLTAREN) 1 % Gel Apply 2 g topically 3 (three) times daily. 100 g 0    fluocinolone acetonide oiL 0.01 % Drop INSTILL 4 DROPS IN EAR(S) TWICE DAILY FOR 1 TO 2 WEEKS 20 mL 1    gabapentin (NEURONTIN) 600 MG tablet Take 1 tablet (600 mg total) by mouth 3 (three) times daily. 90 tablet 5    glipiZIDE (GLUCOTROL) 5 MG TR24 TAKE 1 TABLET(5 MG) BY MOUTH TWICE DAILY 180 tablet 1    hydroCHLOROthiazide (HYDRODIURIL) 25 MG tablet Take 1 tablet (25 mg total) by mouth once daily. 90 tablet 1    ketoconazole (NIZORAL) 2 % cream Apply topically once daily. 1 Tube 2    lancets Misc To check BG 2 times daily, to use with insurance preferred meter 200 each 3    losartan (COZAAR) 50 MG tablet Take 1 tablet (50 mg total) by mouth once daily. 90 tablet 1    metFORMIN (GLUCOPHAGE) 1000 MG tablet TAKE 1 TABLET(1000 MG) BY MOUTH TWICE DAILY WITH MEALS 180 tablet 1    naproxen sodium (ANAPROX) 550 MG tablet Take 1 tablet (550 mg total) by mouth 2 (two) times daily as needed (for headaches). 30 tablet 5    ondansetron (ZOFRAN-ODT) 8 MG TbDL Take 1 tablet (8 mg total) by mouth every 8 (eight) hours as needed (Nausea). 30 tablet 2    rizatriptan (MAXALT-MLT) 10 MG disintegrating tablet Take 1 tablet (10 mg total) by mouth  "every 2 (two) hours as needed for Migraine. Max 30 mg/day. 12 tablet 5    sumatriptan (IMITREX) 100 MG tablet Take 1 tab at onset of migraine, may repeat dose in 2 hours if needed. Max 2 tabs/day. 9 tablet 11    tiZANidine (ZANAFLEX) 4 MG tablet TAKE 1 TABLET(4 MG) BY MOUTH EVERY 8 HOURS AS NEEDED FOR SPASM 90 tablet 0    [DISCONTINUED] fluticasone propionate (FLONASE) 50 mcg/actuation nasal spray SHAKE LIQUID AND USE 2 SPRAYS(100 MCG) IN EACH NOSTRIL TWICE DAILY 16 g 1    acetic acid (VOSOL) 2 % otic solution Place 4 drops into both ears as needed.      ONETOUCH DELICA LANC DEVICE Kit USE TO TEST BLOOD SUGAR THREE TIMES DAILY 1 each 0    rimegepant (NURTEC) 75 mg odt Take 1 tablet (75 mg total) by mouth daily as needed for Migraine. Place ODT tablet on the tongue; alternatively the ODT tablet may be placed under the tongue (Patient not taking: Reported on 2/11/2022) 8 tablet 2     No current facility-administered medications on file prior to visit.         ROS    Objective:   Ht 5' 5" (1.651 m)   Wt 105.8 kg (233 lb 4 oz)   BMI 38.81 kg/m²       Physical Exam    Data:   Aeroallergen testing by the Immunocap method ( 03/04/2021) is negative.    All aeroallergens less than 0.35 KU / L. Cockroach 0.16.  Total serum IgE less than 35.    Eosinophil count 300 on CBC of 11/24/2020      Assessment:     1. Essential hypertension    2. History of eustachian tube dysfunction    3. Chronic rhinitis    4. Type 2 diabetes mellitus without complication, without long-term current use of insulin        Plan:     Medical decision making:  Patient's rhinitis is fairly well controlled, except for excessive turbinates swelling on the left-hand side.  I suggested she add an extra squirt of Flonase to that side only.  She can continue to follow-up with Dr. Jaramillo annually and to follow-up with me as needed.    Chronic nonallergic rhinitis  Continue current nose sprays, except increase Flonase to 3 squirts on the left " nostril    History of ear pain  Continue regular follow-up with Dr. Edwards    Type 2 diabetes  Avoid systemic steroids    Hypertension  Avoid oral decongestants            Patient Instructions       Continue azelastine morning +/- night  Depending on how post nasal drip is doing.    Change Flonase = fluticasone to 3 squirts on the left and 2 squirts on the right.      Follow up if symptoms worsen or fail to improve.    Monik Soto MD    Coding based on time

## 2022-03-02 ENCOUNTER — PATIENT MESSAGE (OUTPATIENT)
Dept: FAMILY MEDICINE | Facility: CLINIC | Age: 67
End: 2022-03-02
Payer: MEDICARE

## 2022-03-02 ENCOUNTER — TELEPHONE (OUTPATIENT)
Dept: FAMILY MEDICINE | Facility: CLINIC | Age: 67
End: 2022-03-02
Payer: MEDICARE

## 2022-03-02 NOTE — TELEPHONE ENCOUNTER
----- Message from Hien David sent at 3/2/2022  2:08 PM CST -----  Regarding: self [phone]  Type: Lab    Caller is requesting to schedule their Lab appointment prior to annual appointment.    Order is not listed in EPIC.  Please enter order and contact patient to schedule.    Name of Caller self    Preferred Date and Time of Labs: 3/15 morning 8:30-9    Date of EPP Appointment:3822    Where would they [facility] the lab performed[facility]    Would the patient rather a call back or a response via [facility]?  Call back    Best Call Back Number: [phone]             Neurointerventional  Inpatient Progress Note    Chief Complaint: AMS    Summary: Pantera Newman is a 72 year old male who presented to [facility] ED on 10/13/20 with fatigue, vomiting, and fever. He was discharged form the ED with a diagnosis of dehydration and recommendations to follow-up with his PCP. He presented to [facility] ED again on 10/17/20 following an unwitnessed fall, and was noted to be confused and incontinent of urine. A CTOH was with interval progression of ventriculomegaly without acute hemorrhage or infarct and he was admitted for further workup. 10/20/20 MRI/MRA brain was with a 4 x 5 mm ACOM aneurysm, marked L M1 segment stenosis with hyperintense signal on DWI and T2 FLAIR in sulci. He was transferred to [facility] for further care. Initially it was thought that his encephalopathy was d/t infectious etiology but then later felt to be aneurysmal SAH. WEB placement to ACOM aneurysm was performed, complicated by herniation of WEB outside of aneurysm causing occlusion of distal L A1 and bilateral A2 segments. Attempts to recanalize these vessels were attempted and partially successful. [place] stent was then placed to prevent further herniation of WEB device. Post stenting run demonstrated patent L ICA/MCA/JAMAL/ bilateral A2 segments. Pt's post procedure course has been lengthy, and complicated with septic shock, UTI, GIB, urinary retention, delirium, DENISE, continued dysphasia. CARLOS was re-consulted on 12/26 to assist in Brilinta bridging for PEG placement/esophageal dilation procedure.     Subjective/Interval History: Pantera is sitting up in bed, is on a mini-team with staff sitting outside room as he tries to pull NG tube out. Pantera is cooperative but gives answers \"I don't know\" to most questioning, requires prompting for most participation. Denies complaints aside from NG discomfort.     Assessment/Plan:  Aneurysmal Subarachnoid Hemorrhage (HH 1, mF unable to be calculated)  Anterior  Communicating Artery Aneurysm, Secure  S/p Stent-Assisted WEB Embolization (10/27/20, Dr. Lorenz)  Right Middle Cerebral Artery Bifurcation Aneurysm, Unsecured  Right Internal Carotid (Paraophthalmic) Artery Aneurysm, Unsecured  Tobacco Use  · DAPT Plan for PEG tube/EGD:  § No interruption of ASA 81mg daily.   § Brilinta 90mg BID stopped 12/27/2020 (2 days before procedure).  § Cangrelor gtt at 0.75mcg/kg/min (set rate, no titration) started 12/27/2020 at 1200.   § Cangrelor gtt to stop at 0600 on 12/29/20 (1 hr before procedure at 0700).  § Cangrelor gtt must be restarted within 6 hrs of procedure on 12/29/20, at set rate of 0.75mcg/kg/min. GI team to alert H team once pt is out of procedure so Cangrelor gtt re-start time can be entered.   § Brilinta will restart at 0900 on 12/30/20.   § Cangrelor gtt will be stopped after pt has received 2 doses of Brilinta (~2100 on 12/20/20).  § No need to recheck response labs per Dr. Lorenz.   · Close monitoring of neuro exam. Recommend obtaining STAT CTOH/CTA with any neuro change.   · Recommend normotension in setting of unsecured aneurysms. Attaining goal.    · SW following; eventual LTAC placement.   · Outpatient follow-up, as scheduled:  § CTA head in 3 months (scheduled for 1/26/21).  § Clinic visit with Dr. Lorenz on 02/01/21.   · CARLOS will follow during DAPT bridging.     Duodenal Ulcer, Clean Based  Esophageal stricture  Dysphasia  · EGD (12/26/20) with esophageal stricture, unable to pass scope through narrowed area. Will require dilation.   · GI following; planning for repeat EGD with stricture dilation and PEG tube placement on 12/29/20 0700.  · The above bridging plan discussed and cleared by GI (Dr. Thompson).      Acute Urinary Tract Infection  Gross Hematuria   Anemia  · Cystoscopy (12/10/20) performed d/t urinary retention was unremarkable aside from mild catheter cystitis.  · On Doxazosin per Urology.  · Failed multiple voiding trials, Urology  recommending SP tube after Jan 2021.     Review of Systems:  Endorses: Discomfort with NG tube.  Denies: HA, CP, SOB, dizziness, blurred vision, diplopia, n/t.     Physical Exam:  Visit Vitals  /78 (BP Location: RUE - Right upper extremity, Patient Position: Semi-Murray's)   Pulse 90   Temp 97.2 °F (36.2 °C) (Oral)   Resp 15   Ht 5' 10\" (1.778 m)   Wt 60.8 kg   SpO2 97%   BMI 19.23 kg/m²     General:  Alert, cooperative, conversive.  Skin: Warm and dry without rash.    Neurological:   Mental status: The patient is alert, attentive, and oriented to person, place ([facility] hospital but not [facility]), year.   Language: Speech is slightly dysarthric. Good repetition and naming.  Cranial nerves: Visual fields appear full to confrontation but difficult to test as he keeps looking at my fingers. Pupils are 3 mm and briskly reactive to light. At primary gaze, there is no eye deviation. EOMs (extraocular movements) are full, no nystagmus observed. Face is symmetric with normal eye closure and smile. Hearing is normal to voice.   Motor: There is no pronator drift of out-stretched arms. Muscle bulk and tone are normal. Strength is 4/5 in BUE and 4-/5 in BLE with no drift.   Sensory: Light touch sense is intact symetrically in BUE and BLE.   Coordination: There is no dysmetria on finger-to-nose test.   Eyes: Normal conjunctivae and sclerae.    Cardiovascular: No evidence of UE or LE edema.   Peripheral vascular: Bilateral DP pulses +1.  Respiratory: Normal respiratory effort, on room air. NAD.  Gastrointestinal/: Soft and nontender. NG in place. Caceres catheter in place, patent and draining.   Musculoskeletal: No deformity noted.   Psychiatric: Cooperative. Appropriate mood and affect.     Care discussed with Dr. Armstrong, Dr. Hedrick, [facility], GI team, ARTURO Ríos.     12/28/2020  Светлана Pride NP  Neuro Interventional Radiology  Pager: [phone]  Answering service: [phone]  Please page above NP number weekdays [phone].    After 1700 please contact our answering service to reach the on call CARLOS provider.     Neuro Interventional Radiology MD pagers  Dr. Trae Armstrong [phone]  Dr. Adolfo Mensah [phone]  Dr. Americo Luis [phone]  Dr. Ronit Martin: [phone]

## 2022-03-10 NOTE — PROGRESS NOTES
HPI     DLS: 11/01/2021    Pt here for HVF review;  Pt states no pain or discomfort.     Meds:  Refresh QAM OU    1. Glaucoma Suspect   2. Drusen OU   3. NS OU   4. Hx Breast CA    Last edited by Margie Lakhani on 3/11/2022  9:35 AM. (History)            Assessment /Plan     For exam results, see Encounter Report.    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   First photos   2018   Treatment / Drops started   none           Family history    + mother / grtand father / uncle / cousins         Glaucoma meds    nne        H/O adverse rxn to glaucoma drops    none        LASERS    none        GLAUCOMA SURGERIES    none        OTHER EYE SURGERIES    none        CDR    0.8/0.7        Tbase    18-22  od // 16-22 os        Tmax    22/22           Ttarget    ?             HVF    3 test 2019 to  2022 - full od // full os        Gonio    +3-4 ou         CCT    588/585        OCT    3  test 2019 to 2021 - RNFL -  Bord TI od // nl  os        HRT    1 test 2020 to 2020 -  MR -  nl od // nl os /// CDR 0.63 od // 0.51 os        Disc photos    2018, 2022      - Ttoday    18/21  - Test done today     IOP //  HVF / DFE / photos     2. Dominate familial drusen   Mostly outside the arcades ou    See photos - 2018    Pt is taking ARED's     3. NS   Mild -monitor     4. flaoter (one floater - string like)    Warned of signs of PVD and RD    Pt to call if marked increase in floaters or flashes or curtain defect    5. Breast CA       PLAN   IOP is high nl at 18-22  ou // + Fm Hx - mother and an uncle and possibly a grand parent   HVF is nl ou   OCT - bord RNFL in one sector od - stable and nl os   Discussed starting treatment vs continued observation off drops  Pt  prefers to cont with observation alone     F/U 4-6 months - IOP // gonio

## 2022-03-11 ENCOUNTER — CLINICAL SUPPORT (OUTPATIENT)
Dept: OPHTHALMOLOGY | Facility: CLINIC | Age: 67
End: 2022-03-11
Payer: MEDICARE

## 2022-03-11 ENCOUNTER — OFFICE VISIT (OUTPATIENT)
Dept: OPHTHALMOLOGY | Facility: CLINIC | Age: 67
End: 2022-03-11
Payer: MEDICARE

## 2022-03-11 DIAGNOSIS — H35.361 FAMILIAL DRUSEN OF MACULA OF BOTH EYES: ICD-10-CM

## 2022-03-11 DIAGNOSIS — H52.13 MYOPIA OF BOTH EYES WITH ASTIGMATISM AND PRESBYOPIA: ICD-10-CM

## 2022-03-11 DIAGNOSIS — H35.362 FAMILIAL DRUSEN OF MACULA OF BOTH EYES: ICD-10-CM

## 2022-03-11 DIAGNOSIS — H40.023 OPEN ANGLE WITH BORDERLINE FINDINGS AND HIGH GLAUCOMA RISK IN BOTH EYES: Primary | ICD-10-CM

## 2022-03-11 DIAGNOSIS — H52.4 MYOPIA OF BOTH EYES WITH ASTIGMATISM AND PRESBYOPIA: ICD-10-CM

## 2022-03-11 DIAGNOSIS — H40.023 OPEN ANGLE WITH BORDERLINE FINDINGS AND HIGH GLAUCOMA RISK IN BOTH EYES: ICD-10-CM

## 2022-03-11 DIAGNOSIS — H52.203 MYOPIA OF BOTH EYES WITH ASTIGMATISM AND PRESBYOPIA: ICD-10-CM

## 2022-03-11 DIAGNOSIS — H25.13 NUCLEAR SCLEROTIC CATARACT OF BOTH EYES: ICD-10-CM

## 2022-03-11 PROCEDURE — 4010F PR ACE/ARB THEARPY RXD/TAKEN: ICD-10-PCS | Mod: CPTII,S$GLB,, | Performed by: OPHTHALMOLOGY

## 2022-03-11 PROCEDURE — 92014 COMPRE OPH EXAM EST PT 1/>: CPT | Mod: S$GLB,,, | Performed by: OPHTHALMOLOGY

## 2022-03-11 PROCEDURE — 99999 PR PBB SHADOW E&M-EST. PATIENT-LVL II: CPT | Mod: PBBFAC,,, | Performed by: OPHTHALMOLOGY

## 2022-03-11 PROCEDURE — 1100F PR PT FALLS ASSESS DOC 2+ FALLS/FALL W/INJURY/YR: ICD-10-PCS | Mod: CPTII,S$GLB,, | Performed by: OPHTHALMOLOGY

## 2022-03-11 PROCEDURE — 1160F PR REVIEW ALL MEDS BY PRESCRIBER/CLIN PHARMACIST DOCUMENTED: ICD-10-PCS | Mod: CPTII,S$GLB,, | Performed by: OPHTHALMOLOGY

## 2022-03-11 PROCEDURE — 92250 FUNDUS PHOTOGRAPHY W/I&R: CPT | Mod: S$GLB,,, | Performed by: OPHTHALMOLOGY

## 2022-03-11 PROCEDURE — 3288F PR FALLS RISK ASSESSMENT DOCUMENTED: ICD-10-PCS | Mod: CPTII,S$GLB,, | Performed by: OPHTHALMOLOGY

## 2022-03-11 PROCEDURE — 92014 PR EYE EXAM, EST PATIENT,COMPREHESV: ICD-10-PCS | Mod: S$GLB,,, | Performed by: OPHTHALMOLOGY

## 2022-03-11 PROCEDURE — 4010F ACE/ARB THERAPY RXD/TAKEN: CPT | Mod: CPTII,S$GLB,, | Performed by: OPHTHALMOLOGY

## 2022-03-11 PROCEDURE — 1160F RVW MEDS BY RX/DR IN RCRD: CPT | Mod: CPTII,S$GLB,, | Performed by: OPHTHALMOLOGY

## 2022-03-11 PROCEDURE — 3288F FALL RISK ASSESSMENT DOCD: CPT | Mod: CPTII,S$GLB,, | Performed by: OPHTHALMOLOGY

## 2022-03-11 PROCEDURE — 99999 PR PBB SHADOW E&M-EST. PATIENT-LVL II: ICD-10-PCS | Mod: PBBFAC,,, | Performed by: OPHTHALMOLOGY

## 2022-03-11 PROCEDURE — 1100F PTFALLS ASSESS-DOCD GE2>/YR: CPT | Mod: CPTII,S$GLB,, | Performed by: OPHTHALMOLOGY

## 2022-03-11 PROCEDURE — 1159F PR MEDICATION LIST DOCUMENTED IN MEDICAL RECORD: ICD-10-PCS | Mod: CPTII,S$GLB,, | Performed by: OPHTHALMOLOGY

## 2022-03-11 PROCEDURE — 92250 COLOR FUNDUS PHOTOGRAPHY - OU - BOTH EYES: ICD-10-PCS | Mod: S$GLB,,, | Performed by: OPHTHALMOLOGY

## 2022-03-11 PROCEDURE — 1159F MED LIST DOCD IN RCRD: CPT | Mod: CPTII,S$GLB,, | Performed by: OPHTHALMOLOGY

## 2022-03-11 NOTE — PROGRESS NOTES
HVF done ou. /Rel/fix/coop. Good ou./chart checked for latex allergy./-4.25 + .75 x 115/os -3.25 + .75 x 95/os-h

## 2022-03-15 ENCOUNTER — LAB VISIT (OUTPATIENT)
Dept: LAB | Facility: HOSPITAL | Age: 67
End: 2022-03-15
Attending: FAMILY MEDICINE
Payer: MEDICARE

## 2022-03-15 DIAGNOSIS — E11.9 TYPE 2 DIABETES MELLITUS WITHOUT COMPLICATION, WITHOUT LONG-TERM CURRENT USE OF INSULIN: ICD-10-CM

## 2022-03-15 LAB
ANION GAP SERPL CALC-SCNC: 12 MMOL/L (ref 8–16)
BUN SERPL-MCNC: 14 MG/DL (ref 8–23)
CALCIUM SERPL-MCNC: 10.3 MG/DL (ref 8.7–10.5)
CHLORIDE SERPL-SCNC: 103 MMOL/L (ref 95–110)
CO2 SERPL-SCNC: 29 MMOL/L (ref 23–29)
CREAT SERPL-MCNC: 0.6 MG/DL (ref 0.5–1.4)
EST. GFR  (AFRICAN AMERICAN): >60 ML/MIN/1.73 M^2
EST. GFR  (NON AFRICAN AMERICAN): >60 ML/MIN/1.73 M^2
ESTIMATED AVG GLUCOSE: 126 MG/DL (ref 68–131)
GLUCOSE SERPL-MCNC: 120 MG/DL (ref 70–110)
HBA1C MFR BLD: 6 % (ref 4–5.6)
POTASSIUM SERPL-SCNC: 3.6 MMOL/L (ref 3.5–5.1)
SODIUM SERPL-SCNC: 144 MMOL/L (ref 136–145)

## 2022-03-15 PROCEDURE — 83036 HEMOGLOBIN GLYCOSYLATED A1C: CPT | Performed by: FAMILY MEDICINE

## 2022-03-15 PROCEDURE — 36415 COLL VENOUS BLD VENIPUNCTURE: CPT | Performed by: FAMILY MEDICINE

## 2022-03-15 PROCEDURE — 80048 BASIC METABOLIC PNL TOTAL CA: CPT | Performed by: FAMILY MEDICINE

## 2022-03-16 ENCOUNTER — PATIENT OUTREACH (OUTPATIENT)
Dept: ADMINISTRATIVE | Facility: OTHER | Age: 67
End: 2022-03-16
Payer: MEDICARE

## 2022-03-16 NOTE — PROGRESS NOTES
Care Everywhere: updated  Immunization: updated  Health Maintenance: updated  Media Review:   Legacy Review:   DIS:  Order placed:   Upcoming appts:mammogram 4.5  EFAX:  Task Tickets:  Referrals:

## 2022-03-17 ENCOUNTER — OFFICE VISIT (OUTPATIENT)
Dept: OBSTETRICS AND GYNECOLOGY | Facility: CLINIC | Age: 67
End: 2022-03-17
Attending: OBSTETRICS & GYNECOLOGY
Payer: MEDICARE

## 2022-03-17 VITALS
WEIGHT: 231 LBS | BODY MASS INDEX: 38.49 KG/M2 | HEIGHT: 65 IN | HEART RATE: 106 BPM | SYSTOLIC BLOOD PRESSURE: 115 MMHG | DIASTOLIC BLOOD PRESSURE: 60 MMHG

## 2022-03-17 DIAGNOSIS — Z01.419 ENCOUNTER FOR GYNECOLOGICAL EXAMINATION WITHOUT ABNORMAL FINDING: ICD-10-CM

## 2022-03-17 DIAGNOSIS — Z12.4 SCREENING FOR MALIGNANT NEOPLASM OF THE CERVIX: Primary | ICD-10-CM

## 2022-03-17 DIAGNOSIS — Z85.3 HISTORY OF BREAST CANCER: ICD-10-CM

## 2022-03-17 DIAGNOSIS — Z90.12 HISTORY OF LEFT MASTECTOMY: ICD-10-CM

## 2022-03-17 PROCEDURE — 3288F PR FALLS RISK ASSESSMENT DOCUMENTED: ICD-10-PCS | Mod: CPTII,S$GLB,, | Performed by: OBSTETRICS & GYNECOLOGY

## 2022-03-17 PROCEDURE — 99999 PR PBB SHADOW E&M-EST. PATIENT-LVL II: CPT | Mod: PBBFAC,,, | Performed by: OBSTETRICS & GYNECOLOGY

## 2022-03-17 PROCEDURE — 3074F SYST BP LT 130 MM HG: CPT | Mod: CPTII,S$GLB,, | Performed by: OBSTETRICS & GYNECOLOGY

## 2022-03-17 PROCEDURE — 3074F PR MOST RECENT SYSTOLIC BLOOD PRESSURE < 130 MM HG: ICD-10-PCS | Mod: CPTII,S$GLB,, | Performed by: OBSTETRICS & GYNECOLOGY

## 2022-03-17 PROCEDURE — 88175 CYTOPATH C/V AUTO FLUID REDO: CPT | Performed by: OBSTETRICS & GYNECOLOGY

## 2022-03-17 PROCEDURE — G0101 CA SCREEN;PELVIC/BREAST EXAM: HCPCS | Mod: S$GLB,,, | Performed by: OBSTETRICS & GYNECOLOGY

## 2022-03-17 PROCEDURE — 4010F PR ACE/ARB THEARPY RXD/TAKEN: ICD-10-PCS | Mod: CPTII,S$GLB,, | Performed by: OBSTETRICS & GYNECOLOGY

## 2022-03-17 PROCEDURE — 99499 RISK ADDL DX/OHS AUDIT: ICD-10-PCS | Mod: S$GLB,,, | Performed by: OBSTETRICS & GYNECOLOGY

## 2022-03-17 PROCEDURE — 99499 UNLISTED E&M SERVICE: CPT | Mod: S$GLB,,, | Performed by: OBSTETRICS & GYNECOLOGY

## 2022-03-17 PROCEDURE — G0101 PR CA SCREEN;PELVIC/BREAST EXAM: ICD-10-PCS | Mod: S$GLB,,, | Performed by: OBSTETRICS & GYNECOLOGY

## 2022-03-17 PROCEDURE — 87624 HPV HI-RISK TYP POOLED RSLT: CPT | Performed by: OBSTETRICS & GYNECOLOGY

## 2022-03-17 PROCEDURE — 3044F PR MOST RECENT HEMOGLOBIN A1C LEVEL <7.0%: ICD-10-PCS | Mod: CPTII,S$GLB,, | Performed by: OBSTETRICS & GYNECOLOGY

## 2022-03-17 PROCEDURE — 1159F PR MEDICATION LIST DOCUMENTED IN MEDICAL RECORD: ICD-10-PCS | Mod: CPTII,S$GLB,, | Performed by: OBSTETRICS & GYNECOLOGY

## 2022-03-17 PROCEDURE — 3008F BODY MASS INDEX DOCD: CPT | Mod: CPTII,S$GLB,, | Performed by: OBSTETRICS & GYNECOLOGY

## 2022-03-17 PROCEDURE — 3078F PR MOST RECENT DIASTOLIC BLOOD PRESSURE < 80 MM HG: ICD-10-PCS | Mod: CPTII,S$GLB,, | Performed by: OBSTETRICS & GYNECOLOGY

## 2022-03-17 PROCEDURE — 1159F MED LIST DOCD IN RCRD: CPT | Mod: CPTII,S$GLB,, | Performed by: OBSTETRICS & GYNECOLOGY

## 2022-03-17 PROCEDURE — 3288F FALL RISK ASSESSMENT DOCD: CPT | Mod: CPTII,S$GLB,, | Performed by: OBSTETRICS & GYNECOLOGY

## 2022-03-17 PROCEDURE — 1101F PT FALLS ASSESS-DOCD LE1/YR: CPT | Mod: CPTII,S$GLB,, | Performed by: OBSTETRICS & GYNECOLOGY

## 2022-03-17 PROCEDURE — 1101F PR PT FALLS ASSESS DOC 0-1 FALLS W/OUT INJ PAST YR: ICD-10-PCS | Mod: CPTII,S$GLB,, | Performed by: OBSTETRICS & GYNECOLOGY

## 2022-03-17 PROCEDURE — 3008F PR BODY MASS INDEX (BMI) DOCUMENTED: ICD-10-PCS | Mod: CPTII,S$GLB,, | Performed by: OBSTETRICS & GYNECOLOGY

## 2022-03-17 PROCEDURE — 3044F HG A1C LEVEL LT 7.0%: CPT | Mod: CPTII,S$GLB,, | Performed by: OBSTETRICS & GYNECOLOGY

## 2022-03-17 PROCEDURE — 99999 PR PBB SHADOW E&M-EST. PATIENT-LVL II: ICD-10-PCS | Mod: PBBFAC,,, | Performed by: OBSTETRICS & GYNECOLOGY

## 2022-03-17 PROCEDURE — 4010F ACE/ARB THERAPY RXD/TAKEN: CPT | Mod: CPTII,S$GLB,, | Performed by: OBSTETRICS & GYNECOLOGY

## 2022-03-17 PROCEDURE — 3078F DIAST BP <80 MM HG: CPT | Mod: CPTII,S$GLB,, | Performed by: OBSTETRICS & GYNECOLOGY

## 2022-03-17 NOTE — PROGRESS NOTES
SUBJECTIVE:   67 y.o. female   for annual routine Pap and checkup. No LMP recorded. Patient has had a hysterectomy..  She has lost 30 pounds - intentional .    She has history of breast cancer  She is still bothered by cognitive issues but reports they are not as bad as when she was on chemo    Past Medical History:   Diagnosis Date    Allergy     Breast cancer 2017    Cataract     Diabetes mellitus, type 2     Type 2    Eczema     Fibromyalgia     Glaucoma     Hypertension     Renal cyst, right 2020    Steatosis of liver 2020     Past Surgical History:   Procedure Laterality Date    ADENOIDECTOMY      BREAST BIOPSY      BREAST SURGERY      breast ca lt side     HYSTERECTOMY      supacervical hysterectomy    LAPAROSCOPIC SUPRACERVICAL HYSTERECTOMY  2005    fibroids    MASTECTOMY Left 2017    chemo    MYOMECTOMY      TONSILLECTOMY      TYMPANOSTOMY TUBE PLACEMENT      VAGINAL DELIVERY       Social History     Socioeconomic History    Marital status:     Number of children: 1   Tobacco Use    Smoking status: Never Smoker    Smokeless tobacco: Never Used   Substance and Sexual Activity    Alcohol use: No    Drug use: No    Sexual activity: Not Currently   Social History Narrative        1 son (estranged)    3 grandchildren ([place])    Born and raised in [place] (moved to [facility] )     Social Determinants of Health     Financial Resource Strain: Low Risk     Difficulty of Paying Living Expenses: Not very hard   Food Insecurity: No Food Insecurity    Worried About Running Out of Food in the Last Year: Never true    Ran Out of Food in the Last Year: Never true   Transportation Needs: No Transportation Needs    Lack of Transportation (Medical): No    Lack of Transportation (Non-Medical): No   Physical Activity: Insufficiently Active    Days of Exercise per Week: 1 day    Minutes of Exercise per Session: 20 min   Stress: Stress Concern Present     Feeling of Stress : To some extent   Social Connections: Unknown    Frequency of Communication with Friends and Family: More than three times a week    Frequency of Social Gatherings with Friends and Family: Once a week    Active Member of Clubs or Organizations: Yes    Attends Club or Organization Meetings: More than 4 times per year    Marital Status:    Housing Stability: Low Risk     Unable to Pay for Housing in the Last Year: No    Number of Places Lived in the Last Year: 1    Unstable Housing in the Last Year: No     Family History   Problem Relation Age of Onset    Dementia Mother     Glaucoma Mother     Bipolar disorder Mother     Lung cancer Father     Bipolar disorder Son     Post-traumatic stress disorder Son     Breast cancer Maternal Aunt     Bipolar disorder Maternal Aunt     Glaucoma Maternal Uncle     Blindness Maternal Grandfather     Glaucoma Maternal Grandfather     Breast cancer Cousin 39        paternal     Breast cancer Cousin 29    Amblyopia Neg Hx     Cataracts Neg Hx     Macular degeneration Neg Hx     Retinal detachment Neg Hx     Strabismus Neg Hx     Cancer Neg Hx     Colon cancer Neg Hx     Ovarian cancer Neg Hx      OB History    Para Term  AB Living   4 1 1   3 1   SAB IAB Ectopic Multiple Live Births                  # Outcome Date GA Lbr Frankie/2nd Weight Sex Delivery Anes PTL Lv   4 AB            3 AB            2 AB            1 Term      Vag-Spont              Current Outpatient Medications   Medication Sig Dispense Refill    acetic acid (VOSOL) 2 % otic solution Place 4 drops into both ears as needed.      aspirin 81 MG Chew Take 81 mg by mouth once daily.       azelastine (ASTELIN) 137 mcg (0.1 %) nasal spray 1 SPRAY IN EACH NOSTRIL TWICE DAILY 30 mL 2    benzonatate (TESSALON) 200 MG capsule 1 capsule up to three times a day as needed for cough 50 capsule 1    blood sugar diagnostic Strp To check BG 2 times daily, to use with  insurance preferred meter 200 each 3    blood-glucose meter kit To check BG 2 times daily, to use with insurance preferred meter 1 each 0    cetirizine (ZYRTEC) 10 MG tablet TAKE 2 TABLETS BY MOUTH ONCE DAILY (Patient taking differently: 10 mg once daily.) 60 tablet 3    cholecalciferol, vitamin D3, (VITAMIN D3) 50 mcg (2,000 unit) Cap Take 1 capsule by mouth once daily.       clobetasoL (CLOBEX) 0.05 % shampoo Apply topically once a week. 118 mL 5    cyanocobalamin/cobamamide (B12 SL) Place 3,000 mcg under the tongue once daily.      dextromethorphan-guaiFENesin  mg/5 ml (ROBITUSSIN-DM)  mg/5 mL liquid Take 5 mLs by mouth every 12 (twelve) hours.      diclofenac sodium (VOLTAREN) 1 % Gel Apply 2 g topically 3 (three) times daily. 100 g 0    fluocinolone acetonide oiL 0.01 % Drop INSTILL 4 DROPS IN EAR(S) TWICE DAILY FOR 1 TO 2 WEEKS 20 mL 1    fluticasone propionate (FLONASE) 50 mcg/actuation nasal spray 2 sprays on the right and 3 sprays on the left every morning 48 g 3    gabapentin (NEURONTIN) 600 MG tablet Take 1 tablet (600 mg total) by mouth 3 (three) times daily. 90 tablet 5    glipiZIDE (GLUCOTROL) 5 MG TR24 TAKE 1 TABLET(5 MG) BY MOUTH TWICE DAILY 180 tablet 1    hydroCHLOROthiazide (HYDRODIURIL) 25 MG tablet Take 1 tablet (25 mg total) by mouth once daily. 90 tablet 1    ketoconazole (NIZORAL) 2 % cream APPLY EXTERNALLY TO THE AFFECTED AREA EVERY DAY 15 g 2    lancets Misc To check BG 2 times daily, to use with insurance preferred meter 200 each 3    losartan (COZAAR) 50 MG tablet Take 1 tablet (50 mg total) by mouth once daily. 90 tablet 1    metFORMIN (GLUCOPHAGE) 1000 MG tablet TAKE 1 TABLET(1000 MG) BY MOUTH TWICE DAILY WITH MEALS 180 tablet 1    naproxen sodium (ANAPROX) 550 MG tablet Take 1 tablet (550 mg total) by mouth 2 (two) times daily as needed (for headaches). 30 tablet 5    ondansetron (ZOFRAN-ODT) 8 MG TbDL Take 1 tablet (8 mg total) by mouth every 8 (eight)  hours as needed (Nausea). 30 tablet 2    ONETOUCH DELICA LANC DEVICE Kit USE TO TEST BLOOD SUGAR THREE TIMES DAILY 1 each 0    rimegepant (NURTEC) 75 mg odt Take 1 tablet (75 mg total) by mouth daily as needed for Migraine. Place ODT tablet on the tongue; alternatively the ODT tablet may be placed under the tongue 8 tablet 2    rizatriptan (MAXALT-MLT) 10 MG disintegrating tablet Take 1 tablet (10 mg total) by mouth every 2 (two) hours as needed for Migraine. Max 30 mg/day. 12 tablet 5    sumatriptan (IMITREX) 100 MG tablet Take 1 tab at onset of migraine, may repeat dose in 2 hours if needed. Max 2 tabs/day. 9 tablet 11    tiZANidine (ZANAFLEX) 4 MG tablet TAKE 1 TABLET(4 MG) BY MOUTH EVERY 8 HOURS AS NEEDED FOR SPASM 90 tablet 0     No current facility-administered medications for this visit.     Allergies: Codeine and Lisinopril     The 10-year ASCVD risk score (Abrammelissa ZHAO Jr., et al., 2013) is: 12.9%    Values used to calculate the score:      Age: 67 years      Sex: Female      Is Non- : Yes      Diabetic: Yes      Tobacco smoker: No      Systolic Blood Pressure: 115 mmHg      Is BP treated: Yes      HDL Cholesterol: 52 mg/dL      Total Cholesterol: 151 mg/dL      ROS:  Constitutional: + weight loss, weight gain, fever, fatigue  Eyes:  No vision changes, glasses/contacts  ENT/Mouth: No ulcers, sinus problems, ears ringing, headache  Cardiovascular: No inability to lie flat, chest pain, exercise intolerance, swelling, heart palpitations  Respiratory: No wheezing, coughing blood, shortness of breath, or cough  Gastrointestinal: No diarrhea, bloody stool, nausea/vomiting, constipation, gas, hemorrhoids  Genitourinary: No blood in urine, painful urination, urgency of urination, frequency of urination, incomplete emptying, incontinence, abnormal bleeding, painful periods, heavy periods, vaginal discharge, vaginal odor, painful intercourse, sexual problems, bleeding after  intercourse.  Musculoskeletal: No muscle weakness  Skin/Breast: No painful breasts, nipple discharge, masses, rash, ulcers, +breast cancer  Neurological: No passing out, seizures, numbness, headache  Endocrine: + diabetes, hypothyroid, hyperthyroid, hot flashes, hair loss, abnormal hair growth, acne  Psychiatric: No depression, crying, +memory issues  Hematologic: No bruises, bleeding, swollen lymph nodes, anemia.      Physical Exam:   Constitutional: She is oriented to person, place, and time. She appears well-developed and well-nourished.      Neck: No tracheal deviation present. No thyromegaly present.    Cardiovascular: Exam reveals no edema.     Pulmonary/Chest: Effort normal. She exhibits no mass, no tenderness, no deformity and no retraction. Right breast exhibits no inverted nipple, no mass, no nipple discharge, no skin change, no tenderness, presence, no bleeding and no swelling. Left breast exhibits absence. Breasts are symmetrical.        Abdominal: Soft. She exhibits no distension and no mass. There is no abdominal tenderness. There is no rebound and no guarding. No hernia. Hernia confirmed negative in the left inguinal area.     Genitourinary:    Vagina normal.   Rectum:      No external hemorrhoid.   There is no rash, tenderness or lesion on the right labia. There is no rash, tenderness or lesion on the left labia. Cervix is normal. No no adexnal prolapse. Right adnexum displays no mass, no tenderness and no fullness. Left adnexum displays no mass, no tenderness and no fullness. No  no vaginal discharge, tenderness, bleeding, rectocele, cystocele or unspecified prolapse of vaginal walls in the vagina. Cervix exhibits no motion tenderness, no discharge and no friability. Uterus is absent.           Musculoskeletal: Normal range of motion and moves all extremeties. No edema.       Neurological: She is alert and oriented to person, place, and time.    Skin: No rash noted. No erythema. No pallor.     Psychiatric: She has a normal mood and affect. Her behavior is normal. Judgment and thought content normal.         ASSESSMENT:   well woman  Breast cancer  PLAN:   mammogram  pap smear  Prescription for new prostheses- has lost weight, requires new measurements. Patient will return annually or PRN.

## 2022-03-22 ENCOUNTER — OFFICE VISIT (OUTPATIENT)
Dept: FAMILY MEDICINE | Facility: CLINIC | Age: 67
End: 2022-03-22
Payer: MEDICARE

## 2022-03-22 VITALS
DIASTOLIC BLOOD PRESSURE: 60 MMHG | SYSTOLIC BLOOD PRESSURE: 124 MMHG | TEMPERATURE: 99 F | BODY MASS INDEX: 38.89 KG/M2 | RESPIRATION RATE: 20 BRPM | HEART RATE: 79 BPM | OXYGEN SATURATION: 96 % | HEIGHT: 65 IN | WEIGHT: 233.44 LBS

## 2022-03-22 DIAGNOSIS — M48.062 SPINAL STENOSIS OF LUMBAR REGION WITH NEUROGENIC CLAUDICATION: ICD-10-CM

## 2022-03-22 DIAGNOSIS — M48.02 CERVICAL STENOSIS OF SPINE: Primary | ICD-10-CM

## 2022-03-22 DIAGNOSIS — I10 ESSENTIAL HYPERTENSION: ICD-10-CM

## 2022-03-22 DIAGNOSIS — L21.9 SEBORRHEIC DERMATITIS OF SCALP: ICD-10-CM

## 2022-03-22 DIAGNOSIS — E04.1 THYROID NODULE: ICD-10-CM

## 2022-03-22 DIAGNOSIS — M79.7 FIBROMYALGIA: ICD-10-CM

## 2022-03-22 DIAGNOSIS — Z78.0 POST-MENOPAUSE: ICD-10-CM

## 2022-03-22 DIAGNOSIS — G43.009 MIGRAINE WITHOUT AURA AND WITHOUT STATUS MIGRAINOSUS, NOT INTRACTABLE: ICD-10-CM

## 2022-03-22 DIAGNOSIS — E11.9 TYPE 2 DIABETES MELLITUS WITHOUT COMPLICATION, WITHOUT LONG-TERM CURRENT USE OF INSULIN: ICD-10-CM

## 2022-03-22 PROCEDURE — 1159F PR MEDICATION LIST DOCUMENTED IN MEDICAL RECORD: ICD-10-PCS | Mod: CPTII,S$GLB,, | Performed by: FAMILY MEDICINE

## 2022-03-22 PROCEDURE — 99999 PR PBB SHADOW E&M-EST. PATIENT-LVL V: CPT | Mod: PBBFAC,,, | Performed by: FAMILY MEDICINE

## 2022-03-22 PROCEDURE — 3044F PR MOST RECENT HEMOGLOBIN A1C LEVEL <7.0%: ICD-10-PCS | Mod: CPTII,S$GLB,, | Performed by: FAMILY MEDICINE

## 2022-03-22 PROCEDURE — 1100F PR PT FALLS ASSESS DOC 2+ FALLS/FALL W/INJURY/YR: ICD-10-PCS | Mod: CPTII,S$GLB,, | Performed by: FAMILY MEDICINE

## 2022-03-22 PROCEDURE — 99999 PR PBB SHADOW E&M-EST. PATIENT-LVL V: ICD-10-PCS | Mod: PBBFAC,,, | Performed by: FAMILY MEDICINE

## 2022-03-22 PROCEDURE — 3008F BODY MASS INDEX DOCD: CPT | Mod: CPTII,S$GLB,, | Performed by: FAMILY MEDICINE

## 2022-03-22 PROCEDURE — 3288F PR FALLS RISK ASSESSMENT DOCUMENTED: ICD-10-PCS | Mod: CPTII,S$GLB,, | Performed by: FAMILY MEDICINE

## 2022-03-22 PROCEDURE — 3008F PR BODY MASS INDEX (BMI) DOCUMENTED: ICD-10-PCS | Mod: CPTII,S$GLB,, | Performed by: FAMILY MEDICINE

## 2022-03-22 PROCEDURE — 3074F SYST BP LT 130 MM HG: CPT | Mod: CPTII,S$GLB,, | Performed by: FAMILY MEDICINE

## 2022-03-22 PROCEDURE — 99214 OFFICE O/P EST MOD 30 MIN: CPT | Mod: S$GLB,,, | Performed by: FAMILY MEDICINE

## 2022-03-22 PROCEDURE — 1100F PTFALLS ASSESS-DOCD GE2>/YR: CPT | Mod: CPTII,S$GLB,, | Performed by: FAMILY MEDICINE

## 2022-03-22 PROCEDURE — 3078F DIAST BP <80 MM HG: CPT | Mod: CPTII,S$GLB,, | Performed by: FAMILY MEDICINE

## 2022-03-22 PROCEDURE — 3288F FALL RISK ASSESSMENT DOCD: CPT | Mod: CPTII,S$GLB,, | Performed by: FAMILY MEDICINE

## 2022-03-22 PROCEDURE — 1159F MED LIST DOCD IN RCRD: CPT | Mod: CPTII,S$GLB,, | Performed by: FAMILY MEDICINE

## 2022-03-22 PROCEDURE — 3078F PR MOST RECENT DIASTOLIC BLOOD PRESSURE < 80 MM HG: ICD-10-PCS | Mod: CPTII,S$GLB,, | Performed by: FAMILY MEDICINE

## 2022-03-22 PROCEDURE — 4010F PR ACE/ARB THEARPY RXD/TAKEN: ICD-10-PCS | Mod: CPTII,S$GLB,, | Performed by: FAMILY MEDICINE

## 2022-03-22 PROCEDURE — 3074F PR MOST RECENT SYSTOLIC BLOOD PRESSURE < 130 MM HG: ICD-10-PCS | Mod: CPTII,S$GLB,, | Performed by: FAMILY MEDICINE

## 2022-03-22 PROCEDURE — 4010F ACE/ARB THERAPY RXD/TAKEN: CPT | Mod: CPTII,S$GLB,, | Performed by: FAMILY MEDICINE

## 2022-03-22 PROCEDURE — 3044F HG A1C LEVEL LT 7.0%: CPT | Mod: CPTII,S$GLB,, | Performed by: FAMILY MEDICINE

## 2022-03-22 PROCEDURE — 1160F RVW MEDS BY RX/DR IN RCRD: CPT | Mod: CPTII,S$GLB,, | Performed by: FAMILY MEDICINE

## 2022-03-22 PROCEDURE — 1160F PR REVIEW ALL MEDS BY PRESCRIBER/CLIN PHARMACIST DOCUMENTED: ICD-10-PCS | Mod: CPTII,S$GLB,, | Performed by: FAMILY MEDICINE

## 2022-03-22 PROCEDURE — 99214 PR OFFICE/OUTPT VISIT, EST, LEVL IV, 30-39 MIN: ICD-10-PCS | Mod: S$GLB,,, | Performed by: FAMILY MEDICINE

## 2022-03-22 RX ORDER — HYDROCHLOROTHIAZIDE 25 MG/1
25 TABLET ORAL DAILY
Qty: 90 TABLET | Refills: 1 | Status: SHIPPED | OUTPATIENT
Start: 2022-03-22 | End: 2023-01-09 | Stop reason: SDUPTHER

## 2022-03-22 RX ORDER — LOSARTAN POTASSIUM 50 MG/1
50 TABLET ORAL DAILY
Qty: 90 TABLET | Refills: 1 | Status: SHIPPED | OUTPATIENT
Start: 2022-03-22 | End: 2022-10-12 | Stop reason: SDUPTHER

## 2022-03-22 RX ORDER — METFORMIN HYDROCHLORIDE 500 MG/1
500 TABLET ORAL 2 TIMES DAILY WITH MEALS
Qty: 180 TABLET | Refills: 1 | Status: SHIPPED | OUTPATIENT
Start: 2022-03-22 | End: 2022-09-20 | Stop reason: SDUPTHER

## 2022-03-22 NOTE — PROGRESS NOTES
Health Maintenance Due   Topic     Foot Exam  Examined 1/24 with Dr Haynes    DEXA Scan  Will help patient schedule after visit    Pneumococcal Vaccines (Age 65+) (2 of 2 - PPSV23) Consult with pcp

## 2022-03-22 NOTE — PROGRESS NOTES
Chief Complaint   Patient presents with    Diabetes     Follow up    Back Pain     Upper back pain few months       Regine Osorio is a 67 y.o. female who presents per chief complain.  Chronic medical issues, if present, have been documented.  Acute medical issues, if present have been documented in the Chief Complaint.     Diabetes  She presents for her follow-up diabetic visit. She has type 2 diabetes mellitus. No MedicAlert identification noted. The initial diagnosis of diabetes was made 10 years ago. Her disease course has been stable. Hypoglycemia symptoms include headaches and hunger. Pertinent negatives for hypoglycemia include no confusion, dizziness, mood changes, nervousness/anxiousness, pallor, seizures, sleepiness, speech difficulty, sweats or tremors. Associated symptoms include fatigue. Pertinent negatives for diabetes include no blurred vision, no chest pain, no foot paresthesias, no foot ulcerations, no polydipsia, no polyphagia, no polyuria, no visual change, no weakness and no weight loss. There are no hypoglycemic complications. Pertinent negatives for hypoglycemia complications include no blackouts, no hospitalization, no nocturnal hypoglycemia, no required assistance and no required glucagon injection. Symptoms are stable. There are no diabetic complications. Pertinent negatives for diabetic complications include no autonomic neuropathy, CVA, heart disease, impotence, nephropathy, peripheral neuropathy, PVD or retinopathy. Risk factors for coronary artery disease include hypertension, obesity, post-menopausal, stress and diabetes mellitus. Current diabetic treatment includes oral agent (dual therapy). She is compliant with treatment all of the time. Her weight is stable. She is following a generally healthy diet. When asked about meal planning, she reported none. She has not had a previous visit with a dietitian. She rarely participates in exercise. She monitors blood glucose at home 1-2 x  per day. Blood glucose monitoring compliance is excellent. There is no change in her home blood glucose trend. An ACE inhibitor/angiotensin II receptor blocker is being taken. She sees a podiatrist.Eye exam is current.   Back Pain  This is a chronic problem. The current episode started more than 1 year ago. The problem occurs daily. The problem has been waxing and waning since onset. The pain is present in the lumbar spine. The quality of the pain is described as aching. The pain does not radiate. The pain is at a severity of 4/10. The pain is moderate. The pain is worse during the day. The symptoms are aggravated by position. Associated symptoms include headaches, leg pain and paresis. Pertinent negatives include no abdominal pain, bladder incontinence, bowel incontinence, chest pain, dysuria, fever, numbness, paresthesias, pelvic pain, perianal numbness, tingling, weakness or weight loss. She has tried muscle relaxant and NSAIDs for the symptoms. The treatment provided moderate relief.   Hypertension  This is a chronic problem. The current episode started more than 1 year ago. The problem is unchanged. The problem is controlled. Associated symptoms include headaches and neck pain. Pertinent negatives include no anxiety, blurred vision, chest pain, malaise/fatigue, orthopnea, palpitations, peripheral edema, PND, shortness of breath or sweats. Agents associated with hypertension include steroids. Risk factors for coronary artery disease include diabetes mellitus, obesity and post-menopausal state. Past treatments include angiotensin blockers and diuretics. The current treatment provides moderate improvement. There are no compliance problems.  There is no history of angina, kidney disease, CAD/MI, CVA, heart failure, left ventricular hypertrophy, PVD or retinopathy. There is no history of chronic renal disease, coarctation of the aorta, hyperaldosteronism, hypercortisolism, hyperparathyroidism, a hypertension causing  med, pheochromocytoma, renovascular disease, sleep apnea or a thyroid problem.   Neck Pain   This is a recurrent problem. The current episode started more than 1 month ago. The problem occurs intermittently. The problem has been waxing and waning. The pain is associated with nothing. The pain is present in the midline, right side and left side. The quality of the pain is described as aching. The pain is at a severity of 4/10. The pain is mild. The symptoms are aggravated by position, bending and twisting. Stiffness is present all day. Associated symptoms include headaches, leg pain and paresis. Pertinent negatives include no chest pain, fever, numbness, pain with swallowing, photophobia, syncope, tingling, trouble swallowing, visual change, weakness or weight loss. She has tried NSAIDs for the symptoms.   Fibromyalgia  This is a chronic problem. The current episode started more than 1 year ago. The problem occurs daily. The problem has been waxing and waning. Associated symptoms include arthralgias, fatigue, headaches, myalgias and neck pain. Pertinent negatives include no abdominal pain, anorexia, change in bowel habit, chest pain, chills, congestion, coughing, diaphoresis, fever, joint swelling, nausea, numbness, rash, sore throat, swollen glands, urinary symptoms, vertigo, visual change, vomiting or weakness. The symptoms are aggravated by bending, exertion, standing and walking. She has tried acetaminophen, oral narcotics, NSAIDs, rest and position changes for the symptoms. The treatment provided moderate relief.     ROS  Review of Systems   Constitutional: Positive for fatigue. Negative for activity change, appetite change, chills, diaphoresis, fever, malaise/fatigue, unexpected weight change and weight loss.   HENT: Negative.  Negative for congestion, ear pain, hearing loss, nosebleeds, postnasal drip, rhinorrhea, sinus pressure, sneezing, sore throat and trouble swallowing.    Eyes: Negative for blurred  "vision, photophobia, pain, discharge, redness, itching and visual disturbance.   Respiratory: Negative for cough, choking and shortness of breath.    Cardiovascular: Negative for chest pain, palpitations, orthopnea, leg swelling, syncope and PND.   Gastrointestinal: Negative for abdominal distention, abdominal pain, anal bleeding, anorexia, blood in stool, bowel incontinence, change in bowel habit, constipation, diarrhea, nausea, rectal pain and vomiting.   Endocrine: Negative for polydipsia, polyphagia and polyuria.   Genitourinary: Negative for bladder incontinence, difficulty urinating, dysuria, frequency, hematuria, impotence, pelvic pain and urgency.   Musculoskeletal: Positive for arthralgias, back pain, myalgias and neck pain. Negative for gait problem, joint swelling and neck stiffness.   Skin: Negative.  Negative for pallor and rash.   Allergic/Immunologic: Negative for environmental allergies and food allergies.   Neurological: Positive for headaches. Negative for dizziness, vertigo, tingling, tremors, seizures, syncope, facial asymmetry, speech difficulty, weakness, light-headedness, numbness and paresthesias.   Psychiatric/Behavioral: Negative.  Negative for confusion, decreased concentration, dysphoric mood and sleep disturbance. The patient is not nervous/anxious.      Physical Exam  Vitals:    03/22/22 0853   BP: 124/60   Pulse: 79   Resp: 20   Temp: 98.6 °F (37 °C)    Body mass index is 38.85 kg/m².  Weight: 105.9 kg (233 lb 7.5 oz)   Height: 5' 5" (165.1 cm)     Physical Exam  Constitutional:       General: She is not in acute distress.     Appearance: Normal appearance. She is well-developed. She is not ill-appearing, toxic-appearing or diaphoretic.   HENT:      Head: Normocephalic and atraumatic.      Right Ear: Hearing and external ear normal. No decreased hearing noted.      Left Ear: Hearing and external ear normal. No decreased hearing noted.      Nose: Nose normal. No nasal deformity or " rhinorrhea.      Mouth/Throat:      Dentition: Normal dentition. Does not have dentures.      Pharynx: Uvula midline.   Eyes:      General: Lids are normal. No scleral icterus.        Right eye: No foreign body or discharge.         Left eye: No foreign body or discharge.      Conjunctiva/sclera: Conjunctivae normal.      Right eye: No chemosis or exudate.     Left eye: No chemosis or exudate.     Pupils: Pupils are equal, round, and reactive to light.   Cardiovascular:      Rate and Rhythm: Normal rate and regular rhythm.      Heart sounds: Normal heart sounds, S1 normal and S2 normal. No murmur heard.    No friction rub. No gallop.   Pulmonary:      Effort: Pulmonary effort is normal. No accessory muscle usage or respiratory distress.      Breath sounds: Normal breath sounds. No decreased breath sounds, wheezing, rhonchi or rales.   Abdominal:      General: There is no distension.      Palpations: Abdomen is soft. Abdomen is not rigid.      Tenderness: There is no abdominal tenderness. There is no guarding or rebound.   Musculoskeletal:      Cervical back: Rigidity and tenderness present. No swelling, edema, deformity, erythema, signs of trauma, lacerations, spasms, torticollis, bony tenderness or crepitus. Pain with movement and muscular tenderness present. No spinous process tenderness. Decreased range of motion.      Thoracic back: Normal.      Lumbar back: Tenderness present. No swelling, edema, deformity, signs of trauma, lacerations, spasms or bony tenderness. Decreased range of motion. No scoliosis.      Right upper leg: Tenderness present. No swelling, edema, deformity, lacerations or bony tenderness.      Left upper leg: Tenderness present. No swelling, edema, deformity, lacerations or bony tenderness.   Skin:     General: Skin is warm and dry.      Findings: No rash.   Neurological:      Mental Status: She is alert and oriented to person, place, and time.      Cranial Nerves: No cranial nerve deficit.       Sensory: No sensory deficit.      Motor: No abnormal muscle tone or seizure activity.      Coordination: Coordination normal.      Gait: Gait normal.   Psychiatric:         Attention and Perception: She is attentive.         Speech: Speech normal.         Behavior: Behavior normal. Behavior is cooperative.         Thought Content: Thought content normal.         Judgment: Judgment normal.       Assessment & Plan    Discussion of plan of care including treatment options regarding health and wellness were reviewed and discussed with patient.  Any changes to medication or treatment plan, as well as any screening blood test, imaging, or referrals to specialist, are documented.  Follow up as indicated.     1. Cervical stenosis of spine  Referral to appropriate specialist for evaluation and management placed in [facility].   - Ambulatory referral/consult to Pain Clinic; Future    2. Spinal stenosis of lumbar region with neurogenic claudication  The current medical regimen will be continued at this time as discussed.  Referral to appropriate specialist for evaluation and management placed in [facility].   - Ambulatory referral/consult to Pain Clinic; Future    3. Type 2 diabetes mellitus without complication, without long-term current use of insulin  Patient is encouraged to follow a diet low in carbohydrates and simple sugars.  Discussed simple vs. complex carbohydrates as well as eating times of certain meals. Advised to focus on good food choices and increased physical activity and encouraged to adhere to medication regimen and/or lifestyle adjustments, and to check glucose level as recommended.  Contact office if glucose levels are not improving over time.  Will monitor HbA1c appropriately.   - metFORMIN (GLUCOPHAGE) 500 MG tablet; Take 1 tablet (500 mg total) by mouth 2 (two) times daily with meals.  Dispense: 180 tablet; Refill: 1    4. Essential hypertension  Patient was counseled and encouraged to maintain a low sodium diet,  as well as increasing physical activity.  Recommend random BP checks at home on a regular basis.  Repeat BP at end of visit was not necessary. Will continue medication at this time, and follow up in 3-6 months, or sooner if blood pressure begins to increase.     - hydroCHLOROthiazide (HYDRODIURIL) 25 MG tablet; Take 1 tablet (25 mg total) by mouth once daily.  Dispense: 90 tablet; Refill: 1  - losartan (COZAAR) 50 MG tablet; Take 1 tablet (50 mg total) by mouth once daily.  Dispense: 90 tablet; Refill: 1    5. Fibromyalgia  The current medical regimen will be continued at this time as discussed.  Referral to appropriate specialist for evaluation and management placed in [facility].   - Ambulatory referral/consult to Pain Clinic; Future    6. Migraine without aura and without status migrainosus, not intractable  Stable; no therapeutic changes at this time.  The current medical regimen will be continued at this time as discussed.     7. Seborrheic dermatitis of scalp  Referral to appropriate specialist for evaluation and management placed in [facility].   - Ambulatory referral/consult to Dermatology; Future    8. Thyroid nodule  Benign; patient is due for routine screening.  Will order imaging to further evaluate and manage accordingly.   - US Soft Tissue Head Neck Thyroid; Future    9. Post-menopause  Screening test will be ordered and once results available patient will be notified of results and managed accordingly.   - DXA Bone Density Spine And Hip; Future       Follow up in about 6 months (around 9/22/2022).      ACTIVE MEDICAL ISSUES:  Documented in Problem List    PAST MEDICAL HISTORY  Documented    PAST SURGICAL HISTORY:  Documented    SOCIAL HISTORY:  Documented    FAMILY HISTORY:  Documented    ALLERGIES AND MEDICATIONS: updated and reviewed.  Documented    Health Maintenance       Date Due Completion Date    Foot Exam Never done ---    DEXA Scan Never done ---    Pneumococcal Vaccines (Age 65+) (2 of 2 - PPSV23)  01/12/2022 3/23/2018    Mammogram 04/05/2022 (Originally 2/26/2022) 2/26/2021    Diabetes Urine Screening 08/26/2022 8/26/2021    Hemoglobin A1c 09/15/2022 3/15/2022    Lipid Panel 12/10/2022 12/10/2021    Eye Exam 03/11/2023 3/11/2022    Colorectal Cancer Screening 07/09/2023 7/9/2018    TETANUS VACCINE 11/26/2028 11/26/2018          None

## 2022-03-28 ENCOUNTER — PATIENT MESSAGE (OUTPATIENT)
Dept: OBSTETRICS AND GYNECOLOGY | Facility: CLINIC | Age: 67
End: 2022-03-28
Payer: MEDICARE

## 2022-03-29 ENCOUNTER — TELEPHONE (OUTPATIENT)
Dept: OBSTETRICS AND GYNECOLOGY | Facility: CLINIC | Age: 67
End: 2022-03-29
Payer: MEDICARE

## 2022-03-29 NOTE — TELEPHONE ENCOUNTER
----- Message from Silas Strickland sent at 3/29/2022  2:15 PM CDT -----  Name of Who is Calling: KRISTINE PEÑALOZA          What is the request in detail: The patient is returning a call back to the office. Please advise          Can the clinic reply by [facility]: No         What Number to Call Back if not in [facility]: [phone]

## 2022-03-30 ENCOUNTER — TELEPHONE (OUTPATIENT)
Dept: OBSTETRICS AND GYNECOLOGY | Facility: CLINIC | Age: 67
End: 2022-03-30
Payer: MEDICARE

## 2022-03-30 ENCOUNTER — TELEPHONE (OUTPATIENT)
Dept: PAIN MEDICINE | Facility: CLINIC | Age: 67
End: 2022-03-30
Payer: MEDICARE
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
## 2022-03-30 NOTE — TELEPHONE ENCOUNTER
----- Message from Wing Arellano sent at 3/30/2022  1:37 PM CDT -----   Name of Who is Calling:     What is the request in detail:  request call back in reference to order for left breast prosthesis and bra Please contact to further discuss and advise      Can the clinic reply by [facility]:     What Number to Call Back [facility]:  reji / [facility] / [phone]

## 2022-03-30 NOTE — TELEPHONE ENCOUNTER
This message is for Ms.Le Centeno  in regards to his/her appointment 04/01/2022 at 10:30am    [facility] Policy: For the safety of all patients and staff members. Please review [facility] Patient Visitor policy below.    Patient Visitor Policy:     Due to social distancing and limited seating, the clinic staff asks that all patients arrive on time for their scheduled appointment.  During this visit, we ask all patients to only have one visitor over the age of 18yrs old to accompany them to be seen by Dr. Hi Fishman.  If the patient does not require assistance with their visit, all visitors remain in the waiting area.  Upon arriving, patients and visitors are required to wear a face mask.  If the patient or visitor does not have a face mask, one will be provided to you when entering the facility.      [facility] providers and Mid-levels offer interventional, procedure-based options to treat chronic pain.  The goal is to manage chronic pain by reducing pain frequency and intensity and address your functional goals for activities of daily living while simultaneously reducing or eliminating your reliance on medications. Please bring any records or imaging from prior treatments to visit for provider review.  You will be presented with a multi-modal treatment plan that may or may not include imaging, interventional procedures, Physical/occupational/aqua therapy, pain creams, and non-narcotic pain medications used for the treatments of chronic pain.    Address: [address] [facility], [address] [place] [address]    Thank you,  Interventional Pain Staff    Patient verbalized understanding

## 2022-03-31 ENCOUNTER — OFFICE VISIT (OUTPATIENT)
Dept: PAIN MEDICINE | Facility: CLINIC | Age: 67
End: 2022-03-31
Payer: MEDICARE

## 2022-03-31 VITALS
WEIGHT: 233 LBS | HEIGHT: 65 IN | DIASTOLIC BLOOD PRESSURE: 72 MMHG | HEART RATE: 88 BPM | SYSTOLIC BLOOD PRESSURE: 120 MMHG | TEMPERATURE: 97 F | RESPIRATION RATE: 18 BRPM | BODY MASS INDEX: 38.82 KG/M2

## 2022-03-31 DIAGNOSIS — G24.3 CERVICAL DYSTONIA: Primary | ICD-10-CM

## 2022-03-31 DIAGNOSIS — M75.00 ADHESIVE CAPSULITIS OF SHOULDER, UNSPECIFIED LATERALITY: ICD-10-CM

## 2022-03-31 DIAGNOSIS — M48.062 SPINAL STENOSIS OF LUMBAR REGION WITH NEUROGENIC CLAUDICATION: ICD-10-CM

## 2022-03-31 DIAGNOSIS — M54.9 DORSALGIA, UNSPECIFIED: ICD-10-CM

## 2022-03-31 DIAGNOSIS — M48.02 CERVICAL STENOSIS OF SPINE: ICD-10-CM

## 2022-03-31 DIAGNOSIS — M54.2 CERVICALGIA: ICD-10-CM

## 2022-03-31 DIAGNOSIS — M79.7 FIBROMYALGIA: ICD-10-CM

## 2022-03-31 PROCEDURE — 4010F PR ACE/ARB THEARPY RXD/TAKEN: ICD-10-PCS | Mod: CPTII,S$GLB,, | Performed by: STUDENT IN AN ORGANIZED HEALTH CARE EDUCATION/TRAINING PROGRAM

## 2022-03-31 PROCEDURE — 1160F RVW MEDS BY RX/DR IN RCRD: CPT | Mod: CPTII,S$GLB,, | Performed by: STUDENT IN AN ORGANIZED HEALTH CARE EDUCATION/TRAINING PROGRAM

## 2022-03-31 PROCEDURE — 99205 OFFICE O/P NEW HI 60 MIN: CPT | Mod: S$GLB,,, | Performed by: STUDENT IN AN ORGANIZED HEALTH CARE EDUCATION/TRAINING PROGRAM

## 2022-03-31 PROCEDURE — 3008F PR BODY MASS INDEX (BMI) DOCUMENTED: ICD-10-PCS | Mod: CPTII,S$GLB,, | Performed by: STUDENT IN AN ORGANIZED HEALTH CARE EDUCATION/TRAINING PROGRAM

## 2022-03-31 PROCEDURE — 3008F BODY MASS INDEX DOCD: CPT | Mod: CPTII,S$GLB,, | Performed by: STUDENT IN AN ORGANIZED HEALTH CARE EDUCATION/TRAINING PROGRAM

## 2022-03-31 PROCEDURE — 3074F SYST BP LT 130 MM HG: CPT | Mod: CPTII,S$GLB,, | Performed by: STUDENT IN AN ORGANIZED HEALTH CARE EDUCATION/TRAINING PROGRAM

## 2022-03-31 PROCEDURE — 99999 PR PBB SHADOW E&M-EST. PATIENT-LVL V: CPT | Mod: PBBFAC,,, | Performed by: STUDENT IN AN ORGANIZED HEALTH CARE EDUCATION/TRAINING PROGRAM

## 2022-03-31 PROCEDURE — 99999 PR PBB SHADOW E&M-EST. PATIENT-LVL V: ICD-10-PCS | Mod: PBBFAC,,, | Performed by: STUDENT IN AN ORGANIZED HEALTH CARE EDUCATION/TRAINING PROGRAM

## 2022-03-31 PROCEDURE — 99205 PR OFFICE/OUTPT VISIT, NEW, LEVL V, 60-74 MIN: ICD-10-PCS | Mod: S$GLB,,, | Performed by: STUDENT IN AN ORGANIZED HEALTH CARE EDUCATION/TRAINING PROGRAM

## 2022-03-31 PROCEDURE — 3288F PR FALLS RISK ASSESSMENT DOCUMENTED: ICD-10-PCS | Mod: CPTII,S$GLB,, | Performed by: STUDENT IN AN ORGANIZED HEALTH CARE EDUCATION/TRAINING PROGRAM

## 2022-03-31 PROCEDURE — 1101F PT FALLS ASSESS-DOCD LE1/YR: CPT | Mod: CPTII,S$GLB,, | Performed by: STUDENT IN AN ORGANIZED HEALTH CARE EDUCATION/TRAINING PROGRAM

## 2022-03-31 PROCEDURE — 1159F PR MEDICATION LIST DOCUMENTED IN MEDICAL RECORD: ICD-10-PCS | Mod: CPTII,S$GLB,, | Performed by: STUDENT IN AN ORGANIZED HEALTH CARE EDUCATION/TRAINING PROGRAM

## 2022-03-31 PROCEDURE — 1101F PR PT FALLS ASSESS DOC 0-1 FALLS W/OUT INJ PAST YR: ICD-10-PCS | Mod: CPTII,S$GLB,, | Performed by: STUDENT IN AN ORGANIZED HEALTH CARE EDUCATION/TRAINING PROGRAM

## 2022-03-31 PROCEDURE — 4010F ACE/ARB THERAPY RXD/TAKEN: CPT | Mod: CPTII,S$GLB,, | Performed by: STUDENT IN AN ORGANIZED HEALTH CARE EDUCATION/TRAINING PROGRAM

## 2022-03-31 PROCEDURE — 3044F PR MOST RECENT HEMOGLOBIN A1C LEVEL <7.0%: ICD-10-PCS | Mod: CPTII,S$GLB,, | Performed by: STUDENT IN AN ORGANIZED HEALTH CARE EDUCATION/TRAINING PROGRAM

## 2022-03-31 PROCEDURE — 3078F PR MOST RECENT DIASTOLIC BLOOD PRESSURE < 80 MM HG: ICD-10-PCS | Mod: CPTII,S$GLB,, | Performed by: STUDENT IN AN ORGANIZED HEALTH CARE EDUCATION/TRAINING PROGRAM

## 2022-03-31 PROCEDURE — 1159F MED LIST DOCD IN RCRD: CPT | Mod: CPTII,S$GLB,, | Performed by: STUDENT IN AN ORGANIZED HEALTH CARE EDUCATION/TRAINING PROGRAM

## 2022-03-31 PROCEDURE — 3044F HG A1C LEVEL LT 7.0%: CPT | Mod: CPTII,S$GLB,, | Performed by: STUDENT IN AN ORGANIZED HEALTH CARE EDUCATION/TRAINING PROGRAM

## 2022-03-31 PROCEDURE — 1160F PR REVIEW ALL MEDS BY PRESCRIBER/CLIN PHARMACIST DOCUMENTED: ICD-10-PCS | Mod: CPTII,S$GLB,, | Performed by: STUDENT IN AN ORGANIZED HEALTH CARE EDUCATION/TRAINING PROGRAM

## 2022-03-31 PROCEDURE — 3074F PR MOST RECENT SYSTOLIC BLOOD PRESSURE < 130 MM HG: ICD-10-PCS | Mod: CPTII,S$GLB,, | Performed by: STUDENT IN AN ORGANIZED HEALTH CARE EDUCATION/TRAINING PROGRAM

## 2022-03-31 PROCEDURE — 3288F FALL RISK ASSESSMENT DOCD: CPT | Mod: CPTII,S$GLB,, | Performed by: STUDENT IN AN ORGANIZED HEALTH CARE EDUCATION/TRAINING PROGRAM

## 2022-03-31 PROCEDURE — 3078F DIAST BP <80 MM HG: CPT | Mod: CPTII,S$GLB,, | Performed by: STUDENT IN AN ORGANIZED HEALTH CARE EDUCATION/TRAINING PROGRAM

## 2022-04-02 ENCOUNTER — HOSPITAL ENCOUNTER (OUTPATIENT)
Dept: RADIOLOGY | Facility: HOSPITAL | Age: 67
Discharge: HOME | End: 2022-04-02
Attending: FAMILY MEDICINE
Payer: MEDICARE

## 2022-04-02 DIAGNOSIS — E04.1 THYROID NODULE: ICD-10-CM

## 2022-04-02 PROCEDURE — 76536 US SOFT TISSUE HEAD NECK THYROID: ICD-10-PCS | Mod: 26,,, | Performed by: RADIOLOGY

## 2022-04-02 PROCEDURE — 76536 US EXAM OF HEAD AND NECK: CPT | Mod: 26,,, | Performed by: RADIOLOGY

## 2022-04-02 PROCEDURE — 76536 US EXAM OF HEAD AND NECK: CPT | Mod: TC

## 2022-04-05 ENCOUNTER — OFFICE VISIT (OUTPATIENT)
Dept: HEMATOLOGY/ONCOLOGY | Facility: CLINIC | Age: 67
End: 2022-04-05
Payer: MEDICARE

## 2022-04-05 ENCOUNTER — HOSPITAL ENCOUNTER (OUTPATIENT)
Dept: RADIOLOGY | Facility: HOSPITAL | Age: 67
Discharge: HOME | End: 2022-04-05
Attending: INTERNAL MEDICINE
Payer: MEDICARE

## 2022-04-05 ENCOUNTER — PATIENT MESSAGE (OUTPATIENT)
Dept: PAIN MEDICINE | Facility: CLINIC | Age: 67
End: 2022-04-05
Payer: MEDICARE

## 2022-04-05 VITALS
SYSTOLIC BLOOD PRESSURE: 134 MMHG | WEIGHT: 230.19 LBS | HEART RATE: 73 BPM | BODY MASS INDEX: 38.35 KG/M2 | RESPIRATION RATE: 16 BRPM | HEIGHT: 65 IN | DIASTOLIC BLOOD PRESSURE: 67 MMHG | TEMPERATURE: 98 F | OXYGEN SATURATION: 99 %

## 2022-04-05 DIAGNOSIS — Z85.3 HISTORY OF BREAST CANCER IN FEMALE: ICD-10-CM

## 2022-04-05 DIAGNOSIS — F41.9 ANXIETY: Primary | ICD-10-CM

## 2022-04-05 DIAGNOSIS — Z85.3 HISTORY OF BREAST CANCER IN FEMALE: Primary | ICD-10-CM

## 2022-04-05 PROCEDURE — 77065 DX MAMMO INCL CAD UNI: CPT | Mod: TC,RT

## 2022-04-05 PROCEDURE — 1159F MED LIST DOCD IN RCRD: CPT | Mod: CPTII,S$GLB,, | Performed by: INTERNAL MEDICINE

## 2022-04-05 PROCEDURE — 77061 MAMMO DIGITAL DIAGNOSTIC RIGHT WITH TOMO: ICD-10-PCS | Mod: 26,RT,, | Performed by: RADIOLOGY

## 2022-04-05 PROCEDURE — 99499 UNLISTED E&M SERVICE: CPT | Mod: S$GLB,,, | Performed by: INTERNAL MEDICINE

## 2022-04-05 PROCEDURE — 99999 PR PBB SHADOW E&M-EST. PATIENT-LVL V: ICD-10-PCS | Mod: PBBFAC,,, | Performed by: INTERNAL MEDICINE

## 2022-04-05 PROCEDURE — 3078F PR MOST RECENT DIASTOLIC BLOOD PRESSURE < 80 MM HG: ICD-10-PCS | Mod: CPTII,S$GLB,, | Performed by: INTERNAL MEDICINE

## 2022-04-05 PROCEDURE — 77065 MAMMO DIGITAL DIAGNOSTIC RIGHT WITH TOMO: ICD-10-PCS | Mod: 26,RT,, | Performed by: RADIOLOGY

## 2022-04-05 PROCEDURE — 1101F PT FALLS ASSESS-DOCD LE1/YR: CPT | Mod: CPTII,S$GLB,, | Performed by: INTERNAL MEDICINE

## 2022-04-05 PROCEDURE — 3008F BODY MASS INDEX DOCD: CPT | Mod: CPTII,S$GLB,, | Performed by: INTERNAL MEDICINE

## 2022-04-05 PROCEDURE — 99214 OFFICE O/P EST MOD 30 MIN: CPT | Mod: S$GLB,,, | Performed by: INTERNAL MEDICINE

## 2022-04-05 PROCEDURE — 3075F SYST BP GE 130 - 139MM HG: CPT | Mod: CPTII,S$GLB,, | Performed by: INTERNAL MEDICINE

## 2022-04-05 PROCEDURE — 77061 BREAST TOMOSYNTHESIS UNI: CPT | Mod: 26,RT,, | Performed by: RADIOLOGY

## 2022-04-05 PROCEDURE — 99999 PR PBB SHADOW E&M-EST. PATIENT-LVL V: CPT | Mod: PBBFAC,,, | Performed by: INTERNAL MEDICINE

## 2022-04-05 PROCEDURE — 3075F PR MOST RECENT SYSTOLIC BLOOD PRESS GE 130-139MM HG: ICD-10-PCS | Mod: CPTII,S$GLB,, | Performed by: INTERNAL MEDICINE

## 2022-04-05 PROCEDURE — 1160F RVW MEDS BY RX/DR IN RCRD: CPT | Mod: CPTII,S$GLB,, | Performed by: INTERNAL MEDICINE

## 2022-04-05 PROCEDURE — 1159F PR MEDICATION LIST DOCUMENTED IN MEDICAL RECORD: ICD-10-PCS | Mod: CPTII,S$GLB,, | Performed by: INTERNAL MEDICINE

## 2022-04-05 PROCEDURE — 3288F FALL RISK ASSESSMENT DOCD: CPT | Mod: CPTII,S$GLB,, | Performed by: INTERNAL MEDICINE

## 2022-04-05 PROCEDURE — 77065 DX MAMMO INCL CAD UNI: CPT | Mod: 26,RT,, | Performed by: RADIOLOGY

## 2022-04-05 PROCEDURE — 3044F HG A1C LEVEL LT 7.0%: CPT | Mod: CPTII,S$GLB,, | Performed by: INTERNAL MEDICINE

## 2022-04-05 PROCEDURE — 1160F PR REVIEW ALL MEDS BY PRESCRIBER/CLIN PHARMACIST DOCUMENTED: ICD-10-PCS | Mod: CPTII,S$GLB,, | Performed by: INTERNAL MEDICINE

## 2022-04-05 PROCEDURE — 4010F ACE/ARB THERAPY RXD/TAKEN: CPT | Mod: CPTII,S$GLB,, | Performed by: INTERNAL MEDICINE

## 2022-04-05 PROCEDURE — 99499 RISK ADDL DX/OHS AUDIT: ICD-10-PCS | Mod: S$GLB,,, | Performed by: INTERNAL MEDICINE

## 2022-04-05 PROCEDURE — 99214 PR OFFICE/OUTPT VISIT, EST, LEVL IV, 30-39 MIN: ICD-10-PCS | Mod: S$GLB,,, | Performed by: INTERNAL MEDICINE

## 2022-04-05 PROCEDURE — 3288F PR FALLS RISK ASSESSMENT DOCUMENTED: ICD-10-PCS | Mod: CPTII,S$GLB,, | Performed by: INTERNAL MEDICINE

## 2022-04-05 PROCEDURE — 3044F PR MOST RECENT HEMOGLOBIN A1C LEVEL <7.0%: ICD-10-PCS | Mod: CPTII,S$GLB,, | Performed by: INTERNAL MEDICINE

## 2022-04-05 PROCEDURE — 3078F DIAST BP <80 MM HG: CPT | Mod: CPTII,S$GLB,, | Performed by: INTERNAL MEDICINE

## 2022-04-05 PROCEDURE — 3008F PR BODY MASS INDEX (BMI) DOCUMENTED: ICD-10-PCS | Mod: CPTII,S$GLB,, | Performed by: INTERNAL MEDICINE

## 2022-04-05 PROCEDURE — 1101F PR PT FALLS ASSESS DOC 0-1 FALLS W/OUT INJ PAST YR: ICD-10-PCS | Mod: CPTII,S$GLB,, | Performed by: INTERNAL MEDICINE

## 2022-04-05 PROCEDURE — 4010F PR ACE/ARB THEARPY RXD/TAKEN: ICD-10-PCS | Mod: CPTII,S$GLB,, | Performed by: INTERNAL MEDICINE

## 2022-04-05 RX ORDER — DIAZEPAM 2 MG/1
2 TABLET ORAL
Qty: 1 TABLET | Refills: 0 | Status: SHIPPED | OUTPATIENT
Start: 2022-04-05 | End: 2022-05-24

## 2022-04-05 NOTE — TELEPHONE ENCOUNTER
Please change patients appointment on the 20th to the 21st for an injection.  Botox for cervical dystonia with ultrasound. 100 units. Let her know I sent a Valium to her pharmacy that we will have her take right before the procedure.  Please send the prior authorization for the botox.  Thank you!

## 2022-04-05 NOTE — PROGRESS NOTES
Subjective:       Patient ID: Regine Osorio is a 67 y.o. female.    Chief Complaint: No chief complaint on file.    HPI      Mrs. Alfred returns today for follow up.  I had last seen her on 11/1/2021.     Briefly, she is a 67-year-old female who has a history of a stage I triple negative breast cancer that was diagnosed in late 2017.  She underwent a left modified radical mastectomy in [facility] on 11/01/2017 and had a stage IA carcinoma measuring 1.2 cm in greatest diameter.  Resection margins were clear, while four sentinel lymph nodes were negative.  Postoperatively, she received four cycles of docetaxel and cyclophosphamide.  The first cycle was administered on 11/30/2017 and she completed her treatment by 02/01/2018.  She has been followed expectantly and has been MERRY since then.  We assumed her care when she relocated to [place] 3 years ago.    Her mammogram earlier today was read as BIRADS I, and a one year follow up was recommended.        Review of Systems    Overall she feels OK and she has no specific complaints today.  ECOG PS is 1.  She denies any anxiety, depression, easy bruising, fevers, chills, night  sweats, weight loss, nausea, vomiting, diarrhea, constipation, diplopia, blurred vision, headache, chest pain, palpitations, shortness of breath, breast pain, lower abdominal pain, extremity pain, or difficulty ambulating.  The remainder of the ten-point ROS, including general, skin, lymph, H/N, cardiorespiratory, GI, , Neuro, Endocrine, and psychiatric is negative.     Objective:      Physical Exam      She is alert, oriented to time, place, person, pleasant, well      nourished, in no acute physical distress.                                    VITAL SIGNS:  Reviewed                                      HEENT:  Normal.  There are no nasal, oral, lip, gingival, auricular, lid,    or conjunctival lesions.  Mucosae are moist and pink, and there is no        thrush.  Pupils are equal,  reactive to light and accommodation.              Extraocular muscle movements are intact.  Dentition is good.  There is no frontal or maxillary tenderness.                                     NECK:  Supple without JVD, adenopathy, but she does have thyromegaly.                       LUNGS:  Clear to auscultation without wheezing, rales, or rhonchi.           CARDIOVASCULAR:  Reveals an S1, S2, no murmurs, no rubs, no gallops.         ABDOMEN:  Soft, nontender, without organomegaly.  Bowel sounds are    present.                                                                     EXTREMITIES:  No cyanosis, clubbing, or edema.                               BREASTS:  She is status post left mastectomy with a well-healed periareolar incision.    There are no masses in the right breast.     LYMPHATIC:  There is no cervical, axillary, or supraclavicular adenopathy.   SKIN:  Warm and moist, without petechiae, rashes, induration, or ecchymoses.           NEUROLOGIC:  DTRs are 0-1+ bilaterally, symmetrical, motor function is 5/5,  and cranial nerves are  within normal limits.    Assessment:       1. History of breast cancer in female, L8cC3E6, status post mastectomy and 4 cycles of docetaxel cyclophosphamide, clinically MERRY, doing well.          Plan:         From the breast cancer standpoint Mrs Alfred is doing well, and remains MERRY.  I have asked her to return and see me in 6 months, and I explained to her that 99% of the patients with triple negative who recur do so within 5 years.  She voiced understanding.  Time spent preparing for the visit, interviewing and examining the patient and reviewing her mammogram was in excess of 30 minutes.  Her multiple questions were answered to her satisfaction.

## 2022-04-07 ENCOUNTER — OFFICE VISIT (OUTPATIENT)
Dept: PAIN MEDICINE | Facility: OTHER | Age: 67
End: 2022-04-07
Attending: STUDENT IN AN ORGANIZED HEALTH CARE EDUCATION/TRAINING PROGRAM
Payer: MEDICARE

## 2022-04-07 ENCOUNTER — PATIENT MESSAGE (OUTPATIENT)
Dept: PAIN MEDICINE | Facility: OTHER | Age: 67
End: 2022-04-07
Payer: MEDICARE

## 2022-04-07 ENCOUNTER — TELEPHONE (OUTPATIENT)
Dept: PAIN MEDICINE | Facility: CLINIC | Age: 67
End: 2022-04-07
Payer: MEDICARE

## 2022-04-07 DIAGNOSIS — M79.7 FIBROMYALGIA: ICD-10-CM

## 2022-04-07 DIAGNOSIS — Z74.09 IMPAIRED MOBILITY AND ENDURANCE: ICD-10-CM

## 2022-04-07 DIAGNOSIS — M54.2 CERVICALGIA: ICD-10-CM

## 2022-04-07 DIAGNOSIS — M75.00 ADHESIVE CAPSULITIS OF SHOULDER, UNSPECIFIED LATERALITY: ICD-10-CM

## 2022-04-07 DIAGNOSIS — R52 PAIN AGGRAVATED BY ACTIVITIES OF DAILY LIVING: Primary | ICD-10-CM

## 2022-04-07 PROCEDURE — 4010F ACE/ARB THERAPY RXD/TAKEN: CPT | Mod: CPTII,95,, | Performed by: NURSE PRACTITIONER

## 2022-04-07 PROCEDURE — 4010F PR ACE/ARB THEARPY RXD/TAKEN: ICD-10-PCS | Mod: CPTII,95,, | Performed by: NURSE PRACTITIONER

## 2022-04-07 PROCEDURE — 99215 OFFICE O/P EST HI 40 MIN: CPT | Mod: 95,,, | Performed by: NURSE PRACTITIONER

## 2022-04-07 PROCEDURE — 1160F RVW MEDS BY RX/DR IN RCRD: CPT | Mod: CPTII,95,, | Performed by: NURSE PRACTITIONER

## 2022-04-07 PROCEDURE — 1159F PR MEDICATION LIST DOCUMENTED IN MEDICAL RECORD: ICD-10-PCS | Mod: CPTII,95,, | Performed by: NURSE PRACTITIONER

## 2022-04-07 PROCEDURE — 3044F HG A1C LEVEL LT 7.0%: CPT | Mod: CPTII,95,, | Performed by: NURSE PRACTITIONER

## 2022-04-07 PROCEDURE — 99215 PR OFFICE/OUTPT VISIT, EST, LEVL V, 40-54 MIN: ICD-10-PCS | Mod: 95,,, | Performed by: NURSE PRACTITIONER

## 2022-04-07 PROCEDURE — 3044F PR MOST RECENT HEMOGLOBIN A1C LEVEL <7.0%: ICD-10-PCS | Mod: CPTII,95,, | Performed by: NURSE PRACTITIONER

## 2022-04-07 PROCEDURE — 1159F MED LIST DOCD IN RCRD: CPT | Mod: CPTII,95,, | Performed by: NURSE PRACTITIONER

## 2022-04-07 PROCEDURE — 1160F PR REVIEW ALL MEDS BY PRESCRIBER/CLIN PHARMACIST DOCUMENTED: ICD-10-PCS | Mod: CPTII,95,, | Performed by: NURSE PRACTITIONER
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
## 2022-04-07 NOTE — TELEPHONE ENCOUNTER
Staff spoke with patient regarding Mri appt scheduled in [place], Patient would like her appt scheduled at [facility]. Patient was informed that we can schedule her at [facility] 19th of April at 3:45pm & 5:30pm. Patient verbalzied understanding

## 2022-04-11 ENCOUNTER — PATIENT MESSAGE (OUTPATIENT)
Dept: PAIN MEDICINE | Facility: OTHER | Age: 67
End: 2022-04-11
Payer: MEDICARE

## 2022-04-19 ENCOUNTER — HOSPITAL ENCOUNTER (OUTPATIENT)
Dept: RADIOLOGY | Facility: HOSPITAL | Age: 67
Discharge: HOME | End: 2022-04-19
Attending: STUDENT IN AN ORGANIZED HEALTH CARE EDUCATION/TRAINING PROGRAM
Payer: MEDICARE

## 2022-04-19 DIAGNOSIS — M48.062 SPINAL STENOSIS OF LUMBAR REGION WITH NEUROGENIC CLAUDICATION: ICD-10-CM

## 2022-04-19 DIAGNOSIS — M54.2 CERVICALGIA: ICD-10-CM

## 2022-04-19 DIAGNOSIS — M54.9 DORSALGIA, UNSPECIFIED: ICD-10-CM

## 2022-04-19 PROCEDURE — 72148 MRI LUMBAR SPINE W/O DYE: CPT | Mod: TC

## 2022-04-19 PROCEDURE — 72141 MRI NECK SPINE W/O DYE: CPT | Mod: 26,,, | Performed by: INTERNAL MEDICINE

## 2022-04-19 PROCEDURE — 72141 MRI NECK SPINE W/O DYE: CPT | Mod: TC

## 2022-04-19 PROCEDURE — 72148 MRI LUMBAR SPINE WITHOUT CONTRAST: ICD-10-PCS | Mod: 26,,, | Performed by: INTERNAL MEDICINE

## 2022-04-19 PROCEDURE — 72148 MRI LUMBAR SPINE W/O DYE: CPT | Mod: 26,,, | Performed by: INTERNAL MEDICINE

## 2022-04-19 PROCEDURE — 72141 MRI CERVICAL SPINE WITHOUT CONTRAST: ICD-10-PCS | Mod: 26,,, | Performed by: INTERNAL MEDICINE

## 2022-04-20 ENCOUNTER — TELEPHONE (OUTPATIENT)
Dept: ORTHOPEDICS | Facility: CLINIC | Age: 67
End: 2022-04-20
Payer: MEDICARE

## 2022-04-20 ENCOUNTER — TELEPHONE (OUTPATIENT)
Dept: PAIN MEDICINE | Facility: CLINIC | Age: 67
End: 2022-04-20
Payer: MEDICARE

## 2022-04-20 NOTE — TELEPHONE ENCOUNTER
This message is for 04/21/2022 in regards to his/her appointment 04/21/2022 at 11:30am    [facility] Policy: For the safety of all patients and staff members. Please review [facility] Patient Visitor policy below.    Patient Visitor Policy:     Due to social distancing and limited seating, the clinic staff asks that all patients arrive on time for their scheduled appointment.  During this visit, we ask all patients to only have one visitor over the age of 18yrs old to accompany them to be seen by Dr. Hi Fishman.  If the patient does not require assistance with their visit, all visitors remain in the waiting area.  Upon arriving, patients and visitors are required to wear a face mask.  If the patient or visitor does not have a face mask, one will be provided to you when entering the facility.      Address: [address] [place] [address], [address] [place] [address]    Thank you,  Interventional Pain Staff   [phone]    Patient verbalized understanding    We are always striving for excellence. Should you receive a patient experience survey via text message, electronically, or by mail, we would appreciate if you would take a few moments to give us your feedback. These surveys let us know our strengths as well as areas of opportunity for improvement to better serve you.

## 2022-04-20 NOTE — TELEPHONE ENCOUNTER
----- Message from Rosales Scruggs MD sent at 4/20/2022 12:16 PM CDT -----  Regarding: RE: think she needs surgery?  Happy to see her.    Does she have cervical myelopathy?  ----- Message -----  From: Hi Fishman DO  Sent: 4/20/2022  11:09 AM CDT  To: Rosales Scruggs MD  Subject: think she needs surgery?                         Hannah Gan,    Question about this patient.  She has some cervical dystonia, which I now wonder might be coming from the severe stenosis and myelomalacia. She also has had lumbar TFESI in the past with improvement in the low back pain, but the epidermal lipomatosis makes me hesitant to do more.    MRI cervical shows myelomalacia:    1. Multilevel degenerative changes of the cervical spine, most advanced at C5-6 where there is severe spinal canal stenosis and severe bilateral neural foraminal narrowing.  2. Myelomalacia of the cervical cord extending from C4-5 to C6-7    She also has severe lumbar spinal stenosis.     3. Multilevel degenerative changes of the lumbar spine, most advanced at L2-3 and L3-4 where there is severe canal stenosis.  Prominent epidural fat throughout the lumbar spine contributes to canal stenosis, compatible with epidermal lipomatosis.    Let me know your thoughts.  I am doing botox for her dystonia for her tomorrow, but I'll talk to her about surgery if you think it's worth discussing.  Thanks    -Hi

## 2022-04-21 ENCOUNTER — CLINICAL SUPPORT (OUTPATIENT)
Dept: PAIN MEDICINE | Facility: CLINIC | Age: 67
End: 2022-04-21
Payer: MEDICARE

## 2022-04-21 ENCOUNTER — HOSPITAL ENCOUNTER (OUTPATIENT)
Dept: RADIOLOGY | Facility: CLINIC | Age: 67
Discharge: HOME | End: 2022-04-21
Attending: FAMILY MEDICINE
Payer: MEDICARE

## 2022-04-21 DIAGNOSIS — Z78.0 POST-MENOPAUSE: ICD-10-CM

## 2022-04-21 DIAGNOSIS — M48.02 CERVICAL STENOSIS OF SPINE: ICD-10-CM

## 2022-04-21 DIAGNOSIS — G95.89 MYELOMALACIA OF CERVICAL CORD: ICD-10-CM

## 2022-04-21 DIAGNOSIS — G24.3 CERVICAL DYSTONIA: Primary | ICD-10-CM

## 2022-04-21 DIAGNOSIS — M48.062 SPINAL STENOSIS OF LUMBAR REGION WITH NEUROGENIC CLAUDICATION: ICD-10-CM

## 2022-04-21 PROCEDURE — 99499 RISK ADDL DX/OHS AUDIT: ICD-10-PCS | Mod: S$GLB,,, | Performed by: STUDENT IN AN ORGANIZED HEALTH CARE EDUCATION/TRAINING PROGRAM

## 2022-04-21 PROCEDURE — 99213 PR OFFICE/OUTPT VISIT, EST, LEVL III, 20-29 MIN: ICD-10-PCS | Mod: 25,S$GLB,, | Performed by: STUDENT IN AN ORGANIZED HEALTH CARE EDUCATION/TRAINING PROGRAM

## 2022-04-21 PROCEDURE — 64616 CHEMODENERV MUSC NECK DYSTON: CPT | Mod: 50,S$GLB,, | Performed by: STUDENT IN AN ORGANIZED HEALTH CARE EDUCATION/TRAINING PROGRAM

## 2022-04-21 PROCEDURE — 64616 PR CHEMODENERVATION NECK MUSCLES EXC LARNYNX, UNI: ICD-10-PCS | Mod: 50,S$GLB,, | Performed by: STUDENT IN AN ORGANIZED HEALTH CARE EDUCATION/TRAINING PROGRAM

## 2022-04-21 PROCEDURE — 99999 PR PBB SHADOW E&M-EST. PATIENT-LVL II: CPT | Mod: PBBFAC,,, | Performed by: STUDENT IN AN ORGANIZED HEALTH CARE EDUCATION/TRAINING PROGRAM

## 2022-04-21 PROCEDURE — 99999 PR PBB SHADOW E&M-EST. PATIENT-LVL II: ICD-10-PCS | Mod: PBBFAC,,, | Performed by: STUDENT IN AN ORGANIZED HEALTH CARE EDUCATION/TRAINING PROGRAM

## 2022-04-21 PROCEDURE — 99499 UNLISTED E&M SERVICE: CPT | Mod: S$GLB,,, | Performed by: STUDENT IN AN ORGANIZED HEALTH CARE EDUCATION/TRAINING PROGRAM

## 2022-04-21 PROCEDURE — 77080 DEXA BONE DENSITY SPINE HIP: ICD-10-PCS | Mod: 26,,, | Performed by: INTERNAL MEDICINE

## 2022-04-21 PROCEDURE — 76942 PR U/S GUIDANCE FOR NEEDLE GUIDANCE: ICD-10-PCS | Mod: S$GLB,,, | Performed by: STUDENT IN AN ORGANIZED HEALTH CARE EDUCATION/TRAINING PROGRAM

## 2022-04-21 PROCEDURE — 99213 OFFICE O/P EST LOW 20 MIN: CPT | Mod: 25,S$GLB,, | Performed by: STUDENT IN AN ORGANIZED HEALTH CARE EDUCATION/TRAINING PROGRAM

## 2022-04-21 PROCEDURE — 76942 ECHO GUIDE FOR BIOPSY: CPT | Mod: S$GLB,,, | Performed by: STUDENT IN AN ORGANIZED HEALTH CARE EDUCATION/TRAINING PROGRAM

## 2022-04-21 PROCEDURE — 77080 DXA BONE DENSITY AXIAL: CPT | Mod: TC

## 2022-04-21 PROCEDURE — 77080 DXA BONE DENSITY AXIAL: CPT | Mod: 26,,, | Performed by: INTERNAL MEDICINE

## 2022-04-21 RX ORDER — MELOXICAM 15 MG/1
15 TABLET ORAL DAILY PRN
Qty: 30 TABLET | Refills: 2 | Status: SHIPPED | OUTPATIENT
Start: 2022-04-21 | End: 2022-07-20

## 2022-04-21 NOTE — PROCEDURES
"Procedures    ROCEDURE: Botulinum Toxin Injections with Ultrasound Guidance- bilateral Sternocleidomastoid, Splenius Capitis, Semispinalis Capitis, Splenius cervicis and Trapezius    Diagnosis: Dystonia  CPT code:   10987 (Ultrasonic guidance for needle placement imaging supervision and interpretation)   (per unit of Botox)  74497 CHEMODENERVATION OF MUSCLE(S); NECK MUSCLE(S), EXCLUDING MUSCLES OF THE LARYNX, UNILATERAL (EG, FOR CERVICAL DYSTONIA, SPASMODIC TORTICOLLIS)    Injection # 1 this year.    Postprocedural Diagnosis: Same  Needle Type: - 27G 1.5" needle    Solution injected: A 3:1 dilution of Botulinum Toxin   Total units utilized: 170 units  # of Units Discarded: 30 units    Estimated Blood Loss - <2ml  Drains: None  Specimens Removed: None  Urine Output - Not Measured  Complications: None  Outcome: Good    Informed Consent:  The patient's condition and proposed procedures, risks (including complications of nerve damage,  bleeding, infection, and failure of pain relief), and alternatives were discussed with the patient or responsible party.  The patient's/responsible party's questions were answered.  The patient/responsible party appeared to understand and chose to proceed.  Informed consent was obtained.    Procedure in Detail:  The procedure was performed in the procedure treatment room.  After obtaining written consent, the patient was placed in a seated position. By ultrasound the target muscles were identified.    Procedural Pause:  A procedural pause verifying correct patient, medical record number, allergies, medications to be administered, current vital signs, and surgical site was performed immediately prior to beginning the procedure.    The above noted needle was advanced towards each target muscle under ultrasound guidance until the needle was noted to be within the muscle belly.  After negative aspiration for heme, the following amounts of medication were injected at each " location:    Bilateral sternocleidomastoid muscle - 17 units  Bilateral splenius capitus muscle - 17 units  Bilateral trapezius muscle - 17 units  Bilateral splenius cervicis muscle - 17 units  Bilateral semispinalis capitis muscle - 17 units    All sites were done in the same manner. The patient tolerated the procedure well and without complications. After meeting discharge criteria, the patient was discharged home safely.    DISCUSSION:  Botox injections were performed today to treat the patients dystonia. The purpose was to improve the patients function. The patient was advised to relax and avoid any heavy lifting or excessive bending for 24 hours. She was advised that she may return to her usual activities after 24 hours if she is otherwise feeling well.  The patient was advised not to bathe or soak in water for 24 hours but that showering would be acceptable.      Note Electronically Signed By:  Hi Otoole  04/21/2022
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
## 2022-04-29 ENCOUNTER — CLINICAL SUPPORT (OUTPATIENT)
Dept: REHABILITATION | Facility: OTHER | Age: 67
End: 2022-04-29
Payer: MEDICARE

## 2022-04-29 ENCOUNTER — CLINICAL SUPPORT (OUTPATIENT)
Dept: REHABILITATION | Facility: OTHER | Age: 67
End: 2022-04-29
Attending: NURSE PRACTITIONER
Payer: MEDICARE

## 2022-04-29 DIAGNOSIS — M79.7 FIBROMYALGIA: ICD-10-CM

## 2022-04-29 DIAGNOSIS — Z74.09 IMPAIRED FUNCTIONAL MOBILITY AND ACTIVITY TOLERANCE: ICD-10-CM

## 2022-04-29 DIAGNOSIS — F40.298 FEAR OF PAIN: ICD-10-CM

## 2022-04-29 DIAGNOSIS — R52 PAIN AGGRAVATED BY ACTIVITIES OF DAILY LIVING: ICD-10-CM

## 2022-04-29 DIAGNOSIS — M53.86 DECREASED RANGE OF MOTION OF LUMBAR SPINE: ICD-10-CM

## 2022-04-29 DIAGNOSIS — Z74.09 IMPAIRED MOBILITY AND ENDURANCE: ICD-10-CM

## 2022-04-29 DIAGNOSIS — Z74.09 IMPAIRED FUNCTIONAL MOBILITY, BALANCE, GAIT, AND ENDURANCE: ICD-10-CM

## 2022-04-29 DIAGNOSIS — G89.29 CENTRAL SENSITIZATION TO PAIN: ICD-10-CM

## 2022-04-29 PROCEDURE — 97530 THERAPEUTIC ACTIVITIES: CPT

## 2022-04-29 PROCEDURE — 97163 PT EVAL HIGH COMPLEX 45 MIN: CPT

## 2022-04-29 PROCEDURE — 97167 OT EVAL HIGH COMPLEX 60 MIN: CPT
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
## 2022-05-16 ENCOUNTER — PATIENT OUTREACH (OUTPATIENT)
Dept: ADMINISTRATIVE | Facility: OTHER | Age: 67
End: 2022-05-16
Payer: MEDICARE

## 2022-05-16 ENCOUNTER — PATIENT MESSAGE (OUTPATIENT)
Dept: OBSTETRICS AND GYNECOLOGY | Facility: CLINIC | Age: 67
End: 2022-05-16
Payer: MEDICARE

## 2022-05-17 ENCOUNTER — OFFICE VISIT (OUTPATIENT)
Dept: OBSTETRICS AND GYNECOLOGY | Facility: CLINIC | Age: 67
End: 2022-05-17
Payer: MEDICARE

## 2022-05-17 VITALS
BODY MASS INDEX: 38.79 KG/M2 | SYSTOLIC BLOOD PRESSURE: 131 MMHG | HEIGHT: 65 IN | DIASTOLIC BLOOD PRESSURE: 76 MMHG | WEIGHT: 232.81 LBS

## 2022-05-17 DIAGNOSIS — N76.4 VULVAR ABSCESS: Primary | ICD-10-CM

## 2022-05-17 PROCEDURE — 1159F PR MEDICATION LIST DOCUMENTED IN MEDICAL RECORD: ICD-10-PCS | Mod: CPTII,S$GLB,, | Performed by: PHYSICIAN ASSISTANT

## 2022-05-17 PROCEDURE — 99213 PR OFFICE/OUTPT VISIT, EST, LEVL III, 20-29 MIN: ICD-10-PCS | Mod: S$GLB,,, | Performed by: PHYSICIAN ASSISTANT

## 2022-05-17 PROCEDURE — 3075F SYST BP GE 130 - 139MM HG: CPT | Mod: CPTII,S$GLB,, | Performed by: PHYSICIAN ASSISTANT

## 2022-05-17 PROCEDURE — 99999 PR PBB SHADOW E&M-EST. PATIENT-LVL III: CPT | Mod: PBBFAC,,, | Performed by: PHYSICIAN ASSISTANT

## 2022-05-17 PROCEDURE — 4010F PR ACE/ARB THEARPY RXD/TAKEN: ICD-10-PCS | Mod: CPTII,S$GLB,, | Performed by: PHYSICIAN ASSISTANT

## 2022-05-17 PROCEDURE — 3008F PR BODY MASS INDEX (BMI) DOCUMENTED: ICD-10-PCS | Mod: CPTII,S$GLB,, | Performed by: PHYSICIAN ASSISTANT

## 2022-05-17 PROCEDURE — 99213 OFFICE O/P EST LOW 20 MIN: CPT | Mod: S$GLB,,, | Performed by: PHYSICIAN ASSISTANT

## 2022-05-17 PROCEDURE — 1159F MED LIST DOCD IN RCRD: CPT | Mod: CPTII,S$GLB,, | Performed by: PHYSICIAN ASSISTANT

## 2022-05-17 PROCEDURE — 3075F PR MOST RECENT SYSTOLIC BLOOD PRESS GE 130-139MM HG: ICD-10-PCS | Mod: CPTII,S$GLB,, | Performed by: PHYSICIAN ASSISTANT

## 2022-05-17 PROCEDURE — 1160F RVW MEDS BY RX/DR IN RCRD: CPT | Mod: CPTII,S$GLB,, | Performed by: PHYSICIAN ASSISTANT

## 2022-05-17 PROCEDURE — 3044F HG A1C LEVEL LT 7.0%: CPT | Mod: CPTII,S$GLB,, | Performed by: PHYSICIAN ASSISTANT

## 2022-05-17 PROCEDURE — 3008F BODY MASS INDEX DOCD: CPT | Mod: CPTII,S$GLB,, | Performed by: PHYSICIAN ASSISTANT

## 2022-05-17 PROCEDURE — 3078F PR MOST RECENT DIASTOLIC BLOOD PRESSURE < 80 MM HG: ICD-10-PCS | Mod: CPTII,S$GLB,, | Performed by: PHYSICIAN ASSISTANT

## 2022-05-17 PROCEDURE — 4010F ACE/ARB THERAPY RXD/TAKEN: CPT | Mod: CPTII,S$GLB,, | Performed by: PHYSICIAN ASSISTANT

## 2022-05-17 PROCEDURE — 1160F PR REVIEW ALL MEDS BY PRESCRIBER/CLIN PHARMACIST DOCUMENTED: ICD-10-PCS | Mod: CPTII,S$GLB,, | Performed by: PHYSICIAN ASSISTANT

## 2022-05-17 PROCEDURE — 87070 CULTURE OTHR SPECIMN AEROBIC: CPT | Performed by: PHYSICIAN ASSISTANT

## 2022-05-17 PROCEDURE — 3078F DIAST BP <80 MM HG: CPT | Mod: CPTII,S$GLB,, | Performed by: PHYSICIAN ASSISTANT

## 2022-05-17 PROCEDURE — 3044F PR MOST RECENT HEMOGLOBIN A1C LEVEL <7.0%: ICD-10-PCS | Mod: CPTII,S$GLB,, | Performed by: PHYSICIAN ASSISTANT

## 2022-05-17 PROCEDURE — 99999 PR PBB SHADOW E&M-EST. PATIENT-LVL III: ICD-10-PCS | Mod: PBBFAC,,, | Performed by: PHYSICIAN ASSISTANT

## 2022-05-17 PROCEDURE — 87075 CULTR BACTERIA EXCEPT BLOOD: CPT | Performed by: PHYSICIAN ASSISTANT

## 2022-05-17 RX ORDER — MUPIROCIN 20 MG/G
OINTMENT TOPICAL 2 TIMES DAILY
Qty: 30 G | Refills: 0 | Status: SHIPPED | OUTPATIENT
Start: 2022-05-17 | End: 2022-07-26

## 2022-05-17 RX ORDER — SULFAMETHOXAZOLE AND TRIMETHOPRIM 800; 160 MG/1; MG/1
1 TABLET ORAL 2 TIMES DAILY
Qty: 20 TABLET | Refills: 0 | Status: SHIPPED | OUTPATIENT
Start: 2022-05-17 | End: 2022-05-27

## 2022-05-17 NOTE — PROGRESS NOTES
Subjective:      Regine Osorio is a 67 y.o. female who presents for vulvar abscess. Noticed swelling and pain in the area on 5/13/22 when traveling. Started cleaning with hydrogen peroxide and sitting in baths. Had some left over mupirocin so used a small amount of it as well. Started draining yesterday and is feeling better. However, the area is still swollen. No changes in products. Does not wax or shave the area. Taking probiotic daily.    Office Visit on 03/17/2022   Component Date Value Ref Range Status    Cytology ThinPrep Pap Source 03/17/2022 Cervix   Final    Cytology ThinPrep Pap Report Status 03/17/2022 DNR   Final    Cytology Thinprep PAP Clinical His* 03/17/2022 Routine exam   Corrected    Cytology ThinPrep Pap LMP 03/17/2022 SUPRACX HY   Corrected    Cytology ThinPrep Previous PAP 03/17/2022 Unknown   Final    Cytology ThinPrep Previous Biopsy 03/17/2022 No   Final    Cytology ThinPrep PAP Adequacy 03/17/2022 SEE BELOW   Final    Cytology ThinPrep PAP General Patti* 03/17/2022 DNR   Final    Cytology ThinPrep PAP Interpretati* 03/17/2022 SEE BELOW   Final    Cytology ThinPrep PAP Comment 03/17/2022 SEE BELOW   Final    Cytotechnologist 03/17/2022 DXG, CT(ASCP)   Final    Review Cytotechnologist 03/17/2022 DNR   Final    Pathologist 03/17/2022 DNR   Final    Cytology ThinPrep PAP Infection 03/17/2022 DNR   Final    Cytology Thin Prep Pap Explanation 03/17/2022 SEE BELOW   Final    HPV DNA High Risk 03/17/2022 Not Detected  NOT DETECTED Final   Lab Visit on 03/15/2022   Component Date Value Ref Range Status    Hemoglobin A1C 03/15/2022 6.0 (A) 4.0 - 5.6 % Final    Estimated Avg Glucose 03/15/2022 126  68 - 131 mg/dL Final    Sodium 03/15/2022 144  136 - 145 mmol/L Final    Potassium 03/15/2022 3.6  3.5 - 5.1 mmol/L Final    Chloride 03/15/2022 103  95 - 110 mmol/L Final    CO2 03/15/2022 29  23 - 29 mmol/L Final    Glucose 03/15/2022 120 (A) 70 - 110 mg/dL Final    BUN  03/15/2022 14  8 - 23 mg/dL Final    Creatinine 03/15/2022 0.6  0.5 - 1.4 mg/dL Final    Calcium 03/15/2022 10.3  8.7 - 10.5 mg/dL Final    Anion Gap 03/15/2022 12  8 - 16 mmol/L Final    eGFR if African American 03/15/2022 >60.0  >60 mL/min/1.73 m^2 Final    eGFR if non African American 03/15/2022 >60.0  >60 mL/min/1.73 m^2 Final       Past Medical History:   Diagnosis Date    Allergy     Breast cancer 2017    Cataract     Diabetes mellitus, type 2     Type 2    Eczema     Fibromyalgia     Glaucoma     Hypertension     Renal cyst, right 2020    Steatosis of liver 2020     Past Surgical History:   Procedure Laterality Date    ADENOIDECTOMY      BREAST BIOPSY      BREAST SURGERY      breast ca lt side     HYSTERECTOMY      supacervical hysterectomy    LAPAROSCOPIC SUPRACERVICAL HYSTERECTOMY      fibroids    MASTECTOMY Left 2017    chemo    MYOMECTOMY      TONSILLECTOMY      TYMPANOSTOMY TUBE PLACEMENT      VAGINAL DELIVERY       Social History     Tobacco Use    Smoking status: Never Smoker    Smokeless tobacco: Never Used   Substance Use Topics    Alcohol use: No    Drug use: No     Family History   Problem Relation Age of Onset    Dementia Mother     Glaucoma Mother     Bipolar disorder Mother     Lung cancer Father     Bipolar disorder Son     Post-traumatic stress disorder Son     Breast cancer Maternal Aunt     Bipolar disorder Maternal Aunt     Glaucoma Maternal Uncle     Blindness Maternal Grandfather     Glaucoma Maternal Grandfather     Breast cancer Cousin 39        paternal     Breast cancer Cousin 29    Amblyopia Neg Hx     Cataracts Neg Hx     Macular degeneration Neg Hx     Retinal detachment Neg Hx     Strabismus Neg Hx     Cancer Neg Hx     Colon cancer Neg Hx     Ovarian cancer Neg Hx      OB History    Para Term  AB Living   4 1 1   3 1   SAB IAB Ectopic Multiple Live Births                  # Outcome Date GA Lbr Frankie/2nd  Weight Sex Delivery Anes PTL Lv   4 AB            3 AB            2 AB            1 Term      Vag-Spont          Current Outpatient Medications:     acetic acid (VOSOL) 2 % otic solution, Place 4 drops into both ears as needed., Disp: , Rfl:     aspirin 81 MG Chew, Take 81 mg by mouth once daily. , Disp: , Rfl:     azelastine (ASTELIN) 137 mcg (0.1 %) nasal spray, 1 SPRAY IN EACH NOSTRIL TWICE DAILY, Disp: 30 mL, Rfl: 2    benzonatate (TESSALON) 200 MG capsule, 1 capsule up to three times a day as needed for cough, Disp: 50 capsule, Rfl: 1    blood sugar diagnostic Strp, To check BG 2 times daily, to use with insurance preferred meter, Disp: 200 each, Rfl: 3    blood-glucose meter kit, To check BG 2 times daily, to use with insurance preferred meter, Disp: 1 each, Rfl: 0    cetirizine (ZYRTEC) 10 MG tablet, TAKE 2 TABLETS BY MOUTH ONCE DAILY (Patient taking differently: 10 mg once daily.), Disp: 60 tablet, Rfl: 3    cholecalciferol, vitamin D3, (VITAMIN D3) 50 mcg (2,000 unit) Cap, Take 1 capsule by mouth once daily. , Disp: , Rfl:     clobetasoL (CLOBEX) 0.05 % shampoo, Apply topically once a week., Disp: 118 mL, Rfl: 5    cyanocobalamin/cobamamide (B12 SL), Place 3,000 mcg under the tongue once daily., Disp: , Rfl:     dextromethorphan-guaiFENesin  mg/5 ml (ROBITUSSIN-DM)  mg/5 mL liquid, Take 5 mLs by mouth every 12 (twelve) hours., Disp: , Rfl:     diazePAM (VALIUM) 2 MG tablet, Take 1 tablet (2 mg total) by mouth On call Procedure for Anxiety. Before injection, Disp: 1 tablet, Rfl: 0    diclofenac sodium (VOLTAREN) 1 % Gel, Apply 2 g topically 3 (three) times daily., Disp: 100 g, Rfl: 0    fluocinolone acetonide oiL 0.01 % Drop, INSTILL 4 DROPS IN EAR(S) TWICE DAILY FOR 1 TO 2 WEEKS, Disp: 20 mL, Rfl: 1    fluticasone propionate (FLONASE) 50 mcg/actuation nasal spray, 2 sprays on the right and 3 sprays on the left every morning, Disp: 48 g, Rfl: 3    gabapentin (NEURONTIN) 600 MG  tablet, Take 1 tablet (600 mg total) by mouth 3 (three) times daily., Disp: 90 tablet, Rfl: 5    glipiZIDE (GLUCOTROL) 5 MG TR24, TAKE 1 TABLET(5 MG) BY MOUTH TWICE DAILY, Disp: 180 tablet, Rfl: 1    hydroCHLOROthiazide (HYDRODIURIL) 25 MG tablet, Take 1 tablet (25 mg total) by mouth once daily., Disp: 90 tablet, Rfl: 1    ketoconazole (NIZORAL) 2 % cream, APPLY EXTERNALLY TO THE AFFECTED AREA EVERY DAY, Disp: 15 g, Rfl: 2    lancets Misc, To check BG 2 times daily, to use with insurance preferred meter, Disp: 200 each, Rfl: 3    losartan (COZAAR) 50 MG tablet, Take 1 tablet (50 mg total) by mouth once daily., Disp: 90 tablet, Rfl: 1    meloxicam (MOBIC) 15 MG tablet, Take 1 tablet (15 mg total) by mouth daily as needed for Pain., Disp: 30 tablet, Rfl: 2    metFORMIN (GLUCOPHAGE) 500 MG tablet, Take 1 tablet (500 mg total) by mouth 2 (two) times daily with meals., Disp: 180 tablet, Rfl: 1    mupirocin (BACTROBAN) 2 % ointment, Apply topically 2 (two) times daily., Disp: 30 g, Rfl: 0    ondansetron (ZOFRAN-ODT) 8 MG TbDL, Take 1 tablet (8 mg total) by mouth every 8 (eight) hours as needed (Nausea)., Disp: 30 tablet, Rfl: 2    rizatriptan (MAXALT-MLT) 10 MG disintegrating tablet, Take 1 tablet (10 mg total) by mouth every 2 (two) hours as needed for Migraine. Max 30 mg/day., Disp: 12 tablet, Rfl: 5    sulfamethoxazole-trimethoprim 800-160mg (BACTRIM DS) 800-160 mg Tab, Take 1 tablet by mouth 2 (two) times daily. for 10 days, Disp: 20 tablet, Rfl: 0    sumatriptan (IMITREX) 100 MG tablet, Take 1 tab at onset of migraine, may repeat dose in 2 hours if needed. Max 2 tabs/day., Disp: 9 tablet, Rfl: 11    tiZANidine (ZANAFLEX) 4 MG tablet, TAKE 1 TABLET(4 MG) BY MOUTH EVERY 8 HOURS AS NEEDED FOR SPASM, Disp: 90 tablet, Rfl: 0    The 10-year ASCVD risk score (Abrammelissa ZHAO Jr., et al., 2013) is: 17.2%    Values used to calculate the score:      Age: 67 years      Sex: Female      Is Non- :  "Yes      Diabetic: Yes      Tobacco smoker: No      Systolic Blood Pressure: 131 mmHg      Is BP treated: Yes      HDL Cholesterol: 52 mg/dL      Total Cholesterol: 151 mg/dL    Review of Systems:  General: No fever, chills, or weight loss.  Chest: No chest pain, shortness of breath, or palpitations.  Breast: No pain, masses, or nipple discharge.  Vulva: No  Itching. +Pain, lesion  Vagina: No relaxation, itching, discharge, or lesions.  Abdomen: No pain, nausea, vomiting, diarrhea, or constipation.  Urinary: No incontinence, nocturia, frequency, or dysuria.  Extremities:  No leg cramps, edema, or calf pain.  Neurologic: No headaches, dizziness, or visual changes.    Objective:     Vitals:    05/17/22 1122   BP: 131/76   Weight: 105.6 kg (232 lb 12.8 oz)   Height: 5' 5" (1.651 m)   PainSc:   6     Body mass index is 38.74 kg/m².    PHYSICAL EXAM:  APPEARANCE: Well nourished, well developed, in no acute distress.  AFFECT: WNL, alert and oriented x 3  PELVIC: Normal external genitalis,+ erythematous soft, mass with central sore on the right labia. Mildly tender, scant purulent drainage with pressure. Normal hair distribution.  Adequate perineal body, normal urethral meatus.  Vagina moist and well rugated without lesions or discharge.  Cervix pink, without lesions, discharge or tenderness.  No significant cystocele or rectocele.  Bimanual exam shows uterus to be normal size, regular, mobile and nontender.  Adnexa without masses or tenderness.    EXTREMITIES: No edema.    Physical Exam  Genitourinary:               Assessment:      Vulvar abscess  -     Aerobic culture  -     Culture, Anaerobic  -     sulfamethoxazole-trimethoprim 800-160mg (BACTRIM DS) 800-160 mg Tab; Take 1 tablet by mouth 2 (two) times daily. for 10 days  Dispense: 20 tablet; Refill: 0  -     mupirocin (BACTROBAN) 2 % ointment; Apply topically 2 (two) times daily.  Dispense: 30 g; Refill: 0        Plan:   Previous abscess was on the left labia. "   Aerobic and anaerobic culture.  Start Bactrim BID x 10 days.  Mupirocin BID.  Warm compresses to the area.  Follow up if symptoms fail to resolve.    Instructed patient to call if she experiences any side effects or has any questions.    I spent a total of 25 minutes on the day of the visit.This includes face to face time and non-face to face time preparing to see the patient (eg, review of tests), obtaining and/or reviewing separately obtained history, documenting clinical information in the electronic or other health record, independently interpreting results and communicating results to the patient/family/caregiver, or care coordinator.

## 2022-05-20 LAB — BACTERIA SPEC AEROBE CULT: NORMAL

## 2022-05-23 ENCOUNTER — CLINICAL SUPPORT (OUTPATIENT)
Dept: REHABILITATION | Facility: OTHER | Age: 67
End: 2022-05-23
Payer: MEDICARE

## 2022-05-23 ENCOUNTER — OFFICE VISIT (OUTPATIENT)
Dept: PSYCHIATRY | Facility: OTHER | Age: 67
End: 2022-05-23
Payer: COMMERCIAL

## 2022-05-23 DIAGNOSIS — M79.7 FIBROMYALGIA: ICD-10-CM

## 2022-05-23 DIAGNOSIS — R52 PAIN AGGRAVATED BY ACTIVITIES OF DAILY LIVING: Primary | ICD-10-CM

## 2022-05-23 DIAGNOSIS — M53.86 DECREASED RANGE OF MOTION OF LUMBAR SPINE: Primary | ICD-10-CM

## 2022-05-23 DIAGNOSIS — Z74.09 IMPAIRED FUNCTIONAL MOBILITY AND ACTIVITY TOLERANCE: ICD-10-CM

## 2022-05-23 DIAGNOSIS — M50.30 DDD (DEGENERATIVE DISC DISEASE), CERVICAL: Primary | ICD-10-CM

## 2022-05-23 DIAGNOSIS — M51.36 DDD (DEGENERATIVE DISC DISEASE), LUMBAR: ICD-10-CM

## 2022-05-23 DIAGNOSIS — Z74.09 IMPAIRED FUNCTIONAL MOBILITY, BALANCE, GAIT, AND ENDURANCE: ICD-10-CM

## 2022-05-23 DIAGNOSIS — G89.29 CENTRAL SENSITIZATION TO PAIN: ICD-10-CM

## 2022-05-23 DIAGNOSIS — F40.298 FEAR OF PAIN: ICD-10-CM

## 2022-05-23 DIAGNOSIS — F43.29 ADJUSTMENT DISORDER WITH PHYSICAL COMPLAINTS: Primary | ICD-10-CM

## 2022-05-23 LAB — BACTERIA SPEC ANAEROBE CULT: ABNORMAL

## 2022-05-23 PROCEDURE — 3044F HG A1C LEVEL LT 7.0%: CPT | Mod: CPTII,,, | Performed by: SOCIAL WORKER

## 2022-05-23 PROCEDURE — 90791 PSYCH DIAGNOSTIC EVALUATION: CPT | Mod: ,,, | Performed by: SOCIAL WORKER

## 2022-05-23 PROCEDURE — 97530 THERAPEUTIC ACTIVITIES: CPT

## 2022-05-23 PROCEDURE — 97110 THERAPEUTIC EXERCISES: CPT

## 2022-05-23 PROCEDURE — 3044F PR MOST RECENT HEMOGLOBIN A1C LEVEL <7.0%: ICD-10-PCS | Mod: CPTII,,, | Performed by: SOCIAL WORKER

## 2022-05-23 PROCEDURE — 4010F ACE/ARB THERAPY RXD/TAKEN: CPT | Mod: CPTII,,, | Performed by: SOCIAL WORKER

## 2022-05-23 PROCEDURE — 90791 PR PSYCHIATRIC DIAGNOSTIC EVALUATION: ICD-10-PCS | Mod: ,,, | Performed by: SOCIAL WORKER

## 2022-05-23 PROCEDURE — 4010F PR ACE/ARB THEARPY RXD/TAKEN: ICD-10-PCS | Mod: CPTII,,, | Performed by: SOCIAL WORKER

## 2022-05-23 NOTE — PROGRESS NOTES
[facility]    Functional Restoration Program  Physical Therapy Treatment Note  FRP Day 1    Name: Regine Osorio  MRN:[id number]      Therapy Diagnosis:   Encounter Diagnoses   Name Primary?    Decreased range of motion of lumbar spine Yes    Central sensitization to pain     Fibromyalgia     Impaired functional mobility, balance, gait, and endurance      Physician: Montserrat Castellon NP    Date: 5/23/2022    Evaluation Date: 4/29/2022  Authorization Period Expiration: 06/22/2022  Plan of Care Expiration: 07/29/2022  Visit # / Visits authorized: 2/ 11  FOTO: completed 01 / 03       Time In: 3:00p  Time Out: 4:00p  Total Billable Time: 60 minutes    SUBJECTIVE       Regine reports her R shld is bothering her today. In supine exercises, she reports that she avoids getting on her back because it is very difficult & painful for her to get up. She has a lot of trouble rolling & lacks the strength to sit right up, so she usually sleeps on her side.        Current 4/10  Location(s): lower back & B LE, R shld    OBJECTIVE     Objective Measures updated at progress report unless specified.     Treatment       Regine received the Individualized Treatments listed below:     therapeutic exercises to develop strength, endurance, ROM, flexibility, posture and core stabilization for 50 minutes including:    Peripheral muscle strengthening which included 1-2 sets of 10-20 repetitions at a slow, controlled 5-7 second per rep pace focused on strengthening supporting musculature for improved body mechanics & functional mobility.  Patient & therapist focused on proper form during treatment to ensure optimal strengthening of each targeted muscle group. Patients are instructed to keep sets/reps with proper load to stay at 3-5 out of 10 on the provided modified Radha exertion scale.       BATCA Machine Exercises Weight (lbs) Sets Repetitions Radha Exertion Scale Rating   Leg Extension  5 1 10 7    Hamstring Curls 5 1 16 4   Chest Press 5 1 10 5   Pec Fly 5 1 10 5   Lat Pull Down 5 1 15 5   Mid Row 10 1 15 4   Bicep Bar Curls       Standing Tricep Extension       Single Leg Press          · Supine lower trunk rotations 10 B    Regine participated in dynamic functional therapeutic activities to improve functional performance for 10  minutes, including:    Diaphragmatic breathing:   · Verbal cues for unlabored, long & relaxed breathing w/ increased time for exhalation  · Awareness of pulling breath down away from mouth to hips  · Cues for for proper abdominal excursion & decreased use of accessory muscles of breathing (hand on stomach & on chest; increased stomach motion, decreased chest motion)  · Education on inhalation activating sympathetic nervous system   · Education on exhalation activating parasympathetic nervous system  · Performed supine & seated        Patient Education and Home Exercises     Home Exercises Provided and Patient Education Provided     Education provided:   - diaphragmatic breathing, resisted exercise basics    Written Home Exercises Provided: Patient instructed to cont prior HEP. Exercises were reviewed and Regine was able to demonstrate them prior to the end of the session.  Regine demonstrated fair  understanding of the education provided. See EMR under Patient Instructions for information provided during therapy sessions    ASSESSMENT     Pt w/ good participation in PT today w/ increased rest breaks & decreased volume of exercise due to pain & fatigue. In supine breathing training & trunk rotations, pt w/ poor tolerance to positioning & needed assistance to sit up. Pt verbalized fear avoidance behavior about supine positions & many motions in exercise. She will need increased education to help differentiate hurt vs harm in order to perform exercises & positions correctly.    Regine Is progressing well towards her goals.   Pt prognosis is Guarded.     Pt will continue to  benefit from skilled outpatient physical therapy to address the deficits listed in the problem list box on initial evaluation, provide pt/family education and to maximize pt's level of independence in the home and community environment.     Pt's spiritual, cultural and educational needs considered and pt agreeable to plan of care and goals.     Anticipated barriers to physical therapy: chronic nature of pain, marked mobility deficits     GOALS: Pt is in agreement with the following goals:        Goal Progress Towards Goal   Short Term: In 3 weeks, pt will:     Verbalize & demonstrate good understanding of resisted training with 3-1-3 second rep for neyquiryoc-dterpvofd-rchyudccy contraction to encourage full muscle recruitment for strength & endurance Progress towards goal: initiated 4/29/2022   Verbalize & demonstrate good understanding of home stretch routine to improve AROM, help decrease pain & improve mobility Progress towards goal: initiated 4/29/2022   Demonstrate proper supine or seated diaphragmatic breathing with decreased chest excursion & emphasis on full abdominal excursion & increased expiration time to promote muscle relaxation & increased parasympathetic activity to improve patient tolerance to activities Progress towards goal: initiated 4/29/2022   Demonstrate proper static standing posture in frontal & sagittal plane per PT visual assessment to decrease postural strain in ADLs Progress towards goal: initiated 4/29/2022   Demonstrate good recall of names of resisted exercises w/ minimal to moderate verbal cues to promote independence w/ exercise at the end of the program Progress towards goal: initiated 4/29/2022   Verbalize plan for continuing resisted exercises w/ specific location (i.e. commercial gym, home, community fitness center) indicating preference for machine-based or free-weight exercises to incorporate all major muscle groups to maintain & progress therapeutic functional improvements  Progress towards goal: initiated 4/29/2022   Pt will perform supine <> sit x 3 Ind /s inc LB or cervical discomfort for inc bed mobility  Progress towards goal: initiated 4/29/2022         Long Term: In 8 weeks, pt will:     Verbalize good understanding of activities planning/scheduling (stretching, mindfulness, resisted training, & cardio) prescribed by PT/OT following the end of the program to sustain & progress functional improvements Progress towards goal: initiated 4/29/2022   Be independent w/ selected resisted training w/ minimal to no cuing while performing to continue w/ resisted strengthening independently at the end of the program Progress towards goal: initiated 4/29/2022   Improve 2 Minute Walk Test (MWT) to 300 feet and 6 MWT to 900 feet or greater to improve gait speed and mobility Progress towards goal: initiated 4/29/2022   Perform single leg stance 15 seconds or greater bilaterally to improve safety and balance in ADLs Progress towards goal: initiated 4/29/2022   Demonstrate Timed Up & Go (with appropriate assistive device, if necessary) of 18 seconds or less to decrease fall risk and improve functional mobility Progress towards goal: initiated 4/29/2022   Demonstrate 5 time sit to stand test without upper extremity assist to 20 sec or less to improve general mobility and decrease fall risk Progress towards goal: initiated 4/29/2022   Score 50 % or less on Lumbar FOTO to indicate significant functional improvements in impaired functions secondary to low back pain Progress towards goal: initiated 4/29/2022             PLAN     Continue PT per POC     Kar Trevizo, PT, DPT

## 2022-05-23 NOTE — PROGRESS NOTES
"[facility]   Occupational Therapy Treatment Note  [facility] Day 1    Date: 5/23/2022  Name: Regine Osorio  Clinic Number: [id number]    Therapy Diagnosis:   Encounter Diagnoses   Name Primary?    Pain aggravated by activities of daily living Yes    Fear of pain     Impaired functional mobility and activity tolerance      Physician: Montserrat Castellon NP    Physician Orders: OT eval and treat   Medical Diagnosis: Fibromyalgia  Evaluation Date: 4/29/2022  Insurance Authorization Period Expiration: 06/22/2022  Plan of Care Certification Period: 7/1/2022  Visit # / Visits authorized: 1/ 11 (including evaluation)  FOTO: evaluation 59% limitation; 5/23/2022 58% limitation.    Precautions:  Standard and Fall    Time In: 13:01  Time Out: 12:03  Total Billable Time: 62 minutes    Subjective     Regine states: "I took the train to go to [place]". I had botox injections in my neck a few weeks ago. I did fine right away, and then it got really bad.         Pain:  3/10  Location: neck, back         Objective     Objective measures updated at progress note, unless otherwise specified.    Treatment     Fear of falling; min/max verbal cues needed....    Regine received the treatments listed below.    Therapeutic activities to improve functional performance for 62 minutes, including:    Discussed established goals. Reviewed and revised goals based on patient feedback. Pt oriented/educated on functional lifting/endurance exercise program with plan for progression toward goals. Pt verbalized understanding of functional tasks associated on program. Pt educated on the importance of paying attention to body with functional activities, considerations for use of log and use of aris exertion scale.     Regine completed dynamic reaching in various functional ranges, with continued focus on hip rotation/weight shifting, 2 reps x no added lbs, with hands clasped to decrease tendency to elevate right " shoulder, rated as Sort of hard (4/10) exertion.     No environmental, cultural, spiritual, developmental or education needs expressed or noted.    Home Exercises and Patient Education     Education provided:   -Ms. Osorio received education regarding proper posture and body mechanics. She received education regarding anticipated muscular soreness following lift testing and strategies for management were reviewed with patient including stretching, using ice, scheduled rest.  - aris scale, pain cycle, z-lie, functional lifting mechanics, pursed lip and diaphragmatic breathing techniques  - boom/bust theory  - Progress towards goals    Written Home Exercises Provided: yes. diaphragmatic breathing; FRP binder received.  Exercises were reviewed and Regine was able to demonstrate them prior to the end of the session.    Regine demonstrated good understanding of the education provided.     Handouts provided during sessions can be found in EMR under patient instructions, or might be part of the FRP binder.    Assessment     Regine presents with report of this being a good day. Increased time spend discussing goals and current performance, with Regine expressing low satisfaction with tasks performance due to breaks needed and needing to spread tasks throughout the week, but with decreased understanding that checking in with self, which is currently doing to be able to help pacing of activities, is favorable, to help prevent boom/bust cycles with day to day. Overall, she was open to education and engaging in discussion today. With dynamic reaching, limited due to decreased ROM of right shoulder, with tendency to elevate shoulder.     Regine is unable to fully participate in community work, recreational, and home-based activities because of decreased functional  strength, decreased  AROM, decreased endurance, limited positional tolerance, fear of re-injury, and fear of increased pain. She will benefit from  participating in a conditioning  program designed  to increase functional strength, flexibility, and endurance in order to meet functional goals.     Regine's prognosis is Good due to Good motivation and willingness to particiapte. She will benefit from skilled outpatient Occupational Therapy to address the limitations and goals stated above and in the chart below, provide patient/family education, and to maximize patient's level of functional independence.    Plan of care discussed with patient: Yes  Pt's spiritual, cultural and educational needs considered and patient is agreeable to the plan of care and goals as stated below:       FRP Goals: While working towards the long-term (2 month) functional goals listed above, Ms. Osorio will accomplish the following short term goals during the 5-week intensive rehabilitation program. Ms. Osorio will:     1. Develop a viable return to community work plan.   2. Demonstrate physical capacities consistent with a light-medium work demand level.   3. Demonstrate increased functional strength by lifting 20 lbs floor to waist and 20 lbs waist to shoulder in order to meet demand of work/home routine.   4. Increase her positional tolerance to the level needed for work and home activities.   5. Implement self-care strategies during the program and at home to control pain.   6.   Patient will demonstrate use of mindfulness, stress management, and coping  strategies for improved participation in daily routine.   7.  Will drink 7 x 16oz bottles of water a day.    Specific patient expressed goals and demand levels:  Current Capacity Limit/ Physical  Demand Level (PDL)   Demand Levels of Functional Recovery Goals    Performance/Satisfaction  Day 1 Performance/Satisfaction  Day 9 Performance/Satisfaction  Follow-up     Sedentary- Light Physical Demand  (Based above tested results)    Vocational:  Sitting tolerance, as needed for work    Recreational:  Walk for exercise    Daily  Living:  Dusting (floors and furniture)    Laundry    Mopping    Standing tolerance when attending to chores    UB and LB dressing    Supine to sit     Light-medium Physical Demand Level    Pending chair, 5/3      1/0      3/1      3/1    1/0    3/0        3/0    1/0       The PDL listed above is based on the physical performance screening test performed at initial evaluation. More comprehensive physical  testing integrated with clinical findings would be required to derived an accurate work capacity.      Plan   Plan of care certification: 5/23/2022 to 7/1/2022.    Outpatient occupational therapy, 3 times a week for 5 weeks:     1. Functional conditioning daily to increase physical capacity and allow Ms. Osorio to meet demands of vocation and home.   2. Individual counseling to develop return to work/daily routine plan and better understand the return to work process and/or daily routine restructure.   3. Daily Stretching, aerobic conditioning and walking to increase functional tolerance and allow Ms. Osorio to meet demands of work and home.   4. Functional activities to increase ability to move fluidly and quickly and tolerate unguarded movements.   5. Re-education regarding proper postural, body mechanics, and coping strategies for healthy roles and routine for managing daily stressors.    6. Any other treatment deemed necessary for patient to achieve personally driven goals to promote positive health and wellness and daily roles and routines    ANA Denis/L, CEAS-I  5/23/2022

## 2022-05-23 NOTE — PROGRESS NOTES
? Date: 05/23/2022    ? Referral Source: Dr. Pelaez    ? Met with pt for 60 minutes to perform initial testing and discuss the process of FRP and continuing home program. Pt. filled out measures of assessment, scores are as follows:    Regine Centeno Cohort 57 Day 1 Day 16 % change   BRENNAN Depression-2  Anxiety-1  Stress-2 Depression-   Anxiety-   Stress-  % improvement  % improvement  % improvement   VAS  Pain today-5  Pain in the past week-10 Pain today-   Pain in the past week-  % improvement  % improvement   Pain Catastrophizing Scale 17  % decrease    Sleep Quality Instrument  14  % improvement    Pain Disability Index 34  % decrease   Fear Avoidance Beliefs Fear of Physical pain -16  Fear of Pain caused by work/chores- 24  Total fear of pain- 61 Fear of Physical pain-   Fear of Pain caused by work/chores-   Total fear of pain-  % decrease  % decrease  % decrease overall   Yanci Pain questionnaire 15  % decrease   Low Back Disability  49% % % decrease   ACE score 3 N/A          ? Discussed process of program: Consent forms signed, all questions answered.     ? Content of Session: See below    ? Therapeutic techniques and approaches including meds: what other treatments has the patient tried that havent worked?  Why are they now in the functional restoration program? Did 2.5 years of PT, was dxed with cancer right after being released from PT.     ? Assessment of the patients ability to adhere to the treatment plan outlined in the Functional Restoration Program  [facility]  Psychosocial Assessment      Date: 5/23/2022  Time: 2:08 PM    Name: Regine Osorio    Chief Complaint: Fibromyalgia, lumbar stenosis     History of Present Illness: Pt states she was dxed with fibro in 2015. Cancer dx 2017, in remission, finished chemo in 2018. Had breast CA, regular f/u with oncology team.     Medical History:   Past Medical History:   Diagnosis Date    Allergy     Breast cancer 2017     Cataract     Diabetes mellitus, type 2     Type 2    Eczema     Fibromyalgia     Glaucoma     Hypertension     Renal cyst, right 02/06/2020    Steatosis of liver 02/06/2020       Past Surgical History:   Procedure Laterality Date    ADENOIDECTOMY      BREAST BIOPSY      BREAST SURGERY      breast ca lt side     HYSTERECTOMY      supacervical hysterectomy    LAPAROSCOPIC SUPRACERVICAL HYSTERECTOMY  2005    fibroids    MASTECTOMY Left 2017    chemo    MYOMECTOMY      TONSILLECTOMY      TYMPANOSTOMY TUBE PLACEMENT      VAGINAL DELIVERY         Family History   Problem Relation Age of Onset    Dementia Mother     Glaucoma Mother     Bipolar disorder Mother     Lung cancer Father     Bipolar disorder Son     Post-traumatic stress disorder Son     Breast cancer Maternal Aunt     Bipolar disorder Maternal Aunt     Glaucoma Maternal Uncle     Blindness Maternal Grandfather     Glaucoma Maternal Grandfather     Breast cancer Cousin 39        paternal     Breast cancer Cousin 29    Amblyopia Neg Hx     Cataracts Neg Hx     Macular degeneration Neg Hx     Retinal detachment Neg Hx     Strabismus Neg Hx     Cancer Neg Hx     Colon cancer Neg Hx     Ovarian cancer Neg Hx        Social History     Socioeconomic History    Marital status:     Number of children: 1   Tobacco Use    Smoking status: Never Smoker    Smokeless tobacco: Never Used   Substance and Sexual Activity    Alcohol use: No    Drug use: No    Sexual activity: Not Currently   Social History Narrative        1 son (estranged)    3 grandchildren ([place])    Born and raised in [place] (moved to [facility] 2017)     Social Determinants of Health     Financial Resource Strain: Low Risk     Difficulty of Paying Living Expenses: Not hard at all   Food Insecurity: No Food Insecurity    Worried About Running Out of Food in the Last Year: Never true    Ran Out of Food in the Last Year: Never true    Transportation Needs: No Transportation Needs    Lack of Transportation (Medical): No    Lack of Transportation (Non-Medical): No   Physical Activity: Inactive    Days of Exercise per Week: 0 days    Minutes of Exercise per Session: 20 min   Stress: No Stress Concern Present    Feeling of Stress : Only a little   Social Connections: Unknown    Frequency of Communication with Friends and Family: More than three times a week    Frequency of Social Gatherings with Friends and Family: Twice a week    Active Member of Clubs or Organizations: Yes    Attends Club or Organization Meetings: 1 to 4 times per year    Marital Status:    Housing Stability: Low Risk     Unable to Pay for Housing in the Last Year: No    Number of Places Lived in the Last Year: 1    Unstable Housing in the Last Year: No       Current Outpatient Medications   Medication Sig Dispense Refill    acetic acid (VOSOL) 2 % otic solution Place 4 drops into both ears as needed.      aspirin 81 MG Chew Take 81 mg by mouth once daily.       azelastine (ASTELIN) 137 mcg (0.1 %) nasal spray 1 SPRAY IN EACH NOSTRIL TWICE DAILY 30 mL 2    benzonatate (TESSALON) 200 MG capsule 1 capsule up to three times a day as needed for cough 50 capsule 1    blood sugar diagnostic Strp To check BG 2 times daily, to use with insurance preferred meter 200 each 3    blood-glucose meter kit To check BG 2 times daily, to use with insurance preferred meter 1 each 0    cetirizine (ZYRTEC) 10 MG tablet TAKE 2 TABLETS BY MOUTH ONCE DAILY (Patient taking differently: 10 mg once daily.) 60 tablet 3    cholecalciferol, vitamin D3, (VITAMIN D3) 50 mcg (2,000 unit) Cap Take 1 capsule by mouth once daily.       clobetasoL (CLOBEX) 0.05 % shampoo Apply topically once a week. 118 mL 5    cyanocobalamin/cobamamide (B12 SL) Place 3,000 mcg under the tongue once daily.      dextromethorphan-guaiFENesin  mg/5 ml (ROBITUSSIN-DM)  mg/5 mL liquid Take 5  mLs by mouth every 12 (twelve) hours.      diazePAM (VALIUM) 2 MG tablet Take 1 tablet (2 mg total) by mouth On call Procedure for Anxiety. Before injection 1 tablet 0    diclofenac sodium (VOLTAREN) 1 % Gel Apply 2 g topically 3 (three) times daily. 100 g 0    fluocinolone acetonide oiL 0.01 % Drop INSTILL 4 DROPS IN EAR(S) TWICE DAILY FOR 1 TO 2 WEEKS 20 mL 1    fluticasone propionate (FLONASE) 50 mcg/actuation nasal spray 2 sprays on the right and 3 sprays on the left every morning 48 g 3    gabapentin (NEURONTIN) 600 MG tablet Take 1 tablet (600 mg total) by mouth 3 (three) times daily. 90 tablet 5    glipiZIDE (GLUCOTROL) 5 MG TR24 TAKE 1 TABLET(5 MG) BY MOUTH TWICE DAILY 180 tablet 1    hydroCHLOROthiazide (HYDRODIURIL) 25 MG tablet Take 1 tablet (25 mg total) by mouth once daily. 90 tablet 1    ketoconazole (NIZORAL) 2 % cream APPLY EXTERNALLY TO THE AFFECTED AREA EVERY DAY 15 g 2    lancets Misc To check BG 2 times daily, to use with insurance preferred meter 200 each 3    losartan (COZAAR) 50 MG tablet Take 1 tablet (50 mg total) by mouth once daily. 90 tablet 1    meloxicam (MOBIC) 15 MG tablet Take 1 tablet (15 mg total) by mouth daily as needed for Pain. 30 tablet 2    metFORMIN (GLUCOPHAGE) 500 MG tablet Take 1 tablet (500 mg total) by mouth 2 (two) times daily with meals. 180 tablet 1    mupirocin (BACTROBAN) 2 % ointment Apply topically 2 (two) times daily. 30 g 0    ondansetron (ZOFRAN-ODT) 8 MG TbDL Take 1 tablet (8 mg total) by mouth every 8 (eight) hours as needed (Nausea). 30 tablet 2    rizatriptan (MAXALT-MLT) 10 MG disintegrating tablet Take 1 tablet (10 mg total) by mouth every 2 (two) hours as needed for Migraine. Max 30 mg/day. 12 tablet 5    sulfamethoxazole-trimethoprim 800-160mg (BACTRIM DS) 800-160 mg Tab Take 1 tablet by mouth 2 (two) times daily. for 10 days 20 tablet 0    sumatriptan (IMITREX) 100 MG tablet Take 1 tab at onset of migraine, may repeat dose in 2 hours  "if needed. Max 2 tabs/day. 9 tablet 11    tiZANidine (ZANAFLEX) 4 MG tablet TAKE 1 TABLET(4 MG) BY MOUTH EVERY 8 HOURS AS NEEDED FOR SPASM 90 tablet 0     No current facility-administered medications for this visit.       Review of patient's allergies indicates:   Allergen Reactions    Codeine Shortness Of Breath     Pt states she had a reaction as a teenager and was taken to the ER.    Lisinopril      cough       Family History: (mental illness, substance abuse, relationships, etc.)    Paternal: Father passed 1984 from lung CA, pt was 29 years old, was not particularly close to him, notes he did not live with them growing up  Maternal: Pt grew up with Mom, she passed in 2018 at the age of 99. Pt notes relationship with Mom was "a little difficult", notes she is now aware that her Mom and Mom's sisters have issues with mental illness. Notes her Mom had bipolar disorder, "I didn't have a name for it" at the time.   Siblings: Pt states she has half siblings on her Dad's side but they are not close.   Children: Pt has 1 son who has been dxed with bipolar disorder, pt states "I kind of think he's schizophrenic, too", states he is in [place] but recently in [place], states they've been estranged for 30 years "his choice, not mine". Pt notes she is close with her grandchildren who are 25, 21 and 15 years old, 15 year old lives with her Mom.     Psychiatric History/Previous Substance Abuse [place] (incluse response to past substance abuse tx): Denies, "For the most part I think I'm doing ok", notes "when I was first struck down and was having trouble walking, I had my moments", notes with [place] dx "I had my moment". Denies anxiety     Past Psychiatric History:   None, states she had some "moments" when she was in chemo, but is otherwise ok    Is pt currently in treatment with a therapist or psychiatrist? No     Druze/Spiritual Orientation:Tenriism    Sexual/Physical Abuse (indicate if patient is abuser or the abused): " "Deferred    Sexual Orientation and History: No relationships right now, heterosexual      History:  no    Employment History: Retired early in 2018, worked at [employer] in [place] as an .     Legal Issues: Pt states she is fighting with SSDI    Financial Status: Finances are a stressor, is fighting with SSDI, is getting regular social security now, started getting that about 1 year ago, but notes it is about 1/4 of her salary.     Social, Peer Group, Living Situation, and Living Environment: Pt was living in [place], moved here end of 2018, states she had always planned to retire here as her family was from here. Pt states she has family on her father's side that she is getting to know. Notes family and friends in [place] were sending her gift cards and money to help with her living expenses. Notes friends and coworkers in [place], keeps in touch via Zoom and phone, belongs to [employer]. Has made a few friends since she moved here, notes it's difficult here as it can be "clanish" here.     Leisure and Recreation Activities: Pt states she does genealogy research, has dated her father's family back to [address]. Lives in [facility].     Nutrition: Pt has DMII, has been trying to lay off sweets, notes she is also trying to lose weight. Pt notes she doesn't eat eggs or milk, doesn't really like chicken, likes nuts. Likes some cheese, otherwise doesn't do much dairy. Likes shrimp and crab. Pt states she feels like she eats enough, has tried to eat 3x/day.     Sleep: Sleep is "not good", sleep is 4.5-5.5 hours, notes if she's in pain it's even worse after that. Notes she often gets up and walks the floors at night if she's in pain. Did see sleep medicine, did not find it helpful. Has tried melatonin OTC, didn't do anything, neither does Benadryl. Pt notes tizanidine helps her get some sleep, will discuss with her doctor tomorrow. Pt states she normally does well in the morning but " "often "crashes" in the afternoon.     Exercise: "Non-existent", "I wish I was able to stand and walk, even if just for recreation. Pt notes she wants to work on endurance.     Stressors:Health Problems    Substance Use History: (include age of onset, duration, intensity, patterns of use, relapse history, and consequences of use for each substance): No hx    Any Other Addictive Behaviors: Denies    Motivation for change:  yes    Impressions: Pt is a 67 year old female who presents for entrance to [facility]. Pt will benefit from group dynamic, CBT, titrated exercise and education about pain.     Clinical diagnosis/priority: Adjustment disorder with physical complaints  Psychosocial/cultural factors: Breast CA in remission  Current level of functioning: Moderate          "

## 2022-05-23 NOTE — PATIENT INSTRUCTIONS
Home Exercise Program: 05/23/2022    DIAPHRAGMATIC BREATHING     The diaphragm is a dome shaped muscle that forms the floor of the rib cage. It is the most efficient muscle for breathing and relaxation, although most people are not used to using the diaphragm. Diaphragmatic or belly breathing is an important technique to learn because it helps settle down or relax the autonomic nervous system. The correct use of diaphragmatic breathing can help to quiet brain activity resulting in the relaxation of all the muscles and organs of the body. This is accomplished by slow rhythmic breathing concentrated in the diaphragm muscle rather than the chest.    How to do proper relaxation breathing:    Start by lying on your back or reclining in a chair in a relaxed position. Place one hand on your chest and the other on your abdomen.  Relax your jaw by placing your tongue on the roof of your mouth and keeping your teeth slightly apart.   Take a deep breath in, letting the abdomen expand and rise while you keep your upper chest, neck and shoulders relaxed.   As you breathe out, allow your abdomen and chest to fall. Exhale completely.  It doesn't matter if you breathe in/out through your nose and/or mouth. Do whichever feels comfortable.  Remember to breathe slowly.  Do not force your breathing. Do not hold your breath.  Repeat for stress and pain management (10 reps 5x a day)

## 2022-05-24 ENCOUNTER — OFFICE VISIT (OUTPATIENT)
Dept: PAIN MEDICINE | Facility: CLINIC | Age: 67
End: 2022-05-24
Payer: MEDICARE

## 2022-05-24 ENCOUNTER — PATIENT MESSAGE (OUTPATIENT)
Dept: OBSTETRICS AND GYNECOLOGY | Facility: CLINIC | Age: 67
End: 2022-05-24
Payer: MEDICARE

## 2022-05-24 VITALS
BODY MASS INDEX: 38.65 KG/M2 | SYSTOLIC BLOOD PRESSURE: 114 MMHG | RESPIRATION RATE: 18 BRPM | DIASTOLIC BLOOD PRESSURE: 70 MMHG | HEART RATE: 90 BPM | HEIGHT: 65 IN | WEIGHT: 232 LBS

## 2022-05-24 DIAGNOSIS — M79.7 FIBROMYALGIA: ICD-10-CM

## 2022-05-24 DIAGNOSIS — M48.02 CERVICAL STENOSIS OF SPINE: ICD-10-CM

## 2022-05-24 DIAGNOSIS — M75.00 ADHESIVE CAPSULITIS OF SHOULDER, UNSPECIFIED LATERALITY: ICD-10-CM

## 2022-05-24 DIAGNOSIS — M48.062 SPINAL STENOSIS OF LUMBAR REGION WITH NEUROGENIC CLAUDICATION: ICD-10-CM

## 2022-05-24 DIAGNOSIS — G95.89 MYELOMALACIA OF CERVICAL CORD: ICD-10-CM

## 2022-05-24 DIAGNOSIS — G24.3 CERVICAL DYSTONIA: Primary | ICD-10-CM

## 2022-05-24 PROCEDURE — 1159F PR MEDICATION LIST DOCUMENTED IN MEDICAL RECORD: ICD-10-PCS | Mod: CPTII,S$GLB,, | Performed by: STUDENT IN AN ORGANIZED HEALTH CARE EDUCATION/TRAINING PROGRAM

## 2022-05-24 PROCEDURE — 3044F PR MOST RECENT HEMOGLOBIN A1C LEVEL <7.0%: ICD-10-PCS | Mod: CPTII,S$GLB,, | Performed by: STUDENT IN AN ORGANIZED HEALTH CARE EDUCATION/TRAINING PROGRAM

## 2022-05-24 PROCEDURE — 3288F PR FALLS RISK ASSESSMENT DOCUMENTED: ICD-10-PCS | Mod: CPTII,S$GLB,, | Performed by: STUDENT IN AN ORGANIZED HEALTH CARE EDUCATION/TRAINING PROGRAM

## 2022-05-24 PROCEDURE — 3074F PR MOST RECENT SYSTOLIC BLOOD PRESSURE < 130 MM HG: ICD-10-PCS | Mod: CPTII,S$GLB,, | Performed by: STUDENT IN AN ORGANIZED HEALTH CARE EDUCATION/TRAINING PROGRAM

## 2022-05-24 PROCEDURE — 99999 PR PBB SHADOW E&M-EST. PATIENT-LVL III: ICD-10-PCS | Mod: PBBFAC,,, | Performed by: STUDENT IN AN ORGANIZED HEALTH CARE EDUCATION/TRAINING PROGRAM

## 2022-05-24 PROCEDURE — 3008F PR BODY MASS INDEX (BMI) DOCUMENTED: ICD-10-PCS | Mod: CPTII,S$GLB,, | Performed by: STUDENT IN AN ORGANIZED HEALTH CARE EDUCATION/TRAINING PROGRAM

## 2022-05-24 PROCEDURE — 4010F PR ACE/ARB THEARPY RXD/TAKEN: ICD-10-PCS | Mod: CPTII,S$GLB,, | Performed by: STUDENT IN AN ORGANIZED HEALTH CARE EDUCATION/TRAINING PROGRAM

## 2022-05-24 PROCEDURE — 3044F HG A1C LEVEL LT 7.0%: CPT | Mod: CPTII,S$GLB,, | Performed by: STUDENT IN AN ORGANIZED HEALTH CARE EDUCATION/TRAINING PROGRAM

## 2022-05-24 PROCEDURE — 1101F PR PT FALLS ASSESS DOC 0-1 FALLS W/OUT INJ PAST YR: ICD-10-PCS | Mod: CPTII,S$GLB,, | Performed by: STUDENT IN AN ORGANIZED HEALTH CARE EDUCATION/TRAINING PROGRAM

## 2022-05-24 PROCEDURE — 3074F SYST BP LT 130 MM HG: CPT | Mod: CPTII,S$GLB,, | Performed by: STUDENT IN AN ORGANIZED HEALTH CARE EDUCATION/TRAINING PROGRAM

## 2022-05-24 PROCEDURE — 1160F PR REVIEW ALL MEDS BY PRESCRIBER/CLIN PHARMACIST DOCUMENTED: ICD-10-PCS | Mod: CPTII,S$GLB,, | Performed by: STUDENT IN AN ORGANIZED HEALTH CARE EDUCATION/TRAINING PROGRAM

## 2022-05-24 PROCEDURE — 99499 UNLISTED E&M SERVICE: CPT | Mod: S$GLB,,, | Performed by: STUDENT IN AN ORGANIZED HEALTH CARE EDUCATION/TRAINING PROGRAM

## 2022-05-24 PROCEDURE — 3008F BODY MASS INDEX DOCD: CPT | Mod: CPTII,S$GLB,, | Performed by: STUDENT IN AN ORGANIZED HEALTH CARE EDUCATION/TRAINING PROGRAM

## 2022-05-24 PROCEDURE — 99499 RISK ADDL DX/OHS AUDIT: ICD-10-PCS | Mod: S$GLB,,, | Performed by: STUDENT IN AN ORGANIZED HEALTH CARE EDUCATION/TRAINING PROGRAM

## 2022-05-24 PROCEDURE — 3288F FALL RISK ASSESSMENT DOCD: CPT | Mod: CPTII,S$GLB,, | Performed by: STUDENT IN AN ORGANIZED HEALTH CARE EDUCATION/TRAINING PROGRAM

## 2022-05-24 PROCEDURE — 1160F RVW MEDS BY RX/DR IN RCRD: CPT | Mod: CPTII,S$GLB,, | Performed by: STUDENT IN AN ORGANIZED HEALTH CARE EDUCATION/TRAINING PROGRAM

## 2022-05-24 PROCEDURE — 1101F PT FALLS ASSESS-DOCD LE1/YR: CPT | Mod: CPTII,S$GLB,, | Performed by: STUDENT IN AN ORGANIZED HEALTH CARE EDUCATION/TRAINING PROGRAM

## 2022-05-24 PROCEDURE — 3078F PR MOST RECENT DIASTOLIC BLOOD PRESSURE < 80 MM HG: ICD-10-PCS | Mod: CPTII,S$GLB,, | Performed by: STUDENT IN AN ORGANIZED HEALTH CARE EDUCATION/TRAINING PROGRAM

## 2022-05-24 PROCEDURE — 99214 OFFICE O/P EST MOD 30 MIN: CPT | Mod: S$GLB,,, | Performed by: STUDENT IN AN ORGANIZED HEALTH CARE EDUCATION/TRAINING PROGRAM

## 2022-05-24 PROCEDURE — 3078F DIAST BP <80 MM HG: CPT | Mod: CPTII,S$GLB,, | Performed by: STUDENT IN AN ORGANIZED HEALTH CARE EDUCATION/TRAINING PROGRAM

## 2022-05-24 PROCEDURE — 4010F ACE/ARB THERAPY RXD/TAKEN: CPT | Mod: CPTII,S$GLB,, | Performed by: STUDENT IN AN ORGANIZED HEALTH CARE EDUCATION/TRAINING PROGRAM

## 2022-05-24 PROCEDURE — 99999 PR PBB SHADOW E&M-EST. PATIENT-LVL III: CPT | Mod: PBBFAC,,, | Performed by: STUDENT IN AN ORGANIZED HEALTH CARE EDUCATION/TRAINING PROGRAM

## 2022-05-24 PROCEDURE — 1159F MED LIST DOCD IN RCRD: CPT | Mod: CPTII,S$GLB,, | Performed by: STUDENT IN AN ORGANIZED HEALTH CARE EDUCATION/TRAINING PROGRAM

## 2022-05-24 PROCEDURE — 99214 PR OFFICE/OUTPT VISIT, EST, LEVL IV, 30-39 MIN: ICD-10-PCS | Mod: S$GLB,,, | Performed by: STUDENT IN AN ORGANIZED HEALTH CARE EDUCATION/TRAINING PROGRAM

## 2022-05-24 RX ORDER — DIAZEPAM 5 MG/1
5 TABLET ORAL ONCE AS NEEDED
Qty: 1 TABLET | Refills: 0 | Status: SHIPPED | OUTPATIENT
Start: 2022-07-07 | End: 2022-07-08
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
## 2022-05-25 ENCOUNTER — OFFICE VISIT (OUTPATIENT)
Dept: PSYCHIATRY | Facility: OTHER | Age: 67
End: 2022-05-25
Payer: COMMERCIAL

## 2022-05-25 ENCOUNTER — CLINICAL SUPPORT (OUTPATIENT)
Dept: REHABILITATION | Facility: OTHER | Age: 67
End: 2022-05-25
Payer: MEDICARE

## 2022-05-25 ENCOUNTER — PATIENT MESSAGE (OUTPATIENT)
Dept: REHABILITATION | Facility: OTHER | Age: 67
End: 2022-05-25

## 2022-05-25 ENCOUNTER — PATIENT MESSAGE (OUTPATIENT)
Dept: OBSTETRICS AND GYNECOLOGY | Facility: CLINIC | Age: 67
End: 2022-05-25
Payer: MEDICARE

## 2022-05-25 ENCOUNTER — TELEPHONE (OUTPATIENT)
Dept: PAIN MEDICINE | Facility: CLINIC | Age: 67
End: 2022-05-25
Payer: MEDICARE

## 2022-05-25 DIAGNOSIS — R52 PAIN AGGRAVATED BY ACTIVITIES OF DAILY LIVING: Primary | ICD-10-CM

## 2022-05-25 DIAGNOSIS — M53.86 DECREASED RANGE OF MOTION OF LUMBAR SPINE: Primary | ICD-10-CM

## 2022-05-25 DIAGNOSIS — F43.29 ADJUSTMENT DISORDER WITH PHYSICAL COMPLAINTS: Primary | ICD-10-CM

## 2022-05-25 DIAGNOSIS — Z74.09 IMPAIRED FUNCTIONAL MOBILITY AND ACTIVITY TOLERANCE: ICD-10-CM

## 2022-05-25 DIAGNOSIS — M79.7 FIBROMYALGIA: ICD-10-CM

## 2022-05-25 DIAGNOSIS — G89.29 CENTRAL SENSITIZATION TO PAIN: ICD-10-CM

## 2022-05-25 DIAGNOSIS — G24.3 CERVICAL DYSTONIA: Primary | ICD-10-CM

## 2022-05-25 DIAGNOSIS — Z74.09 IMPAIRED FUNCTIONAL MOBILITY, BALANCE, GAIT, AND ENDURANCE: ICD-10-CM

## 2022-05-25 DIAGNOSIS — F40.298 FEAR OF PAIN: ICD-10-CM

## 2022-05-25 PROCEDURE — 90853 GROUP PSYCHOTHERAPY: CPT | Mod: ,,, | Performed by: SOCIAL WORKER

## 2022-05-25 PROCEDURE — 4010F ACE/ARB THERAPY RXD/TAKEN: CPT | Mod: CPTII,,, | Performed by: SOCIAL WORKER

## 2022-05-25 PROCEDURE — 90853 PR GROUP PSYCHOTHERAPY: ICD-10-PCS | Mod: ,,, | Performed by: SOCIAL WORKER

## 2022-05-25 PROCEDURE — 97530 THERAPEUTIC ACTIVITIES: CPT

## 2022-05-25 PROCEDURE — 3044F PR MOST RECENT HEMOGLOBIN A1C LEVEL <7.0%: ICD-10-PCS | Mod: CPTII,,, | Performed by: SOCIAL WORKER

## 2022-05-25 PROCEDURE — 4010F PR ACE/ARB THEARPY RXD/TAKEN: ICD-10-PCS | Mod: CPTII,,, | Performed by: SOCIAL WORKER

## 2022-05-25 PROCEDURE — 97110 THERAPEUTIC EXERCISES: CPT

## 2022-05-25 PROCEDURE — 3044F HG A1C LEVEL LT 7.0%: CPT | Mod: CPTII,,, | Performed by: SOCIAL WORKER

## 2022-05-25 RX ORDER — METRONIDAZOLE 500 MG/1
500 TABLET ORAL EVERY 12 HOURS
Qty: 14 TABLET | Refills: 0 | Status: SHIPPED | OUTPATIENT
Start: 2022-05-25 | End: 2022-06-01

## 2022-05-25 NOTE — PROGRESS NOTES
"[facility]   Occupational Therapy Treatment Note  P Day 2    Date: 5/25/2022  Name: Regine Osorio  Clinic Number: [id number]    Therapy Diagnosis:   Encounter Diagnoses   Name Primary?    Pain aggravated by activities of daily living Yes    Fear of pain     Impaired functional mobility and activity tolerance      Physician: Montserrat Castellon NP    Physician Orders: OT eval and treat   Medical Diagnosis: Fibromyalgia  Evaluation Date: 4/29/2022  Insurance Authorization Period Expiration: 06/22/2022  Plan of Care Certification Period: 7/1/2022  Visit # / Visits authorized: 2/ 11 (including evaluation)  FOTO: evaluation 59% limitation; 5/23/2022 58% limitation.    Precautions:  Standard and Fall    Time In: 2:45   Time Out: 3:55   Total Billable Time: 70 minutes    Subjective     Regine states: "im doing okay. I was sore when I came in and I know im going to be sore when I leave here"        Pain:  Current: 5 / 10  Location: [facility]    Objective     Therapeutic activities to improve functional performance for 70 minutes, including:    Regine completed dynamic reaching in various functional ranges, alternating between standing and seated every 3 reps, with continued focus on hip rotation/weight shifting, 12 reps x no added lbs, with hands clasped to decrease tendency to elevate right shoulder, rated as Sort of hard (4/10) exertion. Good use of diaphragmatic breathing in between sets as rest break.     Educated on weighted pendulum swings to perform with R shoulder. Completed [id number] each direction with 1lb weight in R hand.      Regine completed functional lifting, floor to waist, 3x5 reps x 5 lbs with exertion rated Sort of hard (4/10); focused education on keeping weight close to center of gravity, wide base of support, knees tracking with toes, hinging hips, bending knees, keeping weight in heels, using breath to guide movement. Pt plans to use this lift related to laundry and " cleaning. Post activity noted increased fear making it to her car post session. Reported bringing cane with her today because she was nervous on Monday she wouldn't make it to her car. Re-educated her on aris exertion scale and how to use rating scale based on effort but being mindful of both internal and external factors that impact our effort (ie: sleep, stress, nutrition, etc)    Pt completed seated mindfulness diaphragmatic breathing activity [id number] with focus on posture, mechanics of breathing, and benefits re: PNS activation. Pt voiced and demonstrated understanding.     Pt educated on and completed shoulder stretches at wall. Performed assisted flexion and abduction at wall x2 reps in each plane of movement alternating BUE.     Pt educated on and participated in seated yoga flow x10 min with focus on stability, strength, balance, sitting posture, alignment, and coordinating breath with movement. Pt educated on both physical and mental benefits of yoga.     No environmental, cultural, spiritual, developmental or education needs expressed or noted.    Home Exercises and Patient Education     Education provided:   -Ms. Osorio received education regarding proper posture and body mechanics. She received education regarding anticipated muscular soreness following lift testing and strategies for management were reviewed with patient including stretching, using ice, scheduled rest.  - aris scale, pain cycle, z-lie, functional lifting mechanics, pursed lip and diaphragmatic breathing techniques  - boom/bust theory  - Progress towards goals    Written Home Exercises Provided: yes. diaphragmatic breathing; FRP binder received.  Exercises were reviewed and Regine was able to demonstrate them prior to the end of the session.    Regine demonstrated good understanding of the education provided.     Handouts provided during sessions can be found in EMR under patient instructions, or might be part of the FRP  vasquez.    Assessment     Regine presents with report of increased soreness post Monday FRP as well as increased fatigue post PT session but with continued motivation and willingness to participate. Required increased time to complete functional activities 2/2 BLE and BUE fatigue. Good tolerance to weight in functional lifts despite increased time to complete, and with good response to all feedback and education.   Continues with difficulty with BUE AROM (R>L) but with good tolerance to stretches and education. Continues to progress towards personal goals.     Regine is unable to fully participate in community work, recreational, and home-based activities because of decreased functional  strength, decreased  AROM, decreased endurance, limited positional tolerance, fear of re-injury, and fear of increased pain. She will benefit from participating in a conditioning  program designed  to increase functional strength, flexibility, and endurance in order to meet functional goals.     Regine's prognosis is Good due to Good motivation and willingness to particiapte. She will benefit from skilled outpatient Occupational Therapy to address the limitations and goals stated above and in the chart below, provide patient/family education, and to maximize patient's level of functional independence.    Plan of care discussed with patient: Yes  Pt's spiritual, cultural and educational needs considered and patient is agreeable to the plan of care and goals as stated below:       FRP Goals: While working towards the long-term (2 month) functional goals listed above, Ms. Osorio will accomplish the following short term goals during the 5-week intensive rehabilitation program. Ms. Osorio will:     1. Develop a viable return to community work plan.   2. Demonstrate physical capacities consistent with a light-medium work demand level.   3. Demonstrate increased functional strength by lifting 20 lbs floor to waist and 20 lbs waist  to shoulder in order to meet demand of work/home routine.   4. Increase her positional tolerance to the level needed for work and home activities.   5. Implement self-care strategies during the program and at home to control pain.   6.   Patient will demonstrate use of mindfulness, stress management, and coping  strategies for improved participation in daily routine.   7.  Will drink 7 x 16oz bottles of water a day.    Specific patient expressed goals and demand levels:  Current Capacity Limit/ Physical  Demand Level (PDL)   Demand Levels of Functional Recovery Goals    Performance/Satisfaction  Day 1 Performance/Satisfaction  Day 9 Performance/Satisfaction  Follow-up     Sedentary- Light Physical Demand  (Based above tested results)    Vocational:  Sitting tolerance, as needed for work    Recreational:  Walk for exercise    Daily Living:  Dusting (floors and furniture)    Laundry    Mopping    Standing tolerance when attending to chores    UB and LB dressing    Supine to sit     Light-medium Physical Demand Level    Pending chair, 5/3      1/0      3/1      3/1    1/0    3/0        3/0    1/0       The PDL listed above is based on the physical performance screening test performed at initial evaluation. More comprehensive physical  testing integrated with clinical findings would be required to derived an accurate work capacity.      Plan   Plan of care certification: 5/23/2022 to 7/1/2022.    Outpatient occupational therapy, 3 times a week for 5 weeks:     1. Functional conditioning daily to increase physical capacity and allow Ms. sOorio to meet demands of vocation and home.   2. Individual counseling to develop return to work/daily routine plan and better understand the return to work process and/or daily routine restructure.   3. Daily Stretching, aerobic conditioning and walking to increase functional tolerance and allow Ms. Osorio to meet demands of work and home.   4. Functional activities to increase  ability to move fluidly and quickly and tolerate unguarded movements.   5. Re-education regarding proper postural, body mechanics, and coping strategies for healthy roles and routine for managing daily stressors.    6. Any other treatment deemed necessary for patient to achieve personally driven goals to promote positive health and wellness and daily roles and routines    Soniya Mccauley OTR/L   5/25/2022

## 2022-05-25 NOTE — PROGRESS NOTES
[facility]    Functional Restoration Program  Physical Therapy Treatment Note  FRP Day 2    Name: Regine Osorio  MRN:[id number]      Therapy Diagnosis:   Encounter Diagnoses   Name Primary?    Decreased range of motion of lumbar spine Yes    Central sensitization to pain     Fibromyalgia     Impaired functional mobility, balance, gait, and endurance      Physician: Montserrat Castellon NP    Date: 5/25/2022    Evaluation Date: 4/29/2022  Authorization Period Expiration: 06/22/2022  Plan of Care Expiration: 07/29/2022  Visit # / Visits authorized: 3/ 11  FOTO: completed 01 / 03       Time In: 1:30 pm  Time Out: 2:45 pm  Total Billable Time: 75 minutes    SUBJECTIVE       Regine reports reports soreness in BLE from Monday /c dissipation into today.  Pt report visiting pain med MD yesterday and plans to see neurosurgeon tomorrow to consult for possible cervical surgery.   Pt notes at most 5 hr sleep a night /c body pains or migraines waking her.          Patient's FRP goals: I want to be able to walk again, travel /s cane, inc spontaneity /c my daily activities      Current 4/10  Location(s): lower back & B LE, R shld    OBJECTIVE     Objective Measures updated at progress report unless specified.     Treatment       Regine received the Individualized Treatments listed below:     therapeutic exercises to develop strength, endurance, ROM, flexibility, posture and core stabilization for 75 minutes including:    Full body stretch w/ 3-10 sec hold technique &/or 3-5 repetition technique on all of the following:   Cervical flx/ext   Cervical sidebending   Cervical rotation   Cervical protraction/retraction   Shld rolls   Shld depression/elevation   Scapular retraction/protraction/scaption   Posterior shld stretch (cross arm)   Shld ER stretch (chicken wing)   Elbow flx/ext   Forearm supination/pronation   Wrist flx/ext   Open/close hands/fingers   Seated trunk  rotations   Seated trunk/thoracic ext/flx   Seated marches & knee to chest   Seated knee flx/ext   Seated hip ER/piriformis stretch   Seated hamstring stretch   Seated toe raise to heel raise    Seated ankle circles each way   Standing lumbar extensions   Standing lateral leaning stretch   Standing quad stretch (UE support for balance)      Peripheral muscle strengthening which included 1-2 sets of 10-20 repetitions at a slow, controlled 5-7 second per rep pace focused on strengthening supporting musculature for improved body mechanics & functional mobility.  Patient & therapist focused on proper form during treatment to ensure optimal strengthening of each targeted muscle group. Patients are instructed to keep sets/reps with proper load to stay at 3-5 out of 10 on the provided modified Radha exertion scale.       BATCA Machine Exercises Weight (lbs) Sets Repetitions Radha Exertion Scale Rating   Leg Extension        Hamstring Curls       Chest Press       Pec Fly       Lat Pull Down       Mid Row       Bicep Bar Curls 3# dumbbell 1 12 5   Standing Tricep Extension 5 1 15 3   Single Leg Press* 5 1 18 5   *pt need ModA to place each foot on plate      Supine   LTR x 20 B    DKTC on ball x 10 5 sec hold   Z lying for positional tolerance 5 min    Pt cue for diaphragmatic breathing   HS stretch /c strap x 2 20 sec hold   Extended time to supine to stand needing cue for logroll and UE placement for seesaw push         Patient Education and Home Exercises     Home Exercises Provided and Patient Education Provided     Education provided:   -daily stretching program      Written Home Exercises Provided: Patient instructed to cont prior HEP. Exercises were reviewed and Regine was able to demonstrate them prior to the end of the session.  Regine demonstrated fair  understanding of the education provided. See EMR under Patient Instructions for information provided during therapy sessions    ASSESSMENT     Pt /c  "dec volume of exercise noting fatigue.  Pt cont resistance training needing max cue for pacing and cont education on Radha Scale.  Pt often verbalize "I can't do that" when referencing bed mobility indicating dec self efficacy, however, /c extended time pt able to complete required tasks.  Therefore, plan to repeat stand<>supine as exercise for inc functionality /c transitions.  Pt demo min thoracic and cervical rotational range and no disassociation between the regions.  Pt will benefit from seated cervical ROM/mobility to improved functional mobility.  Pt issued daily stretch routine handout to promote inc participation at home.     Regine Is progressing well towards her goals.   Pt prognosis is Guarded.     Pt will continue to benefit from skilled outpatient physical therapy to address the deficits listed in the problem list box on initial evaluation, provide pt/family education and to maximize pt's level of independence in the home and community environment.     Pt's spiritual, cultural and educational needs considered and pt agreeable to plan of care and goals.     Anticipated barriers to physical therapy: chronic nature of pain, fear of pain, marked mobility deficits     GOALS: Pt is in agreement with the following goals:        Goal Progress Towards Goal   Short Term: In 3 weeks, pt will:     Verbalize & demonstrate good understanding of resisted training with 3-1-3 second rep for lraycxlosf-nvcwpwkqb-rsfhloute contraction to encourage full muscle recruitment for strength & endurance Progress towards goal: initiated 4/29/2022   Verbalize & demonstrate good understanding of home stretch routine to improve AROM, help decrease pain & improve mobility Progress towards goal: initiated 4/29/2022   Demonstrate proper supine or seated diaphragmatic breathing with decreased chest excursion & emphasis on full abdominal excursion & increased expiration time to promote muscle relaxation & increased parasympathetic " activity to improve patient tolerance to activities Progress towards goal: initiated 4/29/2022   Demonstrate proper static standing posture in frontal & sagittal plane per PT visual assessment to decrease postural strain in ADLs Progress towards goal: initiated 4/29/2022   Demonstrate good recall of names of resisted exercises w/ minimal to moderate verbal cues to promote independence w/ exercise at the end of the program Progress towards goal: initiated 4/29/2022   Verbalize plan for continuing resisted exercises w/ specific location (i.e. commercial gym, home, community fitness center) indicating preference for machine-based or free-weight exercises to incorporate all major muscle groups to maintain & progress therapeutic functional improvements Progress towards goal: initiated 4/29/2022   Pt will perform supine <> sit x 3 Ind /s inc LB or cervical discomfort for inc bed mobility  Progress towards goal: initiated 4/29/2022         Long Term: In 8 weeks, pt will:     Verbalize good understanding of activities planning/scheduling (stretching, mindfulness, resisted training, & cardio) prescribed by PT/OT following the end of the program to sustain & progress functional improvements Progress towards goal: initiated 4/29/2022   Be independent w/ selected resisted training w/ minimal to no cuing while performing to continue w/ resisted strengthening independently at the end of the program Progress towards goal: initiated 4/29/2022   Improve 2 Minute Walk Test (MWT) to 300 feet and 6 MWT to 900 feet or greater to improve gait speed and mobility Progress towards goal: initiated 4/29/2022   Perform single leg stance 15 seconds or greater bilaterally to improve safety and balance in ADLs Progress towards goal: initiated 4/29/2022   Demonstrate Timed Up & Go (with appropriate assistive device, if necessary) of 18 seconds or less to decrease fall risk and improve functional mobility Progress towards goal: initiated 4/29/2022    Demonstrate 5 time sit to stand test without upper extremity assist to 20 sec or less to improve general mobility and decrease fall risk Progress towards goal: initiated 4/29/2022   Score 50 % or less on Lumbar FOTO to indicate significant functional improvements in impaired functions secondary to low back pain Progress towards goal: initiated 4/29/2022             PLAN     Continue PT per POC     Dequan Wilde, PT, DPT

## 2022-05-26 ENCOUNTER — CLINICAL SUPPORT (OUTPATIENT)
Dept: REHABILITATION | Facility: OTHER | Age: 67
End: 2022-05-26
Payer: MEDICARE

## 2022-05-26 ENCOUNTER — HOSPITAL ENCOUNTER (OUTPATIENT)
Dept: RADIOLOGY | Facility: HOSPITAL | Age: 67
Discharge: HOME | End: 2022-05-26
Attending: ORTHOPAEDIC SURGERY
Payer: MEDICARE

## 2022-05-26 ENCOUNTER — TELEPHONE (OUTPATIENT)
Dept: PAIN MEDICINE | Facility: CLINIC | Age: 67
End: 2022-05-26
Payer: MEDICARE

## 2022-05-26 ENCOUNTER — OFFICE VISIT (OUTPATIENT)
Dept: ORTHOPEDICS | Facility: CLINIC | Age: 67
End: 2022-05-26
Payer: MEDICARE

## 2022-05-26 ENCOUNTER — OFFICE VISIT (OUTPATIENT)
Dept: PSYCHIATRY | Facility: OTHER | Age: 67
End: 2022-05-26
Payer: MEDICARE

## 2022-05-26 VITALS — HEIGHT: 65 IN | WEIGHT: 227.88 LBS | BODY MASS INDEX: 37.97 KG/M2

## 2022-05-26 DIAGNOSIS — F43.29 ADJUSTMENT DISORDER WITH PHYSICAL COMPLAINTS: Primary | ICD-10-CM

## 2022-05-26 DIAGNOSIS — M48.02 SPINAL STENOSIS, CERVICAL REGION: ICD-10-CM

## 2022-05-26 DIAGNOSIS — F40.298 FEAR OF PAIN: ICD-10-CM

## 2022-05-26 DIAGNOSIS — M48.062 LUMBAR STENOSIS WITH NEUROGENIC CLAUDICATION: ICD-10-CM

## 2022-05-26 DIAGNOSIS — M51.36 DDD (DEGENERATIVE DISC DISEASE), LUMBAR: ICD-10-CM

## 2022-05-26 DIAGNOSIS — M50.30 DDD (DEGENERATIVE DISC DISEASE), CERVICAL: ICD-10-CM

## 2022-05-26 DIAGNOSIS — M53.86 DECREASED RANGE OF MOTION OF LUMBAR SPINE: Primary | ICD-10-CM

## 2022-05-26 DIAGNOSIS — G99.2 STENOSIS OF CERVICAL SPINE WITH MYELOPATHY: ICD-10-CM

## 2022-05-26 DIAGNOSIS — Z74.09 IMPAIRED FUNCTIONAL MOBILITY AND ACTIVITY TOLERANCE: ICD-10-CM

## 2022-05-26 DIAGNOSIS — G89.29 CENTRAL SENSITIZATION TO PAIN: ICD-10-CM

## 2022-05-26 DIAGNOSIS — M79.7 FIBROMYALGIA: ICD-10-CM

## 2022-05-26 DIAGNOSIS — R52 PAIN AGGRAVATED BY ACTIVITIES OF DAILY LIVING: Primary | ICD-10-CM

## 2022-05-26 DIAGNOSIS — Z74.09 IMPAIRED FUNCTIONAL MOBILITY, BALANCE, GAIT, AND ENDURANCE: ICD-10-CM

## 2022-05-26 DIAGNOSIS — G95.89 MYELOMALACIA OF CERVICAL CORD: Primary | ICD-10-CM

## 2022-05-26 DIAGNOSIS — M47.812 CERVICAL SPONDYLOSIS: Primary | ICD-10-CM

## 2022-05-26 DIAGNOSIS — M48.02 STENOSIS OF CERVICAL SPINE WITH MYELOPATHY: ICD-10-CM

## 2022-05-26 DIAGNOSIS — M47.816 LUMBAR SPONDYLOSIS: ICD-10-CM

## 2022-05-26 DIAGNOSIS — M54.12 CERVICAL RADICULOPATHY: ICD-10-CM

## 2022-05-26 PROCEDURE — 3044F HG A1C LEVEL LT 7.0%: CPT | Mod: CPTII,S$GLB,, | Performed by: ORTHOPAEDIC SURGERY

## 2022-05-26 PROCEDURE — 97110 THERAPEUTIC EXERCISES: CPT

## 2022-05-26 PROCEDURE — 4010F PR ACE/ARB THEARPY RXD/TAKEN: ICD-10-PCS | Mod: CPTII,,, | Performed by: SOCIAL WORKER

## 2022-05-26 PROCEDURE — 97530 THERAPEUTIC ACTIVITIES: CPT

## 2022-05-26 PROCEDURE — 72050 X-RAY EXAM NECK SPINE 4/5VWS: CPT | Mod: TC

## 2022-05-26 PROCEDURE — 3008F PR BODY MASS INDEX (BMI) DOCUMENTED: ICD-10-PCS | Mod: CPTII,S$GLB,, | Performed by: ORTHOPAEDIC SURGERY

## 2022-05-26 PROCEDURE — 3288F PR FALLS RISK ASSESSMENT DOCUMENTED: ICD-10-PCS | Mod: CPTII,S$GLB,, | Performed by: ORTHOPAEDIC SURGERY

## 2022-05-26 PROCEDURE — 72050 XR CERVICAL SPINE AP LAT WITH FLEX EXTEN: ICD-10-PCS | Mod: 26,,, | Performed by: RADIOLOGY

## 2022-05-26 PROCEDURE — 1160F RVW MEDS BY RX/DR IN RCRD: CPT | Mod: CPTII,S$GLB,, | Performed by: ORTHOPAEDIC SURGERY

## 2022-05-26 PROCEDURE — 90853 GROUP PSYCHOTHERAPY: CPT | Mod: ,,, | Performed by: SOCIAL WORKER

## 2022-05-26 PROCEDURE — 3044F PR MOST RECENT HEMOGLOBIN A1C LEVEL <7.0%: ICD-10-PCS | Mod: CPTII,,, | Performed by: SOCIAL WORKER

## 2022-05-26 PROCEDURE — 3044F PR MOST RECENT HEMOGLOBIN A1C LEVEL <7.0%: ICD-10-PCS | Mod: CPTII,S$GLB,, | Performed by: ORTHOPAEDIC SURGERY

## 2022-05-26 PROCEDURE — 1101F PT FALLS ASSESS-DOCD LE1/YR: CPT | Mod: CPTII,S$GLB,, | Performed by: ORTHOPAEDIC SURGERY

## 2022-05-26 PROCEDURE — 99204 OFFICE O/P NEW MOD 45 MIN: CPT | Mod: S$GLB,,, | Performed by: ORTHOPAEDIC SURGERY

## 2022-05-26 PROCEDURE — 72050 X-RAY EXAM NECK SPINE 4/5VWS: CPT | Mod: 26,,, | Performed by: RADIOLOGY

## 2022-05-26 PROCEDURE — 1101F PR PT FALLS ASSESS DOC 0-1 FALLS W/OUT INJ PAST YR: ICD-10-PCS | Mod: CPTII,S$GLB,, | Performed by: ORTHOPAEDIC SURGERY

## 2022-05-26 PROCEDURE — 4010F ACE/ARB THERAPY RXD/TAKEN: CPT | Mod: CPTII,,, | Performed by: SOCIAL WORKER

## 2022-05-26 PROCEDURE — 1159F PR MEDICATION LIST DOCUMENTED IN MEDICAL RECORD: ICD-10-PCS | Mod: CPTII,S$GLB,, | Performed by: ORTHOPAEDIC SURGERY

## 2022-05-26 PROCEDURE — 1159F MED LIST DOCD IN RCRD: CPT | Mod: CPTII,S$GLB,, | Performed by: ORTHOPAEDIC SURGERY

## 2022-05-26 PROCEDURE — 99999 PR PBB SHADOW E&M-EST. PATIENT-LVL IV: CPT | Mod: PBBFAC,,, | Performed by: ORTHOPAEDIC SURGERY

## 2022-05-26 PROCEDURE — 4010F ACE/ARB THERAPY RXD/TAKEN: CPT | Mod: CPTII,S$GLB,, | Performed by: ORTHOPAEDIC SURGERY

## 2022-05-26 PROCEDURE — 72110 XR LUMBAR SPINE AP AND LAT WITH FLEX/EXT: ICD-10-PCS | Mod: 26,,, | Performed by: RADIOLOGY

## 2022-05-26 PROCEDURE — 99204 PR OFFICE/OUTPT VISIT, NEW, LEVL IV, 45-59 MIN: ICD-10-PCS | Mod: S$GLB,,, | Performed by: ORTHOPAEDIC SURGERY

## 2022-05-26 PROCEDURE — 3008F BODY MASS INDEX DOCD: CPT | Mod: CPTII,S$GLB,, | Performed by: ORTHOPAEDIC SURGERY

## 2022-05-26 PROCEDURE — 3044F HG A1C LEVEL LT 7.0%: CPT | Mod: CPTII,,, | Performed by: SOCIAL WORKER

## 2022-05-26 PROCEDURE — 3288F FALL RISK ASSESSMENT DOCD: CPT | Mod: CPTII,S$GLB,, | Performed by: ORTHOPAEDIC SURGERY

## 2022-05-26 PROCEDURE — 99999 PR PBB SHADOW E&M-EST. PATIENT-LVL IV: ICD-10-PCS | Mod: PBBFAC,,, | Performed by: ORTHOPAEDIC SURGERY

## 2022-05-26 PROCEDURE — 4010F PR ACE/ARB THEARPY RXD/TAKEN: ICD-10-PCS | Mod: CPTII,S$GLB,, | Performed by: ORTHOPAEDIC SURGERY

## 2022-05-26 PROCEDURE — 72110 X-RAY EXAM L-2 SPINE 4/>VWS: CPT | Mod: 26,,, | Performed by: RADIOLOGY

## 2022-05-26 PROCEDURE — 1160F PR REVIEW ALL MEDS BY PRESCRIBER/CLIN PHARMACIST DOCUMENTED: ICD-10-PCS | Mod: CPTII,S$GLB,, | Performed by: ORTHOPAEDIC SURGERY

## 2022-05-26 PROCEDURE — 72110 X-RAY EXAM L-2 SPINE 4/>VWS: CPT | Mod: TC

## 2022-05-26 PROCEDURE — 90853 PR GROUP PSYCHOTHERAPY: ICD-10-PCS | Mod: ,,, | Performed by: SOCIAL WORKER

## 2022-05-26 NOTE — TELEPHONE ENCOUNTER
Discussed with patient about her appointment with spine surgery.  Sounds like they are planning on surgery.  The patient would like to speak with neurosurgery as well to see what their thoughts are.

## 2022-05-26 NOTE — PROGRESS NOTES
"[facility]    Functional Restoration Program  Physical Therapy Treatment Note  FRP Day 3    Name: Regine Osorio  MRN:[id number]      Therapy Diagnosis:   Encounter Diagnoses   Name Primary?    Decreased range of motion of lumbar spine Yes    Central sensitization to pain     Fibromyalgia     Impaired functional mobility, balance, gait, and endurance      Physician: Montserrat Castellon NP    Date: 5/26/2022    Evaluation Date: 4/29/2022  Authorization Period Expiration: 06/22/2022  Plan of Care Expiration: 07/29/2022  Visit # / Visits authorized: 4/ 11  FOTO: completed 01 / 03       Time In: 2:45 pm  Time Out: 4:00 pm  Total Billable Time: 75 minutes    SUBJECTIVE       Regine reports she is having more fatigue today, but she is trying to push herself through that & pain. "I do not have any fear of the pain."          Patient's FRP goals: I want to be able to walk again, travel /s cane, inc spontaneity /c my daily activities      Current 4/10  Location(s): lower back & B LE, R shld    OBJECTIVE     Objective Measures updated at progress report unless specified.     Treatment       Regine received the Individualized Treatments listed below:     therapeutic exercises to develop strength, endurance, ROM, flexibility, posture and core stabilization for 75 minutes including:        Peripheral muscle strengthening which included 1-2 sets of 10-20 repetitions at a slow, controlled 5-7 second per rep pace focused on strengthening supporting musculature for improved body mechanics & functional mobility.  Patient & therapist focused on proper form during treatment to ensure optimal strengthening of each targeted muscle group. Patients are instructed to keep sets/reps with proper load to stay at 3-5 out of 10 on the provided modified Radha exertion scale.       BATCA Machine Exercises Weight (lbs) Sets Repetitions Radha Exertion Scale Rating   Leg Extension (AAROM) 5 1 15 6   Hamstring Curls " 7.5 1 15 4   Chest Press 5 1 15 5   Pec Fly 5 1 15 6   Lat Pull Down 5 1 15 5   Mid Row 10 1 15 5   Bicep Bar Curls       Standing Tricep Extension       Single Leg Press 7.5 1 10 R & 15 L 5 R & 4L         · Fitter board easy tilts w/  support 20 each way  · Sitting alt UE & LE flx 2x10 B  · Sitting Tball compressions w/ pursed lip breathing 15 B         Patient Education and Home Exercises     Home Exercises Provided and Patient Education Provided     Education provided:   - none      Written Home Exercises Provided: Patient instructed to cont prior HEP. Exercises were reviewed and Regine was able to demonstrate them prior to the end of the session.  Regine demonstrated fair  understanding of the education provided. See EMR under Patient Instructions for information provided during therapy sessions    ASSESSMENT     Pt w/ fairly good participation in therapy today, but needed moderate cuing to increase reps to 15 in many exercises. She exhibits some fear in her mobility & very poor core activation. PT initiated seated core activation in efforts to improve her bed mobility & general core strength. In leg extensions, she needed AAROM due to poor strength & may benefit from seated LAQ using ankle weights.    Regine Is progressing well towards her goals.   Pt prognosis is Guarded.     Pt will continue to benefit from skilled outpatient physical therapy to address the deficits listed in the problem list box on initial evaluation, provide pt/family education and to maximize pt's level of independence in the home and community environment.     Pt's spiritual, cultural and educational needs considered and pt agreeable to plan of care and goals.     Anticipated barriers to physical therapy: chronic nature of pain, fear of pain, marked mobility deficits     GOALS: Pt is in agreement with the following goals:        Goal Progress Towards Goal   Short Term: In 3 weeks, pt will:     Verbalize & demonstrate good  understanding of resisted training with 3-1-3 second rep for rlyurekhad-mdpfpqkrp-lwjmzgqra contraction to encourage full muscle recruitment for strength & endurance Progress towards goal: initiated 4/29/2022   Verbalize & demonstrate good understanding of home stretch routine to improve AROM, help decrease pain & improve mobility Progress towards goal: initiated 4/29/2022   Demonstrate proper supine or seated diaphragmatic breathing with decreased chest excursion & emphasis on full abdominal excursion & increased expiration time to promote muscle relaxation & increased parasympathetic activity to improve patient tolerance to activities Progress towards goal: initiated 4/29/2022   Demonstrate proper static standing posture in frontal & sagittal plane per PT visual assessment to decrease postural strain in ADLs Progress towards goal: initiated 4/29/2022   Demonstrate good recall of names of resisted exercises w/ minimal to moderate verbal cues to promote independence w/ exercise at the end of the program Progress towards goal: initiated 4/29/2022   Verbalize plan for continuing resisted exercises w/ specific location (i.e. commercial gym, home, community fitness center) indicating preference for machine-based or free-weight exercises to incorporate all major muscle groups to maintain & progress therapeutic functional improvements Progress towards goal: initiated 4/29/2022   Pt will perform supine <> sit x 3 Ind /s inc LB or cervical discomfort for inc bed mobility  Progress towards goal: initiated 4/29/2022         Long Term: In 8 weeks, pt will:     Verbalize good understanding of activities planning/scheduling (stretching, mindfulness, resisted training, & cardio) prescribed by PT/OT following the end of the program to sustain & progress functional improvements Progress towards goal: initiated 4/29/2022   Be independent w/ selected resisted training w/ minimal to no cuing while performing to continue w/ resisted  strengthening independently at the end of the program Progress towards goal: initiated 4/29/2022   Improve 2 Minute Walk Test (MWT) to 300 feet and 6 MWT to 900 feet or greater to improve gait speed and mobility Progress towards goal: initiated 4/29/2022   Perform single leg stance 15 seconds or greater bilaterally to improve safety and balance in ADLs Progress towards goal: initiated 4/29/2022   Demonstrate Timed Up & Go (with appropriate assistive device, if necessary) of 18 seconds or less to decrease fall risk and improve functional mobility Progress towards goal: initiated 4/29/2022   Demonstrate 5 time sit to stand test without upper extremity assist to 20 sec or less to improve general mobility and decrease fall risk Progress towards goal: initiated 4/29/2022   Score 50 % or less on Lumbar FOTO to indicate significant functional improvements in impaired functions secondary to low back pain Progress towards goal: initiated 4/29/2022             PLAN     Continue PT per POC     Kar Trevizo, PT, DPT

## 2022-05-26 NOTE — PROGRESS NOTES
DATE: 5/26/2022  PATIENT: Regine Osorio    Attending Physician: Rosales Scruggs M.D.    CHIEF COMPLAINT: neck, BUE pain and LBP and BLE pain    HISTORY:  Regine Osorio is a 67 y.o. female w/ a hx breast cx (s/p mastectomy and chemo) presents for initial evaluation of neck and b/l arm pain (Neck - 8, Arm - 8). The pain has been present for 1 year. The patient describes the pain as sharp and it radiates down both shoulders (R>L) to the b/l forearm (R>L). She has had neck stiffness and spasm. The pain is worse with activity and improved by rest. There is no associated numbness and tingling. There is subjective weakness in the right hand. Prior treatments have included meds (mobic, gabapentin, zanaflex), PT, but no BALJIT or surgery.     She also has LBP that radiates posteriorly down both buttocks/thighs to the knees. The pain is worse with standing and walking and better with rest/sitting. She cannot walk longer than 1 block due to pain. Leaning forward on a shopping cart helps with the leg pain. Her back/leg pain is 5/10. She has been doing PT. She did BALJIT in 2016 with minimal relief.    The Patient endorses myelopathic symptoms such as handwriting changes or difficulty with buttons/coins/keys. She has trouble with balance. Denies perineal paresthesias, bowel/bladder dysfunction.    The patient does not smoke or endorse IVDU. She has DM (HA1C of 6). The patient is not on any blood thinners and does not take chronic narcotics. 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    PAST MEDICAL/SURGICAL HISTORY:  Past Medical History:   Diagnosis Date    Allergy     Breast cancer 2017    Cataract     Diabetes mellitus, type 2     Type 2    Eczema     Fibromyalgia     Glaucoma     Hypertension     Renal cyst, right 02/06/2020    Steatosis of liver 02/06/2020     Past Surgical History:   Procedure Laterality Date    ADENOIDECTOMY      BREAST BIOPSY      BREAST SURGERY      breast ca lt  side     HYSTERECTOMY      supacervical hysterectomy    LAPAROSCOPIC SUPRACERVICAL HYSTERECTOMY  2005    fibroids    MASTECTOMY Left 2017    chemo    MYOMECTOMY      TONSILLECTOMY      TYMPANOSTOMY TUBE PLACEMENT      VAGINAL DELIVERY         Current Medications:   Current Outpatient Medications:     acetic acid (VOSOL) 2 % otic solution, Place 4 drops into both ears as needed., Disp: , Rfl:     aspirin 81 MG Chew, Take 81 mg by mouth once daily. , Disp: , Rfl:     azelastine (ASTELIN) 137 mcg (0.1 %) nasal spray, 1 SPRAY IN EACH NOSTRIL TWICE DAILY, Disp: 30 mL, Rfl: 2    benzonatate (TESSALON) 200 MG capsule, 1 capsule up to three times a day as needed for cough, Disp: 50 capsule, Rfl: 1    blood sugar diagnostic Strp, To check BG 2 times daily, to use with insurance preferred meter, Disp: 200 each, Rfl: 3    blood-glucose meter kit, To check BG 2 times daily, to use with insurance preferred meter, Disp: 1 each, Rfl: 0    cetirizine (ZYRTEC) 10 MG tablet, TAKE 2 TABLETS BY MOUTH ONCE DAILY (Patient taking differently: 10 mg once daily.), Disp: 60 tablet, Rfl: 3    cholecalciferol, vitamin D3, (VITAMIN D3) 50 mcg (2,000 unit) Cap, Take 1 capsule by mouth once daily. , Disp: , Rfl:     clobetasoL (CLOBEX) 0.05 % shampoo, Apply topically once a week., Disp: 118 mL, Rfl: 5    cyanocobalamin/cobamamide (B12 SL), Place 3,000 mcg under the tongue once daily., Disp: , Rfl:     dextromethorphan-guaiFENesin  mg/5 ml (ROBITUSSIN-DM)  mg/5 mL liquid, Take 5 mLs by mouth every 12 (twelve) hours., Disp: , Rfl:     [START ON 7/7/2022] diazePAM (VALIUM) 5 MG tablet, Take 1 tablet (5 mg total) by mouth once as needed (before procedure)., Disp: 1 tablet, Rfl: 0    diclofenac sodium (VOLTAREN) 1 % Gel, Apply 2 g topically 3 (three) times daily., Disp: 100 g, Rfl: 0    fluocinolone acetonide oiL 0.01 % Drop, INSTILL 4 DROPS IN EAR(S) TWICE DAILY FOR 1 TO 2 WEEKS, Disp: 20 mL, Rfl: 1    fluticasone  propionate (FLONASE) 50 mcg/actuation nasal spray, 2 sprays on the right and 3 sprays on the left every morning, Disp: 48 g, Rfl: 3    gabapentin (NEURONTIN) 600 MG tablet, Take 1 tablet (600 mg total) by mouth 3 (three) times daily., Disp: 90 tablet, Rfl: 5    glipiZIDE (GLUCOTROL) 5 MG TR24, TAKE 1 TABLET(5 MG) BY MOUTH TWICE DAILY, Disp: 180 tablet, Rfl: 1    hydroCHLOROthiazide (HYDRODIURIL) 25 MG tablet, Take 1 tablet (25 mg total) by mouth once daily., Disp: 90 tablet, Rfl: 1    ketoconazole (NIZORAL) 2 % cream, APPLY EXTERNALLY TO THE AFFECTED AREA EVERY DAY, Disp: 15 g, Rfl: 2    lancets Misc, To check BG 2 times daily, to use with insurance preferred meter, Disp: 200 each, Rfl: 3    losartan (COZAAR) 50 MG tablet, Take 1 tablet (50 mg total) by mouth once daily., Disp: 90 tablet, Rfl: 1    meloxicam (MOBIC) 15 MG tablet, Take 1 tablet (15 mg total) by mouth daily as needed for Pain., Disp: 30 tablet, Rfl: 2    metFORMIN (GLUCOPHAGE) 500 MG tablet, Take 1 tablet (500 mg total) by mouth 2 (two) times daily with meals., Disp: 180 tablet, Rfl: 1    metroNIDAZOLE (FLAGYL) 500 MG tablet, Take 1 tablet (500 mg total) by mouth every 12 (twelve) hours. for 7 days, Disp: 14 tablet, Rfl: 0    mupirocin (BACTROBAN) 2 % ointment, Apply topically 2 (two) times daily., Disp: 30 g, Rfl: 0    ondansetron (ZOFRAN-ODT) 8 MG TbDL, Take 1 tablet (8 mg total) by mouth every 8 (eight) hours as needed (Nausea)., Disp: 30 tablet, Rfl: 2    rizatriptan (MAXALT-MLT) 10 MG disintegrating tablet, Take 1 tablet (10 mg total) by mouth every 2 (two) hours as needed for Migraine. Max 30 mg/day., Disp: 12 tablet, Rfl: 5    sulfamethoxazole-trimethoprim 800-160mg (BACTRIM DS) 800-160 mg Tab, Take 1 tablet by mouth 2 (two) times daily. for 10 days, Disp: 20 tablet, Rfl: 0    sumatriptan (IMITREX) 100 MG tablet, Take 1 tab at onset of migraine, may repeat dose in 2 hours if needed. Max 2 tabs/day., Disp: 9 tablet, Rfl: 11     tiZANidine (ZANAFLEX) 4 MG tablet, TAKE 1 TABLET(4 MG) BY MOUTH EVERY 8 HOURS AS NEEDED FOR SPASM, Disp: 90 tablet, Rfl: 0    Social History:   Social History     Socioeconomic History    Marital status:     Number of children: 1   Tobacco Use    Smoking status: Never Smoker    Smokeless tobacco: Never Used   Substance and Sexual Activity    Alcohol use: No    Drug use: No    Sexual activity: Not Currently   Social History Narrative        1 son (estranged)    3 grandchildren ([place])    Born and raised in [place] (moved to [facility] [place])     Social Determinants of Health     Financial Resource Strain: Low Risk     Difficulty of Paying Living Expenses: Not hard at all   Food Insecurity: No Food Insecurity    Worried About Running Out of Food in the Last Year: Never true    Ran Out of Food in the Last Year: Never true   Transportation Needs: No Transportation Needs    Lack of Transportation (Medical): No    Lack of Transportation (Non-Medical): No   Physical Activity: Inactive    Days of Exercise per Week: 0 days    Minutes of Exercise per Session: 20 min   Stress: No Stress Concern Present    Feeling of Stress : Only a little   Social Connections: Unknown    Frequency of Communication with Friends and Family: More than three times a week    Frequency of Social Gatherings with Friends and Family: Twice a week    Active Member of Clubs or Organizations: Yes    Attends Club or Organization Meetings: 1 to 4 times per year    Marital Status:    Housing Stability: Low Risk     Unable to Pay for Housing in the Last Year: No    Number of Places Lived in the Last Year: 1    Unstable Housing in the Last Year: No       REVIEW OF SYSTEMS:  Constitution: Negative. Negative for chills, fever and night sweats.   Cardiovascular: Negative for chest pain and syncope.   Respiratory: Negative for cough and shortness of breath.   Gastrointestinal: See HPI. Negative for nausea/vomiting.  "Negative for abdominal pain.  Genitourinary: See HPI. Negative for discoloration or dysuria.  Skin: Negative for dry skin, itching and rash.   Hematologic/Lymphatic: negative for bleeding/clotting disorders.   Musculoskeletal: Negative for falls and muscle weakness.   Neurological: See HPI. no history of seizures. no history of cranial surgery or shunts.  Endocrine: Negative for polydipsia, polyphagia and polyuria.   Allergic/Immunologic: Negative for hives and persistent infections.  Psychiatric/Behavioral: Negative for depression and insomnia.         EXAM:  Ht 5' 5" (1.651 m)   Wt 103.4 kg (227 lb 13.5 oz)   BMI 37.92 kg/m²     General: The patient is a 67 y.o. female in no apparent distress, the patient is orientatied to person, place and time.  Psych: Normal mood and affect  HEENT: Vision grossly intact, hearing intact to the spoken word.  Lungs: Respirations unlabored.  Gait: Normal station and gait, no difficulty with toe or heel walk.   Skin: Cervical skin negative for rashes, lesions, hairy patches and surgical scars.  Range of motion: Cervical range of motion is acceptable. There is posterior cervical and lower lumbar tenderness to palpation.  Spinal Balance: Global saggital and coronal spinal balance acceptable, no significant for scoliosis and kyphosis.  Musculoskeletal: No pain with the range of motion of the bilateral shoulders and elbows. Normal bulk and contour of the bilateral hands.  Vascular: Bilateral hands warm and well perfused, Radial pulses 2+ bilaterally.  Neurological: Normal strength and tone in all major motor groups in the bilateral upper and lower extremities except R hand  strength and 4/5 in R wrist flexion/extension. Normal sensation to light touch in the C5-T1 and L2-S1 dermatomes bilaterally.  Deep tendon reflexes symmetric 2+ in the bilateral upper and lower extremities.  + b/l Peoples's    IMAGING:   Today I independently reviewed the following images and my interpretations " are as follows:    AP, Lat and Flex/Ex  upright C-spine films demonstrate cervical spondylosis and DDD without fractures or listhesis.    Lumbar XRs showed spondylosis and DDD without fractures or listhesis.    MRI cervical showed C5-6 severe central and b/l foraminal stenosis. C6-7 moderate central stenosis. Myelomalacia C4-7.    Lumbar MRI showed L2-3 and L3-4 severe central stenosis.    Body mass index is 37.92 kg/m².  Hemoglobin A1C   Date Value Ref Range Status   03/15/2022 6.0 (H) 4.0 - 5.6 % Final     Comment:     ADA Screening Guidelines:  5.7-6.4%  Consistent with prediabetes  >or=6.5%  Consistent with diabetes    High levels of fetal hemoglobin interfere with the HbA1C  assay. Heterozygous hemoglobin variants (HbS, HgC, etc)do  not significantly interfere with this assay.   However, presence of multiple variants may affect accuracy.     12/10/2021 6.1 (H) 4.0 - 5.6 % Final     Comment:     ADA Screening Guidelines:  5.7-6.4%  Consistent with prediabetes  >or=6.5%  Consistent with diabetes    High levels of fetal hemoglobin interfere with the HbA1C  assay. Heterozygous hemoglobin variants (HbS, HgC, etc)do  not significantly interfere with this assay.   However, presence of multiple variants may affect accuracy.     09/10/2021 6.1 (H) 4.0 - 5.6 % Final     Comment:     ADA Screening Guidelines:  5.7-6.4%  Consistent with prediabetes  >or=6.5%  Consistent with diabetes    High levels of fetal hemoglobin interfere with the HbA1C  assay. Heterozygous hemoglobin variants (HbS, HgC, etc)do  not significantly interfere with this assay.   However, presence of multiple variants may affect accuracy.         ASSESSMENT/PLAN:  Cervical spondylosis    DDD (degenerative disc disease), cervical    Stenosis of cervical spine with myelopathy    Lumbar spondylosis    Lumbar stenosis with neurogenic claudication    DDD (degenerative disc disease), lumbar    Cervical radiculopathy    Spinal stenosis, cervical region  -     CT  Cervical Spine Without Contrast; Future; Expected date: 05/26/2022      Follow up in about 2 weeks (around 6/9/2022).    Patient has cervical stenosis with myelopathy and radiculopathy. I discussed the natural history of their diagnoses as well as surgical and nonsurgical treatment options. I educated the patient on the importance of core/back strengthening, correct posture, bending/lifting ergonomics, and low-impact aerobic exercises (walking, elliptical, and aquatherapy). Continue medications. I ordered CT cervical for preop planning. Patient will follow up in 2 weeks for preop.    I had a sit down discussion with the patient regarding cervical myelopathy. I discussed the known stepwise degeneration of cervical myelopathy. I discussed the risks and benefits of decompressive surgery, including the concept that surgery would be done to prevent further neurological injury/degeneration rather than to ameliorate any existing deficits.     She also has lumbar stenosis with neurogenic claudication. We will continue nonoperative management for now.     Rosales Scruggs MD  Orthopaedic Spine Surgeon  Department of Orthopaedic Surgery  [phone]

## 2022-05-26 NOTE — PROGRESS NOTES
"[facility]   Occupational Therapy Treatment Note  P Day 3    Date: 5/26/2022  Name: Regine Osorio  Clinic Number: [id number]    Therapy Diagnosis:   Encounter Diagnoses   Name Primary?    Pain aggravated by activities of daily living Yes    Fear of pain     Impaired functional mobility and activity tolerance      Physician: Montserrat Castellon NP    Physician Orders: OT eval and treat   Medical Diagnosis: Fibromyalgia  Evaluation Date: 4/29/2022  Insurance Authorization Period Expiration: 06/22/2022  Plan of Care Certification Period: 7/1/2022  Visit # / Visits authorized: 2/ 11 (including evaluation)  FOTO: evaluation 59% limitation; 5/23/2022 58% limitation.    Precautions:  Standard and Fall    Time In: 1:30 PM   Time Out: 2:45 PM   Total Billable Time: 75 minutes    Subjective     Regine states: "this is my 4th appointment today and I didn't really sleep, but i'm here. Im tired and sore but I made it"    "I went to the neck surgery specialist this morning who told me surgery was basically inevitable and the MRI wasn't great, so not news I wanted to hear but it wasn't unexpected"         Pain:  Current: 6 / 10  Location: generalized    Objective     Therapeutic activities to improve functional performance for 75 minutes, including:    Full body stretch w/ 3-10 sec hold technique &/or 3-5 repetition technique on all of the following:   Cervical flx/ext   Cervical sidebending   Cervical rotation   Cervical protraction/retraction   Shld rolls   Shld depression/elevation   Scapular retraction/protraction/scaption   Posterior shld stretch (cross arm)   Shld ER stretch (chicken wing)   Elbow flx/ext   Forearm supination/pronation   Wrist flx/ext   Open/close hands/fingers   Seated trunk rotations   Seated trunk/thoracic ext/flx   Seated marches & knee to chest   Seated knee flx/ext   Seated hip ER/piriformis stretch   Seated hamstring stretch   Seated toe raise " to heel raise    Seated ankle circles each way   Standing lumbar extensions   Standing lateral leaning stretch   Standing quad stretch (UE support for balance)    Regine completed functional lifting, floor to waist, 3x5 reps x 5 lbs (2nd 2 sets placed to raised surface- double crate 2/2 fatigue and increased soreness) with exertion rated Sort of hard (4/10); focused education on keeping weight close to center of gravity, wide base of support, knees tracking with toes, hinging hips, bending knees, keeping weight in heels, using breath to guide movement. Pt plans to use this lift related to laundry and cleaning. Took seated rest break in between sets and completed diaphragmatic breaths. Tendency to lean full body weight onto crate at end range, encouraged to keep weight in heels.     Regine participated in functional lift waist to shoulder, 3x5 reps x 5 lbs (lowered shelf to 4th notch), with a rating of Hard (5/10) exertion. Regine educated on standing posture, core awareness, keeping shoulders depressed and retracted, stepping to place > reaching to place, keeping weight close to center of gravity and pacing as needed. Tendency to hold breath and noted fear of dropping crate as she brought down from top shelf.     Pt completed seated mindfulness diaphragmatic breathing activity [id number] with focus on posture, mechanics of breathing, and benefits re: PNS activation. Pt voiced and demonstrated understanding.     Regine completed maximal lift 2x5 reps with 8 lbs, rated as Sort of hard (4/10) exertion, continued education focused on neutral spine positioning and pushing through heels for safety and activation of correct muscles. Tendency to put weight in toes and with decreased awareness and proprioception in feet. Able to make adjustments with max verbal cues, physical demonstration and visual cues with mirror.     Regine completed dynamic reaching in various functional ranges, from seated, with continued  focus on hip rotation/weight shifting, 4x3 reps x no added lbs, with hands clasped to decrease tendency to elevate right shoulder, rated as Sort of hard (4/10) exertion. Good use of diaphragmatic breathing in between sets as rest break.     Educated on weighted pendulum swings to perform with B shoulders. Completed [id number] each direction with 2lb weight in hand. Rated as moderate (3/10) exertion.     Pt completed seated mindfulness diaphragmatic breathing activity [id number] with focus on posture, mechanics of breathing, and benefits re: PNS activation. Pt voiced and demonstrated understanding.     No environmental, cultural, spiritual, developmental or education needs expressed or noted.    Home Exercises and Patient Education     Education provided:   -Ms. Osorio received education regarding proper posture and body mechanics. She received education regarding anticipated muscular soreness following lift testing and strategies for management were reviewed with patient including stretching, using ice, scheduled rest.  - aris scale, pain cycle, z-lie, functional lifting mechanics, pursed lip and diaphragmatic breathing techniques  - boom/bust theory  - Progress towards goals    Written Home Exercises Provided: yes. diaphragmatic breathing; FRP binder received.  Exercises were reviewed and Regine was able to demonstrate them prior to the end of the session.    Regine demonstrated good understanding of the education provided.     Handouts provided during sessions can be found in EMR under patient instructions, or might be part of the FRP binder.    Assessment     Regine presents with report of increased fatigue and soreness and noted decreased sleep. Also with report of added stress 2/2 MRI report of neck and inevitably need neck surgery eventually. Despite increased stress/soreness/fatigue she had good tolerance to session this date, with improved tolerance for functional activities. Required increased time to  complete functional activities but with good tolerance to repetition increases in functional lifts. Continues with difficulty with BUE AROM (R>L) but with good tolerance to stretches and education. Continues to progress towards personal goals.     Regine is unable to fully participate in community work, recreational, and home-based activities because of decreased functional  strength, decreased  AROM, decreased endurance, limited positional tolerance, fear of re-injury, and fear of increased pain. She will benefit from participating in a conditioning  program designed  to increase functional strength, flexibility, and endurance in order to meet functional goals.     Regine's prognosis is Good due to Good motivation and willingness to particiapte. She will benefit from skilled outpatient Occupational Therapy to address the limitations and goals stated above and in the chart below, provide patient/family education, and to maximize patient's level of functional independence.    Plan of care discussed with patient: Yes  Pt's spiritual, cultural and educational needs considered and patient is agreeable to the plan of care and goals as stated below:       FRP Goals: While working towards the long-term (2 month) functional goals listed above, Ms. Osorio will accomplish the following short term goals during the 5-week intensive rehabilitation program. Ms. Osorio will:     1. Develop a viable return to community work plan.   2. Demonstrate physical capacities consistent with a light-medium work demand level.   3. Demonstrate increased functional strength by lifting 20 lbs floor to waist and 20 lbs waist to shoulder in order to meet demand of work/home routine.   4. Increase her positional tolerance to the level needed for work and home activities.   5. Implement self-care strategies during the program and at home to control pain.   6.   Patient will demonstrate use of mindfulness, stress management, and coping   strategies for improved participation in daily routine.   7.  Will drink 7 x 16oz bottles of water a day.    Specific patient expressed goals and demand levels:  Current Capacity Limit/ Physical  Demand Level (PDL)   Demand Levels of Functional Recovery Goals    Performance/Satisfaction  Day 1 Performance/Satisfaction  Day 9 Performance/Satisfaction  Follow-up     Sedentary- Light Physical Demand  (Based above tested results)    Vocational:  Sitting tolerance, as needed for work    Recreational:  Walk for exercise    Daily Living:  Dusting (floors and furniture)    Laundry    Mopping    Standing tolerance when attending to chores    UB and LB dressing    Supine to sit     Light-medium Physical Demand Level    Pending chair, 5/3      1/0      3/1      3/1 1/0    3/0        3/0    1/0       The PDL listed above is based on the physical performance screening test performed at initial evaluation. More comprehensive physical  testing integrated with clinical findings would be required to derived an accurate work capacity.      Plan   Plan of care certification: 5/23/2022 to 7/1/2022.    Outpatient occupational therapy, 3 times a week for 5 weeks:     1. Functional conditioning daily to increase physical capacity and allow Ms. Osorio to meet demands of vocation and home.   2. Individual counseling to develop return to work/daily routine plan and better understand the return to work process and/or daily routine restructure.   3. Daily Stretching, aerobic conditioning and walking to increase functional tolerance and allow Ms. Osorio to meet demands of work and home.   4. Functional activities to increase ability to move fluidly and quickly and tolerate unguarded movements.   5. Re-education regarding proper postural, body mechanics, and coping strategies for healthy roles and routine for managing daily stressors.    6. Any other treatment deemed necessary for patient to achieve personally driven goals to promote  positive health and wellness and daily roles and routines    Soniya Mccauley, OTR/L   5/26/2022

## 2022-05-27 NOTE — PROGRESS NOTES
Group Psychotherapy    Site: [facility]    Clinical status of patient: Outpatient    5/26/2022    Length of service:[id number]    Referred by: [facility]     Number of patients in attendance: 5    Target symptoms: Chronic Pain Management     Patient's response to intervention:  The patient's response to intervention is active listening.    Progress toward goals and other mental status changes:  The patient's progress toward goals is fair .    Plan: group psychotherapy    Topics Covered: Pt attended CBT for Chronic Pain as part of her participation in [facility]. Topics discussed today included a discussion on sleep and sleep hygiene, how sleep impacts and is impacted by anxiety, depression, other mood disorders and chronic pain. Pts also went over homework for the weekend, will f/u on Tuesday.

## 2022-05-30 ENCOUNTER — PATIENT MESSAGE (OUTPATIENT)
Dept: PSYCHIATRY | Facility: OTHER | Age: 67
End: 2022-05-30
Payer: MEDICARE

## 2022-05-31 ENCOUNTER — CLINICAL SUPPORT (OUTPATIENT)
Dept: REHABILITATION | Facility: OTHER | Age: 67
End: 2022-05-31
Payer: MEDICARE

## 2022-05-31 ENCOUNTER — OFFICE VISIT (OUTPATIENT)
Dept: PSYCHIATRY | Facility: OTHER | Age: 67
End: 2022-05-31
Payer: COMMERCIAL

## 2022-05-31 DIAGNOSIS — M79.7 FIBROMYALGIA: ICD-10-CM

## 2022-05-31 DIAGNOSIS — G89.29 CENTRAL SENSITIZATION TO PAIN: ICD-10-CM

## 2022-05-31 DIAGNOSIS — M53.86 DECREASED RANGE OF MOTION OF LUMBAR SPINE: Primary | ICD-10-CM

## 2022-05-31 DIAGNOSIS — F40.298 FEAR OF PAIN: ICD-10-CM

## 2022-05-31 DIAGNOSIS — F43.29 ADJUSTMENT DISORDER WITH PHYSICAL COMPLAINTS: Primary | ICD-10-CM

## 2022-05-31 DIAGNOSIS — Z74.09 IMPAIRED FUNCTIONAL MOBILITY AND ACTIVITY TOLERANCE: ICD-10-CM

## 2022-05-31 DIAGNOSIS — Z74.09 IMPAIRED FUNCTIONAL MOBILITY, BALANCE, GAIT, AND ENDURANCE: ICD-10-CM

## 2022-05-31 DIAGNOSIS — R52 PAIN AGGRAVATED BY ACTIVITIES OF DAILY LIVING: Primary | ICD-10-CM

## 2022-05-31 PROCEDURE — 97530 THERAPEUTIC ACTIVITIES: CPT

## 2022-05-31 PROCEDURE — 90853 GROUP PSYCHOTHERAPY: CPT | Mod: ,,, | Performed by: SOCIAL WORKER

## 2022-05-31 PROCEDURE — 97110 THERAPEUTIC EXERCISES: CPT

## 2022-05-31 PROCEDURE — 3044F HG A1C LEVEL LT 7.0%: CPT | Mod: CPTII,,, | Performed by: SOCIAL WORKER

## 2022-05-31 PROCEDURE — 4010F ACE/ARB THERAPY RXD/TAKEN: CPT | Mod: CPTII,,, | Performed by: SOCIAL WORKER

## 2022-05-31 PROCEDURE — 90853 PR GROUP PSYCHOTHERAPY: ICD-10-PCS | Mod: ,,, | Performed by: SOCIAL WORKER

## 2022-05-31 PROCEDURE — 4010F PR ACE/ARB THEARPY RXD/TAKEN: ICD-10-PCS | Mod: CPTII,,, | Performed by: SOCIAL WORKER

## 2022-05-31 PROCEDURE — 3044F PR MOST RECENT HEMOGLOBIN A1C LEVEL <7.0%: ICD-10-PCS | Mod: CPTII,,, | Performed by: SOCIAL WORKER

## 2022-05-31 NOTE — PROGRESS NOTES
"[facility]   Occupational Therapy Treatment Note  P Day 4    Date: 5/31/2022  Name: Regine Osorio  Clinic Number: [id number]    Therapy Diagnosis:   Encounter Diagnoses   Name Primary?    Pain aggravated by activities of daily living Yes    Fear of pain     Impaired functional mobility and activity tolerance      Physician: Montserrat Castellon NP    Physician Orders: OT eval and treat   Medical Diagnosis: Fibromyalgia  Evaluation Date: 4/29/2022  Insurance Authorization Period Expiration: 06/22/2022  Plan of Care Certification Period: 7/1/2022  Visit # / Visits authorized: 3/ 11 (including evaluation)  FOTO: evaluation 59% limitation; 5/23/2022 58% limitation.    Precautions:  Standard and Fall    Time In: 2:40 PM   Time Out: 3:55 PM   Total Billable Time: 75 minutes    Subjective     Regine states: "I had a fun weekend but I overdid it. [place] [employer] [place] but It was a lot of walking and standing and I didn't know you could bring your own chair so I only made it about an hour"         Pain:  Current: 5 / 10  Location: R shoulder    Objective     Therapeutic activities to improve functional performance for 75 minutes, including:    Regine participated in endurance activity using Nustep for 10 minutes, at level 1.5 to improve functional endurance and progress towards increased MET level for improved community mobility and participation. Discussed goal of activity to challenge endurance and reach "sort of hard" on aris scaling, pacing to get "breathy not breathless". During activity educated pt on fluidity of movement, but with very slow speed. Voiced understanding of goal to find "just right challenge" but with difficulty with physical carry over. Rated as Sort of hard (4/10) exertion, Regine re-educated on how to add mindfulness component to activity and how to pace appropriately by completed self check ins and grading activity as needed, with goal to reach moderate " "to sort of hard by end of activity. Completed 217 steps.     Pt completed seated mindfulness diaphragmatic breathing activity [id number] with focus on posture, mechanics of breathing, and benefits re: PNS activation. Pt voiced and demonstrated understanding.     Regine completed dynamic reaching in various functional ranges, from seated, with continued focus on hip rotation/weight shifting, 3x3 reps x no added lbs, with hands clasped to decrease tendency to elevate right shoulder, rated as hard (5/10) exertion. Good use of diaphragmatic breathing in between sets as rest break.     Regine completed functional lifting, floor to waist, 3x5 reps x 5 lbs (placed to raised surface- double crate) with exertion rated Sort of hard (4/10); focused education on keeping weight close to center of gravity, wide base of support, knees tracking with toes, hinging hips, bending knees, keeping weight in heels, using breath to guide movement. Pt plans to use this lift related to laundry and cleaning. Took seated rest break in between sets and completed diaphragmatic breaths. Continued tendency to lean full body weight onto crate at end range, encouraged to keep weight in heels.     Regine participated in functional lift waist to shoulder, 3x5 reps x 5 lbs (lowered shelf to 4th notch), with a rating of Sort of hard (4/10) exertion. Regine educated on standing posture, core awareness, keeping shoulders depressed and retracted, stepping to place > reaching to place, keeping weight close to center of gravity and pacing as needed. Continued tendency to hold breath as activity progressively became more difficult. Re-educated on "exhale with exertion". Voiced understanding.      Pt completed seated mindfulness diaphragmatic breathing activity [id number] with focus on posture, mechanics of breathing, and benefits re: PNS activation. Pt voiced and demonstrated understanding.     Regine completed maximal lift 2x5 reps with 9 lbs, rated as " Sort of hard (4/10) exertion, continued education focused on neutral spine positioning and pushing through heels for safety and activation of correct muscles. Tendency to put weight in toes and with decreased awareness and proprioception in feet. Able to make adjustments with max verbal cues, physical demonstration and visual cues with mirror.     Educated on weighted pendulum swings to perform with B shoulders. Completed [id number] each direction with 2lb weight in hand. Rated as sort of hard (4/10) exertion.     Pt educated on and participated in seated yoga flow x10 min with focus on stability, strength, balance, sitting posture, alignment, and coordinating breath with movement. Pt educated on both physical and mental benefits of yoga.     No environmental, cultural, spiritual, developmental or education needs expressed or noted.    Home Exercises and Patient Education     Education provided:   -Ms. Osorio received education regarding proper posture and body mechanics. She received education regarding anticipated muscular soreness following lift testing and strategies for management were reviewed with patient including stretching, using ice, scheduled rest.  - aris scale, pain cycle, z-lie, functional lifting mechanics, pursed lip and diaphragmatic breathing techniques  - boom/bust theory  - Progress towards goals    Written Home Exercises Provided: yes. diaphragmatic breathing; FRP binder received.  Exercises were reviewed and Regine was able to demonstrate them prior to the end of the session.    Regine demonstrated good understanding of the education provided.     Handouts provided during sessions can be found in EMR under patient instructions, or might be part of the FRP binder.    Assessment     Regine presents with continued report of increased fatigue and soreness and noted boom/busting over the weekend. Despite increased fatigue she had fair-good tolerance to session, with increased time required to  complete functional activities but with good use of breathing techniques during seated rest breaks. Continues with difficulty with BUE AROM (R>L) and with some resistance to challenge ROM/endurance/strength. Overall, continues to progress towards personal goals.     Regine is unable to fully participate in community work, recreational, and home-based activities because of decreased functional  strength, decreased  AROM, decreased endurance, limited positional tolerance, fear of re-injury, and fear of increased pain. She will benefit from participating in a conditioning  program designed  to increase functional strength, flexibility, and endurance in order to meet functional goals.     Regine's prognosis is Good due to Good motivation and willingness to particiapte. She will benefit from skilled outpatient Occupational Therapy to address the limitations and goals stated above and in the chart below, provide patient/family education, and to maximize patient's level of functional independence.    Plan of care discussed with patient: Yes  Pt's spiritual, cultural and educational needs considered and patient is agreeable to the plan of care and goals as stated below:       FRP Goals: While working towards the long-term (2 month) functional goals listed above, Ms. Osorio will accomplish the following short term goals during the 5-week intensive rehabilitation program. Ms. Osorio will:     1. Develop a viable return to community work plan.   2. Demonstrate physical capacities consistent with a light-medium work demand level.   3. Demonstrate increased functional strength by lifting 20 lbs floor to waist and 20 lbs waist to shoulder in order to meet demand of work/home routine.   4. Increase her positional tolerance to the level needed for work and home activities.   5. Implement self-care strategies during the program and at home to control pain.   6.   Patient will demonstrate use of mindfulness, stress management,  and coping  strategies for improved participation in daily routine.   7.  Will drink 7 x 16oz bottles of water a day.    Specific patient expressed goals and demand levels:  Current Capacity Limit/ Physical  Demand Level (PDL)   Demand Levels of Functional Recovery Goals    Performance/Satisfaction  Day 1 Performance/Satisfaction  Day 9 Performance/Satisfaction  Follow-up     Sedentary- Light Physical Demand  (Based above tested results)    Vocational:  Sitting tolerance, as needed for work    Recreational:  Walk for exercise    Daily Living:  Dusting (floors and furniture)    Laundry    Mopping    Standing tolerance when attending to chores    UB and LB dressing    Supine to sit     Light-medium Physical Demand Level    Pending chair, 5/3      1/0      3/1      3/1 1/0    3/0        3/0    1/0       The PDL listed above is based on the physical performance screening test performed at initial evaluation. More comprehensive physical  testing integrated with clinical findings would be required to derived an accurate work capacity.      Plan   Plan of care certification: 5/23/2022 to 7/1/2022.    Outpatient occupational therapy, 3 times a week for 5 weeks:     1. Functional conditioning daily to increase physical capacity and allow Ms. Osorio to meet demands of vocation and home.   2. Individual counseling to develop return to work/daily routine plan and better understand the return to work process and/or daily routine restructure.   3. Daily Stretching, aerobic conditioning and walking to increase functional tolerance and allow Ms. Osorio to meet demands of work and home.   4. Functional activities to increase ability to move fluidly and quickly and tolerate unguarded movements.   5. Re-education regarding proper postural, body mechanics, and coping strategies for healthy roles and routine for managing daily stressors.    6. Any other treatment deemed necessary for patient to achieve personally driven goals to  promote positive health and wellness and daily roles and routines    Soniya Mccauley, OTR/L   5/31/2022

## 2022-05-31 NOTE — PROGRESS NOTES
"[facility]    Functional Restoration Program  Physical Therapy Treatment Note  FRP Day 4    Name: Regine Osorio  MRN:[id number]      Therapy Diagnosis:   Encounter Diagnoses   Name Primary?    Decreased range of motion of lumbar spine Yes    Central sensitization to pain     Fibromyalgia     Impaired functional mobility, balance, gait, and endurance      Physician: Montserrat Castellon NP    Date: 5/31/2022    Evaluation Date: 4/29/2022  Authorization Period Expiration: 06/22/2022  Plan of Care Expiration: 07/29/2022  Visit # / Visits authorized: 5/ 11  FOTO: completed 01 / 03       Time In: 1:25 pm  Time Out: 2:40 pm  Total Billable Time: 75 minutes    SUBJECTIVE       Regine reports she really overdid it this weekend going to [employer] [place] w/out bringing a chair. She was completely "out of commission" on Sunday because of it. She did not try her stretch routine yet & could not verbalize her reasoning.         Patient's FRP goals: I want to be able to walk again, travel /s cane, inc spontaneity /c my daily activities      Current 4/10  Location(s): lower back & B LE, R shld    OBJECTIVE     Objective Measures updated at progress report unless specified.     Treatment       Regine received the Individualized Treatments listed below:     therapeutic exercises to develop strength, endurance, ROM, flexibility, posture and core stabilization for 75 minutes including:    Full body stretch w/ 3-10 sec hold technique &/or 3-5 repetition technique on all of the following:   Cervical flx/ext   Cervical sidebending   Cervical rotation   Cervical protraction/retraction   Shld rolls   Shld depression/elevation   Scapular retraction/protraction/scaption   Posterior shld stretch (cross arm)   Shld ER stretch (chicken wing)   Elbow flx/ext   Forearm supination/pronation   Wrist flx/ext   Open/close hands/fingers   Seated trunk rotations   Seated trunk/thoracic ext/flx   Seated " marches & knee to chest   Seated knee flx/ext   Seated hip ER/piriformis stretch   Seated hamstring stretch   Seated toe raise to heel raise    Seated ankle circles each way   Standing lumbar extensions   Standing lateral leaning stretch   Standing quad stretch (UE support for balance)    Peripheral muscle strengthening which included 1-2 sets of 10-20 repetitions at a slow, controlled 5-7 second per rep pace focused on strengthening supporting musculature for improved body mechanics & functional mobility.  Patient & therapist focused on proper form during treatment to ensure optimal strengthening of each targeted muscle group. Patients are instructed to keep sets/reps with proper load to stay at 3-5 out of 10 on the provided modified Radha exertion scale.       BATCA Machine Exercises Weight (lbs) Sets Repetitions Radha Exertion Scale Rating   Leg Extension (AAROM) 5 1 15 5   Hamstring Curls 7.5 1 15 5   Chest Press       Pec Fly       Lat Pull Down 5 1 15 4   Mid Row 7.5 1 15 4   Bicep DB Curls 3 1 10 4   Standing Tricep Extension 10 1 15 5   Single Leg Press 7.5 1 15 6 R, 5 L         · Fitter board medium tilts w/  support 20 each way  · Sitting alt UE & LE flx 10 B  · Sitting Tball compressions w/ pursed lip breathing 15 B  · 5x10 seated diaphragmatic breaths throughout session         Patient Education and Home Exercises     Home Exercises Provided and Patient Education Provided     Education provided:   - none      Written Home Exercises Provided: Patient instructed to cont prior HEP. Exercises were reviewed and Regine was able to demonstrate them prior to the end of the session.  Regine demonstrated fair  understanding of the education provided. See EMR under Patient Instructions for information provided during therapy sessions    ASSESSMENT     Pt w/ fairly good participation in therapy today but needed cuing on nearly all resisted exercises to reach 15 reps. She continued to need AAROM on leg  extensions due to weakness & fear of exercise. Noncompliance w/ home stretch routine indicates she may have a great deal of challenges to making behavioral changes. She continues to demonstrate slow & fearful movements in all mobility; home stretch routine will assist in correcting & PT emphasized the importance of compliance.     Regine Is progressing well towards her goals.   Pt prognosis is Guarded.     Pt will continue to benefit from skilled outpatient physical therapy to address the deficits listed in the problem list box on initial evaluation, provide pt/family education and to maximize pt's level of independence in the home and community environment.     Pt's spiritual, cultural and educational needs considered and pt agreeable to plan of care and goals.     Anticipated barriers to physical therapy: chronic nature of pain, fear of pain, marked mobility deficits     GOALS: Pt is in agreement with the following goals:        Goal Progress Towards Goal   Short Term: In 3 weeks, pt will:     Verbalize & demonstrate good understanding of resisted training with 3-1-3 second rep for [id number] contraction to encourage full muscle recruitment for strength & endurance Progress towards goal: initiated 4/29/2022   Verbalize & demonstrate good understanding of home stretch routine to improve AROM, help decrease pain & improve mobility Progress towards goal: initiated 4/29/2022   Demonstrate proper supine or seated diaphragmatic breathing with decreased chest excursion & emphasis on full abdominal excursion & increased expiration time to promote muscle relaxation & increased parasympathetic activity to improve patient tolerance to activities Progress towards goal: initiated 4/29/2022   Demonstrate proper static standing posture in frontal & sagittal plane per PT visual assessment to decrease postural strain in ADLs Progress towards goal: initiated 4/29/2022   Demonstrate good recall of names of  resisted exercises w/ minimal to moderate verbal cues to promote independence w/ exercise at the end of the program Progress towards goal: initiated 4/29/2022   Verbalize plan for continuing resisted exercises w/ specific location (i.e. commercial gym, home, community fitness center) indicating preference for machine-based or free-weight exercises to incorporate all major muscle groups to maintain & progress therapeutic functional improvements Progress towards goal: initiated 4/29/2022   Pt will perform supine <> sit x 3 Ind /s inc LB or cervical discomfort for inc bed mobility  Progress towards goal: initiated 4/29/2022         Long Term: In 8 weeks, pt will:     Verbalize good understanding of activities planning/scheduling (stretching, mindfulness, resisted training, & cardio) prescribed by PT/OT following the end of the program to sustain & progress functional improvements Progress towards goal: initiated 4/29/2022   Be independent w/ selected resisted training w/ minimal to no cuing while performing to continue w/ resisted strengthening independently at the end of the program Progress towards goal: initiated 4/29/2022   Improve 2 Minute Walk Test (MWT) to 300 feet and 6 MWT to 900 feet or greater to improve gait speed and mobility Progress towards goal: initiated 4/29/2022   Perform single leg stance 15 seconds or greater bilaterally to improve safety and balance in ADLs Progress towards goal: initiated 4/29/2022   Demonstrate Timed Up & Go (with appropriate assistive device, if necessary) of 18 seconds or less to decrease fall risk and improve functional mobility Progress towards goal: initiated 4/29/2022   Demonstrate 5 time sit to stand test without upper extremity assist to 20 sec or less to improve general mobility and decrease fall risk Progress towards goal: initiated 4/29/2022   Score 50 % or less on Lumbar FOTO to indicate significant functional improvements in impaired functions secondary to low back  pain Progress towards goal: initiated 4/29/2022             PLAN     Continue PT per POC     Kar Trevizo, PT, DPT

## 2022-05-31 NOTE — PROGRESS NOTES
Group Psychotherapy    Site: [facility]    Clinical status of patient: Outpatient    5/31/2022    Length of service:[id number]    Referred by: Functional [facility]oration Program     Number of patients in attendance: 5    Target symptoms: Chronic Pain Management     Patient's response to intervention:  The patient's response to intervention is active listening.    Progress toward goals and other mental status changes:  The patient's progress toward goals is fair .    Plan: group psychotherapy    Topics Covered: Pt attended CBT for Chronic Pain as part of her participation in Functional Restoration. Topics discussed today included a discussion of their weekend and what they did differently based upon what they have learned thus far. Pts expressed difficulty with getting homework done, with daily walking and stretching. We also discussed the concept of self care in the context of chronic pain and how self care impacts and is impacted by anxiety, depression, other mood disorders and pain. Pts filled out a self care wheel and discussed. Will f/u in one day.

## 2022-06-01 ENCOUNTER — CLINICAL SUPPORT (OUTPATIENT)
Dept: REHABILITATION | Facility: OTHER | Age: 67
End: 2022-06-01
Payer: MEDICARE

## 2022-06-01 ENCOUNTER — PATIENT MESSAGE (OUTPATIENT)
Dept: OBSTETRICS AND GYNECOLOGY | Facility: CLINIC | Age: 67
End: 2022-06-01
Payer: MEDICARE

## 2022-06-01 ENCOUNTER — OFFICE VISIT (OUTPATIENT)
Dept: PSYCHIATRY | Facility: OTHER | Age: 67
End: 2022-06-01
Payer: COMMERCIAL

## 2022-06-01 DIAGNOSIS — G89.29 CENTRAL SENSITIZATION TO PAIN: ICD-10-CM

## 2022-06-01 DIAGNOSIS — Z74.09 IMPAIRED FUNCTIONAL MOBILITY, BALANCE, GAIT, AND ENDURANCE: ICD-10-CM

## 2022-06-01 DIAGNOSIS — Z74.09 IMPAIRED FUNCTIONAL MOBILITY AND ACTIVITY TOLERANCE: ICD-10-CM

## 2022-06-01 DIAGNOSIS — M79.7 FIBROMYALGIA: ICD-10-CM

## 2022-06-01 DIAGNOSIS — F40.298 FEAR OF PAIN: ICD-10-CM

## 2022-06-01 DIAGNOSIS — M53.86 DECREASED RANGE OF MOTION OF LUMBAR SPINE: Primary | ICD-10-CM

## 2022-06-01 DIAGNOSIS — R52 PAIN AGGRAVATED BY ACTIVITIES OF DAILY LIVING: Primary | ICD-10-CM

## 2022-06-01 DIAGNOSIS — F43.29 ADJUSTMENT DISORDER WITH PHYSICAL COMPLAINTS: Primary | ICD-10-CM

## 2022-06-01 PROCEDURE — 4010F PR ACE/ARB THEARPY RXD/TAKEN: ICD-10-PCS | Mod: CPTII,,, | Performed by: SOCIAL WORKER

## 2022-06-01 PROCEDURE — 3044F PR MOST RECENT HEMOGLOBIN A1C LEVEL <7.0%: ICD-10-PCS | Mod: CPTII,,, | Performed by: SOCIAL WORKER

## 2022-06-01 PROCEDURE — 97110 THERAPEUTIC EXERCISES: CPT

## 2022-06-01 PROCEDURE — 3044F HG A1C LEVEL LT 7.0%: CPT | Mod: CPTII,,, | Performed by: SOCIAL WORKER

## 2022-06-01 PROCEDURE — 90853 GROUP PSYCHOTHERAPY: CPT | Mod: ,,, | Performed by: SOCIAL WORKER

## 2022-06-01 PROCEDURE — 97530 THERAPEUTIC ACTIVITIES: CPT

## 2022-06-01 PROCEDURE — 4010F ACE/ARB THERAPY RXD/TAKEN: CPT | Mod: CPTII,,, | Performed by: SOCIAL WORKER

## 2022-06-01 PROCEDURE — 90853 PR GROUP PSYCHOTHERAPY: ICD-10-PCS | Mod: ,,, | Performed by: SOCIAL WORKER

## 2022-06-01 NOTE — PROGRESS NOTES
Group Psychotherapy    Site: [facility]    Clinical status of patient: Outpatient    6/1/2022    Length of service:[id number]    Referred by: Functional [facility]oration Program     Number of patients in attendance: 4    Target symptoms: Chronic Pain Management     Patient's response to intervention:  The patient's response to intervention is active listening.    Progress toward goals and other mental status changes:  The patient's progress toward goals is fair .    Plan: group psychotherapy    Topics Covered: Pt attended CBT for Chronic Pain as part of her participation in Functional Restoration. Topics discussed today included a discussion on the autonomic nervous system. Pts also watched Dr. Irasema Chen's TOÑO talk on ACEs. Pts also participated in a nutrition lecture on eating for managing inflammation. All questions answered, will f/u in 1 day.

## 2022-06-01 NOTE — PROGRESS NOTES
"[facility]   Occupational Therapy Treatment Note  [place] [facility]    Date: 6/1/2022  Name: Regine Osorio  Clinic Number: [id number]    Therapy Diagnosis:   Encounter Diagnoses   Name Primary?    Pain aggravated by activities of daily living Yes    Fear of pain     Impaired functional mobility and activity tolerance      Physician: Montserrat Castellon NP    Physician Orders: OT eval and treat   Medical Diagnosis: Fibromyalgia  Evaluation Date: 4/29/2022  Insurance Authorization Period Expiration: 06/22/2022  Plan of Care Certification Period: 7/1/2022  Visit # / Visits authorized: 4/ 11 (including evaluation)  FOTO: evaluation 59% limitation; 5/23/2022 58% limitation.    Precautions:  Standard and Fall    Time In: 1:30 PM   Time Out: 2:45 PM   Total Billable Time: 75 minutes    Subjective     Regine states: "I am just still really sore from last week I think"         Pain:  Current: 4 / 10  Location: generalized    Objective     Therapeutic activities to improve functional performance for 75 minutes, including:    Full body stretch w/ 3-10 sec hold technique &/or 3-5 repetition technique on all of the following:   Cervical flx/ext   Cervical sidebending   Cervical rotation   Cervical protraction/retraction   Shld rolls   Shld depression/elevation   Scapular retraction/protraction/scaption   Posterior shld stretch (cross arm)   Shld ER stretch (chicken wing)   Elbow flx/ext   Forearm supination/pronation   Wrist flx/ext   Open/close hands/fingers   Seated trunk rotations   Seated trunk/thoracic ext/flx   Seated marches & knee to chest   Seated knee flx/ext   Seated hip ER/piriformis stretch   Seated hamstring stretch   Seated toe raise to heel raise    Seated ankle circles each way   Standing lumbar extensions   Standing lateral leaning stretch   Standing quad stretch (UE support for balance)    Regine completed functional lifting, floor to waist, 3x5 reps x 7 lbs " "(placed to raised surface- double crate) with exertion rated hard (5/10); focused education on keeping weight close to center of gravity, wide base of support, knees tracking with toes, hinging hips, bending knees, keeping weight in heels, using breath to guide movement. Pt plans to use this lift related to laundry and cleaning. Took seated rest break in between sets and completed diaphragmatic breaths. Continued tendency to lean full body weight onto crate at end range, encouraged to keep weight in heels.     Regine completed dynamic reaching in various functional ranges, from seated, with continued focus on hip rotation/weight shifting, x10 reps x no added lbs, with hands clasped to decrease tendency to elevate right shoulder, rated as hard (6/10) exertion. Good use of diaphragmatic breathing post activity but noted increased back pain. Discussed pacing appropriately.     Regine completed maximal lift 2x5 reps with 10 lbs, rated as hard (6/10) exertion, continued education focused on neutral spine positioning and pushing through heels for safety and activation of correct muscles. Tendency to put weight in toes and with decreased awareness and proprioception in feet. Able to make adjustments with max verbal cues, physical demonstration and visual cues with mirror.     Pt completed seated mindfulness diaphragmatic breathing activity [id number] with focus on posture, mechanics of breathing, and benefits re: PNS activation. Pt voiced and demonstrated understanding.     Pt educated on mod LSVT technique for sit<>stand using high velocity, big movements, with focus on using BUE for weight shift/momentum as well as "noes over toes" and coordinating movement with breath. Pt completed 0 reps with max verbal cues, physical demonstration and double blue pads on chair needed. Minimally able to mirror body mechanics of movement but continued to note throughout session that she was able to get her bottom off of the chair " despite being able to stand. Pt also with increased difficulty with sequencing of movement.     No environmental, cultural, spiritual, developmental or education needs expressed or noted.    Home Exercises and Patient Education     Education provided:   -Ms. Osorio received education regarding proper posture and body mechanics. She received education regarding anticipated muscular soreness following lift testing and strategies for management were reviewed with patient including stretching, using ice, scheduled rest.  - aris scale, pain cycle, z-lie, functional lifting mechanics, pursed lip and diaphragmatic breathing techniques  - boom/bust theory  - Progress towards goals    Written Home Exercises Provided: yes. diaphragmatic breathing; FRP binder received.  Exercises were reviewed and Regine was able to demonstrate them prior to the end of the session.    Regine demonstrated good understanding of the education provided.     Handouts provided during sessions can be found in EMR under patient instructions, or might be part of the FRP binder.    Assessment     Regine presents with continued report of increased fatigue and soreness. She had fair tolerance to session this date with continued increased time required to complete functional activities but also with good use of breathing techniques during seated rest breaks. Extreme difficulty with sit to stand activity 2/2 fatigue, effort and sequencing. Despite challenges, she continues with willingness to participate and openness to feedback and education, and overall, continues to progress towards personal goals.     Regine is unable to fully participate in community work, recreational, and home-based activities because of decreased functional  strength, decreased  AROM, decreased endurance, limited positional tolerance, fear of re-injury, and fear of increased pain. She will benefit from participating in a conditioning  program designed  to increase  functional strength, flexibility, and endurance in order to meet functional goals.     Regine's prognosis is Good due to Good motivation and willingness to particiapte. She will benefit from skilled outpatient Occupational Therapy to address the limitations and goals stated above and in the chart below, provide patient/family education, and to maximize patient's level of functional independence.    Plan of care discussed with patient: Yes  Pt's spiritual, cultural and educational needs considered and patient is agreeable to the plan of care and goals as stated below:       FRP Goals: While working towards the long-term (2 month) functional goals listed above, Ms. Osorio will accomplish the following short term goals during the 5-week intensive rehabilitation program. Ms. Osorio will:     1. Develop a viable return to community work plan.   2. Demonstrate physical capacities consistent with a light-medium work demand level.   3. Demonstrate increased functional strength by lifting 20 lbs floor to waist and 20 lbs waist to shoulder in order to meet demand of work/home routine.   4. Increase her positional tolerance to the level needed for work and home activities.   5. Implement self-care strategies during the program and at home to control pain.   6.   Patient will demonstrate use of mindfulness, stress management, and coping  strategies for improved participation in daily routine.   7.  Will drink 7 x 16oz bottles of water a day.    Specific patient expressed goals and demand levels:  Current Capacity Limit/ Physical  Demand Level (PDL)   Demand Levels of Functional Recovery Goals    Performance/Satisfaction  Day 1 Performance/Satisfaction  Day 9 Performance/Satisfaction  Follow-up     Sedentary- Light Physical Demand  (Based above tested results)    Vocational:  Sitting tolerance, as needed for work    Recreational:  Walk for exercise    Daily Living:  Dusting (floors and  furniture)    Laundry    Mopping    Standing tolerance when attending to chores    UB and LB dressing    Supine to sit     Light-medium Physical Demand Level    Pending chair, 5/3      1/0      3/1      3/1    1/0    3/0        3/0 1/0       The PDL listed above is based on the physical performance screening test performed at initial evaluation. More comprehensive physical  testing integrated with clinical findings would be required to derived an accurate work capacity.      Plan   Plan of care certification: 5/23/2022 to 7/1/2022.    Outpatient occupational therapy, 3 times a week for 5 weeks:     1. Functional conditioning daily to increase physical capacity and allow Ms. Osorio to meet demands of vocation and home.   2. Individual counseling to develop return to work/daily routine plan and better understand the return to work process and/or daily routine restructure.   3. Daily Stretching, aerobic conditioning and walking to increase functional tolerance and allow Ms. Osorio to meet demands of work and home.   4. Functional activities to increase ability to move fluidly and quickly and tolerate unguarded movements.   5. Re-education regarding proper postural, body mechanics, and coping strategies for healthy roles and routine for managing daily stressors.    6. Any other treatment deemed necessary for patient to achieve personally driven goals to promote positive health and wellness and daily roles and routines    Soniya Mccauley, OTR/L   6/1/2022

## 2022-06-01 NOTE — PROGRESS NOTES
[facility]    Functional Restoration Program  Physical Therapy Treatment Note  [facility] Day 5    Name: Regine Osorio  MRN:[id number]      Therapy Diagnosis:   Encounter Diagnoses   Name Primary?    Decreased range of motion of lumbar spine Yes    Central sensitization to pain     Fibromyalgia     Impaired functional mobility, balance, gait, and endurance      Physician: Montserrat Castellon NP    Date: 6/1/2022    Evaluation Date: 4/29/2022  Authorization Period Expiration: 06/22/2022  Plan of Care Expiration: 07/29/2022  Visit # / Visits authorized: 6/ 11  FOTO: completed 01 / 03       Time In: 2:45 pm  Time Out: 4:00 pm  Total Billable Time: 75 minutes    SUBJECTIVE       Regine reports soreness from yesterday session but not rising to intolerable levels.  Pt report not performing stretching program since last session.  Pt report not watching lecture for today either.    Pt report her take away from neurosurgeon consult is that surgery is inevitable.          Patient's FRP goals: I want to be able to walk again, travel /s cane, inc spontaneity /c my daily activities      Current 5/10  Location(s): lower back & neck, R shld    OBJECTIVE     Objective Measures updated at progress report unless specified.     Treatment       Regine received the Individualized Treatments listed below:     therapeutic exercises to develop strength, endurance, ROM, flexibility, posture and core stabilization for 75 minutes including:      Peripheral muscle strengthening which included 1-2 sets of 10-20 repetitions at a slow, controlled 5-7 second per rep pace focused on strengthening supporting musculature for improved body mechanics & functional mobility.  Patient & therapist focused on proper form during treatment to ensure optimal strengthening of each targeted muscle group. Patients are instructed to keep sets/reps with proper load to stay at 3-5 out of 10 on the provided modified Radha exertion scale.        [employer] Machine Exercises Weight (lbs) Sets Repetitions Radha Exertion Scale Rating   Leg Extension (AAROM)       Hamstring Curls       Chest Press 5 1 20 5   Pec Fly 5 1 20 7 RUE    Lat Pull Down 10 1 15 4   Mid Row 10 1 15 4   Bicep DB Curls       Standing Tricep Extension       Single Leg Press R/L  10 1 15/10 5 R, 7 L         Seated-  8 card in [place] grab   R 23.89 sec, 27.41 sec min shoulder ROM especially flex, inc wrist range required   L 11.31 sec, 8.80 sec  Shoulder in 90 deg flex thru movement    Stepping flat ground    Lat 20 ft laps x 6     Timed x 2  17.69 sec, 18.11 sec  6 steps to achieve 1/2 lap   Fwd/Bwd 20 ft laps x 3    Random stepping PT directed 30 sec x 2       Fitter board - mid setting, 20 tilt bouts   Fwd/Bwd    Trial 1 - 2 hand  fade to 1 hand  /c PT suggestion, cue for inc speed     Trial 2 - 2 hand  fade to 1 hand , unable to keep 50 bpm tilts    Lat    Trial 1- 1 hand  (L), cue for inc speed     Trial 2- 1 hand  (R), cue for inc speed          Patient Education and Home Exercises     Home Exercises Provided and Patient Education Provided     Education provided:   - importance of cont HEP viewed as a pre-hab for potential upcoming surgery       Written Home Exercises Provided: Patient instructed to cont prior HEP. Exercises were reviewed and Regine was able to demonstrate them prior to the end of the session.  Regine demonstrated fair  understanding of the education provided. See EMR under Patient Instructions for information provided during therapy sessions    ASSESSMENT     Pt cont noncompliance /c HEP and lecture portion.  Therefore, pt reminded of importance of FRP values and strengthening especially in face of potential cervical surgery upon completion of FRP.  Pt verbalize understanding and intent to fully participate.  Pt need cue to inc pace /c resistance exercises leading to improved movement quality and inc reps.  However, pt cont to need extended  time between exercises due to slowness of transitions.  Pt again encouraged to perform home stretch routine to assist in lessening slow and fearful movements.  Timed activities performed for baseline measure.  Plan to reassess at week 5 for possible improvements to movement quality and speed.   Addressing pt goal of inc amb tolerance, inc standing time inc to > 45 min throughout all activities .  Pt needed only 1 rest break /p 1st trials of fitter board indicating improved standing tolerance.  Plan to complete full complement of resistance exercises next visit thereby meeting FRP principle of achieving full body challenge.                Regine Is progressing well towards her goals.   Pt prognosis is Guarded.     Pt will continue to benefit from skilled outpatient physical therapy to address the deficits listed in the problem list box on initial evaluation, provide pt/family education and to maximize pt's level of independence in the home and community environment.     Pt's spiritual, cultural and educational needs considered and pt agreeable to plan of care and goals.     Anticipated barriers to physical therapy: chronic nature of pain, fear of pain, marked mobility deficits     GOALS: Pt is in agreement with the following goals:        Goal Progress Towards Goal   Short Term: In 3 weeks, pt will:     Verbalize & demonstrate good understanding of resisted training with 3-1-3 second rep for [id number] contraction to encourage full muscle recruitment for strength & endurance Progress towards goal: initiated 4/29/2022   Verbalize & demonstrate good understanding of home stretch routine to improve AROM, help decrease pain & improve mobility Progress towards goal: initiated 4/29/2022   Demonstrate proper supine or seated diaphragmatic breathing with decreased chest excursion & emphasis on full abdominal excursion & increased expiration time to promote muscle relaxation & increased parasympathetic  activity to improve patient tolerance to activities Progress towards goal: initiated 4/29/2022   Demonstrate proper static standing posture in frontal & sagittal plane per PT visual assessment to decrease postural strain in ADLs Progress towards goal: initiated 4/29/2022   Demonstrate good recall of names of resisted exercises w/ minimal to moderate verbal cues to promote independence w/ exercise at the end of the program Progress towards goal: initiated 4/29/2022   Verbalize plan for continuing resisted exercises w/ specific location (i.e. commercial gym, home, community fitness center) indicating preference for machine-based or free-weight exercises to incorporate all major muscle groups to maintain & progress therapeutic functional improvements Progress towards goal: initiated 4/29/2022   Pt will perform supine <> sit x 3 Ind /s inc LB or cervical discomfort for inc bed mobility  Progress towards goal: initiated 4/29/2022         Long Term: In 8 weeks, pt will:     Verbalize good understanding of activities planning/scheduling (stretching, mindfulness, resisted training, & cardio) prescribed by PT/OT following the end of the program to sustain & progress functional improvements Progress towards goal: initiated 4/29/2022   Be independent w/ selected resisted training w/ minimal to no cuing while performing to continue w/ resisted strengthening independently at the end of the program Progress towards goal: initiated 4/29/2022   Improve 2 Minute Walk Test (MWT) to 300 feet and 6 MWT to 900 feet or greater to improve gait speed and mobility Progress towards goal: initiated 4/29/2022   Perform single leg stance 15 seconds or greater bilaterally to improve safety and balance in ADLs Progress towards goal: initiated 4/29/2022   Demonstrate Timed Up & Go (with appropriate assistive device, if necessary) of 18 seconds or less to decrease fall risk and improve functional mobility Progress towards goal: initiated 4/29/2022    Demonstrate 5 time sit to stand test without upper extremity assist to 20 sec or less to improve general mobility and decrease fall risk Progress towards goal: initiated 4/29/2022   Score 50 % or less on Lumbar FOTO to indicate significant functional improvements in impaired functions secondary to low back pain Progress towards goal: initiated 4/29/2022             PLAN     Continue PT per POC     Dequan Wilde, PT, DPT

## 2022-06-01 NOTE — PROGRESS NOTES
Group Psychotherapy    Site: [facility]    Clinical status of patient: Outpatient    5/25/2022    Length of service:[id number]    Referred by: Functional Restoration Program     Number of patients in attendance: 5    Target symptoms: Chronic Pain Management     Patient's response to intervention:  The patient's response to intervention is active listening.    Progress toward goals and other mental status changes:  The patient's progress toward goals is fair .    Plan: group psychotherapy    Topics Covered: Pt attended CBT for Chronic Pain as part of her participation in Functional Restoration. Topics discussed today included a review of CBT for chronic pain, the pain cycle and the stages of change. We discussed how these things impact, and are impacted by anxiety, depression, other mood disorders and chronic pain. Will f/u in 1 day.

## 2022-06-02 ENCOUNTER — CLINICAL SUPPORT (OUTPATIENT)
Dept: REHABILITATION | Facility: OTHER | Age: 67
End: 2022-06-02
Payer: MEDICARE

## 2022-06-02 ENCOUNTER — OFFICE VISIT (OUTPATIENT)
Dept: PSYCHIATRY | Facility: OTHER | Age: 67
End: 2022-06-02
Payer: COMMERCIAL

## 2022-06-02 ENCOUNTER — OFFICE VISIT (OUTPATIENT)
Dept: DERMATOLOGY | Facility: CLINIC | Age: 67
End: 2022-06-02
Payer: MEDICARE

## 2022-06-02 DIAGNOSIS — Z74.09 IMPAIRED FUNCTIONAL MOBILITY AND ACTIVITY TOLERANCE: ICD-10-CM

## 2022-06-02 DIAGNOSIS — R52 PAIN AGGRAVATED BY ACTIVITIES OF DAILY LIVING: Primary | ICD-10-CM

## 2022-06-02 DIAGNOSIS — F40.298 FEAR OF PAIN: ICD-10-CM

## 2022-06-02 DIAGNOSIS — F43.29 ADJUSTMENT DISORDER WITH PHYSICAL COMPLAINTS: Primary | ICD-10-CM

## 2022-06-02 DIAGNOSIS — L82.1 SK (SEBORRHEIC KERATOSIS): ICD-10-CM

## 2022-06-02 DIAGNOSIS — Z74.09 IMPAIRED FUNCTIONAL MOBILITY, BALANCE, GAIT, AND ENDURANCE: ICD-10-CM

## 2022-06-02 DIAGNOSIS — G89.29 CENTRAL SENSITIZATION TO PAIN: ICD-10-CM

## 2022-06-02 DIAGNOSIS — L29.9 ITCHING: ICD-10-CM

## 2022-06-02 DIAGNOSIS — L29.9 SCALP ITCH: ICD-10-CM

## 2022-06-02 DIAGNOSIS — M53.86 DECREASED RANGE OF MOTION OF LUMBAR SPINE: Primary | ICD-10-CM

## 2022-06-02 DIAGNOSIS — M79.7 FIBROMYALGIA: ICD-10-CM

## 2022-06-02 DIAGNOSIS — L21.9 SEBORRHEIC DERMATITIS OF SCALP: ICD-10-CM

## 2022-06-02 DIAGNOSIS — Z76.89 ENCOUNTER FOR SKIN CARE: Primary | ICD-10-CM

## 2022-06-02 PROCEDURE — 1159F PR MEDICATION LIST DOCUMENTED IN MEDICAL RECORD: ICD-10-PCS | Mod: CPTII,S$GLB,, | Performed by: DERMATOLOGY

## 2022-06-02 PROCEDURE — 99204 OFFICE O/P NEW MOD 45 MIN: CPT | Mod: S$GLB,,, | Performed by: DERMATOLOGY

## 2022-06-02 PROCEDURE — 1101F PR PT FALLS ASSESS DOC 0-1 FALLS W/OUT INJ PAST YR: ICD-10-PCS | Mod: CPTII,S$GLB,, | Performed by: DERMATOLOGY

## 2022-06-02 PROCEDURE — 4010F PR ACE/ARB THEARPY RXD/TAKEN: ICD-10-PCS | Mod: CPTII,,, | Performed by: SOCIAL WORKER

## 2022-06-02 PROCEDURE — 1101F PT FALLS ASSESS-DOCD LE1/YR: CPT | Mod: CPTII,S$GLB,, | Performed by: DERMATOLOGY

## 2022-06-02 PROCEDURE — 4010F PR ACE/ARB THEARPY RXD/TAKEN: ICD-10-PCS | Mod: CPTII,S$GLB,, | Performed by: DERMATOLOGY

## 2022-06-02 PROCEDURE — 99204 PR OFFICE/OUTPT VISIT, NEW, LEVL IV, 45-59 MIN: ICD-10-PCS | Mod: S$GLB,,, | Performed by: DERMATOLOGY

## 2022-06-02 PROCEDURE — 4010F ACE/ARB THERAPY RXD/TAKEN: CPT | Mod: CPTII,S$GLB,, | Performed by: DERMATOLOGY

## 2022-06-02 PROCEDURE — 3288F FALL RISK ASSESSMENT DOCD: CPT | Mod: CPTII,S$GLB,, | Performed by: DERMATOLOGY

## 2022-06-02 PROCEDURE — 1160F PR REVIEW ALL MEDS BY PRESCRIBER/CLIN PHARMACIST DOCUMENTED: ICD-10-PCS | Mod: CPTII,S$GLB,, | Performed by: DERMATOLOGY

## 2022-06-02 PROCEDURE — 99999 PR PBB SHADOW E&M-EST. PATIENT-LVL III: CPT | Mod: PBBFAC,,, | Performed by: DERMATOLOGY

## 2022-06-02 PROCEDURE — 3044F HG A1C LEVEL LT 7.0%: CPT | Mod: CPTII,,, | Performed by: SOCIAL WORKER

## 2022-06-02 PROCEDURE — 90853 PR GROUP PSYCHOTHERAPY: ICD-10-PCS | Mod: ,,, | Performed by: SOCIAL WORKER

## 2022-06-02 PROCEDURE — 3044F PR MOST RECENT HEMOGLOBIN A1C LEVEL <7.0%: ICD-10-PCS | Mod: CPTII,,, | Performed by: SOCIAL WORKER

## 2022-06-02 PROCEDURE — 99999 PR PBB SHADOW E&M-EST. PATIENT-LVL III: ICD-10-PCS | Mod: PBBFAC,,, | Performed by: DERMATOLOGY

## 2022-06-02 PROCEDURE — 3044F HG A1C LEVEL LT 7.0%: CPT | Mod: CPTII,S$GLB,, | Performed by: DERMATOLOGY

## 2022-06-02 PROCEDURE — 97110 THERAPEUTIC EXERCISES: CPT

## 2022-06-02 PROCEDURE — 1160F RVW MEDS BY RX/DR IN RCRD: CPT | Mod: CPTII,S$GLB,, | Performed by: DERMATOLOGY

## 2022-06-02 PROCEDURE — 90853 GROUP PSYCHOTHERAPY: CPT | Mod: ,,, | Performed by: SOCIAL WORKER

## 2022-06-02 PROCEDURE — 1159F MED LIST DOCD IN RCRD: CPT | Mod: CPTII,S$GLB,, | Performed by: DERMATOLOGY

## 2022-06-02 PROCEDURE — 3044F PR MOST RECENT HEMOGLOBIN A1C LEVEL <7.0%: ICD-10-PCS | Mod: CPTII,S$GLB,, | Performed by: DERMATOLOGY

## 2022-06-02 PROCEDURE — 4010F ACE/ARB THERAPY RXD/TAKEN: CPT | Mod: CPTII,,, | Performed by: SOCIAL WORKER

## 2022-06-02 PROCEDURE — 3288F PR FALLS RISK ASSESSMENT DOCUMENTED: ICD-10-PCS | Mod: CPTII,S$GLB,, | Performed by: DERMATOLOGY

## 2022-06-02 PROCEDURE — 97530 THERAPEUTIC ACTIVITIES: CPT

## 2022-06-02 RX ORDER — CLOBETASOL PROPIONATE 0.46 MG/ML
SOLUTION TOPICAL 2 TIMES DAILY
Qty: 60 ML | Refills: 1 | Status: SHIPPED | OUTPATIENT
Start: 2022-06-02 | End: 2022-09-11

## 2022-06-02 NOTE — PROGRESS NOTES
"[facility]   Occupational Therapy Treatment Note  [facility] Day 6    Date: 6/2/2022  Name: Regine Osorio  Clinic Number: [id number]    Therapy Diagnosis:   Encounter Diagnoses   Name Primary?    Pain aggravated by activities of daily living Yes    Fear of pain     Impaired functional mobility and activity tolerance      Physician: Montserrat Castellon NP    Physician Orders: OT eval and treat   Medical Diagnosis: Fibromyalgia  Evaluation Date: 4/29/2022  Insurance Authorization Period Expiration: 06/22/2022  Plan of Care Certification Period: 7/1/2022  Visit # / Visits authorized: 5/ 11 (including evaluation)  FOTO: evaluation 59% limitation; 5/23/2022 58% limitation.    Precautions:  Standard and Fall    Time In: 9:00 AM   Time Out: 10:15 AM   Total Billable Time: 75 minutes    Subjective     Regine states: "I am sore all over and my knees keep popping"         Pain:  Current: 5 / 10 "im tired too on top of it"  Location: generalized    Objective     Therapeutic activities to improve functional performance for 75 minutes, including:    Full body stretch w/ 3-10 sec hold technique &/or 3-5 repetition technique on all of the following:   Cervical flx/ext   Cervical sidebending   Cervical rotation   Cervical protraction/retraction   Shld rolls   Shld depression/elevation   Scapular retraction/protraction/scaption   Posterior shld stretch (cross arm)   Shld ER stretch (chicken wing)   Elbow flx/ext   Forearm supination/pronation   Wrist flx/ext   Open/close hands/fingers   Seated trunk rotations   Seated trunk/thoracic ext/flx   Seated marches & knee to chest   Seated knee flx/ext   Seated hip ER/piriformis stretch   Seated hamstring stretch   Seated toe raise to heel raise    Seated ankle circles each way   Standing lumbar extensions   Standing lateral leaning stretch   Standing quad stretch (UE support for balance)    Regine participated in functional lift waist " to shoulder, 3x5 reps x 7 lbs with a rating of Hard (5/10) exertion. Regine educated on standing posture, core awareness, keeping shoulders depressed and retracted, stepping to place > reaching to place, keeping weight close to center of gravity and pacing as needed. Good use of pendulum swings in between sets with body weight.     Regine completed functional lifting, floor to waist, 3x5 reps x 7 lbs. First set completed to floor (rated 5/10 exertion) and with 2nd two sets completed to double crates, exertion rated as sort of hard (4/10); continued education focused on keeping weight close to center of gravity, wide base of support, knees tracking with toes, hinging hips, bending knees, keeping weight in heels, using breath to guide movement. Pt plans to use this lift related to laundry and cleaning. Took seated rest break in between sets and completed diaphragmatic breaths. Verbal cues needed to coordinate movement with breath and activate glutes to protect low back.     Regine completed dynamic reaching in various functional ranges, from seated, with continued focus on hip rotation/weight shifting, 2x5 reps x no added lbs, with hands clasped to decrease tendency to elevate right shoulder, rated as hard (5/10) exertion. Verbal cues needed for full extension of arms, but with good pacing throughout activity. Seated rest break taken in between sets.     Regine completed maximal lift 2x5 reps with 10 lbs, rated as hard (5/10) exertion, continued education focused on neutral spine positioning and pushing through heels for safety and activation of correct muscles. Tendency to put weight in toes and with decreased awareness and proprioception in feet. Able to make adjustments with verbal cues.    Pt completed seated mindfulness diaphragmatic breathing activity [id number] with focus on posture, mechanics of breathing, and benefits re: PNS activation. Pt voiced and demonstrated understanding.     Pt completed standing  asst shoulder flexion stretch at wall to walk fingers up wall as far as she could and take 3 deep breaths at end range. Pt RUE more limited than LUE, but with good use of breathing throughout activity.     No environmental, cultural, spiritual, developmental or education needs expressed or noted.    Home Exercises and Patient Education     Education provided:   -Ms. Osorio received education regarding proper posture and body mechanics. She received education regarding anticipated muscular soreness following lift testing and strategies for management were reviewed with patient including stretching, using ice, scheduled rest.  - aris scale, pain cycle, z-lie, functional lifting mechanics, pursed lip and diaphragmatic breathing techniques  - boom/bust theory  - Progress towards goals    Written Home Exercises Provided: yes. diaphragmatic breathing; FRP binder received.  Exercises were reviewed and Regine was able to demonstrate them prior to the end of the session.    Regine demonstrated good understanding of the education provided.     Handouts provided during sessions can be found in EMR under patient instructions, or might be part of the FRP binder.    Assessment     Regine presents with continued report of increased fatigue and soreness. She had good tolerance to session this date despite continued increased time required to complete functional activities. She had improved tolerance to functional lifts with improved independence with body mechanics. Despite challenges, she continues with willingness to participate and openness to feedback and education, and overall, continues to progress towards personal goals.     Regine is unable to fully participate in community work, recreational, and home-based activities because of decreased functional  strength, decreased  AROM, decreased endurance, limited positional tolerance, fear of re-injury, and fear of increased pain. She will benefit from participating in a  conditioning  program designed  to increase functional strength, flexibility, and endurance in order to meet functional goals.     Regine's prognosis is Good due to Good motivation and willingness to particiapte. She will benefit from skilled outpatient Occupational Therapy to address the limitations and goals stated above and in the chart below, provide patient/family education, and to maximize patient's level of functional independence.    Plan of care discussed with patient: Yes  Pt's spiritual, cultural and educational needs considered and patient is agreeable to the plan of care and goals as stated below:       FRP Goals: While working towards the long-term (2 month) functional goals listed above, Ms. Osorio will accomplish the following short term goals during the 5-week intensive rehabilitation program. Ms. Osorio will:     1. Develop a viable return to community work plan.   2. Demonstrate physical capacities consistent with a light-medium work demand level.   3. Demonstrate increased functional strength by lifting 20 lbs floor to waist and 20 lbs waist to shoulder in order to meet demand of work/home routine.   4. Increase her positional tolerance to the level needed for work and home activities.   5. Implement self-care strategies during the program and at home to control pain.   6.   Patient will demonstrate use of mindfulness, stress management, and coping  strategies for improved participation in daily routine.   7.  Will drink 7 x 16oz bottles of water a day.    Specific patient expressed goals and demand levels:  Current Capacity Limit/ Physical  Demand Level (PDL)   Demand Levels of Functional Recovery Goals    Performance/Satisfaction  Day 1 Performance/Satisfaction  Day 9 Performance/Satisfaction  Follow-up     Sedentary- Light Physical Demand  (Based above tested results)    Vocational:  Sitting tolerance, as needed for work    Recreational:  Walk for exercise    Daily Living:  Dusting (floors  and furniture)    Laundry    Mopping    Standing tolerance when attending to chores    UB and LB dressing    Supine to sit     Light-medium Physical Demand Level    Pending chair, 5/3      1/0      3/1      3/1 1/0    3/0        3/0 1/0       The PDL listed above is based on the physical performance screening test performed at initial evaluation. More comprehensive physical  testing integrated with clinical findings would be required to derived an accurate work capacity.      Plan   Plan of care certification: 5/23/2022 to 7/1/2022.    Continue per POC    Soniya Mccauley OTR/L   6/2/2022

## 2022-06-02 NOTE — PATIENT INSTRUCTIONS
Avoid using hot water    Patient to start 3% crude coal tar shampoo to be used as scalp soaks for at least 10 minutes, longer if possible, per their regular shampooing schedule.  Can also be applied as directed to other parts of the body.     Back:  Good skin care regimen discussed including limiting to one bath or shower per day, using lukewarm water with mild soap and moisturization to skin once to twice daily.  Consider glycerin bar soap or Dove.  Consider organic coconut oil.

## 2022-06-02 NOTE — PROGRESS NOTES
[facility]    Functional Restoration Program  Physical Therapy Treatment Note  [facility] Day 6    Name: Regine Osorio  MRN:[id number]      Therapy Diagnosis:   Encounter Diagnoses   Name Primary?    Decreased range of motion of lumbar spine Yes    Central sensitization to pain     Fibromyalgia     Impaired functional mobility, balance, gait, and endurance      Physician: Montserrat Castellon NP    Date: 6/2/2022    Evaluation Date: 4/29/2022  Authorization Period Expiration: 06/22/2022  Plan of Care Expiration: 07/29/2022  Visit # / Visits authorized: 7/ 11  FOTO: completed 01 / 03       Time In: 10:15 am  Time Out: 11:30 am  Total Billable Time: 75 minutes    SUBJECTIVE       Regine reports her usual pain w/ only mild soreness after her last visit. She will make sure that she does her stretch routine w/ pics this weekend to address her soreness. She acknowledges that her UE overhead AROM is improving & she is skeptical that her joints popping is a good thing.      Patient's FRP goals: I want to be able to walk again, travel /s cane, inc spontaneity /c my daily activities      Current 4/10  Location(s): lower back & B LE, R shld    OBJECTIVE     Objective Measures updated at progress report unless specified.     Treatment       Regine received the Individualized Treatments listed below:     therapeutic exercises to develop strength, endurance, ROM, flexibility, posture and core stabilization for 75 minutes including:      Peripheral muscle strengthening which included 1-2 sets of 10-20 repetitions at a slow, controlled 5-7 second per rep pace focused on strengthening supporting musculature for improved body mechanics & functional mobility.  Patient & therapist focused on proper form during treatment to ensure optimal strengthening of each targeted muscle group. Patients are instructed to keep sets/reps with proper load to stay at 3-5 out of 10 on the provided modified Radha exertion  "scale.       BATCA Machine Exercises Weight (lbs) Sets Repetitions Radha Exertion Scale Rating   Leg Extension (AAROM) 5 1 15 5   Hamstring Curls 7.5 1 15 5   Chest Press       Pec Fly       Lat Pull Down 5 1 15 4   Mid Row 7.5 1 15 4   Bicep DB Curls 3 1 10 4   Standing Tricep Extension 10 1 15 5   Single Leg Press 7.5 1 15 6 R, 5 L         · Fitter board medium tilts w/  support 20 each way  · Sitting alt UE & LE flx 2x10 B  · Sitting Tball compressions w/ pursed lip breathing 2x15 B  · 5x10 seated diaphragmatic breaths throughout session  · Standing closed kinetic chain resisted terminal knee extensions w/ soccer ball at wall 2x15 B w/ 2-3" hold         Patient Education and Home Exercises     Home Exercises Provided and Patient Education Provided     Education provided:   - none      Written Home Exercises Provided: Patient instructed to cont prior HEP. Exercises were reviewed and Regine was able to demonstrate them prior to the end of the session.  Regine demonstrated fair  understanding of the education provided. See EMR under Patient Instructions for information provided during therapy sessions    ASSESSMENT     Pt w/ fairly improved participation in therapy today only needing mild cuing to reach 15 reps. In leg extensions, pt still w/ difficulty achieving full AROM & activation of her VMO. PT therefore initiated standing quad sets to promote better knee extension. Pt again perseverated on her joint popping & demonstrated a disbelieving attitude that joint cavitation is not pathological at all times. She may be self limiting in her AROM due to fear.    Regine Is progressing well towards her goals.   Pt prognosis is Guarded.     Pt will continue to benefit from skilled outpatient physical therapy to address the deficits listed in the problem list box on initial evaluation, provide pt/family education and to maximize pt's level of independence in the home and community environment.     Pt's spiritual, " cultural and educational needs considered and pt agreeable to plan of care and goals.     Anticipated barriers to physical therapy: chronic nature of pain, fear of pain, marked mobility deficits     GOALS: Pt is in agreement with the following goals:        Goal Progress Towards Goal   Short Term: In 3 weeks, pt will:     Verbalize & demonstrate good understanding of resisted training with 3-1-3 second rep for uokhotqsmo-rylynheow-ddhybxrlm contraction to encourage full muscle recruitment for strength & endurance Progress towards goal: initiated 4/29/2022   Verbalize & demonstrate good understanding of home stretch routine to improve AROM, help decrease pain & improve mobility Progress towards goal: initiated 4/29/2022   Demonstrate proper supine or seated diaphragmatic breathing with decreased chest excursion & emphasis on full abdominal excursion & increased expiration time to promote muscle relaxation & increased parasympathetic activity to improve patient tolerance to activities Progress towards goal: initiated 4/29/2022   Demonstrate proper static standing posture in frontal & sagittal plane per PT visual assessment to decrease postural strain in ADLs Progress towards goal: initiated 4/29/2022   Demonstrate good recall of names of resisted exercises w/ minimal to moderate verbal cues to promote independence w/ exercise at the end of the program Progress towards goal: initiated 4/29/2022   Verbalize plan for continuing resisted exercises w/ specific location (i.e. commercial gym, home, community fitness center) indicating preference for machine-based or free-weight exercises to incorporate all major muscle groups to maintain & progress therapeutic functional improvements Progress towards goal: initiated 4/29/2022   Pt will perform supine <> sit x 3 Ind /s inc LB or cervical discomfort for inc bed mobility  Progress towards goal: initiated 4/29/2022         Long Term: In 8 weeks, pt will:     Verbalize good  understanding of activities planning/scheduling (stretching, mindfulness, resisted training, & cardio) prescribed by PT/OT following the end of the program to sustain & progress functional improvements Progress towards goal: initiated 4/29/2022   Be independent w/ selected resisted training w/ minimal to no cuing while performing to continue w/ resisted strengthening independently at the end of the program Progress towards goal: initiated 4/29/2022   Improve 2 Minute Walk Test (MWT) to 300 feet and 6 MWT to 900 feet or greater to improve gait speed and mobility Progress towards goal: initiated 4/29/2022   Perform single leg stance 15 seconds or greater bilaterally to improve safety and balance in ADLs Progress towards goal: initiated 4/29/2022   Demonstrate Timed Up & Go (with appropriate assistive device, if necessary) of 18 seconds or less to decrease fall risk and improve functional mobility Progress towards goal: initiated 4/29/2022   Demonstrate 5 time sit to stand test without upper extremity assist to 20 sec or less to improve general mobility and decrease fall risk Progress towards goal: initiated 4/29/2022   Score 50 % or less on Lumbar FOTO to indicate significant functional improvements in impaired functions secondary to low back pain Progress towards goal: initiated 4/29/2022             PLAN     Continue PT per POC     Kar Trevizo, PT, DPT

## 2022-06-02 NOTE — PROGRESS NOTES
Subjective:       Patient ID:  Regine Osorio is a 67 y.o. female who presents for   Chief Complaint   Patient presents with    Acne     Back, x 3mo, itches, tx otc moisturizer and TAC     Dry Skin     Scalp, x yrs, itches, tx Clob shampoo      Acne - Initial  Affected locations: back  Signs / symptoms: itching  Severity: mild  Timing: constant  Aggravated by: nothing  Relieving factors/Treatments tried: OTC moisturizer  Improvement on treatment: no relief    Dry Skin - Initial  Affected locations: scalp  Signs / symptoms: itching and dryness  Severity: mild  Timing: constant  Aggravated by: nothing  Relieving factors/Treatments tried: Rx topical steroids  Improvement on treatment: no relief        Review of Systems   Constitutional: Negative for fever and chills.   HENT: Negative for sore throat.    Respiratory: Negative for cough.    Skin: Positive for itching and dry skin.        Objective:    Physical Exam   Constitutional: She appears well-developed and well-nourished.   Eyes: No conjunctival no injection.   Neurological: She is alert and oriented to person, place, and time.   Psychiatric: She has a normal mood and affect.   Skin:   Areas Examined (abnormalities noted in diagram):   Scalp / Hair Palpated and Inspected  Back Inspection Performed                   Diagram Legend     Erythematous scaling macule/papule c/w actinic keratosis       Vascular papule c/w angioma      Pigmented verrucoid papule/plaque c/w seborrheic keratosis      Yellow umbilicated papule c/w sebaceous hyperplasia      Irregularly shaped tan macule c/w lentigo     1-2 mm smooth white papules consistent with Milia      Movable subcutaneous cyst with punctum c/w epidermal inclusion cyst      Subcutaneous movable cyst c/w pilar cyst      Firm pink to brown papule c/w dermatofibroma      Pedunculated fleshy papule(s) c/w skin tag(s)      Evenly pigmented macule c/w junctional nevus     Mildly variegated pigmented, slightly  irregular-bordered macule c/w mildly atypical nevus      Flesh colored to evenly pigmented papule c/w intradermal nevus       Pink pearly papule/plaque c/w basal cell carcinoma      Erythematous hyperkeratotic cursted plaque c/w SCC      Surgical scar with no sign of skin cancer recurrence      Open and closed comedones      Inflammatory papules and pustules      Verrucoid papule consistent consistent with wart     Erythematous eczematous patches and plaques     Dystrophic onycholytic nail with subungual debris c/w onychomycosis     Umbilicated papule    Erythematous-base heme-crusted tan verrucoid plaque consistent with inflamed seborrheic keratosis     Erythematous Silvery Scaling Plaque c/w Psoriasis     See annotation      Assessment / Plan:        Encounter for skin care  Good skin care regimen discussed including limiting to one bath or shower per day, using lukewarm water with mild soap and moisturization to skin once to twice daily.  Consider glycerin bar soap or Dove.  Consider organic coconut oil.  Previous [facility] labs and or records and notes reviewed and considered for their impact on our clinical decision making today.    Seborrheic dermatitis of scalp  -     Ambulatory referral/consult to Dermatology  -     clobetasoL (TEMOVATE) 0.05 % external solution; Apply topically 2 (two) times daily. Prn scalp itch or irritation.Stop using steroid topical when skin is smooth and non itchy.  Do not treat dark or red coloring.  Dispense: 60 mL; Refill: 1  Discussed with patient the etiology and pathogenesis of the disease or skin lesion(s) and possible treatments and aggravators.    Chronic nature of this condition discussed with patient.  Watch for evolving psoriasis.  Patient to start 2-5% crude coal tar shampoo to be used as scalp soaks for at least 3 minutes, longer if possible, per their regular shampooing schedule.  Can also be applied as directed to other parts of the body.  Proper application of  medications and or care for affected area(s) and condition(s) reviewed.    SK (seborrheic keratosis)  Discussed with patient the benign nature of these lesions and that no treatment is indicated.    Discussed with patient to use organic coconut oil or pure shea butter at least daily for moisturization for the body and organic jojoba oil at least daily for the face.  Sandals or slippers at home as not to slide around and risk fall on non carpeted floors if applied to the soles.    Itching  -     clobetasoL (TEMOVATE) 0.05 % external solution; Apply topically 2 (two) times daily. Prn scalp itch or irritation.Stop using steroid topical when skin is smooth and non itchy.  Do not treat dark or red coloring.  Dispense: 60 mL; Refill: 1  Trial of top roids.  Pt to call if unsuccessful.  Consider lab helm.    Scalp itch  -     clobetasoL (TEMOVATE) 0.05 % external solution; Apply topically 2 (two) times daily. Prn scalp itch or irritation.Stop using steroid topical when skin is smooth and non itchy.  Do not treat dark or red coloring.  Dispense: 60 mL; Refill: 1  Reviewed with patient different treatment options and associated risks.  Proper application of medications and or care for affected area(s) and condition(s) reviewed.  Stop all applicable skin care and hair products, chemicals, perfumes, and treatments to affected areas.  No deodorants and antiperspirants, if applicable.  No store bought lotions or potions.                Follow up in about 6 weeks (around 7/14/2022).

## 2022-06-06 ENCOUNTER — OFFICE VISIT (OUTPATIENT)
Dept: PSYCHIATRY | Facility: OTHER | Age: 67
End: 2022-06-06
Payer: COMMERCIAL

## 2022-06-06 ENCOUNTER — TELEPHONE (OUTPATIENT)
Dept: NEUROLOGY | Facility: CLINIC | Age: 67
End: 2022-06-06
Payer: MEDICARE

## 2022-06-06 ENCOUNTER — PATIENT MESSAGE (OUTPATIENT)
Dept: PAIN MEDICINE | Facility: CLINIC | Age: 67
End: 2022-06-06
Payer: MEDICARE

## 2022-06-06 ENCOUNTER — CLINICAL SUPPORT (OUTPATIENT)
Dept: REHABILITATION | Facility: OTHER | Age: 67
End: 2022-06-06
Payer: MEDICARE

## 2022-06-06 DIAGNOSIS — F43.29 ADJUSTMENT DISORDER WITH PHYSICAL COMPLAINTS: Primary | ICD-10-CM

## 2022-06-06 DIAGNOSIS — M79.7 FIBROMYALGIA: ICD-10-CM

## 2022-06-06 DIAGNOSIS — Z74.09 IMPAIRED FUNCTIONAL MOBILITY, BALANCE, GAIT, AND ENDURANCE: ICD-10-CM

## 2022-06-06 DIAGNOSIS — F40.298 FEAR OF PAIN: ICD-10-CM

## 2022-06-06 DIAGNOSIS — M53.86 DECREASED RANGE OF MOTION OF LUMBAR SPINE: Primary | ICD-10-CM

## 2022-06-06 DIAGNOSIS — G89.29 CENTRAL SENSITIZATION TO PAIN: ICD-10-CM

## 2022-06-06 DIAGNOSIS — Z74.09 IMPAIRED FUNCTIONAL MOBILITY AND ACTIVITY TOLERANCE: ICD-10-CM

## 2022-06-06 DIAGNOSIS — R52 PAIN AGGRAVATED BY ACTIVITIES OF DAILY LIVING: Primary | ICD-10-CM

## 2022-06-06 PROCEDURE — 3044F PR MOST RECENT HEMOGLOBIN A1C LEVEL <7.0%: ICD-10-PCS | Mod: CPTII,,, | Performed by: SOCIAL WORKER

## 2022-06-06 PROCEDURE — 3044F HG A1C LEVEL LT 7.0%: CPT | Mod: CPTII,,, | Performed by: SOCIAL WORKER

## 2022-06-06 PROCEDURE — 97530 THERAPEUTIC ACTIVITIES: CPT

## 2022-06-06 PROCEDURE — 4010F ACE/ARB THERAPY RXD/TAKEN: CPT | Mod: CPTII,,, | Performed by: SOCIAL WORKER

## 2022-06-06 PROCEDURE — 90853 PR GROUP PSYCHOTHERAPY: ICD-10-PCS | Mod: ,,, | Performed by: SOCIAL WORKER

## 2022-06-06 PROCEDURE — 4010F PR ACE/ARB THEARPY RXD/TAKEN: ICD-10-PCS | Mod: CPTII,,, | Performed by: SOCIAL WORKER

## 2022-06-06 PROCEDURE — 97110 THERAPEUTIC EXERCISES: CPT

## 2022-06-06 PROCEDURE — 90853 GROUP PSYCHOTHERAPY: CPT | Mod: ,,, | Performed by: SOCIAL WORKER

## 2022-06-06 NOTE — PROGRESS NOTES
"[facility]   Occupational Therapy Treatment Note  [facility] Day 7    Date: 6/6/2022  Name: Regine Osorio  Clinic Number: [id number]    Therapy Diagnosis:   Encounter Diagnoses   Name Primary?    Pain aggravated by activities of daily living Yes    Fear of pain     Impaired functional mobility and activity tolerance      Physician: Montserrat Castellon NP    Physician Orders: OT eval and treat   Medical Diagnosis: Fibromyalgia  Evaluation Date: 4/29/2022  Insurance Authorization Period Expiration: 06/22/2022  Plan of Care Certification Period: 7/1/2022  Visit # / Visits authorized: 6/ 11 (including evaluation)  FOTO: evaluation 59% limitation; 5/23/2022 58% limitation.    Precautions:  Standard and Fall    Time In: 1:05 PM   Time Out: 2:15 PM   Total Billable Time: 70 minutes    Subjective     Regine states: "I had a good weekend I went to [facility] where my dad was from and did some cemetery walking but I did pace myself and I would walk some and go back to the car and walk some and rest so I did okay, but I woke up this morning with a migraine"         Pain:  Current: 5 / 10  Location: generalized    Objective     Therapeutic activities to improve functional performance for 70 minutes, including:    Full body stretch w/ 3-10 sec hold technique &/or 3-5 repetition technique on all of the following:   Cervical flx/ext   Cervical sidebending   Cervical rotation   Cervical protraction/retraction   Shld rolls   Shld depression/elevation   Scapular retraction/protraction/scaption   Posterior shld stretch (cross arm)   Shld ER stretch (chicken wing)   Elbow flx/ext   Forearm supination/pronation   Wrist flx/ext   Open/close hands/fingers   Seated trunk rotations   Seated trunk/thoracic ext/flx   Seated marches & knee to chest   Seated knee flx/ext   Seated hip ER/piriformis stretch   Seated hamstring stretch   Seated toe raise to heel raise    Seated ankle circles each " way   Standing lumbar extensions   Standing lateral leaning stretch   Standing quad stretch (UE support for balance)    Regine completed functional lifting, floor to waist, 3x5 reps x 8 lbs. Completed to double crates, with exertion rated as sort of hard (4/10); continued education focused on keeping weight close to center of gravity, wide base of support, knees tracking with toes, hinging hips, bending knees, keeping weight in heels, using breath to guide movement. Pt plans to use this lift related to laundry and cleaning. Took seated rest break in between sets and completed diaphragmatic breaths. Verbal cues needed to coordinate movement with breath and activate glutes to protect low back.     Regine participated in functional lift waist to shoulder, 3x5 reps x 7 lbs with height of shelf lowered to 4th notch, with a rating of Sort of hard (4/10) exertion. Regine educated on standing posture, core awareness, keeping shoulders depressed and retracted, stepping to place > reaching to place, keeping weight close to center of gravity and pacing as needed. Good use of pendulum swings in between sets with body weight.     Regine completed maximal lift 2x5 reps with 10 lbs, rated as sort of hard (4/10) exertion, continued education focused on neutral spine positioning and pushing through heels for safety and activation of correct muscles. Tendency to put weight in toes and with decreased awareness and proprioception in feet. Able to make adjustments with verbal cues.    Pt completed standing asst shoulder flexion stretch at wall x2 reps each armto walk fingers up wall as far as she could and take 3 deep breaths at end range. Pt RUE more limited than LUE, but with good use of breathing throughout activity.  Then completed pec stretch at wall x2 reps on each arm. Rated as sort of hard (4/10) exertion.     Pt educated on mod LSVT technique for sit<>stand using high velocity, big movements, with focus on using BUE  "for weight shift/momentum as well as "noes over toes" and coordinating movement with breath. Pt unable to sit > stand without using armrest to push up. Goal of activity to complete stand > sit with control, using same body mechanics to minimize "plop". Re-educated on importance of breath. Pt completed 2x5 reps with mod-max verbal cues needed. Rated as hard (6/10) exertion. Noted increased fatigue in BLE.     Regine completed dynamic reaching in various functional ranges, from seated, with continued focus on hip rotation/weight shifting, 2x5 reps x no added lbs, with hands clasped to decrease tendency to elevate right shoulder, rated as hard (5/10) exertion. Verbal cues needed for full extension of arms, but with good pacing throughout activity. Seated rest break taken in between sets.     No environmental, cultural, spiritual, developmental or education needs expressed or noted.    Home Exercises and Patient Education     Education provided:   -Ms. Osorio received education regarding proper posture and body mechanics. She received education regarding anticipated muscular soreness following lift testing and strategies for management were reviewed with patient including stretching, using ice, scheduled rest.  - aris scale, pain cycle, z-lie, functional lifting mechanics, pursed lip and diaphragmatic breathing techniques  - boom/bust theory  - Progress towards goals    Written Home Exercises Provided: yes. diaphragmatic breathing; FRP binder received.  Exercises were reviewed and Regine was able to demonstrate them prior to the end of the session.    Regine demonstrated good understanding of the education provided.     Handouts provided during sessions can be found in EMR under patient instructions, or might be part of the FRP binder.    Assessment     Regine presents with continued report of increased fatigue and soreness but with good report of improved ability to pace herself over the weekend. She had " fair-good tolerance to session this date despite continued increased time required to complete functional activities. She had good tolerance to weight increases in functional lifts with improved independence with body mechanics. Overall, she continues to progress towards personal goals.     Regine is unable to fully participate in community work, recreational, and home-based activities because of decreased functional  strength, decreased  AROM, decreased endurance, limited positional tolerance, fear of re-injury, and fear of increased pain. She will benefit from participating in a conditioning  program designed  to increase functional strength, flexibility, and endurance in order to meet functional goals.     Regine's prognosis is Good due to Good motivation and willingness to particiapte. She will benefit from skilled outpatient Occupational Therapy to address the limitations and goals stated above and in the chart below, provide patient/family education, and to maximize patient's level of functional independence.    Plan of care discussed with patient: Yes  Pt's spiritual, cultural and educational needs considered and patient is agreeable to the plan of care and goals as stated below:       FRP Goals: While working towards the long-term (2 month) functional goals listed above, Ms. Osorio will accomplish the following short term goals during the 5-week intensive rehabilitation program. Ms. Osorio will:     1. Develop a viable return to community work plan.   2. Demonstrate physical capacities consistent with a light-medium work demand level.   3. Demonstrate increased functional strength by lifting 20 lbs floor to waist and 20 lbs waist to shoulder in order to meet demand of work/home routine.   4. Increase her positional tolerance to the level needed for work and home activities.   5. Implement self-care strategies during the program and at home to control pain.   6.   Patient will demonstrate use of  mindfulness, stress management, and coping  strategies for improved participation in daily routine.   7.  Will drink 7 x 16oz bottles of water a day.    Specific patient expressed goals and demand levels:  Current Capacity Limit/ Physical  Demand Level (PDL)   Demand Levels of Functional Recovery Goals    Performance/Satisfaction  Day 1 Performance/Satisfaction  Day 9 Performance/Satisfaction  Follow-up     Sedentary- Light Physical Demand  (Based above tested results)    Vocational:  Sitting tolerance, as needed for work    Recreational:  Walk for exercise    Daily Living:  Dusting (floors and furniture)    Laundry    Mopping    Standing tolerance when attending to chores    UB and LB dressing    Supine to sit     Light-medium Physical Demand Level    Pending chair, 5/3      1/0      3/1      3/1    1/0    3/0        3/0    1/0       The PDL listed above is based on the physical performance screening test performed at initial evaluation. More comprehensive physical  testing integrated with clinical findings would be required to derived an accurate work capacity.      Plan   Plan of care certification: 5/23/2022 to 7/1/2022.    Continue per POC    Soniya Mccauley OTR/L   6/6/2022

## 2022-06-06 NOTE — TELEPHONE ENCOUNTER
----- Message from Deysi Acuña sent at 6/6/2022  8:30 AM CDT -----  Contact: P  Pt's requesting a call back regarding scheduling on 7\8.. Pt has a appt with opt and would like to be seen before appointment at 9:30..    Confirmed contact info below:  Contact Name: Regine Osorio  Phone Number: [phone]

## 2022-06-06 NOTE — PROGRESS NOTES
Group Psychotherapy    Site: [facility]    Clinical status of patient: Outpatient    6/2/2022    Length of service:[id number]    Referred by: [facility]     Number of patients in attendance: 3    Target symptoms: Chronic Pain Management     Patient's response to intervention:  The patient's response to intervention is active listening    Progress toward goals and other mental status changes:  The patient's progress toward goals is limited.    Plan: group psychotherapy    Topics Covered: Pt attended CBT for Chronic Pain as part of her participation in Functional [facility]oration. Topics discussed today included a discussion on different breathing techniques to manage anxiety, feeling overwhelmed, etc. Pts also discussed the neuromatrix of pain and how this is impacted by and impacts anxiety, depression, other mood disorders and chronic pain. Will f/u on Monday.

## 2022-06-06 NOTE — PROGRESS NOTES
[facility]    Functional Restoration Program  Physical Therapy Treatment Note  [facility] Day 7    Name: Regine Osorio  MRN:[id number]      Therapy Diagnosis:   Encounter Diagnoses   Name Primary?    Decreased range of motion of lumbar spine Yes    Central sensitization to pain     Fibromyalgia     Impaired functional mobility, balance, gait, and endurance      Physician: Montserrat Castellon NP    Date: 6/6/2022    Evaluation Date: 4/29/2022  Authorization Period Expiration: 06/22/2022  Plan of Care Expiration: 07/29/2022  Visit # / Visits authorized: 7/ 11  FOTO: completed 01 / 03       Time In: 2:15p  Time Out: 3:30p  Total Billable Time: 75 minutes    SUBJECTIVE       Regine reports she is starting to see a little progress. She still has not done her stretching as instructed, but she did walk for exercise over the weekend but cannot verbalize how long she participated. She acknowledges that she will need to do more outside of therapy to have better results.      Patient's FRP goals: I want to be able to walk again, travel /s cane, inc spontaneity /c my daily activities      Current 5/10  Location(s): lower back , R shld    OBJECTIVE     Objective Measures updated at progress report unless specified.     Treatment       Regine received the Individualized Treatments listed below:     therapeutic exercises to develop strength, endurance, ROM, flexibility, posture and core stabilization for 75 minutes including:      Peripheral muscle strengthening which included 1-2 sets of 10-20 repetitions at a slow, controlled 5-7 second per rep pace focused on strengthening supporting musculature for improved body mechanics & functional mobility.  Patient & therapist focused on proper form during treatment to ensure optimal strengthening of each targeted muscle group. Patients are instructed to keep sets/reps with proper load to stay at 3-5 out of 10 on the provided modified Radha exertion scale.    "    BATCA Machine Exercises Weight (lbs) Sets Repetitions Radha Exertion Scale Rating   Leg Extension (AAROM) 5 1 15 5   Hamstring Curls 7.5 1 15 5   Chest Press       Pec Fly       Lat Pull Down 5 1 15 4   Mid Row 7.5 1 15 4   Bicep DB Curls 3 1 10 4   Standing Tricep Extension 10 1 15 5   Single Leg Press 12.5 1 15 6 R, 5 L         · Fitter board medium tilts w/  support 20 each way  · Sitting alt UE & LE flx 2x10 B   · Sitting Tball compressions w/ pursed lip breathing 2x10 B alternating bias from R LE to L LE 2" hold  · 5x10 seated diaphragmatic breaths throughout session  · Standing closed kinetic chain resisted terminal knee extensions w/ soccer ball at wall 2x15 B w/ 2-3" hold         Patient Education and Home Exercises     Home Exercises Provided and Patient Education Provided     Education provided:   - none      Written Home Exercises Provided: Patient instructed to cont prior HEP. Exercises were reviewed and Regine was able to demonstrate them prior to the end of the session.  Regine demonstrated fair  understanding of the education provided. See EMR under Patient Instructions for information provided during therapy sessions    ASSESSMENT     Pt w/ fairly improved participation in therapy again today. She continues to demonstrate very slow mobility & fear of motion in many exercises, but needed less cuing to perform 15 reps of each exercise. In lat pull down, she is now demonstrating R shld elevation above 100 degrees, which is improved from 70-80 in first week of program per therapist's visual estimation. She appears to have some minor lena that exercise & mobility will help to improve her conditions & function, but she will still need continued reinforcement. Her lack of compliance w/ home program limits her progress & may limit her improvements after discharge.    Regine Is progressing well towards her goals.   Pt prognosis is Guarded.     Pt will continue to benefit from skilled outpatient " physical therapy to address the deficits listed in the problem list box on initial evaluation, provide pt/family education and to maximize pt's level of independence in the home and community environment.     Pt's spiritual, cultural and educational needs considered and pt agreeable to plan of care and goals.     Anticipated barriers to physical therapy: chronic nature of pain, fear of pain, marked mobility deficits     GOALS: Pt is in agreement with the following goals:        Goal Progress Towards Goal   Short Term: In 3 weeks, pt will:     Verbalize & demonstrate good understanding of resisted training with 3-1-3 second rep for [id number]-[id number]-[id number] contraction to encourage full muscle recruitment for strength & endurance Progress towards goal: initiated 4/29/2022   Verbalize & demonstrate good understanding of home stretch routine to improve AROM, help decrease pain & improve mobility Progress towards goal: initiated 4/29/2022   Demonstrate proper supine or seated diaphragmatic breathing with decreased chest excursion & emphasis on full abdominal excursion & increased expiration time to promote muscle relaxation & increased parasympathetic activity to improve patient tolerance to activities Progress towards goal: initiated 4/29/2022   Demonstrate proper static standing posture in frontal & sagittal plane per PT visual assessment to decrease postural strain in ADLs Progress towards goal: initiated 4/29/2022   Demonstrate good recall of names of resisted exercises w/ minimal to moderate verbal cues to promote independence w/ exercise at the end of the program Progress towards goal: initiated 4/29/2022   Verbalize plan for continuing resisted exercises w/ specific location (i.e. commercial gym, home, community fitness center) indicating preference for machine-based or free-weight exercises to incorporate all major muscle groups to maintain & progress therapeutic functional improvements Progress towards  goal: initiated 4/29/2022   Pt will perform supine <> sit x 3 Ind /s inc LB or cervical discomfort for inc bed mobility  Progress towards goal: initiated 4/29/2022         Long Term: In 8 weeks, pt will:     Verbalize good understanding of activities planning/scheduling (stretching, mindfulness, resisted training, & cardio) prescribed by PT/OT following the end of the program to sustain & progress functional improvements Progress towards goal: initiated 4/29/2022   Be independent w/ selected resisted training w/ minimal to no cuing while performing to continue w/ resisted strengthening independently at the end of the program Progress towards goal: initiated 4/29/2022   Improve 2 Minute Walk Test (MWT) to 300 feet and 6 MWT to 900 feet or greater to improve gait speed and mobility Progress towards goal: initiated 4/29/2022   Perform single leg stance 15 seconds or greater bilaterally to improve safety and balance in ADLs Progress towards goal: initiated 4/29/2022   Demonstrate Timed Up & Go (with appropriate assistive device, if necessary) of 18 seconds or less to decrease fall risk and improve functional mobility Progress towards goal: initiated 4/29/2022   Demonstrate 5 time sit to stand test without upper extremity assist to 20 sec or less to improve general mobility and decrease fall risk Progress towards goal: initiated 4/29/2022   Score 50 % or less on Lumbar FOTO to indicate significant functional improvements in impaired functions secondary to low back pain Progress towards goal: initiated 4/29/2022             PLAN     Continue PT per POC     Kar Trevizo, PT, DPT

## 2022-06-08 ENCOUNTER — OFFICE VISIT (OUTPATIENT)
Dept: PSYCHIATRY | Facility: OTHER | Age: 67
End: 2022-06-08
Payer: COMMERCIAL

## 2022-06-08 ENCOUNTER — CLINICAL SUPPORT (OUTPATIENT)
Dept: REHABILITATION | Facility: OTHER | Age: 67
End: 2022-06-08
Payer: MEDICARE

## 2022-06-08 DIAGNOSIS — M79.7 FIBROMYALGIA: ICD-10-CM

## 2022-06-08 DIAGNOSIS — Z74.09 IMPAIRED FUNCTIONAL MOBILITY AND ACTIVITY TOLERANCE: ICD-10-CM

## 2022-06-08 DIAGNOSIS — G89.29 CENTRAL SENSITIZATION TO PAIN: ICD-10-CM

## 2022-06-08 DIAGNOSIS — R52 PAIN AGGRAVATED BY ACTIVITIES OF DAILY LIVING: Primary | ICD-10-CM

## 2022-06-08 DIAGNOSIS — Z74.09 IMPAIRED FUNCTIONAL MOBILITY, BALANCE, GAIT, AND ENDURANCE: ICD-10-CM

## 2022-06-08 DIAGNOSIS — F43.29 ADJUSTMENT DISORDER WITH PHYSICAL COMPLAINTS: Primary | ICD-10-CM

## 2022-06-08 DIAGNOSIS — F40.298 FEAR OF PAIN: ICD-10-CM

## 2022-06-08 DIAGNOSIS — M53.86 DECREASED RANGE OF MOTION OF LUMBAR SPINE: Primary | ICD-10-CM

## 2022-06-08 PROCEDURE — 97530 THERAPEUTIC ACTIVITIES: CPT

## 2022-06-08 PROCEDURE — 4010F ACE/ARB THERAPY RXD/TAKEN: CPT | Mod: CPTII,,, | Performed by: SOCIAL WORKER

## 2022-06-08 PROCEDURE — 3044F PR MOST RECENT HEMOGLOBIN A1C LEVEL <7.0%: ICD-10-PCS | Mod: CPTII,,, | Performed by: SOCIAL WORKER

## 2022-06-08 PROCEDURE — 3044F HG A1C LEVEL LT 7.0%: CPT | Mod: CPTII,,, | Performed by: SOCIAL WORKER

## 2022-06-08 PROCEDURE — 90853 GROUP PSYCHOTHERAPY: CPT | Mod: ,,, | Performed by: SOCIAL WORKER

## 2022-06-08 PROCEDURE — 4010F PR ACE/ARB THEARPY RXD/TAKEN: ICD-10-PCS | Mod: CPTII,,, | Performed by: SOCIAL WORKER

## 2022-06-08 PROCEDURE — 97110 THERAPEUTIC EXERCISES: CPT

## 2022-06-08 PROCEDURE — 90853 PR GROUP PSYCHOTHERAPY: ICD-10-PCS | Mod: ,,, | Performed by: SOCIAL WORKER

## 2022-06-08 NOTE — PROGRESS NOTES
"[facility]   Occupational Therapy Treatment Note  [facility] Day 8    Date: 6/8/2022  Name: Regine Osorio  Clinic Number: [id number]    Therapy Diagnosis:   Encounter Diagnoses   Name Primary?    Pain aggravated by activities of daily living Yes    Fear of pain     Impaired functional mobility and activity tolerance      Physician: Montserrat Castellon NP    Physician Orders: OT eval and treat   Medical Diagnosis: Fibromyalgia  Evaluation Date: 4/29/2022  Insurance Authorization Period Expiration: 06/22/2022  Plan of Care Certification Period: 7/1/2022  Visit # / Visits authorized: 7/ 11 (including evaluation)  FOTO: evaluation 59% limitation; 5/23/2022 58% limitation.    Precautions:  Standard and Fall    Time In: 1:20 PM   Time Out: 2:35 PM   Total Billable Time: 75 minutes    Subjective     Regine states: "I am doing okay, I am very sore"         Pain:  Current: 5 / 10  Location: generalized    Objective     Therapeutic activities to improve functional performance for 75 minutes, including:    Full body stretch w/ 3-10 sec hold technique &/or 3-5 repetition technique on all of the following:   Cervical flx/ext   Cervical sidebending   Cervical rotation   Cervical protraction/retraction   Shld rolls   Shld depression/elevation   Scapular retraction/protraction/scaption   Posterior shld stretch (cross arm)   Shld ER stretch (chicken wing)   Elbow flx/ext   Forearm supination/pronation   Wrist flx/ext   Open/close hands/fingers   Seated trunk rotations   Seated trunk/thoracic ext/flx   Seated marches & knee to chest   Seated knee flx/ext   Seated hip ER/piriformis stretch   Seated hamstring stretch   Seated toe raise to heel raise    Seated ankle circles each way   Standing lumbar extensions   Standing lateral leaning stretch   Standing quad stretch (UE support for balance)    Regine participated in functional lift waist to shoulder, 3x5 reps x 8 lbs with height " of shelf lowered to 4th notch, with a rating of hard (5/10) exertion. Regine educated on standing posture, core awareness, keeping shoulders depressed and retracted, stepping to place > reaching to place, keeping weight close to center of gravity and pacing as needed. Good use of seated rest breaks in between sets and with good response to standing shoulder stretches post activity.     Regine completed functional lifting, floor to waist, 3x5 reps x 9 lbs. Completed to double crates, with exertion rated as sort of hard (4/10); continued education focused on keeping weight close to center of gravity, wide base of support, knees tracking with toes, hinging hips, bending knees, keeping weight in heels, using breath to guide movement. Pt plans to use this lift related to laundry and cleaning. Took seated rest break in between sets and completed diaphragmatic breaths. Verbal cues needed to coordinate movement with breath and activate glutes to protect low back. Throughout activity discussed that plan post program was originally gym membership but is not cervical surgery. She noted she has a CT scan simin morning before the program and is in the consultation phase pre surgery already.     Regine completed dynamic reaching in various functional ranges, from seated, with continued focus on hip rotation/weight shifting, 2x5 reps x no added lbs, with hands clasped to decrease tendency to elevate right shoulder, rated as sort of hard (4/10) exertion. Verbal cues needed for full extension of arms, but with good pacing throughout activity. Seated rest break taken in between sets.     Regine completed maximal lift 2x5 reps with 12 lbs, to single crate, rated as hard (5/10) exertion, continued education focused on neutral spine positioning and pushing through heels for safety and activation of correct muscles. Tendency to put weight in toes and with decreased awareness and proprioception in feet. Able to make adjustments with  verbal cues.    Pt completed seated mindfulness diaphragmatic breathing activity [id number] with focus on posture, mechanics of breathing, and benefits re: PNS activation. Pt voiced and demonstrated understanding.     Pt completed standing asst shoulder flexion stretch at wall x2 reps each armto walk fingers up wall as far as she could and take 3 deep breaths at end range. Pt RUE more limited than LUE, but with good use of breathing throughout activity.  Then completed pec stretch at wall x2 reps on each arm. Rated as sort of hard (4/10) exertion.     No environmental, cultural, spiritual, developmental or education needs expressed or noted.    Home Exercises and Patient Education     Education provided:   -Ms. Osorio received education regarding proper posture and body mechanics. She received education regarding anticipated muscular soreness following lift testing and strategies for management were reviewed with patient including stretching, using ice, scheduled rest.  - aris scale, pain cycle, z-lie, functional lifting mechanics, pursed lip and diaphragmatic breathing techniques  - boom/bust theory  - Progress towards goals    Written Home Exercises Provided: yes. diaphragmatic breathing; FRP binder received.  Exercises were reviewed and Regine was able to demonstrate them prior to the end of the session.    Regine demonstrated good understanding of the education provided.     Handouts provided during sessions can be found in EMR under patient instructions, or might be part of the FRP binder.    Assessment     Regine presents with continued report of fatigue and soreness. Despite this, she had good tolerance to session this date. Continues to require increased time and modifications to complete functional weights but with good use of pacing as needed. She had good tolerance to weight increases in functional lifts with improved independence with body mechanics, requiring verbal cues for wide base of support.  Overall, she continues to progress towards personal goals.     Regine is unable to fully participate in community work, recreational, and home-based activities because of decreased functional  strength, decreased  AROM, decreased endurance, limited positional tolerance, fear of re-injury, and fear of increased pain. She will benefit from participating in a conditioning  program designed  to increase functional strength, flexibility, and endurance in order to meet functional goals.     Regine's prognosis is Good due to Good motivation and willingness to particiapte. She will benefit from skilled outpatient Occupational Therapy to address the limitations and goals stated above and in the chart below, provide patient/family education, and to maximize patient's level of functional independence.    Plan of care discussed with patient: Yes  Pt's spiritual, cultural and educational needs considered and patient is agreeable to the plan of care and goals as stated below:       FRP Goals: While working towards the long-term (2 month) functional goals listed above, Ms. Osorio will accomplish the following short term goals during the 5-week intensive rehabilitation program. Ms. Osorio will:     1. Develop a viable return to community work plan.   2. Demonstrate physical capacities consistent with a light-medium work demand level.   3. Demonstrate increased functional strength by lifting 20 lbs floor to waist and 20 lbs waist to shoulder in order to meet demand of work/home routine.   4. Increase her positional tolerance to the level needed for work and home activities.   5. Implement self-care strategies during the program and at home to control pain.   6.   Patient will demonstrate use of mindfulness, stress management, and coping  strategies for improved participation in daily routine.   7.  Will drink 7 x 16oz bottles of water a day.    Specific patient expressed goals and demand levels:  Current Capacity Limit/  Physical  Demand Level (PDL)   Demand Levels of Functional Recovery Goals    Performance/Satisfaction  Day 1 Performance/Satisfaction  Day 9 Performance/Satisfaction  Follow-up     Sedentary- Light Physical Demand  (Based above tested results)    Vocational:  Sitting tolerance, as needed for work    Recreational:  Walk for exercise    Daily Living:  Dusting (floors and furniture)    Laundry    Mopping    Standing tolerance when attending to chores    UB and LB dressing    Supine to sit     Light-medium Physical Demand Level    Pending chair, 5/3      1/0      3/1      3/1    1/0    3/0        3/0    1/0       The PDL listed above is based on the physical performance screening test performed at initial evaluation. More comprehensive physical  testing integrated with clinical findings would be required to derived an accurate work capacity.      Plan   Plan of care certification: 5/23/2022 to 7/1/2022.    Continue per BRANDIE Mccauley, OTR/L   6/8/2022

## 2022-06-08 NOTE — PROGRESS NOTES
"[facility]    Functional Restoration Program  Physical Therapy Treatment Note  FRP Day 8    Name: Regine Osorio  MRN:[id number]      Therapy Diagnosis:   Encounter Diagnoses   Name Primary?    Decreased range of motion of lumbar spine Yes    Central sensitization to pain     Fibromyalgia     Impaired functional mobility, balance, gait, and endurance      Physician: Mnotserrat Castellon NP    Date: 6/8/2022    Evaluation Date: 4/29/2022  Authorization Period Expiration: 06/22/2022  Plan of Care Expiration: 07/29/2022  Visit # / Visits authorized: 8 / 11  FOTO: completed 01 / 03       Time In: 2:40 pm  Time Out:  3:55 pm  Total Billable Time: 75 minutes    SUBJECTIVE       Regine reports not performing stretching or walking challenge since last visit.   However, pt believes she getting stronger.   Pt tolerate last tx /c no c/o of excess discomfort.  Pt agree to tx today.       Patient's FRP goals: I want to be able to walk again, travel /s cane, inc spontaneity /c my daily activities      Current 5/10  Location(s): B knees, neck     OBJECTIVE     Objective Measures updated at progress report unless specified.     Treatment       Regine received the Individualized Treatments listed below:     therapeutic exercises to develop strength, endurance, ROM, flexibility, posture and core stabilization for 75 minutes including:      Standing closed kinetic chain resisted terminal knee extensions w/ soccer ball at wall 2x15 B w/ 2-3" hold    Directly to knee extension BATCA       Peripheral muscle strengthening which included 1-2 sets of 10-20 repetitions at a slow, controlled 5-7 second per rep pace focused on strengthening supporting musculature for improved body mechanics & functional mobility.  Patient & therapist focused on proper form during treatment to ensure optimal strengthening of each targeted muscle group. Patients are instructed to keep sets/reps with proper load to stay at 3-5 " out of 10 on the provided modified Radha exertion scale.      BATCA Machine Exercises Weight (lbs) Sets Repetitions Radha Exertion Scale Rating   Leg Extension (AROM) 5 1 20 5   Hamstring Curls 10 1 20 4   Chest Press 10 1 15 5   Pec Fly 10 1 15 5   Lat Pull Down 5  15 4   Mid Row 7.5  15 4   Bicep DB Curls 3  10 4   Standing Tricep Extension 10  15 5   Single Leg Press 12.5 1 15 5        Fitter board - mid setting, 30 tilt bouts   Fwd/Bwd    Trial 1- 2 hands light , cue for inc tilt velocity   Lat    Trial 1- 1 hand light , inc tilt velocity    Standing quad stretch /c BUE support 10 sec hold    PT ModA LLE      Foot taps to 6 in step, CL hand on window sill for balance/support     RLE x 10    RLE 3.5 steps in 30 sec   LLE x 10          Patient Education and Home Exercises     Home Exercises Provided and Patient Education Provided     Education provided:   - none      Written Home Exercises Provided: Patient instructed to cont prior HEP. Exercises were reviewed and Regine was able to demonstrate them prior to the end of the session.  Regine demonstrated fair  understanding of the education provided. See EMR under Patient Instructions for information provided during therapy sessions    ASSESSMENT     Pt report of cont HEP non compliance indicate poor carry over.  Pt again advised to perform daily stretching.  Pt demo some improvement /c LE resistance exercise naming especially understanding of ext vs flex.  However, she was not able to correctly identify UE resistance exercises /s sig cue thereby not achieving STG.  Pt progress /c strengthening meeting max reps or dec RPE /c today resistance exercise flow.  Pt demo improved B hip mobility and strength /c ability to place feet on leg press /c no assistance.  However, pt cont demo poor movement quality /c RLEstepping on/off Fitter Board leading to sig fall risk.  Therefore, stepping exercises performed to challenge RLE mobility and strength.  Plan to cont  for improved functionality when crossing road.  Plan to perform functional activity reassessment next visit.       Regine Is progressing well towards her goals.   Pt prognosis is Guarded.     Pt will continue to benefit from skilled outpatient physical therapy to address the deficits listed in the problem list box on initial evaluation, provide pt/family education and to maximize pt's level of independence in the home and community environment.     Pt's spiritual, cultural and educational needs considered and pt agreeable to plan of care and goals.     Anticipated barriers to physical therapy: chronic nature of pain, fear of pain, marked mobility deficits     GOALS: Pt is in agreement with the following goals:        Goal Progress Towards Goal   Short Term: In 3 weeks, pt will:     Verbalize & demonstrate good understanding of resisted training with 3-1-3 second rep for [id number]-[id number]-[id number] contraction to encourage full muscle recruitment for strength & endurance Progress towards goal: initiated 4/29/2022   Verbalize & demonstrate good understanding of home stretch routine to improve AROM, help decrease pain & improve mobility Progress towards goal: initiated 4/29/2022   Demonstrate proper supine or seated diaphragmatic breathing with decreased chest excursion & emphasis on full abdominal excursion & increased expiration time to promote muscle relaxation & increased parasympathetic activity to improve patient tolerance to activities Progress towards goal: initiated 4/29/2022   Demonstrate proper static standing posture in frontal & sagittal plane per PT visual assessment to decrease postural strain in ADLs Progress towards goal: initiated 4/29/2022   Demonstrate good recall of names of resisted exercises w/ minimal to moderate verbal cues to promote independence w/ exercise at the end of the program Progress towards goal: initiated 4/29/2022   Verbalize plan for continuing resisted exercises w/ specific  location (i.e. commercial gym, home, community fitness center) indicating preference for machine-based or free-weight exercises to incorporate all major muscle groups to maintain & progress therapeutic functional improvements Progress towards goal: initiated 4/29/2022   Pt will perform supine <> sit x 3 Ind /s inc LB or cervical discomfort for inc bed mobility  Progress towards goal: initiated 4/29/2022         Long Term: In 8 weeks, pt will:     Verbalize good understanding of activities planning/scheduling (stretching, mindfulness, resisted training, & cardio) prescribed by PT/OT following the end of the program to sustain & progress functional improvements Progress towards goal: initiated 4/29/2022   Be independent w/ selected resisted training w/ minimal to no cuing while performing to continue w/ resisted strengthening independently at the end of the program Progress towards goal: initiated 4/29/2022   Improve 2 Minute Walk Test (MWT) to 300 feet and 6 MWT to 900 feet or greater to improve gait speed and mobility Progress towards goal: initiated 4/29/2022   Perform single leg stance 15 seconds or greater bilaterally to improve safety and balance in ADLs Progress towards goal: initiated 4/29/2022   Demonstrate Timed Up & Go (with appropriate assistive device, if necessary) of 18 seconds or less to decrease fall risk and improve functional mobility Progress towards goal: initiated 4/29/2022   Demonstrate 5 time sit to stand test without upper extremity assist to 20 sec or less to improve general mobility and decrease fall risk Progress towards goal: initiated 4/29/2022   Score 50 % or less on Lumbar FOTO to indicate significant functional improvements in impaired functions secondary to low back pain Progress towards goal: initiated 4/29/2022             PLAN     Continue PT per BRANDIE Wilde, PT, DPT

## 2022-06-08 NOTE — PROGRESS NOTES
"Group Psychotherapy    Site: [facility], [place], operated by [facility]    Clinical status of patient: Outpatient    6/8/2022    Length of service:[id number]    Referred by: Functional Restoration Program     Number of patients in attendance: 4    Target symptoms: Chronic Pain Management     Patient's response to intervention:  The patient's response to intervention is active listening.    Progress toward goals and other mental status changes:  The patient's progress toward goals is fair .    Plan: group psychotherapy    Topics Covered: Pt attended CBT for Chronic Pain as part of her participation in Functional Restoration. Topics discussed today included a discussion on anger, conflict and "hard feelings". Pts discussed what coping skills they're using at the moment and what else they'd like to try. Pts also attended a nutrition lecture on inflammation and added sugars. Will f/u in 1 day.    "

## 2022-06-08 NOTE — PROGRESS NOTES
Group Psychotherapy    Site: [facility]    Clinical status of patient: Outpatient    6/6/2022    Length of service:[id number]    Referred by: Functional Restoration Program     Number of patients in attendance: 4    Target symptoms: Chronic Pain Management     Patient's response to intervention:  The patient's response to intervention is active listening.    Progress toward goals and other mental status changes:  The patient's progress toward goals is fair .    Plan: group psychotherapy    Topics Covered: The session began with a check-in with each group member, discussing how they are feeling into their third week of the program. Charlotte asked what they did differently over the weekend. Members discussed that they made some changes such as walking, mindfulness, and breathing exercises. Last week, Dr. Servin assigned homework to be mindful of self-care practices, stretching, and nutrition. We discussed how they achieved certain goals over the weekend due to intentional effort. The topic of self-care was also discussed by each member. The topic for today was Goal setting, in which we discussed SMART goals and how chronic pain influences behavior and mental health. Members filled out worksheets of goal-setting prompts.

## 2022-06-09 ENCOUNTER — TELEPHONE (OUTPATIENT)
Dept: NEUROSURGERY | Facility: CLINIC | Age: 67
End: 2022-06-09
Payer: MEDICARE

## 2022-06-09 ENCOUNTER — OFFICE VISIT (OUTPATIENT)
Dept: PSYCHIATRY | Facility: OTHER | Age: 67
End: 2022-06-09
Payer: COMMERCIAL

## 2022-06-09 ENCOUNTER — CLINICAL SUPPORT (OUTPATIENT)
Dept: REHABILITATION | Facility: OTHER | Age: 67
End: 2022-06-09
Payer: MEDICARE

## 2022-06-09 DIAGNOSIS — Z74.09 IMPAIRED FUNCTIONAL MOBILITY AND ACTIVITY TOLERANCE: ICD-10-CM

## 2022-06-09 DIAGNOSIS — F40.298 FEAR OF PAIN: ICD-10-CM

## 2022-06-09 DIAGNOSIS — R52 PAIN AGGRAVATED BY ACTIVITIES OF DAILY LIVING: Primary | ICD-10-CM

## 2022-06-09 DIAGNOSIS — F43.29 ADJUSTMENT DISORDER WITH PHYSICAL COMPLAINTS: Primary | ICD-10-CM

## 2022-06-09 PROCEDURE — 90853 PR GROUP PSYCHOTHERAPY: ICD-10-PCS | Mod: ,,, | Performed by: SOCIAL WORKER

## 2022-06-09 PROCEDURE — 3044F HG A1C LEVEL LT 7.0%: CPT | Mod: CPTII,,, | Performed by: SOCIAL WORKER

## 2022-06-09 PROCEDURE — 90853 GROUP PSYCHOTHERAPY: CPT | Mod: ,,, | Performed by: SOCIAL WORKER

## 2022-06-09 PROCEDURE — 4010F PR ACE/ARB THEARPY RXD/TAKEN: ICD-10-PCS | Mod: CPTII,,, | Performed by: SOCIAL WORKER

## 2022-06-09 PROCEDURE — 3044F PR MOST RECENT HEMOGLOBIN A1C LEVEL <7.0%: ICD-10-PCS | Mod: CPTII,,, | Performed by: SOCIAL WORKER

## 2022-06-09 PROCEDURE — 4010F ACE/ARB THERAPY RXD/TAKEN: CPT | Mod: CPTII,,, | Performed by: SOCIAL WORKER

## 2022-06-09 PROCEDURE — 97530 THERAPEUTIC ACTIVITIES: CPT

## 2022-06-09 NOTE — TELEPHONE ENCOUNTER
----- Message from Dasia Borja MA sent at 6/7/2022  5:44 PM CDT -----  Regarding: Referral placed for     ----- Message -----  From: Ho Levy MA  Sent: 6/7/2022   9:48 AM CDT  To: [facility] Clinical Support, #    Good Morning,    Dr. Fishman has put in a referral for the current patient, can we get her scheduled at your earliest convenience.    Thank you,  Ho

## 2022-06-09 NOTE — PROGRESS NOTES
"[facility]   Occupational Therapy Treatment Note  [facility] Day 9    Date: 6/9/2022  Name: Regine Osorio  Clinic Number: [id number]    Therapy Diagnosis:   Encounter Diagnoses   Name Primary?    Pain aggravated by activities of daily living Yes    Fear of pain     Impaired functional mobility and activity tolerance      Physician: Montserrat Castellon NP    Physician Orders: OT eval and treat   Medical Diagnosis: Fibromyalgia  Evaluation Date: 4/29/2022  Insurance Authorization Period Expiration: 06/22/2022  Plan of Care Certification Period: 7/1/2022  Visit # / Visits authorized: 8/ 11 (including evaluation)  FOTO: evaluation 59% limitation; 5/23/2022 58% limitation.  FOTO: reassessment: 53% limitation    Precautions:  Standard and Fall    Time In: 1:25 PM   Time Out: 2:08 PM   Total Billable Time: 43 minutes  Pt needed to end session early due to scheduled doctor appointment.    Subjective     Regine states: "I just still feel very very sore"         Pain:  Current: 5 / 10  Location: generalized    Objective     Therapeutic activities to improve functional performance for 30 minutes, including:    Full body stretch w/ 3-10 sec hold technique &/or 3-5 repetition technique on all of the following:   Cervical flx/ext   Cervical sidebending   Cervical rotation   Cervical protraction/retraction   Shld rolls   Shld depression/elevation   Scapular retraction/protraction/scaption   Posterior shld stretch (cross arm)   Shld ER stretch (chicken wing)   Elbow flx/ext   Forearm supination/pronation   Wrist flx/ext   Open/close hands/fingers   Seated trunk rotations   Seated trunk/thoracic ext/flx   Seated marches & knee to chest   Seated knee flx/ext   Seated hip ER/piriformis stretch   Seated hamstring stretch   Seated toe raise to heel raise    Seated ankle circles each way   Standing lumbar extensions   Standing lateral leaning stretch   Standing quad stretch (UE support " for balance)    Regine completed functional lifting, floor to waist, 2x5 reps x 9 lbs. Completed to the floor, with exertion rated as hard (5/10); continued education focused on keeping weight close to center of gravity, wide base of support, knees tracking with toes, hinging hips, bending knees, keeping weight in heels, using breath to guide movement. Pt plans to use this lift related to laundry and cleaning. Took seated rest break in between sets and completed diaphragmatic breaths.    Regine completed dynamic reaching in various functional ranges, from seated, with continued focus on hip rotation/weight shifting, 2x5 reps x no added lbs, with hands clasped to decrease tendency to elevate right shoulder, rated as hard (5/10) exertion. Verbal cues needed for full extension of arms, but with good pacing throughout activity. Seated rest break taken in between sets.     Pt completed seated mindfulness diaphragmatic breathing activity [id number] with focus on posture, mechanics of breathing, and benefits re: PNS activation. Pt voiced and demonstrated understanding.     Pt completed standing asst shoulder flexion stretch at wall x2 reps each armto walk fingers up wall as far as she could and take 3 deep breaths at end range. Pt RUE more limited than LUE, but with good use of breathing throughout activity.  Then completed pec stretch at wall x2 reps on each arm. Rated as sort of hard (4/10) exertion    Limitation/Restriction for FOTO NOC Survey     Therapist reviewed FOTO scores for Regine Osorio on 6/9/2022.   FOTO documents entered into [employer] - see Media section.     Limitation Score 4/29/22: 59%  Limitation Score 6/9/22: 53%         No environmental, cultural, spiritual, developmental or education needs expressed or noted.    Home Exercises and Patient Education     Education provided:   -Ms. Osorio received education regarding proper posture and body mechanics. She received education regarding anticipated  muscular soreness following lift testing and strategies for management were reviewed with patient including stretching, using ice, scheduled rest.  - aris scale, pain cycle, z-lie, functional lifting mechanics, pursed lip and diaphragmatic breathing techniques  - boom/bust theory  - Progress towards goals    Written Home Exercises Provided: yes. diaphragmatic breathing; FRP binder received.  Exercises were reviewed and Regine was able to demonstrate them prior to the end of the session.    Regine demonstrated good understanding of the education provided.     Handouts provided during sessions can be found in EMR under patient instructions, or might be part of the FRP binder.    Assessment     Regine presents with continued report of increased soreness and needed to have shortened session 2/2 scheduled doctor appointment, so will complete reassessment on Monday. Despite this, she had good tolerance to shortened session this date with improved ability to complete full lifting activity floor to waist. She did make some improvement on FOTO score decreasing from 58% limitation to 53% limitation. Continues to require increased time and modifications to complete full session. Overall, she continues to progress towards personal goals.     Regine is unable to fully participate in community work, recreational, and home-based activities because of decreased functional  strength, decreased  AROM, decreased endurance, limited positional tolerance, fear of re-injury, and fear of increased pain. She will benefit from participating in a conditioning  program designed  to increase functional strength, flexibility, and endurance in order to meet functional goals.     Regine's prognosis is Good due to Good motivation and willingness to particiapte. She will benefit from skilled outpatient Occupational Therapy to address the limitations and goals stated above and in the chart below, provide patient/family education, and to  maximize patient's level of functional independence.    Plan of care discussed with patient: Yes  Pt's spiritual, cultural and educational needs considered and patient is agreeable to the plan of care and goals as stated below:       FRP Goals: While working towards the long-term (2 month) functional goals listed above, Ms. Osorio will accomplish the following short term goals during the 5-week intensive rehabilitation program. Ms. Osorio will:     1. Develop a viable return to community work plan.   2. Demonstrate physical capacities consistent with a light-medium work demand level.   3. Demonstrate increased functional strength by lifting 20 lbs floor to waist and 20 lbs waist to shoulder in order to meet demand of work/home routine.   4. Increase her positional tolerance to the level needed for work and home activities.   5. Implement self-care strategies during the program and at home to control pain.   6.   Patient will demonstrate use of mindfulness, stress management, and coping  strategies for improved participation in daily routine.   7.  Will drink 7 x 16oz bottles of water a day.    Specific patient expressed goals and demand levels:  Current Capacity Limit/ Physical  Demand Level (PDL)   Demand Levels of Functional Recovery Goals    Performance/Satisfaction  Day 1 Performance/Satisfaction  Day 10 Performance/Satisfaction  Follow-up     Sedentary- Light Physical Demand  (Based above tested results)    Vocational:  Sitting tolerance, as needed for work    Recreational:  Walk for exercise    Daily Living:  Dusting (floors and furniture)    Laundry    Mopping    Standing tolerance when attending to chores    UB and LB dressing    Supine to sit     Light-medium Physical Demand Level    Pending chair, 5/3      1/0      3/1      3/1    1/0    3/0        3/0    1/0          The PDL listed above is based on the physical performance screening test performed at initial evaluation. More comprehensive physical   testing integrated with clinical findings would be required to derived an accurate work capacity.      Plan     Continue per POC.    Soniya Mccauley, OTR/L   6/9/2022

## 2022-06-09 NOTE — TELEPHONE ENCOUNTER
Scheduled patient an appointment with Dr. Jonas for 6/16/22 @ 9:30 am patient verbally understood.

## 2022-06-09 NOTE — TELEPHONE ENCOUNTER
----- Message from Dorota Owen sent at 6/9/2022 12:38 PM CDT -----  Contact: Patient  Patient missed call. Requesting call back.      Patient @[phone] ([place])

## 2022-06-09 NOTE — TELEPHONE ENCOUNTER
----- Message from Nelida Ko sent at 6/9/2022  2:17 PM CDT -----  KRISTINE PEÑALOZA calling regarding a miss call from [facility].  call back  #[phone]

## 2022-06-09 NOTE — PROGRESS NOTES
"[facility]   Occupational Therapy Progress Report  [facility]P Day 10  NOTE: This is a duplicate copy utilized for reassessment purposes & continuity of care.  See pt's plan of care for co-signed copy.    Date: 6/13/2022  Name: Regine Osorio  Clinic Number: [id number]    Therapy Diagnosis:   Encounter Diagnoses   Name Primary?    Pain aggravated by activities of daily living Yes    Fear of pain     Impaired functional mobility and activity tolerance      Physician: Montserrat Castellon NP    Physician Orders: OT eval and treat   Medical Diagnosis: Fibromyalgia  Evaluation Date: 4/29/2022  Insurance Authorization Period Expiration: 06/22/2022  Plan of Care Certification Period: 8/19/2022 (UPDTAED 6/13/22)  Visit # / Visits authorized: 10/ 11 (including evaluation)  FOTO: evaluation 59% limitation; 5/23/2022 58% limitation.  FOTO: reassessment: 53% limitation    Precautions:  Standard and Fall    Time In: 2:25 PM   Time Out: 3:40 PM   Total Billable Time: 75 minutes    Subjective     Regine states: "I feel like I have made an improvement in both the upper and lower body, in ROM, in endurance and tolerance. The education part of it and the lectures have been very helpful. The meditation not so much, but the breathing has been helpful. I would still like to be able to increase my walking and be able to walk long distances unassisted. I want to be able to skip and hop or at least get close to it"     "this weekend I was really proud of myself I walked from my car to to the grocery store and used a normal cart and walked all around the grocery store and back to my car and I took breaks when I needed to and I was really proud of that".         Pain:  Current: 4 / 10  Worst: 6 / 10  Best: 4 / 10  Location: generalized    Social and ADL History:  Activities of Daily Living Checklist:   Key to Score:   0: Unable to do the activity  1: Can do the activity with great difficulty  2: Can do the activity " with little difficulty   3: No problem with activity  N/A: This is not an activity I do  H/T: Have not tried yet     Personal Care:  22 Homemakin22   Dressing Upper Body:  2 / 3 Meal preparation: 2 / 3   Dressing Lower Body:   Kitchen Cleanup: 2 / 3   Bathing:  3 / 3 Childcare:  na   Hair Care:   Grocery shoppin / 3   Sleep:   Vacuuming/moppin / 2       Emptying trash/ garbage ba / 3   General Mobility:    Bed making changin / 3   Sittin / 2 Laundry     Bendin / 2       Getting in/out of bed:   Home Maintenance:     Standin / 2 Mowing, raking:  na   Walkin / 2 Gardening:  na   Twistin / 2 Home Repairs:  2 /    Liftin / 2 Painting:  na             Getting around:          Getting in and out of car:         Buckling up you seat belt:  3 / 3       Opening heavy doors:         Climbing/descending stairs:                      Objective     Therapeutic activities to improve functional performance for 75 minutes, including:    Strength Testing             Evaluation  22 [place] FRP   22 2 Month Follow Up   Motion Tested                 R L R L R L   Upper Extremity               Shoulder Flexion 4/5 4+/5 4+/5  4+/5        Shoulder Extension 5/5 5/5 5/5  5/5        Shoulder Abduction 3+/5 4/5 5/5  5/5        Shoulder IR 4/5 5/5 5/5  5/5        Shoulder ER 4/5 5/5 5/5  5/5        Elbow Flexion 4/5 4/5 5+/5  5/5        Elbow Extension 3+/5 3+/5 4+/5  5/5                Evaluation 22 ; Reassess 22   5 times sit-stand  (adults 18-63 y/o) 38.51 sec /c UE assist, min control descent ; 36.6 sec (w/ use of UE)  >12 sec= fall risk for general elderly  >16 sec= fall risk for Parkinson's disease  >10 sec= balance/vestibular dysfunction (<61 y/o)  >14.2 sec= balance/vestibular dysfunction (>61 y/o)  >12 sec= fall risk for CVA      Endurance Testing: Modified Ramp Treadmill Test    GAP  22  "Re-assessment  6/13/22 Follow-up   Minutes Completed  2 mins 00 secs  2 min 45 sec     % Grades  5 %  10 %     Speed (mph)  1.0 mph  1.5 mph     METS 2  3.5      bpm  108bpm     Radha Rating Perceived Exertion Scale   5  5     Reason for stop point 4/29/22: Pt reported posture changes, fatigue, noted decline in maintaining pace safely. Full weight through arms and increased fear of falling   Reason for stop 6/13/22: Pt reported fatigue and with fear "my legs are going to give out of me"      Functional Strength Test:  Pile Testing: Lifting  (Pounds/Heart rate)   GAP/Eval (#/HR/RADHA)  Reassessment  Day 10   Follow-up   Appointment    Repetitive Floor to Waist     8#/92/5 15/107/5         Repetitive Waist to Shoulder    5#/102/6   12/101/5            1- Time Maximum     10#/97/6 20/122/6           Carry-2 Handed (40ft)     15#/97/3 20/127/6           Work demand Level     Sedentary-Light Light         Reason for Stop point: Increased time required for completing repetitions. During physical testing, participation level consistent.    Pt completed seated mindfulness diaphragmatic breathing activity [id number] in between each activity with focus on posture, mechanics of breathing, and benefits re: PNS activation. Pt voiced and demonstrated understanding.     Regine completed dynamic reaching in various functional ranges, from seated, with continued focus on hip rotation/weight shifting, 2x5 reps x no added lbs, with hands clasped to decrease tendency to elevate right shoulder, rated as hard (5/10) exertion. Verbal cues needed for full extension of arms, but with good pacing throughout activity. Seated rest break taken in between sets.      Pt completed standing asst shoulder flexion stretch at wall x2 reps each arm to walk fingers up wall as far as she could and take 3 deep breaths at end range. Pt RUE more limited than LUE, but with good use of breathing throughout activity.  Then completed pec stretch at wall x2 " reps on each arm. Rated as sort of hard (4/10) exertion.     Regine completed push and pull 2x5 min with grocery basket in order to reach personal goal of improving grocery shopping ability, educated focused on upright standing posture and activating gluts with functional mobility, leading with hips, decreasing  on handles, using push/pull method with hands for improved control of cart, keeping cart close to center of gravity and weight shifting/stretching as needed. Rates as Sort of hard (4/10) exertion.    No environmental, cultural, spiritual, developmental or education needs expressed or noted.    Home Exercises and Patient Education     Education provided:   -Ms. Osorio received education regarding proper posture and body mechanics. She received education regarding anticipated muscular soreness following lift testing and strategies for management were reviewed with patient including stretching, using ice, scheduled rest.  - aris scale, pain cycle, z-lie, functional lifting mechanics, pursed lip and diaphragmatic breathing techniques  - boom/bust theory  - Progress towards goals    Written Home Exercises Provided: yes. diaphragmatic breathing; FRP binder received.  Exercises were reviewed and Regine was able to demonstrate them prior to the end of the session.    Regine demonstrated good understanding of the education provided.     Handouts provided during sessions can be found in EMR under patient instructions, or might be part of the FRP binder.    Assessment     Regine presents with good self report of improved functional activity tolerance re: grocery shopping experience over the weekend. Good energy and manageable pain report upon arrival to session. She had good tolerance to session this date with good progress made in reassessment in both objective and subjective measures. Notably, she was able to improve from sedentary-light to light work demand level. She improved BUE MMT by at least 1  grade in each category. She also improved MET level by 1.5 levels. Good improvements made in mod COPM as noted by chart below and ADL checklist. Continues to require increased time to complete functional weights but with good use of pacing as needed. Continues with fear of increased pain but with improved self-efficacy. Overall, she continues to progress towards personal goals.     Regine is unable to fully participate in community work, recreational, and home-based activities because of decreased functional  strength, decreased  AROM, decreased endurance, limited positional tolerance, fear of re-injury, and fear of increased pain. She will benefit from participating in a conditioning  program designed  to increase functional strength, flexibility, and endurance in order to meet functional goals.     Regine's prognosis is Good due to Good motivation and willingness to particiapte. She will benefit from skilled outpatient Occupational Therapy to address the limitations and goals stated above and in the chart below, provide patient/family education, and to maximize patient's level of functional independence.    Plan of care discussed with patient: Yes  Pt's spiritual, cultural and educational needs considered and patient is agreeable to the plan of care and goals as stated below:       FRP Goals: While working towards the long-term (2 month) functional goals listed above, Ms. Osorio will accomplish the following short term goals during the 5-week intensive rehabilitation program. Ms. Osorio will:     1. Develop a viable return to community work plan.   2. Demonstrate physical capacities consistent with a light-medium work demand level.   3. Demonstrate increased functional strength by lifting 20 lbs floor to waist and 20 lbs waist to shoulder in order to meet demand of work/home routine.   4. Increase her positional tolerance to the level needed for work and home activities.   5. Implement self-care strategies  during the program and at home to control pain.   6.   Patient will demonstrate use of mindfulness, stress management, and coping  strategies for improved participation in daily routine.   7.  Will drink 7 x 16oz bottles of water a day.    Specific patient expressed goals and demand levels:  Current Capacity Limit/ Physical  Demand Level (PDL)   Demand Levels of Functional Recovery Goals    Performance/Satisfaction  Day 1 Performance/Satisfaction  Day 10  6/13/22 Performance/Satisfaction  Follow-up     Sedentary- Light Physical Demand  (Based above tested results)    Vocational:  Sitting tolerance, as needed for work    Recreational:  Walk for exercise    Daily Living:  Dusting (floors and furniture)    Laundry    Mopping    Standing tolerance when attending to chores    UB and LB dressing      Supine to sit     Light-medium Physical Demand Level    Pending chair, 5/3      1/0      3/1      3/1    1/0    3/0        3/0      1/0      7 / 7        6 / 6       5 / 3      5 / 4     2 / 2    5 / 4         UB 6 / 6  LB 5 / 4    3 / 2    The PDL listed above is based on the physical performance screening test performed at initial evaluation. More comprehensive physical  testing integrated with clinical findings would be required to derived an accurate work capacity.      Plan   UPDATED plan of care certification 6/13/2022 to 8/19/2022    Outpatient occupational therapy, 3 x/wks for 2 additional weeks. Following this, pt to go on hold for a minimum of 7 weeks to attempt independence w/ Home Activity Plan (HAP) & PT activities, and will then return for reassessment.    Soniya Mccauley, OTR/L   6/13/2022

## 2022-06-10 ENCOUNTER — HOSPITAL ENCOUNTER (OUTPATIENT)
Dept: RADIOLOGY | Facility: HOSPITAL | Age: 67
Discharge: HOME | End: 2022-06-10
Attending: ORTHOPAEDIC SURGERY
Payer: MEDICARE

## 2022-06-10 ENCOUNTER — PATIENT MESSAGE (OUTPATIENT)
Dept: PAIN MEDICINE | Facility: CLINIC | Age: 67
End: 2022-06-10
Payer: MEDICARE

## 2022-06-10 ENCOUNTER — TELEPHONE (OUTPATIENT)
Dept: PAIN MEDICINE | Facility: CLINIC | Age: 67
End: 2022-06-10
Payer: MEDICARE

## 2022-06-10 DIAGNOSIS — M48.02 SPINAL STENOSIS, CERVICAL REGION: ICD-10-CM

## 2022-06-10 PROCEDURE — 72125 CT NECK SPINE W/O DYE: CPT | Mod: TC

## 2022-06-10 PROCEDURE — 72125 CT NECK SPINE W/O DYE: CPT | Mod: 26,,, | Performed by: RADIOLOGY

## 2022-06-10 PROCEDURE — 72125 CT CERVICAL SPINE WITHOUT CONTRAST: ICD-10-PCS | Mod: 26,,, | Performed by: RADIOLOGY

## 2022-06-10 NOTE — TELEPHONE ENCOUNTER
Staff spoke with patient, patient states she is confused about the appt that was scheduled with [facility] and also the appt with neurosurgery. Patient was not contacted and would like whoever scheduled her to give her a call. Staff sent a message to both [facility] and neurosurgery for the staff to give her a call for better understanding.

## 2022-06-10 NOTE — TELEPHONE ENCOUNTER
----- Message from Melissa Cordova sent at 6/10/2022  3:23 PM CDT -----  Regarding: Patient Advice  Contact: pt @[phone]  Pt is returning a call from [place] regarding neurosurgery appts. Patient wants clarification on what neurosurgeon she is to be scheduled with.  Please call.

## 2022-06-13 ENCOUNTER — PATIENT MESSAGE (OUTPATIENT)
Dept: PAIN MEDICINE | Facility: CLINIC | Age: 67
End: 2022-06-13
Payer: MEDICARE

## 2022-06-13 ENCOUNTER — CLINICAL SUPPORT (OUTPATIENT)
Dept: REHABILITATION | Facility: OTHER | Age: 67
End: 2022-06-13
Payer: MEDICARE

## 2022-06-13 ENCOUNTER — TELEPHONE (OUTPATIENT)
Dept: NEUROSURGERY | Facility: CLINIC | Age: 67
End: 2022-06-13
Payer: MEDICARE

## 2022-06-13 ENCOUNTER — OFFICE VISIT (OUTPATIENT)
Dept: PSYCHIATRY | Facility: OTHER | Age: 67
End: 2022-06-13
Payer: COMMERCIAL

## 2022-06-13 DIAGNOSIS — M53.86 DECREASED RANGE OF MOTION OF LUMBAR SPINE: Primary | ICD-10-CM

## 2022-06-13 DIAGNOSIS — Z74.09 IMPAIRED FUNCTIONAL MOBILITY AND ACTIVITY TOLERANCE: ICD-10-CM

## 2022-06-13 DIAGNOSIS — F43.29 ADJUSTMENT DISORDER WITH PHYSICAL COMPLAINTS: Primary | ICD-10-CM

## 2022-06-13 DIAGNOSIS — Z74.09 IMPAIRED FUNCTIONAL MOBILITY, BALANCE, GAIT, AND ENDURANCE: ICD-10-CM

## 2022-06-13 DIAGNOSIS — G89.29 CENTRAL SENSITIZATION TO PAIN: ICD-10-CM

## 2022-06-13 DIAGNOSIS — F40.298 FEAR OF PAIN: ICD-10-CM

## 2022-06-13 DIAGNOSIS — R52 PAIN AGGRAVATED BY ACTIVITIES OF DAILY LIVING: Primary | ICD-10-CM

## 2022-06-13 DIAGNOSIS — M79.7 FIBROMYALGIA: ICD-10-CM

## 2022-06-13 PROCEDURE — 90853 PR GROUP PSYCHOTHERAPY: ICD-10-PCS | Mod: ,,, | Performed by: SOCIAL WORKER

## 2022-06-13 PROCEDURE — 3044F HG A1C LEVEL LT 7.0%: CPT | Mod: CPTII,,, | Performed by: SOCIAL WORKER

## 2022-06-13 PROCEDURE — 90853 GROUP PSYCHOTHERAPY: CPT | Mod: ,,, | Performed by: SOCIAL WORKER

## 2022-06-13 PROCEDURE — 4010F PR ACE/ARB THEARPY RXD/TAKEN: ICD-10-PCS | Mod: CPTII,,, | Performed by: SOCIAL WORKER

## 2022-06-13 PROCEDURE — 97530 THERAPEUTIC ACTIVITIES: CPT

## 2022-06-13 PROCEDURE — 4010F ACE/ARB THERAPY RXD/TAKEN: CPT | Mod: CPTII,,, | Performed by: SOCIAL WORKER

## 2022-06-13 PROCEDURE — 3044F PR MOST RECENT HEMOGLOBIN A1C LEVEL <7.0%: ICD-10-PCS | Mod: CPTII,,, | Performed by: SOCIAL WORKER

## 2022-06-13 PROCEDURE — 97110 THERAPEUTIC EXERCISES: CPT

## 2022-06-13 NOTE — TELEPHONE ENCOUNTER
----- Message from Ra Garcia sent at 6/13/2022  9:00 AM CDT -----  Contact: patient  [phone]  Patient calling to speak with you regarding rescheduling her appointment   Please advise

## 2022-06-13 NOTE — PLAN OF CARE
"[facility]   Occupational Therapy Progress Report  [facility] Day 10    Date: 6/13/2022  Name: Regine Osorio  Clinic Number: [id number]    Therapy Diagnosis:   Encounter Diagnoses   Name Primary?    Pain aggravated by activities of daily living Yes    Fear of pain     Impaired functional mobility and activity tolerance      Physician: Montserrat Castellon NP    Physician Orders: OT eval and treat   Medical Diagnosis: Fibromyalgia  Evaluation Date: 4/29/2022  Insurance Authorization Period Expiration: 06/22/2022  Plan of Care Certification Period: 8/19/2022 (UPDTAED 6/13/22)  Visit # / Visits authorized: 10/ 11 (including evaluation)  FOTO: evaluation 59% limitation; 5/23/2022 58% limitation.  FOTO: reassessment: 53% limitation    Precautions:  Standard and Fall    Time In: 2:25 PM   Time Out: 3:40 PM   Total Billable Time: 75 minutes    Subjective     Regine states: "I feel like I have made an improvement in both the upper and lower body, in ROM, in endurance and tolerance. The education part of it and the lectures have been very helpful. The meditation not so much, but the breathing has been helpful. I would still like to be able to increase my walking and be able to walk long distances unassisted. I want to be able to skip and hop or at least get close to it"     "this weekend I was really proud of myself I walked from my car to to the grocery store and used a normal cart and walked all around the grocery store and back to my car and I took breaks when I needed to and I was really proud of that".         Pain:  Current: 4 / 10  Worst: 6 / 10  Best: 4 / 10  Location: generalized    Social and ADL History:  Activities of Daily Living Checklist:   Key to Score:   0: Unable to do the activity  1: Can do the activity with great difficulty  2: Can do the activity with little difficulty   3: No problem with activity  N/A: This is not an activity I do  H/T: Have not tried yet     Personal Care:  " 22 Homemakin22   Dressing Upper Body:   3 Meal preparation: 2 / 3   Dressing Lower Body:   Kitchen Cleanup: 2 / 3   Bathing:  3 / 3 Childcare:  na   Hair Care:   Grocery shoppin / 3   Sleep:   Vacuuming/moppin / 2       Emptying trash/ garbage ba / 3   General Mobility:    Bed making changin / 3   Sittin / 2 Laundry     Bendin / 2       Getting in/out of bed:   Home Maintenance:     Standin / 2 Mowing, raking:  na   Walkin / 2 Gardening:  na   Twistin / 2 Home Repairs:     Liftin / 2 Painting:  na             Getting around:          Getting in and out of car:         Buckling up you seat belt:  3 / 3       Opening heavy doors:         Climbing/descending stairs:                      Objective     Therapeutic activities to improve functional performance for 75 minutes, including:    Strength Testing             Evaluation  22 [place] FRP   22 2 Month Follow Up   Motion Tested                 R L R L R L   Upper Extremity               Shoulder Flexion 4/5 4+/5 4+/5  4+/5        Shoulder Extension 5/5 5/5 5/5  5/5        Shoulder Abduction 3+/5 4/5 5/5  5/5        Shoulder IR 4/5 5/5 5/5  5/5        Shoulder ER 4/5 5/5 5/5  5/5        Elbow Flexion 4/5 4/5 5+/5  5/5        Elbow Extension 3+/5 3+/5 4+/5  5/5                Evaluation 22 ; Reassess 22   5 times sit-stand  (adults 18-63 y/o) 38.51 sec /c UE assist, min control descent ; 36.6 sec (w/ use of UE)  >12 sec= fall risk for general elderly  >16 sec= fall risk for Parkinson's disease  >10 sec= balance/vestibular dysfunction (<59 y/o)  >14.2 sec= balance/vestibular dysfunction (>59 y/o)  >12 sec= fall risk for CVA      Endurance Testing: Modified Ramp Treadmill Test    GAP  22 Re-assessment  22 Follow-up   Minutes Completed  2 mins 00 secs  2 min 45 sec     % Grades  5 %  10 %     Speed (mph)  1.0 mph   "1.5 mph     METS 2  3.5      bpm  108bpm     Radha Rating Perceived Exertion Scale   5  5     Reason for stop point 4/29/22: Pt reported posture changes, fatigue, noted decline in maintaining pace safely. Full weight through arms and increased fear of falling   Reason for stop 6/13/22: Pt reported fatigue and with fear "my legs are going to give out of me"      Functional Strength Test:  Pile Testing: Lifting  (Pounds/Heart rate)   GAP/Eval (#/HR/RADHA)  Reassessment  Day 10   Follow-up   Appointment    Repetitive Floor to Waist     8#/92/5 15/107/5         Repetitive Waist to Shoulder    5#/102/6   12/101/5            1- Time Maximum     10#/97/6 20/122/6           Carry-2 Handed (40ft)     15#/97/3 20/127/6           Work demand Level     Sedentary-Light Light         Reason for Stop point: Increased time required for completing repetitions. During physical testing, participation level consistent.    Pt completed seated mindfulness diaphragmatic breathing activity [id number] in between each activity with focus on posture, mechanics of breathing, and benefits re: PNS activation. Pt voiced and demonstrated understanding.     Regine completed dynamic reaching in various functional ranges, from seated, with continued focus on hip rotation/weight shifting, 2x5 reps x no added lbs, with hands clasped to decrease tendency to elevate right shoulder, rated as hard (5/10) exertion. Verbal cues needed for full extension of arms, but with good pacing throughout activity. Seated rest break taken in between sets.      Pt completed standing asst shoulder flexion stretch at wall x2 reps each arm to walk fingers up wall as far as she could and take 3 deep breaths at end range. Pt RUE more limited than LUE, but with good use of breathing throughout activity.  Then completed pec stretch at wall x2 reps on each arm. Rated as sort of hard (4/10) exertion.     Regine completed push and pull 2x5 min with grocery basket in order to " reach personal goal of improving grocery shopping ability, educated focused on upright standing posture and activating gluts with functional mobility, leading with hips, decreasing  on handles, using push/pull method with hands for improved control of cart, keeping cart close to center of gravity and weight shifting/stretching as needed. Rates as Sort of hard (4/10) exertion.    No environmental, cultural, spiritual, developmental or education needs expressed or noted.    Home Exercises and Patient Education     Education provided:   -Ms. Osorio received education regarding proper posture and body mechanics. She received education regarding anticipated muscular soreness following lift testing and strategies for management were reviewed with patient including stretching, using ice, scheduled rest.  - aris scale, pain cycle, z-lie, functional lifting mechanics, pursed lip and diaphragmatic breathing techniques  - boom/bust theory  - Progress towards goals    Written Home Exercises Provided: yes. diaphragmatic breathing; FRP binder received.  Exercises were reviewed and Regine was able to demonstrate them prior to the end of the session.    Regine demonstrated good understanding of the education provided.     Handouts provided during sessions can be found in EMR under patient instructions, or might be part of the FRP binder.    Assessment     Regine presents with good self report of improved functional activity tolerance re: grocery shopping experience over the weekend. Good energy and manageable pain report upon arrival to session. She had good tolerance to session this date with good progress made in reassessment in both objective and subjective measures. Notably, she was able to improve from sedentary-light to light work demand level. She improved BUE MMT by at least 1 grade in each category. She also improved MET level by 1.5 levels. Good improvements made in mod COPM as noted by chart below and ADL  checklist. Continues to require increased time to complete functional weights but with good use of pacing as needed. Continues with fear of increased pain but with improved self-efficacy. Overall, she continues to progress towards personal goals.     Regine is unable to fully participate in community work, recreational, and home-based activities because of decreased functional  strength, decreased  AROM, decreased endurance, limited positional tolerance, fear of re-injury, and fear of increased pain. She will benefit from participating in a conditioning  program designed  to increase functional strength, flexibility, and endurance in order to meet functional goals.     Regine's prognosis is Good due to Good motivation and willingness to particiapte. She will benefit from skilled outpatient Occupational Therapy to address the limitations and goals stated above and in the chart below, provide patient/family education, and to maximize patient's level of functional independence.    Plan of care discussed with patient: Yes  Pt's spiritual, cultural and educational needs considered and patient is agreeable to the plan of care and goals as stated below:       FRP Goals: While working towards the long-term (2 month) functional goals listed above, Ms. Osorio will accomplish the following short term goals during the 5-week intensive rehabilitation program. Ms. Osorio will:     1. Develop a viable return to community work plan.   2. Demonstrate physical capacities consistent with a light-medium work demand level.   3. Demonstrate increased functional strength by lifting 20 lbs floor to waist and 20 lbs waist to shoulder in order to meet demand of work/home routine.   4. Increase her positional tolerance to the level needed for work and home activities.   5. Implement self-care strategies during the program and at home to control pain.   6.   Patient will demonstrate use of mindfulness, stress management, and coping   strategies for improved participation in daily routine.   7.  Will drink 7 x 16oz bottles of water a day.    Specific patient expressed goals and demand levels:  Current Capacity Limit/ Physical  Demand Level (PDL)   Demand Levels of Functional Recovery Goals    Performance/Satisfaction  Day 1 Performance/Satisfaction  Day 10  6/13/22 Performance/Satisfaction  Follow-up     Sedentary- Light Physical Demand  (Based above tested results)    Vocational:  Sitting tolerance, as needed for work    Recreational:  Walk for exercise    Daily Living:  Dusting (floors and furniture)    Laundry    Mopping    Standing tolerance when attending to chores    UB and LB dressing      Supine to sit     Light-medium Physical Demand Level    Pending chair, 5/3      1/0      3/1      3/1    1/0    3/0        3/0      1/0      7 / 7        6 / 6       5 / 3      5 / 4     2 / 2    5 / 4         UB 6 / 6  LB 5 / 4    3 / 2    The PDL listed above is based on the physical performance screening test performed at initial evaluation. More comprehensive physical  testing integrated with clinical findings would be required to derived an accurate work capacity.      Plan   UPDATED plan of care certification 6/13/2022 to 8/19/2022    Outpatient occupational therapy, 3 x/wks for 2 additional weeks. Following this, pt to go on hold for a minimum of 7 weeks to attempt independence w/ Home Activity Plan (HAP) & PT activities, and will then return for reassessment.    Soniya Mccauley OTR/L   6/13/2022

## 2022-06-13 NOTE — PROGRESS NOTES
"NOTE: This is a duplicate copy utilized for reassessment purposes & continuity of care.  See pt's plan of care for co-signed copy.    [facility]  Functional Restoration Program  Physical Therapy Progress Report    Date: 6/13/2022   Name: Regine Osorio  Clinic Number: [id number]    Therapy Diagnosis:   Encounter Diagnoses   Name Primary?    Decreased range of motion of lumbar spine Yes    Central sensitization to pain     Fibromyalgia     Impaired functional mobility, balance, gait, and endurance      Physician: Montserrat Castellon, NP    Physician Orders: PT Eval and Treat   Medical Diagnosis from Referral:   M79.7 (ICD-10-CM) - Fibromyalgia   R52 (ICD-10-CM) - Pain aggravated by activities of daily living   Z74.09 (ICD-10-CM) - Impaired mobility and endurance       Evaluation Date: 6/13/2022  Authorization Period Expiration: 06/22/22  Plan of Care Expiration: 07/29/22  Visit # / Visits authorized: 9/11  FOTO: completed 2/3     Precautions: Standard and Fall    Time In: 1:10p  Time Out: 2:25p   Total Appointment Time (timed & untimed codes): 75 minutes      Subjective       Patient reports since being in the program, she finds she has minimal improvements. Her pain increased at first & it took a while to improve after that. This weekend, she was able to get from her car into the grocery store w/out using her cane. At first, she thinks she was pushing herself to hard in her home stretch routine. "I tend to push myself through stuff." Her overall endurance is a little bit better & she feels more stable. After the program, she thinks she will continue working out at the gym & she knows the correct 3-1-3 count for her exercises. She is independent w/ her home stretch routine & breathing exercises at this time.    Patient's FRP goals: I want to be able to walk again, travel /s cane, inc spontaneity /c my daily activities        Pain:      Current 4/10, worst 6/10, best 4/10   Location: " bilateral back, B post thigh,   B mid cervical  Description: Aching and Dull progress sharp   Aggravating Factors: Standing, Bending, Walking and Getting out of bed/chair, cold pack, cold air   Easing Factors: min pain meds,     Patient's FRP goals: I want to be able to walk again, travel /s cane, inc spontaneity /c my daily activities          Objective          Range of Motion - Cervical   AROM AROM AROM    Evaluation Reassessment   6/13/2022 Reassessment   Flexion 35 ° 41° °   Extension 18 ° 24° °   Side bending Right 20 ° 26° °   Side bending Left 25 ° 20° °   Rotation Right NT 45° °   Rotation Left NT 41° °           Range of Motion - Lumbar         ROM Loss ROM Loss  6/13/2022 ROM Loss   Flexion major loss Major loss    Extension major loss Major loss    Side bending Right major loss Major loss    Side bending Left major loss Major loss    Rotation Right major loss Major loss    Rotation Left major loss Major loss      Strength Testing   Evaluation Reassessment  6/13/2022 Reassessment   Motion Tested         Lower Extremity R L R L R L   Hip Flexion 2/5 2/5 3/5 3/5     Hip IR NT NT 3/5 3/5     Hip ER NT NT 3/5 3-/5     Knee Extension 4-/5 4/5 4/5 4/5     Knee Flexion 4 /5 4/5 4/5 4/5       Unable to supine<> SL<> prone Ind  Mod A supine to sit        Functional Testing     Evaluation Reassessment   6/13/2022 Reassessment   Timed Up and Go 26.56 sec  23.4 sec sec   5 Time Sit to Stand w/ UE 38.51 sec  36.6 sec sec   Single Limb Stance R LE < 1.5 sec 3.5 sec sec   Single Limb Stance L LE 7.5 sec 21.8 sec sec   2 Minute Walk Test 166 feet 233 feet feet   6 Minute Walk Test 509 feet* 586 feet feet   Self Selected Walking Speed 0.43 m/sec 0.50m/sec m/sec     *6MWT - wall lean rest 5 sec 4:15, 10 sec 3:45, 15 sec 1:30       Limitation/Restriction for FOTO Lumbar Survey    Therapist reviewed FOTO scores for Regine Osorio on 6/13/2022.   FOTO documents entered into [employer] - see Media section.    Limitation Score  Evaluation: 63%  Limitation Score 6/13/2022: 55%    Predicted Score:  51%          TREATMENT       Regine received the treatments listed below:      therapeutic exercises to develop strength, endurance, ROM, flexibility, posture and core stabilization for 75 minutes including:    PT reassessment (see above)     Full body stretch w/ 3-10 sec hold technique &/or 3-5 repetition technique on all of the following:   Cervical flx/ext   Cervical sidebending   Cervical rotation   Cervical protraction/retraction   Shld rolls   Shld depression/elevation   Scapular retraction/protraction/scaption   Posterior shld stretch (cross arm)   Shld ER stretch (chicken wing)   Elbow flx/ext   Forearm supination/pronation   Wrist flx/ext   Open/close hands/fingers   Seated trunk rotations   Seated trunk/thoracic ext/flx   Seated marches & knee to chest   Seated knee flx/ext   Seated hip ER/piriformis stretch   Seated hamstring stretch   Seated toe raise to heel raise    Seated ankle circles each way   Standing lumbar extensions   Standing lateral leaning stretch   Standing quad stretch (UE support for balance)        Peripheral muscle strengthening which included 1-2 sets of 10-20 repetitions at a slow, controlled 5-7 second per rep pace focused on strengthening supporting musculature for improved body mechanics & functional mobility.  Patient & therapist focused on proper form during treatment to ensure optimal strengthening of each targeted muscle group. Patients are instructed to keep sets/reps with proper load to stay at 3-5 out of 10 on the provided modified Radha exertion scale.       BATCA Machine Exercises Weight (lbs) Sets Repetitions Radha Exertion Scale Rating   Leg Extension (AROM)       Hamstring Curls       Chest Press       Pec Fly       Lat Pull Down       Mid Row       Bicep DB Curls 2 1 15 4   Standing Tricep Extension 10   15 5   Single Leg Press 15 1 15 6 R, 5 L            PATIENT EDUCATION AND  HOME EXERCISES     Education provided:   - none     Written Home Exercises Provided: Patient instructed to cont prior HEP. Exercises were reviewed and Regine was able to demonstrate them prior to the end of the session.  Regine demonstrated fair  understanding of the education provided. See EMR under Patient Instructions for information provided during therapy sessions.    ASSESSMENT   Regine w/ fairly good progress in many of her objective measures improving her gait distance & speed mildly. Her functional testing indicates improved balance in each LE w/ longer single leg stance time. She still has a great deal of difficulty in sit to stand mobility & demonstrates small cautious steps, the most limiting factors in her functional testing. W/ encouragement, she is able to take larger steps but needs time on seated endurance training moving more quickly to help translate into better speed of her general mobility. She has made minor progress towards her goals & is still in need of skilled interventions to achieve her remaining goals.      Based on the above history and physical examination an active physical therapy program within a multi-disciplinary setting is recommended.  Pt will benefit from skilled outpatient physical therapy to address the deficits listed below in the chart, provide pt/family education and to maximize pt's level of independence in the home and community environment.     Plan of care discussed with patient: Yes  Pt's spiritual, cultural and educational needs considered and patient is agreeable to the plan of care and goals as stated below:     Pt prognosis is Fair.   Anticipated Barriers for therapy: chronicity of condition, fear of pain    GOALS: Pt is in agreement with the following goals:        Goal Progress Towards Goal   Short Term: In 3 weeks, pt will:     Verbalize & demonstrate good understanding of resisted training with 3-1-3 second rep for [id number]  contraction to encourage full muscle recruitment for strength & endurance Goal met 6/13/2022   Verbalize & demonstrate good understanding of home stretch routine to improve AROM, help decrease pain & improve mobility Goal met 6/13/2022   Demonstrate proper supine or seated diaphragmatic breathing with decreased chest excursion & emphasis on full abdominal excursion & increased expiration time to promote muscle relaxation & increased parasympathetic activity to improve patient tolerance to activities Goal met 6/13/2022   Demonstrate proper static standing posture in frontal & sagittal plane per PT visual assessment to decrease postural strain in ADLs Goal met 6/13/2022   Demonstrate good recall of names of resisted exercises w/ minimal to moderate verbal cues to promote independence w/ exercise at the end of the program progressing   Verbalize plan for continuing resisted exercises w/ specific location (i.e. commercial gym, home, community fitness center) indicating preference for machine-based or free-weight exercises to incorporate all major muscle groups to maintain & progress therapeutic functional improvements progressing   Pt will perform supine <> sit x 3 Ind /s inc LB or cervical discomfort for inc bed mobility  progressing         Long Term: In 8 weeks, pt will:     Verbalize good understanding of activities planning/scheduling (stretching, mindfulness, resisted training, & cardio) prescribed by PT/OT following the end of the program to sustain & progress functional improvements progressing   Be independent w/ selected resisted training w/ minimal to no cuing while performing to continue w/ resisted strengthening independently at the end of the program progressing   Improve 2 Minute Walk Test (MWT) to 300 feet and 6 MWT to 900 feet or greater to improve gait speed and mobility Progressing (233 ft; 586 ft)   Perform single leg stance 15 seconds or greater bilaterally to improve safety and balance in ADLs  Partly met   Demonstrate Timed Up & Go (with appropriate assistive device, if necessary) of 18 seconds or less to decrease fall risk and improve functional mobility progressing   Demonstrate 5 time sit to stand test without upper extremity assist to 20 sec or less to improve general mobility and decrease fall risk progressing   Score 50 % or less on Lumbar FOTO to indicate significant functional improvements in impaired functions secondary to low back pain progressing          PLAN   Plan of care Certification: 6/13/2022 to 07/29/2022.     Outpatient Physical Therapy 3 times weekly for 2 weeks to include the following interventions: Gait Training, Manual Therapy, Moist Heat/ Ice, Neuromuscular Re-ed, Patient Education, Self Care, Therapeutic Activities and Therapeutic Exercise.     Following this, pt to go on hold for minimum of 3 weeks to attempt independence w/ HEP & PT activities, then return for reassessment.    Kar Trevizo, PT, DPT

## 2022-06-14 ENCOUNTER — OFFICE VISIT (OUTPATIENT)
Dept: ORTHOPEDICS | Facility: CLINIC | Age: 67
End: 2022-06-14
Payer: MEDICARE

## 2022-06-14 VITALS — HEIGHT: 65 IN | WEIGHT: 227 LBS | BODY MASS INDEX: 37.82 KG/M2

## 2022-06-14 DIAGNOSIS — M47.812 CERVICAL SPONDYLOSIS: Primary | ICD-10-CM

## 2022-06-14 DIAGNOSIS — M50.30 DDD (DEGENERATIVE DISC DISEASE), CERVICAL: ICD-10-CM

## 2022-06-14 DIAGNOSIS — M48.02 STENOSIS OF CERVICAL SPINE WITH MYELOPATHY: ICD-10-CM

## 2022-06-14 DIAGNOSIS — G99.2 STENOSIS OF CERVICAL SPINE WITH MYELOPATHY: ICD-10-CM

## 2022-06-14 PROCEDURE — 3044F PR MOST RECENT HEMOGLOBIN A1C LEVEL <7.0%: ICD-10-PCS | Mod: CPTII,S$GLB,, | Performed by: ORTHOPAEDIC SURGERY

## 2022-06-14 PROCEDURE — 3044F HG A1C LEVEL LT 7.0%: CPT | Mod: CPTII,S$GLB,, | Performed by: ORTHOPAEDIC SURGERY

## 2022-06-14 PROCEDURE — 3288F PR FALLS RISK ASSESSMENT DOCUMENTED: ICD-10-PCS | Mod: CPTII,S$GLB,, | Performed by: ORTHOPAEDIC SURGERY

## 2022-06-14 PROCEDURE — 1159F MED LIST DOCD IN RCRD: CPT | Mod: CPTII,S$GLB,, | Performed by: ORTHOPAEDIC SURGERY

## 2022-06-14 PROCEDURE — 3008F BODY MASS INDEX DOCD: CPT | Mod: CPTII,S$GLB,, | Performed by: ORTHOPAEDIC SURGERY

## 2022-06-14 PROCEDURE — 99214 PR OFFICE/OUTPT VISIT, EST, LEVL IV, 30-39 MIN: ICD-10-PCS | Mod: S$GLB,,, | Performed by: ORTHOPAEDIC SURGERY

## 2022-06-14 PROCEDURE — 3288F FALL RISK ASSESSMENT DOCD: CPT | Mod: CPTII,S$GLB,, | Performed by: ORTHOPAEDIC SURGERY

## 2022-06-14 PROCEDURE — 1160F PR REVIEW ALL MEDS BY PRESCRIBER/CLIN PHARMACIST DOCUMENTED: ICD-10-PCS | Mod: CPTII,S$GLB,, | Performed by: ORTHOPAEDIC SURGERY

## 2022-06-14 PROCEDURE — 1101F PT FALLS ASSESS-DOCD LE1/YR: CPT | Mod: CPTII,S$GLB,, | Performed by: ORTHOPAEDIC SURGERY

## 2022-06-14 PROCEDURE — 99999 PR PBB SHADOW E&M-EST. PATIENT-LVL II: ICD-10-PCS | Mod: PBBFAC,,, | Performed by: ORTHOPAEDIC SURGERY

## 2022-06-14 PROCEDURE — 99999 PR PBB SHADOW E&M-EST. PATIENT-LVL II: CPT | Mod: PBBFAC,,, | Performed by: ORTHOPAEDIC SURGERY

## 2022-06-14 PROCEDURE — 1160F RVW MEDS BY RX/DR IN RCRD: CPT | Mod: CPTII,S$GLB,, | Performed by: ORTHOPAEDIC SURGERY

## 2022-06-14 PROCEDURE — 3008F PR BODY MASS INDEX (BMI) DOCUMENTED: ICD-10-PCS | Mod: CPTII,S$GLB,, | Performed by: ORTHOPAEDIC SURGERY

## 2022-06-14 PROCEDURE — 1101F PR PT FALLS ASSESS DOC 0-1 FALLS W/OUT INJ PAST YR: ICD-10-PCS | Mod: CPTII,S$GLB,, | Performed by: ORTHOPAEDIC SURGERY

## 2022-06-14 PROCEDURE — 1159F PR MEDICATION LIST DOCUMENTED IN MEDICAL RECORD: ICD-10-PCS | Mod: CPTII,S$GLB,, | Performed by: ORTHOPAEDIC SURGERY

## 2022-06-14 PROCEDURE — 99214 OFFICE O/P EST MOD 30 MIN: CPT | Mod: S$GLB,,, | Performed by: ORTHOPAEDIC SURGERY

## 2022-06-14 PROCEDURE — 4010F PR ACE/ARB THEARPY RXD/TAKEN: ICD-10-PCS | Mod: CPTII,S$GLB,, | Performed by: ORTHOPAEDIC SURGERY

## 2022-06-14 PROCEDURE — 4010F ACE/ARB THERAPY RXD/TAKEN: CPT | Mod: CPTII,S$GLB,, | Performed by: ORTHOPAEDIC SURGERY

## 2022-06-16 ENCOUNTER — PATIENT MESSAGE (OUTPATIENT)
Dept: DERMATOLOGY | Facility: CLINIC | Age: 67
End: 2022-06-16
Payer: MEDICARE

## 2022-06-18 ENCOUNTER — PATIENT MESSAGE (OUTPATIENT)
Dept: OTOLARYNGOLOGY | Facility: CLINIC | Age: 67
End: 2022-06-18
Payer: MEDICARE

## 2022-06-20 ENCOUNTER — CLINICAL SUPPORT (OUTPATIENT)
Dept: REHABILITATION | Facility: OTHER | Age: 67
End: 2022-06-20
Payer: MEDICARE

## 2022-06-20 ENCOUNTER — OFFICE VISIT (OUTPATIENT)
Dept: PSYCHIATRY | Facility: OTHER | Age: 67
End: 2022-06-20
Payer: MEDICARE

## 2022-06-20 DIAGNOSIS — M53.86 DECREASED RANGE OF MOTION OF LUMBAR SPINE: Primary | ICD-10-CM

## 2022-06-20 DIAGNOSIS — R52 PAIN AGGRAVATED BY ACTIVITIES OF DAILY LIVING: Primary | ICD-10-CM

## 2022-06-20 DIAGNOSIS — Z74.09 IMPAIRED FUNCTIONAL MOBILITY AND ACTIVITY TOLERANCE: ICD-10-CM

## 2022-06-20 DIAGNOSIS — M79.7 FIBROMYALGIA: ICD-10-CM

## 2022-06-20 DIAGNOSIS — Z74.09 IMPAIRED FUNCTIONAL MOBILITY, BALANCE, GAIT, AND ENDURANCE: ICD-10-CM

## 2022-06-20 DIAGNOSIS — F43.29 ADJUSTMENT DISORDER WITH PHYSICAL COMPLAINTS: Primary | ICD-10-CM

## 2022-06-20 DIAGNOSIS — F40.298 FEAR OF PAIN: ICD-10-CM

## 2022-06-20 DIAGNOSIS — G89.29 CENTRAL SENSITIZATION TO PAIN: ICD-10-CM

## 2022-06-20 PROCEDURE — 3044F HG A1C LEVEL LT 7.0%: CPT | Mod: CPTII,,, | Performed by: SOCIAL WORKER

## 2022-06-20 PROCEDURE — 97140 MANUAL THERAPY 1/> REGIONS: CPT

## 2022-06-20 PROCEDURE — 4010F PR ACE/ARB THEARPY RXD/TAKEN: ICD-10-PCS | Mod: CPTII,,, | Performed by: SOCIAL WORKER

## 2022-06-20 PROCEDURE — 3044F PR MOST RECENT HEMOGLOBIN A1C LEVEL <7.0%: ICD-10-PCS | Mod: CPTII,,, | Performed by: SOCIAL WORKER

## 2022-06-20 PROCEDURE — 90853 PR GROUP PSYCHOTHERAPY: ICD-10-PCS | Mod: ,,, | Performed by: SOCIAL WORKER

## 2022-06-20 PROCEDURE — 97110 THERAPEUTIC EXERCISES: CPT

## 2022-06-20 PROCEDURE — 97530 THERAPEUTIC ACTIVITIES: CPT

## 2022-06-20 PROCEDURE — 4010F ACE/ARB THERAPY RXD/TAKEN: CPT | Mod: CPTII,,, | Performed by: SOCIAL WORKER

## 2022-06-20 PROCEDURE — 90853 GROUP PSYCHOTHERAPY: CPT | Mod: ,,, | Performed by: SOCIAL WORKER

## 2022-06-20 NOTE — PROGRESS NOTES
"[facility]   Occupational Therapy Treatment Note  [facility] Day 13    Date: 6/20/2022  Name: Regine Osorio  Clinic Number: [id number]    Therapy Diagnosis:   Encounter Diagnoses   Name Primary?    Pain aggravated by activities of daily living Yes    Fear of pain     Impaired functional mobility and activity tolerance      Physician: Montserrat Castellon NP    Physician Orders: OT eval and treat   Medical Diagnosis: Fibromyalgia  Evaluation Date: 4/29/2022  Insurance Authorization Period Expiration: 06/22/2022  Plan of Care Certification Period: 8/19/2022 (UPDTAED 6/13/22)  Visit # / Visits authorized: 11/ 11 (including evaluation)   FOTO: evaluation 59% limitation; 5/23/2022 58% limitation.  FOTO: reassessment: 53% limitation    Precautions:  Standard and Fall    Time In: 13:37  Time Out: 14:47  Total Billable Time: 70 minutes    Subjective     Regine states: "I am just trying to get through the activity. My shoulder is getting stronger, but it's just my bad shoulder, and I have the carpal tunnel as well. Over the weekend, I was able to walk from the car and not use my cane for assistance.         Pain:  Current: 4 / 10  Location: generalized     Objective     Objective measures updated at progress note.     Treatment     Therapeutic activities to improve functional performance for 70 minutes, including:    Full body stretch w/ 3-10 sec hold technique &/or 3-5 repetition technique on all of the following:   Cervical flx/ext   Cervical sidebending   Cervical rotation   Cervical protraction/retraction   Shld rolls   Shld depression/elevation   Scapular retraction/protraction/scaption   Posterior shld stretch (cross arm)   Shld ER stretch (chicken wing)   Elbow flx/ext   Forearm supination/pronation   Wrist flx/ext   Open/close hands/fingers   Seated trunk rotations   Seated trunk/thoracic ext/flx   Seated marches & knee to chest   Seated knee flx/ext   Seated hip " "ER/piriformis stretch   Seated hamstring stretch   Seated toe raise to heel raise    Seated ankle circles each way   Standing lumbar extensions   Standing lateral trunk stretch   Standing quad stretch    Regine completed dynamic reaching in various functional ranges, with continued focus on hip rotation/weight shifting, 2x5 reps x no added lbs, rated as Hard (5/10) exertion. Stretched shoulders at door during and after task. Minimal ROM passed shoulder height.    Regine completed functional lifting, floor to waist, then from raised surface to waist due to decreased ability to perform following proper body mechanics, 10 reps x 10 lbs with exertion rated Hard (5/10); focused education on keeping weight through heels. Has tendency to shift whight forward when leaning onto the crate, prior to picking it up; she stated: "I am flat footed; I have my weight into my heels and not my toes".    Regine participated in functional lift waist to shoulder, 10 reps x 9# lbs with a rating of Sort of hard (4/10) exertion. Regine educated on standing posture, core awareness, keeping shoulders depressed and retracted, stepping to place > reaching to place, keeping weight close to center of gravity and pacing as needed. Compensates by placing edge of crate on shelving by tipping it, then with this closed chain set up, able to push crate up onto the shelf. Discussed use of step stool, which she states she has in the kitchen if needed.     Regine participated in endurance activity using Nustep for 10 minutes, starting at level 2 to 1.4 at 4 minutes to improve functional endurance and progress towards increased MET level for improved community mobility and participation, rated as Sort of hard (4/10) exertion, Regine re-educated on how to add mindfulness component to activity and how to pace appropriately by completed self check ins and grading activity as needed, with goal to reach moderate to sort of hard by end of " activity. Cues to improve step count; consistently moves at about 45 steps/minute.    No environmental, cultural, spiritual, developmental or education needs expressed or noted.    Home Exercises and Patient Education     Education provided:   -Ms. Osorio received education regarding proper posture and body mechanics. She received education regarding anticipated muscular soreness following lift testing and strategies for management were reviewed with patient including stretching, using ice, scheduled rest.  - aris scale, pain cycle, z-lie, functional lifting mechanics, pursed lip and diaphragmatic breathing techniques  - boom/bust theory  - Progress towards goals    Written Home Exercises Provided: Patient instructed to cont prior HEP.   Regine demonstrated good understanding of the education provided.     Handouts provided during sessions can be found in EMR under patient instructions, or might be part of the [facility] binder.    Assessment     Regine presents with report of improved functional mobility over the weekend, not needing cane consistently. In session, open to education, but dismissive of feedback to improve body mechanics with performance, with tasks adjusted as needed. Continues to require increased time to complete functional weights but with good use of pacing as needed. Overall, she continues to progress towards personal goals.     Regine is unable to fully participate in community work, recreational, and home-based activities because of decreased functional  strength, decreased  AROM, decreased endurance, limited positional tolerance, fear of re-injury, and fear of increased pain. She will benefit from participating in a conditioning  program designed  to increase functional strength, flexibility, and endurance in order to meet functional goals.     Regine's prognosis is Good due to Good motivation and willingness to particiapte. She will benefit from skilled outpatient Occupational Therapy to  address the limitations and goals stated above and in the chart below, provide patient/family education, and to maximize patient's level of functional independence.    Plan of care discussed with patient: Yes  Pt's spiritual, cultural and educational needs considered and patient is agreeable to the plan of care and goals as stated below:       FRP Goals: While working towards the long-term (2 month) functional goals listed above, Ms. Osorio will accomplish the following short term goals during the 5-week intensive rehabilitation program. Ms. Osorio will:     1. Develop a viable return to community work plan.   2. Demonstrate physical capacities consistent with a light-medium work demand level.   3. Demonstrate increased functional strength by lifting 20 lbs floor to waist and 20 lbs waist to shoulder in order to meet demand of work/home routine.   4. Increase her positional tolerance to the level needed for work and home activities.   5. Implement self-care strategies during the program and at home to control pain.   6.   Patient will demonstrate use of mindfulness, stress management, and coping  strategies for improved participation in daily routine.   7.  Will drink 7 x 16oz bottles of water a day.    Specific patient expressed goals and demand levels:  Current Capacity Limit/ Physical  Demand Level (PDL)   Demand Levels of Functional Recovery Goals    Performance/Satisfaction  Day 1 Performance/Satisfaction  Day 10  6/13/22 Performance/Satisfaction  Follow-up     Sedentary- Light Physical Demand  (Based above tested results)    Vocational:  Sitting tolerance, as needed for work    Recreational:  Walk for exercise    Daily Living:  Dusting (floors and furniture)    Laundry    Mopping    Standing tolerance when attending to chores    UB and LB dressing      Supine to sit     Light-medium Physical Demand Level    Pending chair, 5/3      1/0      3/1      3/1    1/0    3/0        3/0      1/0      7 / 7 6 /  6       5 / 3      5 / 4     2 / 2    5 / 4         UB 6 / 6  LB 5 / 4    3 / 2    The PDL listed above is based on the physical performance screening test performed at initial evaluation and reassessment. More comprehensive physical  testing integrated with clinical findings would be required to derived an accurate work capacity.      Plan   Continue per plan of care.     ANA Denis/L, CEAS-I  6/20/2022

## 2022-06-20 NOTE — PROGRESS NOTES
[facility]    Functional Restoration Program  Physical Therapy Treatment Note  [facility] Day 13    Name: Regine Osorio  MRN:[id number]      Therapy Diagnosis:   Encounter Diagnoses   Name Primary?    Decreased range of motion of lumbar spine Yes    Central sensitization to pain     Fibromyalgia     Impaired functional mobility, balance, gait, and endurance      Physician: Montserrat Castellon NP    Date: 6/20/2022    Evaluation Date: 4/29/2022  Authorization Period Expiration: 06/22/2022  Plan of Care Expiration: 07/29/2022  Visit # / Visits authorized: 10 / 11  FOTO: completed 01 / 03       Time In:  2:45 pm  Time Out:  4:00 pm  Total Billable Time: 75 minutes    SUBJECTIVE       Regine reports she has been busy since last visit prepping for upcoming surgery and dealing /c house insurance.  Pt note inc stress from transactions and, therefore, has limited her daily stretching and walking.  However, pt report improved activity tolerance regarding grocery visits including not feeling like her legs will give out /p walking  to/from and around the store.    Pt reports cont plans to receiving cervical surgery in July noting post surg requirements include no lifting, no driving.      Pt tolerate last tx /c no c/o of excess discomfort.  Pt agree to tx today.       Patient's FRP goals: I want to be able to walk again, travel /s cane, inc spontaneity /c my daily activities      Current 5/10  Location(s): B knees, neck     OBJECTIVE     Objective Measures updated at progress report unless specified.     Treatment       Regine received the Individualized Treatments listed below:     therapeutic exercises to develop strength, endurance, ROM, flexibility, posture and core stabilization for 65 minutes including:      Peripheral muscle strengthening which included 1-2 sets of 10-20 repetitions at a slow, controlled 5-7 second per rep pace focused on strengthening supporting musculature for improved  body mechanics & functional mobility.  Patient & therapist focused on proper form during treatment to ensure optimal strengthening of each targeted muscle group. Patients are instructed to keep sets/reps with proper load to stay at 3-5 out of 10 on the provided modified Radha exertion scale.      BATCA Machine Exercises Weight (lbs) Sets Repetitions Radha Exertion Scale Rating   Leg Extension  7.5 1 20 5   Hamstring Curls 12.5 1 20 4   Chest Press 10  15 5   Pec Fly 10  15 5   Lat Pull Down* 10 2 10 4   Mid Row 10 2 10 4   Bicep DB Curls 3  10 4   Standing Tricep Extension 10  15 5   Single Leg Press 15 2 10 5   *Performed /p UE isometric and manual tx, pt able to achieve 135 deg      Standing closed kinetic chain isometric R shoulder stability to wall    Flexion x 5 5 sec hold pt max AROM position 85 deg   Horiz AB x 5 5 sec hold pt max AROM position 85 deg  *to manual therapy  (return to ) Standing closed kinetic chain isometric R shoulder stability to wall   Flexion x 5 5 sec hold pt max AROM position 130 deg   Horiz AB x 5 5 sec hold pt max AROM position 130 deg   R Shoulder pendulums x 20 semi prone over plinth    Cue for WS to elicit arm motion vs shoulder muscle    Standing closed kinetic chain resisted terminal knee extensions w/ soccer ball at wall x 10 /c 5 sec hold B    Foot taps to 6 in step, CL hand on window sill for balance/support     RLE x 30    LLE x 30   RLE 13.5 steps in 30 sec  (last visit 3.5 steps in 30 sec)        Manual therapy to R shoulder to develop tissue extensibility, improved bloodflow, ROM, flexibility for 10 minutes including:   R shoulder joint mobs grade 1 from 45 deg, 90 deg, 135 deg horizontal AB    Traction oscillations x 20      Post x 5    Inf x 5    ROM assessment - PROM 135 deg         Patient Education and Home Exercises     Home Exercises Provided and Patient Education Provided     Education provided:   - R shoulder pendulums for R shoulder AROM and sx self management.  "      Written Home Exercises Provided: Patient instructed to cont prior HEP. Exercises were reviewed and Regine was able to demonstrate them prior to the end of the session.  Regine demonstrated fair  understanding of the education provided. See EMR under Patient Instructions for information provided during therapy sessions    ASSESSMENT     Pt subjective report indicate improved ambulation tolerance.  To capitalize on this inc functionality, pt self efficacy promoted today including a passive tx to achieve improvements.  Manual passive tx performed and elicit sig improved R shoulder AAROM and AROM indicating to pt improved abilities.  Following this improvement, R shoulder pendulums introduced for pt self manual tx and pt educated on use of pendulums sx self management.  Regarding standing exercises, pt also demo improved "quickness" of movement /c stepping.  Pt believes her inc strength from resistance training contributes to this improvement.  To promote cont "quickness" of movement, next visit plan seated foot agility exercises transitioning to standing /c BUE support and open floor performance.  However, regarding cont resistance training, pt unable to verbalize gym plan and, instead, notes her post surgical restrictions. Pt advised to confirm /c MD if BLE exercises will be allowable.  Without cont performance, pt may regress /p discharge.     Regine Is progressing fair towards her goals.   Pt prognosis is Guarded.     Pt will continue to benefit from skilled outpatient physical therapy to address the deficits listed in the problem list box on initial evaluation, provide pt/family education and to maximize pt's level of independence in the home and community environment.     Pt's spiritual, cultural and educational needs considered and pt agreeable to plan of care and goals.     Anticipated barriers to physical therapy: chronic nature of pain, fear of pain, marked mobility deficits     GOALS: Pt is in " agreement with the following goals:        Goal Progress Towards Goal   Short Term: In 3 weeks, pt will:     Verbalize & demonstrate good understanding of resisted training with 3-1-3 second rep for ndmdwaroro-[id number]-[id number] contraction to encourage full muscle recruitment for strength & endurance Goal met 6/13/2022   Verbalize & demonstrate good understanding of home stretch routine to improve AROM, help decrease pain & improve mobility Goal met 6/13/2022   Demonstrate proper supine or seated diaphragmatic breathing with decreased chest excursion & emphasis on full abdominal excursion & increased expiration time to promote muscle relaxation & increased parasympathetic activity to improve patient tolerance to activities Goal met 6/13/2022   Demonstrate proper static standing posture in frontal & sagittal plane per PT visual assessment to decrease postural strain in ADLs Goal met 6/13/2022   Demonstrate good recall of names of resisted exercises w/ minimal to moderate verbal cues to promote independence w/ exercise at the end of the program progressing   Verbalize plan for continuing resisted exercises w/ specific location (i.e. commercial gym, home, community fitness center) indicating preference for machine-based or free-weight exercises to incorporate all major muscle groups to maintain & progress therapeutic functional improvements progressing   Pt will perform supine <> sit x 3 Ind /s inc LB or cervical discomfort for inc bed mobility  progressing         Long Term: In 8 weeks, pt will:     Verbalize good understanding of activities planning/scheduling (stretching, mindfulness, resisted training, & cardio) prescribed by PT/OT following the end of the program to sustain & progress functional improvements progressing   Be independent w/ selected resisted training w/ minimal to no cuing while performing to continue w/ resisted strengthening independently at the end of the program progressing   Improve 2  Minute Walk Test (MWT) to 300 feet and 6 MWT to 900 feet or greater to improve gait speed and mobility Progressing (233 ft; 586 ft)   Perform single leg stance 15 seconds or greater bilaterally to improve safety and balance in ADLs Partly met   Demonstrate Timed Up & Go (with appropriate assistive device, if necessary) of 18 seconds or less to decrease fall risk and improve functional mobility progressing   Demonstrate 5 time sit to stand test without upper extremity assist to 20 sec or less to improve general mobility and decrease fall risk progressing   Score 50 % or less on Lumbar FOTO to indicate significant functional improvements in impaired functions secondary to low back pain progressing             PLAN     Continue PT per POC     Dequan Wilde, PT, DPT

## 2022-06-21 NOTE — PROGRESS NOTES
"[facility]   Occupational Therapy Treatment Note  [facility] Day 14    Date: 6/22/2022  Name: Regine Osorio  Clinic Number: [id number]    Therapy Diagnosis:   Encounter Diagnoses   Name Primary?    Pain aggravated by activities of daily living Yes    Fear of pain     Impaired functional mobility and activity tolerance      Physician: Montserrat Castellon NP    Physician Orders: OT eval and treat   Medical Diagnosis: Fibromyalgia  Evaluation Date: 4/29/2022  Insurance Authorization Period Expiration: 06/22/2022  Plan of Care Certification Period: 8/19/2022 (UPDTAED 6/13/22)  Visit # / Visits authorized: 12/ 11 (including evaluation)   FOTO: evaluation 59% limitation; 5/23/2022 58% limitation.  FOTO: reassessment: 53% limitation    Precautions:  Standard and Fall    Time In: 14:47  Time Out: 16:00  Total Billable Time: 73 minutes    Subjective     Regine states: "I have the ACDF scheduled; I won't be doing any lifting, bending for a while".        Pain:  Current: 4 / 10  Location: generalized     Objective     Objective measures updated at progress note.     Treatment     Therapeutic activities to improve functional performance for 73 minutes, including:    Regine completed push and pull activity, simulating floor cleaning, x 7 minutes with education focused on the importance of shifting from the waist to reduce strain on back, keeping elbow tucked to the side, maintaining upright standing posture and keeping shoulders away from the ears. Pt rated exertion as Easy (2/10) exertion. While standing, discussed looking for the natural breaks in task, and use JIAN scale to rate parts of task, to help identify part of task to be assessed and changed if needed.     Regine completed dynamic reaching in various functional ranges, with continued focus on hip rotation/weight shifting, 2x5 reps x no added lbs, rated as Hard (5/10) exertion.     Regine participated in endurance activity using Nustep " for 10 minutes, at level 1.5 to improve functional endurance and progress towards increased MET level for improved community mobility and participation, rated as Hard (5/10) exertion, Regine re-educated on how to add mindfulness component to activity and how to pace appropriately by completed self check ins and grading activity as needed, with goal to reach moderate to sort of hard by end of activity.    Regine participated in functional lift waist to shoulder, 10 reps x 9 lbs with a rating of Hard (5/10) exertion. Regine educated on standing posture, core awareness, keeping shoulders depressed and retracted, stepping to place > reaching to place, keeping weight close to center of gravity and pacing as needed.     Regine completed functional lifting, floor to waist, 2x5 reps x 10 lbs with exertion rated Sort of hard (4/10). Took seated rest break, and bathroom break after task.    Regine participated in review and performance of sit to stands, using large motions for momentum to bring center point of gravity over base of support; unable to come to stand without using hands.    Discussed ADL performance in light of upcoming surgery.    No environmental, cultural, spiritual, developmental or education needs expressed or noted.    Home Exercises and Patient Education     Education provided:   -Ms. Osorio received education regarding proper posture and body mechanics. She received education regarding anticipated muscular soreness following lift testing and strategies for management were reviewed with patient including stretching, using ice, scheduled rest.  - aris scale, pain cycle, z-lie, functional lifting mechanics, pursed lip and diaphragmatic breathing techniques  - boom/bust theory  - Progress towards goals    Written Home Exercises Provided: Patient instructed to cont prior HEP.   Regine demonstrated good understanding of the education provided.     Handouts provided during sessions can be found in  EMR under patient instructions, or might be part of the FRP binder.    Assessment     Regine presents willing to participate, with some improved perception of exertion needed for functional lifting tasks. Continues to require increased time to complete functional weights but with good use of pacing as needed. Due to scheduled upcoming surgery (fusion), she recognizes that she will have limitations in continuing with HEP as instructed, but recognizes benefit of breathing exercises for pain and stress management.    Regine is unable to fully participate in community work, recreational, and home-based activities because of decreased functional  strength, decreased  AROM, decreased endurance, limited positional tolerance, fear of re-injury, and fear of increased pain. She will benefit from participating in a conditioning  program designed  to increase functional strength, flexibility, and endurance in order to meet functional goals.     Regine's prognosis is Good due to Good motivation and willingness to particiapte. She will benefit from skilled outpatient Occupational Therapy to address the limitations and goals stated above and in the chart below, provide patient/family education, and to maximize patient's level of functional independence.    Plan of care discussed with patient: Yes  Pt's spiritual, cultural and educational needs considered and patient is agreeable to the plan of care and goals as stated below:       FRP Goals: While working towards the long-term (2 month) functional goals listed above, Ms. Osorio will accomplish the following short term goals during the 5-week intensive rehabilitation program. Ms. Osorio will:     1. Develop a viable return to community work plan.   2. Demonstrate physical capacities consistent with a light-medium work demand level.   3. Demonstrate increased functional strength by lifting 20 lbs floor to waist and 20 lbs waist to shoulder in order to meet demand of  work/home routine.   4. Increase her positional tolerance to the level needed for work and home activities.   5. Implement self-care strategies during the program and at home to control pain.   6.   Patient will demonstrate use of mindfulness, stress management, and coping  strategies for improved participation in daily routine.   7.  Will drink 7 x 16oz bottles of water a day.    Specific patient expressed goals and demand levels:  Current Capacity Limit/ Physical  Demand Level (PDL)   Demand Levels of Functional Recovery Goals    Performance/Satisfaction  Day 1 Performance/Satisfaction  Day 10  6/13/22 Performance/Satisfaction  Follow-up     Sedentary- Light Physical Demand  (Based above tested results)    Vocational:  Sitting tolerance, as needed for work    Recreational:  Walk for exercise    Daily Living:  Dusting (floors and furniture)    Laundry    Mopping    Standing tolerance when attending to chores    UB and LB dressing      Supine to sit     Light-medium Physical Demand Level    Pending chair, 5/3      1/0      3/1      3/1    1/0    3/0        3/0      1/0      7 / 7        6 / 6       5 / 3      5 / 4     2 / 2    5 / 4         UB 6 / 6  LB 5 / 4    3 / 2    The PDL listed above is based on the physical performance screening test performed at initial evaluation and reassessment. More comprehensive physical  testing integrated with clinical findings would be required to derived an accurate work capacity.      Plan   Continue per plan of care.     ANA Denis/L, CEAS-I  6/22/2022

## 2022-06-22 ENCOUNTER — CLINICAL SUPPORT (OUTPATIENT)
Dept: REHABILITATION | Facility: OTHER | Age: 67
End: 2022-06-22
Payer: MEDICARE

## 2022-06-22 ENCOUNTER — OFFICE VISIT (OUTPATIENT)
Dept: PSYCHIATRY | Facility: OTHER | Age: 67
End: 2022-06-22
Payer: COMMERCIAL

## 2022-06-22 DIAGNOSIS — G89.29 CENTRAL SENSITIZATION TO PAIN: ICD-10-CM

## 2022-06-22 DIAGNOSIS — F40.298 FEAR OF PAIN: ICD-10-CM

## 2022-06-22 DIAGNOSIS — R52 PAIN AGGRAVATED BY ACTIVITIES OF DAILY LIVING: Primary | ICD-10-CM

## 2022-06-22 DIAGNOSIS — M79.7 FIBROMYALGIA: ICD-10-CM

## 2022-06-22 DIAGNOSIS — Z74.09 IMPAIRED FUNCTIONAL MOBILITY AND ACTIVITY TOLERANCE: ICD-10-CM

## 2022-06-22 DIAGNOSIS — M53.86 DECREASED RANGE OF MOTION OF LUMBAR SPINE: Primary | ICD-10-CM

## 2022-06-22 DIAGNOSIS — F43.29 ADJUSTMENT DISORDER WITH PHYSICAL COMPLAINTS: Primary | ICD-10-CM

## 2022-06-22 DIAGNOSIS — Z74.09 IMPAIRED FUNCTIONAL MOBILITY, BALANCE, GAIT, AND ENDURANCE: ICD-10-CM

## 2022-06-22 PROCEDURE — 3044F PR MOST RECENT HEMOGLOBIN A1C LEVEL <7.0%: ICD-10-PCS | Mod: CPTII,,, | Performed by: SOCIAL WORKER

## 2022-06-22 PROCEDURE — 3044F HG A1C LEVEL LT 7.0%: CPT | Mod: CPTII,,, | Performed by: SOCIAL WORKER

## 2022-06-22 PROCEDURE — 4010F PR ACE/ARB THEARPY RXD/TAKEN: ICD-10-PCS | Mod: CPTII,,, | Performed by: SOCIAL WORKER

## 2022-06-22 PROCEDURE — 97110 THERAPEUTIC EXERCISES: CPT

## 2022-06-22 PROCEDURE — 4010F ACE/ARB THERAPY RXD/TAKEN: CPT | Mod: CPTII,,, | Performed by: SOCIAL WORKER

## 2022-06-22 PROCEDURE — 97530 THERAPEUTIC ACTIVITIES: CPT

## 2022-06-22 PROCEDURE — 90791 PR PSYCHIATRIC DIAGNOSTIC EVALUATION: ICD-10-PCS | Mod: ,,, | Performed by: SOCIAL WORKER

## 2022-06-22 PROCEDURE — 90791 PSYCH DIAGNOSTIC EVALUATION: CPT | Mod: ,,, | Performed by: SOCIAL WORKER

## 2022-06-22 NOTE — PROGRESS NOTES
[facility]    Functional Restoration Program  Physical Therapy Treatment Note  FRP Day 14    Name: Regine Osorio  MRN:[id number]      Therapy Diagnosis:   Encounter Diagnoses   Name Primary?    Decreased range of motion of lumbar spine Yes    Central sensitization to pain     Fibromyalgia     Impaired functional mobility, balance, gait, and endurance        Physician: Montserrat Castellon NP    Date: 6/22/2022    Evaluation Date: 4/29/2022  Authorization Period Expiration: 06/22/2022  Plan of Care Expiration: 07/29/2022  Visit # / Visits authorized: 11 / 11  FOTO: completed 01 / 03       Time In:  1:35 pm  Time Out: 2:45  pm  Total Billable Time: 70 minutes    SUBJECTIVE     Regine reports completing her modified stretching program in AM including BLE motion.  Pt note extended time on phone due to insurance search and reports using time to perform neck stretches.    Pt feels stronger and believes she is able to walk farther.  Pt report feeling has returned to her R little toe and believes it is related to her back mobility exercises.    Pt reaffirm intent to have cervical surgery early July.   Pt tolerate last session well /c no c/o excess discomfort.  Pt agree to tx today.       Patient's FRP goals: I want to be able to walk again, travel /s cane, inc spontaneity /c my daily activities      Current 5/10  Location(s): B knees, neck     OBJECTIVE     Objective Measures updated at progress report unless specified.     Treatment       Regine received the Individualized Treatments listed below:     therapeutic exercises to develop strength, endurance, ROM, flexibility, posture and core stabilization for 70 minutes including:    Full body stretch w/ 3-10 sec hold technique &/or 3-5 repetition technique on all of the following:   Cervical flx/ext   Cervical sidebending   Cervical rotation   Cervical protraction/retraction   Shld rolls   Shld depression/elevation   Scapular  retraction/protraction/scaption   Posterior shld stretch (cross arm)   Shld ER stretch (chicken wing)   Elbow flx/ext   Forearm supination/pronation   Wrist flx/ext   Open/close hands/fingers   Seated trunk rotations   Seated trunk/thoracic ext/flx   Seated marches & knee to chest   Seated knee flx/ext   Seated hip ER/piriformis stretch   Seated hamstring stretch   Seated toe raise to heel raise    Seated ankle circles each way   Standing lumbar extensions   Standing lateral leaning stretch   Standing quad stretch (UE support for balance)      AROM  R shoulder    HorizAB 90 deg   Flexion 100 deg    Pendulums - bent over, prone    /c 3# dumbbell 50 CW, 50 CCW    (repeat) AROM R shoulder    HorizAB 120 deg   Flexion 130 deg      Seated-  8 card in [place] grab              L  6.46 sec, 7.25 sec, Shoulder in 90 deg flex thru movement 7.26 sec     R 14.08 sec, 14.70 sec min shoulder flex, /c inc reach for inc shoulder flex 17.26 sec, 12.03 sec    Seated   Marches 2 min   Quick step 30 sec    Lat step 2 min      Standing between plinth   March 60 sec min knee elevation, 1 hand on plinth    Quick steps - mod pace 30 sec, 15 sec rest, fastest pace 30 sec    Lat stepping minimum 1 ft 2 min 1 hand on plinth    Soccer kicking 2 min B foot    Free standing   March 60 sec min knee elevation   Quick steps - mod pace 30 sec, 15 sec rest, fastest pace 30 sec    Lat stepping minimum 1 ft 2 min   Soccer kicking 3 min L foot, L and R direction             Patient Education and Home Exercises     Home Exercises Provided and Patient Education Provided     Education provided:   - R shoulder pendulums for R shoulder AROM and sx self management.       Written Home Exercises Provided: Patient instructed to cont prior HEP. Exercises were reviewed and Regine was able to demonstrate them prior to the end of the session.  Regine demonstrated fair  understanding of the education provided. See EMR under Patient Instructions  "for information provided during therapy sessions    ASSESSMENT       Today tx return to R shoulder ROM exercises to solidify pt sx self management techniques from last visit.  Pt demo sig improved AROM /p pendulums and, again, advised to perform in advance of tasks requiring BUE use for improved functionality.  Reassessed card grab activity and pt perform /c sig improved time indicating likely improved neuromotor processing ("quickness") to BUE. To promote cont "quickness" of movement in BLE, performed agility exercises. Exercise progressed by transitioning from seated to standing /c BUE support and then open floor performance. Pt tolerate all positions and demo improved quickness /c standing indicating likely improved BLE strength and self efficacy /c task.  Today pt demo improved activity tolerance, strength and agility.  However, pt plans to have cervical surgery in near future and is verbal about anticipated activity restrictions. Without cont performance, pt may regress /p discharge. Therefore, pt advised to confirm /c MD if BLE exercises will be allowable.        Regine Is progressing fair towards her goals.   Pt prognosis is Guarded.     Pt will continue to benefit from skilled outpatient physical therapy to address the deficits listed in the problem list box on initial evaluation, provide pt/family education and to maximize pt's level of independence in the home and community environment.     Pt's spiritual, cultural and educational needs considered and pt agreeable to plan of care and goals.     Anticipated barriers to physical therapy: chronic nature of pain, fear of pain, marked mobility deficits     GOALS: Pt is in agreement with the following goals:        Goal Progress Towards Goal   Short Term: In 3 weeks, pt will:     Verbalize & demonstrate good understanding of resisted training with 3-1-3 second rep for plsgxleyoa-umqqivhjp-rbiurnmzs contraction to encourage full muscle recruitment for strength " & endurance Goal met 6/13/2022   Verbalize & demonstrate good understanding of home stretch routine to improve AROM, help decrease pain & improve mobility Goal met 6/13/2022   Demonstrate proper supine or seated diaphragmatic breathing with decreased chest excursion & emphasis on full abdominal excursion & increased expiration time to promote muscle relaxation & increased parasympathetic activity to improve patient tolerance to activities Goal met 6/13/2022   Demonstrate proper static standing posture in frontal & sagittal plane per PT visual assessment to decrease postural strain in ADLs Goal met 6/13/2022   Demonstrate good recall of names of resisted exercises w/ minimal to moderate verbal cues to promote independence w/ exercise at the end of the program progressing   Verbalize plan for continuing resisted exercises w/ specific location (i.e. commercial gym, home, community fitness center) indicating preference for machine-based or free-weight exercises to incorporate all major muscle groups to maintain & progress therapeutic functional improvements progressing   Pt will perform supine <> sit x 3 Ind /s inc LB or cervical discomfort for inc bed mobility  progressing         Long Term: In 8 weeks, pt will:     Verbalize good understanding of activities planning/scheduling (stretching, mindfulness, resisted training, & cardio) prescribed by PT/OT following the end of the program to sustain & progress functional improvements progressing   Be independent w/ selected resisted training w/ minimal to no cuing while performing to continue w/ resisted strengthening independently at the end of the program progressing   Improve 2 Minute Walk Test (MWT) to 300 feet and 6 MWT to 900 feet or greater to improve gait speed and mobility Progressing (233 ft; 586 ft)   Perform single leg stance 15 seconds or greater bilaterally to improve safety and balance in ADLs Partly met   Demonstrate Timed Up & Go (with appropriate  assistive device, if necessary) of 18 seconds or less to decrease fall risk and improve functional mobility progressing   Demonstrate 5 time sit to stand test without upper extremity assist to 20 sec or less to improve general mobility and decrease fall risk progressing   Score 50 % or less on Lumbar FOTO to indicate significant functional improvements in impaired functions secondary to low back pain progressing             PLAN     Continue PT per POC     Dequan Wilde, PT, DPT

## 2022-06-23 ENCOUNTER — OFFICE VISIT (OUTPATIENT)
Dept: PSYCHIATRY | Facility: OTHER | Age: 67
End: 2022-06-23
Payer: COMMERCIAL

## 2022-06-23 ENCOUNTER — CLINICAL SUPPORT (OUTPATIENT)
Dept: REHABILITATION | Facility: OTHER | Age: 67
End: 2022-06-23
Payer: MEDICARE

## 2022-06-23 DIAGNOSIS — F40.298 FEAR OF PAIN: ICD-10-CM

## 2022-06-23 DIAGNOSIS — R52 PAIN AGGRAVATED BY ACTIVITIES OF DAILY LIVING: Primary | ICD-10-CM

## 2022-06-23 DIAGNOSIS — M53.86 DECREASED RANGE OF MOTION OF LUMBAR SPINE: Primary | ICD-10-CM

## 2022-06-23 DIAGNOSIS — Z74.09 IMPAIRED FUNCTIONAL MOBILITY, BALANCE, GAIT, AND ENDURANCE: ICD-10-CM

## 2022-06-23 DIAGNOSIS — Z74.09 IMPAIRED FUNCTIONAL MOBILITY AND ACTIVITY TOLERANCE: ICD-10-CM

## 2022-06-23 DIAGNOSIS — G89.29 CENTRAL SENSITIZATION TO PAIN: ICD-10-CM

## 2022-06-23 DIAGNOSIS — M79.7 FIBROMYALGIA: ICD-10-CM

## 2022-06-23 DIAGNOSIS — F43.29 ADJUSTMENT DISORDER WITH PHYSICAL COMPLAINTS: Primary | ICD-10-CM

## 2022-06-23 PROCEDURE — 99499 UNLISTED E&M SERVICE: CPT | Mod: ,,, | Performed by: SOCIAL WORKER

## 2022-06-23 PROCEDURE — 4010F PR ACE/ARB THEARPY RXD/TAKEN: ICD-10-PCS | Mod: CPTII,,, | Performed by: SOCIAL WORKER

## 2022-06-23 PROCEDURE — 97530 THERAPEUTIC ACTIVITIES: CPT

## 2022-06-23 PROCEDURE — 4010F ACE/ARB THERAPY RXD/TAKEN: CPT | Mod: CPTII,,, | Performed by: SOCIAL WORKER

## 2022-06-23 PROCEDURE — 3044F PR MOST RECENT HEMOGLOBIN A1C LEVEL <7.0%: ICD-10-PCS | Mod: CPTII,,, | Performed by: SOCIAL WORKER

## 2022-06-23 PROCEDURE — 3044F HG A1C LEVEL LT 7.0%: CPT | Mod: CPTII,,, | Performed by: SOCIAL WORKER

## 2022-06-23 PROCEDURE — 97110 THERAPEUTIC EXERCISES: CPT

## 2022-06-23 PROCEDURE — 99499 NO LOS: ICD-10-PCS | Mod: ,,, | Performed by: SOCIAL WORKER

## 2022-06-23 NOTE — PROGRESS NOTES
[facility]    Functional Restoration Program  Physical Therapy Treatment Note  FRP Day 13    Name: Regine Osorio  MRN:[id number]      Therapy Diagnosis:   Encounter Diagnoses   Name Primary?    Decreased range of motion of lumbar spine Yes    Central sensitization to pain     Fibromyalgia     Impaired functional mobility, balance, gait, and endurance        Physician: Montserrat Castellon NP    Date: 6/23/2022    Evaluation Date: 4/29/2022  Authorization Period Expiration: 06/22/2022  Plan of Care Expiration: 07/29/2022  Visit # / Visits authorized: 12 / 11  FOTO: completed 2 / 03       Time In:  2:20 pm  Time Out: 3:30  pm  Total Billable Time: 70 minutes    SUBJECTIVE     Regine reports she will be haiving surgery in a the next 2 weeks & has a lot of preparing to handle. She is requesting discharge at this time, but she reports that she will start back at the gym as soon as there is clearance for her to start exercising again. She is happy w/ her progress.      Patient's FRP goals: I want to be able to walk again, travel /s cane, inc spontaneity /c my daily activities      Current 5/10  Location(s): B knees, neck     OBJECTIVE     Objective Measures updated at progress report unless specified.     Treatment       Regine received the Individualized Treatments listed below:     therapeutic exercises to develop strength, endurance, ROM, flexibility, posture and core stabilization for 70 minutes including:      Peripheral muscle strengthening which included 1-2 sets of 10-20 repetitions at a slow, controlled 5-7 second per rep pace focused on strengthening supporting musculature for improved body mechanics & functional mobility.  Patient & therapist focused on proper form during treatment to ensure optimal strengthening of each targeted muscle group. Patients are instructed to keep sets/reps with proper load to stay at 3-5 out of 10 on the provided modified Radha exertion scale.         BATCA Machine Exercises Weight (lbs) Sets Repetitions Radha Exertion Scale Rating   Leg Extension (AAROM) 7.5 1 15 5   Hamstring Curls 15 1 15 5   Chest Press 12.5 1 15 5   Pec Fly 12.5 1 15 5   Lat Pull Down 10 1 20 4   Mid Row 10 1 20 5   Bicep DB Curls 3 1 15 4   Standing Tricep Extension 12.5 1 15 4   Single Leg Press 15 1 20 4          · Fitter board hard tilts w/  support 20 each way  · Seated diaphragmatic breaths 5x10 throughout session             Patient Education and Home Exercises     Home Exercises Provided and Patient Education Provided     Education provided:   - none       Written Home Exercises Provided: Patient instructed to cont prior HEP. Exercises were reviewed and Regine was able to demonstrate them prior to the end of the session.  Regine demonstrated fair  understanding of the education provided. See EMR under Patient Instructions for information provided during therapy sessions    ASSESSMENT     Pt w/ improved tolerance to exercises being able to perform all resisted exercises for the first time in the program & performed 15-20 reps of each. She still has some slower movements in all of her mobility rooted in pain, but also fear avoidance. Pt expresses she has multiple pending surgeries upcoming resulting in a change in status, so she will need to be discharged from PT at this time.      Regine Is progressing fair towards her goals.   Pt prognosis is Guarded.     Pt will continue to benefit from skilled outpatient physical therapy to address the deficits listed in the problem list box on initial evaluation, provide pt/family education and to maximize pt's level of independence in the home and community environment.     Pt's spiritual, cultural and educational needs considered and pt agreeable to plan of care and goals.     Anticipated barriers to physical therapy: chronic nature of pain, fear of pain, marked mobility deficits     GOALS: Pt is in agreement with the following  goals:        Goal Progress Towards Goal   Short Term: In 3 weeks, pt will:     Verbalize & demonstrate good understanding of resisted training with 3-1-3 second rep for eetzeympfq-hplsadueq-wgxvpvesi contraction to encourage full muscle recruitment for strength & endurance Goal met 6/13/2022   Verbalize & demonstrate good understanding of home stretch routine to improve AROM, help decrease pain & improve mobility Goal met 6/13/2022   Demonstrate proper supine or seated diaphragmatic breathing with decreased chest excursion & emphasis on full abdominal excursion & increased expiration time to promote muscle relaxation & increased parasympathetic activity to improve patient tolerance to activities Goal met 6/13/2022   Demonstrate proper static standing posture in frontal & sagittal plane per PT visual assessment to decrease postural strain in ADLs Goal met 6/13/2022   Demonstrate good recall of names of resisted exercises w/ minimal to moderate verbal cues to promote independence w/ exercise at the end of the program progressing   Verbalize plan for continuing resisted exercises w/ specific location (i.e. commercial gym, home, community fitness center) indicating preference for machine-based or free-weight exercises to incorporate all major muscle groups to maintain & progress therapeutic functional improvements progressing   Pt will perform supine <> sit x 3 Ind /s inc LB or cervical discomfort for inc bed mobility  progressing         Long Term: In 8 weeks, pt will:     Verbalize good understanding of activities planning/scheduling (stretching, mindfulness, resisted training, & cardio) prescribed by PT/OT following the end of the program to sustain & progress functional improvements progressing   Be independent w/ selected resisted training w/ minimal to no cuing while performing to continue w/ resisted strengthening independently at the end of the program progressing   Improve 2 Minute Walk Test (MWT) to 300 feet  and 6 MWT to 900 feet or greater to improve gait speed and mobility Progressing (233 ft; 586 ft)   Perform single leg stance 15 seconds or greater bilaterally to improve safety and balance in ADLs Partly met   Demonstrate Timed Up & Go (with appropriate assistive device, if necessary) of 18 seconds or less to decrease fall risk and improve functional mobility progressing   Demonstrate 5 time sit to stand test without upper extremity assist to 20 sec or less to improve general mobility and decrease fall risk progressing   Score 50 % or less on Lumbar FOTO to indicate significant functional improvements in impaired functions secondary to low back pain progressing             PLAN     Discharge from PT per pt request    Kar Trevizo, PT, DPT

## 2022-06-23 NOTE — PROGRESS NOTES
"[facility]   Occupational Therapy Treatment Note  [facility]    Date: 6/23/2022  Name: Regine Osorio  Clinic Number: [id number]    Therapy Diagnosis:   Encounter Diagnoses   Name Primary?    Pain aggravated by activities of daily living Yes    Fear of pain     Impaired functional mobility and activity tolerance      Physician: Montserrat Castellon NP    Physician Orders: OT eval and treat   Medical Diagnosis: Fibromyalgia  Evaluation Date: 4/29/2022  Insurance Authorization Period Expiration: 06/22/2022  Plan of Care Certification Period: 8/19/2022 (UPDTAED 6/13/22)  Visit # / Visits authorized: 13/ 11 (including evaluation)   FOTO: evaluation 59% limitation; 5/23/2022 58% limitation.  FOTO: reassessment: 53% limitation    Precautions:  Standard and Fall    Time In: 1:10 PM  Time Out: 2:20 PM  Total Billable Time: 70 minutes    Subjective     Regnie states: "I feel frazzled. Just a lot of stress with surgery and me and just everything, it feels too much right now".        Pain:  Current: 5 / 10  Location: generalized     Objective     Therapeutic activities to improve functional performance for 70 minutes, including:    Full body stretch w/ 3-10 sec hold technique &/or 3-5 repetition technique on all of the following:   Cervical flx/ext   Cervical sidebending   Cervical rotation   Cervical protraction/retraction   Shld rolls   Shld depression/elevation   Scapular retraction/protraction/scaption   Posterior shld stretch (cross arm)   Shld ER stretch (chicken wing)   Elbow flx/ext   Forearm supination/pronation   Wrist flx/ext   Open/close hands/fingers   Seated trunk rotations   Seated trunk/thoracic ext/flx   Seated marches & knee to chest   Seated knee flx/ext   Seated hip ER/piriformis stretch   Seated hamstring stretch   Seated toe raise to heel raise    Seated ankle circles each way   Standing lumbar extensions   Standing lateral leaning " stretch   Standing quad stretch (UE support for balance)    Regine completed dynamic reaching in various functional ranges, with continued focus on hip rotation/weight shifting, x10 reps x no added lbs, rated as sort of Hard (4/10) exertion. Completed walk walks and pec stretches post activity.     Regine completed functional lifting, floor to waist, 2x5 reps x 10 lbs with exertion rated hard (5/10). Took seated rest break, and bathroom break after task.     Regine completed push and pull activity, simulating floor cleaning, x 7 minutes with education focused on the importance of shifting from the waist to reduce strain on back, keeping elbow tucked to the side, maintaining upright standing posture and keeping shoulders away from the ears. Pt rated exertion as Easy (2/10) exertion. While standing, discussed looking for the natural breaks in task, and use JIAN scale to rate parts of task, to help identify part of task to be assessed and changed if needed.     Regine participated in endurance activity using Nustep for 10 minutes, at level 1.5 to improve functional endurance and progress towards increased MET level for improved community mobility and participation, rated as Hard (5/10) exertion, Regine re-educated on how to add mindfulness component to activity and how to pace appropriately by completed self check ins and grading activity as needed, with goal to reach moderate to sort of hard by end of activity.    Pt completed seated mindfulness diaphragmatic breathing activity [id number] with focus on posture, mechanics of breathing, and benefits re: PNS activation. Pt voiced and demonstrated understanding.     Regine participated in functional lift waist to shoulder, 10 reps x 9 lbs with a rating of Hard (5/10) exertion. Regine educated on standing posture, core awareness, keeping shoulders depressed and retracted, stepping to place > reaching to place, keeping weight close to center of gravity and  pacing as needed.     Pt completed sustained seated activity while she made phone call to doctor. While seated, focus on proper ergonomics, posture and mindfulness while making stressful conversation.     Regine completed maximal lift 2x5 reps with 13 lbs, rated as Sort of hard (4/10) exertion, continued education focused on neutral spine positioning and pushing through heels for safety and activation of correct muscles.    Pt educated on proper mechanics to get on and off floor using chair for support. Pt educated on steps to take to ensure safety if she were to have a fall: roll to back, complete body scan to see if all body parts are okay, roll to side, hands/knees, crawl to chair and then use steps to get on/off floor. Pt demonstrated understanding x2reps. Pt with increased self-efficacy after completion of activity. Pt given handouts on proper mechanics. Pt rated as sort of hard (4/10) exertion.      Pt completed yoga activity on mat, supine, quadriped, seated and standing, with focused education on getting on/off floor properly and safely, back mobility, hip flexibility, stress reduction, dynamic standing balance, posture, alignment and coordinating movement with breath, rated sort of hard (4/10).    No environmental, cultural, spiritual, developmental or education needs expressed or noted.    Home Exercises and Patient Education     Education provided:   -Ms. Osorio received education regarding proper posture and body mechanics. She received education regarding anticipated muscular soreness following lift testing and strategies for management were reviewed with patient including stretching, using ice, scheduled rest.  - aris scale, pain cycle, z-lie, functional lifting mechanics, pursed lip and diaphragmatic breathing techniques  - boom/bust theory  - Progress towards goals    Written Home Exercises Provided: Patient instructed to cont prior HEP.   Regine demonstrated good understanding of the education  provided.     Handouts provided during sessions can be found in EMR under patient instructions, or might be part of the FRP binder.    Assessment     Regine presents increased stress upon arrival. Despite this, continues with willingness to participate. Continues to require increased time to complete functional weights but with good continued use of pacing as needed and breathing techniques independently. Discussed having to reschedule upcoming surgery 2/2 other stressors, but with eagerness to have procedure over with. Noted good motivation to continue exercise post surgery (fusion). Good response and tolerance to getting on/off floor activity. Continues with slow progress towards personal goals.     Regine is unable to fully participate in community work, recreational, and home-based activities because of decreased functional  strength, decreased  AROM, decreased endurance, limited positional tolerance, fear of re-injury, and fear of increased pain. She will benefit from participating in a conditioning  program designed  to increase functional strength, flexibility, and endurance in order to meet functional goals.     Regine's prognosis is Good due to Good motivation and willingness to particiapte. She will benefit from skilled outpatient Occupational Therapy to address the limitations and goals stated above and in the chart below, provide patient/family education, and to maximize patient's level of functional independence.    Plan of care discussed with patient: Yes  Pt's spiritual, cultural and educational needs considered and patient is agreeable to the plan of care and goals as stated below:       FRP Goals: While working towards the long-term (2 month) functional goals listed above, Ms. Osorio will accomplish the following short term goals during the 5-week intensive rehabilitation program. Ms. Osorio will:     1. Develop a viable return to community work plan.   2. Demonstrate physical capacities  consistent with a light-medium work demand level.   3. Demonstrate increased functional strength by lifting 20 lbs floor to waist and 20 lbs waist to shoulder in order to meet demand of work/home routine.   4. Increase her positional tolerance to the level needed for work and home activities.   5. Implement self-care strategies during the program and at home to control pain.   6.   Patient will demonstrate use of mindfulness, stress management, and coping  strategies for improved participation in daily routine.   7.  Will drink 7 x 16oz bottles of water a day.    Specific patient expressed goals and demand levels:  Current Capacity Limit/ Physical  Demand Level (PDL)   Demand Levels of Functional Recovery Goals    Performance/Satisfaction  Day 1 Performance/Satisfaction  Day 10  6/13/22 Performance/Satisfaction  Follow-up     Sedentary- Light Physical Demand  (Based above tested results)    Vocational:  Sitting tolerance, as needed for work    Recreational:  Walk for exercise    Daily Living:  Dusting (floors and furniture)    Laundry    Mopping    Standing tolerance when attending to chores    UB and LB dressing      Supine to sit     Light-medium Physical Demand Level    Pending chair, 5/3      1/0      3/1      3/1    1/0    3/0        3/0      1/0      7 / 7        6 / 6       5 / 3      5 / 4     2 / 2    5 / 4         UB 6 / 6  LB 5 / 4    3 / 2    The PDL listed above is based on the physical performance screening test performed at initial evaluation and reassessment. More comprehensive physical  testing integrated with clinical findings would be required to derived an accurate work capacity.      Plan   Continue per plan of care.     Soniya Mccauley, OTR/L  6/23/2022

## 2022-06-23 NOTE — PROGRESS NOTES
Group Psychotherapy     Site: [facility]     Clinical status of patient: Outpatient     6/9/2022     Length of service:[id number]     Referred by: [facility]      Number of patients in attendance: 4     Target symptoms: Chronic Pain Management      Patient's response to intervention:  The patient's response to intervention is active listening.     Progress toward goals and other mental status changes:  The patient's progress toward goals is fair .     Plan: group psychotherapy     Topics Covered: Pt attended CBT for Chronic Pain as part of her participation in [facility]. Topics discussed today included a discussion on what they have learned thus far in terms of how to manage anxiety, depression and other mood disorders in order to impact inflammation and pain. Pt also attended nutrition lecture on eating for chronic pain, all questions answered. Pts went over homework for the weekend, will f/u on Monday.

## 2022-06-24 NOTE — PROGRESS NOTES
Group Psychotherapy    Site: [facility]    Clinical status of patient: Outpatient    6/13/2022    Length of service:[id number]    Referred by: [facility]     Number of patients in attendance: 4    Target symptoms: Chronic Pain Management     Patient's response to intervention:  The patient's response to intervention is active listening.    Progress toward goals and other mental status changes:  The patient's progress toward goals is fair .    Plan: group psychotherapy    Topics Covered: Pt attended CBT for Chronic Pain as part of her participation in [facility]. During the initial check-in with the cohort, members of the group discussed how they felt about their current progress within the program. The conversation then shifted to identifying their greatest obstacles within and outside the program. Possible barriers mentioned included motivation, a rigid exercise regimen, and managing difficult pain days. The group then went on to discuss stress management, including important practices such as identification of stressors, symptoms associated with stressors, utilization of social support, emotional management, balancing life components, and meeting basic human needs (sleep, nutrition, hygiene, exercise, social connection, and more). The group members spent the last 15 minutes of the group filling out worksheets that addressed each of these components to stress management.

## 2022-06-27 ENCOUNTER — OFFICE VISIT (OUTPATIENT)
Dept: NEUROSURGERY | Facility: CLINIC | Age: 67
End: 2022-06-27
Payer: MEDICARE

## 2022-06-27 VITALS
WEIGHT: 227 LBS | HEART RATE: 76 BPM | SYSTOLIC BLOOD PRESSURE: 135 MMHG | TEMPERATURE: 98 F | BODY MASS INDEX: 37.82 KG/M2 | HEIGHT: 65 IN | DIASTOLIC BLOOD PRESSURE: 76 MMHG

## 2022-06-27 DIAGNOSIS — M47.22 CERVICAL SPONDYLOSIS WITH MYELOPATHY AND RADICULOPATHY: Primary | ICD-10-CM

## 2022-06-27 DIAGNOSIS — M48.061 LUMBAR STENOSIS WITHOUT NEUROGENIC CLAUDICATION: ICD-10-CM

## 2022-06-27 DIAGNOSIS — G95.89 MYELOMALACIA OF CERVICAL CORD: ICD-10-CM

## 2022-06-27 DIAGNOSIS — M47.12 CERVICAL SPONDYLOSIS WITH MYELOPATHY AND RADICULOPATHY: Primary | ICD-10-CM

## 2022-06-27 PROCEDURE — 99999 PR PBB SHADOW E&M-EST. PATIENT-LVL III: CPT | Mod: PBBFAC,,, | Performed by: NEUROLOGICAL SURGERY

## 2022-06-27 PROCEDURE — 3078F PR MOST RECENT DIASTOLIC BLOOD PRESSURE < 80 MM HG: ICD-10-PCS | Mod: CPTII,S$GLB,, | Performed by: NEUROLOGICAL SURGERY

## 2022-06-27 PROCEDURE — 1101F PR PT FALLS ASSESS DOC 0-1 FALLS W/OUT INJ PAST YR: ICD-10-PCS | Mod: CPTII,S$GLB,, | Performed by: NEUROLOGICAL SURGERY

## 2022-06-27 PROCEDURE — 3288F FALL RISK ASSESSMENT DOCD: CPT | Mod: CPTII,S$GLB,, | Performed by: NEUROLOGICAL SURGERY

## 2022-06-27 PROCEDURE — 99499 UNLISTED E&M SERVICE: CPT | Mod: S$GLB,,, | Performed by: NEUROLOGICAL SURGERY

## 2022-06-27 PROCEDURE — 1159F MED LIST DOCD IN RCRD: CPT | Mod: CPTII,S$GLB,, | Performed by: NEUROLOGICAL SURGERY

## 2022-06-27 PROCEDURE — 99499 RISK ADDL DX/OHS AUDIT: ICD-10-PCS | Mod: S$GLB,,, | Performed by: NEUROLOGICAL SURGERY

## 2022-06-27 PROCEDURE — 4010F ACE/ARB THERAPY RXD/TAKEN: CPT | Mod: CPTII,S$GLB,, | Performed by: NEUROLOGICAL SURGERY

## 2022-06-27 PROCEDURE — 3008F BODY MASS INDEX DOCD: CPT | Mod: CPTII,S$GLB,, | Performed by: NEUROLOGICAL SURGERY

## 2022-06-27 PROCEDURE — 4010F PR ACE/ARB THEARPY RXD/TAKEN: ICD-10-PCS | Mod: CPTII,S$GLB,, | Performed by: NEUROLOGICAL SURGERY

## 2022-06-27 PROCEDURE — 3288F PR FALLS RISK ASSESSMENT DOCUMENTED: ICD-10-PCS | Mod: CPTII,S$GLB,, | Performed by: NEUROLOGICAL SURGERY

## 2022-06-27 PROCEDURE — 3075F PR MOST RECENT SYSTOLIC BLOOD PRESS GE 130-139MM HG: ICD-10-PCS | Mod: CPTII,S$GLB,, | Performed by: NEUROLOGICAL SURGERY

## 2022-06-27 PROCEDURE — 99999 PR PBB SHADOW E&M-EST. PATIENT-LVL III: ICD-10-PCS | Mod: PBBFAC,,, | Performed by: NEUROLOGICAL SURGERY

## 2022-06-27 PROCEDURE — 3044F HG A1C LEVEL LT 7.0%: CPT | Mod: CPTII,S$GLB,, | Performed by: NEUROLOGICAL SURGERY

## 2022-06-27 PROCEDURE — 1159F PR MEDICATION LIST DOCUMENTED IN MEDICAL RECORD: ICD-10-PCS | Mod: CPTII,S$GLB,, | Performed by: NEUROLOGICAL SURGERY

## 2022-06-27 PROCEDURE — 3078F DIAST BP <80 MM HG: CPT | Mod: CPTII,S$GLB,, | Performed by: NEUROLOGICAL SURGERY

## 2022-06-27 PROCEDURE — 99205 OFFICE O/P NEW HI 60 MIN: CPT | Mod: S$GLB,,, | Performed by: NEUROLOGICAL SURGERY

## 2022-06-27 PROCEDURE — 3008F PR BODY MASS INDEX (BMI) DOCUMENTED: ICD-10-PCS | Mod: CPTII,S$GLB,, | Performed by: NEUROLOGICAL SURGERY

## 2022-06-27 PROCEDURE — 1101F PT FALLS ASSESS-DOCD LE1/YR: CPT | Mod: CPTII,S$GLB,, | Performed by: NEUROLOGICAL SURGERY

## 2022-06-27 PROCEDURE — 3044F PR MOST RECENT HEMOGLOBIN A1C LEVEL <7.0%: ICD-10-PCS | Mod: CPTII,S$GLB,, | Performed by: NEUROLOGICAL SURGERY

## 2022-06-27 PROCEDURE — 3075F SYST BP GE 130 - 139MM HG: CPT | Mod: CPTII,S$GLB,, | Performed by: NEUROLOGICAL SURGERY

## 2022-06-27 PROCEDURE — 99205 PR OFFICE/OUTPT VISIT, NEW, LEVL V, 60-74 MIN: ICD-10-PCS | Mod: S$GLB,,, | Performed by: NEUROLOGICAL SURGERY

## 2022-06-27 NOTE — PROGRESS NOTES
Modified [employer] Scale    DATE OF SERVICE:  06/27/2022    ATTENDING PHYSICIAN:  James Sims MD    Category    Motor dysfunction in the upper extremities    * Inability to move hands.  * Inability to eat with a spoon but able to move hands.  * Inability to button shirt but able to eat with a spoon.  * Able to button shirt with great difficulty.  * Able to button shirt with slight difficulty.  * No dysfunction.    Score: 4    Motor dysfunction in the lower extremities    * Complete loss of motor and sensory function.  * Sensory preservation without ability to move legs.  * Able to move legs but unable to walk.  * Able to walk on flat floor with a walking aid (ie, cane or crutch).  * Able to walk up and/or down stairs with using a hand rail.  * Moderate to significant lack of stability but able to walk up and/or down stairs without using a hand rail.  * Mild lack of stability but able to walk unaided with smooth reciprocation.  * No dysfunction.    Score: 4    Sensation    * Complete loss of hand sensation.  * Severe sensory loss or pain.  * Mild sensory loss.  * No sensory loss.     Score: 2    Sphincter dysfunction    * Inability to micturate voluntary.  * Marked difficulty with micturation.   * Mild to moderate difficulty with micturation.  * Normal micturation.    Score: 3      FINAL SCORE: 13 / 18    Mild myelopathy 15-17  Moderate myelopathy 12-14  Severe myelopathy 0-11    Reference: Russell EC, Nga J, José L, Ashlie T: Cervical laminectomy and dentate ligament section for cervical spondylotic myelopathy. J Spine Disord 1991;4(3):286-295.    NEUROSURGICAL OUTPATIENT CONSULTATION NOTE    DATE OF SERVICE:  06/27/2022    ATTENDING PHYSICIAN:  James Sims MD      REASON FOR CONSULT:  Cervical myelopathy      Smoking:None    SUBJECTIVE:    HISTORY:  This is a 67 y.o. female, type 2 diabetes, A1c at 6.1, nonsmoker, who has been complaining of recurrent neck pain, loss of  cervical range of motion.  She has completed physical therapy in the functional restoration program with some improvement.  She has notice that she has more difficulty walking.  She is unable to stand up from a sitting position without using her hands.  She has more gait imbalance.  She always has to use her hand well whenever she climbs up or go downstairs.  She sometimes uses a crutch.  Recently she fell and hit her head.  She also has notice right more than left hand weakness and numbness.  She is dropping things more frequently.  She has difficulty buttoning shirts.  She denies having sphincter dysfunction symptoms.  She reports that all her cervical issues started with right arm pain radiating towards the shoulder.  This has been progressing over several years.  She was also diagnosed with lumbar stenosis.  She reports some back pain posterior buttock and leg pain when she walks for prolonged period of time.      PAST MEDICAL HISTORY:  Active Ambulatory Problems     Diagnosis Date Noted    History of breast cancer in female 11/21/2018    Type 2 diabetes mellitus without complication, without long-term current use of insulin 11/26/2018    Essential hypertension 11/26/2018    S/P left mastectomy 11/26/2018    Spinal stenosis of lumbar region with neurogenic claudication 11/26/2018    Migraine without aura and without status migrainosus, not intractable 12/11/2018    Thyroid nodule 02/26/2019    Fibromyalgia 02/26/2019    Microcytic anemia 05/28/2019    Unsp symptoms and signs w cognitive functions and awareness 05/29/2019    History of antineoplastic chemotherapy 05/29/2019    Seborrheic dermatitis of scalp 08/29/2019    Insomnia due to medical condition 11/21/2019    Insomnia     Obstructive sleep apnea hypopnea, mild 12/01/2020    Cervical stenosis of spine 06/15/2021    Pain aggravated by activities of daily living 04/29/2022    Fear of pain 04/29/2022    Impaired functional mobility and  activity tolerance 04/29/2022    Impaired functional mobility, balance, gait, and endurance 04/29/2022    Decreased range of motion of lumbar spine 04/29/2022    Central sensitization to pain 04/29/2022     Resolved Ambulatory Problems     Diagnosis Date Noted    Vitamin D deficiency 11/26/2018     Past Medical History:   Diagnosis Date    Allergy     Breast cancer 2017    Cataract     Diabetes mellitus, type 2     Eczema     Glaucoma     Hypertension     Renal cyst, right 02/06/2020    Steatosis of liver 02/06/2020       PAST SURGICAL HISTORY:  Past Surgical History:   Procedure Laterality Date    ADENOIDECTOMY      BREAST BIOPSY      BREAST SURGERY      breast ca lt side     HYSTERECTOMY      supacervical hysterectomy    LAPAROSCOPIC SUPRACERVICAL HYSTERECTOMY  2005    fibroids    MASTECTOMY Left 2017    chemo    MYOMECTOMY      TONSILLECTOMY      TYMPANOSTOMY TUBE PLACEMENT      VAGINAL DELIVERY         SOCIAL HISTORY:   Social History     Socioeconomic History    Marital status:     Number of children: 1   Tobacco Use    Smoking status: Never Smoker    Smokeless tobacco: Never Used   Substance and Sexual Activity    Alcohol use: No    Drug use: No    Sexual activity: Not Currently   Social History Narrative        1 son (estranged)    3 grandchildren ([place])    Born and raised in [place] (moved to [facility] 2017)     Social Determinants of Health     Financial Resource Strain: Low Risk     Difficulty of Paying Living Expenses: Not hard at all   Food Insecurity: No Food Insecurity    Worried About Running Out of Food in the Last Year: Never true    Ran Out of Food in the Last Year: Never true   Transportation Needs: No Transportation Needs    Lack of Transportation (Medical): No    Lack of Transportation (Non-Medical): No   Physical Activity: Inactive    Days of Exercise per Week: 0 days    Minutes of Exercise per Session: 20 min   Stress: No Stress Concern  Present    Feeling of Stress : Only a little   Social Connections: Unknown    Frequency of Communication with Friends and Family: More than three times a week    Frequency of Social Gatherings with Friends and Family: Twice a week    Active Member of Clubs or Organizations: Yes    Attends Club or Organization Meetings: 1 to 4 times per year    Marital Status:    Housing Stability: Low Risk     Unable to Pay for Housing in the Last Year: No    Number of Places Lived in the Last Year: 1    Unstable Housing in the Last Year: No       FAMILY HISTORY:  Family History   Problem Relation Age of Onset    Dementia Mother     Glaucoma Mother     Bipolar disorder Mother     Lung cancer Father     Bipolar disorder Son     Post-traumatic stress disorder Son     Breast cancer Maternal Aunt     Bipolar disorder Maternal Aunt     Glaucoma Maternal Uncle     Blindness Maternal Grandfather     Glaucoma Maternal Grandfather     Breast cancer Cousin 39        paternal     Breast cancer Cousin 29    Amblyopia Neg Hx     Cataracts Neg Hx     Macular degeneration Neg Hx     Retinal detachment Neg Hx     Strabismus Neg Hx     Cancer Neg Hx     Colon cancer Neg Hx     Ovarian cancer Neg Hx        CURRENTS MEDICATIONS:  Current Outpatient Medications on File Prior to Visit   Medication Sig Dispense Refill    acetic acid (VOSOL) 2 % otic solution Place 4 drops into both ears as needed.      aspirin 81 MG Chew Take 81 mg by mouth once daily.       azelastine (ASTELIN) 137 mcg (0.1 %) nasal spray 1 SPRAY IN EACH NOSTRIL TWICE DAILY 30 mL 2    benzonatate (TESSALON) 200 MG capsule 1 capsule up to three times a day as needed for cough 50 capsule 1    blood sugar diagnostic Strp To check BG 2 times daily, to use with insurance preferred meter 200 each 3    blood-glucose meter kit To check BG 2 times daily, to use with insurance preferred meter 1 each 0    cetirizine (ZYRTEC) 10 MG tablet TAKE 2 TABLETS  BY MOUTH ONCE DAILY (Patient taking differently: 10 mg once daily.) 60 tablet 3    cholecalciferol, vitamin D3, (VITAMIN D3) 50 mcg (2,000 unit) Cap Take 1 capsule by mouth once daily.       clobetasoL (CLOBEX) 0.05 % shampoo Apply topically once a week. 118 mL 5    clobetasoL (TEMOVATE) 0.05 % external solution Apply topically 2 (two) times daily. Prn scalp itch or irritation.Stop using steroid topical when skin is smooth and non itchy.  Do not treat dark or red coloring. 60 mL 1    cyanocobalamin/cobamamide (B12 SL) Place 3,000 mcg under the tongue once daily.      dextromethorphan-guaiFENesin  mg/5 ml (ROBITUSSIN-DM)  mg/5 mL liquid Take 5 mLs by mouth every 12 (twelve) hours.      [START ON 7/7/2022] diazePAM (VALIUM) 5 MG tablet Take 1 tablet (5 mg total) by mouth once as needed (before procedure). 1 tablet 0    diclofenac sodium (VOLTAREN) 1 % Gel Apply 2 g topically 3 (three) times daily. 100 g 0    fluocinolone acetonide oiL 0.01 % Drop INSTILL 4 DROPS IN EAR(S) TWICE DAILY FOR 1 TO 2 WEEKS 20 mL 1    fluticasone propionate (FLONASE) 50 mcg/actuation nasal spray 2 sprays on the right and 3 sprays on the left every morning 48 g 3    gabapentin (NEURONTIN) 600 MG tablet Take 1 tablet (600 mg total) by mouth 3 (three) times daily. 90 tablet 5    glipiZIDE (GLUCOTROL) 5 MG TR24 TAKE 1 TABLET(5 MG) BY MOUTH TWICE DAILY 180 tablet 1    hydroCHLOROthiazide (HYDRODIURIL) 25 MG tablet Take 1 tablet (25 mg total) by mouth once daily. 90 tablet 1    ketoconazole (NIZORAL) 2 % cream APPLY EXTERNALLY TO THE AFFECTED AREA EVERY DAY 15 g 2    lancets Misc To check BG 2 times daily, to use with insurance preferred meter 200 each 3    losartan (COZAAR) 50 MG tablet Take 1 tablet (50 mg total) by mouth once daily. 90 tablet 1    meloxicam (MOBIC) 15 MG tablet Take 1 tablet (15 mg total) by mouth daily as needed for Pain. 30 tablet 2    metFORMIN (GLUCOPHAGE) 500 MG tablet Take 1 tablet (500 mg  total) by mouth 2 (two) times daily with meals. 180 tablet 1    mupirocin (BACTROBAN) 2 % ointment Apply topically 2 (two) times daily. 30 g 0    ondansetron (ZOFRAN-ODT) 8 MG TbDL Take 1 tablet (8 mg total) by mouth every 8 (eight) hours as needed (Nausea). 30 tablet 2    rizatriptan (MAXALT-MLT) 10 MG disintegrating tablet Take 1 tablet (10 mg total) by mouth every 2 (two) hours as needed for Migraine. Max 30 mg/day. 12 tablet 5    sumatriptan (IMITREX) 100 MG tablet Take 1 tab at onset of migraine, may repeat dose in 2 hours if needed. Max 2 tabs/day. 9 tablet 11    tiZANidine (ZANAFLEX) 4 MG tablet TAKE 1 TABLET(4 MG) BY MOUTH EVERY 8 HOURS AS NEEDED FOR SPASM 90 tablet 0     No current facility-administered medications on file prior to visit.       ALLERGIES:  Review of patient's allergies indicates:   Allergen Reactions    Codeine Shortness Of Breath     Pt states she had a reaction as a teenager and was taken to the ER.    Lisinopril      cough       REVIEW OF SYSTEMS:  Review of Systems   Constitutional: Negative for diaphoresis, fever and weight loss.   Respiratory: Negative for shortness of breath.    Cardiovascular: Negative for chest pain.   Gastrointestinal: Negative for blood in stool.   Genitourinary: Negative for hematuria.   Endo/Heme/Allergies: Does not bruise/bleed easily.   All other systems reviewed and are negative.      OBJECTIVE:    PHYSICAL EXAMINATION:   Vitals:    06/27/22 0805   BP: 135/76   Pulse: 76   Temp: 98 °F (36.7 °C)       Physical Exam:  Vitals reviewed.    Constitutional: She appears well-developed and well-nourished.     Eyes: Pupils are equal, round, and reactive to light. Conjunctivae and EOM are normal.     Cardiovascular: Normal distal pulses and no edema.     Abdominal: Soft.     Skin: Skin displays no rash on trunk and no rash on extremities. Skin displays no lesions on trunk and no lesions on extremities.     Psych/Behavior: She is alert. She is oriented to  person, place, and time. She has a normal mood and affect.     Musculoskeletal:        Neck: Range of motion is limited.     Neurological:        DTRs: Tricep reflexes are 2+ on the right side and 2+ on the left side. Bicep reflexes are 2+ on the right side and 2+ on the left side. Brachioradialis reflexes are 2+ on the right side and 2+ on the left side. Patellar reflexes are 2+ on the right side and 2+ on the left side. Achilles reflexes are 2+ on the right side and 2+ on the left side.       Back Exam     Tenderness   The patient is experiencing tenderness in the cervical.    Muscle Strength   Right Quadriceps:  5/5   Left Quadriceps:  5/5   Right Hamstrings:  5/5   Left Hamstrings:  5/5     Tests   Straight leg raise right: negative  Straight leg raise left: negative              Neurologic Exam     Mental Status   Oriented to person, place, and time.   Speech: speech is normal   Level of consciousness: alert    Cranial Nerves   Cranial nerves II through XII intact.     CN III, IV, VI   Pupils are equal, round, and reactive to light.  Extraocular motions are normal.     Motor Exam   Muscle bulk: normal  Overall muscle tone: increased    Strength   Right deltoid: 5/5  Left deltoid: 5/5  Right biceps: 5/5  Left biceps: 5/5  Right triceps: 5/5  Left triceps: 5/5  Right wrist flexion: 5/5  Left wrist flexion: 5/5  Right wrist extension: 5/5  Left wrist extension: 5/5  Right interossei: 4/5  Left interossei: 5/5  Right iliopsoas: 5/5  Left iliopsoas: 5/5  Right quadriceps: 5/5  Left quadriceps: 5/5  Right hamstrin/5  Left hamstrin/5  Right anterior tibial: 5/5  Left anterior tibial: 5/5  Right posterior tibial: 5/5  Left posterior tibial: 5/5  Right peroneal: 5/5  Left peroneal: 5/5  Right gastroc: 5/5  Left gastroc: 5/5    Sensory Exam   Light touch normal.   Pinprick normal.     Gait, Coordination, and Reflexes     Gait  Gait: spastic    Coordination   Finger to nose coordination: normal  Tandem walking  coordination: abnormal    Reflexes   Right brachioradialis: 2+  Left brachioradialis: 2+  Right biceps: 2+  Left biceps: 2+  Right triceps: 2+  Left triceps: 2+  Right patellar: 2+  Left patellar: 2+  Right achilles: 2+  Left achilles: 2+  Right plantar: normal  Left plantar: normal  Right Peoples: present  Left Peoples: present  Right ankle clonus: absent  Left ankle clonus: absent        DIAGNOSTIC DATA:  I personally interpreted the following imaging:   Cervical spine MRI shows moderate to severe stenosis at C5-6 and C6-7, moderate stenosis at C4-5, bilateral foraminal stenosis from C4-C7, no spondylolisthesis  Cervical flexion-extension x-ray shows good alignment  Lumbar spine MRI shows mild to moderate spinal stenosis at L2-3 and L3-4, severe facet arthropathy at L4-5 and L5-S1, preserve lumbar alignment without spondylolisthesis  DEXA T score:  Elevated lumbar bone density    ASSESMENT:  This is a 67 y.o. female with     Problem List Items Addressed This Visit    None     Visit Diagnoses     Cervical spondylosis with myelopathy and radiculopathy    -  Primary        Lumbar stenosis without neurogenic claudication              PLAN:  I explained the natural history of the disease and all treatment options. I recommended a C4-5, C5-6, C6-7 anterior cervical diskectomy and instrumented fusion to prevent worsening myelopathy.  The patient has moderate myelopathy symptoms at this time with significant gait imbalance.  I explained to her that the main goal of the surgery is to prevent worsening myelopathy.  The neck pain may or may not improved.  I do not think that she is significantly symptomatic from her lumbar stenosis.    We have discussed the risks of surgery including death, coma, bleeding, infection, failure of surgery, CSF leak, nerve root injury, spinal cord injury, ureter injury, weakness, paralysis, peripheral neuropathy, malplaced hardware, migration of hardware, non-union, need for reoperation. Patient  understands the risks and is contemplating surgery        The patient has increase perioperative risks because of these comorbidities:  Type 2 diabetes.         James Sims MD  Cell:[phone]

## 2022-06-27 NOTE — PROGRESS NOTES
The team (SW, NP, OT, PT) met for 30 minutes with Regine Mcclain ingris regarding her progress in the program and the plans moving forward.  Discussion involved a comparison of the testing scores against day 1 intake scores and the plan to continue to make progress in those areas. Please refer to individual notes for plans of care.  All questions answered.      This is remarkable!  Keep up the good work!

## 2022-06-28 DIAGNOSIS — M47.812 CERVICAL SPONDYLOSIS: Primary | ICD-10-CM

## 2022-06-29 ENCOUNTER — PATIENT MESSAGE (OUTPATIENT)
Dept: ADMINISTRATIVE | Facility: OTHER | Age: 67
End: 2022-06-29
Payer: MEDICARE

## 2022-06-30 ENCOUNTER — TELEPHONE (OUTPATIENT)
Dept: FAMILY MEDICINE | Facility: CLINIC | Age: 67
End: 2022-06-30
Payer: MEDICARE

## 2022-06-30 ENCOUNTER — OFFICE VISIT (OUTPATIENT)
Dept: PAIN MEDICINE | Facility: CLINIC | Age: 67
End: 2022-06-30
Payer: MEDICARE

## 2022-06-30 ENCOUNTER — TELEPHONE (OUTPATIENT)
Dept: PREADMISSION TESTING | Facility: HOSPITAL | Age: 67
End: 2022-06-30
Payer: MEDICARE

## 2022-06-30 VITALS
HEART RATE: 81 BPM | HEIGHT: 65 IN | BODY MASS INDEX: 37.82 KG/M2 | RESPIRATION RATE: 18 BRPM | WEIGHT: 227 LBS | SYSTOLIC BLOOD PRESSURE: 125 MMHG | DIASTOLIC BLOOD PRESSURE: 73 MMHG

## 2022-06-30 DIAGNOSIS — M79.601 PAIN OF RIGHT UPPER EXTREMITY: ICD-10-CM

## 2022-06-30 DIAGNOSIS — M79.7 FIBROMYALGIA: ICD-10-CM

## 2022-06-30 DIAGNOSIS — G24.3 CERVICAL DYSTONIA: ICD-10-CM

## 2022-06-30 DIAGNOSIS — Z01.818 PREOPERATIVE TESTING: Primary | ICD-10-CM

## 2022-06-30 DIAGNOSIS — M48.02 CERVICAL STENOSIS OF SPINE: ICD-10-CM

## 2022-06-30 DIAGNOSIS — M48.062 SPINAL STENOSIS OF LUMBAR REGION WITH NEUROGENIC CLAUDICATION: ICD-10-CM

## 2022-06-30 DIAGNOSIS — G95.89 MYELOMALACIA OF CERVICAL CORD: Primary | ICD-10-CM

## 2022-06-30 PROCEDURE — 99499 RISK ADDL DX/OHS AUDIT: ICD-10-PCS | Mod: S$GLB,,, | Performed by: STUDENT IN AN ORGANIZED HEALTH CARE EDUCATION/TRAINING PROGRAM

## 2022-06-30 PROCEDURE — 1101F PT FALLS ASSESS-DOCD LE1/YR: CPT | Mod: CPTII,S$GLB,, | Performed by: STUDENT IN AN ORGANIZED HEALTH CARE EDUCATION/TRAINING PROGRAM

## 2022-06-30 PROCEDURE — 1160F PR REVIEW ALL MEDS BY PRESCRIBER/CLIN PHARMACIST DOCUMENTED: ICD-10-PCS | Mod: CPTII,S$GLB,, | Performed by: STUDENT IN AN ORGANIZED HEALTH CARE EDUCATION/TRAINING PROGRAM

## 2022-06-30 PROCEDURE — 1159F MED LIST DOCD IN RCRD: CPT | Mod: CPTII,S$GLB,, | Performed by: STUDENT IN AN ORGANIZED HEALTH CARE EDUCATION/TRAINING PROGRAM

## 2022-06-30 PROCEDURE — 4010F ACE/ARB THERAPY RXD/TAKEN: CPT | Mod: CPTII,S$GLB,, | Performed by: STUDENT IN AN ORGANIZED HEALTH CARE EDUCATION/TRAINING PROGRAM

## 2022-06-30 PROCEDURE — 3288F FALL RISK ASSESSMENT DOCD: CPT | Mod: CPTII,S$GLB,, | Performed by: STUDENT IN AN ORGANIZED HEALTH CARE EDUCATION/TRAINING PROGRAM

## 2022-06-30 PROCEDURE — 99999 PR PBB SHADOW E&M-EST. PATIENT-LVL III: CPT | Mod: PBBFAC,,, | Performed by: STUDENT IN AN ORGANIZED HEALTH CARE EDUCATION/TRAINING PROGRAM

## 2022-06-30 PROCEDURE — 3078F DIAST BP <80 MM HG: CPT | Mod: CPTII,S$GLB,, | Performed by: STUDENT IN AN ORGANIZED HEALTH CARE EDUCATION/TRAINING PROGRAM

## 2022-06-30 PROCEDURE — 99214 OFFICE O/P EST MOD 30 MIN: CPT | Mod: S$GLB,,, | Performed by: STUDENT IN AN ORGANIZED HEALTH CARE EDUCATION/TRAINING PROGRAM

## 2022-06-30 PROCEDURE — 99214 PR OFFICE/OUTPT VISIT, EST, LEVL IV, 30-39 MIN: ICD-10-PCS | Mod: S$GLB,,, | Performed by: STUDENT IN AN ORGANIZED HEALTH CARE EDUCATION/TRAINING PROGRAM

## 2022-06-30 PROCEDURE — 99999 PR PBB SHADOW E&M-EST. PATIENT-LVL III: ICD-10-PCS | Mod: PBBFAC,,, | Performed by: STUDENT IN AN ORGANIZED HEALTH CARE EDUCATION/TRAINING PROGRAM

## 2022-06-30 PROCEDURE — 3078F PR MOST RECENT DIASTOLIC BLOOD PRESSURE < 80 MM HG: ICD-10-PCS | Mod: CPTII,S$GLB,, | Performed by: STUDENT IN AN ORGANIZED HEALTH CARE EDUCATION/TRAINING PROGRAM

## 2022-06-30 PROCEDURE — 1160F RVW MEDS BY RX/DR IN RCRD: CPT | Mod: CPTII,S$GLB,, | Performed by: STUDENT IN AN ORGANIZED HEALTH CARE EDUCATION/TRAINING PROGRAM

## 2022-06-30 PROCEDURE — 3074F PR MOST RECENT SYSTOLIC BLOOD PRESSURE < 130 MM HG: ICD-10-PCS | Mod: CPTII,S$GLB,, | Performed by: STUDENT IN AN ORGANIZED HEALTH CARE EDUCATION/TRAINING PROGRAM

## 2022-06-30 PROCEDURE — 3288F PR FALLS RISK ASSESSMENT DOCUMENTED: ICD-10-PCS | Mod: CPTII,S$GLB,, | Performed by: STUDENT IN AN ORGANIZED HEALTH CARE EDUCATION/TRAINING PROGRAM

## 2022-06-30 PROCEDURE — 3008F BODY MASS INDEX DOCD: CPT | Mod: CPTII,S$GLB,, | Performed by: STUDENT IN AN ORGANIZED HEALTH CARE EDUCATION/TRAINING PROGRAM

## 2022-06-30 PROCEDURE — 3044F HG A1C LEVEL LT 7.0%: CPT | Mod: CPTII,S$GLB,, | Performed by: STUDENT IN AN ORGANIZED HEALTH CARE EDUCATION/TRAINING PROGRAM

## 2022-06-30 PROCEDURE — 3008F PR BODY MASS INDEX (BMI) DOCUMENTED: ICD-10-PCS | Mod: CPTII,S$GLB,, | Performed by: STUDENT IN AN ORGANIZED HEALTH CARE EDUCATION/TRAINING PROGRAM

## 2022-06-30 PROCEDURE — 1101F PR PT FALLS ASSESS DOC 0-1 FALLS W/OUT INJ PAST YR: ICD-10-PCS | Mod: CPTII,S$GLB,, | Performed by: STUDENT IN AN ORGANIZED HEALTH CARE EDUCATION/TRAINING PROGRAM

## 2022-06-30 PROCEDURE — 99499 UNLISTED E&M SERVICE: CPT | Mod: S$GLB,,, | Performed by: STUDENT IN AN ORGANIZED HEALTH CARE EDUCATION/TRAINING PROGRAM

## 2022-06-30 PROCEDURE — 3044F PR MOST RECENT HEMOGLOBIN A1C LEVEL <7.0%: ICD-10-PCS | Mod: CPTII,S$GLB,, | Performed by: STUDENT IN AN ORGANIZED HEALTH CARE EDUCATION/TRAINING PROGRAM

## 2022-06-30 PROCEDURE — 3074F SYST BP LT 130 MM HG: CPT | Mod: CPTII,S$GLB,, | Performed by: STUDENT IN AN ORGANIZED HEALTH CARE EDUCATION/TRAINING PROGRAM

## 2022-06-30 PROCEDURE — 1159F PR MEDICATION LIST DOCUMENTED IN MEDICAL RECORD: ICD-10-PCS | Mod: CPTII,S$GLB,, | Performed by: STUDENT IN AN ORGANIZED HEALTH CARE EDUCATION/TRAINING PROGRAM

## 2022-06-30 PROCEDURE — 4010F PR ACE/ARB THEARPY RXD/TAKEN: ICD-10-PCS | Mod: CPTII,S$GLB,, | Performed by: STUDENT IN AN ORGANIZED HEALTH CARE EDUCATION/TRAINING PROGRAM

## 2022-06-30 RX ORDER — NAPROXEN SODIUM 550 MG/1
550 TABLET ORAL DAILY PRN
Status: ON HOLD | COMMUNITY
End: 2022-07-29 | Stop reason: HOSPADM
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
## 2022-06-30 NOTE — ANESTHESIA PAT ROS NOTE
06/30/2022  Regine Osorio is a 67 y.o., female.      Pre-op Assessment          Review of Systems  Anesthesia Hx:  Hx of Anesthetic complications PATIENT STATES SHE GOT A MIGRAINE AFTER ANESTHESIA Denies Family Hx of Anesthesia complications.  Personal Hx of Anesthesia complications   Social:  Non-Smoker, No Alcohol Use    Hematology/Oncology:         -- Anemia: --  Cancer in past history:  Breast left surgery and chemotherapy  Oncology Comments: HX LEFT BREAST CANCER S/P LEFT MASTECTOMY (2017)     EENT/Dental:   Eyes: Visual Impairment Has Bilateral Catarract Eye Disease: Glaucoma:     Cardiovascular:   Hypertension  Denies Angina.  Functional Capacity good / => 4 METS    Pulmonary:   Denies Shortness of breath.  Denies Recent URI. Sleep Apnea DOES NOT USE CPAP   Renal/:   RIGHT KIDNEY CYST   Hepatic/GI:   GERD  Liver Disease, Fatty Liver    Musculoskeletal:  Muscle Disorders: Fibromyalgia  Cervical Spine Disorder, Cervical Disc Disease CERVICAL SPONDYLOSIS    CERVICAL SPINAL STENOSIS Lumbar Spine Disorders, Lumbar Disc Disease LUMBAR STENOSIS  Neurological:  Dx of Headaches, Migraine Headache   Endocrine:   THYROID NODULES Diabetes, Type 2 Diabetes , controlled by diet, oral hypoglycemics. , most recent HgA1c value was 6.0 on 3/15/2022.  Metabolic Disorders, Obesity / BMI > 30  Dermatological:   ECZEMA   Psych:  Psychiatric Normal           Physical Exam  General:  Obesity      Airway/Jaw/Neck:  Airway Findings: Mouth Opening: Normal   Tongue: Normal   Jaw/Neck Findings: Neck ROM: Decreased flexion, Decreased Lateral Motion, to the left, to the right, Extension Decreased, Mod.       Dental:  Dental Findings: In tact     Chest/Lungs:  Chest/Lungs Findings: Clear to auscultation      Heart/Vascular:  Heart Findings: Rate: Normal  Rhythm: Regular Rhythm  Sounds: Normal        Mental Status:  Mental Status  Findings:  Cooperative, Alert and Oriented         Anesthesia Assessment: Preoperative EQUATION    Planned Procedure: Procedure(s) (LRB):  DISCECTOMY, SPINE, CERVICAL, ANTERIOR APPROACH, WITH FUSION C5-7 SPINEWAVE SNS: VOCAL CORDS/MOTORS/SSEP (N/A)  Requested Anesthesia Type:General  Surgeon: Rosales Scruggs MD  Service: Neurosurgery  Known or anticipated Date of Surgery:7/29/2022    Surgeon notes: reviewed    Electronic QUestionnaire Assessment completed via nurse interview with patient.        Triage considerations:       Previous anesthesia records:No problems and Not available    Last PCP note: 3-6 months ago , within [facility]   Subspecialty notes: Allergy, Dermatology, ENT, Hematology/Oncology, Neurology, Neurosurgery, Ortho, Pain Management, Psychiatry, OB/GYN, OPHTHALMOLOGY, PODIATRY    Other important co-morbidities:PER Epic:  DM2, HTN, Obesity, KENDRA and CERVICAL SPINAL STENOSIS, H/O BREAST CANCER, H/O ANEMIA, FIBROMYALGIA, GLAUCOMA, ECZEMA      Tests already available:  Available tests,  within 3 months , within [facility] .6/10/2022 CT CERVICAL SPINE W/O CONTRAST, 5/26/2022 XRAY CERVICAL SPINE AP/LAT W FLEX/EXT, 4/19/2022 XRAY CERVICAL SPINE W/O CONTRAST             Instructions given. (See in Nurse's note)    Optimization:  Anesthesia Preop Clinic Assessment  Indicated    Medical Opinion Indicated         Plan:    Testing:  BMP, CBC, EKG, PT/INR and T&S   Pre-anesthesia  visit       Visit focus: concerns in complex and/or prolonged anesthesia, COMORBIDITIES     Consultation:Patient's PCP for re-evaluation     Patient  has previously scheduled Medical Appointment:7/8 OPTHAL, 7/21 BOTOX, 7/26 NEURO, 7/27 DERM    Navigation: Tests Scheduled. TBD             Consults scheduled.TBD             Results will be tracked by Preop Clinic.  Emani Le RN BSN      7/26/2022: PCP CLEARANCE  DINESH Bello RN  Caller: Unspecified (Today,  2:08 PM)  Note updated and sent to Dr. Scruggs, pt cleared.    THanks,   Angelina

## 2022-06-30 NOTE — TELEPHONE ENCOUNTER
----- Message from Emani Le RN sent at 6/30/2022  2:39 PM CDT -----  Surgery 7/29  Please schedule poc appt, labs, and ekg.  Thanks!

## 2022-06-30 NOTE — TELEPHONE ENCOUNTER
----- Message from Emani Le RN sent at 6/30/2022  2:41 PM CDT -----  Patient is scheduled for ACDF C5-7 on 7/29 with . (approximately 230 minutes of general anesthesia )) She will need medical clearance.Please schedule a preop clearance appt.  Thanks!

## 2022-06-30 NOTE — PRE-PROCEDURE INSTRUCTIONS
Patient stated has not had any problem with anesthesia in the past. Will need medical clearance from your PCP,Dr.Azikiwe Lombard. She will make an appt. Will need poc appt, labs and ekg.  Our  will call to set up these appts. She said she takes ASA 81 mg for prevention.  Preop instructions given. Hold aspirin, aspirin containing products, nsaids(aleve, advil, motrin, ibuprofen, naprosyn, naproxen, voltaren, diclofenac, Mobic,  Meloxicam), vitamins( Vitamin D3, B12) and supplements ( Probiotic) one week prior to surgery.     May take Tylenol.( sent to [facility] portal)  Verbalizes understanding.

## 2022-07-06 NOTE — PROGRESS NOTES
HPI     Glaucoma     Comments: 4 month ck and pt states no changes since last exam               Comments     DLS: 3/11/22    1. Glaucoma Suspect - high risk ou -    Lrg CDR / + FmHx / high nl IOP  2. Drusen OU   3. NS OU   4. Hx Breast CA  5. Myopia     MEDS:  AT's PRN OU          Last edited by Jen Prado MD on 7/8/2022 10:25 AM. (History)            Assessment /Plan     For exam results, see Encounter Report.    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   First photos   2018   Treatment / Drops started   none           Family history    + mother / grtand father / uncle / cousins         Glaucoma meds    nne        H/O adverse rxn to glaucoma drops    none        LASERS    none        GLAUCOMA SURGERIES    none        OTHER EYE SURGERIES    none        CDR    0.8/0.7        Tbase    18-22  od // 16-22 os        Tmax    22/22           Ttarget    ?             HVF    3 test 2019 to  2022 - full od // full os        Gonio    +3-4 ou         CCT    588/585        OCT    3  test 2019 to 2021 - RNFL -  Bord TI od // nl  os        HRT    1 test 2020 to 2020 -  MR -  nl od // nl os /// CDR 0.63 od // 0.51 os        Disc photos    2018, 2022      - Ttoday   22/21  - Test done today     IOP // gonio    2. Dominate familial drusen   Mostly outside the arcades ou    See photos - 2018    Pt is taking ARED's     3. NS   Mild -monitor     4. flaoter (one floater - string like)    Warned of signs of PVD and RD    Pt to call if marked increase in floaters or flashes or curtain defect    5. Breast CA       PLAN   IOP is high nl at 18-22  ou // + Fm Hx - mother and an uncle and possibly a grand parent   HVF is nl ou   OCT - bord RNFL in one  sector od - stable and nl os   Discussed starting treatment vs continued observation off drops  Pt prefers to cont with observation alone     F/U 4-6 months - IOP //HVF / DFE / OCT
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
## 2022-07-07 NOTE — PROGRESS NOTES
Group Psychotherapy    Site: [facility]    Clinical status of patient: Outpatient    6/20/2022    Length of service:[id number]    Referred by: Functional [facility]oration Program     Number of patients in attendance: 4    Target symptoms: Chronic Pain Management     Patient's response to intervention:  The patient's response to intervention is not participating.    Progress toward goals and other mental status changes:  The patient's progress toward goals is limited.    Plan: group psychotherapy    Topics Covered: Pt attended CBT for Chronic Pain as part of her participation in Functional Restoration. Topics discussed today included a review of what they have learned in the program and the relationship between mood (anxiety, depression and other mood disorders) and pain. We discussed skills they have learned to manage mood and pain, continuing program after graduation. Will f/u on Wednesday for testing.

## 2022-07-08 ENCOUNTER — OFFICE VISIT (OUTPATIENT)
Dept: OPHTHALMOLOGY | Facility: CLINIC | Age: 67
End: 2022-07-08
Payer: MEDICARE

## 2022-07-08 DIAGNOSIS — H52.13 MYOPIA OF BOTH EYES WITH ASTIGMATISM AND PRESBYOPIA: ICD-10-CM

## 2022-07-08 DIAGNOSIS — H25.13 NUCLEAR SCLEROTIC CATARACT OF BOTH EYES: ICD-10-CM

## 2022-07-08 DIAGNOSIS — H35.361 FAMILIAL DRUSEN OF MACULA OF BOTH EYES: ICD-10-CM

## 2022-07-08 DIAGNOSIS — H40.023 OPEN ANGLE WITH BORDERLINE FINDINGS AND HIGH GLAUCOMA RISK IN BOTH EYES: Primary | ICD-10-CM

## 2022-07-08 DIAGNOSIS — H35.362 FAMILIAL DRUSEN OF MACULA OF BOTH EYES: ICD-10-CM

## 2022-07-08 DIAGNOSIS — H52.4 MYOPIA OF BOTH EYES WITH ASTIGMATISM AND PRESBYOPIA: ICD-10-CM

## 2022-07-08 DIAGNOSIS — H52.203 MYOPIA OF BOTH EYES WITH ASTIGMATISM AND PRESBYOPIA: ICD-10-CM

## 2022-07-08 PROCEDURE — 3044F PR MOST RECENT HEMOGLOBIN A1C LEVEL <7.0%: ICD-10-PCS | Mod: CPTII,S$GLB,, | Performed by: OPHTHALMOLOGY

## 2022-07-08 PROCEDURE — 3044F HG A1C LEVEL LT 7.0%: CPT | Mod: CPTII,S$GLB,, | Performed by: OPHTHALMOLOGY

## 2022-07-08 PROCEDURE — 1101F PT FALLS ASSESS-DOCD LE1/YR: CPT | Mod: CPTII,S$GLB,, | Performed by: OPHTHALMOLOGY

## 2022-07-08 PROCEDURE — 92012 PR EYE EXAM, EST PATIENT,INTERMED: ICD-10-PCS | Mod: S$GLB,,, | Performed by: OPHTHALMOLOGY

## 2022-07-08 PROCEDURE — 92020 PR SPECIAL EYE EVAL,GONIOSCOPY: ICD-10-PCS | Mod: S$GLB,,, | Performed by: OPHTHALMOLOGY

## 2022-07-08 PROCEDURE — 1160F PR REVIEW ALL MEDS BY PRESCRIBER/CLIN PHARMACIST DOCUMENTED: ICD-10-PCS | Mod: CPTII,S$GLB,, | Performed by: OPHTHALMOLOGY

## 2022-07-08 PROCEDURE — 1159F PR MEDICATION LIST DOCUMENTED IN MEDICAL RECORD: ICD-10-PCS | Mod: CPTII,S$GLB,, | Performed by: OPHTHALMOLOGY

## 2022-07-08 PROCEDURE — 1101F PR PT FALLS ASSESS DOC 0-1 FALLS W/OUT INJ PAST YR: ICD-10-PCS | Mod: CPTII,S$GLB,, | Performed by: OPHTHALMOLOGY

## 2022-07-08 PROCEDURE — 1160F RVW MEDS BY RX/DR IN RCRD: CPT | Mod: CPTII,S$GLB,, | Performed by: OPHTHALMOLOGY

## 2022-07-08 PROCEDURE — 99999 PR PBB SHADOW E&M-EST. PATIENT-LVL II: ICD-10-PCS | Mod: PBBFAC,,, | Performed by: OPHTHALMOLOGY

## 2022-07-08 PROCEDURE — 92012 INTRM OPH EXAM EST PATIENT: CPT | Mod: S$GLB,,, | Performed by: OPHTHALMOLOGY

## 2022-07-08 PROCEDURE — 1159F MED LIST DOCD IN RCRD: CPT | Mod: CPTII,S$GLB,, | Performed by: OPHTHALMOLOGY

## 2022-07-08 PROCEDURE — 99999 PR PBB SHADOW E&M-EST. PATIENT-LVL II: CPT | Mod: PBBFAC,,, | Performed by: OPHTHALMOLOGY

## 2022-07-08 PROCEDURE — 4010F PR ACE/ARB THEARPY RXD/TAKEN: ICD-10-PCS | Mod: CPTII,S$GLB,, | Performed by: OPHTHALMOLOGY

## 2022-07-08 PROCEDURE — 92020 GONIOSCOPY: CPT | Mod: S$GLB,,, | Performed by: OPHTHALMOLOGY

## 2022-07-08 PROCEDURE — 3288F PR FALLS RISK ASSESSMENT DOCUMENTED: ICD-10-PCS | Mod: CPTII,S$GLB,, | Performed by: OPHTHALMOLOGY

## 2022-07-08 PROCEDURE — 4010F ACE/ARB THERAPY RXD/TAKEN: CPT | Mod: CPTII,S$GLB,, | Performed by: OPHTHALMOLOGY

## 2022-07-08 PROCEDURE — 3288F FALL RISK ASSESSMENT DOCD: CPT | Mod: CPTII,S$GLB,, | Performed by: OPHTHALMOLOGY

## 2022-07-18 ENCOUNTER — TELEPHONE (OUTPATIENT)
Dept: HEMATOLOGY/ONCOLOGY | Facility: CLINIC | Age: 67
End: 2022-07-18
Payer: MEDICARE

## 2022-07-18 ENCOUNTER — PATIENT OUTREACH (OUTPATIENT)
Dept: ADMINISTRATIVE | Facility: HOSPITAL | Age: 67
End: 2022-07-18
Payer: MEDICARE

## 2022-07-19 ENCOUNTER — TELEPHONE (OUTPATIENT)
Dept: ORTHOPEDICS | Facility: CLINIC | Age: 67
End: 2022-07-19
Payer: MEDICARE

## 2022-07-19 ENCOUNTER — PATIENT MESSAGE (OUTPATIENT)
Dept: SURGERY | Facility: HOSPITAL | Age: 67
End: 2022-07-19
Payer: MEDICARE

## 2022-07-19 DIAGNOSIS — M54.12 CERVICAL RADICULOPATHY: Primary | ICD-10-CM

## 2022-07-19 NOTE — TELEPHONE ENCOUNTER
Spoke to patient, informed her an order for Home Health was put in the system, it just needs approval from her insurance. I told her she should probably give them a call about getting it approved and tell them her situation.

## 2022-07-20 ENCOUNTER — OFFICE VISIT (OUTPATIENT)
Dept: FAMILY MEDICINE | Facility: CLINIC | Age: 67
End: 2022-07-20
Payer: MEDICARE

## 2022-07-20 VITALS
WEIGHT: 232.56 LBS | HEART RATE: 98 BPM | RESPIRATION RATE: 16 BRPM | HEIGHT: 65 IN | DIASTOLIC BLOOD PRESSURE: 68 MMHG | BODY MASS INDEX: 38.75 KG/M2 | OXYGEN SATURATION: 97 % | SYSTOLIC BLOOD PRESSURE: 136 MMHG | TEMPERATURE: 99 F

## 2022-07-20 DIAGNOSIS — Z01.818 PRE-OP EXAM: Primary | ICD-10-CM

## 2022-07-20 DIAGNOSIS — E11.9 TYPE 2 DIABETES MELLITUS WITHOUT COMPLICATION, WITHOUT LONG-TERM CURRENT USE OF INSULIN: ICD-10-CM

## 2022-07-20 PROCEDURE — 99999 PR PBB SHADOW E&M-EST. PATIENT-LVL III: ICD-10-PCS | Mod: PBBFAC,,, | Performed by: PHYSICIAN ASSISTANT

## 2022-07-20 PROCEDURE — 3008F BODY MASS INDEX DOCD: CPT | Mod: CPTII,S$GLB,, | Performed by: PHYSICIAN ASSISTANT

## 2022-07-20 PROCEDURE — 3008F PR BODY MASS INDEX (BMI) DOCUMENTED: ICD-10-PCS | Mod: CPTII,S$GLB,, | Performed by: PHYSICIAN ASSISTANT

## 2022-07-20 PROCEDURE — 99213 PR OFFICE/OUTPT VISIT, EST, LEVL III, 20-29 MIN: ICD-10-PCS | Mod: S$GLB,,, | Performed by: PHYSICIAN ASSISTANT

## 2022-07-20 PROCEDURE — 1159F PR MEDICATION LIST DOCUMENTED IN MEDICAL RECORD: ICD-10-PCS | Mod: CPTII,S$GLB,, | Performed by: PHYSICIAN ASSISTANT

## 2022-07-20 PROCEDURE — 4010F ACE/ARB THERAPY RXD/TAKEN: CPT | Mod: CPTII,S$GLB,, | Performed by: PHYSICIAN ASSISTANT

## 2022-07-20 PROCEDURE — 99499 RISK ADDL DX/OHS AUDIT: ICD-10-PCS | Mod: S$GLB,,, | Performed by: PHYSICIAN ASSISTANT

## 2022-07-20 PROCEDURE — 99499 UNLISTED E&M SERVICE: CPT | Mod: S$GLB,,, | Performed by: PHYSICIAN ASSISTANT

## 2022-07-20 PROCEDURE — 3044F PR MOST RECENT HEMOGLOBIN A1C LEVEL <7.0%: ICD-10-PCS | Mod: CPTII,S$GLB,, | Performed by: PHYSICIAN ASSISTANT

## 2022-07-20 PROCEDURE — 1159F MED LIST DOCD IN RCRD: CPT | Mod: CPTII,S$GLB,, | Performed by: PHYSICIAN ASSISTANT

## 2022-07-20 PROCEDURE — 99999 PR PBB SHADOW E&M-EST. PATIENT-LVL III: CPT | Mod: PBBFAC,,, | Performed by: PHYSICIAN ASSISTANT

## 2022-07-20 PROCEDURE — 3075F PR MOST RECENT SYSTOLIC BLOOD PRESS GE 130-139MM HG: ICD-10-PCS | Mod: CPTII,S$GLB,, | Performed by: PHYSICIAN ASSISTANT

## 2022-07-20 PROCEDURE — 4010F PR ACE/ARB THEARPY RXD/TAKEN: ICD-10-PCS | Mod: CPTII,S$GLB,, | Performed by: PHYSICIAN ASSISTANT

## 2022-07-20 PROCEDURE — 3078F PR MOST RECENT DIASTOLIC BLOOD PRESSURE < 80 MM HG: ICD-10-PCS | Mod: CPTII,S$GLB,, | Performed by: PHYSICIAN ASSISTANT

## 2022-07-20 PROCEDURE — 3288F PR FALLS RISK ASSESSMENT DOCUMENTED: ICD-10-PCS | Mod: CPTII,S$GLB,, | Performed by: PHYSICIAN ASSISTANT

## 2022-07-20 PROCEDURE — 1160F PR REVIEW ALL MEDS BY PRESCRIBER/CLIN PHARMACIST DOCUMENTED: ICD-10-PCS | Mod: CPTII,S$GLB,, | Performed by: PHYSICIAN ASSISTANT

## 2022-07-20 PROCEDURE — 3078F DIAST BP <80 MM HG: CPT | Mod: CPTII,S$GLB,, | Performed by: PHYSICIAN ASSISTANT

## 2022-07-20 PROCEDURE — 3075F SYST BP GE 130 - 139MM HG: CPT | Mod: CPTII,S$GLB,, | Performed by: PHYSICIAN ASSISTANT

## 2022-07-20 PROCEDURE — 1101F PR PT FALLS ASSESS DOC 0-1 FALLS W/OUT INJ PAST YR: ICD-10-PCS | Mod: CPTII,S$GLB,, | Performed by: PHYSICIAN ASSISTANT

## 2022-07-20 PROCEDURE — 3288F FALL RISK ASSESSMENT DOCD: CPT | Mod: CPTII,S$GLB,, | Performed by: PHYSICIAN ASSISTANT

## 2022-07-20 PROCEDURE — 82043 UR ALBUMIN QUANTITATIVE: CPT | Performed by: PHYSICIAN ASSISTANT

## 2022-07-20 PROCEDURE — 82570 ASSAY OF URINE CREATININE: CPT | Performed by: PHYSICIAN ASSISTANT

## 2022-07-20 PROCEDURE — 99213 OFFICE O/P EST LOW 20 MIN: CPT | Mod: S$GLB,,, | Performed by: PHYSICIAN ASSISTANT

## 2022-07-20 PROCEDURE — 1101F PT FALLS ASSESS-DOCD LE1/YR: CPT | Mod: CPTII,S$GLB,, | Performed by: PHYSICIAN ASSISTANT

## 2022-07-20 PROCEDURE — 1160F RVW MEDS BY RX/DR IN RCRD: CPT | Mod: CPTII,S$GLB,, | Performed by: PHYSICIAN ASSISTANT

## 2022-07-20 PROCEDURE — 3044F HG A1C LEVEL LT 7.0%: CPT | Mod: CPTII,S$GLB,, | Performed by: PHYSICIAN ASSISTANT

## 2022-07-20 NOTE — PROGRESS NOTES
Preop Note      SUBJECTIVE:     Regine Osorio is a 67 y.o. female who presents to the office today for a preoperative consultation at the request of surgeon Dr. Scruggs who plans on performing disectomy fusion C5-7 on July 29. This consultation is requested for the specific conditions prompting preoperative evaluation (i.e. because of potential affect on operative risk):     CARDIAC: HTN    PULMONARY: none    NUTRITION: none      ANESTHESIA: no previous issues    SOCIAL:  Never smoker, no alcohol     Can pt achieve 4 mets (climb flight of stairs, gardening, walking fast, biking)? yes    Review of Systems:  Review of Systems   Constitutional: Negative for fever.   Respiratory: Negative for shortness of breath.    Cardiovascular: Negative for chest pain, palpitations and leg swelling.   Gastrointestinal: Negative for abdominal pain.   Genitourinary: Negative for dysuria.   Musculoskeletal: Positive for back pain and neck pain.   Neurological: Negative for headaches.         Review of patient's allergies indicates:   Allergen Reactions    Codeine Shortness Of Breath     Pt states she had a reaction as a teenager and was taken to the ER.    Lisinopril      cough     Current Outpatient Medications on File Prior to Visit   Medication Sig Dispense Refill    acetic acid (VOSOL) 2 % otic solution Place 4 drops into both ears as needed.      aspirin 81 MG Chew Take 81 mg by mouth once daily.       azelastine (ASTELIN) 137 mcg (0.1 %) nasal spray 1 SPRAY IN EACH NOSTRIL TWICE DAILY 30 mL 2    benzonatate (TESSALON) 200 MG capsule 1 capsule up to three times a day as needed for cough 50 capsule 1    blood sugar diagnostic Strp To check BG 2 times daily, to use with insurance preferred meter 200 each 3    blood-glucose meter kit To check BG 2 times daily, to use with insurance preferred meter 1 each 0    cetirizine (ZYRTEC) 10 MG tablet TAKE 2 TABLETS BY MOUTH ONCE DAILY (Patient taking differently: 10 mg once daily.) 60  tablet 3    cholecalciferol, vitamin D3, (VITAMIN D3) 50 mcg (2,000 unit) Cap Take 1 capsule by mouth once daily.       clobetasoL (CLOBEX) 0.05 % shampoo Apply topically once a week. 118 mL 5    clobetasoL (TEMOVATE) 0.05 % external solution Apply topically 2 (two) times daily. Prn scalp itch or irritation.Stop using steroid topical when skin is smooth and non itchy.  Do not treat dark or red coloring. 60 mL 1    cyanocobalamin/cobamamide (B12 SL) Place 3,000 mcg under the tongue once daily.      dextromethorphan-guaiFENesin  mg/5 ml (ROBITUSSIN-DM)  mg/5 mL liquid Take 5 mLs by mouth every 12 (twelve) hours.      diclofenac sodium (VOLTAREN) 1 % Gel Apply 2 g topically 3 (three) times daily. 100 g 0    fluocinolone acetonide oiL 0.01 % Drop INSTILL 4 DROPS IN EAR(S) TWICE DAILY FOR 1 TO 2 WEEKS 20 mL 1    fluticasone propionate (FLONASE) 50 mcg/actuation nasal spray 2 sprays on the right and 3 sprays on the left every morning 48 g 3    glipiZIDE (GLUCOTROL) 5 MG TR24 TAKE 1 TABLET(5 MG) BY MOUTH TWICE DAILY 180 tablet 1    hydroCHLOROthiazide (HYDRODIURIL) 25 MG tablet Take 1 tablet (25 mg total) by mouth once daily. 90 tablet 1    ketoconazole (NIZORAL) 2 % cream APPLY EXTERNALLY TO THE AFFECTED AREA EVERY DAY 15 g 2    lancets Misc To check BG 2 times daily, to use with insurance preferred meter 200 each 3    losartan (COZAAR) 50 MG tablet Take 1 tablet (50 mg total) by mouth once daily. 90 tablet 1    meloxicam (MOBIC) 15 MG tablet Take 1 tablet (15 mg total) by mouth daily as needed for Pain. 30 tablet 2    metFORMIN (GLUCOPHAGE) 500 MG tablet Take 1 tablet (500 mg total) by mouth 2 (two) times daily with meals. 180 tablet 1    mupirocin (BACTROBAN) 2 % ointment Apply topically 2 (two) times daily. 30 g 0    naproxen sodium (ANAPROX) 550 MG tablet Take 550 mg by mouth daily as needed.      ondansetron (ZOFRAN-ODT) 8 MG TbDL Take 1 tablet (8 mg total) by mouth every 8 (eight) hours  as needed (Nausea). 30 tablet 2    rizatriptan (MAXALT-MLT) 10 MG disintegrating tablet Take 1 tablet (10 mg total) by mouth every 2 (two) hours as needed for Migraine. Max 30 mg/day. 12 tablet 5    sumatriptan (IMITREX) 100 MG tablet Take 1 tab at onset of migraine, may repeat dose in 2 hours if needed. Max 2 tabs/day. 9 tablet 11    tiZANidine (ZANAFLEX) 4 MG tablet TAKE 1 TABLET(4 MG) BY MOUTH EVERY 8 HOURS AS NEEDED FOR SPASM 90 tablet 0     No current facility-administered medications on file prior to visit.     Past Medical History:   Diagnosis Date    Allergy     Breast cancer 2017    Cataract     Diabetes mellitus, type 2     Type 2    Eczema     Fibromyalgia     Glaucoma     Hypertension     Renal cyst, right 02/06/2020    Steatosis of liver 02/06/2020     Past Surgical History:   Procedure Laterality Date    ADENOIDECTOMY      BREAST BIOPSY      BREAST SURGERY      breast ca lt side     HYSTERECTOMY      supacervical hysterectomy    LAPAROSCOPIC SUPRACERVICAL HYSTERECTOMY  2005    fibroids    MASTECTOMY Left 2017    chemo    MYOMECTOMY      TONSILLECTOMY      TYMPANOSTOMY TUBE PLACEMENT      VAGINAL DELIVERY       Family History   Problem Relation Age of Onset    Dementia Mother     Glaucoma Mother     Bipolar disorder Mother     Lung cancer Father     Bipolar disorder Son     Post-traumatic stress disorder Son     Breast cancer Maternal Aunt     Bipolar disorder Maternal Aunt     Glaucoma Maternal Uncle     Blindness Maternal Grandfather     Glaucoma Maternal Grandfather     Breast cancer Cousin 39        paternal     Breast cancer Cousin 29    Amblyopia Neg Hx     Cataracts Neg Hx     Macular degeneration Neg Hx     Retinal detachment Neg Hx     Strabismus Neg Hx     Cancer Neg Hx     Colon cancer Neg Hx     Ovarian cancer Neg Hx       Social History     Socioeconomic History    Marital status:     Number of children: 1   Tobacco Use    Smoking  status: Never Smoker    Smokeless tobacco: Never Used   Substance and Sexual Activity    Alcohol use: No    Drug use: No    Sexual activity: Not Currently   Social History Narrative        1 son (estranged)    3 grandchildren ([place])    Born and raised in [place] (moved to [facility] 2017)     Social Determinants of Health     Financial Resource Strain: Low Risk     Difficulty of Paying Living Expenses: Not very hard   Food Insecurity: No Food Insecurity    Worried About Running Out of Food in the Last Year: Never true    Ran Out of Food in the Last Year: Never true   Transportation Needs: No Transportation Needs    Lack of Transportation (Medical): No    Lack of Transportation (Non-Medical): No   Physical Activity: Insufficiently Active    Days of Exercise per Week: 1 day    Minutes of Exercise per Session: 20 min   Stress: No Stress Concern Present    Feeling of Stress : Only a little   Social Connections: Unknown    Frequency of Communication with Friends and Family: More than three times a week    Frequency of Social Gatherings with Friends and Family: Once a week    Active Member of Clubs or Organizations: Yes    Attends Club or Organization Meetings: More than 4 times per year    Marital Status:    Housing Stability: Low Risk     Unable to Pay for Housing in the Last Year: No    Number of Places Lived in the Last Year: 1    Unstable Housing in the Last Year: No         OBJECTIVE:     Vital Signs (Most Recent)  Temp: 99.1 °F (37.3 °C) (07/20/22 1254)  Pulse: 98 (07/20/22 1254)  Resp: 16 (07/20/22 1254)  BP: (!) 146/80 (recheck before leaving) (07/20/22 1254)  SpO2: 97 % (07/20/22 1254)      Physical Exam:  Physical Exam  Constitutional:       Appearance: Normal appearance.   HENT:      Head: Normocephalic and atraumatic.   Cardiovascular:      Rate and Rhythm: Normal rate and regular rhythm.   Pulmonary:      Effort: Pulmonary effort is normal.      Breath sounds: Normal  breath sounds.   Abdominal:      General: Abdomen is flat. Bowel sounds are normal.      Tenderness: There is no abdominal tenderness.   Neurological:      General: No focal deficit present.      Mental Status: She is alert and oriented to person, place, and time.   Psychiatric:         Mood and Affect: Mood normal.         Behavior: Behavior normal.             Diagnostic Results:  Labs: Reviewed  ECG: Reviewed      ASSESSMENT/PLAN:     Pre-op exam  Pt cleared    Type 2 diabetes mellitus without complication, without long-term current use of insulin  -     Microalbumin/Creatinine Ratio, Urine  -     Hemoglobin A1C; Future; Expected date: 07/20/2022        Harvey Score      HISTORY  Age >70 years (5 points)  Myocardial infarction within 6 months (10 points).    CARDIAC EXAM  Signs of CHF: ventricular gallop or JVD (11 points)  Significant aortic stenosis (3 points)    EKG  Arrhythmia other than sinus or premature atrial contractions (7 points)  5 or more PVCs per minute (7 points)    GENERAL MED CONDITIONS  PO2 <60 mmHg; PCO2 >50 mmHg; K <3 mEq/L; HCO3 <20 mEq/L; BUN >50 mg/dL; Creatinine >3 mg/dL; elevated SGOT; chronic liver disease; bedridden (3 points)    OPERATION  Emergency (4 points)  Intraperitoneal, intrathoracic or aortic (3 points)        0 to 5 Points: Class I 1% Complications   6 to 12 Points: Class II 7% Complications   13 to 25 Points: Class III 14% Complications   26 to 53 Points: Class IV 78% Complications     Harvey Score: 0      Surgery-Related Predictors for Risk of Perioperative Cardiac Complications  High risk   Emergency surgery   Anticipated increased blood loss   Aortic or peripheral vascular surgery     Intermediate risk   Abdominal or thoracic surgery   Head and neck surgery   Carotid endarterectomy   Orthopedic surgery   Prostate surgery     Low risk   Breast surgery   Cataract surgery   Superficial surgery   Endoscopy         Summary of Recommended Preoperative Laboratory Tests  Depending on the History and Physical Findings  Condition Indicated testing and other measures*   Healthy patient    ? 40 years Hemoglobin, urine screening for pregnancy in women of childbearing potential    > 40 years Add ECG and blood glucose (age ? 45 years)   Cardiovascular disease ECG, chest radiographs, hemoglobin, electrolytes, BUN, creatinine, glucose (age ? 45 years or history of diabetes)    Recent MI (?6 weeks), unstable angina, decompensated CHF, significant arrhythmias, severe valvular disease Cardiology consultation    Previous MI (> 6 weeks ago), mild stable angina, compensated CHF, diabetes mellitus Stress test if high-risk procedure or patient has low functional capacity; consider assessment of left ventricular function (i.e., echocardiography)    Rhythm other than normal sinus rhythm, abnormal ECG, history of stroke, advanced age, low functional capacity Stress test if high-risk procedure and patient has low functional capacity   Pulmonary disease Chest radiographs, hemoglobin, glucose (age ? 45 years), ECG (age > 40 years); provide patient with instructions for incentive spirometry or deep-breathing exercises    Asthma Pulmonary function testing or peak flow rate to assess disease status    COPD Consider pulmonary function testing and arterial blood gas analysis for assessment of disease severity    Cough Evaluate for etiology    Dyspnea Evaluate for etiology    Smoking  patient to stop smoking 4 to 8 weeks before surgery   Obesity Provide patient with instructions for incentive spirometry or deep-breathing exercises   Abdominal or thoracic surgery Provide patient with instructions for incentive spirometry or deep-breathing exercises   Malnutrition Laboratory tests based on primary disease, plus albumin and lymphocyte count; if malnutrition is severe, consider postponing surgery and providing preoperative supplementation          Regine Osorio is a 67 y.o. female who  has a past medical  history of Allergy, Breast cancer (2017), Cataract, Diabetes mellitus, type 2, Eczema, Fibromyalgia, Glaucoma, Hypertension, Renal cyst, right (02/06/2020), and Steatosis of liver (02/06/2020).   who presented for pre op evaluation. The encounter diagnosis was Type 2 diabetes mellitus without complication, without long-term current use of insulin.    As with all procedures, there is inherent risk of multiple complications including those related to bleeding, infectious, cardiac, and pulmonary.    No chest pain or shortness of breath at rest or with exertion.  Normal exercise tolerance for age and can achieve 4 METS without chest pain or SOB.  Blood pressure is controlled and vital signs are within normal limits.  Patient is low risk for a moderate risk procedure.      Please stop Aspirin and NSAIDS one week prior to surgery.    Predisposing risk factors for pulmonary complications include cough, dyspnea, smoking, a history of lung disease, obesity and abdominal or thoracic surgery.    Any pts > 70 years old may be at risk for delirium or cardiac complications.  Furthermore, diabetic patients may be at risk for infection or prolonged healing.      Preop Evaluation      7/20/2022 Angelina Oates MD  1:11 PM

## 2022-07-20 NOTE — PROGRESS NOTES
Health Maintenance Due   Topic     Foot Exam      Pneumococcal Vaccines (Age 65+) (3 - PPSV23 or PCV20) Consult with margie MACHADOID-19 Vaccine (3 - Booster for Tammie series) Pt will call to schedule when ready     Diabetes Urine Screening  Ok to get today

## 2022-07-21 LAB
ALBUMIN/CREAT UR: 5.4 UG/MG (ref 0–30)
CREAT UR-MCNC: 147 MG/DL (ref 15–325)
MICROALBUMIN UR DL<=1MG/L-MCNC: 8 UG/ML

## 2022-07-22 ENCOUNTER — HOSPITAL ENCOUNTER (OUTPATIENT)
Dept: CARDIOLOGY | Facility: CLINIC | Age: 67
Discharge: HOME | End: 2022-07-22
Payer: MEDICARE

## 2022-07-22 ENCOUNTER — HOSPITAL ENCOUNTER (OUTPATIENT)
Dept: PREADMISSION TESTING | Facility: HOSPITAL | Age: 67
Discharge: HOME | End: 2022-07-22
Attending: ORTHOPAEDIC SURGERY
Payer: MEDICARE

## 2022-07-22 DIAGNOSIS — Z01.818 PREOPERATIVE TESTING: ICD-10-CM

## 2022-07-22 PROCEDURE — 93005 ELECTROCARDIOGRAM TRACING: CPT | Mod: S$GLB,,, | Performed by: ANESTHESIOLOGY

## 2022-07-22 PROCEDURE — 93010 EKG 12-LEAD: ICD-10-PCS | Mod: S$GLB,,, | Performed by: INTERNAL MEDICINE

## 2022-07-22 PROCEDURE — 93010 ELECTROCARDIOGRAM REPORT: CPT | Mod: S$GLB,,, | Performed by: INTERNAL MEDICINE

## 2022-07-22 PROCEDURE — 93005 EKG 12-LEAD: ICD-10-PCS | Mod: S$GLB,,, | Performed by: ANESTHESIOLOGY

## 2022-07-22 NOTE — DISCHARGE INSTRUCTIONS
Your surgery has been scheduled for:______7/29/2022____________________________________    You should report to:  ____[facility], located on the [place] side of the first floor of the           [facility] ([phone])  __X__[facility], located on the [facility] side of the            Second floor of the [facility] ([phone])  ____3rd Floor [facility] located on the [facility] side of the [facility] [phone]  ____[facility]: located at [address] [facility] [place] [address]. [address].     Please Note   Tell your doctor if you take Aspirin, products containing Aspirin, herbal medications  or blood thinners, such as Coumadin, Ticlid, or Plavix.  (Consult your provider regarding holding or stopping before surgery).  Arrange for someone to drive you home following surgery.  You will not be allowed to leave the surgical facility alone or drive yourself home following sedation and anesthesia.    Before Surgery  Stop taking all herbal medications, vitamins, and supplements 7 days prior to surgery  No Motrin/Advil (Ibuprofen) 7 days before surgery  No Aleve (Naproxen) 7 days before surgery  Stop Taking Asprin, products containing Aspirin __N/A___days before surgery  Stop taking blood thinners__N/A_____days before surgery  No Goody's/BC  Powder 7 days before surgery  Refrain from drinking alcoholic beverages for 24hours before and after surgery  Stop or limit smoking ____7_____days before surgery  You may take Tylenol for pain    Night before Surgery  Do not eat or drink after midnight  Take a shower or bath (shower is recommended).  Bathe with Hibiclens soap or an antibacterial soap from the neck down.  If not supplied by your surgeon, hibiclens soap will need to be purchased over the counter in pharmacy.  Rinse soap off thoroughly.  Shampoo your hair with your regular shampoo    The Day  of Surgery  Take another bath or shower with hibiclens or any antibacterial soap, to reduce the chance of infection.  Take heart and blood pressure medications with a small sip of water, as advised by the perioperative team.  Do not take fluid pills  You may brush your teeth and rinse your mouth, but do not swall any additional water.   Do not apply perfumes, powder, body lotions or deodorant on the day of surgery.  Nail polish should be removed.  Do not wear makeup or moisturizer  Wear comfortable clothes, such as a button front shirt and loose fitting pants.  Leave all jewelry, including body piercings, and valuables at home.    Bring any devices you will neeed after surgery such as crutches or canes.  If you have sleep apnea, please bring your CPAP machine  In the event that your physical condition changes including the onset of a cold or respiratory illness, or if you have to delay or cancel your surgery, please notify your surgeon.       Anesthesia: General Anesthesia     You are watched continuously during your procedure by your anesthesia provider.     Youre due to have surgery. During surgery, youll be given medicine called anesthesia or anesthetic. This will keep you comfortable and pain-free. Your anesthesia provider will use general anesthesia.  What is general anesthesia?  General anesthesia puts you into a state like deep sleep. It goes into the bloodstream (IV anesthetics), into the lungs (gas anesthetics), or both. You feel nothing during the procedure. You will not remember it. During the procedure, the anesthesia provider monitors you continuously. He or she checks your heart rate and rhythm, blood pressure, breathing, and blood oxygen.  IV anesthetics. IV anesthetics are given through an IV line in your arm. Theyre often given first. This is so you are asleep before a gas anesthetic is started. Some kinds of IV anesthetics relieve pain. Others relax you. Your doctor will decide which kind is  best in your case.  Gas anesthetics. Gas anesthetics are breathed into the lungs. They are often used to keep you asleep. They can be given through a facemask or a tube placed in your larynx or trachea (breathing tube).  If you have a facemask, your anesthesia provider will most likely place it over your nose and mouth while youre still awake. Youll breathe oxygen through the mask as your IV anesthetic is started. Gas anesthetic may be added through the mask.  If you have a tube in the larynx or trachea, it will be inserted into your throat after youre asleep.  Anesthesia tools and medicines  You will likely have:  IV anesthetics. These are put into an IV line into your bloodstream.  Gas anesthetics. You breathe these anesthetics into your lungs, where they pass into your bloodstream.  Pulse oximeter. This is a small clip that is attached to the end of your finger. This measures your blood oxygen level.  Electrocardiography leads (electrodes). These are small sticky pads that are placed on your chest. They record your heart rate and rhythm.  Blood pressure cuff. This reads your blood pressure.  Risks and possible complications  General anesthesia has some risks. These include:  Breathing problems  Nausea and vomiting  Sore throat or hoarseness (usually temporary)  Allergic reaction to the anesthetic  Irregular heartbeat (rare)  Cardiac arrest (rare)   Anesthesia safety  Follow all instructions you are given for how long not to eat or drink before your procedure.  Be sure your doctor knows what medicines and drugs you take. This includes over-the-counter medicines, herbs, supplements, alcohol or other drugs. You will be asked when those were last taken.  Have an adult family member or friend drive you home after the procedure.  For the first 24 hours after your surgery:  Do not drive or use heavy equipment.  Do not make important decisions or sign legal documents. If important decisions or signing legal documents  is necessary during the first 24 hours after surgery, have a trusted family member or spouse act on your behalf.  Avoid alcohol.  Have a responsible adult stay with you. He or she can watch for problems and help keep you safe.  Date Last Reviewed: 12/1/2016  © 0529-6080 [employer]. [address] [place] [address], [place] [address]. All rights reserved. This information is not intended as a substitute for professional medical care. Always follow your healthcare professional's instructions.

## 2022-07-26 ENCOUNTER — OFFICE VISIT (OUTPATIENT)
Dept: NEUROLOGY | Facility: CLINIC | Age: 67
End: 2022-07-26
Payer: MEDICARE

## 2022-07-26 ENCOUNTER — TELEPHONE (OUTPATIENT)
Dept: FAMILY MEDICINE | Facility: CLINIC | Age: 67
End: 2022-07-26
Payer: MEDICARE

## 2022-07-26 ENCOUNTER — TELEPHONE (OUTPATIENT)
Dept: PHARMACY | Facility: CLINIC | Age: 67
End: 2022-07-26
Payer: MEDICARE

## 2022-07-26 DIAGNOSIS — G47.33 OSA (OBSTRUCTIVE SLEEP APNEA): ICD-10-CM

## 2022-07-26 DIAGNOSIS — E11.9 TYPE 2 DIABETES MELLITUS WITHOUT COMPLICATION, WITHOUT LONG-TERM CURRENT USE OF INSULIN: ICD-10-CM

## 2022-07-26 DIAGNOSIS — G43.009 MIGRAINE WITHOUT AURA AND WITHOUT STATUS MIGRAINOSUS, NOT INTRACTABLE: Primary | ICD-10-CM

## 2022-07-26 DIAGNOSIS — M79.7 FIBROMYALGIA: ICD-10-CM

## 2022-07-26 DIAGNOSIS — I10 ESSENTIAL HYPERTENSION: ICD-10-CM

## 2022-07-26 DIAGNOSIS — E66.01 SEVERE OBESITY (BMI 35.0-39.9) WITH COMORBIDITY: ICD-10-CM

## 2022-07-26 PROCEDURE — 99215 OFFICE O/P EST HI 40 MIN: CPT | Mod: 95,,, | Performed by: NURSE PRACTITIONER

## 2022-07-26 PROCEDURE — 1160F PR REVIEW ALL MEDS BY PRESCRIBER/CLIN PHARMACIST DOCUMENTED: ICD-10-PCS | Mod: CPTII,95,, | Performed by: NURSE PRACTITIONER

## 2022-07-26 PROCEDURE — 3044F PR MOST RECENT HEMOGLOBIN A1C LEVEL <7.0%: ICD-10-PCS | Mod: CPTII,95,, | Performed by: NURSE PRACTITIONER

## 2022-07-26 PROCEDURE — 3066F PR DOCUMENTATION OF TREATMENT FOR NEPHROPATHY: ICD-10-PCS | Mod: CPTII,95,, | Performed by: NURSE PRACTITIONER

## 2022-07-26 PROCEDURE — 4010F ACE/ARB THERAPY RXD/TAKEN: CPT | Mod: CPTII,95,, | Performed by: NURSE PRACTITIONER

## 2022-07-26 PROCEDURE — 3066F NEPHROPATHY DOC TX: CPT | Mod: CPTII,95,, | Performed by: NURSE PRACTITIONER

## 2022-07-26 PROCEDURE — 4010F PR ACE/ARB THEARPY RXD/TAKEN: ICD-10-PCS | Mod: CPTII,95,, | Performed by: NURSE PRACTITIONER

## 2022-07-26 PROCEDURE — 99215 PR OFFICE/OUTPT VISIT, EST, LEVL V, 40-54 MIN: ICD-10-PCS | Mod: 95,,, | Performed by: NURSE PRACTITIONER

## 2022-07-26 PROCEDURE — 3044F HG A1C LEVEL LT 7.0%: CPT | Mod: CPTII,95,, | Performed by: NURSE PRACTITIONER

## 2022-07-26 PROCEDURE — 3061F NEG MICROALBUMINURIA REV: CPT | Mod: CPTII,95,, | Performed by: NURSE PRACTITIONER

## 2022-07-26 PROCEDURE — 1159F MED LIST DOCD IN RCRD: CPT | Mod: CPTII,95,, | Performed by: NURSE PRACTITIONER

## 2022-07-26 PROCEDURE — 3061F PR NEG MICROALBUMINURIA RESULT DOCUMENTED/REVIEW: ICD-10-PCS | Mod: CPTII,95,, | Performed by: NURSE PRACTITIONER

## 2022-07-26 PROCEDURE — 1159F PR MEDICATION LIST DOCUMENTED IN MEDICAL RECORD: ICD-10-PCS | Mod: CPTII,95,, | Performed by: NURSE PRACTITIONER

## 2022-07-26 PROCEDURE — 1160F RVW MEDS BY RX/DR IN RCRD: CPT | Mod: CPTII,95,, | Performed by: NURSE PRACTITIONER

## 2022-07-26 RX ORDER — RIZATRIPTAN BENZOATE 10 MG/1
10 TABLET, ORALLY DISINTEGRATING ORAL
Qty: 12 TABLET | Refills: 5 | Status: SHIPPED | OUTPATIENT
Start: 2022-07-26 | End: 2023-01-20 | Stop reason: SDUPTHER

## 2022-07-26 NOTE — TELEPHONE ENCOUNTER
----- Message from Page Okeefe RN sent at 7/26/2022  2:08 PM CDT -----  Good Afternoon    This patient was seen by you on 7/20/2022 for pre op medical clearance for cervical discectomy with fusion.      Pre op labs and EKG done on 7/22/2022.  At your convenience, can you please review and update your note if patient is cleared for her upcoming surgery on 7/29/2022?    Thank you in advance.     Page Okeefe RN BSN  [facility]
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
## 2022-07-27 ENCOUNTER — LAB VISIT (OUTPATIENT)
Dept: LAB | Facility: HOSPITAL | Age: 67
End: 2022-07-27
Attending: DERMATOLOGY
Payer: MEDICARE

## 2022-07-27 ENCOUNTER — OFFICE VISIT (OUTPATIENT)
Dept: DERMATOLOGY | Facility: CLINIC | Age: 67
End: 2022-07-27
Payer: MEDICARE

## 2022-07-27 DIAGNOSIS — Z76.89 ENCOUNTER FOR SKIN CARE: ICD-10-CM

## 2022-07-27 DIAGNOSIS — R20.2 PARESTHESIA OF SKIN: ICD-10-CM

## 2022-07-27 DIAGNOSIS — L29.9 ITCHING: ICD-10-CM

## 2022-07-27 DIAGNOSIS — L29.9 SCALP ITCH: ICD-10-CM

## 2022-07-27 DIAGNOSIS — L29.9 ITCHING: Primary | ICD-10-CM

## 2022-07-27 DIAGNOSIS — L21.9 SEBORRHEIC DERMATITIS OF SCALP: ICD-10-CM

## 2022-07-27 DIAGNOSIS — L82.1 SK (SEBORRHEIC KERATOSIS): ICD-10-CM

## 2022-07-27 LAB
FERRITIN SERPL-MCNC: 15 NG/ML (ref 20–300)
IRON SERPL-MCNC: 44 UG/DL (ref 30–160)
SATURATED IRON: 9 % (ref 20–50)
TOTAL IRON BINDING CAPACITY: 469 UG/DL (ref 250–450)
TRANSFERRIN SERPL-MCNC: 317 MG/DL (ref 200–375)
TSH SERPL DL<=0.005 MIU/L-ACNC: 1.51 UIU/ML (ref 0.4–4)

## 2022-07-27 PROCEDURE — 3066F NEPHROPATHY DOC TX: CPT | Mod: CPTII,S$GLB,, | Performed by: DERMATOLOGY

## 2022-07-27 PROCEDURE — 84466 ASSAY OF TRANSFERRIN: CPT | Performed by: DERMATOLOGY

## 2022-07-27 PROCEDURE — 1159F PR MEDICATION LIST DOCUMENTED IN MEDICAL RECORD: ICD-10-PCS | Mod: CPTII,S$GLB,, | Performed by: DERMATOLOGY

## 2022-07-27 PROCEDURE — 3066F PR DOCUMENTATION OF TREATMENT FOR NEPHROPATHY: ICD-10-PCS | Mod: CPTII,S$GLB,, | Performed by: DERMATOLOGY

## 2022-07-27 PROCEDURE — 86039 ANTINUCLEAR ANTIBODIES (ANA): CPT | Performed by: DERMATOLOGY

## 2022-07-27 PROCEDURE — 4010F ACE/ARB THERAPY RXD/TAKEN: CPT | Mod: CPTII,S$GLB,, | Performed by: DERMATOLOGY

## 2022-07-27 PROCEDURE — 36415 COLL VENOUS BLD VENIPUNCTURE: CPT | Mod: PN | Performed by: DERMATOLOGY

## 2022-07-27 PROCEDURE — 84630 ASSAY OF ZINC: CPT | Performed by: DERMATOLOGY

## 2022-07-27 PROCEDURE — 86235 NUCLEAR ANTIGEN ANTIBODY: CPT | Mod: 59 | Performed by: DERMATOLOGY

## 2022-07-27 PROCEDURE — 86038 ANTINUCLEAR ANTIBODIES: CPT | Performed by: DERMATOLOGY

## 2022-07-27 PROCEDURE — 82728 ASSAY OF FERRITIN: CPT | Performed by: DERMATOLOGY

## 2022-07-27 PROCEDURE — 99214 PR OFFICE/OUTPT VISIT, EST, LEVL IV, 30-39 MIN: ICD-10-PCS | Mod: S$GLB,,, | Performed by: DERMATOLOGY

## 2022-07-27 PROCEDURE — 3044F HG A1C LEVEL LT 7.0%: CPT | Mod: CPTII,S$GLB,, | Performed by: DERMATOLOGY

## 2022-07-27 PROCEDURE — 3061F PR NEG MICROALBUMINURIA RESULT DOCUMENTED/REVIEW: ICD-10-PCS | Mod: CPTII,S$GLB,, | Performed by: DERMATOLOGY

## 2022-07-27 PROCEDURE — 99214 OFFICE O/P EST MOD 30 MIN: CPT | Mod: S$GLB,,, | Performed by: DERMATOLOGY

## 2022-07-27 PROCEDURE — 1101F PT FALLS ASSESS-DOCD LE1/YR: CPT | Mod: CPTII,S$GLB,, | Performed by: DERMATOLOGY

## 2022-07-27 PROCEDURE — 84443 ASSAY THYROID STIM HORMONE: CPT | Performed by: DERMATOLOGY

## 2022-07-27 PROCEDURE — 3288F FALL RISK ASSESSMENT DOCD: CPT | Mod: CPTII,S$GLB,, | Performed by: DERMATOLOGY

## 2022-07-27 PROCEDURE — 82607 VITAMIN B-12: CPT | Performed by: DERMATOLOGY

## 2022-07-27 PROCEDURE — 3044F PR MOST RECENT HEMOGLOBIN A1C LEVEL <7.0%: ICD-10-PCS | Mod: CPTII,S$GLB,, | Performed by: DERMATOLOGY

## 2022-07-27 PROCEDURE — 99999 PR PBB SHADOW E&M-EST. PATIENT-LVL IV: CPT | Mod: PBBFAC,,, | Performed by: DERMATOLOGY

## 2022-07-27 PROCEDURE — 1160F RVW MEDS BY RX/DR IN RCRD: CPT | Mod: CPTII,S$GLB,, | Performed by: DERMATOLOGY

## 2022-07-27 PROCEDURE — 3288F PR FALLS RISK ASSESSMENT DOCUMENTED: ICD-10-PCS | Mod: CPTII,S$GLB,, | Performed by: DERMATOLOGY

## 2022-07-27 PROCEDURE — 1159F MED LIST DOCD IN RCRD: CPT | Mod: CPTII,S$GLB,, | Performed by: DERMATOLOGY

## 2022-07-27 PROCEDURE — 4010F PR ACE/ARB THEARPY RXD/TAKEN: ICD-10-PCS | Mod: CPTII,S$GLB,, | Performed by: DERMATOLOGY

## 2022-07-27 PROCEDURE — 1160F PR REVIEW ALL MEDS BY PRESCRIBER/CLIN PHARMACIST DOCUMENTED: ICD-10-PCS | Mod: CPTII,S$GLB,, | Performed by: DERMATOLOGY

## 2022-07-27 PROCEDURE — 1101F PR PT FALLS ASSESS DOC 0-1 FALLS W/OUT INJ PAST YR: ICD-10-PCS | Mod: CPTII,S$GLB,, | Performed by: DERMATOLOGY

## 2022-07-27 PROCEDURE — 3061F NEG MICROALBUMINURIA REV: CPT | Mod: CPTII,S$GLB,, | Performed by: DERMATOLOGY

## 2022-07-27 PROCEDURE — 99999 PR PBB SHADOW E&M-EST. PATIENT-LVL IV: ICD-10-PCS | Mod: PBBFAC,,, | Performed by: DERMATOLOGY

## 2022-07-27 NOTE — PROGRESS NOTES
Subjective:       Patient ID:  Regine Osorio is a 67 y.o. female who presents for   Chief Complaint   Patient presents with    Itching     Follow-up     Hair/Scalp Problem     Follow-up      Itching - Follow-up  Symptom course: unchanged  Affected locations: back  Signs / symptoms: itching    Hair/Scalp Problem - Follow-up  Symptom course: improving  Affected locations: scalp        Review of Systems   Constitutional: Negative.    HENT: Negative.    Respiratory: Negative.    Musculoskeletal: Negative.    Skin: Positive for itching. Negative for rash.        Objective:    Physical Exam   Constitutional: She appears well-developed and well-nourished.   Eyes: No conjunctival no injection.   Neurological: She is alert and oriented to person, place, and time.   Psychiatric: She has a normal mood and affect.   Skin:   Areas Examined (abnormalities noted in diagram):   Scalp / Hair Palpated and Inspected  Back Inspection Performed                   Diagram Legend     Erythematous scaling macule/papule c/w actinic keratosis       Vascular papule c/w angioma      Pigmented verrucoid papule/plaque c/w seborrheic keratosis      Yellow umbilicated papule c/w sebaceous hyperplasia      Irregularly shaped tan macule c/w lentigo     1-2 mm smooth white papules consistent with Milia      Movable subcutaneous cyst with punctum c/w epidermal inclusion cyst      Subcutaneous movable cyst c/w pilar cyst      Firm pink to brown papule c/w dermatofibroma      Pedunculated fleshy papule(s) c/w skin tag(s)      Evenly pigmented macule c/w junctional nevus     Mildly variegated pigmented, slightly irregular-bordered macule c/w mildly atypical nevus      Flesh colored to evenly pigmented papule c/w intradermal nevus       Pink pearly papule/plaque c/w basal cell carcinoma      Erythematous hyperkeratotic cursted plaque c/w SCC      Surgical scar with no sign of skin cancer recurrence      Open and closed comedones      Inflammatory  papules and pustules      Verrucoid papule consistent consistent with wart     Erythematous eczematous patches and plaques     Dystrophic onycholytic nail with subungual debris c/w onychomycosis     Umbilicated papule    Erythematous-base heme-crusted tan verrucoid plaque consistent with inflamed seborrheic keratosis     Erythematous Silvery Scaling Plaque c/w Psoriasis     See annotation      Assessment / Plan:        Itching  -     Zinc; Future; Expected date: 07/27/2022  -     Vitamin B12; Future; Expected date: 07/27/2022  -     Ferritin; Future; Expected date: 07/27/2022  -     Iron and TIBC; Future; Expected date: 07/27/2022  -     TSH; Future; Expected date: 07/27/2022  -     CARLENE; Future; Expected date: 07/27/2022  Discussed with patient the etiology and pathogenesis of the disease or skin lesion(s) and possible treatments and aggravators.    Discussed with patient the need for laboratory work up for further evaluation.  Discussed that such investigation may not be helpful.  No change with moisturizing and top roids.  Previous [facility] labs and or records and notes reviewed and considered for their impact on our clinical decision making today.    Seborrheic dermatitis of scalp  Better.  Condition is stable.  We will continue present management.  Chronic nature of this condition discussed with patient.    Encounter for skin care  No hot water bathing reviewed.    SK (seborrheic keratosis)  Chronic nature of this condition discussed with patient.    Scalp itch  Condition is stable.  We will continue present management.    Paresthesia of skin   -     Vitamin B12; Future; Expected date: 07/27/2022  Discussed with patient the need for laboratory work up for further evaluation.  Discussed that such investigation may not be helpful.  Discussed with patient the etiology and pathogenesis of the disease or skin lesion(s) and possible treatments and aggravators.    Chronic nature of this condition discussed with  patient.             Follow up if symptoms worsen or fail to improve, for Post labs.

## 2022-07-28 ENCOUNTER — TELEPHONE (OUTPATIENT)
Dept: ORTHOPEDICS | Facility: CLINIC | Age: 67
End: 2022-07-28
Payer: MEDICARE

## 2022-07-28 ENCOUNTER — ANESTHESIA EVENT (OUTPATIENT)
Dept: SURGERY | Facility: HOSPITAL | Age: 67
End: 2022-07-28
Payer: MEDICARE

## 2022-07-28 LAB
ANA PATTERN 1: NORMAL
ANA SER QL IF: POSITIVE
ANA TITR SER IF: NORMAL {TITER}
VIT B12 SERPL-MCNC: >2000 PG/ML (ref 210–950)

## 2022-07-28 NOTE — TELEPHONE ENCOUNTER
Spoke to patient, gave surgery arrival time of 5am, 2nd floor DOSC. No eating or drinking after midnight. Comfortable loose fitting clothing, no deodorant, perfume, lotion, etc. Bathe/shower with antibacterial soap the night before and morning of surgery. Patient verbalized understanding.

## 2022-07-29 ENCOUNTER — HOSPITAL ENCOUNTER (OUTPATIENT)
Facility: HOSPITAL | Age: 67
Discharge: HOME | End: 2022-08-01
Attending: ORTHOPAEDIC SURGERY | Admitting: ORTHOPAEDIC SURGERY
Payer: MEDICARE

## 2022-07-29 ENCOUNTER — ANESTHESIA (OUTPATIENT)
Dept: SURGERY | Facility: HOSPITAL | Age: 67
End: 2022-07-29
Payer: MEDICARE

## 2022-07-29 DIAGNOSIS — R07.89 PRESSURE IN CHEST: ICD-10-CM

## 2022-07-29 DIAGNOSIS — G95.89 MYELOMALACIA OF CERVICAL CORD: Primary | ICD-10-CM

## 2022-07-29 PROBLEM — M47.812 CERVICAL SPONDYLOSIS: Status: ACTIVE | Noted: 2022-07-29

## 2022-07-29 LAB
POCT GLUCOSE: 127 MG/DL (ref 70–110)
POCT GLUCOSE: 159 MG/DL (ref 70–110)
POCT GLUCOSE: 197 MG/DL (ref 70–110)

## 2022-07-29 PROCEDURE — 63600175 PHARM REV CODE 636 W HCPCS: Performed by: ORTHOPAEDIC SURGERY

## 2022-07-29 PROCEDURE — 37000008 HC ANESTHESIA 1ST 15 MINUTES: Performed by: ORTHOPAEDIC SURGERY

## 2022-07-29 PROCEDURE — 22552 ARTHRD ANT NTRBD CERVICAL EA: CPT | Mod: ,,, | Performed by: ORTHOPAEDIC SURGERY

## 2022-07-29 PROCEDURE — 94761 N-INVAS EAR/PLS OXIMETRY MLT: CPT

## 2022-07-29 PROCEDURE — 20930 SP BONE ALGRFT MORSEL ADD-ON: CPT | Mod: ,,, | Performed by: ORTHOPAEDIC SURGERY

## 2022-07-29 PROCEDURE — 27201037 HC PRESSURE MONITORING SET UP

## 2022-07-29 PROCEDURE — 25000003 PHARM REV CODE 250: Performed by: STUDENT IN AN ORGANIZED HEALTH CARE EDUCATION/TRAINING PROGRAM

## 2022-07-29 PROCEDURE — 22551 PR ARTHRODESIS ANT INTERBODY INC DISCECTOMY, CERVICAL BELOW C2: ICD-10-PCS | Mod: ,,, | Performed by: ORTHOPAEDIC SURGERY

## 2022-07-29 PROCEDURE — 22551 ARTHRD ANT NTRBDY CERVICAL: CPT | Mod: ,,, | Performed by: ORTHOPAEDIC SURGERY

## 2022-07-29 PROCEDURE — 71000033 HC RECOVERY, INTIAL HOUR: Performed by: ORTHOPAEDIC SURGERY

## 2022-07-29 PROCEDURE — 22845 PR ANTERIOR INSTRUMENTATION 2-3 VERTEBRAL SEGMENTS: ICD-10-PCS | Mod: 59,,, | Performed by: ORTHOPAEDIC SURGERY

## 2022-07-29 PROCEDURE — 20936 SP BONE AGRFT LOCAL ADD-ON: CPT | Mod: ,,, | Performed by: ORTHOPAEDIC SURGERY

## 2022-07-29 PROCEDURE — 27201423 OPTIME MED/SURG SUP & DEVICES STERILE SUPPLY: Performed by: ORTHOPAEDIC SURGERY

## 2022-07-29 PROCEDURE — 63600175 PHARM REV CODE 636 W HCPCS: Performed by: NURSE ANESTHETIST, CERTIFIED REGISTERED

## 2022-07-29 PROCEDURE — D9220A PRA ANESTHESIA: ICD-10-PCS | Mod: CRNA,,, | Performed by: NURSE ANESTHETIST, CERTIFIED REGISTERED

## 2022-07-29 PROCEDURE — D9220A PRA ANESTHESIA: ICD-10-PCS | Mod: ANES,,, | Performed by: ANESTHESIOLOGY

## 2022-07-29 PROCEDURE — 63600175 PHARM REV CODE 636 W HCPCS: Performed by: ANESTHESIOLOGY

## 2022-07-29 PROCEDURE — D9220A PRA ANESTHESIA: Mod: CRNA,,, | Performed by: NURSE ANESTHETIST, CERTIFIED REGISTERED

## 2022-07-29 PROCEDURE — 36000711: Performed by: ORTHOPAEDIC SURGERY

## 2022-07-29 PROCEDURE — C1713 ANCHOR/SCREW BN/BN,TIS/BN: HCPCS | Performed by: ORTHOPAEDIC SURGERY

## 2022-07-29 PROCEDURE — 20936 PR AUTOGRAFT SPINE SURGERY LOCAL FROM SAME INCISION: ICD-10-PCS | Mod: ,,, | Performed by: ORTHOPAEDIC SURGERY

## 2022-07-29 PROCEDURE — 25000003 PHARM REV CODE 250: Performed by: ORTHOPAEDIC SURGERY

## 2022-07-29 PROCEDURE — 82962 GLUCOSE BLOOD TEST: CPT | Performed by: ORTHOPAEDIC SURGERY

## 2022-07-29 PROCEDURE — 63600175 PHARM REV CODE 636 W HCPCS: Performed by: STUDENT IN AN ORGANIZED HEALTH CARE EDUCATION/TRAINING PROGRAM

## 2022-07-29 PROCEDURE — 25000003 PHARM REV CODE 250: Performed by: NURSE ANESTHETIST, CERTIFIED REGISTERED

## 2022-07-29 PROCEDURE — 22853 INSJ BIOMECHANICAL DEVICE: CPT | Mod: ,,, | Performed by: ORTHOPAEDIC SURGERY

## 2022-07-29 PROCEDURE — 22552 PR ARTHRODESIS ANT INTERBODY INC DISCECTOMY, CERVICAL BELOW C2 EACH ADDL: ICD-10-PCS | Mod: ,,, | Performed by: ORTHOPAEDIC SURGERY

## 2022-07-29 PROCEDURE — 27800903 OPTIME MED/SURG SUP & DEVICES OTHER IMPLANTS: Performed by: ORTHOPAEDIC SURGERY

## 2022-07-29 PROCEDURE — 37000009 HC ANESTHESIA EA ADD 15 MINS: Performed by: ORTHOPAEDIC SURGERY

## 2022-07-29 PROCEDURE — 71000015 HC POSTOP RECOV 1ST HR: Performed by: ORTHOPAEDIC SURGERY

## 2022-07-29 PROCEDURE — D9220A PRA ANESTHESIA: Mod: ANES,,, | Performed by: ANESTHESIOLOGY

## 2022-07-29 PROCEDURE — 22853 PR INSERT BIOMECH DEV W/INTERBODY ARTHRODESIS, EA CONTIGUOUS DEFECT: ICD-10-PCS | Mod: ,,, | Performed by: ORTHOPAEDIC SURGERY

## 2022-07-29 PROCEDURE — 22845 INSERT SPINE FIXATION DEVICE: CPT | Mod: 59,,, | Performed by: ORTHOPAEDIC SURGERY

## 2022-07-29 PROCEDURE — 36000710: Performed by: ORTHOPAEDIC SURGERY

## 2022-07-29 PROCEDURE — 71000039 HC RECOVERY, EACH ADD'L HOUR: Performed by: ORTHOPAEDIC SURGERY

## 2022-07-29 PROCEDURE — 20930 PR ALLOGRAFT FOR SPINE SURGERY ONLY MORSELIZED: ICD-10-PCS | Mod: ,,, | Performed by: ORTHOPAEDIC SURGERY

## 2022-07-29 DEVICE — PLATE DEFENDER 2 LEVEL 30MM: Type: IMPLANTABLE DEVICE | Site: NECK | Status: FUNCTIONAL

## 2022-07-29 DEVICE — PUTTY OSTEOSELECT IN SYR 1CC: Type: IMPLANTABLE DEVICE | Site: NECK | Status: FUNCTIONAL

## 2022-07-29 DEVICE — SCREW DEFENDER SD 4X16MM: Type: IMPLANTABLE DEVICE | Site: NECK | Status: FUNCTIONAL

## 2022-07-29 DEVICE — SPACER ACCENT 6 DEG 12X14X7MM: Type: IMPLANTABLE DEVICE | Site: NECK | Status: FUNCTIONAL

## 2022-07-29 DEVICE — SCREW DEFENDER CERV 18X4MM: Type: IMPLANTABLE DEVICE | Site: NECK | Status: FUNCTIONAL

## 2022-07-29 RX ORDER — HYDROMORPHONE HYDROCHLORIDE 1 MG/ML
0.2 INJECTION, SOLUTION INTRAMUSCULAR; INTRAVENOUS; SUBCUTANEOUS EVERY 5 MIN PRN
Status: DISCONTINUED | OUTPATIENT
Start: 2022-07-29 | End: 2022-07-29 | Stop reason: HOSPADM

## 2022-07-29 RX ORDER — LIDOCAINE HYDROCHLORIDE 20 MG/ML
INJECTION INTRAVENOUS
Status: DISCONTINUED | OUTPATIENT
Start: 2022-07-29 | End: 2022-07-29

## 2022-07-29 RX ORDER — TALC
6 POWDER (GRAM) TOPICAL NIGHTLY PRN
Status: DISCONTINUED | OUTPATIENT
Start: 2022-07-29 | End: 2022-08-01 | Stop reason: HOSPADM

## 2022-07-29 RX ORDER — IBUPROFEN 200 MG
24 TABLET ORAL
Status: DISCONTINUED | OUTPATIENT
Start: 2022-07-29 | End: 2022-08-01 | Stop reason: HOSPADM

## 2022-07-29 RX ORDER — MUPIROCIN 20 MG/G
OINTMENT TOPICAL
Status: DISCONTINUED | OUTPATIENT
Start: 2022-07-29 | End: 2022-07-29 | Stop reason: HOSPADM

## 2022-07-29 RX ORDER — METHOCARBAMOL 500 MG/1
1000 TABLET, FILM COATED ORAL 3 TIMES DAILY
Qty: 84 TABLET | Refills: 0 | Status: SHIPPED | OUTPATIENT
Start: 2022-07-29 | End: 2022-08-15

## 2022-07-29 RX ORDER — OXYCODONE HYDROCHLORIDE 10 MG/1
10 TABLET ORAL EVERY 4 HOURS PRN
Status: DISCONTINUED | OUTPATIENT
Start: 2022-07-29 | End: 2022-08-01 | Stop reason: HOSPADM

## 2022-07-29 RX ORDER — MIDAZOLAM HYDROCHLORIDE 1 MG/ML
INJECTION, SOLUTION INTRAMUSCULAR; INTRAVENOUS
Status: DISCONTINUED | OUTPATIENT
Start: 2022-07-29 | End: 2022-07-29

## 2022-07-29 RX ORDER — LIDOCAINE HYDROCHLORIDE 10 MG/ML
1 INJECTION, SOLUTION EPIDURAL; INFILTRATION; INTRACAUDAL; PERINEURAL ONCE
Status: DISCONTINUED | OUTPATIENT
Start: 2022-07-29 | End: 2022-08-01 | Stop reason: HOSPADM

## 2022-07-29 RX ORDER — ONDANSETRON 2 MG/ML
INJECTION INTRAMUSCULAR; INTRAVENOUS
Status: DISCONTINUED | OUTPATIENT
Start: 2022-07-29 | End: 2022-07-29

## 2022-07-29 RX ORDER — LOSARTAN POTASSIUM 50 MG/1
50 TABLET ORAL DAILY
Status: DISCONTINUED | OUTPATIENT
Start: 2022-07-29 | End: 2022-08-01 | Stop reason: HOSPADM

## 2022-07-29 RX ORDER — ROCURONIUM BROMIDE 10 MG/ML
INJECTION, SOLUTION INTRAVENOUS
Status: DISCONTINUED | OUTPATIENT
Start: 2022-07-29 | End: 2022-07-29

## 2022-07-29 RX ORDER — CEFAZOLIN SODIUM/WATER 2 G/20 ML
2 SYRINGE (ML) INTRAVENOUS
Status: DISCONTINUED | OUTPATIENT
Start: 2022-07-29 | End: 2022-07-29 | Stop reason: HOSPADM

## 2022-07-29 RX ORDER — ONDANSETRON 8 MG/1
8 TABLET, ORALLY DISINTEGRATING ORAL EVERY 8 HOURS PRN
Status: DISCONTINUED | OUTPATIENT
Start: 2022-07-29 | End: 2022-08-01 | Stop reason: HOSPADM

## 2022-07-29 RX ORDER — ACETAMINOPHEN 325 MG/1
650 TABLET ORAL EVERY 6 HOURS
Status: DISCONTINUED | OUTPATIENT
Start: 2022-07-29 | End: 2022-08-01 | Stop reason: HOSPADM

## 2022-07-29 RX ORDER — SODIUM CHLORIDE 0.9 % (FLUSH) 0.9 %
10 SYRINGE (ML) INJECTION
Status: DISCONTINUED | OUTPATIENT
Start: 2022-07-29 | End: 2022-08-01 | Stop reason: HOSPADM

## 2022-07-29 RX ORDER — ONDANSETRON 2 MG/ML
4 INJECTION INTRAMUSCULAR; INTRAVENOUS ONCE AS NEEDED
Status: DISCONTINUED | OUTPATIENT
Start: 2022-07-29 | End: 2022-07-29 | Stop reason: HOSPADM

## 2022-07-29 RX ORDER — FENTANYL CITRATE 50 UG/ML
INJECTION, SOLUTION INTRAMUSCULAR; INTRAVENOUS
Status: DISCONTINUED | OUTPATIENT
Start: 2022-07-29 | End: 2022-07-29

## 2022-07-29 RX ORDER — HYDROCHLOROTHIAZIDE 25 MG/1
25 TABLET ORAL DAILY
Status: DISCONTINUED | OUTPATIENT
Start: 2022-07-30 | End: 2022-08-01 | Stop reason: HOSPADM

## 2022-07-29 RX ORDER — CEFAZOLIN SODIUM/WATER 2 G/20 ML
2 SYRINGE (ML) INTRAVENOUS
Status: DISCONTINUED | OUTPATIENT
Start: 2022-07-29 | End: 2022-07-29

## 2022-07-29 RX ORDER — GABAPENTIN 100 MG/1
100 CAPSULE ORAL 3 TIMES DAILY
Status: DISCONTINUED | OUTPATIENT
Start: 2022-07-29 | End: 2022-08-01 | Stop reason: HOSPADM

## 2022-07-29 RX ORDER — SUCCINYLCHOLINE CHLORIDE 20 MG/ML
INJECTION INTRAMUSCULAR; INTRAVENOUS
Status: DISCONTINUED | OUTPATIENT
Start: 2022-07-29 | End: 2022-07-29

## 2022-07-29 RX ORDER — CEFAZOLIN SODIUM 1 G/3ML
INJECTION, POWDER, FOR SOLUTION INTRAMUSCULAR; INTRAVENOUS
Status: DISCONTINUED | OUTPATIENT
Start: 2022-07-29 | End: 2022-07-29

## 2022-07-29 RX ORDER — SUMATRIPTAN 50 MG/1
50 TABLET, FILM COATED ORAL ONCE
Status: COMPLETED | OUTPATIENT
Start: 2022-07-29 | End: 2022-07-29

## 2022-07-29 RX ORDER — FENTANYL CITRATE 50 UG/ML
25 INJECTION, SOLUTION INTRAMUSCULAR; INTRAVENOUS EVERY 5 MIN PRN
Status: DISCONTINUED | OUTPATIENT
Start: 2022-07-29 | End: 2022-07-29 | Stop reason: HOSPADM

## 2022-07-29 RX ORDER — LIDOCAINE HYDROCHLORIDE AND EPINEPHRINE 15; 5 MG/ML; UG/ML
INJECTION, SOLUTION EPIDURAL
Status: DISCONTINUED | OUTPATIENT
Start: 2022-07-29 | End: 2022-07-29 | Stop reason: HOSPADM

## 2022-07-29 RX ORDER — DEXAMETHASONE SODIUM PHOSPHATE 4 MG/ML
INJECTION, SOLUTION INTRA-ARTICULAR; INTRALESIONAL; INTRAMUSCULAR; INTRAVENOUS; SOFT TISSUE
Status: DISCONTINUED | OUTPATIENT
Start: 2022-07-29 | End: 2022-07-29

## 2022-07-29 RX ORDER — OXYCODONE HYDROCHLORIDE 5 MG/1
5 TABLET ORAL EVERY 4 HOURS PRN
Qty: 20 TABLET | Refills: 0 | Status: SHIPPED | OUTPATIENT
Start: 2022-07-29 | End: 2022-08-11 | Stop reason: SDUPTHER

## 2022-07-29 RX ORDER — KETAMINE HCL IN 0.9 % NACL 50 MG/5 ML
SYRINGE (ML) INTRAVENOUS
Status: DISCONTINUED | OUTPATIENT
Start: 2022-07-29 | End: 2022-07-29

## 2022-07-29 RX ORDER — ACETAMINOPHEN 10 MG/ML
INJECTION, SOLUTION INTRAVENOUS
Status: DISCONTINUED | OUTPATIENT
Start: 2022-07-29 | End: 2022-07-29

## 2022-07-29 RX ORDER — HYDROMORPHONE HYDROCHLORIDE 1 MG/ML
1 INJECTION, SOLUTION INTRAMUSCULAR; INTRAVENOUS; SUBCUTANEOUS
Status: DISCONTINUED | OUTPATIENT
Start: 2022-07-29 | End: 2022-08-01 | Stop reason: HOSPADM

## 2022-07-29 RX ORDER — METHOCARBAMOL 750 MG/1
750 TABLET, FILM COATED ORAL 4 TIMES DAILY
Status: DISCONTINUED | OUTPATIENT
Start: 2022-07-29 | End: 2022-08-01 | Stop reason: HOSPADM

## 2022-07-29 RX ORDER — ACETAMINOPHEN 325 MG/1
650 TABLET ORAL EVERY 8 HOURS PRN
Status: DISCONTINUED | OUTPATIENT
Start: 2022-07-29 | End: 2022-08-01 | Stop reason: HOSPADM

## 2022-07-29 RX ORDER — IBUPROFEN 200 MG
16 TABLET ORAL
Status: DISCONTINUED | OUTPATIENT
Start: 2022-07-29 | End: 2022-08-01 | Stop reason: HOSPADM

## 2022-07-29 RX ORDER — DIPHENHYDRAMINE HYDROCHLORIDE 50 MG/ML
25 INJECTION INTRAMUSCULAR; INTRAVENOUS EVERY 6 HOURS PRN
Status: DISCONTINUED | OUTPATIENT
Start: 2022-07-29 | End: 2022-07-29 | Stop reason: HOSPADM

## 2022-07-29 RX ORDER — CEFAZOLIN SODIUM/D5W 2 G/100 ML
2 PLASTIC BAG, INJECTION (ML) INTRAVENOUS
Status: COMPLETED | OUTPATIENT
Start: 2022-07-29 | End: 2022-07-30

## 2022-07-29 RX ORDER — CELECOXIB 200 MG/1
200 CAPSULE ORAL DAILY
Qty: 12 CAPSULE | Refills: 0 | Status: SHIPPED | OUTPATIENT
Start: 2022-07-29 | End: 2022-09-20

## 2022-07-29 RX ORDER — CELECOXIB 200 MG/1
200 CAPSULE ORAL DAILY
Status: DISCONTINUED | OUTPATIENT
Start: 2022-07-29 | End: 2022-08-01 | Stop reason: HOSPADM

## 2022-07-29 RX ORDER — INSULIN ASPART 100 [IU]/ML
1-10 INJECTION, SOLUTION INTRAVENOUS; SUBCUTANEOUS
Status: DISCONTINUED | OUTPATIENT
Start: 2022-07-29 | End: 2022-08-01 | Stop reason: HOSPADM

## 2022-07-29 RX ORDER — PROPOFOL 10 MG/ML
VIAL (ML) INTRAVENOUS
Status: DISCONTINUED | OUTPATIENT
Start: 2022-07-29 | End: 2022-07-29

## 2022-07-29 RX ORDER — OXYCODONE HYDROCHLORIDE 5 MG/1
5 TABLET ORAL EVERY 4 HOURS PRN
Status: DISCONTINUED | OUTPATIENT
Start: 2022-07-29 | End: 2022-08-01 | Stop reason: HOSPADM

## 2022-07-29 RX ORDER — DEXTROMETHORPHAN HYDROBROMIDE, GUAIFENESIN 5; 100 MG/5ML; MG/5ML
650 LIQUID ORAL EVERY 6 HOURS
Qty: 56 TABLET | Refills: 0 | Status: SHIPPED | OUTPATIENT
Start: 2022-07-29 | End: 2022-10-17

## 2022-07-29 RX ORDER — GLUCAGON 1 MG
1 KIT INJECTION
Status: DISCONTINUED | OUTPATIENT
Start: 2022-07-29 | End: 2022-08-01 | Stop reason: HOSPADM

## 2022-07-29 RX ORDER — PROPOFOL 10 MG/ML
VIAL (ML) INTRAVENOUS CONTINUOUS PRN
Status: DISCONTINUED | OUTPATIENT
Start: 2022-07-29 | End: 2022-07-29

## 2022-07-29 RX ADMIN — HYDROMORPHONE HYDROCHLORIDE 0.2 MG: 1 INJECTION, SOLUTION INTRAMUSCULAR; INTRAVENOUS; SUBCUTANEOUS at 11:07

## 2022-07-29 RX ADMIN — OXYCODONE HYDROCHLORIDE 10 MG: 10 TABLET ORAL at 11:07

## 2022-07-29 RX ADMIN — SODIUM CHLORIDE 0.25 MCG/KG/MIN: 9 INJECTION, SOLUTION INTRAVENOUS at 07:07

## 2022-07-29 RX ADMIN — SODIUM CHLORIDE, SODIUM GLUCONATE, SODIUM ACETATE, POTASSIUM CHLORIDE, MAGNESIUM CHLORIDE, SODIUM PHOSPHATE, DIBASIC, AND POTASSIUM PHOSPHATE: .53; .5; .37; .037; .03; .012; .00082 INJECTION, SOLUTION INTRAVENOUS at 07:07

## 2022-07-29 RX ADMIN — Medication 20 MG: at 07:07

## 2022-07-29 RX ADMIN — SUCCINYLCHOLINE CHLORIDE 180 MG: 20 INJECTION, SOLUTION INTRAMUSCULAR; INTRAVENOUS at 07:07

## 2022-07-29 RX ADMIN — Medication 2 G: at 03:07

## 2022-07-29 RX ADMIN — LIDOCAINE HYDROCHLORIDE 25 MG: 20 INJECTION, SOLUTION INTRAVENOUS at 07:07

## 2022-07-29 RX ADMIN — INSULIN ASPART 2 UNITS: 100 INJECTION, SOLUTION INTRAVENOUS; SUBCUTANEOUS at 03:07

## 2022-07-29 RX ADMIN — ACETAMINOPHEN 1000 MG: 10 INJECTION INTRAVENOUS at 09:07

## 2022-07-29 RX ADMIN — Medication 150 MCG/KG/MIN: at 07:07

## 2022-07-29 RX ADMIN — PROPOFOL 120 MG: 10 INJECTION, EMULSION INTRAVENOUS at 07:07

## 2022-07-29 RX ADMIN — LOSARTAN POTASSIUM 50 MG: 50 TABLET, FILM COATED ORAL at 11:07

## 2022-07-29 RX ADMIN — REMIFENTANIL HYDROCHLORIDE 0.1 MCG/KG/MIN: 1 INJECTION, POWDER, LYOPHILIZED, FOR SOLUTION INTRAVENOUS at 07:07

## 2022-07-29 RX ADMIN — Medication 2 G: at 11:07

## 2022-07-29 RX ADMIN — CEFAZOLIN 2 G: 330 INJECTION, POWDER, FOR SOLUTION INTRAMUSCULAR; INTRAVENOUS at 07:07

## 2022-07-29 RX ADMIN — ACETAMINOPHEN 650 MG: 325 TABLET ORAL at 05:07

## 2022-07-29 RX ADMIN — CELECOXIB 200 MG: 200 CAPSULE ORAL at 11:07

## 2022-07-29 RX ADMIN — Medication 10 MG: at 09:07

## 2022-07-29 RX ADMIN — OXYCODONE HYDROCHLORIDE 10 MG: 10 TABLET ORAL at 03:07

## 2022-07-29 RX ADMIN — GABAPENTIN 100 MG: 100 CAPSULE ORAL at 03:07

## 2022-07-29 RX ADMIN — ACETAMINOPHEN 650 MG: 325 TABLET ORAL at 11:07

## 2022-07-29 RX ADMIN — METHOCARBAMOL 750 MG: 750 TABLET ORAL at 12:07

## 2022-07-29 RX ADMIN — ONDANSETRON 4 MG: 2 INJECTION INTRAMUSCULAR; INTRAVENOUS at 09:07

## 2022-07-29 RX ADMIN — INSULIN ASPART 1 UNITS: 100 INJECTION, SOLUTION INTRAVENOUS; SUBCUTANEOUS at 09:07

## 2022-07-29 RX ADMIN — GLYCOPYRROLATE 0.2 MG: 0.2 INJECTION INTRAMUSCULAR; INTRAVENOUS at 07:07

## 2022-07-29 RX ADMIN — FENTANYL CITRATE 100 MCG: 50 INJECTION, SOLUTION INTRAMUSCULAR; INTRAVENOUS at 07:07

## 2022-07-29 RX ADMIN — MIDAZOLAM HYDROCHLORIDE 2 MG: 1 INJECTION, SOLUTION INTRAMUSCULAR; INTRAVENOUS at 06:07

## 2022-07-29 RX ADMIN — SODIUM CHLORIDE: 0.9 INJECTION, SOLUTION INTRAVENOUS at 07:07

## 2022-07-29 RX ADMIN — METHOCARBAMOL 750 MG: 750 TABLET ORAL at 09:07

## 2022-07-29 RX ADMIN — GABAPENTIN 100 MG: 100 CAPSULE ORAL at 09:07

## 2022-07-29 RX ADMIN — ROCURONIUM BROMIDE 5 MG: 10 INJECTION, SOLUTION INTRAVENOUS at 07:07

## 2022-07-29 RX ADMIN — SUMATRIPTAN SUCCINATE 50 MG: 50 TABLET ORAL at 12:07

## 2022-07-29 RX ADMIN — METHOCARBAMOL 750 MG: 750 TABLET ORAL at 05:07

## 2022-07-29 RX ADMIN — DEXAMETHASONE SODIUM PHOSPHATE 4 MG: 4 INJECTION INTRA-ARTICULAR; INTRALESIONAL; INTRAMUSCULAR; INTRAVENOUS; SOFT TISSUE at 07:07

## 2022-07-29 NOTE — TRANSFER OF CARE
"Anesthesia Transfer of Care Note    Patient: Regine sOorio    Procedure(s) Performed: Procedure(s) (LRB):  DISCECTOMY, SPINE, CERVICAL, ANTERIOR APPROACH, WITH FUSION C5-7 SPINEWAVE SNS: VOCAL CORDS/MOTORS/SSEP (N/A)    Transport from OR: Transported from OR intubated on 100% O2 by AMBU with adequate controlled ventilation          Last vitals:   Visit Vitals  BP (!) 141/68   Pulse 93   Temp 36.1 °C (97 °F) (Temporal)   Resp 20   Ht 5' 5" (1.651 m)   Wt 104.3 kg (230 lb)   SpO2 98%   Breastfeeding No   BMI 38.27 kg/m²     "

## 2022-07-29 NOTE — TRANSFER OF CARE
"Anesthesia Transfer of Care Note    Patient: Regine Osorio    Procedure(s) Performed: Procedure(s) (LRB):  DISCECTOMY, SPINE, CERVICAL, ANTERIOR APPROACH, WITH FUSION C5-7 SPINEWAVE SNS: VOCAL CORDS/MOTORS/SSEP (N/A)    Patient location: PACU    Anesthesia Type: general    Transport from OR: Transported from OR on 6-10 L/min O2 by face mask with adequate spontaneous ventilation    Post pain: adequate analgesia    Post assessment: no apparent anesthetic complications    Post vital signs: stable    Level of consciousness: awake    Nausea/Vomiting: no nausea/vomiting    Complications: none    Transfer of care protocol was followed      Last vitals:   Visit Vitals  BP (!) 141/68   Pulse 93   Temp 36.1 °C (97 °F) (Temporal)   Resp 20   Ht 5' 5" (1.651 m)   Wt 104.3 kg (230 lb)   SpO2 98%   Breastfeeding No   BMI 38.27 kg/m²     "

## 2022-07-29 NOTE — ANESTHESIA PREPROCEDURE EVALUATION
07/29/2022  Regine Osorio is a 67 y.o., female.  Patient Active Problem List   Diagnosis    History of breast cancer in female    Type 2 diabetes mellitus without complication, without long-term current use of insulin    Essential hypertension    S/P left mastectomy    Spinal stenosis of lumbar region with neurogenic claudication    Migraine without aura and without status migrainosus, not intractable    Thyroid nodule    Fibromyalgia    Microcytic anemia    Unsp symptoms and signs w cognitive functions and awareness    History of antineoplastic chemotherapy    Seborrheic dermatitis of scalp    Insomnia due to medical condition    Insomnia    Obstructive sleep apnea hypopnea, mild    Cervical stenosis of spine    Pain aggravated by activities of daily living    Fear of pain    Impaired functional mobility and activity tolerance    Impaired functional mobility, balance, gait, and endurance    Decreased range of motion of lumbar spine    Central sensitization to pain    Myelomalacia of cervical cord           Pre-op Assessment          Review of Systems      Physical Exam    Airway:  No airway management difficulties anticipated  Dental:No active dental issues noted  Chest/Lungs:  Clear to auscultation    Heart:  Rate: Normal  Rhythm: Regular Rhythm  Sounds: Normal        Anesthesia Plan  Type of Anesthesia, risks & benefits discussed:    Anesthesia Type: Gen ETT  Intra-op Monitoring Plan: Art Line  Informed Consent: Informed consent signed with the Patient and all parties understand the risks and agree with anesthesia plan.  All questions answered.   ASA Score: 3  Anesthesia Plan Notes: Chart reviewed. Patient seen and examined. Anesthesia plan discussed and questions answered. E-consent signed. Asim Styles MD    Ready For Surgery From Anesthesia Perspective.     .

## 2022-07-29 NOTE — ANESTHESIA PROCEDURE NOTES
Intubation    Date/Time: 7/29/2022 7:12 AM  Performed by: Christie Peraza  Authorized by: Asim Styles MD     Intubation:     Induction:  Intravenous    Intubated:  Postinduction    Mask Ventilation:  Easy mask    Attempts:  1    Attempted By:  Student    Method of Intubation:  Video laryngoscopy    Blade:  Jacobsen 3    Laryngeal View Grade: Grade IIA - cords partially seen      Difficult Airway Encountered?: No      Complications:  None    Airway Device:  Oral endotracheal tube    Airway Device Size:  7.0    Style/Cuff Inflation:  Cuffed    Inflation Amount (mL):  8    Tube secured:  21    Secured at:  The lips    Placement Verified By:  Capnometry    Complicating Factors:  Obesity and poor neck/head extension    Findings Post-Intubation:  BS equal bilateral and atraumatic/condition of teeth unchanged

## 2022-07-29 NOTE — PLAN OF CARE
[facility] - [facility]  Discharge Assessment    Primary Care Provider: No primary care provider on file.     Discharge Assessment (most recent)     BRIEF DISCHARGE ASSESSMENT - 07/29/22 8315        Discharge Planning    Assessment Type Discharge Planning Brief Assessment     Resource/Environmental Concerns none     Support Systems Family members     Equipment Currently Used at Home cane, straight     Current Living Arrangements home/apartment/condo     Patient/Family Anticipates Transition to home     Patient/Family Anticipated Services at Transition home health care     DME Needed Upon Discharge  none     Discharge Plan A Home;Home Health     Discharge Plan B Skilled Nursing Facility                   Spoke with patient at bedside to complete d/c planning assessment. Patient lives alone in a single story home with no steps to enter. Independent with ADL'S and has a cane at home if needed. Verified PCP, Pharmacy and Health insurance. Patient reports her cousin Rissa will give her a ride home at d/c and check on her but she will have no one staying with her at home once she is discharged. Is agreeable to  care or Skilled Nursing if recommended by PT/OT. Therapy to eval in am. Will continue to follow for needs.

## 2022-07-29 NOTE — H&P
"PATIENT: Regine Osorio     Attending Physician: Rosales Scruggs M.D.     HISTORY:  Regine Osorio is a 67 y.o. female who returns to me today for CT review. She continues to have neck pain that radiates to R shoulder, but it has been getting better with functional restoration program. She has R hand weakness. She is here for preop, and she needs to arrange her social situation in order to figure out a date.     PMH/PSH/FamHx/SocHx:  Unchanged from prior visit     ROS:  Positive for neck and R shoulder pain  Endorses myelopathic symptoms and trouble with balance/walking. Denied perineal paresthesias, bowel or bladder incontinence     EXAM:  Ht 5' 5" (1.651 m)   Wt 103 kg (227 lb)   BMI 37.77 kg/m²      My physical examination was notable for the following findings: Normal strength and tone in all major motor groups in the bilateral upper and lower extremities except R hand  strength and 4/5 in R elbow extension and 4/5wrist flexion/extension. Normal sensation to light touch in the C5-T1 and L2-S1 dermatomes bilaterally. + b/l Peoples's (R>L)     IMAGING:  Today I independently reviewed the following images and my interpretations are as follows:     Previous AP and Lat upright C-spine films showed cervical spondylosis and DDD without fractures or listhesis.     MRI cervical showed C5-6 severe central and b/l foraminal stenosis. C6-7 moderate central stenosis. Myelomalacia C4-7.     CT cervical showed no OPLL. Spondylosis worst at C5-7.     ASSESSMENT/PLAN:  Patient has cervical stenosis with myelopathy and radiculopathy. I discussed the natural history of their diagnoses as well as surgical and nonsurgical treatment options. I educated the patient on the importance of core/back strengthening, correct posture, bending/lifting ergonomics, and low-impact aerobic exercises (walking, elliptical, and aquatherapy). We will plan C5-7 ACDF. She will call us back with a date.     I had a sit down discussion with the " patient regarding cervical myelopathy. I discussed the known stepwise degeneration of cervical myelopathy. I discussed the risks and benefits of decompressive surgery, including the concept that surgery would be done to prevent further neurological injury/degeneration rather than to ameliorate any existing deficits.      I had a sit down discussion with the patient regarding C5-7 ACDF. We specifically discussed the risks, benefits, and alternatives to surgery. We discussed the surgical procedure including the skin incision, nerve decompression, bone fusion, allograft and/or iliac crest bone graft, and surgical implants including plates and interbody spacers as indicated: they understand the risks include but are not limited to death, paralysis, blindness, bleeding, infection, damage to arteries, veins and nerves, tracheal injury, esophageal injury, vertebral artery injury, dysphagia, spinal fluid leak, continued or worsening pain, no improvement in symptoms, non-union, and the possible need for more surgery in the future, as well as the possibility other unforseen and unknown complications. We talked about expected hospital stay and recovery period. All questions were answered; they understand and wish to proceed.

## 2022-07-29 NOTE — NURSING TRANSFER
Nursing Transfer Note      7/29/2022     Transfer To: 502    Transfer via stretcher      Transported by transport    Medicines sent: in chart    Any special needs or follow-up needed: routine    Chart send with patient: Yes    Notified: friend and relative    Patient reassessed at: 1145    Unclear what the patients baseline strength was. Pt. Says she feels she is at baseline from pre op. Upon my assessment L foot weaker than R foot and r  weaker than L. (initially L upper extremity was weaker than right). I have called ortho and notified them.

## 2022-07-29 NOTE — PROGRESS NOTES
Nurses Note -- 4 Eyes      7/29/2022   12:30 PM      Skin assessed during: Admit      [x] No Pressure Injuries Present    []Prevention Measures Documented      [] Yes- Altered Skin Integrity Present or Discovered   [] LDA Added if Not in Epic (Describe Wound)   [] New Altered Skin Integrity was Present on Admit and Documented in LDA   [] Wound Image Taken    Wound Care Consulted? No    Attending Nurse:  Charli Valdez RN     Second RN/Staff Member:  Ivy Rosales RN

## 2022-07-29 NOTE — OP NOTE
DATE OF PROCEDURE: 7/29/2022.     SURGEON: Rosales Scruggs M.D.     FIRST ASSISTANT: Moises Haley MD, PGY-5 Orthopedics     PREOPERATIVE DIAGNOSIS: Cervical stenosis C5-7 with myeloradiculopathy.     POSTOPERATIVE DIAGNOSIS: same     PROCEDURES PERFORMED:  1. Anterior cervical diskectomy and instrumented spinal fusion, including decompression of neurological elements, C5-7.  2. Application of carbon fiber reinforced polyetheretherketone titanium intervertebral spacer to C5-7 disk space.  3. Anterior instrumentation, C5-7.  4. Cadaveric and local bone grafting.     ANESTHESIA: General endotracheal anesthesia.     ESTIMATED BLOOD LOSS: 25 mL.     IMPLANTS: SpineWave.  C5-6: 7mm height PEEK cage  C6-7: 7mm height PEEK cage  30mm 2-level plate  16-18mm screws     SPECIMENS: None.     FINDINGS: None.     DRAINS: One deep.     COMPLICATIONS: None.     SPONGE AND NEEDLE COUNT: Correct x2.     NEUROLOGICAL MONITORING: Motor evoked potentials, somatosensory evoked potential, free-running EMG, and vocal fold monitoring.  There were no changes to preincisional MEP and SSEP baselines.  There was no significant EMG activity.     REASON FOR OPERATION AND BRIEF HISTORY AND PHYSICAL: Regine Osorio is a 67 y.o. female with cervical C5-7 stensois with myeloradiculopathy. They have failed conservative management and are here for surgery today.      DESCRIPTION OF INFORMED CONSENT: Please see my last clinic note for a full description of the informed consent process that I had with the patient.     DESCRIPTION OF PROCEDURE: The patient was met in the preoperative area where their right-sided neck was marked as the operative site. Subsequently, they were brought to the Operating Room where they were placed under general endotracheal anesthesia. Sequential compression devices were placed in the patient's bilateral calves and run throughout the case. A Frias catheter was not placed. At this point, a shoulder roll was placed and the  patient's neck was gently hyperextended. The neck was stacked in multiple stack sheets as well as a gel roll. At this point, the patient's anterior cervical spine was prepped and draped in a normal sterile fashion.     A full timeout was then called, identifying the patient, the procedural site and levels, the availability of all instruments and implants, and no specific nursing, surgical, anesthetic, or neurological monitoring concerns.  All present were empowered to identify any concerns at any time. Finally, it was safe to proceed with surgery and the patient was given weight-appropriate dose of Ancef by the Anesthesia Service as well as 8 mg of Decadron.     I then made a transverse incision centered over the patient's anterior cervical spine and carried dissection down through skin and subcutaneous tissues using a combination of sharp dissection and electrocautery where necessary. The platysma was identified and transected in line with the skin incision and subplatysmal flaps were elevated. At this point, I performed a standard [place] approach tothe anterior cervical spine. Any large bridging veins or arteries were tied and ligated.  Small veins were coagulated with bipolar electrocautery. At this point, I  visualized the anterior cervical spine. Peanut dissection allowed me to visualize the vertebral body. I placed a needle in what I took to be the C5-6 disc space and took a lateral radiograph and thus confirmed my level. I then finalized my exposure. At the conclusion of my exposure, I could see from the inferior half of the C5 vertebrae to the superior half of the C7 vertebra  and the disk space from uncinate process to uncinate process.     I then performed a subtotal C5-6 diskectomy under loupe magnification using a disk knife as well as straight and angled curettes, Kerrison punches, and pituitary rongeurs.     Under microscopic visulalization I drilled down the posterior aspect of the disk space  with a high-speed drill to reveal the posterior longitudinal ligament. I used a 4-0 forward-angle curette to enter the median raphe of the ligament, followed by #1 and #2 Kerrison punches to resect the posterior longitudinal ligament. At the end of my neurological decompression, I could see dura from uncinate process to uncinate process. Epidural hemostasis was finalized with patties and FloSeal. The wound was then thoroughly irrigated. The disk space was sized for a size 7 spacer and this was filled with 1 mL of DBX putty as well as locally harvested bone and gently impacted into place.     I repeat the same process for C6-7 disc space. Finally, an anterior plate was then applied in the standard fashion, spanning C5-7.     AP and lateral radiographs were taken, demonstrating correct level as well as adequate positionof all implants. The wound was then thoroughly irrigated. The plate screw capture mechanism was then locked with the torque wrench. A deep 10-Arabic BENJAMIN drain was placed. The platysma was closed with 3-0 Vicryl, followed by 4-0 Monocryl for the skin. A 2-0 silk suture was used to secure down the drain. A soft dressing was placed. A cervical collar was placed. The patient was subsequently transferred to his hospital bed, awoken, and transferred to the Recovery Room in stable condition.    Rosales Scruggs MD  Orthopaedic Spine Surgeon  Department of Orthopaedic Surgery  [phone]

## 2022-07-29 NOTE — PATIENT INSTRUCTIONS
DR. ERIC CHAMPAGNE'S POSTOPERATIVE INSTRUCTIONS -   ACDF      Antibiotics: You do not need additional antibiotics at home.    NSAIDs: Please refrain from taking ibuprofen (Advil), naproxen (Aleve), and other non steroidal anti-inflammatory medications other than the Celebrex that will be prescribed to you after surgery.    Wound Care: You may remove your dressing and shower 7 days after surgery. Until then please keep your wound clean and dry. Sponge baths are acceptable. Do not go in a pool or hot tub until seen in clinic. Please leave the small steri-strips covering your wound in place until they fall off naturally (2 weeks). You may notice clear suture ends hanging from the sides of your incision after the steri-strips are removed, it is ok to clip these with scissors.    Cervical Collar: If given a collar after surgery you should wear it at all times. The only times it may be removed are for sleeping, eating and showering.    Pain: We will use a multimodal approach for pain management after your surgery.  You will be given a prescription for pain medicine when you are discharged from the hospital.  You will also be given prescriptions for Robaxin (a muscle relaxer), Gabapentin, Celebrex and Tylenol.  Please note: you will only be given ONE prescription for narcotics when you are discharged from the hospital.  This medication is for breakthrough pain only. This medication will not be refilled.  The other medications given to you may be refilled if needed.      Difficulty Breathing: This is uncommon after surgery and may represent dangerous swelling in your neck. If you experience difficulty breathing please call 911 immediately.    Infection: Signs of infection include increasing wound drainage and redness around the wound, as well as a temperature over 101.5 degrees. It is unnecessary to take your temperature on a routine basis. Please call the above number if you are concerned about an infection.    Driving and  Work: It is ok to return to driving and work as long as you are not taking narcotic pain medications or wearing your cervical collar. Please do not lift over 5 pounds or participate in exercise or sports until cleared by Dr. Scruggs.    Deep Venous Thrombosis (Blood Clots): Symptoms include swelling in the legs and shortness of breath. Please call the office or proceed to the nearest emergency room if you have any of these symptoms.    Physical Therapy: The best physical therapy immediately after surgery is walking. Please try to walk as much as possible.    Follow-up: You will be scheduled for a follow-up appointment in 4 weeks with either Dr. Scruggs or his physician assistant, Fauzia Vidales PA-C.    Questions: During business hours please call [phone] for routine questions. For after hours questions please call [phone] and ask to speak with the Orthopaedic resident on call.    Disability: If you submitted short term disability paperwork for us to complete and would like to check the status, please call the Disability Department at [phone].  You may fax any necessary paperwork to [phone].

## 2022-07-29 NOTE — ANESTHESIA POSTPROCEDURE EVALUATION
Anesthesia Post Evaluation    Patient: Regine Osorio    Procedure(s) Performed: Procedure(s) (LRB):  DISCECTOMY, SPINE, CERVICAL, ANTERIOR APPROACH, WITH FUSION C5-7 SPINEWAVE SNS: VOCAL CORDS/MOTORS/SSEP (N/A)    Final Anesthesia Type: general      Level of consciousness: awake and alert  Post-procedure vital signs: reviewed and stable  Pain control: Pain has been treated.  Airway patency: patent    PONV status: Absent or treated.  Anesthetic complications: no      Cardiovascular status: hemodynamically stable  Respiratory status: unassisted  Hydration status: euvolemic            Vitals Value Taken Time   /72 07/29/22 1245   Temp 36.6 °C (97.8 °F) 07/29/22 1245   Pulse 94 07/29/22 1245   Resp 18 07/29/22 1245   SpO2 96 % 07/29/22 1245         Event Time   Out of Recovery 11:50:00         Pain/Pan Score: Pain Rating Prior to Med Admin: 0 (7/29/2022 12:53 PM)  Pan Score: 10 (7/29/2022 11:50 AM)

## 2022-07-30 LAB
POCT GLUCOSE: 132 MG/DL (ref 70–110)
POCT GLUCOSE: 143 MG/DL (ref 70–110)
POCT GLUCOSE: 168 MG/DL (ref 70–110)
POCT GLUCOSE: 169 MG/DL (ref 70–110)
POCT GLUCOSE: 176 MG/DL (ref 70–110)

## 2022-07-30 PROCEDURE — 97116 GAIT TRAINING THERAPY: CPT

## 2022-07-30 PROCEDURE — 97161 PT EVAL LOW COMPLEX 20 MIN: CPT

## 2022-07-30 PROCEDURE — 25000003 PHARM REV CODE 250: Performed by: STUDENT IN AN ORGANIZED HEALTH CARE EDUCATION/TRAINING PROGRAM

## 2022-07-30 PROCEDURE — 97535 SELF CARE MNGMENT TRAINING: CPT

## 2022-07-30 PROCEDURE — 97530 THERAPEUTIC ACTIVITIES: CPT

## 2022-07-30 PROCEDURE — 97165 OT EVAL LOW COMPLEX 30 MIN: CPT

## 2022-07-30 RX ADMIN — METHOCARBAMOL 750 MG: 750 TABLET ORAL at 08:07

## 2022-07-30 RX ADMIN — LOSARTAN POTASSIUM 50 MG: 50 TABLET, FILM COATED ORAL at 08:07

## 2022-07-30 RX ADMIN — ACETAMINOPHEN 650 MG: 325 TABLET ORAL at 07:07

## 2022-07-30 RX ADMIN — OXYCODONE HYDROCHLORIDE 10 MG: 10 TABLET ORAL at 12:07

## 2022-07-30 RX ADMIN — GABAPENTIN 100 MG: 100 CAPSULE ORAL at 08:07

## 2022-07-30 RX ADMIN — CELECOXIB 200 MG: 200 CAPSULE ORAL at 08:07

## 2022-07-30 RX ADMIN — INSULIN ASPART 1 UNITS: 100 INJECTION, SOLUTION INTRAVENOUS; SUBCUTANEOUS at 08:07

## 2022-07-30 RX ADMIN — OXYCODONE 5 MG: 5 TABLET ORAL at 07:07

## 2022-07-30 RX ADMIN — INSULIN ASPART 2 UNITS: 100 INJECTION, SOLUTION INTRAVENOUS; SUBCUTANEOUS at 05:07

## 2022-07-30 RX ADMIN — Medication 2 G: at 08:07

## 2022-07-30 RX ADMIN — GABAPENTIN 100 MG: 100 CAPSULE ORAL at 02:07

## 2022-07-30 RX ADMIN — METHOCARBAMOL 750 MG: 750 TABLET ORAL at 12:07

## 2022-07-30 RX ADMIN — OXYCODONE 5 MG: 5 TABLET ORAL at 05:07

## 2022-07-30 RX ADMIN — HYDROCHLOROTHIAZIDE 25 MG: 25 TABLET ORAL at 08:07

## 2022-07-30 RX ADMIN — OXYCODONE HYDROCHLORIDE 10 MG: 10 TABLET ORAL at 03:07

## 2022-07-30 RX ADMIN — ACETAMINOPHEN 650 MG: 325 TABLET ORAL at 08:07

## 2022-07-30 RX ADMIN — METHOCARBAMOL 750 MG: 750 TABLET ORAL at 05:07

## 2022-07-30 NOTE — PLAN OF CARE
Evaluation completed, plan of care established.     Problem: Occupational Therapy  Goal: Occupational Therapy Goal  Description: Goals to be met by: 8/7/22     Patient will increase functional independence with ADLs by performing:    UE Dressing with Modified [place].  LE Dressing with Modified [place].  Grooming while standing at sink with Modified [place].  Toileting from toilet with Modified [place] for hygiene and clothing management.   Supine to sit with Modified [place].  Step transfer with Modified [place]  Toilet transfer to toilet with Modified [place].    Outcome: Ongoing, Progressing

## 2022-07-30 NOTE — ASSESSMENT & PLAN NOTE
Regine Osorio is a 67 y.o. female s/p C5-7 ACDF 7/29/22  -- Doing well   -- C-collar at all times   -- Drain with 7cc out   -- Hold anticoag   -- Diet Reg  -- PT daily WBAT    Dispo: Possible discharge later today

## 2022-07-30 NOTE — PLAN OF CARE
Problem: Physical Therapy  Goal: Physical Therapy Goal  Description: Goals to be met by: 2022     Patient will increase functional independence with mobility by performin. Supine to sit with Modified [place] while maintaining cervical spine precautions  2. Sit to supine with Modified [place] while maintaining cervical spine precautions  3. Rolling to Left and Right with Modified [place].  4. Sit to stand transfer with Modified [place] with rolling walker  5. Bed to chair transfer with Modified [place] using Rolling Walker  6. Gait  x 400 feet with Modified [place] using Rolling Walker.   7. Patient verbalizes appropriate 3 out 3 spinal precautions correctly    Outcome: Ongoing, Progressing     PT evaluated patient and established goals for patient today.

## 2022-07-30 NOTE — PT/OT/SLP EVAL
Physical Therapy Co-Evaluation & Treatment with Occupational Therapy    Patient Name:  Regine Osorio   MRN:  [id number]  Admit Date: 7/29/2022  Admitting Diagnosis:  Cervical spondylosis  Recent Surgery: Procedure(s) (LRB):  DISCECTOMY, SPINE, CERVICAL, ANTERIOR APPROACH, WITH FUSION C5-7 SPINEWAVE SNS: VOCAL CORDS/MOTORS/SSEP (N/A) 1 Day Post-Op  Length of Stay: 0 days    Recommendations:     Discharge Recommendations:  home health PT, home health OT   Discharge Equipment Recommendations: bath bench, walker, rolling   Barriers to discharge: decreased caregiver support (patient lives home alone) and fall risk    Assessment:     Regine Osorio is a 67 y.o. female [place] [facility] [place]22 with a medical diagnosis of Cervical spondylosis. Patient is POD 1 s/p C5-7 ACDF. She has orders to wear cervical collar at all times. Today, patient verbalized understanding of spinal precautions with minimal verbal cues. She required minimal assistance for bed mobility and contact guard assistance with transfers. She was unsteady when ambulating in the room without assistive device and required minimal assistance to maintain balance. She ambulated ~ 300 ft in the hallway with rolling walker and contact guard assistance. She presents with the following impairments/functional limitations: weakness, impaired functional mobility, gait instability, impaired balance, orthopedic precautions. She is planning to discharge home alone.  Patient will benefit from skilled acute physical therapy services to address the mentioned deficits in order to increase safety and independence with functional mobility. PT recommending that patient use rolling walker to improve safety with ambulation. As of today, patient is not safe to discharge home alone due to the assistance level she needs to maintain balance when ambulating.    Rehab Prognosis: Good     Plan:     During this hospitalization, patient to be seen daily to address the identified  "rehab impairments via gait training, therapeutic activities, therapeutic exercises, neuromuscular re-education and progress towards the established goals.    · Plan of Care Expires:  08/29/22    Social History   Living Environment:   Pt lives alone    Pt lives in a single story home with 0 steps to enter      Pt has a tub/shower combo and walk-in shower. She usually sits low in the tub    Prior Level of Function:    Patient ambulated in house independently. She would use single point cane in right hand to ambulate distances > 1 block    DME used: cane, straight   Patient reports 0 falls.      Roles/Repsonsibilities:    Works: no.    Drives: yes.    Managing Medicines/Managing Home: yes.    Right handed    Upon discharge, patient will have limited assistance from: friends and family     Subjective   Communicated with RN prior to session.  Patient found resting with HOB elevated with cervical collar upon PT entry to room.  Patient is alone during session.  Pt is agreeable to evaluation.     Additional staff present:OT  OT for co-evaluation due to patient's medical complexities requiring two skilled therapists in order to appropriately assess patient's functional deficits as well as ensure patient safety, accommodate for limited activity tolerance, and provide appropriate, skilled assistance to maximize functional potential during evaluation.    Pt's subjective: "I have been to the bedside commode."    Pain/Comfort:  · Pain Rating 1: 4/10  · Location - Side 1: Bilateral  · Location 1: neck (radiating to shoulders and arms)  · Pain Addressed 1: Reposition, Pre-medicate for activity, Distraction  · Pain Rating Post-Intervention 1: 4/10      Objective:   General Precautions: Standard, fall   Orthopedic Precautions:spinal precautions   Braces: Aspen collar at all times  Oxygen Device: Room Air  Vitals: BP (!) 117/57 (BP Location: Right arm, Patient Position: Sitting)   Pulse 72   Temp 97 °F (36.1 °C) (Oral)   " "Resp 16   Ht 5' 5" (1.651 m)   Wt 122.7 kg (270 lb 8.1 oz)   SpO2 98%   Breastfeeding No   BMI 45.01 kg/m²     Exams:  · Cognition:   · Alert and Cooperative  · AxOx4  · Command following: Follows multistep  commands  · Fluency: clear/fluent  · Skin Integrity: Intact dressing present right anterior neck with drain intact  · Edema: None noted   · Vision:  wears glasses   · Coordination:  · Finger to Nose: (dysmetria): WFL  · Range of Motion:  · RLE: WFL  · LLE: WFL  · Strength Exam:  · RLE Strength:   · Hip Flexion: 5/5  · Knee Flexion: 5/5  · Knee Extension: 5/5  · Ankle Dorsiflexion: 5/5  · Ankle Plantarflexion: 5/5  · LLE Strength:   · Hip Flexion: 5/5  · Knee Flexion: 5/5  · Knee Extension: 5/5  · Ankle Dorsiflexion: 5/5  · Ankle Plantarflexion: 5/5    Outcome Measures:   AM-PAC 6 CLICK MOBILITY:    Turning over in bed (including adjusting bedclothes, sheets and blankets)?: 3   Sitting down on and standing up from a chair with arms (e.g., wheelchair, bedside commode, etc.): 3   Moving from lying on back to sitting on the side of the bed?: 3   Moving to and from a bed to a chair (including a wheelchair)?: 3   Need to walk in hospital room?: 3   Climbing 3-5 steps with a railing?: 3   Basic Mobility Total Score: 18     Functional Mobility:    Bed Mobility with HOB at 20 degrees without bedrails:     Rolling Right: minimum assistance    Right side lying to sit: minimum assistance for trunk management with verbal cues to maintain spinal precautions    Transfers:     Sit to Stand:  contact guard assistance    Stand to Sit: stand by assistance  with rolling walker with cues for hand placement and controlled descent    Gait:    Patient ambulates 20 ft on indoor, even surfaces without assistive device and minimal assistance. Patient requires hand-held assistance to steady her gait. She has 1 mild loss of balance and reaches right hand for wall. She demos increased lateral sway and decreased step length. " "     Gait Training:    PT educates patient on the proper use of rolling walker and proper positioning within rolling walker.     Patient ambulates ~ 300 ft on indoor, even surface with rolling walker and contact guard assistance. Patient demos decreased dilip, occasional unsteady gait, decreased step length, and mild lateral sway.     Therapeutic Activities and Patient Education:   Patient educated on PT POC and role of PT in acute care.   Patient educated on the importance of early mobility to prevent functional decline during hospital stay   Patient was instructed to utilize staff assistance for mobility/transfers.   Patient is appropriate to transfer and ambulate with rolling walker and contact guard assistance with RN/PCT   Patient educated on spinal precautions: "No bending, no lifting > 5 lbs, and no twisting."   Patient educated on log rolling technique to maintain spinal precautions with bed mobility.   Patient educated on home safety    Patient educated on donning, doffing, and cleaning Aspen collar.    White board updated to include patient's safest level of mobility with staff assistance   Patient educated on assistive device use, bed mobility training, Fall risk, gait training, home safety, plan of care, spinal surgery precautions and transfer training by explanation and teach back.  Patient was receptive to education and needs reinforcement.       Patient left sitting in chair with all lines intact, call button in reach and RN notified.      GOALS:   Multidisciplinary Problems     Physical Therapy Goals        Problem: Physical Therapy    Goal Priority Disciplines Outcome Goal Variances Interventions   Physical Therapy Goal     PT, PT/OT Ongoing, Progressing     Description: Goals to be met by: 2022     Patient will increase functional independence with mobility by performin. Supine to sit with Modified [place] while maintaining cervical spine precautions  2. Sit to supine " with Modified [place] while maintaining cervical spine precautions  3. Rolling to Left and Right with Modified [place].  4. Sit to stand transfer with Modified [place] with rolling walker  5. Bed to chair transfer with Modified [place] using Rolling Walker  6. Gait  x 400 feet with Modified [place] using Rolling Walker.   7. Patient verbalizes appropriate 3 out 3 spinal precautions correctly                     History:     Past Medical History:   Diagnosis Date    Allergy     Breast cancer 2017    Cataract     Cervical spondylosis 7/29/2022    Diabetes mellitus, type 2     Type 2    Eczema     Fibromyalgia     Glaucoma     Hypertension     Renal cyst, right 02/06/2020    Steatosis of liver 02/06/2020       Past Surgical History:   Procedure Laterality Date    ADENOIDECTOMY      BREAST BIOPSY      BREAST SURGERY      breast ca lt side     HYSTERECTOMY      supacervical hysterectomy    LAPAROSCOPIC SUPRACERVICAL HYSTERECTOMY  2005    fibroids    MASTECTOMY Left 2017    chemo    MYOMECTOMY      TONSILLECTOMY      TYMPANOSTOMY TUBE PLACEMENT      VAGINAL DELIVERY         Time Tracking:     PT Received On: 07/30/22  PT Start Time: 0944     PT Stop Time: 1018  PT Total Time (min): 34 min     Billable Minutes: Evaluation 1 procedure, Gait Training 10 mins and Therapeutic Activity 13 mins

## 2022-07-30 NOTE — PROGRESS NOTES
[facility]  Orthopedics  Progress Note    Attg Note:  I agree with the resident's assessment and plan.    Jimmy Alexandre MD      Patient Name: Regine Osorio  MRN: [id number]  Admission Date: 7/29/2022  Hospital Length of Stay: 0 days  Attending Provider: Rosales Scruggs MD  Primary Care Provider: No primary care provider on file.  Follow-up For: Procedure(s) (LRB):  DISCECTOMY, SPINE, CERVICAL, ANTERIOR APPROACH, WITH FUSION C5-7 SPINEWAVE SNS: VOCAL CORDS/MOTORS/SSEP (N/A)    Post-Operative Day: 1 Day Post-Op  Subjective:     Principal Problem:Cervical spondylosis    Principal Orthopedic Problem: Same    Interval History: NAEON, patient stable, pain controlled. No acute complaints this morning.   Drain with 7cc recorded as output   C-collar in place    Review of patient's allergies indicates:   Allergen Reactions    Codeine Shortness Of Breath     Pt states she had a reaction as a teenager and was taken to the ER.    Lisinopril      cough       Current Facility-Administered Medications   Medication    acetaminophen tablet 650 mg    acetaminophen tablet 650 mg    ceFAZolin 2 gram in dextrose 5% 100 mL IVPB (premix)    celecoxib capsule 200 mg    dextrose 10% bolus 125 mL    dextrose 10% bolus 250 mL    gabapentin capsule 100 mg    glucagon (human recombinant) injection 1 mg    glucose chewable tablet 16 g    glucose chewable tablet 24 g    hydroCHLOROthiazide tablet 25 mg    HYDROmorphone injection 1 mg    insulin aspart U-100 pen 1-10 Units    LIDOcaine (PF) 10 mg/ml (1%) injection 10 mg    losartan tablet 50 mg    melatonin tablet 6 mg    methocarbamoL tablet 750 mg    ondansetron disintegrating tablet 8 mg    ondansetron disintegrating tablet 8 mg    oxyCODONE immediate release tablet 5 mg    oxyCODONE immediate release tablet Tab 10 mg    sodium chloride 0.9% flush 10 mL     Objective:     Vital Signs (Most Recent):  Temp: 98.1 °F (36.7 °C) (07/30/22 0522)  Pulse: 73 (07/30/22 0522)  Resp: 16 (07/30/22  "0522)  BP: (!) 114/53 (07/30/22 0522)  SpO2: 98 % (07/30/22 0522)   Vital Signs (24h Range):  Temp:  [97 °F (36.1 °C)-98.3 °F (36.8 °C)] 98.1 °F (36.7 °C)  Pulse:  [] 73  Resp:  [14-36] 16  SpO2:  [95 %-98 %] 98 %  BP: (108-172)/(53-80) 114/53     Weight: 122.7 kg (270 lb 8.1 oz)  Height: 5' 5" (165.1 cm)  Body mass index is 45.01 kg/m².      Intake/Output Summary (Last 24 hours) at 7/30/2022 0717  Last data filed at 7/30/2022 0658  Gross per 24 hour   Intake 1940 ml   Output 509 ml   Net 1431 ml       Ortho/SPM Exam  Physical examination  General appearance :WDWN, normal body habitus, well groomed adult   Constitutional: Cooperative in NAD  Cardiovascular: WWP extremities, no clubbing or cyanosis, radial/DP/T pulses 2+, no edema.  Musculoskeletal:  Full A/P ROM without deformity   Skin: No obvious lesions, abrasions, or rashes   Neurologic: Sensation is intact to light touch in the extremity. The patient has good coordination without hyperreflexia and is alert and oriented to person, place and time and has normal mood and affect  Dressings CDI  Drain in place      Significant Labs: All pertinent labs within the past 24 hours have been reviewed.    Significant Imaging: I have reviewed all pertinent imaging results/findings.    Assessment/Plan:     * Cervical spondylosis  Regine Osorio is a 67 y.o. female s/p C5-7 ACDF 7/29/22  -- Doing well   -- C-collar at all times   -- Drain with 7cc out   -- Hold anticoag   -- Diet Reg  -- PT daily WBAT    Dispo: Possible discharge later today           Gurmeet Jay MD  Orthopedics  [facility] - Surgery  "

## 2022-07-30 NOTE — PT/OT/SLP EVAL
Occupational Therapy   Evaluation    Name: Regine Osorio  MRN: [id number]  Admitting Diagnosis:  Cervical spondylosis  Recent Surgery: Procedure(s) (LRB):  DISCECTOMY, SPINE, CERVICAL, ANTERIOR APPROACH, WITH FUSION C5-7 SPINEWAVE SNS: VOCAL CORDS/MOTORS/SSEP (N/A) 1 Day Post-Op    Recommendations:     Discharge Recommendations: home health PT, home health OT  Discharge Equipment Recommendations:  bath bench, walker, rolling  Barriers to discharge:  Decreased caregiver support    Assessment:     Regine Osorio is a 67 y.o. female with a medical diagnosis of Cervical spondylosis.  She presents with the following performance deficits affecting function: weakness, impaired self care skills, impaired functional mobility, gait instability, impaired balance, orthopedic precautions, impaired fine motor, decreased upper extremity function, impaired coordination.      Patient participated well with therapy, bed mobility with min A, transfers with min A without AD and CGA with rolling walker, ~300 ft with CGA with RW in hallway, episode of unsteadiness and re-education required for precautions for re-call and adherence; will benefit from therapy during this admission prior to returning home safely; OT POC initiated.    Rehab Prognosis: Good; patient would benefit from acute skilled OT services to address these deficits and reach maximum level of function.       Plan:     Patient to be seen 5 x/week to address the above listed problems via self-care/home management, therapeutic activities, therapeutic exercises  · Plan of Care Expires: 08/07/22  · Plan of Care Reviewed with: patient    Subjective     Chief Complaint: Some radiating pain to shoulders  Patient/Family Comments/goals: Return home     Occupational Profile:  Living Environment: Pt lives alone, SSH with 0STE, t/s combo, elevated toilet seat  Previous level of function: Mod I with use of SPC as needed for distance; reports   Roles and Routines: None  stated  Equipment Used at Home:  cane, straight  Assistance upon Discharge: Patient reports friends can be available as needed for transportation/errands    Pain/Comfort:  · Pain Rating 1: 4/10  · Location - Side 1: Bilateral  · Location 1: neck  · Pain Addressed 1: Pre-medicate for activity, Distraction, Reposition  · Pain Rating Post-Intervention 1: 4/10  · Pain Rating 2: 4/10  · Location - Side 2: Bilateral  · Location - Orientation 2: generalized  · Location 2: shoulder  · Pain Addressed 2: Pre-medicate for activity, Reposition, Distraction  · Pain Rating Post-Intervention 2: 4/10    Patients cultural, spiritual, Sabianist conflicts given the current situation: no    Objective:     Communicated with: Nursing prior to session.  Patient found HOB elevated with cervical collar upon OT entry to room.    General Precautions: Standard, fall   Orthopedic Precautions:spinal precautions   Braces: Aspen collar  Respiratory Status: Room air    Occupational Performance:    Bed Mobility:    · Patient completed Rolling/Turning to Right with minimum assistance  · Patient completed Scooting/Bridging with minimum assistance  · Patient completed Supine to Sit with minimum assistance    Functional Mobility/Transfers:  · Patient completed Sit <> Stand Transfer with contact guard assistance and minimum assistance  with  no assistive device and rolling walker   · Patient completed Bed <> Chair Transfer using Step Transfer technique with contact guard assistance with rolling walker  · Functional Mobility: CGA with rolling walker within room and into hallway    Activities of Daily Living:  · Grooming: stand by assistance seated edge of bed  · Upper Body Dressing: minimum assistance seated edge of bed to don gown behind back  · Lower Body Dressing: maximal assistance seated edge of bed to manage B socks    Cognitive/Visual Perceptual:  Cognitive/Psychosocial Skills:     -       Oriented to: Person, Place, Time and Situation   -        Follows Commands/attention:Follows multistep  commands  -       Safety awareness/insight to disability: intact   -       Mood/Affect/Coping skills/emotional control: Appropriate to situation    Physical Exam:  Postural examination/scapula alignment:    -       No postural abnormalities identified  Upper Extremity Range of Motion:     -       Right Upper Extremity: WFL  -       Left Upper Extremity: WFL  Upper Extremity Strength:    -       Right Upper Extremity: WFL  -       Left Upper Extremity: WFL   Strength:    -       Right Upper Extremity: WFL  -       Left Upper Extremity: WFL    AMPAC 6 Click ADL:  AMPAC Total Score: 18    Treatment & Education:  -Evaluation completed, plan of care established.  -Patient and family educated on roles/goals of OT and POC.  -White board updated.  -Therapist provided time for questions and answered within scope of practice.  -Patient educated on importance of EOB/OOB activity to maximize recovery.  Education:    Patient left up in chair with all lines intact, call button in reach and nursing notified    GOALS:   Multidisciplinary Problems     Occupational Therapy Goals        Problem: Occupational Therapy    Goal Priority Disciplines Outcome Interventions   Occupational Therapy Goal     OT, PT/OT Ongoing, Progressing    Description: Goals to be met by: 8/7/22     Patient will increase functional independence with ADLs by performing:    UE Dressing with Modified [place].  LE Dressing with Modified [place].  Grooming while standing at sink with Modified [place].  Toileting from toilet with Modified [place] for hygiene and clothing management.   Supine to sit with Modified [place].  Step transfer with Modified [place]  Toilet transfer to toilet with Modified [place].                     History:     Past Medical History:   Diagnosis Date    Allergy     Breast cancer 2017    Cataract     Cervical spondylosis 7/29/2022    Diabetes mellitus,  type 2     Type 2    Eczema     Fibromyalgia     Glaucoma     Hypertension     Renal cyst, right 02/06/2020    Steatosis of liver 02/06/2020       Past Surgical History:   Procedure Laterality Date    ADENOIDECTOMY      BREAST BIOPSY      BREAST SURGERY      breast ca lt side     HYSTERECTOMY      supacervical hysterectomy    LAPAROSCOPIC SUPRACERVICAL HYSTERECTOMY  2005    fibroids    MASTECTOMY Left 2017    chemo    MYOMECTOMY      TONSILLECTOMY      TYMPANOSTOMY TUBE PLACEMENT      VAGINAL DELIVERY         Time Tracking:     OT Date of Treatment: 07/30/22  OT Start Time: 0948  OT Stop Time: 1017  OT Total Time (min): 29 min    Billable Minutes:Evaluation 9  Self Care/Home Management 10  Therapeutic Activity 10    7/30/2022

## 2022-07-30 NOTE — SUBJECTIVE & OBJECTIVE
"Principal Problem:Cervical spondylosis    Principal Orthopedic Problem: Same    Interval History: VIKTORIA, patient stable, pain controlled. No acute complaints this morning.   Drain with 7cc recorded as output   C-collar in place    Review of patient's allergies indicates:   Allergen Reactions    Codeine Shortness Of Breath     Pt states she had a reaction as a teenager and was taken to the ER.    Lisinopril      cough       Current Facility-Administered Medications   Medication    acetaminophen tablet 650 mg    acetaminophen tablet 650 mg    ceFAZolin 2 gram in dextrose 5% 100 mL IVPB (premix)    celecoxib capsule 200 mg    dextrose 10% bolus 125 mL    dextrose 10% bolus 250 mL    gabapentin capsule 100 mg    glucagon (human recombinant) injection 1 mg    glucose chewable tablet 16 g    glucose chewable tablet 24 g    hydroCHLOROthiazide tablet 25 mg    HYDROmorphone injection 1 mg    insulin aspart U-100 pen 1-10 Units    LIDOcaine (PF) 10 mg/ml (1%) injection 10 mg    losartan tablet 50 mg    melatonin tablet 6 mg    methocarbamoL tablet 750 mg    ondansetron disintegrating tablet 8 mg    ondansetron disintegrating tablet 8 mg    oxyCODONE immediate release tablet 5 mg    oxyCODONE immediate release tablet Tab 10 mg    sodium chloride 0.9% flush 10 mL     Objective:     Vital Signs (Most Recent):  Temp: 98.1 °F (36.7 °C) (07/30/22 0522)  Pulse: 73 (07/30/22 0522)  Resp: 16 (07/30/22 0522)  BP: (!) 114/53 (07/30/22 0522)  SpO2: 98 % (07/30/22 0522)   Vital Signs (24h Range):  Temp:  [97 °F (36.1 °C)-98.3 °F (36.8 °C)] 98.1 °F (36.7 °C)  Pulse:  [] 73  Resp:  [14-36] 16  SpO2:  [95 %-98 %] 98 %  BP: (108-172)/(53-80) 114/53     Weight: 122.7 kg (270 lb 8.1 oz)  Height: 5' 5" (165.1 cm)  Body mass index is 45.01 kg/m².      Intake/Output Summary (Last 24 hours) at 7/30/2022 0717  Last data filed at 7/30/2022 0658  Gross per 24 hour   Intake 1940 ml   Output 509 ml   Net 1431 ml       Ortho/SPM Exam  Physical " examination  General appearance :WDWN, normal body habitus, well groomed adult   Constitutional: Cooperative in NAD  Cardiovascular: WWP extremities, no clubbing or cyanosis, radial/DP/T pulses 2+, no edema.  Musculoskeletal:  Full A/P ROM without deformity   Skin: No obvious lesions, abrasions, or rashes   Neurologic: Sensation is intact to light touch in the extremity. The patient has good coordination without hyperreflexia and is alert and oriented to person, place and time and has normal mood and affect  Dressings CDI  Drain in place      Significant Labs: All pertinent labs within the past 24 hours have been reviewed.    Significant Imaging: I have reviewed all pertinent imaging results/findings.

## 2022-07-31 LAB
POCT GLUCOSE: 157 MG/DL (ref 70–110)
POCT GLUCOSE: 177 MG/DL (ref 70–110)

## 2022-07-31 PROCEDURE — 93005 ELECTROCARDIOGRAM TRACING: CPT

## 2022-07-31 PROCEDURE — 25000003 PHARM REV CODE 250: Performed by: STUDENT IN AN ORGANIZED HEALTH CARE EDUCATION/TRAINING PROGRAM

## 2022-07-31 PROCEDURE — 97530 THERAPEUTIC ACTIVITIES: CPT

## 2022-07-31 PROCEDURE — 93010 EKG 12-LEAD: ICD-10-PCS | Mod: ,,, | Performed by: INTERNAL MEDICINE

## 2022-07-31 PROCEDURE — 93010 ELECTROCARDIOGRAM REPORT: CPT | Mod: ,,, | Performed by: INTERNAL MEDICINE

## 2022-07-31 PROCEDURE — 97116 GAIT TRAINING THERAPY: CPT

## 2022-07-31 RX ORDER — SUMATRIPTAN SUCCINATE 25 MG/1
25 TABLET ORAL
Status: DISCONTINUED | OUTPATIENT
Start: 2022-07-31 | End: 2022-08-01 | Stop reason: HOSPADM

## 2022-07-31 RX ADMIN — OXYCODONE 5 MG: 5 TABLET ORAL at 06:07

## 2022-07-31 RX ADMIN — GABAPENTIN 100 MG: 100 CAPSULE ORAL at 09:07

## 2022-07-31 RX ADMIN — METHOCARBAMOL 750 MG: 750 TABLET ORAL at 09:07

## 2022-07-31 RX ADMIN — ACETAMINOPHEN 650 MG: 325 TABLET ORAL at 01:07

## 2022-07-31 RX ADMIN — OXYCODONE HYDROCHLORIDE 10 MG: 10 TABLET ORAL at 01:07

## 2022-07-31 RX ADMIN — ONDANSETRON 8 MG: 8 TABLET, ORALLY DISINTEGRATING ORAL at 10:07

## 2022-07-31 RX ADMIN — ACETAMINOPHEN 650 MG: 325 TABLET ORAL at 06:07

## 2022-07-31 RX ADMIN — GABAPENTIN 100 MG: 100 CAPSULE ORAL at 04:07

## 2022-07-31 RX ADMIN — HYDROCHLOROTHIAZIDE 25 MG: 25 TABLET ORAL at 09:07

## 2022-07-31 RX ADMIN — METHOCARBAMOL 750 MG: 750 TABLET ORAL at 04:07

## 2022-07-31 RX ADMIN — METHOCARBAMOL 750 MG: 750 TABLET ORAL at 01:07

## 2022-07-31 RX ADMIN — OXYCODONE 5 MG: 5 TABLET ORAL at 09:07

## 2022-07-31 RX ADMIN — LOSARTAN POTASSIUM 50 MG: 50 TABLET, FILM COATED ORAL at 09:07

## 2022-07-31 RX ADMIN — SUMATRIPTAN SUCCINATE 25 MG: 25 TABLET ORAL at 11:07

## 2022-07-31 RX ADMIN — CELECOXIB 200 MG: 200 CAPSULE ORAL at 09:07

## 2022-07-31 NOTE — PROGRESS NOTES
[facility]  Orthopedics  Progress Note    Patient Name: Regine Osorio  MRN: [id number]  Admission Date: 7/29/2022  Hospital Length of Stay: 0 days  Attending Provider: Rosales Scruggs MD  Primary Care Provider: No primary care provider on file.  Follow-up For: Procedure(s) (LRB):  DISCECTOMY, SPINE, CERVICAL, ANTERIOR APPROACH, WITH FUSION C5-7 SPINEWAVE SNS: VOCAL CORDS/MOTORS/SSEP (N/A)    Post-Operative Day: 2 Days Post-Op  Subjective:     Principal Problem:Cervical spondylosis    Principal Orthopedic Problem: Same    Interval History: NAEON, patient stable, pain controlled. No acute complaints this morning.   C-collar in place   No drain output  Stayed inpatient yesterday for PT today    Review of patient's allergies indicates:   Allergen Reactions    Codeine Shortness Of Breath     Pt states she had a reaction as a teenager and was taken to the ER.    Lisinopril      cough       Current Facility-Administered Medications   Medication    acetaminophen tablet 650 mg    acetaminophen tablet 650 mg    celecoxib capsule 200 mg    dextrose 10% bolus 125 mL    dextrose 10% bolus 250 mL    gabapentin capsule 100 mg    glucagon (human recombinant) injection 1 mg    glucose chewable tablet 16 g    glucose chewable tablet 24 g    hydroCHLOROthiazide tablet 25 mg    HYDROmorphone injection 1 mg    insulin aspart U-100 pen 1-10 Units    LIDOcaine (PF) 10 mg/ml (1%) injection 10 mg    losartan tablet 50 mg    melatonin tablet 6 mg    methocarbamoL tablet 750 mg    ondansetron disintegrating tablet 8 mg    ondansetron disintegrating tablet 8 mg    oxyCODONE immediate release tablet 5 mg    oxyCODONE immediate release tablet Tab 10 mg    sodium chloride 0.9% flush 10 mL     Objective:     Vital Signs (Most Recent):  Temp: 98 °F (36.7 °C) (07/31/22 0404)  Pulse: 70 (07/31/22 0404)  Resp: 14 (07/31/22 0631)  BP: (!) 105/52 (07/31/22 0404)  SpO2: 96 % (07/31/22 0404)   Vital Signs (24h Range):  Temp:  " [97 °F (36.1 °C)-98.7 °F (37.1 °C)] 98 °F (36.7 °C)  Pulse:  [70-78] 70  Resp:  [14-18] 14  SpO2:  [94 %-100 %] 96 %  BP: (105-127)/(49-59) 105/52     Weight: 122.7 kg (270 lb 8.1 oz)  Height: 5' 5" (165.1 cm)  Body mass index is 45.01 kg/m².      Intake/Output Summary (Last 24 hours) at 7/31/2022 0759  Last data filed at 7/31/2022 0638  Gross per 24 hour   Intake --   Output 7 ml   Net -7 ml         Ortho/SPM Exam  Physical examination  General appearance :WDWN, normal body habitus, well groomed adult   Constitutional: Cooperative in NAD  Cardiovascular: WWP extremities, no clubbing or cyanosis, radial/DP/T pulses 2+, no edema.  Musculoskeletal:  Full A/P ROM without deformity   Skin: No obvious lesions, abrasions, or rashes   Neurologic: Sensation is intact to light touch in the extremity. The patient has good coordination without hyperreflexia and is alert and oriented to person, place and time and has normal mood and affect  Dressings CDI  Drain in place      Significant Labs: All pertinent labs within the past 24 hours have been reviewed.    Significant Imaging: I have reviewed all pertinent imaging results/findings.    Assessment/Plan:     * Cervical spondylosis  Regine Osorio is a 67 y.o. female s/p C5-7 ACDF 7/29/22  -- Doing well   -- C-collar at all times   -- Drain pull this AM, Xray after   -- Hold anticoag   -- Diet Reg  -- PT daily WBAT    Dispo: Discharge later today           Gurmeet Jay MD  Orthopedics  [facility] - Surgery  "

## 2022-07-31 NOTE — PT/OT/SLP PROGRESS
Physical Therapy Treatment    Patient Name:  Regine Osorio   MRN:  [id number]  Admit Date: 7/29/2022  Admitting Diagnosis:  Cervical spondylosis   Length of Stay: 0 days  Recent Surgery: Procedure(s) (LRB):  DISCECTOMY, SPINE, CERVICAL, ANTERIOR APPROACH, WITH FUSION C5-7 SPINEWAVE SNS: VOCAL CORDS/MOTORS/SSEP (N/A) 2 Days Post-Op    Recommendations:     Discharge Recommendations:  home health OT, home health PT   Discharge Equipment Recommendations: bath bench, walker, rolling   Barriers to discharge: Decreased caregiver support (patient lives alone)    Plan:     During this hospitalization, patient to be seen daily to address the listed problems via gait training, therapeutic activities, therapeutic exercises, neuromuscular re-education  · Plan of Care Expires:  08/29/22   Plan of Care Reviewed with: patient    Assessment:     Regine Osorio is a 67 y.o. female admitted with a medical diagnosis of Cervical spondylosis.  Patient is POD 2 s/p C5-7 ACDF. She has orders to wear cervical collar at all times. Today, patient recalled spinal precautions without assitance. She progressed to performing transfers with rolling walker and supervision. She ambulated 300 ft with rolling walker and close supervision. She verbalized understanding of home safety techniques after spinal surgery.  Pt is currently limited by weakness, impaired balance, pain, impaired self care skills, impaired functional mobility, gait instability, decreased upper extremity function, orthopedic precautions. Pt will continue to benefit from skilled PT services to address the above deficits in order to increase safety and independence with functional mobility. PT recommending that patient discharge home with supervision, home health PT services, rolling walker, and bath bench.     Rehab Prognosis: Good     GOALS:   Multidisciplinary Problems     Physical Therapy Goals        Problem: Physical Therapy    Goal Priority Disciplines Outcome Goal  "Variances Interventions   Physical Therapy Goal     PT, PT/OT Ongoing, Progressing     Description: Goals to be met by: 2022     Patient will increase functional independence with mobility by performin. Supine to sit with Modified [place] while maintaining cervical spine precautions  2. Sit to supine with Modified [place] while maintaining cervical spine precautions  3. Rolling to Left and Right with Modified [place].  4. Sit to stand transfer with Modified [place] with rolling walker  5. Bed to chair transfer with Modified [place] using Rolling Walker  6. Gait  x 400 feet with Modified [place] using Rolling Walker.   7. Patient verbalizes appropriate 3 out 3 spinal precautions correctly - Met on 2022                       Subjective   Communicated with RN prior to session.  Patient found resting in recliner chair with Aspen collar donned and BENJAMIN drain intact upon PT entry to room. Patient is agreeable to treatment session.  Patient is alone during session.       Patient's subjective: "Pain was not good this morning." Patient reports that her neck, back, and shoulder pain were about 5/10 this morning before she took her pain medication around 4 AM. She reported that she felt more comfortable sleeping in recliner chair than the bed. She states that she plans to sleep in her electric recliner chair at home. She reports mild chest pain, which she contributes to coughing and choking after trying to eat. She states that her has talked to MD about coughing after drinking and eating. She states that her cousin is planning to take her home at discharge.     Pain/Comfort:  · Pain Rating 1: 3/10  · Location - Side 1: Bilateral  · Location 1: neck (radiating to back and arms)  · Pain Addressed 1: Pre-medicate for activity, Reposition, Distraction  · Pain Rating Post-Intervention 1: 3/10    Objective:   Patient found with: cervical collar, BENJAMIN drain   General Precautions: Standard, " "fall   Orthopedic Precautions:spinal precautions   Braces: Aspen collar   Oxygen Device: Room Air  Vitals: BP (!) 105/52   Pulse 70   Temp 98 °F (36.7 °C)   Resp 14   Ht 5' 5" (1.651 m)   Wt 122.7 kg (270 lb 8.1 oz)   SpO2 96%   Breastfeeding No   BMI 45.01 kg/m²     Outcome Measures:  AM-PAC 6 CLICK MOBILITY  Turning over in bed (including adjusting bedclothes, sheets and blankets)?: 3  Sitting down on and standing up from a chair with arms (e.g., wheelchair, bedside commode, etc.): 3  Moving from lying on back to sitting on the side of the bed?: 3  Moving to and from a bed to a chair (including a wheelchair)?: 3  Need to walk in hospital room?: 3  Climbing 3-5 steps with a railing?: 3  Basic Mobility Total Score: 18     Cognition:    Alert and Cooperative   Command following: Follows multistep  commands   Fluency: clear/fluent    Functional Mobility:    Bed Mobility:    · Seated in chair at start of session and returned to chair    Transfers:     Sit to Stand:  supervision  with rolling walker with cues for hand placement   Stand to Sit: supervision  with rolling walker with cues for hand placement and foot placement   Toilet Transfer:  supervision  with rolling walker using Step Transfer    Gait:   · Patient ambulates 15 ft, 15 ft, and 300 ft    · Patient uses:  rolling walker   · Patient requires: close supervision  · Gait Deviation(s): decreased dilip  · Impairments due to: impaired balance  · Comments: PT provides verbal cues on forward gaze and upright posture     Therapeutic Activities:   Patient performs perineal hygiene without assistance.      Patient Education:    Patient was instructed to utilize staff assistance for mobility/transfers.   Patient is appropriate to transfer and ambulate with rolling walker and close supervision with RN/PCT   Patient recalled spinal precautions correctly without verbal cues.    PT educated patient on home safety with rolling walker management when " walking to countertops, transferring to different chairs, and ascending/descending thresholds.    White board updated to include patient's safest level of mobility with staff assistance   Patient educated on assistive device use, Fall risk, gait training, home safety, plan of care, posture, transfer training and weight bearing restrictions by explanation.  Patient was receptive to education and verbalizes understanding.       Patient left reclined in chair with aspen collar donned with all lines intact and call button in reach.    Time Tracking:     PT Received On: 07/31/22  PT Start Time: 0848     PT Stop Time: 0913  PT Total Time (min): 25 min     Billable Minutes:   · Gait Training 13 mins and Therapeutic Activity 12 mins    Treatment Type: Treatment  PT/PTA: PT

## 2022-07-31 NOTE — PLAN OF CARE
Problem: Physical Therapy  Goal: Physical Therapy Goal  Description: Goals to be met by: 2022     Patient will increase functional independence with mobility by performin. Supine to sit with Modified [place] while maintaining cervical spine precautions  2. Sit to supine with Modified [place] while maintaining cervical spine precautions  3. Rolling to Left and Right with Modified [place].  4. Sit to stand transfer with Modified [place] with rolling walker  5. Bed to chair transfer with Modified [place] using Rolling Walker  6. Gait  x 400 feet with Modified [place] using Rolling Walker.   7. Patient verbalizes appropriate 3 out 3 spinal precautions correctly - Met on 2022      Outcome: Ongoing, Progressing     Regine Osorio progressed to performing transfers with supervision and rolling walker. She ambulated 300 ft with rolling walker and close supervision. Patient will continue to benefit from skilled PT services. PT recommending that patient discharge home with supervision, home health PT services, rolling walker, and bath bench.

## 2022-07-31 NOTE — ASSESSMENT & PLAN NOTE
Regine Osorio is a 67 y.o. female s/p C5-7 ACDF 7/29/22  -- Doing well   -- C-collar at all times   -- Drain pull this AM, Xray after   -- Hold anticoag   -- Diet Reg  -- PT daily WBAT    Dispo: Discharge later today

## 2022-07-31 NOTE — SUBJECTIVE & OBJECTIVE
"Principal Problem:Cervical spondylosis    Principal Orthopedic Problem: Same    Interval History: VIKTORIA, patient stable, pain controlled. No acute complaints this morning.   C-collar in place   No drain output  Stayed inpatient yesterday for PT today    Review of patient's allergies indicates:   Allergen Reactions    Codeine Shortness Of Breath     Pt states she had a reaction as a teenager and was taken to the ER.    Lisinopril      cough       Current Facility-Administered Medications   Medication    acetaminophen tablet 650 mg    acetaminophen tablet 650 mg    celecoxib capsule 200 mg    dextrose 10% bolus 125 mL    dextrose 10% bolus 250 mL    gabapentin capsule 100 mg    glucagon (human recombinant) injection 1 mg    glucose chewable tablet 16 g    glucose chewable tablet 24 g    hydroCHLOROthiazide tablet 25 mg    HYDROmorphone injection 1 mg    insulin aspart U-100 pen 1-10 Units    LIDOcaine (PF) 10 mg/ml (1%) injection 10 mg    losartan tablet 50 mg    melatonin tablet 6 mg    methocarbamoL tablet 750 mg    ondansetron disintegrating tablet 8 mg    ondansetron disintegrating tablet 8 mg    oxyCODONE immediate release tablet 5 mg    oxyCODONE immediate release tablet Tab 10 mg    sodium chloride 0.9% flush 10 mL     Objective:     Vital Signs (Most Recent):  Temp: 98 °F (36.7 °C) (07/31/22 0404)  Pulse: 70 (07/31/22 0404)  Resp: 14 (07/31/22 0631)  BP: (!) 105/52 (07/31/22 0404)  SpO2: 96 % (07/31/22 0404)   Vital Signs (24h Range):  Temp:  [97 °F (36.1 °C)-98.7 °F (37.1 °C)] 98 °F (36.7 °C)  Pulse:  [70-78] 70  Resp:  [14-18] 14  SpO2:  [94 %-100 %] 96 %  BP: (105-127)/(49-59) 105/52     Weight: 122.7 kg (270 lb 8.1 oz)  Height: 5' 5" (165.1 cm)  Body mass index is 45.01 kg/m².      Intake/Output Summary (Last 24 hours) at 7/31/2022 9686  Last data filed at 7/31/2022 0602  Gross per 24 hour   Intake --   Output 7 ml   Net -7 ml         Ortho/SPM Exam  Physical examination  General appearance :WDWN, normal " body habitus, well groomed adult   Constitutional: Cooperative in NAD  Cardiovascular: WWP extremities, no clubbing or cyanosis, radial/DP/T pulses 2+, no edema.  Musculoskeletal:  Full A/P ROM without deformity   Skin: No obvious lesions, abrasions, or rashes   Neurologic: Sensation is intact to light touch in the extremity. The patient has good coordination without hyperreflexia and is alert and oriented to person, place and time and has normal mood and affect  Dressings CDI  Drain in place      Significant Labs: All pertinent labs within the past 24 hours have been reviewed.    Significant Imaging: I have reviewed all pertinent imaging results/findings.

## 2022-08-01 VITALS
DIASTOLIC BLOOD PRESSURE: 61 MMHG | HEIGHT: 65 IN | BODY MASS INDEX: 45.07 KG/M2 | OXYGEN SATURATION: 96 % | WEIGHT: 270.5 LBS | RESPIRATION RATE: 18 BRPM | HEART RATE: 93 BPM | SYSTOLIC BLOOD PRESSURE: 135 MMHG | TEMPERATURE: 96 F

## 2022-08-01 LAB
ANTI SM ANTIBODY: 0.07 RATIO (ref 0–0.99)
ANTI SM/RNP ANTIBODY: 0.05 RATIO (ref 0–0.99)
ANTI-SM INTERPRETATION: NEGATIVE
ANTI-SM/RNP INTERPRETATION: NEGATIVE
ANTI-SSA ANTIBODY: 0.04 RATIO (ref 0–0.99)
ANTI-SSA INTERPRETATION: NEGATIVE
ANTI-SSB ANTIBODY: 0.05 RATIO (ref 0–0.99)
ANTI-SSB INTERPRETATION: NEGATIVE
DSDNA AB SER-ACNC: NORMAL [IU]/ML
ZINC SERPL-MCNC: 83 UG/DL (ref 60–130)

## 2022-08-01 PROCEDURE — 97530 THERAPEUTIC ACTIVITIES: CPT | Mod: CQ

## 2022-08-01 PROCEDURE — 97116 GAIT TRAINING THERAPY: CPT | Mod: CQ

## 2022-08-01 PROCEDURE — 25000003 PHARM REV CODE 250: Performed by: STUDENT IN AN ORGANIZED HEALTH CARE EDUCATION/TRAINING PROGRAM

## 2022-08-01 PROCEDURE — 97535 SELF CARE MNGMENT TRAINING: CPT

## 2022-08-01 RX ADMIN — LOSARTAN POTASSIUM 50 MG: 50 TABLET, FILM COATED ORAL at 08:08

## 2022-08-01 RX ADMIN — ACETAMINOPHEN 650 MG: 325 TABLET ORAL at 04:08

## 2022-08-01 RX ADMIN — SUMATRIPTAN SUCCINATE 25 MG: 25 TABLET ORAL at 12:08

## 2022-08-01 RX ADMIN — METHOCARBAMOL 750 MG: 750 TABLET ORAL at 08:08

## 2022-08-01 RX ADMIN — GABAPENTIN 100 MG: 100 CAPSULE ORAL at 08:08

## 2022-08-01 RX ADMIN — SUMATRIPTAN SUCCINATE 25 MG: 25 TABLET ORAL at 10:08

## 2022-08-01 RX ADMIN — METHOCARBAMOL 750 MG: 750 TABLET ORAL at 01:08

## 2022-08-01 RX ADMIN — HYDROCHLOROTHIAZIDE 25 MG: 25 TABLET ORAL at 08:08

## 2022-08-01 RX ADMIN — ACETAMINOPHEN 650 MG: 325 TABLET ORAL at 01:08

## 2022-08-01 RX ADMIN — CELECOXIB 200 MG: 200 CAPSULE ORAL at 08:08

## 2022-08-01 RX ADMIN — OXYCODONE HYDROCHLORIDE 10 MG: 10 TABLET ORAL at 04:08

## 2022-08-01 NOTE — PLAN OF CARE
[facility]  Discharge Reassessment    Primary Care Provider: No primary care provider on file.    Expected Discharge Date: 8/1/2022    Reassessment (most recent)     Discharge Reassessment - 08/01/22 0934        Discharge Reassessment    Assessment Type Discharge Planning Reassessment     Did the patient's condition or plan change since previous assessment? Yes     Discharge Plan discussed with: Patient     Communicated TONY with patient/caregiver Yes     Discharge Plan A Home with family;Home Health     Discharge Plan B Home with family;Home Health     DME Needed Upon Discharge  shower chair;walker, rolling     Discharge Barriers Identified None               Patient to d/c home today with HH arranged through [facility]. Rolling walker and shower chair ordered per [facility] to be delivered to bedside prior to discharge. Patient aware she will pay out of pocket for shower chair as it is not covered by insurance. Will continue to follow.

## 2022-08-01 NOTE — PLAN OF CARE
[facility]  Discharge Final Note    Primary Care Provider: No primary care provider on file.    Expected Discharge Date: 8/1/2022    Final Discharge Note (most recent)     Final Note - 08/01/22 1518        Final Note    Assessment Type Final Discharge Note     Anticipated Discharge Disposition Home-Health Care [facility]     What phone number can be called within the next 1-3 days to see how you are doing after discharge? --   [phone]    Hospital Resources/Appts/Education Provided Appointments scheduled and added to AVS        Post-Acute Status    Post-Acute Authorization Home Health                 Important Message from Medicare            Future Appointments   Date Time Provider Department Center   8/26/2022 12:30 PM [facility] OIC [facility] EOS [facility]   8/26/2022  1:30 PM Fauzia Vidales PA-C [facility] SPINE [facility]   9/15/2022  9:15 AM Hi Fishman DO [facility] PAIN[facility] [place]   9/19/2022  7:40 AM Héctor Lund MD [facility] HEMONC[facility]   10/13/2022  9:00 AM Tonny Moreno MD [facility]     Patient discharged home to care of family on 8/1/22. [facility] processing HH request at this time.

## 2022-08-01 NOTE — DISCHARGE INSTRUCTIONS
DR. ERIC CHAMPAGNE'S POSTOPERATIVE INSTRUCTIONS -   ANTERIOR CERVICAL DISKECTOMY AND FUSION       Antibiotics: You do not need additional antibiotics at home.    NSAIDs: Please refrain from taking ibuprofen (Advil), naproxen (Aleve), and other non steroidal anti-inflammatory medications other than the Celebrex that will be prescribed to you after surgery.    Wound Care: You may remove your dressing and shower 5 days after surgery. Until then please keep your wound clean and dry. Sponge baths are acceptable. Do not go in a pool or hot tub until seen in clinic. Please leave the small steri-strips covering your wound in place until they fall off naturally (2 weeks). You may notice clear suture ends hanging from the sides of your incision after the steri-strips are removed, it is ok to clip these with scissors.    Cervical Collar: If given a collar after surgery you should wear it at all times. The only times it may be removed are for eating and showering.    Pain: We will use a multimodal approach for pain management after your surgery.  You will be given a prescription for pain medicine when you are discharged from the hospital.  You will also be given prescriptions for Robaxin (a muscle relaxer), Gabapentin, Celebrex and Tylenol.  Please note: you will only be given ONE prescription for narcotics when you are discharged from the hospital.  This medication is for breakthrough pain only. This medication will not be refilled.  The other medications given to you may be refilled if needed.      Difficulty Swallowing: This is very common in the postoperative period. You may find it easier to eat a pureed diet for the first 1-2 weeks after surgery. Ice chips and menthol cough drops may also be soothing.    Difficulty Breathing: This is uncommon after surgery and may represent dangerous swelling in your neck. If you experience difficulty breathing please call 911 immediately.    Infection: Signs of infection include increasing  wound drainage and redness around the wound, as well as a temperature over 101.5 degrees. It is unnecessary to take your temperature on a routine basis. Please call the above number if you are concerned about an infection.    Driving and Work: It is ok to return to driving and work as long as you are not taking narcotic pain medications or wearing your cervical collar. Please do not lift over 5 pounds or participate in exercise or sports until cleared by Dr. Scruggs.    Deep Venous Thrombosis (Blood Clots): Symptoms include swelling in the legs and shortness of breath. Please call the office or proceed to the nearest emergency room if you have any of these symptoms.    Physical Therapy: The best physical therapy immediately after surgery is walking. Please try to walk as much as possible.    Follow-up: You will be scheduled for a follow-up appointment in 4 weeks with either Dr. Scruggs or his physician assistant, Fauzia Vidales PA-C.    Questions: During business hours please call [phone] for routine questions. For after hours questions please call [phone] and ask to speak with the Orthopaedic resident on call.    Disability: If you submitted short term disability paperwork for us to complete and would like to check the status, please call the Disability Department at [phone].  You may fax any necessary paperwork to [phone].

## 2022-08-01 NOTE — DISCHARGE SUMMARY
[facility] - Surgery  Orthopedics  Discharge Summary      Patient Name: Regine Osorio  MRN: [id number]  Admission Date: 7/29/2022  Hospital Length of Stay: 0 days  Discharge Date and Time:  08/01/2022 5:51 AM  Attending Physician: Rosales Scruggs MD   Discharging Provider: Anthony Haley MD  Primary Care Provider: No primary care provider on file.    HPI: HISTORY:  Regine Osorio is a 67 y.o. female who returns to me today for CT review. She continues to have neck pain that radiates to R shoulder, but it has been getting better with functional restoration program. She has R hand weakness. She is here for preop, and she needs to arrange her social situation in order to figure out a date.    Procedure(s) (LRB):  DISCECTOMY, SPINE, CERVICAL, ANTERIOR APPROACH, WITH FUSION C5-7 SPINEWAVE SNS: VOCAL CORDS/MOTORS/SSEP (N/A)      Hospital Course: the patient arrived to pre-op area for proper pre-operative management.  Upon completion of pre-operative preparation, the patient was taken back to the operative theatre.  A C5-7 ACDF was performed without complication and the patient was transported to the post anesthesia care unit in stable condition. After appropriate recovery from the anaesthetic agents used during the surgery the patient was transferred to the floor where they received pain medication and physical therapy. The Patient remained until POD3 when the patient was deemed safe for DC, they were discharged home.              Significant Diagnostic Studies: No pertinent studies.    Pending Diagnostic Studies:     None        Final Active Diagnoses:    Diagnosis Date Noted POA    PRINCIPAL PROBLEM:  Cervical spondylosis [M47.812] 07/29/2022 Yes      Problems Resolved During this Admission:      Discharged Condition: stable    Disposition: Home or Self Care    Follow Up:    Patient Instructions:   No discharge procedures on file.  Medications:  Reconciled Home Medications:      Medication List      START taking  these medications    acetaminophen 650 MG Tbsr  Commonly known as: TYLENOL  Take 1 tablet (650 mg total) by mouth every 6 (six) hours.     celecoxib 200 MG capsule  Commonly known as: CeleBREX  Take 1 capsule (200 mg total) by mouth once daily.     methocarbamoL 500 MG Tab  Commonly known as: ROBAXIN  Take 2 tablets (1,000 mg total) by mouth 3 (three) times daily. for 14 days     oxyCODONE 5 MG immediate release tablet  Commonly known as: ROXICODONE  Take 1 tablet (5 mg total) by mouth every 4 (four) hours as needed for Pain.        CHANGE how you take these medications    cetirizine 10 MG tablet  Commonly known as: ZYRTEC  TAKE 2 TABLETS BY MOUTH ONCE DAILY  What changed:   · how much to take  · how to take this     clobetasoL 0.05 % external solution  Commonly known as: TEMOVATE  Apply topically 2 (two) times daily. Prn scalp itch or irritation.Stop using steroid topical when skin is smooth and non itchy.  Do not treat dark or red coloring.  What changed: Another medication with the same name was removed. Continue taking this medication, and follow the directions you see here.        CONTINUE taking these medications    acetic acid 2 % otic solution  Commonly known as: VOSOL  Place 4 drops into both ears as needed.     azelastine 137 mcg (0.1 %) nasal spray  Commonly known as: ASTELIN  1 SPRAY IN EACH NOSTRIL TWICE DAILY     benzonatate 200 MG capsule  Commonly known as: TESSALON  1 capsule up to three times a day as needed for cough     blood sugar diagnostic Strp  To check BG 2 times daily, to use with insurance preferred meter     blood-glucose meter kit  To check BG 2 times daily, to use with insurance preferred meter     dextromethorphan-guaiFENesin  mg/5 ml  mg/5 mL liquid  Commonly known as: ROBITUSSIN-DM  Take 5 mLs by mouth every 12 (twelve) hours.     fluocinolone acetonide oiL 0.01 % Drop  INSTILL 4 DROPS IN EAR(S) TWICE DAILY FOR 1 TO 2 WEEKS     fluticasone propionate 50 mcg/actuation  nasal spray  Commonly known as: FLONASE  2 sprays on the right and 3 sprays on the left every morning     glipiZIDE 5 MG Tr24  TAKE 1 TABLET(5 MG) BY MOUTH TWICE DAILY     hydroCHLOROthiazide 25 MG tablet  Commonly known as: HYDRODIURIL  Take 1 tablet (25 mg total) by mouth once daily.     ketoconazole 2 % cream  Commonly known as: NIZORAL  APPLY EXTERNALLY TO THE AFFECTED AREA EVERY DAY     lancets Misc  To check BG 2 times daily, to use with insurance preferred meter     losartan 50 MG tablet  Commonly known as: COZAAR  Take 1 tablet (50 mg total) by mouth once daily.     metFORMIN 500 MG tablet  Commonly known as: GLUCOPHAGE  Take 1 tablet (500 mg total) by mouth 2 (two) times daily with meals.     ondansetron 8 MG Tbdl  Commonly known as: ZOFRAN-ODT  Take 1 tablet (8 mg total) by mouth every 8 (eight) hours as needed (Nausea).     rizatriptan 10 MG disintegrating tablet  Commonly known as: MAXALT-MLT  Take 1 tablet (10 mg total) by mouth every 2 (two) hours as needed for Migraine. Max 30 mg/day.     UBRELVY 100 mg tablet  Generic drug: ubrogepant  Take 1 tablet (100 mg total) by mouth every 2 (two) hours as needed for Migraine. Max 200 mg/day.        STOP taking these medications    aspirin 81 MG Chew     B12 SL     cholecalciferol (vitamin D3) 50 mcg (2,000 unit) Cap capsule  Commonly known as: VITAMIN D3     diclofenac sodium 1 % Gel  Commonly known as: VOLTAREN     naproxen sodium 550 MG tablet  Commonly known as: ANAPROX     tiZANidine 4 MG tablet  Commonly known as: ZANAFLEX            Anthony Haley MD  Orthopedics  [facility] - Surgery

## 2022-08-01 NOTE — PT/OT/SLP PROGRESS
"Occupational Therapy   Treatment    Name: Regine Osorio  MRN: [id number]  Admitting Diagnosis:  Cervical spondylosis  3 Days Post-Op    Recommendations:     Discharge Recommendations: home health OT  Discharge Equipment Recommendations:  bath bench, walker, rolling  Barriers to discharge:  Decreased caregiver support    Assessment:     Regine Osorio is a 67 y.o. female with a medical diagnosis of Cervical spondylosis.  Patient tolerated OT session well. She completed grooming task and toilet task at stand by assistance. She completed dressing task at stand by assistance. She was able to recall spinal precautions.  Performance deficits affecting function are weakness, impaired functional mobility, gait instability, impaired balance, impaired self care skills, decreased ROM, decreased upper extremity function, orthopedic precautions.     Rehab Prognosis:  Good; patient would benefit from acute skilled OT services to address these deficits and reach maximum level of function.       Plan:     Patient to be seen 5 x/week to address the above listed problems via self-care/home management, therapeutic activities, therapeutic exercises  · Plan of Care Expires: 08/07/22  · Plan of Care Reviewed with: patient    Subjective     "I think I am leaving today"     Pain/Comfort:  · Pain Rating 1:  (little pain but did not rate number)  · Location - Orientation 1: generalized  · Location 1: neck  · Pain Addressed 1: Pre-medicate for activity, Reposition, Distraction    Objective:     Communicated with: RN  prior to session.  Patient found up in chair with cervical collar upon OT entry to room.    General Precautions: Standard, fall   Orthopedic Precautions:spinal precautions   Braces: Aspen collar  Respiratory Status: Room air     Occupational Performance:     Functional Mobility/Transfers:  · Patient completed Sit <> Stand Transfer with stand by assistance  with  no assistive device   · Patient completed Toilet Transfer Step " Transfer technique with stand by assistance with  no AD  · Functional Mobility: patient ambulated to/from bathroom with no assist device at stand by assistance     Activities of Daily Living:  · Grooming: stand by assistance standing at sink to wash hands, brush teeth, and wash off   · Upper Body Dressing: stand by assistance sitting in chair to don button up shirt   · Lower Body Dressing: stand by assistance sitting in chair to don underwear/jeans, bringing foot up to opposite knee   · Toileting: stand by assistance completed on regular toilet       [facility] 6 Click ADL: 19    Treatment & Education:  -Patient educated to bring legs to opposite knees for lower body dressing   -Patient educated on positioning of items for grooming and toilet task   -Patient educated on lifting restrictions   -Patient educated on hand placement for transfers   -Patient educated on role of OT and plan of care        Patient left up in chair with all lines intact and call button in reachEducation:      GOALS:   Multidisciplinary Problems     Occupational Therapy Goals        Problem: Occupational Therapy    Goal Priority Disciplines Outcome Interventions   Occupational Therapy Goal     OT, PT/OT Ongoing, Progressing    Description: Goals to be met by: 8/7/22     Patient will increase functional independence with ADLs by performing:    UE Dressing with Modified [place].  LE Dressing with Modified [place].  Grooming while standing at sink with Modified [place].  Toileting from toilet with Modified [place] for hygiene and clothing management.   Supine to sit with Modified [place].  Step transfer with Modified [place]  Toilet transfer to toilet with Modified [place].                     Time Tracking:     OT Date of Treatment: 08/01/22  OT Start Time: 0854  OT Stop Time: 0927  OT Total Time (min): 33 min    Billable Minutes:Self Care/Home Management 33               8/1/2022

## 2022-08-01 NOTE — NURSING
Pt received bedside medication , instructed on bedside medication  and copy of / instructed on discharged papers. Pt denies pain at this time. Pt educated on signs and symptoms of when to call the doctor. All belongings with the patient . Pt verbalized understanding. Patient waiting for medical epuipment/DME

## 2022-08-01 NOTE — PLAN OF CARE
08/01/22 0922   Post-Acute Status   Post-Acute Authorization Home Health   Home Health Status Referrals Sent     SW faxed referral to [facility] for Home Health services, awaiting signature by attending to complete referral process. SW to follow.    2:23 PM  MED[facility] resent HH request to [facility] w/ signed HH orders.     Josselyn Marquez LMSW  Case Management   [facility]   Ext. [phone]

## 2022-08-01 NOTE — ASSESSMENT & PLAN NOTE
Regine Osorio is a 67 y.o. female s/p C5-7 ACDF 7/29/22  -- Doing well   -- C-collar at all times    -- Hold anticoag, FCD when not ambulating   -- Diabetic diet  -- PT daily WBAT c Collar    Dispo: Discharge today with HH.

## 2022-08-01 NOTE — PROGRESS NOTES
[facility] - [facility]  Orthopedics  Progress Note    Patient Name: Regine Osorio  MRN: [id number]  Admission Date: 7/29/2022  Hospital Length of Stay: 0 days  Attending Provider: Rosales Scruggs MD  Primary Care Provider: No primary care provider on file.  Follow-up For: Procedure(s) (LRB):  DISCECTOMY, SPINE, CERVICAL, ANTERIOR APPROACH, WITH FUSION C5-7 SPINEWAVE SNS: VOCAL CORDS/MOTORS/SSEP (N/A)    Post-Operative Day: 3 Days Post-Op  Subjective:     Principal Problem:Cervical spondylosis    Principal Orthopedic Problem: Same    Interval History: NAEON, patient stable, pain controlled. No acute complaints this morning. Per nursing note review, no problems overnight. Yesterday with some nausea but this improved.     Review of patient's allergies indicates:   Allergen Reactions    Codeine Shortness Of Breath     Pt states she had a reaction as a teenager and was taken to the ER.    Lisinopril      cough       Current Facility-Administered Medications   Medication    acetaminophen tablet 650 mg    acetaminophen tablet 650 mg    celecoxib capsule 200 mg    dextrose 10% bolus 125 mL    dextrose 10% bolus 250 mL    gabapentin capsule 100 mg    glucagon (human recombinant) injection 1 mg    glucose chewable tablet 16 g    glucose chewable tablet 24 g    hydroCHLOROthiazide tablet 25 mg    HYDROmorphone injection 1 mg    insulin aspart U-100 pen 1-10 Units    LIDOcaine (PF) 10 mg/ml (1%) injection 10 mg    losartan tablet 50 mg    melatonin tablet 6 mg    methocarbamoL tablet 750 mg    ondansetron disintegrating tablet 8 mg    ondansetron disintegrating tablet 8 mg    oxyCODONE immediate release tablet 5 mg    oxyCODONE immediate release tablet Tab 10 mg    sodium chloride 0.9% flush 10 mL    sumatriptan tablet 25 mg     Objective:     Vital Signs (Most Recent):  Temp: 99.3 °F (37.4 °C) (08/01/22 0436)  Pulse: 98 (08/01/22 0436)  Resp: 14 (08/01/22 0450)  BP: (!) 145/63 (08/01/22 0436)  SpO2: 99 %  "(08/01/22 0436)   Vital Signs (24h Range):  Temp:  [96.7 °F (35.9 °C)-99.3 °F (37.4 °C)] 99.3 °F (37.4 °C)  Pulse:  [70-98] 98  Resp:  [14-19] 14  SpO2:  [96 %-99 %] 99 %  BP: (105-145)/(52-70) 145/63     Weight: 122.7 kg (270 lb 8.1 oz)  Height: 5' 5" (165.1 cm)  Body mass index is 45.01 kg/m².      Intake/Output Summary (Last 24 hours) at 8/1/2022 0549  Last data filed at 7/31/2022 0638  Gross per 24 hour   Intake --   Output 7 ml   Net -7 ml         Ortho/SPM Exam  AAOx4  NAD  Reg rate  No increased WOB  Dressing c.d.i  SILT M/R/U  Motor intact Ain/Pin/U  WWP extremities  FCDs in place and functioning        Significant Labs: All pertinent labs within the past 24 hours have been reviewed.    Significant Imaging: I have reviewed all pertinent imaging results/findings.  Hardware in good position    Assessment/Plan:     * Cervical spondylosis  Regine Osorio is a 67 y.o. female s/p C5-7 ACDF 7/29/22  -- Doing well   -- C-collar at all times    -- Hold anticoag, FCD when not ambulating   -- Diabetic diet  -- PT daily WBAT c Collar    Dispo: Discharge today with .            Anthony Haley MD  Orthopedics  [facility] - Surgery  "

## 2022-08-01 NOTE — SUBJECTIVE & OBJECTIVE
"Principal Problem:Cervical spondylosis    Principal Orthopedic Problem: Same    Interval History: VIKTORIA, patient stable, pain controlled. No acute complaints this morning. Per nursing note review, no problems overnight. Yesterday with some nausea but this improved.     Review of patient's allergies indicates:   Allergen Reactions    Codeine Shortness Of Breath     Pt states she had a reaction as a teenager and was taken to the ER.    Lisinopril      cough       Current Facility-Administered Medications   Medication    acetaminophen tablet 650 mg    acetaminophen tablet 650 mg    celecoxib capsule 200 mg    dextrose 10% bolus 125 mL    dextrose 10% bolus 250 mL    gabapentin capsule 100 mg    glucagon (human recombinant) injection 1 mg    glucose chewable tablet 16 g    glucose chewable tablet 24 g    hydroCHLOROthiazide tablet 25 mg    HYDROmorphone injection 1 mg    insulin aspart U-100 pen 1-10 Units    LIDOcaine (PF) 10 mg/ml (1%) injection 10 mg    losartan tablet 50 mg    melatonin tablet 6 mg    methocarbamoL tablet 750 mg    ondansetron disintegrating tablet 8 mg    ondansetron disintegrating tablet 8 mg    oxyCODONE immediate release tablet 5 mg    oxyCODONE immediate release tablet Tab 10 mg    sodium chloride 0.9% flush 10 mL    sumatriptan tablet 25 mg     Objective:     Vital Signs (Most Recent):  Temp: 99.3 °F (37.4 °C) (08/01/22 0436)  Pulse: 98 (08/01/22 0436)  Resp: 14 (08/01/22 0450)  BP: (!) 145/63 (08/01/22 0436)  SpO2: 99 % (08/01/22 0436)   Vital Signs (24h Range):  Temp:  [96.7 °F (35.9 °C)-99.3 °F (37.4 °C)] 99.3 °F (37.4 °C)  Pulse:  [70-98] 98  Resp:  [14-19] 14  SpO2:  [96 %-99 %] 99 %  BP: (105-145)/(52-70) 145/63     Weight: 122.7 kg (270 lb 8.1 oz)  Height: 5' 5" (165.1 cm)  Body mass index is 45.01 kg/m².      Intake/Output Summary (Last 24 hours) at 8/1/2022 0549  Last data filed at 7/31/2022 0638  Gross per 24 hour   Intake --   Output 7 ml   Net -7 ml         Ortho/SPM " Exam  AAOx4  NAD  Reg rate  No increased WOB  Dressing c.d.i  SILT M/R/U  Motor intact Ain/Pin/U  WWP extremities  FCDs in place and functioning        Significant Labs: All pertinent labs within the past 24 hours have been reviewed.    Significant Imaging: I have reviewed all pertinent imaging results/findings.  Hardware in good position

## 2022-08-01 NOTE — PLAN OF CARE
[facility] - Surgery      HOME HEALTH ORDERS  FACE TO FACE ENCOUNTER    Patient Name: Regine Osorio  YOB: 1955    PCP: No primary care provider on file.   PCP Address: No primary physician on file.  PCP Phone Number: None  PCP Fax: None    Encounter Date: 6/28/22    Admit to Home Health    Diagnoses:  Active Hospital Problems    Diagnosis  POA    *Cervical spondylosis [M47.812]  Yes      Resolved Hospital Problems   No resolved problems to display.       Follow Up Appointments:  Future Appointments   Date Time Provider Department Center   8/26/2022 12:30 PM [facility] OI EOS [facility] EOS [facility]   8/26/2022  1:30 PM Fauzia Vidales PA-C [facility] SPINE [facility]   9/15/2022  9:15 AM Hi Fishman DO [facility]IN[facility] [place]   9/19/2022  7:40 AM Héctor Lund MD [facility] [id number] [place]   10/13/2022  9:00 AM [facility]       Allergies:  Review of patient's allergies indicates:   Allergen Reactions    Codeine Shortness Of Breath     Pt states she had a reaction as a teenager and was taken to the ER.    Lisinopril      cough       Medications: Review discharge medications with patient and family and provide education.    Current Facility-Administered Medications   Medication Dose Route Frequency Provider Last Rate Last Admin    acetaminophen tablet 650 mg  650 mg Oral Q8H PRN Anthony Haley MD        acetaminophen tablet 650 mg  650 mg Oral Q6H Anthony Haley MD   650 mg at 07/31/22 1330    celecoxib capsule 200 mg  200 mg Oral Daily Anthony Haley MD   200 mg at 07/31/22 0926    dextrose 10% bolus 125 mL  12.5 g Intravenous PRN Anthony Haley MD        dextrose 10% bolus 250 mL  25 g Intravenous PRN Anthony Haley MD        gabapentin capsule 100 mg  100 mg Oral TID Anthony Haley MD   100 mg at 07/31/22 1641    glucagon (human recombinant) injection 1 mg  1 mg Intramuscular PRN Anthony Haley MD         glucose chewable tablet 16 g  16 g Oral PRN Anthony Haley MD        glucose chewable tablet 24 g  24 g Oral PRN Anthony Haley MD        hydroCHLOROthiazide tablet 25 mg  25 mg Oral Daily Anthony Haley MD   25 mg at 07/31/22 0931    HYDROmorphone injection 1 mg  1 mg Intravenous Q3H PRN Anthony Haley MD        insulin aspart U-100 pen 1-10 Units  1-10 Units Subcutaneous QID (AC + HS) PRN Anthony Haley MD   1 Units at 07/30/22 2013    LIDOcaine (PF) 10 mg/ml (1%) injection 10 mg  1 mL Other Once Anthony Haley MD        losartan tablet 50 mg  50 mg Oral Daily Anthony Haley MD   50 mg at 07/31/22 0931    melatonin tablet 6 mg  6 mg Oral Nightly PRN Anthony Haley MD        methocarbamoL tablet 750 mg  750 mg Oral QID Anthony Haley MD   750 mg at 07/31/22 1641    ondansetron disintegrating tablet 8 mg  8 mg Oral Q8H PRN Anthony Haley MD        ondansetron disintegrating tablet 8 mg  8 mg Oral Q8H PRN Anthony Haley MD   8 mg at 07/31/22 1040    oxyCODONE immediate release tablet 5 mg  5 mg Oral Q4H PRN Anthony Haley MD   5 mg at 07/31/22 0631    oxyCODONE immediate release tablet Tab 10 mg  10 mg Oral Q4H PRN Anthony Haley MD   10 mg at 07/31/22 0153    sodium chloride 0.9% flush 10 mL  10 mL Intravenous PRN Anthony Haley MD        sumatriptan tablet 25 mg  25 mg Oral Q2H PRN Eugenia Segal MD   25 mg at 07/31/22 1135     Current Discharge Medication List      START taking these medications    Details   acetaminophen (TYLENOL) 650 MG TbSR Take 1 tablet (650 mg total) by mouth every 6 (six) hours.  Qty: 56 tablet, Refills: 0      celecoxib (CELEBREX) 200 MG capsule Take 1 capsule (200 mg total) by mouth once daily.  Qty: 12 capsule, Refills: 0      methocarbamoL (ROBAXIN) 500 MG Tab Take 2 tablets (1,000 mg total) by mouth 3 (three) times daily. for 14 days  Qty: 84 tablet, Refills: 0       oxyCODONE (ROXICODONE) 5 MG immediate release tablet Take 1 tablet (5 mg total) by mouth every 4 (four) hours as needed for Pain.  Qty: 20 tablet, Refills: 0         CONTINUE these medications which have NOT CHANGED    Details   acetic acid (VOSOL) 2 % otic solution Place 4 drops into both ears as needed.      azelastine (ASTELIN) 137 mcg (0.1 %) nasal spray 1 SPRAY IN EACH NOSTRIL TWICE DAILY  Qty: 30 mL, Refills: 2      cetirizine (ZYRTEC) 10 MG tablet TAKE 2 TABLETS BY MOUTH ONCE DAILY  Qty: 60 tablet, Refills: 3    Associated Diagnoses: Chronic rhinitis; Itching      clobetasoL (TEMOVATE) 0.05 % external solution Apply topically 2 (two) times daily. Prn scalp itch or irritation.Stop using steroid topical when skin is smooth and non itchy.  Do not treat dark or red coloring.  Qty: 60 mL, Refills: 1    Associated Diagnoses: Seborrheic dermatitis of scalp; Itching; Scalp itch      fluticasone propionate (FLONASE) 50 mcg/actuation nasal spray 2 sprays on the right and 3 sprays on the left every morning  Qty: 48 g, Refills: 3    Comments: Pt will need an appointment in March  Associated Diagnoses: History of eustachian tube dysfunction; Chronic rhinitis      glipiZIDE (GLUCOTROL) 5 MG TR24 TAKE 1 TABLET(5 MG) BY MOUTH TWICE DAILY  Qty: 180 tablet, Refills: 1    Associated Diagnoses: Type 2 diabetes mellitus with hyperglycemia, without long-term current use of insulin      hydroCHLOROthiazide (HYDRODIURIL) 25 MG tablet Take 1 tablet (25 mg total) by mouth once daily.  Qty: 90 tablet, Refills: 1    Associated Diagnoses: Essential hypertension      ketoconazole (NIZORAL) 2 % cream APPLY EXTERNALLY TO THE AFFECTED AREA EVERY DAY  Qty: 15 g, Refills: 2      losartan (COZAAR) 50 MG tablet Take 1 tablet (50 mg total) by mouth once daily.  Qty: 90 tablet, Refills: 1    Associated Diagnoses: Essential hypertension      metFORMIN (GLUCOPHAGE) 500 MG tablet Take 1 tablet (500 mg total) by mouth 2 (two) times daily with  meals.  Qty: 180 tablet, Refills: 1    Comments: Dosage change; Hold until requested  Associated Diagnoses: Type 2 diabetes mellitus without complication, without long-term current use of insulin      rizatriptan (MAXALT-MLT) 10 MG disintegrating tablet Take 1 tablet (10 mg total) by mouth every 2 (two) hours as needed for Migraine. Max 30 mg/day.  Qty: 12 tablet, Refills: 5    Associated Diagnoses: Migraine without aura and without status migrainosus, not intractable      ubrogepant (UBRELVY) 100 mg tablet Take 1 tablet (100 mg total) by mouth every 2 (two) hours as needed for Migraine. Max 200 mg/day.  Qty: 10 tablet, Refills: 2    Associated Diagnoses: Migraine without aura and without status migrainosus, not intractable      benzonatate (TESSALON) 200 MG capsule 1 capsule up to three times a day as needed for cough  Qty: 50 capsule, Refills: 1    Associated Diagnoses: Cough      blood sugar diagnostic Strp To check BG 2 times daily, to use with insurance preferred meter  Qty: 200 each, Refills: 3      blood-glucose meter kit To check BG 2 times daily, to use with insurance preferred meter  Qty: 1 each, Refills: 0      dextromethorphan-guaiFENesin  mg/5 ml (ROBITUSSIN-DM)  mg/5 mL liquid Take 5 mLs by mouth every 12 (twelve) hours.      fluocinolone acetonide oiL 0.01 % Drop INSTILL 4 DROPS IN EAR(S) TWICE DAILY FOR 1 TO 2 WEEKS  Qty: 20 mL, Refills: 1      lancets Misc To check BG 2 times daily, to use with insurance preferred meter  Qty: 200 each, Refills: 3      ondansetron (ZOFRAN-ODT) 8 MG TbDL Take 1 tablet (8 mg total) by mouth every 8 (eight) hours as needed (Nausea).  Qty: 30 tablet, Refills: 2    Associated Diagnoses: Migraine without aura and without status migrainosus, not intractable         STOP taking these medications       aspirin 81 MG Chew Comments:   Reason for Stopping:         cholecalciferol, vitamin D3, (VITAMIN D3) 50 mcg (2,000 unit) Cap Comments:   Reason for Stopping:          clobetasoL (CLOBEX) 0.05 % shampoo Comments:   Reason for Stopping:         cyanocobalamin/cobamamide (B12 SL) Comments:   Reason for Stopping:         naproxen sodium (ANAPROX) 550 MG tablet Comments:   Reason for Stopping:         diclofenac sodium (VOLTAREN) 1 % Gel Comments:   Reason for Stopping:         tiZANidine (ZANAFLEX) 4 MG tablet Comments:   Reason for Stopping:                 I have seen and examined this patient within the last 30 days. My clinical findings that support the need for the home health skilled services and home bound status are the following:no   Weakness/numbness causing balance and gait disturbance due to Surgery making it taxing to leave home.     Diet:   regular diet    Labs:  none    Referrals/ Consults  Physical Therapy to evaluate and treat. Evaluate for home safety and equipment needs; Establish/upgrade home exercise program. Perform / instruct on therapeutic exercises, gait training, transfer training, and Range of Motion.  Occupational Therapy to evaluate and treat. Evaluate home environment for safety and equipment needs. Perform/Instruct on transfers, ADL training, ROM, and therapeutic exercises.    Activities:   other WBAT with spine precautions    Nursing:   Agency to admit patient within 24 hours of hospital discharge unless specified on physician order or at patient request    SN to complete comprehensive assessment including routine vital signs. Instruct on disease process and s/s of complications to report to MD. Review/verify medication list sent home with the patient at time of discharge  and instruct patient/caregiver as needed. Frequency may be adjusted depending on start of care date.     Skilled nurse to perform up to 3 visits PRN for symptoms related to diagnosis    Notify MD if SBP > 160 or < 90; DBP > 90 or < 50; HR > 120 or < 50; Temp > 101; O2 < 88%;     Ok to schedule additional visits based on staff availability and patient request on consecutive days  within the home health episode.    When multiple disciplines ordered:    Start of Care occurs on Sunday - Wednesday schedule remaining discipline evaluations as ordered on separate consecutive days following the start of care.    Thursday SOC -schedule subsequent evaluations Friday and Monday the following week.     Friday - Saturday SOC - schedule subsequent discipline evaluations on consecutive days starting Monday of the following week.      Miscellaneous   none    Home Health Aide:  none    Wound Care Orders  no    I certify that this patient is confined to her home and needs physical therapy and occupational therapy.

## 2022-08-02 ENCOUNTER — PATIENT MESSAGE (OUTPATIENT)
Dept: ORTHOPEDICS | Facility: CLINIC | Age: 67
End: 2022-08-02
Payer: MEDICARE

## 2022-08-02 NOTE — PLAN OF CARE
SW contacted [employer] to follow up on pt's Home Health request. Patient has been set-up with Sandy WHITMORE.

## 2022-08-03 PROCEDURE — G0180 MD CERTIFICATION HHA PATIENT: HCPCS | Mod: ,,, | Performed by: ORTHOPAEDIC SURGERY

## 2022-08-03 PROCEDURE — G0180 PR HOME HEALTH MD CERTIFICATION: ICD-10-PCS | Mod: ,,, | Performed by: ORTHOPAEDIC SURGERY

## 2022-08-04 ENCOUNTER — PATIENT MESSAGE (OUTPATIENT)
Dept: DERMATOLOGY | Facility: CLINIC | Age: 67
End: 2022-08-04
Payer: MEDICARE

## 2022-08-05 ENCOUNTER — PATIENT MESSAGE (OUTPATIENT)
Dept: ORTHOPEDICS | Facility: CLINIC | Age: 67
End: 2022-08-05
Payer: MEDICARE

## 2022-08-05 PROBLEM — R52 PAIN AGGRAVATED BY ACTIVITIES OF DAILY LIVING: Status: RESOLVED | Noted: 2022-04-29 | Resolved: 2022-08-05

## 2022-08-05 PROBLEM — F40.298 FEAR OF PAIN: Status: RESOLVED | Noted: 2022-04-29 | Resolved: 2022-08-05

## 2022-08-05 PROBLEM — Z74.09 IMPAIRED FUNCTIONAL MOBILITY AND ACTIVITY TOLERANCE: Status: RESOLVED | Noted: 2022-04-29 | Resolved: 2022-08-05

## 2022-08-11 ENCOUNTER — PATIENT MESSAGE (OUTPATIENT)
Dept: ORTHOPEDICS | Facility: CLINIC | Age: 67
End: 2022-08-11
Payer: MEDICARE

## 2022-08-11 RX ORDER — OXYCODONE HYDROCHLORIDE 5 MG/1
5 TABLET ORAL EVERY 4 HOURS PRN
Qty: 20 TABLET | Refills: 0 | Status: SHIPPED | OUTPATIENT
Start: 2022-08-11 | End: 2022-09-20

## 2022-08-13 ENCOUNTER — NURSE TRIAGE (OUTPATIENT)
Dept: ADMINISTRATIVE | Facility: CLINIC | Age: 67
End: 2022-08-13
Payer: MEDICARE

## 2022-08-13 ENCOUNTER — TELEPHONE (OUTPATIENT)
Dept: ORTHOPEDICS | Facility: HOSPITAL | Age: 67
End: 2022-08-13
Payer: MEDICARE

## 2022-08-14 NOTE — TELEPHONE ENCOUNTER
Reason for Disposition   Sounds like a serious complication to the triager    Additional Information   Negative: [1] Major abdominal surgical incision AND [2] wound gaping open AND [3] visible internal organs   Negative: Sounds like a life-threatening emergency to the triager   Negative: Patient has a Negative Pressure Wound Therapy device   Negative: Patient is followed by a wound clinic or wound specialist for this wound   Negative: [1] Bleeding from incision AND [2] won't stop after 10 minutes of direct pressure   Negative: [1] Widespread rash AND [2] bright red, sunburn-like   Negative: Severe pain in the incision   Negative: [1] Incision gaping open AND [2] < 48 hours since wound re-opened   Negative: [1] Incision gaping open AND [2] length of opening > 2 inches (5 cm)   Negative: Patient sounds very sick or weak to the triager    Protocols used: POST-OP INCISION SYMPTOMS AND [id number]-C-AQ  surg 7/29  pt called re told to call if seeing drainage this evening. pt states just felt oozing running out of wound. still wearing brace and steri strips still on. clump of dried material yellowish form wound. afeb. pt admits feeling mild uncomfortable. took pain pill this evening. still coughing and choking when trying to eat or drinking. no CP, no SOB. rash irritated from dressing. uses triamcinolone at site. rec ED. pt requests to speak with MD on call. Spoke with dr lombardo re above. MD warm transferred to speak with pt.

## 2022-08-14 NOTE — TELEPHONE ENCOUNTER
Pt called due to concern after removing her bandage from surgical site because it was itchy. She noticed a yellow colored, dry clump on the bandage, and her neck felt wet. She said there was a small amount of serosanguinous discharge that she cleaned off.    She denies active discharge at this time or continuous draining. She plans to leave her steri-strips on and cover with a new gauze bandage.     She has been instructed to call back or seek care at the ED if the volume of discharge increases, the consistency changes to purulent material or thickens, or if she begins to experience fever, pain, or chills.     Her f/u appt is already scheduled for 08/26.   --  Isaiah Agustin

## 2022-08-15 ENCOUNTER — TELEPHONE (OUTPATIENT)
Dept: ORTHOPEDICS | Facility: CLINIC | Age: 67
End: 2022-08-15
Payer: MEDICARE

## 2022-08-15 NOTE — TELEPHONE ENCOUNTER
ALICIA requesting call back. Checking on patient after she called in this weekend about her wound. Requested she send in a picture of the wound.

## 2022-08-15 NOTE — TELEPHONE ENCOUNTER
----- Message from Rosales Scruggs MD sent at 8/15/2022  3:03 PM CDT -----  Regarding: appt  Could you schedule her an appt tmr to check her wound?

## 2022-08-16 ENCOUNTER — OFFICE VISIT (OUTPATIENT)
Dept: ORTHOPEDICS | Facility: CLINIC | Age: 67
End: 2022-08-16
Payer: MEDICARE

## 2022-08-16 VITALS — HEIGHT: 65 IN | BODY MASS INDEX: 38.53 KG/M2 | WEIGHT: 231.25 LBS

## 2022-08-16 DIAGNOSIS — Z98.1 S/P CERVICAL SPINAL FUSION: Primary | ICD-10-CM

## 2022-08-16 PROCEDURE — 3008F BODY MASS INDEX DOCD: CPT | Mod: CPTII,S$GLB,, | Performed by: ORTHOPAEDIC SURGERY

## 2022-08-16 PROCEDURE — 4010F PR ACE/ARB THEARPY RXD/TAKEN: ICD-10-PCS | Mod: CPTII,S$GLB,, | Performed by: ORTHOPAEDIC SURGERY

## 2022-08-16 PROCEDURE — 3008F PR BODY MASS INDEX (BMI) DOCUMENTED: ICD-10-PCS | Mod: CPTII,S$GLB,, | Performed by: ORTHOPAEDIC SURGERY

## 2022-08-16 PROCEDURE — 3061F PR NEG MICROALBUMINURIA RESULT DOCUMENTED/REVIEW: ICD-10-PCS | Mod: CPTII,S$GLB,, | Performed by: ORTHOPAEDIC SURGERY

## 2022-08-16 PROCEDURE — 3066F NEPHROPATHY DOC TX: CPT | Mod: CPTII,S$GLB,, | Performed by: ORTHOPAEDIC SURGERY

## 2022-08-16 PROCEDURE — 3044F PR MOST RECENT HEMOGLOBIN A1C LEVEL <7.0%: ICD-10-PCS | Mod: CPTII,S$GLB,, | Performed by: ORTHOPAEDIC SURGERY

## 2022-08-16 PROCEDURE — 1101F PR PT FALLS ASSESS DOC 0-1 FALLS W/OUT INJ PAST YR: ICD-10-PCS | Mod: CPTII,S$GLB,, | Performed by: ORTHOPAEDIC SURGERY

## 2022-08-16 PROCEDURE — 99999 PR PBB SHADOW E&M-EST. PATIENT-LVL IV: ICD-10-PCS | Mod: PBBFAC,,, | Performed by: ORTHOPAEDIC SURGERY

## 2022-08-16 PROCEDURE — 1160F PR REVIEW ALL MEDS BY PRESCRIBER/CLIN PHARMACIST DOCUMENTED: ICD-10-PCS | Mod: CPTII,S$GLB,, | Performed by: ORTHOPAEDIC SURGERY

## 2022-08-16 PROCEDURE — 99024 POSTOP FOLLOW-UP VISIT: CPT | Mod: S$GLB,,, | Performed by: ORTHOPAEDIC SURGERY

## 2022-08-16 PROCEDURE — 3288F PR FALLS RISK ASSESSMENT DOCUMENTED: ICD-10-PCS | Mod: CPTII,S$GLB,, | Performed by: ORTHOPAEDIC SURGERY

## 2022-08-16 PROCEDURE — 1159F PR MEDICATION LIST DOCUMENTED IN MEDICAL RECORD: ICD-10-PCS | Mod: CPTII,S$GLB,, | Performed by: ORTHOPAEDIC SURGERY

## 2022-08-16 PROCEDURE — 99999 PR PBB SHADOW E&M-EST. PATIENT-LVL IV: CPT | Mod: PBBFAC,,, | Performed by: ORTHOPAEDIC SURGERY

## 2022-08-16 PROCEDURE — 1101F PT FALLS ASSESS-DOCD LE1/YR: CPT | Mod: CPTII,S$GLB,, | Performed by: ORTHOPAEDIC SURGERY

## 2022-08-16 PROCEDURE — 1160F RVW MEDS BY RX/DR IN RCRD: CPT | Mod: CPTII,S$GLB,, | Performed by: ORTHOPAEDIC SURGERY

## 2022-08-16 PROCEDURE — 3044F HG A1C LEVEL LT 7.0%: CPT | Mod: CPTII,S$GLB,, | Performed by: ORTHOPAEDIC SURGERY

## 2022-08-16 PROCEDURE — 4010F ACE/ARB THERAPY RXD/TAKEN: CPT | Mod: CPTII,S$GLB,, | Performed by: ORTHOPAEDIC SURGERY

## 2022-08-16 PROCEDURE — 3061F NEG MICROALBUMINURIA REV: CPT | Mod: CPTII,S$GLB,, | Performed by: ORTHOPAEDIC SURGERY

## 2022-08-16 PROCEDURE — 3066F PR DOCUMENTATION OF TREATMENT FOR NEPHROPATHY: ICD-10-PCS | Mod: CPTII,S$GLB,, | Performed by: ORTHOPAEDIC SURGERY

## 2022-08-16 PROCEDURE — 99024 PR POST-OP FOLLOW-UP VISIT: ICD-10-PCS | Mod: S$GLB,,, | Performed by: ORTHOPAEDIC SURGERY

## 2022-08-16 PROCEDURE — 1159F MED LIST DOCD IN RCRD: CPT | Mod: CPTII,S$GLB,, | Performed by: ORTHOPAEDIC SURGERY

## 2022-08-16 PROCEDURE — 3288F FALL RISK ASSESSMENT DOCD: CPT | Mod: CPTII,S$GLB,, | Performed by: ORTHOPAEDIC SURGERY

## 2022-08-16 RX ORDER — SULFAMETHOXAZOLE AND TRIMETHOPRIM 800; 160 MG/1; MG/1
1 TABLET ORAL 2 TIMES DAILY
Qty: 14 TABLET | Refills: 0 | Status: SHIPPED | OUTPATIENT
Start: 2022-08-16 | End: 2022-09-11

## 2022-08-16 NOTE — PROGRESS NOTES
Date of Surgery: 7/29/22    Procedure: C5-7 ACDF    History: Regine Osorio is seen today for follow-up following the above listed procedure. Overall the patient is doing well but today notes some bilateral shoulder pain. She stated that she started having mild serous drainage from wound on Saturday, that was associated with itching. She denied of fever, chills, n/v/d or foul smelling wound.    Exam: Incision had surrounding yellow crusted residues without active drainage. No drainage expressible. No redness around wound. Neuro exam is stable. No signs of DVT.    Radiographs: none today    Assessment/Plan: Doing well postoperatively. Suspect reaction to surgical dressing. Wound was cleaned with betadine. Patient will paint wound BID with betadine and cover with clean gauze for 7 days. I prescribed bactrim BID for 7 days. Adequate dressing material provided for the patient. I will plan to see the patient back for the next postop visit in 1 weeks. Thank you for the opportunity to participate in this patient's care. Please give me a call if there are any concerns or questions.    Rosales Scruggs MD  Orthopaedic Spine Surgeon  Department of Orthopaedic Surgery  [phone]

## 2022-08-17 ENCOUNTER — EXTERNAL HOME HEALTH (OUTPATIENT)
Dept: HOME HEALTH SERVICES | Facility: HOSPITAL | Age: 67
End: 2022-08-17
Payer: MEDICARE

## 2022-08-22 ENCOUNTER — PATIENT MESSAGE (OUTPATIENT)
Dept: ORTHOPEDICS | Facility: CLINIC | Age: 67
End: 2022-08-22
Payer: MEDICARE

## 2022-08-26 ENCOUNTER — OFFICE VISIT (OUTPATIENT)
Dept: ORTHOPEDICS | Facility: CLINIC | Age: 67
End: 2022-08-26
Payer: MEDICARE

## 2022-08-26 ENCOUNTER — HOSPITAL ENCOUNTER (OUTPATIENT)
Dept: RADIOLOGY | Facility: HOSPITAL | Age: 67
Discharge: HOME | End: 2022-08-26
Attending: ORTHOPAEDIC SURGERY
Payer: MEDICARE

## 2022-08-26 VITALS — BODY MASS INDEX: 38.53 KG/M2 | WEIGHT: 231.25 LBS | HEIGHT: 65 IN

## 2022-08-26 DIAGNOSIS — Z98.1 S/P CERVICAL SPINAL FUSION: Primary | ICD-10-CM

## 2022-08-26 DIAGNOSIS — Z98.890 S/P SPINAL SURGERY: ICD-10-CM

## 2022-08-26 PROCEDURE — 4010F PR ACE/ARB THEARPY RXD/TAKEN: ICD-10-PCS | Mod: CPTII,S$GLB,, | Performed by: ORTHOPAEDIC SURGERY

## 2022-08-26 PROCEDURE — 4010F ACE/ARB THERAPY RXD/TAKEN: CPT | Mod: CPTII,S$GLB,, | Performed by: ORTHOPAEDIC SURGERY

## 2022-08-26 PROCEDURE — 72040 XR CERVICAL SPINE AP LATERAL: ICD-10-PCS | Mod: 26,,, | Performed by: STUDENT IN AN ORGANIZED HEALTH CARE EDUCATION/TRAINING PROGRAM

## 2022-08-26 PROCEDURE — 3044F PR MOST RECENT HEMOGLOBIN A1C LEVEL <7.0%: ICD-10-PCS | Mod: CPTII,S$GLB,, | Performed by: ORTHOPAEDIC SURGERY

## 2022-08-26 PROCEDURE — 1159F PR MEDICATION LIST DOCUMENTED IN MEDICAL RECORD: ICD-10-PCS | Mod: CPTII,S$GLB,, | Performed by: ORTHOPAEDIC SURGERY

## 2022-08-26 PROCEDURE — 3066F PR DOCUMENTATION OF TREATMENT FOR NEPHROPATHY: ICD-10-PCS | Mod: CPTII,S$GLB,, | Performed by: ORTHOPAEDIC SURGERY

## 2022-08-26 PROCEDURE — 3061F NEG MICROALBUMINURIA REV: CPT | Mod: CPTII,S$GLB,, | Performed by: ORTHOPAEDIC SURGERY

## 2022-08-26 PROCEDURE — 72040 X-RAY EXAM NECK SPINE 2-3 VW: CPT | Mod: TC

## 2022-08-26 PROCEDURE — 3288F FALL RISK ASSESSMENT DOCD: CPT | Mod: CPTII,S$GLB,, | Performed by: ORTHOPAEDIC SURGERY

## 2022-08-26 PROCEDURE — 1101F PR PT FALLS ASSESS DOC 0-1 FALLS W/OUT INJ PAST YR: ICD-10-PCS | Mod: CPTII,S$GLB,, | Performed by: ORTHOPAEDIC SURGERY

## 2022-08-26 PROCEDURE — 3008F BODY MASS INDEX DOCD: CPT | Mod: CPTII,S$GLB,, | Performed by: ORTHOPAEDIC SURGERY

## 2022-08-26 PROCEDURE — 3066F NEPHROPATHY DOC TX: CPT | Mod: CPTII,S$GLB,, | Performed by: ORTHOPAEDIC SURGERY

## 2022-08-26 PROCEDURE — 3288F PR FALLS RISK ASSESSMENT DOCUMENTED: ICD-10-PCS | Mod: CPTII,S$GLB,, | Performed by: ORTHOPAEDIC SURGERY

## 2022-08-26 PROCEDURE — 3044F HG A1C LEVEL LT 7.0%: CPT | Mod: CPTII,S$GLB,, | Performed by: ORTHOPAEDIC SURGERY

## 2022-08-26 PROCEDURE — 72040 X-RAY EXAM NECK SPINE 2-3 VW: CPT | Mod: 26,,, | Performed by: STUDENT IN AN ORGANIZED HEALTH CARE EDUCATION/TRAINING PROGRAM

## 2022-08-26 PROCEDURE — 1159F MED LIST DOCD IN RCRD: CPT | Mod: CPTII,S$GLB,, | Performed by: ORTHOPAEDIC SURGERY

## 2022-08-26 PROCEDURE — 99024 PR POST-OP FOLLOW-UP VISIT: ICD-10-PCS | Mod: S$GLB,,, | Performed by: ORTHOPAEDIC SURGERY

## 2022-08-26 PROCEDURE — 99999 PR PBB SHADOW E&M-EST. PATIENT-LVL IV: CPT | Mod: PBBFAC,,, | Performed by: ORTHOPAEDIC SURGERY

## 2022-08-26 PROCEDURE — 99999 PR PBB SHADOW E&M-EST. PATIENT-LVL IV: ICD-10-PCS | Mod: PBBFAC,,, | Performed by: ORTHOPAEDIC SURGERY

## 2022-08-26 PROCEDURE — 3061F PR NEG MICROALBUMINURIA RESULT DOCUMENTED/REVIEW: ICD-10-PCS | Mod: CPTII,S$GLB,, | Performed by: ORTHOPAEDIC SURGERY

## 2022-08-26 PROCEDURE — 3008F PR BODY MASS INDEX (BMI) DOCUMENTED: ICD-10-PCS | Mod: CPTII,S$GLB,, | Performed by: ORTHOPAEDIC SURGERY

## 2022-08-26 PROCEDURE — 99024 POSTOP FOLLOW-UP VISIT: CPT | Mod: S$GLB,,, | Performed by: ORTHOPAEDIC SURGERY

## 2022-08-26 PROCEDURE — 1101F PT FALLS ASSESS-DOCD LE1/YR: CPT | Mod: CPTII,S$GLB,, | Performed by: ORTHOPAEDIC SURGERY

## 2022-08-26 NOTE — PROGRESS NOTES
Date: 08/26/2022    Supervising Physician: Rosales Scruggs M.D.    Date of Surgery: 7/29/22    Procedure: C5-7 ACDF    History: Regine Osorio is seen today for follow-up following the above listed procedure. Overall the patient is doing well but today notes she continues to have neck and upper back/shoulder pain. However, she says the pain is better than it was immediately after surgery. She is currently not taking medication for pain.  she denies fever, chills, and sweats since the time of the surgery. She reports no change in her right hand strength since surgery.      Exam: C collar removed.  Incision is healing well, clean, dry and intact.   There is no sign of infection. Neuro exam is stable. No signs of DVT.    Radiographs: hardware in place no failure    Assessment/Plan: 4 weeks post op.    Doing well postoperatively.  reviewed. She will resume mobic and zanaflex PRN for pain relief. Will wean out of collar in 2 weeks.     I will plan to see the patient back for the next postop visit in 2 months. Will schedule PT at that time.    Thank you for the opportunity to participate in this patient's care. Please give me a call if there are any concerns or questions.

## 2022-08-30 ENCOUNTER — PATIENT MESSAGE (OUTPATIENT)
Dept: ADMINISTRATIVE | Facility: OTHER | Age: 67
End: 2022-08-30
Payer: MEDICARE

## 2022-09-05 ENCOUNTER — PATIENT MESSAGE (OUTPATIENT)
Dept: ORTHOPEDICS | Facility: CLINIC | Age: 67
End: 2022-09-05
Payer: MEDICARE

## 2022-09-06 ENCOUNTER — PATIENT MESSAGE (OUTPATIENT)
Dept: PAIN MEDICINE | Facility: CLINIC | Age: 67
End: 2022-09-06
Payer: MEDICARE

## 2022-09-06 NOTE — TELEPHONE ENCOUNTER
Staff contacted patient to r/s her appt. Patient was scheduled 10/17 at 11:45 am. Patient verbalized understanding.

## 2022-09-11 ENCOUNTER — OFFICE VISIT (OUTPATIENT)
Dept: URGENT CARE | Facility: CLINIC | Age: 67
End: 2022-09-11
Payer: MEDICARE

## 2022-09-11 VITALS
SYSTOLIC BLOOD PRESSURE: 133 MMHG | HEART RATE: 84 BPM | WEIGHT: 231 LBS | OXYGEN SATURATION: 99 % | HEIGHT: 65 IN | BODY MASS INDEX: 38.49 KG/M2 | RESPIRATION RATE: 16 BRPM | TEMPERATURE: 98 F | DIASTOLIC BLOOD PRESSURE: 75 MMHG

## 2022-09-11 DIAGNOSIS — R30.0 DYSURIA: Primary | ICD-10-CM

## 2022-09-11 DIAGNOSIS — R35.0 URINARY FREQUENCY: ICD-10-CM

## 2022-09-11 DIAGNOSIS — R39.15 URINARY URGENCY: ICD-10-CM

## 2022-09-11 LAB
BILIRUB UR QL STRIP: NEGATIVE
GLUCOSE UR QL STRIP: NEGATIVE
KETONES UR QL STRIP: NEGATIVE
LEUKOCYTE ESTERASE UR QL STRIP: NEGATIVE
PH, POC UA: 5 (ref 5–8)
POC BLOOD, URINE: NEGATIVE
POC NITRATES, URINE: NEGATIVE
PROT UR QL STRIP: NEGATIVE
SP GR UR STRIP: 1.02 (ref 1–1.03)
UROBILINOGEN UR STRIP-ACNC: NORMAL (ref 0.1–1.1)

## 2022-09-11 PROCEDURE — 99213 PR OFFICE/OUTPT VISIT, EST, LEVL III, 20-29 MIN: ICD-10-PCS | Mod: S$GLB,,, | Performed by: NURSE PRACTITIONER

## 2022-09-11 PROCEDURE — 1160F RVW MEDS BY RX/DR IN RCRD: CPT | Mod: CPTII,S$GLB,, | Performed by: NURSE PRACTITIONER

## 2022-09-11 PROCEDURE — 3066F NEPHROPATHY DOC TX: CPT | Mod: CPTII,S$GLB,, | Performed by: NURSE PRACTITIONER

## 2022-09-11 PROCEDURE — 87088 URINE BACTERIA CULTURE: CPT | Performed by: NURSE PRACTITIONER

## 2022-09-11 PROCEDURE — 81003 URINALYSIS AUTO W/O SCOPE: CPT | Mod: QW,S$GLB,, | Performed by: NURSE PRACTITIONER

## 2022-09-11 PROCEDURE — 3066F PR DOCUMENTATION OF TREATMENT FOR NEPHROPATHY: ICD-10-PCS | Mod: CPTII,S$GLB,, | Performed by: NURSE PRACTITIONER

## 2022-09-11 PROCEDURE — 4010F PR ACE/ARB THEARPY RXD/TAKEN: ICD-10-PCS | Mod: CPTII,S$GLB,, | Performed by: NURSE PRACTITIONER

## 2022-09-11 PROCEDURE — 81003 POCT URINALYSIS, DIPSTICK, AUTOMATED, W/O SCOPE: ICD-10-PCS | Mod: QW,S$GLB,, | Performed by: NURSE PRACTITIONER

## 2022-09-11 PROCEDURE — 87077 CULTURE AEROBIC IDENTIFY: CPT | Performed by: NURSE PRACTITIONER

## 2022-09-11 PROCEDURE — 4010F ACE/ARB THERAPY RXD/TAKEN: CPT | Mod: CPTII,S$GLB,, | Performed by: NURSE PRACTITIONER

## 2022-09-11 PROCEDURE — 3061F NEG MICROALBUMINURIA REV: CPT | Mod: CPTII,S$GLB,, | Performed by: NURSE PRACTITIONER

## 2022-09-11 PROCEDURE — 3044F PR MOST RECENT HEMOGLOBIN A1C LEVEL <7.0%: ICD-10-PCS | Mod: CPTII,S$GLB,, | Performed by: NURSE PRACTITIONER

## 2022-09-11 PROCEDURE — 1159F MED LIST DOCD IN RCRD: CPT | Mod: CPTII,S$GLB,, | Performed by: NURSE PRACTITIONER

## 2022-09-11 PROCEDURE — 3075F PR MOST RECENT SYSTOLIC BLOOD PRESS GE 130-139MM HG: ICD-10-PCS | Mod: CPTII,S$GLB,, | Performed by: NURSE PRACTITIONER

## 2022-09-11 PROCEDURE — 3044F HG A1C LEVEL LT 7.0%: CPT | Mod: CPTII,S$GLB,, | Performed by: NURSE PRACTITIONER

## 2022-09-11 PROCEDURE — 3008F PR BODY MASS INDEX (BMI) DOCUMENTED: ICD-10-PCS | Mod: CPTII,S$GLB,, | Performed by: NURSE PRACTITIONER

## 2022-09-11 PROCEDURE — 3061F PR NEG MICROALBUMINURIA RESULT DOCUMENTED/REVIEW: ICD-10-PCS | Mod: CPTII,S$GLB,, | Performed by: NURSE PRACTITIONER

## 2022-09-11 PROCEDURE — 87186 SC STD MICRODIL/AGAR DIL: CPT | Performed by: NURSE PRACTITIONER

## 2022-09-11 PROCEDURE — 3075F SYST BP GE 130 - 139MM HG: CPT | Mod: CPTII,S$GLB,, | Performed by: NURSE PRACTITIONER

## 2022-09-11 PROCEDURE — 99213 OFFICE O/P EST LOW 20 MIN: CPT | Mod: S$GLB,,, | Performed by: NURSE PRACTITIONER

## 2022-09-11 PROCEDURE — 87086 URINE CULTURE/COLONY COUNT: CPT | Performed by: NURSE PRACTITIONER

## 2022-09-11 PROCEDURE — 3078F PR MOST RECENT DIASTOLIC BLOOD PRESSURE < 80 MM HG: ICD-10-PCS | Mod: CPTII,S$GLB,, | Performed by: NURSE PRACTITIONER

## 2022-09-11 PROCEDURE — 3008F BODY MASS INDEX DOCD: CPT | Mod: CPTII,S$GLB,, | Performed by: NURSE PRACTITIONER

## 2022-09-11 PROCEDURE — 1159F PR MEDICATION LIST DOCUMENTED IN MEDICAL RECORD: ICD-10-PCS | Mod: CPTII,S$GLB,, | Performed by: NURSE PRACTITIONER

## 2022-09-11 PROCEDURE — 3078F DIAST BP <80 MM HG: CPT | Mod: CPTII,S$GLB,, | Performed by: NURSE PRACTITIONER

## 2022-09-11 PROCEDURE — 1160F PR REVIEW ALL MEDS BY PRESCRIBER/CLIN PHARMACIST DOCUMENTED: ICD-10-PCS | Mod: CPTII,S$GLB,, | Performed by: NURSE PRACTITIONER

## 2022-09-11 RX ORDER — NITROFURANTOIN 25; 75 MG/1; MG/1
100 CAPSULE ORAL 2 TIMES DAILY
Qty: 10 CAPSULE | Refills: 0 | Status: SHIPPED | OUTPATIENT
Start: 2022-09-11 | End: 2022-09-16

## 2022-09-11 RX ORDER — PHENAZOPYRIDINE HYDROCHLORIDE 100 MG/1
100 TABLET, FILM COATED ORAL 3 TIMES DAILY PRN
Qty: 6 TABLET | Refills: 0 | Status: SHIPPED | OUTPATIENT
Start: 2022-09-11 | End: 2022-09-13

## 2022-09-11 NOTE — PROGRESS NOTES
"Subjective:       Patient ID: Regine Osorio is a 67 y.o. female.    Vitals:  height is 5' 5" (1.651 m) and weight is 104.8 kg (231 lb). Her temperature is 97.8 °F (36.6 °C). Her blood pressure is 133/75 and her pulse is 84. Her respiration is 16 and oxygen saturation is 99%.     Chief Complaint: Dysuria    67 year old female presents to clinic with reports of a one day history of urinary urgency, urinary frequency, lower abdominal pressure.     Dysuria   This is a new problem. The current episode started yesterday. The problem occurs every urination. The problem has been unchanged. The quality of the pain is described as burning. The pain is at a severity of 4/10. There has been no fever. Associated symptoms include frequency and urgency. Pertinent negatives include no chills, discharge, hesitancy, possible pregnancy, vomiting, weight loss, constipation, rash or withholding. She has tried increased fluids for the symptoms. There is no history of diabetes insipidus, genitourinary reflux, recurrent UTIs, a single kidney, urinary stasis or a urological procedure.     Constitution: Negative for chills, sweating, fatigue and fever.   Gastrointestinal:  Positive for abdominal pain. Negative for vomiting and constipation.   Genitourinary:  Positive for dysuria, frequency and urgency.   Skin:  Negative for rash.   Neurological:  Negative for disorientation, altered mental status and loss of consciousness.   Psychiatric/Behavioral:  Negative for altered mental status and disorientation.      Objective:      Physical Exam   Constitutional: She is oriented to person, place, and time. She appears well-developed.   HENT:   Head: Normocephalic and atraumatic.   Ears:   Right Ear: External ear normal.   Left Ear: External ear normal.   Nose: No nasal deformity. No epistaxis.   Mouth/Throat: Oropharynx is clear and moist and mucous membranes are normal.   Eyes: Lids are normal.   Neck: Trachea normal and phonation normal. Neck " supple.   Cardiovascular: Normal rate, regular rhythm and normal heart sounds.   Pulmonary/Chest: Effort normal.   Abdominal: Normal appearance and bowel sounds are normal. She exhibits no distension. Soft. There is abdominal tenderness in the suprapubic area. There is no left CVA tenderness and no right CVA tenderness.   Neurological: She is alert and oriented to person, place, and time.   Skin: Skin is warm, dry and intact.   Psychiatric: Her speech is normal and behavior is normal.   Nursing note and vitals reviewed.      Assessment:       1. Dysuria    2. Urinary frequency    3. Urinary urgency        Results for orders placed or performed in visit on 09/11/22   POCT Urinalysis, Dipstick, Automated, W/O Scope   Result Value Ref Range    POC Blood, Urine Negative Negative    POC Bilirubin, Urine Negative Negative    POC Urobilinogen, Urine normal 0.1 - 1.1    POC Ketones, Urine Negative Negative    POC Protein, Urine Negative Negative    POC Nitrates, Urine Negative Negative    POC Glucose, Urine Negative Negative    pH, UA 5.0 5 - 8    POC Specific Gravity, Urine 1.020 1.003 - 1.029    POC Leukocytes, Urine Negative Negative      Plan:         Dysuria  -     POCT Urinalysis, Dipstick, Automated, W/O Scope  -     CULTURE, URINE  -     nitrofurantoin, macrocrystal-monohydrate, (MACROBID) 100 MG capsule; Take 1 capsule (100 mg total) by mouth 2 (two) times daily. for 5 days  Dispense: 10 capsule; Refill: 0  -     phenazopyridine (PYRIDIUM) 100 MG tablet; Take 1 tablet (100 mg total) by mouth 3 (three) times daily as needed for Pain.  Dispense: 6 tablet; Refill: 0    Urinary frequency  -     CULTURE, URINE  -     nitrofurantoin, macrocrystal-monohydrate, (MACROBID) 100 MG capsule; Take 1 capsule (100 mg total) by mouth 2 (two) times daily. for 5 days  Dispense: 10 capsule; Refill: 0    Urinary urgency  -     CULTURE, URINE  -     nitrofurantoin, macrocrystal-monohydrate, (MACROBID) 100 MG capsule; Take 1 capsule  (100 mg total) by mouth 2 (two) times daily. for 5 days  Dispense: 10 capsule; Refill: 0       Patient Instructions   Reviewed negative urinalysis test with patient who verbalized understanding.      Discussed starting Macrobid on today and ordering a urine culture to confirm urinalysis results.      Discussed AZO over-the-counter which can help symptoms of lower abdominal pressure.      Patient does not report a history of antibiotic induced yeast infections     I reviewed discharge instructions with patient who verbalized understanding and agrees with plan of care.      We also discussed at home treatment for symptoms which she acknowledged.     Patient denied any further questions or concerns at this time.      She exits the exam room in no acute distress.

## 2022-09-11 NOTE — PATIENT INSTRUCTIONS
Reviewed negative urinalysis test with patient who verbalized understanding.      Discussed starting Macrobid on today and ordering a urine culture to confirm urinalysis results.      Discussed AZO over-the-counter which can help symptoms of lower abdominal pressure.      Patient does not report a history of antibiotic induced yeast infections     I reviewed discharge instructions with patient who verbalized understanding and agrees with plan of care.      We also discussed at home treatment for symptoms which she acknowledged.     Patient denied any further questions or concerns at this time.      She exits the exam room in no acute distress.

## 2022-09-12 ENCOUNTER — PATIENT MESSAGE (OUTPATIENT)
Dept: DERMATOLOGY | Facility: CLINIC | Age: 67
End: 2022-09-12
Payer: MEDICARE

## 2022-09-13 ENCOUNTER — TELEPHONE (OUTPATIENT)
Dept: URGENT CARE | Facility: CLINIC | Age: 67
End: 2022-09-13
Payer: MEDICARE

## 2022-09-13 LAB — BACTERIA UR CULT: ABNORMAL

## 2022-09-13 NOTE — TELEPHONE ENCOUNTER
Contacted patient regarding her positive urine culture.  Verified ID x2.  Patient was treated appropriately and is feeling much better.  Answered all questions.  Patient thanked me for the call.

## 2022-09-14 DIAGNOSIS — L29.9 SCALP ITCH: Primary | ICD-10-CM

## 2022-09-14 RX ORDER — CLOBETASOL PROPIONATE 0.46 MG/ML
SOLUTION TOPICAL 2 TIMES DAILY
Qty: 50 ML | Refills: 0 | Status: SHIPPED | OUTPATIENT
Start: 2022-09-14 | End: 2022-11-08

## 2022-09-19 ENCOUNTER — TELEPHONE (OUTPATIENT)
Dept: SURGERY | Facility: CLINIC | Age: 67
End: 2022-09-19
Payer: MEDICARE

## 2022-09-19 ENCOUNTER — OFFICE VISIT (OUTPATIENT)
Dept: HEMATOLOGY/ONCOLOGY | Facility: CLINIC | Age: 67
End: 2022-09-19
Payer: MEDICARE

## 2022-09-19 VITALS
DIASTOLIC BLOOD PRESSURE: 63 MMHG | HEART RATE: 103 BPM | OXYGEN SATURATION: 97 % | WEIGHT: 231.69 LBS | HEIGHT: 65 IN | RESPIRATION RATE: 16 BRPM | SYSTOLIC BLOOD PRESSURE: 133 MMHG | BODY MASS INDEX: 38.6 KG/M2

## 2022-09-19 DIAGNOSIS — Z12.31 ENCOUNTER FOR SCREENING MAMMOGRAM FOR MALIGNANT NEOPLASM OF BREAST: ICD-10-CM

## 2022-09-19 DIAGNOSIS — Z85.3 HISTORY OF BREAST CANCER IN FEMALE: Primary | ICD-10-CM

## 2022-09-19 PROCEDURE — 1101F PR PT FALLS ASSESS DOC 0-1 FALLS W/OUT INJ PAST YR: ICD-10-PCS | Mod: CPTII,S$GLB,, | Performed by: INTERNAL MEDICINE

## 2022-09-19 PROCEDURE — 3061F PR NEG MICROALBUMINURIA RESULT DOCUMENTED/REVIEW: ICD-10-PCS | Mod: CPTII,S$GLB,, | Performed by: INTERNAL MEDICINE

## 2022-09-19 PROCEDURE — 3075F PR MOST RECENT SYSTOLIC BLOOD PRESS GE 130-139MM HG: ICD-10-PCS | Mod: CPTII,S$GLB,, | Performed by: INTERNAL MEDICINE

## 2022-09-19 PROCEDURE — 1101F PT FALLS ASSESS-DOCD LE1/YR: CPT | Mod: CPTII,S$GLB,, | Performed by: INTERNAL MEDICINE

## 2022-09-19 PROCEDURE — 3066F PR DOCUMENTATION OF TREATMENT FOR NEPHROPATHY: ICD-10-PCS | Mod: CPTII,S$GLB,, | Performed by: INTERNAL MEDICINE

## 2022-09-19 PROCEDURE — 3044F HG A1C LEVEL LT 7.0%: CPT | Mod: CPTII,S$GLB,, | Performed by: INTERNAL MEDICINE

## 2022-09-19 PROCEDURE — 99999 PR PBB SHADOW E&M-EST. PATIENT-LVL IV: ICD-10-PCS | Mod: PBBFAC,,, | Performed by: INTERNAL MEDICINE

## 2022-09-19 PROCEDURE — 1159F PR MEDICATION LIST DOCUMENTED IN MEDICAL RECORD: ICD-10-PCS | Mod: CPTII,S$GLB,, | Performed by: INTERNAL MEDICINE

## 2022-09-19 PROCEDURE — 4010F PR ACE/ARB THEARPY RXD/TAKEN: ICD-10-PCS | Mod: CPTII,S$GLB,, | Performed by: INTERNAL MEDICINE

## 2022-09-19 PROCEDURE — 3044F PR MOST RECENT HEMOGLOBIN A1C LEVEL <7.0%: ICD-10-PCS | Mod: CPTII,S$GLB,, | Performed by: INTERNAL MEDICINE

## 2022-09-19 PROCEDURE — 3288F PR FALLS RISK ASSESSMENT DOCUMENTED: ICD-10-PCS | Mod: CPTII,S$GLB,, | Performed by: INTERNAL MEDICINE

## 2022-09-19 PROCEDURE — 3061F NEG MICROALBUMINURIA REV: CPT | Mod: CPTII,S$GLB,, | Performed by: INTERNAL MEDICINE

## 2022-09-19 PROCEDURE — 3066F NEPHROPATHY DOC TX: CPT | Mod: CPTII,S$GLB,, | Performed by: INTERNAL MEDICINE

## 2022-09-19 PROCEDURE — 3078F DIAST BP <80 MM HG: CPT | Mod: CPTII,S$GLB,, | Performed by: INTERNAL MEDICINE

## 2022-09-19 PROCEDURE — 4010F ACE/ARB THERAPY RXD/TAKEN: CPT | Mod: CPTII,S$GLB,, | Performed by: INTERNAL MEDICINE

## 2022-09-19 PROCEDURE — 1159F MED LIST DOCD IN RCRD: CPT | Mod: CPTII,S$GLB,, | Performed by: INTERNAL MEDICINE

## 2022-09-19 PROCEDURE — 3008F BODY MASS INDEX DOCD: CPT | Mod: CPTII,S$GLB,, | Performed by: INTERNAL MEDICINE

## 2022-09-19 PROCEDURE — 3075F SYST BP GE 130 - 139MM HG: CPT | Mod: CPTII,S$GLB,, | Performed by: INTERNAL MEDICINE

## 2022-09-19 PROCEDURE — 99999 PR PBB SHADOW E&M-EST. PATIENT-LVL IV: CPT | Mod: PBBFAC,,, | Performed by: INTERNAL MEDICINE

## 2022-09-19 PROCEDURE — 3078F PR MOST RECENT DIASTOLIC BLOOD PRESSURE < 80 MM HG: ICD-10-PCS | Mod: CPTII,S$GLB,, | Performed by: INTERNAL MEDICINE

## 2022-09-19 PROCEDURE — 99214 OFFICE O/P EST MOD 30 MIN: CPT | Mod: S$GLB,,, | Performed by: INTERNAL MEDICINE

## 2022-09-19 PROCEDURE — 99214 PR OFFICE/OUTPT VISIT, EST, LEVL IV, 30-39 MIN: ICD-10-PCS | Mod: S$GLB,,, | Performed by: INTERNAL MEDICINE

## 2022-09-19 PROCEDURE — 3288F FALL RISK ASSESSMENT DOCD: CPT | Mod: CPTII,S$GLB,, | Performed by: INTERNAL MEDICINE

## 2022-09-19 PROCEDURE — 3008F PR BODY MASS INDEX (BMI) DOCUMENTED: ICD-10-PCS | Mod: CPTII,S$GLB,, | Performed by: INTERNAL MEDICINE

## 2022-09-19 RX ORDER — SUMATRIPTAN SUCCINATE 100 MG/1
100 TABLET ORAL
COMMUNITY
End: 2023-01-09 | Stop reason: ALTCHOICE

## 2022-09-19 NOTE — PROGRESS NOTES
Subjective:       Patient ID: Regine Osorio is a 67 y.o. female.    Chief Complaint: No chief complaint on file.    HPI      Mrs. Alfred returns today for follow up.  I had last seen her in April 2022.     Briefly, she is a 67-year-old female who has a history of a stage I triple negative breast cancer that was diagnosed in late 2017.  She underwent a left modified radical mastectomy in [facility] on 11/01/2017 and had a stage IA carcinoma measuring 1.2 cm in greatest diameter.  Resection margins were clear, while four sentinel lymph nodes were negative.  Postoperatively, she received four cycles of docetaxel and cyclophosphamide.  The first cycle was administered on 11/30/2017 and she completed her treatment by 02/01/2018.  She has been followed expectantly and has been MERRY since then.  We assumed her care when she relocated to [place] 3 years ago.    Her mammogram last April was read as BIRADS I, and a one year follow up was recommended.        Review of Systems    Overall she feels OK, however, she states that she recently felt a scaly lump on her right nipple in the area is pruritic she is concerned about it.  Other than that she has no additional complaints..  ECOG PS is 1.  She denies any anxiety, depression, easy bruising, fevers, chills, night  sweats, weight loss, nausea, vomiting, diarrhea, constipation, diplopia, blurred vision, headache, chest pain, palpitations, shortness of breath, breast pain, lower abdominal pain, extremity pain, or difficulty ambulating.  The remainder of the ten-point ROS, including general, skin, lymph, H/N, cardiorespiratory, GI, , Neuro, Endocrine, and psychiatric is negative.     Objective:      Physical Exam      She is alert, oriented to time, place, person, pleasant, well      nourished, in no acute physical distress.                                    VITAL SIGNS:  Reviewed                                      HEENT:  Normal.  There are no nasal, oral, lip,  gingival, auricular, lid,    or conjunctival lesions.  Mucosae are moist and pink, and there is no        thrush.  Pupils are equal, reactive to light and accommodation.              Extraocular muscle movements are intact.  Dentition is good.  There is no frontal or maxillary tenderness.                                     NECK:  Supple without JVD, adenopathy, but she does have thyromegaly.                       LUNGS:  Clear to auscultation without wheezing, rales, or rhonchi.           CARDIOVASCULAR:  Reveals an S1, S2, no murmurs, no rubs, no gallops.         ABDOMEN:  Soft, nontender, without organomegaly.  Bowel sounds are    present.                                                                     EXTREMITIES:  No cyanosis, clubbing, or edema.                               BREASTS:  She is status post left mastectomy with a well-healed periareolar incision.    There are no masses in the right breast.  On the right areola at the 6 o'clock position there is a 3 mm area of skin desquamation compatible with seborrheic keratosis.  I do not palpate any masses underneath.    LYMPHATIC:  There is no cervical, axillary, or supraclavicular adenopathy.   SKIN:  Warm and moist, without petechiae, rashes, induration, or ecchymoses.           NEUROLOGIC:  DTRs are 0-1+ bilaterally, symmetrical, motor function is 5/5,  and cranial nerves are  within normal limits.    Assessment:       1. History of breast cancer in female, P7mV1O8, status post mastectomy and 4 cycles of docetaxel cyclophosphamide, clinically MERRY, doing well.        2.     Probable seborrheic keratosis, right knee  Plan:         Clinically Mrs. Alfred remains in remission.  In regards to the small area of concern which I think the seborrheic keratosis, out of abundance of caution we will initiate a referral to 1 of our breast surgeons for possible biopsy.  Assuming that there is no further workup needed or that a biopsy is negative,, I will see her  again in April with her yearly mammogram.  Her multiple questions were answered to her satisfaction.      Route Chart for Scheduling    Med Onc Chart Routing      Follow up with physician 6 weeks and other. See me after April 5th with a R mamogram.  Needs to see 1 of the breast surgeons ASAP please I have emailed the navigators   Follow up with [facility]    Infusion scheduling note    Injection scheduling note    Labs    Imaging Mammogram      Pharmacy appointment    Other referrals Additional referrals needed

## 2022-09-19 NOTE — TELEPHONE ENCOUNTER
----- Message from Héctor Lund MD sent at 9/19/2022  7:53 AM CDT -----  Good morning to my to favored navigators:  This is a patient had a triple -5 years ago who has what to me looks like a seborrheic keratosis on the right nipple..  Can we please have her see 1 of our world renowned breast surgeons?  Thanks,    CT

## 2022-09-20 ENCOUNTER — OFFICE VISIT (OUTPATIENT)
Dept: PRIMARY CARE CLINIC | Facility: CLINIC | Age: 67
End: 2022-09-20
Payer: MEDICARE

## 2022-09-20 VITALS
BODY MASS INDEX: 38.26 KG/M2 | DIASTOLIC BLOOD PRESSURE: 65 MMHG | OXYGEN SATURATION: 97 % | SYSTOLIC BLOOD PRESSURE: 128 MMHG | HEART RATE: 107 BPM | HEIGHT: 65 IN | WEIGHT: 229.63 LBS

## 2022-09-20 DIAGNOSIS — I15.2 HYPERTENSION ASSOCIATED WITH TYPE 2 DIABETES MELLITUS: Primary | ICD-10-CM

## 2022-09-20 DIAGNOSIS — M79.7 FIBROMYALGIA: ICD-10-CM

## 2022-09-20 DIAGNOSIS — Z79.899 MEDICATION MANAGEMENT: ICD-10-CM

## 2022-09-20 DIAGNOSIS — G43.009 MIGRAINE WITHOUT AURA AND WITHOUT STATUS MIGRAINOSUS, NOT INTRACTABLE: ICD-10-CM

## 2022-09-20 DIAGNOSIS — Z85.3 HISTORY OF BREAST CANCER IN FEMALE: ICD-10-CM

## 2022-09-20 DIAGNOSIS — G47.33 OBSTRUCTIVE SLEEP APNEA HYPOPNEA, MILD: ICD-10-CM

## 2022-09-20 DIAGNOSIS — Z90.12 S/P LEFT MASTECTOMY: ICD-10-CM

## 2022-09-20 DIAGNOSIS — Z98.1 STATUS POST CERVICAL SPINAL FUSION: ICD-10-CM

## 2022-09-20 DIAGNOSIS — E11.9 TYPE 2 DIABETES MELLITUS WITHOUT COMPLICATION, WITHOUT LONG-TERM CURRENT USE OF INSULIN: ICD-10-CM

## 2022-09-20 DIAGNOSIS — M48.062 SPINAL STENOSIS OF LUMBAR REGION WITH NEUROGENIC CLAUDICATION: ICD-10-CM

## 2022-09-20 DIAGNOSIS — G95.89 MYELOMALACIA OF CERVICAL CORD: ICD-10-CM

## 2022-09-20 DIAGNOSIS — Z98.890 STATUS POST CERVICAL DISCECTOMY: ICD-10-CM

## 2022-09-20 DIAGNOSIS — E11.59 HYPERTENSION ASSOCIATED WITH TYPE 2 DIABETES MELLITUS: Primary | ICD-10-CM

## 2022-09-20 DIAGNOSIS — M48.02 CERVICAL STENOSIS OF SPINE: ICD-10-CM

## 2022-09-20 DIAGNOSIS — I70.0 ATHEROSCLEROSIS OF AORTA: ICD-10-CM

## 2022-09-20 DIAGNOSIS — G47.00 INSOMNIA, UNSPECIFIED TYPE: ICD-10-CM

## 2022-09-20 DIAGNOSIS — M47.812 CERVICAL SPONDYLOSIS: ICD-10-CM

## 2022-09-20 PROCEDURE — 99999 PR PBB SHADOW E&M-EST. PATIENT-LVL IV: CPT | Mod: PBBFAC,,, | Performed by: FAMILY MEDICINE

## 2022-09-20 PROCEDURE — 90677 PNEUMOCOCCAL CONJUGATE VACCINE 20-VALENT: ICD-10-PCS | Mod: S$GLB,,, | Performed by: FAMILY MEDICINE

## 2022-09-20 PROCEDURE — G0008 FLU VACCINE - QUADRIVALENT - ADJUVANTED: ICD-10-PCS | Mod: S$GLB,,, | Performed by: FAMILY MEDICINE

## 2022-09-20 PROCEDURE — 3044F PR MOST RECENT HEMOGLOBIN A1C LEVEL <7.0%: ICD-10-PCS | Mod: CPTII,S$GLB,, | Performed by: FAMILY MEDICINE

## 2022-09-20 PROCEDURE — G0008 ADMIN INFLUENZA VIRUS VAC: HCPCS | Mod: S$GLB,,, | Performed by: FAMILY MEDICINE

## 2022-09-20 PROCEDURE — 99215 OFFICE O/P EST HI 40 MIN: CPT | Mod: 25,S$GLB,, | Performed by: FAMILY MEDICINE

## 2022-09-20 PROCEDURE — 3061F PR NEG MICROALBUMINURIA RESULT DOCUMENTED/REVIEW: ICD-10-PCS | Mod: CPTII,S$GLB,, | Performed by: FAMILY MEDICINE

## 2022-09-20 PROCEDURE — 4010F ACE/ARB THERAPY RXD/TAKEN: CPT | Mod: CPTII,S$GLB,, | Performed by: FAMILY MEDICINE

## 2022-09-20 PROCEDURE — 1101F PT FALLS ASSESS-DOCD LE1/YR: CPT | Mod: CPTII,S$GLB,, | Performed by: FAMILY MEDICINE

## 2022-09-20 PROCEDURE — 4010F PR ACE/ARB THEARPY RXD/TAKEN: ICD-10-PCS | Mod: CPTII,S$GLB,, | Performed by: FAMILY MEDICINE

## 2022-09-20 PROCEDURE — 99499 RISK ADDL DX/OHS AUDIT: ICD-10-PCS | Mod: S$GLB,,, | Performed by: FAMILY MEDICINE

## 2022-09-20 PROCEDURE — 99215 PR OFFICE/OUTPT VISIT, EST, LEVL V, 40-54 MIN: ICD-10-PCS | Mod: 25,S$GLB,, | Performed by: FAMILY MEDICINE

## 2022-09-20 PROCEDURE — 3066F PR DOCUMENTATION OF TREATMENT FOR NEPHROPATHY: ICD-10-PCS | Mod: CPTII,S$GLB,, | Performed by: FAMILY MEDICINE

## 2022-09-20 PROCEDURE — 90694 VACC AIIV4 NO PRSRV 0.5ML IM: CPT | Mod: S$GLB,,, | Performed by: FAMILY MEDICINE

## 2022-09-20 PROCEDURE — 90677 PCV20 VACCINE IM: CPT | Mod: S$GLB,,, | Performed by: FAMILY MEDICINE

## 2022-09-20 PROCEDURE — 99999 PR PBB SHADOW E&M-EST. PATIENT-LVL IV: ICD-10-PCS | Mod: PBBFAC,,, | Performed by: FAMILY MEDICINE

## 2022-09-20 PROCEDURE — 3078F PR MOST RECENT DIASTOLIC BLOOD PRESSURE < 80 MM HG: ICD-10-PCS | Mod: CPTII,S$GLB,, | Performed by: FAMILY MEDICINE

## 2022-09-20 PROCEDURE — 99499 UNLISTED E&M SERVICE: CPT | Mod: S$GLB,,, | Performed by: FAMILY MEDICINE

## 2022-09-20 PROCEDURE — 3288F PR FALLS RISK ASSESSMENT DOCUMENTED: ICD-10-PCS | Mod: CPTII,S$GLB,, | Performed by: FAMILY MEDICINE

## 2022-09-20 PROCEDURE — 3288F FALL RISK ASSESSMENT DOCD: CPT | Mod: CPTII,S$GLB,, | Performed by: FAMILY MEDICINE

## 2022-09-20 PROCEDURE — 90694 FLU VACCINE - QUADRIVALENT - ADJUVANTED: ICD-10-PCS | Mod: S$GLB,,, | Performed by: FAMILY MEDICINE

## 2022-09-20 PROCEDURE — 3078F DIAST BP <80 MM HG: CPT | Mod: CPTII,S$GLB,, | Performed by: FAMILY MEDICINE

## 2022-09-20 PROCEDURE — 3074F PR MOST RECENT SYSTOLIC BLOOD PRESSURE < 130 MM HG: ICD-10-PCS | Mod: CPTII,S$GLB,, | Performed by: FAMILY MEDICINE

## 2022-09-20 PROCEDURE — G0009 ADMIN PNEUMOCOCCAL VACCINE: HCPCS | Mod: S$GLB,,, | Performed by: FAMILY MEDICINE

## 2022-09-20 PROCEDURE — 3044F HG A1C LEVEL LT 7.0%: CPT | Mod: CPTII,S$GLB,, | Performed by: FAMILY MEDICINE

## 2022-09-20 PROCEDURE — G0009 PNEUMOCOCCAL CONJUGATE VACCINE 20-VALENT: ICD-10-PCS | Mod: S$GLB,,, | Performed by: FAMILY MEDICINE

## 2022-09-20 PROCEDURE — 1101F PR PT FALLS ASSESS DOC 0-1 FALLS W/OUT INJ PAST YR: ICD-10-PCS | Mod: CPTII,S$GLB,, | Performed by: FAMILY MEDICINE

## 2022-09-20 PROCEDURE — 3066F NEPHROPATHY DOC TX: CPT | Mod: CPTII,S$GLB,, | Performed by: FAMILY MEDICINE

## 2022-09-20 PROCEDURE — 3061F NEG MICROALBUMINURIA REV: CPT | Mod: CPTII,S$GLB,, | Performed by: FAMILY MEDICINE

## 2022-09-20 PROCEDURE — 3074F SYST BP LT 130 MM HG: CPT | Mod: CPTII,S$GLB,, | Performed by: FAMILY MEDICINE

## 2022-09-20 RX ORDER — METFORMIN HYDROCHLORIDE 500 MG/1
500 TABLET ORAL 2 TIMES DAILY WITH MEALS
Qty: 180 TABLET | Refills: 3 | Status: SHIPPED | OUTPATIENT
Start: 2022-09-20 | End: 2023-04-03 | Stop reason: SDUPTHER

## 2022-09-20 NOTE — PROGRESS NOTES
Pneumonia vacine 20 given tolerated well. VIS given Patient instructed to wait 15 minutes for monitoring  Flu injection given &.VIS given. Tolerated injection well.Patient instructed to wait 15 minutes for monitoring.

## 2022-09-20 NOTE — PROGRESS NOTES
Subjective:       Patient ID: Regine Osorio is a 67 y.o. female.    Chief Complaint: [facility]    66 yo F pt, new to me, with PMH significant for h/o left-sided breast cancer s/p left mastectomy and chemotherapy, HTN, DM-2, KENDRA, spinal stenosis of L-spine with neurogenic claudication, cervical spondylosis, stenosis, and myelomalacia of C-spine s/p ACDF 07/29/2022, Migraine headaches, Fibromyalgia, and Insomnia. She presents to [facility]. Previously followed by Dr. A. Lombard (last visit 03/2022). Desires chronic disease management.    DM-2: A1c 6.6 07/2022. Adherent with Metformin 500 mg BID + Glipizide TR 5 mg daily    HTN: /60 in office today. Adherent with Losartan 50 mg daily + HCTZ 25 mg daily     Follows with neurology for migraine headaches.  Alternates between rizatriptan, sumatriptan, and ubrelvy for abortive.     Also following with podiatry rx'd Ketoconazole 2% to nails     AR: Follows with ENT. Tx'd with Cetirizine, Astelin, Flonase, Robitussin, Vosol 2%    Meloxicam + Tylenol utilized for chronic pain to cervical spine. Following with orthopedics.      Past Medical History:   Diagnosis Date    Allergy     Breast cancer 2017    Cataract     Cervical spondylosis 7/29/2022    Diabetes mellitus, type 2     Type 2    Eczema     Fibromyalgia     Glaucoma     Hypertension     Renal cyst, right 02/06/2020    Steatosis of liver 02/06/2020       Patient Active Problem List   Diagnosis    History of breast cancer in female    Type 2 diabetes mellitus without complication, without long-term current use of insulin    Hypertension associated with type 2 diabetes mellitus    S/P left mastectomy    Spinal stenosis of lumbar region with neurogenic claudication    Migraine without aura and without status migrainosus, not intractable    Thyroid nodule    Fibromyalgia    Microcytic anemia    Unsp symptoms and signs w cognitive functions and awareness    History of antineoplastic chemotherapy     Seborrheic dermatitis of scalp    Insomnia due to medical condition    Insomnia    Obstructive sleep apnea hypopnea, mild    Cervical stenosis of spine    Impaired functional mobility, balance, gait, and endurance    Decreased range of motion of lumbar spine    Central sensitization to pain    Myelomalacia of cervical cord    Cervical spondylosis    Status post cervical discectomy    Status post cervical spinal fusion    Atherosclerosis of aorta       Past Surgical History:   Procedure Laterality Date    ADENOIDECTOMY      ANTERIOR CERVICAL DISCECTOMY W/ FUSION N/A 7/29/2022    Procedure: DISCECTOMY, SPINE, CERVICAL, ANTERIOR APPROACH, WITH FUSION C5-7 SPINEWAVE SNS: VOCAL CORDS/MOTORS/SSEP;  Surgeon: Rosales Scruggs MD;  Location: [facility] OR [address] [place] [address];  Service: Neurosurgery;  Laterality: N/A;    BREAST BIOPSY      BREAST SURGERY      breast ca lt side     HYSTERECTOMY      supacervical hysterectomy    LAPAROSCOPIC SUPRACERVICAL HYSTERECTOMY  2005    fibroids    MASTECTOMY Left 2017    chemo    MYOMECTOMY      TONSILLECTOMY      TYMPANOSTOMY TUBE PLACEMENT      VAGINAL DELIVERY         Family History   Problem Relation Age of Onset    Dementia Mother     Glaucoma Mother     Bipolar disorder Mother     Lung cancer Father     Bipolar disorder Son     Post-traumatic stress disorder Son     Breast cancer Maternal Aunt     Bipolar disorder Maternal Aunt     Glaucoma Maternal Uncle     Blindness Maternal Grandfather     Glaucoma Maternal Grandfather     Breast cancer Cousin 39        paternal     Breast cancer Cousin 29    Amblyopia Neg Hx     Cataracts Neg Hx     Macular degeneration Neg Hx     Retinal detachment Neg Hx     Strabismus Neg Hx     Cancer Neg Hx     Colon cancer Neg Hx     Ovarian cancer Neg Hx        Social History     Tobacco Use   Smoking Status Never   Smokeless Tobacco Never       Social History     Social History Narrative        1 son (estranged)    3 grandchildren ([place])    Born and raised  "in [place] (moved to [facility] 2017)       Medications have been reviewed and reconciled.     Review of patient's allergies indicates:   Allergen Reactions    Codeine Shortness Of Breath     Pt states she had a reaction as a teenager and was taken to the ER.    Lisinopril      cough        Review of Systems      Objective:        /65 (BP Location: Left arm, Patient Position: Sitting, BP Method: X-Large (Automatic))   Pulse 107   Ht 5' 5" (1.651 m)   Wt 104.2 kg (229 lb 9.8 oz)   SpO2 97%   BMI 38.21 kg/m²      Physical Exam  Constitutional:       General: She is not in acute distress.     Appearance: She is well-developed.   HENT:      Head: Normocephalic and atraumatic.      Right Ear: External ear normal.      Left Ear: External ear normal.   Eyes:      General: No scleral icterus.     Extraocular Movements: Extraocular movements intact.      Conjunctiva/sclera: Conjunctivae normal.   Neck:      Comments: Soft neck brace in place  Cardiovascular:      Rate and Rhythm: Normal rate and regular rhythm.      Heart sounds: Normal heart sounds. No murmur heard.    No friction rub. No gallop.   Pulmonary:      Effort: Pulmonary effort is normal.      Breath sounds: Normal breath sounds. No wheezing or rales.   Musculoskeletal:         General: Normal range of motion.      Cervical back: Normal range of motion and neck supple.      Right lower leg: No edema.      Left lower leg: No edema.   Skin:     General: Skin is warm and dry.      Findings: No erythema or rash.   Neurological:      Mental Status: She is alert and oriented to person, place, and time.      Cranial Nerves: No cranial nerve deficit.   Psychiatric:         Mood and Affect: Mood normal.         Behavior: Behavior normal.         Assessment:       1. Hypertension associated with type 2 diabetes mellitus    2. Type 2 diabetes mellitus without complication, without long-term current use of insulin    3. Obstructive sleep apnea hypopnea, mild    4. " Spinal stenosis of lumbar region with neurogenic claudication    5. Cervical spondylosis    6. Cervical stenosis of spine    7. Myelomalacia of cervical cord    8. Status post cervical discectomy    9. Status post cervical spinal fusion    10. History of breast cancer in female    11. Fibromyalgia    12. Migraine without aura and without status migrainosus, not intractable    13. S/P left mastectomy    14. Insomnia, unspecified type    15. Medication management    16. Atherosclerosis of aorta        Plan:       Regine was seen today for establish care.    Diagnoses and all orders for this visit:    Hypertension associated with type 2 diabetes mellitus    Type 2 diabetes mellitus without complication, without long-term current use of insulin  -     metFORMIN (GLUCOPHAGE) 500 MG tablet; Take 1 tablet (500 mg total) by mouth 2 (two) times daily with meals.  -     Influenza (FLUAD) - Quadrivalent (Adjuvanted) *Preferred* (65+) (PF)  -     (In Office Administered) Pneumococcal Conjugate Vaccine (20 Valent) (IM)    Obstructive sleep apnea hypopnea, mild    Spinal stenosis of lumbar region with neurogenic claudication    Cervical spondylosis    Cervical stenosis of spine    Myelomalacia of cervical cord    Status post cervical discectomy    Status post cervical spinal fusion    History of breast cancer in female    Fibromyalgia    Migraine without aura and without status migrainosus, not intractable    S/P left mastectomy    Insomnia, unspecified type    Medication management  -     CBC Auto Differential; Future  -     Comprehensive Metabolic Panel; Future  -     Hemoglobin A1C; Future  -     Lipid Panel; Future  -     Vitamin D; Future    Atherosclerosis of aorta          HTN  Controlled  Continue Losartan 50 mg daily + HCTZ 25 mg daily  CBC and BMP normal 07/2022    DM-2  A1c 6.6 07/2022.   Continue Metformin 500 mg BID + Glipizide TR 5 mg daily    KENDRA  Assess if using CPAP on f/u    Spinal stenosis of L-spine with  neurogenic claudication  Management per ortho/pain management  Cont meloxicam/tylenol    Cervical spondylosis, stenosis, and myelomalacia of C-spine s/p ACDF 07/29/2022  Management per ortho/pain management  Cont meloxicam/tylenol    Migraine headaches  Management per neurology with rizatriptan, sumatriptan, and ubrelvy for abortive    Fibromyalgia  No specific treatment at present    Insomnia  No specific treatment at present    h/o left-sided breast cancer s/p left modified radical mastectomy and chemotherapy  Dx'd late 2017  Following with H/O--last visit 09/19/22  Planning for biopsy of area of concern (?seborrheic keratosis). If neg, planning to see her for annual exam 04/2023.     HM   Flu vaccine and PCV-20 today 09/20/22    Follow up in about 3 months (around 12/20/2022) for Chronic Disease Management; Labs.    I spent a total of 59 minutes on the day of the visit.This includes face to face time and non-face to face time preparing to see the patient (eg, review of tests), obtaining and/or reviewing separately obtained history, documenting clinical information in the electronic or other health record, independently interpreting results and communicating results to the patient/family/caregiver, or care coordinator.

## 2022-09-22 ENCOUNTER — PATIENT MESSAGE (OUTPATIENT)
Dept: OTOLARYNGOLOGY | Facility: CLINIC | Age: 67
End: 2022-09-22
Payer: MEDICARE

## 2022-09-22 ENCOUNTER — PATIENT MESSAGE (OUTPATIENT)
Dept: HEMATOLOGY/ONCOLOGY | Facility: CLINIC | Age: 67
End: 2022-09-22
Payer: MEDICARE

## 2022-09-23 ENCOUNTER — OFFICE VISIT (OUTPATIENT)
Dept: SURGERY | Facility: CLINIC | Age: 67
End: 2022-09-23
Payer: MEDICARE

## 2022-09-23 VITALS
BODY MASS INDEX: 38.49 KG/M2 | SYSTOLIC BLOOD PRESSURE: 131 MMHG | DIASTOLIC BLOOD PRESSURE: 60 MMHG | HEART RATE: 95 BPM | WEIGHT: 231 LBS | HEIGHT: 65 IN

## 2022-09-23 DIAGNOSIS — Z12.39 ENCOUNTER FOR SCREENING BREAST EXAMINATION: ICD-10-CM

## 2022-09-23 DIAGNOSIS — Z85.3 PERSONAL HISTORY OF BREAST CANCER: ICD-10-CM

## 2022-09-23 DIAGNOSIS — L98.8 SKIN LESION OF BREAST: Primary | ICD-10-CM

## 2022-09-23 PROCEDURE — 3075F SYST BP GE 130 - 139MM HG: CPT | Mod: CPTII,S$GLB,, | Performed by: NURSE PRACTITIONER

## 2022-09-23 PROCEDURE — 99203 PR OFFICE/OUTPT VISIT, NEW, LEVL III, 30-44 MIN: ICD-10-PCS | Mod: 25,S$GLB,, | Performed by: NURSE PRACTITIONER

## 2022-09-23 PROCEDURE — 88305 TISSUE EXAM BY PATHOLOGIST: CPT | Mod: 26,,, | Performed by: DERMATOLOGY

## 2022-09-23 PROCEDURE — 3075F PR MOST RECENT SYSTOLIC BLOOD PRESS GE 130-139MM HG: ICD-10-PCS | Mod: CPTII,S$GLB,, | Performed by: NURSE PRACTITIONER

## 2022-09-23 PROCEDURE — 1101F PR PT FALLS ASSESS DOC 0-1 FALLS W/OUT INJ PAST YR: ICD-10-PCS | Mod: CPTII,S$GLB,, | Performed by: NURSE PRACTITIONER

## 2022-09-23 PROCEDURE — 3061F NEG MICROALBUMINURIA REV: CPT | Mod: CPTII,S$GLB,, | Performed by: NURSE PRACTITIONER

## 2022-09-23 PROCEDURE — 3066F NEPHROPATHY DOC TX: CPT | Mod: CPTII,S$GLB,, | Performed by: NURSE PRACTITIONER

## 2022-09-23 PROCEDURE — 88342 IMHCHEM/IMCYTCHM 1ST ANTB: CPT | Mod: 26,,, | Performed by: DERMATOLOGY

## 2022-09-23 PROCEDURE — 88305 TISSUE EXAM BY PATHOLOGIST: CPT | Performed by: DERMATOLOGY

## 2022-09-23 PROCEDURE — 1101F PT FALLS ASSESS-DOCD LE1/YR: CPT | Mod: CPTII,S$GLB,, | Performed by: NURSE PRACTITIONER

## 2022-09-23 PROCEDURE — 1159F PR MEDICATION LIST DOCUMENTED IN MEDICAL RECORD: ICD-10-PCS | Mod: CPTII,S$GLB,, | Performed by: NURSE PRACTITIONER

## 2022-09-23 PROCEDURE — 3008F BODY MASS INDEX DOCD: CPT | Mod: CPTII,S$GLB,, | Performed by: NURSE PRACTITIONER

## 2022-09-23 PROCEDURE — 88342 CHG IMMUNOCYTOCHEMISTRY: ICD-10-PCS | Mod: 26,,, | Performed by: DERMATOLOGY

## 2022-09-23 PROCEDURE — 88312 SPECIAL STAINS GROUP 1: CPT | Performed by: DERMATOLOGY

## 2022-09-23 PROCEDURE — 4010F ACE/ARB THERAPY RXD/TAKEN: CPT | Mod: CPTII,S$GLB,, | Performed by: NURSE PRACTITIONER

## 2022-09-23 PROCEDURE — 88312 SPECIAL STAINS GROUP 1: CPT | Mod: 26,,, | Performed by: DERMATOLOGY

## 2022-09-23 PROCEDURE — 1159F MED LIST DOCD IN RCRD: CPT | Mod: CPTII,S$GLB,, | Performed by: NURSE PRACTITIONER

## 2022-09-23 PROCEDURE — 99203 OFFICE O/P NEW LOW 30 MIN: CPT | Mod: 25,S$GLB,, | Performed by: NURSE PRACTITIONER

## 2022-09-23 PROCEDURE — 3061F PR NEG MICROALBUMINURIA RESULT DOCUMENTED/REVIEW: ICD-10-PCS | Mod: CPTII,S$GLB,, | Performed by: NURSE PRACTITIONER

## 2022-09-23 PROCEDURE — 4010F PR ACE/ARB THEARPY RXD/TAKEN: ICD-10-PCS | Mod: CPTII,S$GLB,, | Performed by: NURSE PRACTITIONER

## 2022-09-23 PROCEDURE — 99999 PR PBB SHADOW E&M-EST. PATIENT-LVL IV: ICD-10-PCS | Mod: PBBFAC,,, | Performed by: NURSE PRACTITIONER

## 2022-09-23 PROCEDURE — 88312 PR  SPECIAL STAINS,GROUP I: ICD-10-PCS | Mod: 26,,, | Performed by: DERMATOLOGY

## 2022-09-23 PROCEDURE — 11104 PR PUNCH BIOPSY, SKIN, SINGLE LESION: ICD-10-PCS | Mod: S$GLB,,, | Performed by: NURSE PRACTITIONER

## 2022-09-23 PROCEDURE — 3066F PR DOCUMENTATION OF TREATMENT FOR NEPHROPATHY: ICD-10-PCS | Mod: CPTII,S$GLB,, | Performed by: NURSE PRACTITIONER

## 2022-09-23 PROCEDURE — 11104 PUNCH BX SKIN SINGLE LESION: CPT | Mod: S$GLB,,, | Performed by: NURSE PRACTITIONER

## 2022-09-23 PROCEDURE — 3078F PR MOST RECENT DIASTOLIC BLOOD PRESSURE < 80 MM HG: ICD-10-PCS | Mod: CPTII,S$GLB,, | Performed by: NURSE PRACTITIONER

## 2022-09-23 PROCEDURE — 3044F HG A1C LEVEL LT 7.0%: CPT | Mod: CPTII,S$GLB,, | Performed by: NURSE PRACTITIONER

## 2022-09-23 PROCEDURE — 88305 TISSUE EXAM BY PATHOLOGIST: ICD-10-PCS | Mod: 26,,, | Performed by: DERMATOLOGY

## 2022-09-23 PROCEDURE — 3008F PR BODY MASS INDEX (BMI) DOCUMENTED: ICD-10-PCS | Mod: CPTII,S$GLB,, | Performed by: NURSE PRACTITIONER

## 2022-09-23 PROCEDURE — 88342 IMHCHEM/IMCYTCHM 1ST ANTB: CPT | Performed by: DERMATOLOGY

## 2022-09-23 PROCEDURE — 3288F PR FALLS RISK ASSESSMENT DOCUMENTED: ICD-10-PCS | Mod: CPTII,S$GLB,, | Performed by: NURSE PRACTITIONER

## 2022-09-23 PROCEDURE — 3288F FALL RISK ASSESSMENT DOCD: CPT | Mod: CPTII,S$GLB,, | Performed by: NURSE PRACTITIONER

## 2022-09-23 PROCEDURE — 99999 PR PBB SHADOW E&M-EST. PATIENT-LVL IV: CPT | Mod: PBBFAC,,, | Performed by: NURSE PRACTITIONER

## 2022-09-23 PROCEDURE — 3044F PR MOST RECENT HEMOGLOBIN A1C LEVEL <7.0%: ICD-10-PCS | Mod: CPTII,S$GLB,, | Performed by: NURSE PRACTITIONER

## 2022-09-23 PROCEDURE — 3078F DIAST BP <80 MM HG: CPT | Mod: CPTII,S$GLB,, | Performed by: NURSE PRACTITIONER

## 2022-09-23 NOTE — PROGRESS NOTES
New Breast Cancer  History and Physical  [facility]  Department of Surgery    REFERRING PROVIDER: Aaareferral Self  No address on file    CHIEF COMPLAINT: left breast cancer    Subjective:      Regine Osorio is a 67 y.o. postmenopausal female who was initially treated in [place], [place] for newly diagnosed left breast cancer. Moved to [place] in 2018. Her breast cancer was first diagnosed on screening mammogram in 2017. Underwent core biopsy that showed Triple negative invasive ductal carcinoma.  She underwent left simple mastectomy, axillary SLNB on 17 for stage IA invasive ductal carcinoma ([id number]) with 0/5 lymph nodes positive. She underwent chemotherapy with TC x4 cycles and completed on 2018. No radiation.  She had a follow up screening bilateral mammogram on 2018, which was negative for any new suspicious changes.      She has a past medical history of HTN, DMT2, and migraines. 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     Patient does routinely do self breast exams.  Patient has not noted a change on breast exam.  Patient denies nipple discharge on the right. Patient admits to to previous breast biopsy. Patient admits to a personal history of breast cancer.    GYN History:  Age of menarche was 11. Hysterectomy in , left ovaries. Believes she went through menopause with chemotherapy last year.  Patient denies hormonal therapy. Patient is . Age of first live birth was 21. Patient did not breast feed.    FAMILY History:  Maternal aunt breast cancer ( age 65)  Paternal cousin breast cancer ( age 39, the late 90s)  Maternal cousin with ovarian cancer (diagnosed 38,  age 39)  Maternal uncle with prostate (diagnosed and  of in his 80s, treatment unknown)    Interval History:   Patient presents with right breast wound. Patient states she first noticed it about 2 weeks ago. Reports area has been itchy and uncomfortable.  Denies drainage from the area. Reports there has been no improvement since then. Does report recent neck surgery requiring neck brace for 6 weeks. She was seen by Dr. Lund for follow up and recommended she be seen by breast surgery for possible punch biopsy.     Past Medical History:   Diagnosis Date    Allergy     Breast cancer 2017    Cataract     Cervical spondylosis 7/29/2022    Diabetes mellitus, type 2     Type 2    Eczema     Fibromyalgia     Glaucoma     Hypertension     Renal cyst, right 02/06/2020    Steatosis of liver 02/06/2020     Past Surgical History:   Procedure Laterality Date    ADENOIDECTOMY      ANTERIOR CERVICAL DISCECTOMY W/ FUSION N/A 7/29/2022    Procedure: DISCECTOMY, SPINE, CERVICAL, ANTERIOR APPROACH, WITH FUSION C5-7 SPINEWAVE SNS: VOCAL CORDS/MOTORS/SSEP;  Surgeon: Rosales Scruggs MD;  Location: [facility] OR [address] [place] [address];  Service: Neurosurgery;  Laterality: N/A;    BREAST BIOPSY      BREAST SURGERY      breast ca lt side     HYSTERECTOMY      supacervical hysterectomy    LAPAROSCOPIC SUPRACERVICAL HYSTERECTOMY  2005    fibroids    MASTECTOMY Left 2017    chemo    MYOMECTOMY      TONSILLECTOMY      TYMPANOSTOMY TUBE PLACEMENT      VAGINAL DELIVERY       Current Outpatient Medications on File Prior to Visit   Medication Sig Dispense Refill    acetic acid (VOSOL) 2 % otic solution Place 4 drops into both ears as needed.      azelastine (ASTELIN) 137 mcg (0.1 %) nasal spray 1 SPRAY IN EACH NOSTRIL TWICE DAILY 30 mL 2    blood sugar diagnostic Strp To check BG 2 times daily, to use with insurance preferred meter 200 each 3    blood-glucose meter kit To check BG 2 times daily, to use with insurance preferred meter 1 each 0    cetirizine (ZYRTEC) 10 MG tablet TAKE 1 TABLET BY MOUTH EVERY DAY FOR ALLERGIES 90 tablet 0    clobetasoL (TEMOVATE) 0.05 % external solution Apply topically 2 (two) times daily. Prn scalp itch or irritation 50 mL 0    dextromethorphan-guaiFENesin  mg/5 ml  (ROBITUSSIN-DM)  mg/5 mL liquid Take 5 mLs by mouth every 12 (twelve) hours.      fluticasone propionate (FLONASE) 50 mcg/actuation nasal spray 2 sprays on the right and 3 sprays on the left every morning 48 g 3    glipiZIDE (GLUCOTROL) 5 MG TR24 TAKE 1 TABLET(5 MG) BY MOUTH TWICE DAILY 180 tablet 1    hydroCHLOROthiazide (HYDRODIURIL) 25 MG tablet Take 1 tablet (25 mg total) by mouth once daily. 90 tablet 1    ketoconazole (NIZORAL) 2 % cream APPLY EXTERNALLY TO THE AFFECTED AREA EVERY DAY 15 g 2    lancets Misc To check BG 2 times daily, to use with insurance preferred meter 200 each 3    losartan (COZAAR) 50 MG tablet Take 1 tablet (50 mg total) by mouth once daily. 90 tablet 1    metFORMIN (GLUCOPHAGE) 500 MG tablet Take 1 tablet (500 mg total) by mouth 2 (two) times daily with meals. 180 tablet 3    ondansetron (ZOFRAN-ODT) 8 MG TbDL Take 1 tablet (8 mg total) by mouth every 8 (eight) hours as needed (Nausea). 30 tablet 2    rizatriptan (MAXALT-MLT) 10 MG disintegrating tablet Take 1 tablet (10 mg total) by mouth every 2 (two) hours as needed for Migraine. Max 30 mg/day. 12 tablet 5    sumatriptan (IMITREX) 100 MG tablet Take 100 mg by mouth every 2 (two) hours as needed for Migraine (as needed for migraine).      ubrogepant (UBRELVY) 100 mg tablet Take 1 tablet (100 mg total) by mouth every 2 (two) hours as needed for Migraine. Max 200 mg/day. 10 tablet 2    acetaminophen (TYLENOL) 650 MG TbSR Take 1 tablet (650 mg total) by mouth every 6 (six) hours. 56 tablet 0    [DISCONTINUED] aspirin 81 MG Chew Take 81 mg by mouth once daily.       [DISCONTINUED] diclofenac sodium (VOLTAREN) 1 % Gel Apply 2 g topically 3 (three) times daily. 100 g 0     No current facility-administered medications on file prior to visit.     Social History     Socioeconomic History    Marital status:     Number of children: 1   Tobacco Use    Smoking status: Never    Smokeless tobacco: Never   Substance and Sexual Activity     Alcohol use: No    Drug use: No    Sexual activity: Not Currently   Social History Narrative        1 son (estranged)    3 grandchildren ([place])    Born and raised in [place] (moved to [facility] 2017)     Social Determinants of Health     Financial Resource Strain: Low Risk     Difficulty of Paying Living Expenses: Not hard at all   Food Insecurity: No Food Insecurity    Worried About Running Out of Food in the Last Year: Never true    Ran Out of Food in the Last Year: Never true   Transportation Needs: No Transportation Needs    Lack of Transportation (Medical): No    Lack of Transportation (Non-Medical): No   Physical Activity: Inactive    Days of Exercise per Week: 0 days    Minutes of Exercise per Session: 20 min   Stress: No Stress Concern Present    Feeling of Stress : Only a little   Social Connections: Unknown    Frequency of Communication with Friends and Family: More than three times a week    Frequency of Social Gatherings with Friends and Family: Once a week    Active Member of Clubs or Organizations: Yes    Attends Club or Organization Meetings: More than 4 times per year    Marital Status:    Housing Stability: Low Risk     Unable to Pay for Housing in the Last Year: No    Number of Places Lived in the Last Year: 1    Unstable Housing in the Last Year: No     Family History   Problem Relation Age of Onset    Dementia Mother     Glaucoma Mother     Bipolar disorder Mother     Lung cancer Father     Bipolar disorder Son     Post-traumatic stress disorder Son     Breast cancer Maternal Aunt     Bipolar disorder Maternal Aunt     Glaucoma Maternal Uncle     Blindness Maternal Grandfather     Glaucoma Maternal Grandfather     Breast cancer Cousin 39        paternal     Breast cancer Cousin 29    Amblyopia Neg Hx     Cataracts Neg Hx     Macular degeneration Neg Hx     Retinal detachment Neg Hx     Strabismus Neg Hx     Cancer Neg Hx     Colon cancer Neg Hx     Ovarian cancer Neg Hx      "    Review of Systems  Review of Systems   Constitutional:  Positive for fatigue. Negative for chills, fever and unexpected weight change (wait gain with decadron before chemo).        Admits to "chemo brain," trouble remembering things. Getting better.    Respiratory:  Negative for cough, chest tightness and shortness of breath.    Cardiovascular:  Negative for chest pain, palpitations and leg swelling.   Endocrine: Positive for heat intolerance.        Hot flashes   Genitourinary:         Irritable bowel syndrome      Objective:   PHYSICAL EXAM:  /60 (BP Location: Right arm, Patient Position: Sitting, BP Method: Large (Automatic))   Pulse 95   Ht 5' 5" (1.651 m)   Wt 104.8 kg (231 lb)   BMI 38.44 kg/m²     Physical Exam   Constitutional: She is oriented to person, place, and time. She appears well-developed.   Neck: No thyromegaly present.   Cardiovascular:  Normal rate and regular rhythm.            Pulmonary/Chest: Effort normal and breath sounds normal. No respiratory distress. Right breast exhibits no inverted nipple, no mass, no nipple discharge, no skin change and no tenderness. Left breast exhibits tenderness (under the arm). Left breast exhibits no mass, no nipple discharge and no skin change.   Well-healed left simple mastectomy scar that extends into the axilla. Normal breast exam on the right.        Abdominal: Soft. She exhibits no distension. There is no abdominal tenderness.   Neurological: She is alert and oriented to person, place, and time.   Skin: Skin is warm and dry.         Radiology review:   4/5/2022 Mammo Digital Diagnostic Right with Eben     History:  Patient is 67 y.o. and is seen for diagnostic imaging. Patient with a personal history of left breast cancer status post mastectomy in 2017 who is due for annual imaging of the right breast.      Films Compared:  Prior images (if available) were compared.     Findings:  This procedure was performed using tomosynthesis. " Computer-aided detection was utilized in the interpretation of this examination.  The right breast has scattered areas of fibroglandular density.      There is no evidence of suspicious masses, calcifications, or other abnormal findings in the right breast.        Impression:  There is no mammographic evidence of malignancy in the right breast.     BI-RADS Category:   Overall: 1 - Negative      Punch Biopsy Procedure Note    Pre-operative Diagnosis: right breast skin thickening    Post-operative Diagnosis: same    Location: right breast    Anesthesia: 1% plain lidocaine    Procedure Details   The Procedure, risks and complications have been discussed in detail (including, but not limited to pain, infection, bleeding) with the patient, and the patient has signed consent to have the surgery completed.    The skin was sterilely prepped and draped over the affected area in the usual fashion.  Local anesthetic was injected in the affected area.  Next, using a 3 mm punch, a small skin sample was obtained and sent to pathology for permanent sectioning.  A 4-0 nylon was used to approximate the skin edges.  Dressing was applied.  Patient tolerated well.     EBL: minimal    Condition:  Stable    Complications:  none.      Assessment:      Regine Osorio is a 67 y.o. postmenopausal female with history of carcinoma of the left breast s/p left mastectomy and SLNB with adjuvant chemotherapy. Presents with right breast skin changes.      Plan:       We discussed exam findings and decided to proceed with punch biopsy of lesion to r/o malignancy. Patient tolerated procedure well.     Suture placed so will follow up in 1 week for removal.     Will call patient with biopsy results once they become available.     The patient is in agreement with the plan. Questions were encouraged and answered to patient's satisfaction. Regine will call our office with any questions or concerns.

## 2022-09-28 LAB
FINAL PATHOLOGIC DIAGNOSIS: NORMAL
GROSS: NORMAL
Lab: NORMAL
MICROSCOPIC EXAM: NORMAL

## 2022-10-04 ENCOUNTER — IMMUNIZATION (OUTPATIENT)
Dept: PHARMACY | Facility: CLINIC | Age: 67
End: 2022-10-04
Payer: MEDICARE

## 2022-10-04 DIAGNOSIS — Z23 NEED FOR VACCINATION: Primary | ICD-10-CM

## 2022-10-11 ENCOUNTER — PATIENT MESSAGE (OUTPATIENT)
Dept: PRIMARY CARE CLINIC | Facility: CLINIC | Age: 67
End: 2022-10-11
Payer: MEDICARE

## 2022-10-12 ENCOUNTER — TELEPHONE (OUTPATIENT)
Dept: NEUROLOGY | Facility: CLINIC | Age: 67
End: 2022-10-12
Payer: MEDICARE

## 2022-10-12 DIAGNOSIS — I10 ESSENTIAL HYPERTENSION: ICD-10-CM

## 2022-10-12 NOTE — TELEPHONE ENCOUNTER
Returned pt's call. Pt is scheduled for an in person follow up for .       ----- Message from Kristyn Rose sent at 10/12/2022 12:06 PM CDT -----  Regardin month recall  Contact: [phone]  Pt is needing to be scheduled for her 6 month recall for . Please call to discuss @ [phone]

## 2022-10-13 RX ORDER — LOSARTAN POTASSIUM 50 MG/1
50 TABLET ORAL DAILY
Qty: 90 TABLET | Refills: 1 | Status: SHIPPED | OUTPATIENT
Start: 2022-10-13 | End: 2023-03-31 | Stop reason: SDUPTHER

## 2022-10-14 ENCOUNTER — TELEPHONE (OUTPATIENT)
Dept: DERMATOLOGY | Facility: CLINIC | Age: 67
End: 2022-10-14
Payer: MEDICARE

## 2022-10-14 NOTE — TELEPHONE ENCOUNTER
Scheduled patient appointment per message received in the Dermatology department. Patient acknowledges appointment made and confirms.      RD      ----- Message from Margaret Hawthorne MA sent at 10/14/2022 10:32 AM CDT -----  Regarding: f/u appt  Contact: Regine Padilla is requesting a call back in regards to being scheduled for a follow up visit. Please call pt to assist her.        Confirmed Contact below:  Contact Name:Regine Osorio  Phone Number: [phone]

## 2022-10-17 ENCOUNTER — OFFICE VISIT (OUTPATIENT)
Dept: PAIN MEDICINE | Facility: CLINIC | Age: 67
End: 2022-10-17
Payer: MEDICARE

## 2022-10-17 VITALS
RESPIRATION RATE: 18 BRPM | WEIGHT: 231 LBS | HEIGHT: 65 IN | DIASTOLIC BLOOD PRESSURE: 78 MMHG | SYSTOLIC BLOOD PRESSURE: 131 MMHG | HEART RATE: 96 BPM | BODY MASS INDEX: 38.49 KG/M2

## 2022-10-17 DIAGNOSIS — Z98.1 STATUS POST CERVICAL SPINAL FUSION: Primary | ICD-10-CM

## 2022-10-17 DIAGNOSIS — G95.89 MYELOMALACIA OF CERVICAL CORD: ICD-10-CM

## 2022-10-17 DIAGNOSIS — M48.062 SPINAL STENOSIS OF LUMBAR REGION WITH NEUROGENIC CLAUDICATION: ICD-10-CM

## 2022-10-17 DIAGNOSIS — M48.02 CERVICAL STENOSIS OF SPINE: ICD-10-CM

## 2022-10-17 DIAGNOSIS — M75.00 ADHESIVE CAPSULITIS OF SHOULDER, UNSPECIFIED LATERALITY: ICD-10-CM

## 2022-10-17 DIAGNOSIS — Z98.890 STATUS POST CERVICAL DISCECTOMY: ICD-10-CM

## 2022-10-17 DIAGNOSIS — G24.3 CERVICAL DYSTONIA: ICD-10-CM

## 2022-10-17 PROBLEM — I70.0 ATHEROSCLEROSIS OF AORTA: Status: ACTIVE | Noted: 2022-10-17

## 2022-10-17 PROBLEM — I15.2 HYPERTENSION ASSOCIATED WITH TYPE 2 DIABETES MELLITUS: Status: ACTIVE | Noted: 2018-11-26

## 2022-10-17 PROBLEM — E11.59 HYPERTENSION ASSOCIATED WITH TYPE 2 DIABETES MELLITUS: Status: ACTIVE | Noted: 2018-11-26

## 2022-10-17 PROCEDURE — 3066F PR DOCUMENTATION OF TREATMENT FOR NEPHROPATHY: ICD-10-PCS | Mod: CPTII,S$GLB,, | Performed by: STUDENT IN AN ORGANIZED HEALTH CARE EDUCATION/TRAINING PROGRAM

## 2022-10-17 PROCEDURE — 3066F NEPHROPATHY DOC TX: CPT | Mod: CPTII,S$GLB,, | Performed by: STUDENT IN AN ORGANIZED HEALTH CARE EDUCATION/TRAINING PROGRAM

## 2022-10-17 PROCEDURE — 99214 OFFICE O/P EST MOD 30 MIN: CPT | Mod: S$GLB,,, | Performed by: STUDENT IN AN ORGANIZED HEALTH CARE EDUCATION/TRAINING PROGRAM

## 2022-10-17 PROCEDURE — 3044F PR MOST RECENT HEMOGLOBIN A1C LEVEL <7.0%: ICD-10-PCS | Mod: CPTII,S$GLB,, | Performed by: STUDENT IN AN ORGANIZED HEALTH CARE EDUCATION/TRAINING PROGRAM

## 2022-10-17 PROCEDURE — 99214 PR OFFICE/OUTPT VISIT, EST, LEVL IV, 30-39 MIN: ICD-10-PCS | Mod: S$GLB,,, | Performed by: STUDENT IN AN ORGANIZED HEALTH CARE EDUCATION/TRAINING PROGRAM

## 2022-10-17 PROCEDURE — 1101F PR PT FALLS ASSESS DOC 0-1 FALLS W/OUT INJ PAST YR: ICD-10-PCS | Mod: CPTII,S$GLB,, | Performed by: STUDENT IN AN ORGANIZED HEALTH CARE EDUCATION/TRAINING PROGRAM

## 2022-10-17 PROCEDURE — 3061F NEG MICROALBUMINURIA REV: CPT | Mod: CPTII,S$GLB,, | Performed by: STUDENT IN AN ORGANIZED HEALTH CARE EDUCATION/TRAINING PROGRAM

## 2022-10-17 PROCEDURE — 3078F PR MOST RECENT DIASTOLIC BLOOD PRESSURE < 80 MM HG: ICD-10-PCS | Mod: CPTII,S$GLB,, | Performed by: STUDENT IN AN ORGANIZED HEALTH CARE EDUCATION/TRAINING PROGRAM

## 2022-10-17 PROCEDURE — 1101F PT FALLS ASSESS-DOCD LE1/YR: CPT | Mod: CPTII,S$GLB,, | Performed by: STUDENT IN AN ORGANIZED HEALTH CARE EDUCATION/TRAINING PROGRAM

## 2022-10-17 PROCEDURE — 3044F HG A1C LEVEL LT 7.0%: CPT | Mod: CPTII,S$GLB,, | Performed by: STUDENT IN AN ORGANIZED HEALTH CARE EDUCATION/TRAINING PROGRAM

## 2022-10-17 PROCEDURE — 3078F DIAST BP <80 MM HG: CPT | Mod: CPTII,S$GLB,, | Performed by: STUDENT IN AN ORGANIZED HEALTH CARE EDUCATION/TRAINING PROGRAM

## 2022-10-17 PROCEDURE — 3075F SYST BP GE 130 - 139MM HG: CPT | Mod: CPTII,S$GLB,, | Performed by: STUDENT IN AN ORGANIZED HEALTH CARE EDUCATION/TRAINING PROGRAM

## 2022-10-17 PROCEDURE — 4010F ACE/ARB THERAPY RXD/TAKEN: CPT | Mod: CPTII,S$GLB,, | Performed by: STUDENT IN AN ORGANIZED HEALTH CARE EDUCATION/TRAINING PROGRAM

## 2022-10-17 PROCEDURE — 99499 UNLISTED E&M SERVICE: CPT | Mod: S$GLB,,, | Performed by: STUDENT IN AN ORGANIZED HEALTH CARE EDUCATION/TRAINING PROGRAM

## 2022-10-17 PROCEDURE — 3061F PR NEG MICROALBUMINURIA RESULT DOCUMENTED/REVIEW: ICD-10-PCS | Mod: CPTII,S$GLB,, | Performed by: STUDENT IN AN ORGANIZED HEALTH CARE EDUCATION/TRAINING PROGRAM

## 2022-10-17 PROCEDURE — 4010F PR ACE/ARB THEARPY RXD/TAKEN: ICD-10-PCS | Mod: CPTII,S$GLB,, | Performed by: STUDENT IN AN ORGANIZED HEALTH CARE EDUCATION/TRAINING PROGRAM

## 2022-10-17 PROCEDURE — 3288F FALL RISK ASSESSMENT DOCD: CPT | Mod: CPTII,S$GLB,, | Performed by: STUDENT IN AN ORGANIZED HEALTH CARE EDUCATION/TRAINING PROGRAM

## 2022-10-17 PROCEDURE — 1160F PR REVIEW ALL MEDS BY PRESCRIBER/CLIN PHARMACIST DOCUMENTED: ICD-10-PCS | Mod: CPTII,S$GLB,, | Performed by: STUDENT IN AN ORGANIZED HEALTH CARE EDUCATION/TRAINING PROGRAM

## 2022-10-17 PROCEDURE — 99999 PR PBB SHADOW E&M-EST. PATIENT-LVL IV: ICD-10-PCS | Mod: PBBFAC,,, | Performed by: STUDENT IN AN ORGANIZED HEALTH CARE EDUCATION/TRAINING PROGRAM

## 2022-10-17 PROCEDURE — 3075F PR MOST RECENT SYSTOLIC BLOOD PRESS GE 130-139MM HG: ICD-10-PCS | Mod: CPTII,S$GLB,, | Performed by: STUDENT IN AN ORGANIZED HEALTH CARE EDUCATION/TRAINING PROGRAM

## 2022-10-17 PROCEDURE — 3288F PR FALLS RISK ASSESSMENT DOCUMENTED: ICD-10-PCS | Mod: CPTII,S$GLB,, | Performed by: STUDENT IN AN ORGANIZED HEALTH CARE EDUCATION/TRAINING PROGRAM

## 2022-10-17 PROCEDURE — 99999 PR PBB SHADOW E&M-EST. PATIENT-LVL IV: CPT | Mod: PBBFAC,,, | Performed by: STUDENT IN AN ORGANIZED HEALTH CARE EDUCATION/TRAINING PROGRAM

## 2022-10-17 PROCEDURE — 1159F PR MEDICATION LIST DOCUMENTED IN MEDICAL RECORD: ICD-10-PCS | Mod: CPTII,S$GLB,, | Performed by: STUDENT IN AN ORGANIZED HEALTH CARE EDUCATION/TRAINING PROGRAM

## 2022-10-17 PROCEDURE — 99499 RISK ADDL DX/OHS AUDIT: ICD-10-PCS | Mod: S$GLB,,, | Performed by: STUDENT IN AN ORGANIZED HEALTH CARE EDUCATION/TRAINING PROGRAM

## 2022-10-17 PROCEDURE — 1159F MED LIST DOCD IN RCRD: CPT | Mod: CPTII,S$GLB,, | Performed by: STUDENT IN AN ORGANIZED HEALTH CARE EDUCATION/TRAINING PROGRAM

## 2022-10-17 PROCEDURE — 1160F RVW MEDS BY RX/DR IN RCRD: CPT | Mod: CPTII,S$GLB,, | Performed by: STUDENT IN AN ORGANIZED HEALTH CARE EDUCATION/TRAINING PROGRAM
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
## 2022-10-19 ENCOUNTER — TELEPHONE (OUTPATIENT)
Dept: PAIN MEDICINE | Facility: CLINIC | Age: 67
End: 2022-10-19
Payer: MEDICARE

## 2022-10-19 NOTE — TELEPHONE ENCOUNTER
Staff contacted pt regarding rescheduling appointment on 11/08/22 with Angelina Johnson NP. Staff informed pt that her appointment will need to be rescheduled due to the provider being out of office on that day. Staff also informed pt that her appointment will be canceled and to contact office regarding rescheduling.

## 2022-10-21 ENCOUNTER — TELEPHONE (OUTPATIENT)
Dept: PAIN MEDICINE | Facility: CLINIC | Age: 67
End: 2022-10-21
Payer: MEDICARE

## 2022-10-21 NOTE — TELEPHONE ENCOUNTER
----- Message from Brenna Acharya sent at 10/21/2022 10:48 AM CDT -----    Name of Who is Calling: KRISTINE PEÑALOZA [[id number]]      What is the request in detail: Pt is requesting a call back for rescheduling.       Can the clinic reply by [facility]: No      What [facility]: [phone]

## 2022-10-24 ENCOUNTER — OFFICE VISIT (OUTPATIENT)
Dept: DERMATOLOGY | Facility: CLINIC | Age: 67
End: 2022-10-24
Payer: MEDICARE

## 2022-10-24 DIAGNOSIS — E61.1 LOW IRON: ICD-10-CM

## 2022-10-24 DIAGNOSIS — L82.1 SK (SEBORRHEIC KERATOSIS): ICD-10-CM

## 2022-10-24 DIAGNOSIS — L29.9 ITCHING: Primary | ICD-10-CM

## 2022-10-24 DIAGNOSIS — Z76.89 ENCOUNTER FOR SKIN CARE: ICD-10-CM

## 2022-10-24 PROCEDURE — 3066F PR DOCUMENTATION OF TREATMENT FOR NEPHROPATHY: ICD-10-PCS | Mod: CPTII,S$GLB,, | Performed by: DERMATOLOGY

## 2022-10-24 PROCEDURE — 1159F PR MEDICATION LIST DOCUMENTED IN MEDICAL RECORD: ICD-10-PCS | Mod: CPTII,S$GLB,, | Performed by: DERMATOLOGY

## 2022-10-24 PROCEDURE — 3288F FALL RISK ASSESSMENT DOCD: CPT | Mod: CPTII,S$GLB,, | Performed by: DERMATOLOGY

## 2022-10-24 PROCEDURE — 3061F PR NEG MICROALBUMINURIA RESULT DOCUMENTED/REVIEW: ICD-10-PCS | Mod: CPTII,S$GLB,, | Performed by: DERMATOLOGY

## 2022-10-24 PROCEDURE — 3061F NEG MICROALBUMINURIA REV: CPT | Mod: CPTII,S$GLB,, | Performed by: DERMATOLOGY

## 2022-10-24 PROCEDURE — 99999 PR PBB SHADOW E&M-EST. PATIENT-LVL III: ICD-10-PCS | Mod: PBBFAC,,, | Performed by: DERMATOLOGY

## 2022-10-24 PROCEDURE — 4010F ACE/ARB THERAPY RXD/TAKEN: CPT | Mod: CPTII,S$GLB,, | Performed by: DERMATOLOGY

## 2022-10-24 PROCEDURE — 3066F NEPHROPATHY DOC TX: CPT | Mod: CPTII,S$GLB,, | Performed by: DERMATOLOGY

## 2022-10-24 PROCEDURE — 99213 OFFICE O/P EST LOW 20 MIN: CPT | Mod: S$GLB,,, | Performed by: DERMATOLOGY

## 2022-10-24 PROCEDURE — 3044F HG A1C LEVEL LT 7.0%: CPT | Mod: CPTII,S$GLB,, | Performed by: DERMATOLOGY

## 2022-10-24 PROCEDURE — 99213 PR OFFICE/OUTPT VISIT, EST, LEVL III, 20-29 MIN: ICD-10-PCS | Mod: S$GLB,,, | Performed by: DERMATOLOGY

## 2022-10-24 PROCEDURE — 1159F MED LIST DOCD IN RCRD: CPT | Mod: CPTII,S$GLB,, | Performed by: DERMATOLOGY

## 2022-10-24 PROCEDURE — 3044F PR MOST RECENT HEMOGLOBIN A1C LEVEL <7.0%: ICD-10-PCS | Mod: CPTII,S$GLB,, | Performed by: DERMATOLOGY

## 2022-10-24 PROCEDURE — 3288F PR FALLS RISK ASSESSMENT DOCUMENTED: ICD-10-PCS | Mod: CPTII,S$GLB,, | Performed by: DERMATOLOGY

## 2022-10-24 PROCEDURE — 1101F PT FALLS ASSESS-DOCD LE1/YR: CPT | Mod: CPTII,S$GLB,, | Performed by: DERMATOLOGY

## 2022-10-24 PROCEDURE — 4010F PR ACE/ARB THEARPY RXD/TAKEN: ICD-10-PCS | Mod: CPTII,S$GLB,, | Performed by: DERMATOLOGY

## 2022-10-24 PROCEDURE — 99999 PR PBB SHADOW E&M-EST. PATIENT-LVL III: CPT | Mod: PBBFAC,,, | Performed by: DERMATOLOGY

## 2022-10-24 PROCEDURE — 1101F PR PT FALLS ASSESS DOC 0-1 FALLS W/OUT INJ PAST YR: ICD-10-PCS | Mod: CPTII,S$GLB,, | Performed by: DERMATOLOGY

## 2022-10-28 ENCOUNTER — HOSPITAL ENCOUNTER (OUTPATIENT)
Dept: RADIOLOGY | Facility: HOSPITAL | Age: 67
Discharge: HOME | End: 2022-10-28
Attending: ORTHOPAEDIC SURGERY
Payer: MEDICARE

## 2022-10-28 ENCOUNTER — OFFICE VISIT (OUTPATIENT)
Dept: ORTHOPEDICS | Facility: CLINIC | Age: 67
End: 2022-10-28
Payer: MEDICARE

## 2022-10-28 VITALS — HEIGHT: 65 IN | BODY MASS INDEX: 38.79 KG/M2 | WEIGHT: 232.81 LBS

## 2022-10-28 DIAGNOSIS — M75.02 ADHESIVE CAPSULITIS OF LEFT SHOULDER: ICD-10-CM

## 2022-10-28 DIAGNOSIS — Z98.1 S/P CERVICAL SPINAL FUSION: Primary | ICD-10-CM

## 2022-10-28 DIAGNOSIS — Z98.1 S/P CERVICAL SPINAL FUSION: ICD-10-CM

## 2022-10-28 PROCEDURE — 99024 PR POST-OP FOLLOW-UP VISIT: ICD-10-PCS | Mod: S$GLB,,, | Performed by: ORTHOPAEDIC SURGERY

## 2022-10-28 PROCEDURE — 99999 PR PBB SHADOW E&M-EST. PATIENT-LVL IV: ICD-10-PCS | Mod: PBBFAC,,, | Performed by: ORTHOPAEDIC SURGERY

## 2022-10-28 PROCEDURE — 72040 X-RAY EXAM NECK SPINE 2-3 VW: CPT | Mod: TC

## 2022-10-28 PROCEDURE — 4010F PR ACE/ARB THEARPY RXD/TAKEN: ICD-10-PCS | Mod: CPTII,S$GLB,, | Performed by: ORTHOPAEDIC SURGERY

## 2022-10-28 PROCEDURE — 1159F PR MEDICATION LIST DOCUMENTED IN MEDICAL RECORD: ICD-10-PCS | Mod: CPTII,S$GLB,, | Performed by: ORTHOPAEDIC SURGERY

## 2022-10-28 PROCEDURE — 3066F NEPHROPATHY DOC TX: CPT | Mod: CPTII,S$GLB,, | Performed by: ORTHOPAEDIC SURGERY

## 2022-10-28 PROCEDURE — 72040 X-RAY EXAM NECK SPINE 2-3 VW: CPT | Mod: 26,,, | Performed by: RADIOLOGY

## 2022-10-28 PROCEDURE — 3061F PR NEG MICROALBUMINURIA RESULT DOCUMENTED/REVIEW: ICD-10-PCS | Mod: CPTII,S$GLB,, | Performed by: ORTHOPAEDIC SURGERY

## 2022-10-28 PROCEDURE — 3288F PR FALLS RISK ASSESSMENT DOCUMENTED: ICD-10-PCS | Mod: CPTII,S$GLB,, | Performed by: ORTHOPAEDIC SURGERY

## 2022-10-28 PROCEDURE — 1101F PT FALLS ASSESS-DOCD LE1/YR: CPT | Mod: CPTII,S$GLB,, | Performed by: ORTHOPAEDIC SURGERY

## 2022-10-28 PROCEDURE — 72040 XR CERVICAL SPINE AP LATERAL: ICD-10-PCS | Mod: 26,,, | Performed by: RADIOLOGY

## 2022-10-28 PROCEDURE — 3066F PR DOCUMENTATION OF TREATMENT FOR NEPHROPATHY: ICD-10-PCS | Mod: CPTII,S$GLB,, | Performed by: ORTHOPAEDIC SURGERY

## 2022-10-28 PROCEDURE — 3061F NEG MICROALBUMINURIA REV: CPT | Mod: CPTII,S$GLB,, | Performed by: ORTHOPAEDIC SURGERY

## 2022-10-28 PROCEDURE — 1101F PR PT FALLS ASSESS DOC 0-1 FALLS W/OUT INJ PAST YR: ICD-10-PCS | Mod: CPTII,S$GLB,, | Performed by: ORTHOPAEDIC SURGERY

## 2022-10-28 PROCEDURE — 99999 PR PBB SHADOW E&M-EST. PATIENT-LVL IV: CPT | Mod: PBBFAC,,, | Performed by: ORTHOPAEDIC SURGERY

## 2022-10-28 PROCEDURE — 1159F MED LIST DOCD IN RCRD: CPT | Mod: CPTII,S$GLB,, | Performed by: ORTHOPAEDIC SURGERY

## 2022-10-28 PROCEDURE — 4010F ACE/ARB THERAPY RXD/TAKEN: CPT | Mod: CPTII,S$GLB,, | Performed by: ORTHOPAEDIC SURGERY

## 2022-10-28 PROCEDURE — 99024 POSTOP FOLLOW-UP VISIT: CPT | Mod: S$GLB,,, | Performed by: ORTHOPAEDIC SURGERY

## 2022-10-28 PROCEDURE — 3044F HG A1C LEVEL LT 7.0%: CPT | Mod: CPTII,S$GLB,, | Performed by: ORTHOPAEDIC SURGERY

## 2022-10-28 PROCEDURE — 3288F FALL RISK ASSESSMENT DOCD: CPT | Mod: CPTII,S$GLB,, | Performed by: ORTHOPAEDIC SURGERY

## 2022-10-28 PROCEDURE — 3044F PR MOST RECENT HEMOGLOBIN A1C LEVEL <7.0%: ICD-10-PCS | Mod: CPTII,S$GLB,, | Performed by: ORTHOPAEDIC SURGERY

## 2022-11-02 ENCOUNTER — PATIENT MESSAGE (OUTPATIENT)
Dept: DERMATOLOGY | Facility: CLINIC | Age: 67
End: 2022-11-02
Payer: MEDICARE

## 2022-11-02 DIAGNOSIS — L29.9 SCALP ITCH: ICD-10-CM

## 2022-11-02 RX ORDER — CLOBETASOL PROPIONATE 0.46 MG/ML
SOLUTION TOPICAL 2 TIMES DAILY
Qty: 50 ML | Refills: 0 | OUTPATIENT
Start: 2022-11-02

## 2022-11-03 ENCOUNTER — PATIENT MESSAGE (OUTPATIENT)
Dept: OTOLARYNGOLOGY | Facility: CLINIC | Age: 67
End: 2022-11-03
Payer: MEDICARE

## 2022-11-03 DIAGNOSIS — Z86.69 HISTORY OF EUSTACHIAN TUBE DYSFUNCTION: ICD-10-CM

## 2022-11-03 DIAGNOSIS — J31.0 CHRONIC RHINITIS: ICD-10-CM

## 2022-11-03 RX ORDER — FLUTICASONE PROPIONATE 50 MCG
SPRAY, SUSPENSION (ML) NASAL
Qty: 48 G | Refills: 3 | Status: SHIPPED | OUTPATIENT
Start: 2022-11-03

## 2022-11-03 RX ORDER — AZELASTINE 1 MG/ML
SPRAY, METERED NASAL
Qty: 30 ML | Refills: 2 | Status: SHIPPED | OUTPATIENT
Start: 2022-11-03 | End: 2023-01-06 | Stop reason: SDUPTHER

## 2022-11-03 RX ORDER — AZELASTINE 1 MG/ML
SPRAY, METERED NASAL
Qty: 30 ML | Refills: 2 | Status: SHIPPED | OUTPATIENT
Start: 2022-11-03

## 2022-11-09 DIAGNOSIS — L29.9 SCALP ITCH: ICD-10-CM

## 2022-11-09 NOTE — TELEPHONE ENCOUNTER
----- Message from Cherrie Harrington LPN sent at 11/8/2022  3:59 PM CST -----  Contact: pt    ----- Message -----  From: Farzana Skinner  Sent: 11/8/2022   8:48 AM CST  To: [facility] Staff    Pt requesting a refill request. Pt states she requested a refill for prescription over a week ago, and pharmacy has tried to reach the office as well with no answer.     Medication:  clobetasoL (TEMOVATE) 0.05 % external solution      [id number] - [facility] [address]  [address] [facility] [address]  Phone: [phone] Fax: [phone]    Confirmed contact below:  Contact Name:Regine Osorio  Phone Number: [phone]

## 2022-11-10 RX ORDER — CLOBETASOL PROPIONATE 0.46 MG/ML
SOLUTION TOPICAL
Qty: 50 ML | Refills: 0 | OUTPATIENT
Start: 2022-11-10

## 2022-11-14 ENCOUNTER — PATIENT MESSAGE (OUTPATIENT)
Dept: PAIN MEDICINE | Facility: CLINIC | Age: 67
End: 2022-11-14
Payer: MEDICARE

## 2022-11-15 ENCOUNTER — LAB VISIT (OUTPATIENT)
Dept: LAB | Facility: HOSPITAL | Age: 67
End: 2022-11-15
Attending: DERMATOLOGY
Payer: MEDICARE

## 2022-11-15 DIAGNOSIS — E61.1 LOW IRON: ICD-10-CM

## 2022-11-15 PROCEDURE — 82728 ASSAY OF FERRITIN: CPT | Performed by: DERMATOLOGY

## 2022-11-15 PROCEDURE — 84466 ASSAY OF TRANSFERRIN: CPT | Performed by: DERMATOLOGY

## 2022-11-15 PROCEDURE — 36415 COLL VENOUS BLD VENIPUNCTURE: CPT | Mod: PN | Performed by: DERMATOLOGY

## 2022-11-16 LAB
FERRITIN SERPL-MCNC: 22 NG/ML (ref 20–300)
IRON SERPL-MCNC: 40 UG/DL (ref 30–160)
SATURATED IRON: 10 % (ref 20–50)
TOTAL IRON BINDING CAPACITY: 410 UG/DL (ref 250–450)
TRANSFERRIN SERPL-MCNC: 277 MG/DL (ref 200–375)

## 2022-11-18 ENCOUNTER — TELEPHONE (OUTPATIENT)
Dept: DERMATOLOGY | Facility: CLINIC | Age: 67
End: 2022-11-18
Payer: MEDICARE

## 2022-11-18 NOTE — TELEPHONE ENCOUNTER
Reviewed results.Informed the pt to double check [employer] messages to see the note.        Tory         ----- Message from Juan Izaguirre MD sent at 11/16/2022  9:05 AM CST -----  Iron still low!    Patient to consider better supplement brands like Jarrows, Nature's Way, Solaray, DESTINY, Pure Encapsulations, and Now.  Top of the line companies are [employer] and [employer].  Can also go to [employer] and look at different brands there.  For iron and zinc, consider bases of glycinate, acetate, citrate, picolinate.    Check pt's brand    Needs 30-40 mg of a good brand.    If on a good brand already, go to 30 mg bid.    Itching should improve with proper brand/dosing  ----- Message -----  From: [employer]  Sent: 11/16/2022  12:10 AM CST  To: Juan Izaguirre MD

## 2022-12-02 ENCOUNTER — TELEPHONE (OUTPATIENT)
Dept: NEUROLOGY | Facility: CLINIC | Age: 67
End: 2022-12-02
Payer: MEDICARE

## 2022-12-02 NOTE — TELEPHONE ENCOUNTER
Spoke to pt about switching her Jan 30 appt from in person to virtual. Jan 30 appt has been rescheduled to virtual.       ----- Message from Nelida Ko sent at 12/2/2022  9:27 AM CST -----  Regarding: apppt access  Contact: [phone]  Regine Sarahi Centeno calling regarding Appointment Access  (message) want to know if she can change her appt to a virtual visit on any day.   call back [phone]

## 2022-12-09 ENCOUNTER — CLINICAL SUPPORT (OUTPATIENT)
Dept: REHABILITATION | Facility: HOSPITAL | Age: 67
End: 2022-12-09
Attending: ORTHOPAEDIC SURGERY
Payer: MEDICARE

## 2022-12-09 DIAGNOSIS — Z98.1 S/P CERVICAL SPINAL FUSION: ICD-10-CM

## 2022-12-09 DIAGNOSIS — M25.512 CHRONIC LEFT SHOULDER PAIN: ICD-10-CM

## 2022-12-09 DIAGNOSIS — M75.02 ADHESIVE CAPSULITIS OF LEFT SHOULDER: ICD-10-CM

## 2022-12-09 DIAGNOSIS — M54.2 NECK PAIN: ICD-10-CM

## 2022-12-09 DIAGNOSIS — G89.29 CHRONIC LEFT SHOULDER PAIN: ICD-10-CM

## 2022-12-09 PROCEDURE — 97110 THERAPEUTIC EXERCISES: CPT | Mod: KX,PN

## 2022-12-09 PROCEDURE — 97162 PT EVAL MOD COMPLEX 30 MIN: CPT | Mod: KX,PN

## 2022-12-09 PROCEDURE — 97140 MANUAL THERAPY 1/> REGIONS: CPT | Mod: KX,PN

## 2022-12-09 NOTE — PROGRESS NOTES
[facility]   Physical Therapy Initial Evaluation     Date: 12/9/2022   Name: Regine Osorio  Clinic Number: [id number]    Therapy Diagnosis:   Encounter Diagnoses   Name Primary?    S/P cervical spinal fusion     Adhesive capsulitis of left shoulder     Neck pain     Chronic left shoulder pain      Physician: Fauzia Vidales,*    Physician Orders: PT Eval and Treat   Medical Diagnosis from Referral:   Z98.1 (ICD-10-CM) - S/P cervical spinal fusion   M75.02 (ICD-10-CM) - Adhesive capsulitis of left shoulder     Evaluation Date: 12/9/2022  Authorization Period Expiration: 1/6/2023  Plan of Care Expiration: 2/3/2023   Progress Note Due: 1/9/2023  Visit # / Visits authorized: 1/ 1   Remaining Visits Scheduled - 00  PTA Consecutive Visits - 0/6    Eval Visit FOTO - 00 (12/9/2022)   5th Visit FOTO  - (Date/Score/Turn Green)   10th Visit FOTO - (Date/Score/Turn Green)   D/C FOTO -  (Date/Score/Turn Green)      Precautions: Standard, Diabetes, and HTN     Time In: 9:30  Time Out: 10:15  Total Appointment Time (timed & untimed codes): 45 minutes      Please see Plan of Care notation section to view Regine Osorio Initial Evaluation.       Qasim Sánchez, PT,DPT

## 2022-12-09 NOTE — PLAN OF CARE
[facility]   Physical Therapy Initial Evaluation     Date: 12/9/2022   Name: Regine Osorio  Clinic Number: [id number]    Therapy Diagnosis:   Encounter Diagnoses   Name Primary?    S/P cervical spinal fusion     Adhesive capsulitis of left shoulder     Neck pain     Chronic left shoulder pain      Physician: Fauzia Vidales,*    Physician Orders: PT Eval and Treat   Medical Diagnosis from Referral:   Z98.1 (ICD-10-CM) - S/P cervical spinal fusion   M75.02 (ICD-10-CM) - Adhesive capsulitis of left shoulder     Evaluation Date: 12/9/2022  Authorization Period Expiration: 1/6/2023  Plan of Care Expiration: 2/3/2023   Progress Note Due: 1/9/2023  Visit # / Visits authorized: 1/ 1   Remaining Visits Scheduled - 00  PTA Consecutive Visits - 0/6    Eval Visit FOTO - 00 (12/9/2022)   5th Visit FOTO  - (Date/Score/Turn Green)   10th Visit FOTO - (Date/Score/Turn Green)   D/C FOTO -  (Date/Score/Turn Green)      Precautions: Standard, Diabetes, and HTN     Time In: 9:30  Time Out: 10:15  Total Appointment Time (timed & untimed codes): 45 minutes    SUBJECTIVE     Date of onset: 7/2022    Current Condition - Regine reports having cervical fusion in 7/2022. She had cervical myelopathy that caused weakeness to the upper extremity. She now displays a decrease in left shoulder weakness, secondary to adhesive capsulitis. Ms. Osorio is still having limitations with active range of motion of the neck. Surgeon has no rescritions at this time for her, per patient.      Type of Pain: Pain is located to the cervical region and Left upper extremity and described as Aching  Sleep: Pt is able to sleep throughout the night without waking up due to pain.     Aggravating Factors: Cervical and shoulder- active range of motion and laying on the Left shoulder.  Easing Factors: pain medication and heating pad    Prior Therapy: Yes, to cervical region.   Work/School: Retired.   Hobbies/Exercise: Genealogy    Hand Dominance: right    Prior Level of Function: Independent.   Current Level of Function: Independent.     Pain:  Current 2/10,   Worst 6/10,   Best 2/10     Non-Mechanical Origin:  Night Pain?  No.   Hx of Cancer?  Breast Cancer.   Trauma?  Surgery to the Cervical Region 7/2022.   Sudden bowel/bladder changes?  None.     Falls: No Falls in the last year.   Red Flags: (-) Numbness/Tingling, Diplopia, Blurred Vision, Dizziness, Dysphagia, Dysarthria.      Patient Goals: Patient would like to decrease pain and improve mobility.      Medical History:   Past Medical History:   Diagnosis Date    Allergy     Breast cancer 2017    Cataract     Cervical spondylosis 7/29/2022    Diabetes mellitus, type 2     Type 2    Eczema     Fibromyalgia     Glaucoma     Hypertension     Renal cyst, right 02/06/2020    Steatosis of liver 02/06/2020     Surgical History:   Regine Osorio  has a past surgical history that includes Tonsillectomy; Breast surgery; Breast biopsy; Mastectomy (Left, 2017); Vaginal delivery; Myomectomy; Laparoscopic supracervical hysterectomy (2005); Hysterectomy; Adenoidectomy; Tympanostomy tube placement; and Anterior cervical discectomy w/ fusion (N/A, 7/29/2022).    Medications:   Regine has a current medication list which includes the following prescription(s): acetic acid, azelastine, azelastine, blood sugar diagnostic, blood-glucose meter, cetirizine, clobetasol, dextromethorphan-guaifenesin  mg/5 ml, fluticasone propionate, glipizide, hydrochlorothiazide, ketoconazole, lancets, losartan, metformin, ondansetron, rizatriptan, sumatriptan, ubrogepant, [DISCONTINUED] aspirin, and [DISCONTINUED] diclofenac sodium.    Allergies:   Review of patient's allergies indicates:   Allergen Reactions    Codeine Shortness Of Breath     Pt states she had a reaction as a teenager and was taken to the ER.    Lisinopril      cough        OBJECTIVE   OBSERVATIONS: Patient is a 67 year old female who ambulates  with no AD and no apparent signs of distress.  No signs of atrophy at the SCM, traps, deltoids, supraspinatus.    POSTURE:  Patient is significant for moderate forward head and internally rotated shoulders. Upper t-spine flexion also observed.    CERVICAL ACTIVE RANGE OF MOTION   Flexion 25°   Extension 20°   Right Side Bend 40°   Left Side Bend 20°   Right Rotation 33°   Left Rotation 40°     SHOULDER ACTIVE RANGE OF MOTION    Left Right    Flexion 80° 130°   Abduction 55° 110°   External Rotation 55° 75°   Internal Rotation (Apley Scratch Test) L5 T9   Extension 30° Within Normal Limits      UPPER LOWER EXTREMITY STRENGTH    Left  Right    Shoulder Flexion 4-/5 4/5   Shoulder Abduction 4-/5 4/5   Shoulder External Rotation 4/5 4+/5   Shoulder Internal Rotation 4/5 4+/5   Elbow Flexion 5/5 5/5   Elbow Extension 4+/5 5/5     DERMATOMES: Sensation: Light Touch: Intact    UPPER EXTREMITY DTR's   Bicep (C5-6) 2+ Unable to elicit    Brachioradialis (C6) 1+ Unable to elicit    Triceps (C7-8) 1+ Unable to elicit      PALPATION:  Patient reports primary pain region along the cervical region.   (+) Upper Trapezius  (+) Levator Scapulae  (+) Sub-Occipital  (+) Paraspainals    Prone PA segmental testing:   Cervical Spine: Moderate hypomobility throughout with concordant symptoms from C2-C7      Limitation/Restriction for FOTO Neck Index Survey    Therapist reviewed FOTO scores for Regine Osorio on 12/9/2022.   FOTO documents entered into [employer] - see Media section.    Limitation Score: 0%       TREATMENT     Total Treatment time (time-based codes) separate from Evaluation: 25 minutes      Regine received the treatments listed below:      therapeutic exercises to develop strength, endurance, ROM, flexibility, and posture for 15 minutes including:  Cervical active range of motion x30 (Rotation/Flexion-Extension)   Chin Tucks 2x10 with 3 sec hold   Cervical SNAGS 2x10, with a 2 sec hold (Rotation/Extension)     manual therapy  techniques: Joint mobilizations, Manual traction, Myofacial release, and Soft tissue Mobilization were applied to the: cervical region for 10 minutes, including:  Soft-Tissue Mobilization to the levator scap, upper trap, sub-occipitals, and paraspinals.       PATIENT EDUCATION AND HOME EXERCISES     Education provided:   - Pt was educated on the prognosis and pathology of cervical spine fusion and adhesive capsulitis of the Left shoulder. The patient verbalized understanding and compliance with HEP.    - Pt/family was provided educational information, including: role of PT, goals for PT, scheduling - pt verbalized understanding. Discussed insurance limitations with pt.     Written Home Exercises Provided: yes. Exercises were reviewed and Regine was able to demonstrate them prior to the end of the session.  Regine demonstrated good  understanding of the education provided. See EMR under Patient Instructions for exercises provided during therapy sessions.    ASSESSMENT     Regine is a 67 y.o. female referred to outpatient Physical Therapy with a medical diagnosis of cervical spine fusion and adhesive capsulitis of the Left shoulder. Findings are consistent with cervical spine fusion and adhesive capsulitis of the Left shoulder. Limitations are noted with cervical and shoulder active range of motion, driving, lifting > 5lbs overhead, and activities of daily living. Primary impairment is pain, secondary to decreased ROM, endurance, strength, coordination, stability/balance, muscular activation, and function to the cervical and Left shoulder regions. Pt. would benefit from PT to address impairments listed above to return to functional independence.    Intervention strategies designed to address these impairments will be instrumental in achieving the stated functional goals, including her ability to safely perform mobility tasks. Based on the examination, the patient's rehabilitation potential to achieve functional  goals is fair due to medical and surgical history.      Patient prognosis is Fair.   Patient will benefit from skilled outpatient Physical Therapy to address the deficits stated above and in the chart below, provide patient /family education, and to maximize patientt's level of independence.     Plan of care discussed with patient: Yes  Patient's spiritual, cultural and educational needs considered and patient is agreeable to the plan of care and goals as stated below:     Anticipated Barriers for therapy: None.     Medical Necessity is demonstrated by the following  History  Co-morbidities and personal factors that may impact the plan of care Co-morbidities:   diabetes, high BMI, history of cancer, and HTN    Personal Factors:   no deficits     high   Examination  Body Structures and Functions, activity limitations and participation restrictions that may impact the plan of care Body Regions:   neck  upper extremities    Body Systems:    gross symmetry  ROM  strength  gross coordinated movement  transfers  transitions  motor control  motor learning  blood pressure    Participation Restrictions:   None.     Activity limitations:   Learning and applying knowledge  no deficits    General Tasks and Commands  no deficits    Communication  no deficits    Mobility  no deficits    Self care  washing oneself (bathing, drying, washing hands)  caring for body parts (brushing teeth, shaving, grooming)  dressing  eating  drinking  no deficits    Domestic Life  no deficits    Interactions/Relationships  no deficits    Life Areas  no deficits    Community and Social Life  no deficits         moderate   Clinical Presentation evolving clinical presentation with changing clinical characteristics moderate   Decision Making/ Complexity Score: moderate     Goals:  Short Term Goals: 4 weeks   Patient will be independent with HEP at home to increase PT compliance.     Patient will be able to increase active range of motion by 10° in each  plane to increase mobility of the shoulder and cervical spine    Patient will be able to hold 5 lbs at 90 degrees of shoulder flexion and abduction for 15 sec to increase shoulder strength           Long Term Goals: 8 weeks   Patient will decrease Neck Index score to <20% to increase functional capacity.     Patient will be able to drive for 30 min with < 4/10 pain to the cervical and shoulder region to increase functional capacity     Patient will be able to lift 5 lbs overhead 5 times with < 2/10 pain to the shoulder to increase functional capacity           PLAN   Plan of care Certification: 12/9/2022 to 2/3/2023 .    Outpatient Physical Therapy 2 times weekly for 8 weeks to include the following interventions: Cervical/Lumbar Traction, Electrical Stimulation (IFC), Manual Therapy, Moist Heat/ Ice, Neuromuscular Re-ed, Patient Education, Self Care, Therapeutic Activities, Therapeutic Exercise, Ultrasound, and Functional Dry Needling.     Qasim Sánchez PT, DPT    Pt may be seen by PTA as part of the rehabilitation team.     Therapist: Qasim Sánchez, PT, DPT 12/9/2022    I CERTIFY THE NEED FOR THESE SERVICES FURNISHED UNDER THIS PLAN OF TREATMENT AND WHILE UNDER MY CARE   Physician's comments:     Physician's Signature: ___________________________________________________

## 2022-12-12 ENCOUNTER — TELEPHONE (OUTPATIENT)
Dept: REHABILITATION | Facility: HOSPITAL | Age: 67
End: 2022-12-12
Payer: MEDICARE

## 2022-12-12 ENCOUNTER — LAB VISIT (OUTPATIENT)
Dept: PRIMARY CARE CLINIC | Facility: CLINIC | Age: 67
End: 2022-12-12
Payer: MEDICARE

## 2022-12-12 DIAGNOSIS — Z79.899 MEDICATION MANAGEMENT: ICD-10-CM

## 2022-12-12 LAB
25(OH)D3+25(OH)D2 SERPL-MCNC: 52 NG/ML (ref 30–96)
ALBUMIN SERPL BCP-MCNC: 4 G/DL (ref 3.5–5.2)
ALP SERPL-CCNC: 91 U/L (ref 55–135)
ALT SERPL W/O P-5'-P-CCNC: 11 U/L (ref 10–44)
ANION GAP SERPL CALC-SCNC: 10 MMOL/L (ref 8–16)
AST SERPL-CCNC: 13 U/L (ref 10–40)
BILIRUB SERPL-MCNC: 0.6 MG/DL (ref 0.1–1)
BUN SERPL-MCNC: 11 MG/DL (ref 8–23)
CALCIUM SERPL-MCNC: 10.4 MG/DL (ref 8.7–10.5)
CHLORIDE SERPL-SCNC: 99 MMOL/L (ref 95–110)
CHOLEST SERPL-MCNC: 163 MG/DL (ref 120–199)
CHOLEST/HDLC SERPL: 3.1 {RATIO} (ref 2–5)
CO2 SERPL-SCNC: 30 MMOL/L (ref 23–29)
CREAT SERPL-MCNC: 0.7 MG/DL (ref 0.5–1.4)
EST. GFR  (NO RACE VARIABLE): >60 ML/MIN/1.73 M^2
ESTIMATED AVG GLUCOSE: 169 MG/DL (ref 68–131)
GLUCOSE SERPL-MCNC: 202 MG/DL (ref 70–110)
HBA1C MFR BLD: 7.5 % (ref 4–5.6)
HDLC SERPL-MCNC: 52 MG/DL (ref 40–75)
HDLC SERPL: 31.9 % (ref 20–50)
LDLC SERPL CALC-MCNC: 91.2 MG/DL (ref 63–159)
NONHDLC SERPL-MCNC: 111 MG/DL
POTASSIUM SERPL-SCNC: 4.4 MMOL/L (ref 3.5–5.1)
PROT SERPL-MCNC: 7.7 G/DL (ref 6–8.4)
SODIUM SERPL-SCNC: 139 MMOL/L (ref 136–145)
TRIGL SERPL-MCNC: 99 MG/DL (ref 30–150)

## 2022-12-12 PROCEDURE — 83036 HEMOGLOBIN GLYCOSYLATED A1C: CPT | Performed by: FAMILY MEDICINE

## 2022-12-12 PROCEDURE — 80053 COMPREHEN METABOLIC PANEL: CPT | Performed by: FAMILY MEDICINE

## 2022-12-12 PROCEDURE — 80061 LIPID PANEL: CPT | Performed by: FAMILY MEDICINE

## 2022-12-12 PROCEDURE — 82306 VITAMIN D 25 HYDROXY: CPT | Performed by: FAMILY MEDICINE

## 2022-12-13 ENCOUNTER — CLINICAL SUPPORT (OUTPATIENT)
Dept: REHABILITATION | Facility: HOSPITAL | Age: 67
End: 2022-12-13
Attending: ORTHOPAEDIC SURGERY
Payer: MEDICARE

## 2022-12-13 ENCOUNTER — PATIENT MESSAGE (OUTPATIENT)
Dept: PRIMARY CARE CLINIC | Facility: CLINIC | Age: 67
End: 2022-12-13
Payer: MEDICARE

## 2022-12-13 DIAGNOSIS — M25.512 CHRONIC LEFT SHOULDER PAIN: ICD-10-CM

## 2022-12-13 DIAGNOSIS — G89.29 CHRONIC LEFT SHOULDER PAIN: ICD-10-CM

## 2022-12-13 DIAGNOSIS — M54.2 NECK PAIN: Primary | ICD-10-CM

## 2022-12-13 PROCEDURE — 97112 NEUROMUSCULAR REEDUCATION: CPT | Mod: PN,CQ

## 2022-12-13 PROCEDURE — 97110 THERAPEUTIC EXERCISES: CPT | Mod: PN,CQ

## 2022-12-13 NOTE — PROGRESS NOTES
Physical Therapy Daily Treatment Note         Name: Regine Osorio  Clinic Number: [id number]    Therapy Diagnosis:   Encounter Diagnoses   Name Primary?    Neck pain Yes    Chronic left shoulder pain      Physician: Fauzia Vidales,*    Visit Date: 12/13/2022    Physician Orders: PT Eval and Treat   Medical Diagnosis from Referral:   Z98.1 (ICD-10-CM) - S/P cervical spinal fusion   M75.02 (ICD-10-CM) - Adhesive capsulitis of left shoulder      Evaluation Date: 12/9/2022  Authorization Period Expiration: 1/6/2023  Plan of Care Expiration: 2/3/2023   Progress Note Due: 1/9/2023  Visit # / Visits authorized: 2   Remaining Visits Scheduled - 09 PTA Consecutive Visits - 1/6      5th Visit FOTO - (Date, Score/ turn green)  10th Visit FOTO -  (Date, Score/ turn green )  D/C FOTO - (Date, Score/ turn green )    Time In: 1:00 pm  Time Out: 1:45 pm  Billable Time: 45 minutes  Non-Billable Time: 00 minutes    Precautions: Standard, Diabetes, and HTN  Insurance: Payor: [employer] / Plan: [employer] / Product Type: Medicare Advantage /     Subjective     Pt reports: Still discomfort in the neck. Right shoulder had muscle spasms last night.  She is compliant with home exercise program.  Response to previous treatment: 1st session    Pre-Treatment Pain Rating: 3/10  Post-Treatment Pain Rating: 3/10  Location: left shoulder and cervical spine    Objective     Regine received therapeutic exercises to develop strength, endurance, ROM, and flexibility for 30 minutes including:    Cervical active range of motion x30 (Rotation/Flexion-Extension)   Chin Tucks 3x10 with 3 sec hold   Cervical SNAGS 2x10, with a 2 sec hold (Rotation/Extension)   Scapular retractions 2x10  Shoulder shrugs 2x10      *Bold exercise performed     Regine received the following manual therapy techniques: Soft tissue Mobilization were applied to the: L shoulder for 00 minutes, including:  Soft-Tissue Mobilization to  the levator scap, upper trap, sub-occipitals, and paraspinals.      Regine participated in neuromuscular re-education activities to improve: Coordination and Proprioception for 15 minutes. The following activities were included:  Shoulder Isometrics x20 (flexion, extension, IR, ER)      Home Exercises Provided and Patient Education Provided     Education provided:   - Continue with HEP    Written Home Exercises Provided: Patient instructed to cont prior HEP.  Exercises were reviewed and Regine was able to demonstrate them prior to the end of the session.  Regine demonstrated good  understanding of the education provided.     See EMR under Patient Instructions for exercises provided prior visit.    Assessment     Pt tolerated session well. No pain felt during exercises. Verbal reminders to keep ROM in pain free range, pt demonstrated understanding. Some pulling felt with cervical ext/flex. Crepitus felt during eccentric portion of shoulder shrugs and cervical SNAG exercises. Shoulder Isometrics challenging due to L shoulder weakness. Crepitus with pain felt if pt moved shoulder too quickly. Regine had no adverse effects with exercises today and will continue to benefit from skilled therapy.    Regine Is progressing well towards Her goals.     Pt prognosis is Fair.     Pt will continue to benefit from skilled outpatient physical therapy to address the deficits listed in the problem list box on initial evaluation, provide pt/family education and to maximize pt's level of independence in the home and community environment.     Pt's spiritual, cultural and educational needs considered and pt agreeable to plan of care and goals.    Anticipated barriers to physical therapy: None    Goals:  Short Term Goals: 4 weeks   Patient will be independent with HEP at home to increase PT compliance.  Progressing 12/13/2022      Patient will be able to increase active range of motion by 10° in each plane to increase mobility  of the shoulder and cervical spine Progressing 12/13/2022      Patient will be able to hold 5 lbs at 90 degrees of shoulder flexion and abduction for 15 sec to increase shoulder strength  Progressing 12/13/2022               Long Term Goals: 8 weeks   Patient will decrease Neck Index score to <20% to increase functional capacity.      Patient will be able to drive for 30 min with < 4/10 pain to the cervical and shoulder region to increase functional capacity      Patient will be able to lift 5 lbs overhead 5 times with < 2/10 pain to the shoulder to increase functional capacity               Plan     Continued with current POC      Aris Cortes PTA ,  12/13/2022

## 2022-12-13 NOTE — TELEPHONE ENCOUNTER
The 10-year ASCVD risk score (Charmaine RUIZ, et al., 2019) is: 18.4%    Values used to calculate the score:      Age: 67 years      Sex: Female      Is Non- : Yes      Diabetic: Yes      Tobacco smoker: No      Systolic Blood Pressure: 131 mmHg      Is BP treated: Yes      HDL Cholesterol: 52 mg/dL      Total Cholesterol: 163 mg/dL  Not on statin therapy. Need to discuss with patient.     Dr. Moreno

## 2022-12-14 ENCOUNTER — PATIENT MESSAGE (OUTPATIENT)
Dept: PRIMARY CARE CLINIC | Facility: CLINIC | Age: 67
End: 2022-12-14
Payer: MEDICARE

## 2022-12-14 ENCOUNTER — TELEPHONE (OUTPATIENT)
Dept: PAIN MEDICINE | Facility: CLINIC | Age: 67
End: 2022-12-14
Payer: MEDICARE

## 2022-12-14 NOTE — TELEPHONE ENCOUNTER
This message is for patient in regards to his/her appointment 12/15/22 at 8:00 am   With ERIK Kendall.      [facility] Policy: For the safety of all patients and staff members.   During this visit we're informing all patients and visitors to wear face mask at all time here at [facility].  If you have any questions or concerns please contact [phone]

## 2022-12-15 ENCOUNTER — OFFICE VISIT (OUTPATIENT)
Dept: PAIN MEDICINE | Facility: CLINIC | Age: 67
End: 2022-12-15
Payer: MEDICARE

## 2022-12-15 VITALS
SYSTOLIC BLOOD PRESSURE: 129 MMHG | HEART RATE: 95 BPM | BODY MASS INDEX: 39.15 KG/M2 | TEMPERATURE: 97 F | HEIGHT: 65 IN | WEIGHT: 235 LBS | RESPIRATION RATE: 18 BRPM | DIASTOLIC BLOOD PRESSURE: 72 MMHG

## 2022-12-15 DIAGNOSIS — G89.4 CHRONIC PAIN DISORDER: Primary | ICD-10-CM

## 2022-12-15 DIAGNOSIS — Z74.09 IMPAIRED MOBILITY AND ENDURANCE: ICD-10-CM

## 2022-12-15 DIAGNOSIS — R52 PAIN AGGRAVATED BY ACTIVITIES OF DAILY LIVING: ICD-10-CM

## 2022-12-15 PROCEDURE — 1160F PR REVIEW ALL MEDS BY PRESCRIBER/CLIN PHARMACIST DOCUMENTED: ICD-10-PCS | Mod: CPTII,S$GLB,, | Performed by: NURSE PRACTITIONER

## 2022-12-15 PROCEDURE — 1160F RVW MEDS BY RX/DR IN RCRD: CPT | Mod: CPTII,S$GLB,, | Performed by: NURSE PRACTITIONER

## 2022-12-15 PROCEDURE — 3074F SYST BP LT 130 MM HG: CPT | Mod: CPTII,S$GLB,, | Performed by: NURSE PRACTITIONER

## 2022-12-15 PROCEDURE — 1101F PR PT FALLS ASSESS DOC 0-1 FALLS W/OUT INJ PAST YR: ICD-10-PCS | Mod: CPTII,S$GLB,, | Performed by: NURSE PRACTITIONER

## 2022-12-15 PROCEDURE — 3078F PR MOST RECENT DIASTOLIC BLOOD PRESSURE < 80 MM HG: ICD-10-PCS | Mod: CPTII,S$GLB,, | Performed by: NURSE PRACTITIONER

## 2022-12-15 PROCEDURE — 1159F MED LIST DOCD IN RCRD: CPT | Mod: CPTII,S$GLB,, | Performed by: NURSE PRACTITIONER

## 2022-12-15 PROCEDURE — 99999 PR PBB SHADOW E&M-EST. PATIENT-LVL IV: ICD-10-PCS | Mod: PBBFAC,,, | Performed by: NURSE PRACTITIONER

## 2022-12-15 PROCEDURE — 99214 PR OFFICE/OUTPT VISIT, EST, LEVL IV, 30-39 MIN: ICD-10-PCS | Mod: S$GLB,,, | Performed by: NURSE PRACTITIONER

## 2022-12-15 PROCEDURE — 3008F PR BODY MASS INDEX (BMI) DOCUMENTED: ICD-10-PCS | Mod: CPTII,S$GLB,, | Performed by: NURSE PRACTITIONER

## 2022-12-15 PROCEDURE — 99214 OFFICE O/P EST MOD 30 MIN: CPT | Mod: S$GLB,,, | Performed by: NURSE PRACTITIONER

## 2022-12-15 PROCEDURE — 1159F PR MEDICATION LIST DOCUMENTED IN MEDICAL RECORD: ICD-10-PCS | Mod: CPTII,S$GLB,, | Performed by: NURSE PRACTITIONER

## 2022-12-15 PROCEDURE — 3078F DIAST BP <80 MM HG: CPT | Mod: CPTII,S$GLB,, | Performed by: NURSE PRACTITIONER

## 2022-12-15 PROCEDURE — 1101F PT FALLS ASSESS-DOCD LE1/YR: CPT | Mod: CPTII,S$GLB,, | Performed by: NURSE PRACTITIONER

## 2022-12-15 PROCEDURE — 3051F HG A1C>EQUAL 7.0%<8.0%: CPT | Mod: CPTII,S$GLB,, | Performed by: NURSE PRACTITIONER

## 2022-12-15 PROCEDURE — 4010F ACE/ARB THERAPY RXD/TAKEN: CPT | Mod: CPTII,S$GLB,, | Performed by: NURSE PRACTITIONER

## 2022-12-15 PROCEDURE — 3061F PR NEG MICROALBUMINURIA RESULT DOCUMENTED/REVIEW: ICD-10-PCS | Mod: CPTII,S$GLB,, | Performed by: NURSE PRACTITIONER

## 2022-12-15 PROCEDURE — 3008F BODY MASS INDEX DOCD: CPT | Mod: CPTII,S$GLB,, | Performed by: NURSE PRACTITIONER

## 2022-12-15 PROCEDURE — 3061F NEG MICROALBUMINURIA REV: CPT | Mod: CPTII,S$GLB,, | Performed by: NURSE PRACTITIONER

## 2022-12-15 PROCEDURE — 3288F FALL RISK ASSESSMENT DOCD: CPT | Mod: CPTII,S$GLB,, | Performed by: NURSE PRACTITIONER

## 2022-12-15 PROCEDURE — 4010F PR ACE/ARB THEARPY RXD/TAKEN: ICD-10-PCS | Mod: CPTII,S$GLB,, | Performed by: NURSE PRACTITIONER

## 2022-12-15 PROCEDURE — 99999 PR PBB SHADOW E&M-EST. PATIENT-LVL IV: CPT | Mod: PBBFAC,,, | Performed by: NURSE PRACTITIONER

## 2022-12-15 PROCEDURE — 3066F PR DOCUMENTATION OF TREATMENT FOR NEPHROPATHY: ICD-10-PCS | Mod: CPTII,S$GLB,, | Performed by: NURSE PRACTITIONER

## 2022-12-15 PROCEDURE — 3066F NEPHROPATHY DOC TX: CPT | Mod: CPTII,S$GLB,, | Performed by: NURSE PRACTITIONER

## 2022-12-15 PROCEDURE — 3288F PR FALLS RISK ASSESSMENT DOCUMENTED: ICD-10-PCS | Mod: CPTII,S$GLB,, | Performed by: NURSE PRACTITIONER

## 2022-12-15 PROCEDURE — 3051F PR MOST RECENT HEMOGLOBIN A1C LEVEL 7.0 - < 8.0%: ICD-10-PCS | Mod: CPTII,S$GLB,, | Performed by: NURSE PRACTITIONER

## 2022-12-15 PROCEDURE — 3074F PR MOST RECENT SYSTOLIC BLOOD PRESSURE < 130 MM HG: ICD-10-PCS | Mod: CPTII,S$GLB,, | Performed by: NURSE PRACTITIONER
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
## 2022-12-20 ENCOUNTER — PATIENT MESSAGE (OUTPATIENT)
Dept: DERMATOLOGY | Facility: CLINIC | Age: 67
End: 2022-12-20
Payer: MEDICARE

## 2022-12-21 DIAGNOSIS — L29.9 SCALP ITCH: ICD-10-CM

## 2022-12-21 RX ORDER — CLOBETASOL PROPIONATE 0.46 MG/ML
SOLUTION TOPICAL
Qty: 50 ML | Refills: 0 | Status: SHIPPED | OUTPATIENT
Start: 2022-12-21 | End: 2023-06-07 | Stop reason: SDUPTHER

## 2022-12-22 ENCOUNTER — CLINICAL SUPPORT (OUTPATIENT)
Dept: REHABILITATION | Facility: HOSPITAL | Age: 67
End: 2022-12-22
Attending: ORTHOPAEDIC SURGERY
Payer: MEDICARE

## 2022-12-22 ENCOUNTER — TELEPHONE (OUTPATIENT)
Dept: REHABILITATION | Facility: HOSPITAL | Age: 67
End: 2022-12-22

## 2022-12-22 DIAGNOSIS — M54.2 NECK PAIN: Primary | ICD-10-CM

## 2022-12-22 DIAGNOSIS — G89.29 CHRONIC LEFT SHOULDER PAIN: ICD-10-CM

## 2022-12-22 DIAGNOSIS — M25.512 CHRONIC LEFT SHOULDER PAIN: ICD-10-CM

## 2022-12-22 PROCEDURE — 97110 THERAPEUTIC EXERCISES: CPT | Mod: PN,CQ

## 2022-12-22 NOTE — PROGRESS NOTES
Physical Therapy Daily Treatment Note         Name: Regine Osorio  Clinic Number: [id number]    Therapy Diagnosis:   Encounter Diagnoses   Name Primary?    Neck pain Yes    Chronic left shoulder pain      Physician: Fauzia Vidales,*    Visit Date: 2022    Physician Orders: PT Eval and Treat   Medical Diagnosis from Referral:   Z98.1 (ICD-10-CM) - S/P cervical spinal fusion   M75.02 (ICD-10-CM) - Adhesive capsulitis of left shoulder      Evaluation Date: 2022  Authorization Period Expiration: 2023  Plan of Care Expiration: 2/3/2023   Progress Note Due: 2023  Visit # / Visits authorized:    Remaining Visits Scheduled -  Consecutive Visits -  Visit FOTO - (Date, Score/ turn green)  10th Visit FOTO -  (Date, Score/ turn green )  D/C FOTO - (Date, Score/ turn green )    Time In: 1:00 pm  Time Out: 1:45 pm  Billable Time: 45 minutes  Non-Billable Time: 00 minutes    Precautions: Standard, Diabetes, and HTN  Insurance: Payor: [employer]ARE / Plan: [employer] / Product Type: Medicare Advantage /     Subjective     Pt reports: Right shoulder was the bad shoulder before surgery, is bothering her now. Left shoulder is okay, a little sore.  She is compliant with home exercise program.  Response to previous treatment: 1st session    Pre-Treatment Pain Ratin/10  Post-Treatment Pain Rating: 3/10  Location: left shoulder and cervical spine    Objective     Regine received therapeutic exercises to develop strength, endurance, ROM, and flexibility for 30 minutes including:    Cervical active range of motion x30 (Rotation/Flexion-Extension)   Chin Tucks 3x10 with 3 sec hold   Cervical SNAGS 2x10, with a 2 sec hold (Rotation/Extension)   Scapular retractions 3x10  Shoulder shrugs 3x10      *Bold exercise performed     Regine received the following manual therapy techniques: Soft tissue Mobilization were applied to the: L shoulder for 00 minutes,  "including:  Soft-Tissue Mobilization to the levator scap, upper trap, sub-occipitals, and paraspinals.      Regine participated in neuromuscular re-education activities to improve: Coordination and Proprioception for 15 minutes. The following activities were included:  Shoulder Isometrics x20 (flexion, extension, IR, ER)      Home Exercises Provided and Patient Education Provided     Education provided:   - Continue with HEP    Written Home Exercises Provided: Patient instructed to cont prior HEP.  Exercises were reviewed and Regine was able to demonstrate them prior to the end of the session.  Regine demonstrated good  understanding of the education provided.     See EMR under Patient Instructions for exercises provided prior visit.    Assessment     Pt tolerated session well. Shoulder isometrics challenging due to low muscular endurance and strength, breaks needed after each set. Technique for chin tucks shows improvement since last session. Pt still has "catching" and "clicking" in left shoulder with flexion at 90 degrees. Regine had no adverse effects with exercises and will continue to benefit from skilled therapy.     Regine Is progressing well towards Her goals.     Pt prognosis is Fair.     Pt will continue to benefit from skilled outpatient physical therapy to address the deficits listed in the problem list box on initial evaluation, provide pt/family education and to maximize pt's level of independence in the home and community environment.     Pt's spiritual, cultural and educational needs considered and pt agreeable to plan of care and goals.    Anticipated barriers to physical therapy: None    Goals:  Short Term Goals: 4 weeks   Patient will be independent with HEP at home to increase PT compliance.  Progressing 12/22/2022      Patient will be able to increase active range of motion by 10° in each plane to increase mobility of the shoulder and cervical spine Progressing 12/22/2022      Patient " will be able to hold 5 lbs at 90 degrees of shoulder flexion and abduction for 15 sec to increase shoulder strength  Progressing 12/22/2022               Long Term Goals: 8 weeks   Patient will decrease Neck Index score to <20% to increase functional capacity.      Patient will be able to drive for 30 min with < 4/10 pain to the cervical and shoulder region to increase functional capacity      Patient will be able to lift 5 lbs overhead 5 times with < 2/10 pain to the shoulder to increase functional capacity               Plan     Continued with current POC      Aris Cortes PTA ,  12/22/2022

## 2022-12-27 ENCOUNTER — TELEPHONE (OUTPATIENT)
Dept: REHABILITATION | Facility: HOSPITAL | Age: 67
End: 2022-12-27
Payer: MEDICARE

## 2022-12-29 ENCOUNTER — CLINICAL SUPPORT (OUTPATIENT)
Dept: REHABILITATION | Facility: HOSPITAL | Age: 67
End: 2022-12-29
Payer: MEDICARE

## 2022-12-29 DIAGNOSIS — G89.29 CHRONIC LEFT SHOULDER PAIN: ICD-10-CM

## 2022-12-29 DIAGNOSIS — M25.512 CHRONIC LEFT SHOULDER PAIN: ICD-10-CM

## 2022-12-29 DIAGNOSIS — M54.2 NECK PAIN: Primary | ICD-10-CM

## 2022-12-29 PROCEDURE — 97110 THERAPEUTIC EXERCISES: CPT | Mod: KX,PN

## 2022-12-29 NOTE — PROGRESS NOTES
Physical Therapy Daily Treatment Note         Name: Regine Osorio  Clinic Number: [id number]    Therapy Diagnosis:   Encounter Diagnoses   Name Primary?    Neck pain Yes    Chronic left shoulder pain      Physician: Fauzia Vidales,*    Visit Date: 12/29/2022    Physician Orders: PT Eval and Treat   Medical Diagnosis from Referral:   Z98.1 (ICD-10-CM) - S/P cervical spinal fusion   M75.02 (ICD-10-CM) - Adhesive capsulitis of left shoulder      Evaluation Date: 12/9/2022  Authorization Period Expiration: 1/6/2023  Plan of Care Expiration: 2/3/2023   Progress Note Due: 1/9/2023  Visit # / Visits authorized: 3/4   Remaining Visits Scheduled - 09 PTA Consecutive Visits - 2/6 5th Visit FOTO - (Date, Score/ turn green)  10th Visit FOTO -  (Date, Score/ turn green )  D/C FOTO - (Date, Score/ turn green )    Time In: 0930  Time Out: 1015  Billable Time: 45 minutes  Non-Billable Time: 00 minutes    Precautions: Standard, Diabetes, and HTN  Insurance: Payor: [employer]ARE / Plan: [employer] 65 / Product Type: Medicare Advantage /     Subjective     Pt reports: Right shoulder discomfort with soreness right and left   She is compliant with home exercise program.  Response to previous treatment: soreness    Pre-Treatment Pain Rating: 3right and 2 left/10  Post-Treatment Pain Rating: 3right and 2 left/10  Location: left shoulder and cervical spine    Objective     Regine received therapeutic exercises to develop strength, endurance, ROM, and flexibility for 45 minutes including:    Cervical active range of motion x30 (Rotation/Flexion-Extension)   Chin Tucks 3x10 with 3 sec hold   Cervical SNAGS 2x10, with a 2 sec hold (Rotation/Extension)   Scapular retractions 3x10  Shoulder shrugs 3x10  Static shoulder external rotation /internal rotation and Abduction stretch - 2' each   External rotation with cane - 30 repititions        *Bold exercise performed     Regine received the  following manual therapy techniques: Soft tissue Mobilization were applied to the: L shoulder for 00 minutes, including:  Soft-Tissue Mobilization to the levator scap, upper trap, sub-occipitals, and paraspinals.      Regine participated in neuromuscular re-education activities to improve: Coordination and Proprioception for 00 minutes. The following activities were included:  Shoulder Isometrics x20 (flexion, extension, IR, ER)      Home Exercises Provided and Patient Education Provided     Education provided:   - Continue with HEP    Written Home Exercises Provided: Patient instructed to cont prior HEP.  Exercises were reviewed and Regine was able to demonstrate them prior to the end of the session.  Regine demonstrated good  understanding of the education provided.     See EMR under Patient Instructions for exercises provided prior visit.    Assessment     Patient presents to therapy with reports of continued bilateral shoulder pain. Patient reports pain with static external rotation  and internal rotation positioning with increased tightness of the biceps noted with biceps lengthen position. Patient  had no adverse effects with exercises and will continue to benefit from skilled therapy.       Regine Is progressing well towards Her goals.     Pt prognosis is Fair.     Pt will continue to benefit from skilled outpatient physical therapy to address the deficits listed in the problem list box on initial evaluation, provide pt/family education and to maximize pt's level of independence in the home and community environment.     Pt's spiritual, cultural and educational needs considered and pt agreeable to plan of care and goals.    Anticipated barriers to physical therapy: None    Goals:  Short Term Goals: 4 weeks   Patient will be independent with HEP at home to increase PT compliance.  Progressing 12/29/2022      Patient will be able to increase active range of motion by 10° in each plane to increase  mobility of the shoulder and cervical spine Progressing 12/29/2022      Patient will be able to hold 5 lbs at 90 degrees of shoulder flexion and abduction for 15 sec to increase shoulder strength  Progressing 12/29/2022               Long Term Goals: 8 weeks   Patient will decrease Neck Index score to <20% to increase functional capacity.      Patient will be able to drive for 30 min with < 4/10 pain to the cervical and shoulder region to increase functional capacity      Patient will be able to lift 5 lbs overhead 5 times with < 2/10 pain to the shoulder to increase functional capacity               Plan     Continued with current POC      Pepe Jung, PT ,  12/29/2022

## 2023-01-03 ENCOUNTER — CLINICAL SUPPORT (OUTPATIENT)
Dept: REHABILITATION | Facility: HOSPITAL | Age: 68
End: 2023-01-03
Payer: MEDICARE

## 2023-01-03 DIAGNOSIS — M25.512 CHRONIC LEFT SHOULDER PAIN: ICD-10-CM

## 2023-01-03 DIAGNOSIS — G89.29 CHRONIC LEFT SHOULDER PAIN: ICD-10-CM

## 2023-01-03 DIAGNOSIS — M54.2 NECK PAIN: Primary | ICD-10-CM

## 2023-01-03 PROCEDURE — 97110 THERAPEUTIC EXERCISES: CPT | Mod: PN

## 2023-01-03 PROCEDURE — 97112 NEUROMUSCULAR REEDUCATION: CPT | Mod: PN

## 2023-01-03 NOTE — PROGRESS NOTES
Physical Therapy Daily Treatment Note     Name: Regine Osorio  Clinic Number: [id number]    Therapy Diagnosis:   Encounter Diagnoses   Name Primary?    Neck pain Yes    Chronic left shoulder pain      Physician: Fauzia Vidales,*    Visit Date: 1/3/2023    Physician Orders: PT Eval and Treat   Medical Diagnosis from Referral:   Z98.1 (ICD-10-CM) - S/P cervical spinal fusion   M75.02 (ICD-10-CM) - Adhesive capsulitis of left shoulder      Evaluation Date: 12/9/2022  Authorization Period Expiration: 1/6/2023  Plan of Care Expiration: 2/3/2023   Progress Note Due: 1/9/2023  Visit # / Visits authorized: 1/20  Remaining Visits Scheduled - 08  PTA Consecutive Visits - 0/6    5th Visit FOTO - (Date, Score/ turn green)  10th Visit FOTO -  (Date, Score/ turn green )  D/C FOTO - (Date, Score/ turn green )    Time In: 14:30  Time Out: 15:15  Billable Time: 45 minutes  Non-Billable Time: 00 minutes    Precautions: Standard, Diabetes, and HTN  Insurance: Payor: [employer] / Plan: [employer] / Product Type: Medicare Advantage /     Subjective     Pt reports: soreness and stiffness to the shoulders and neck. Ms. Osorio is noticing improvement with therapy.   She is compliant with home exercise program.  Response to previous treatment: soreness    Pre-Treatment Pain Rating: 3 right and 2 left/10  Post-Treatment Pain Rating: 3 right and 2 left/10  Location: left shoulder and cervical spine    Objective     Regine received therapeutic exercises to develop strength, endurance, ROM, and flexibility for 30 minutes including:    Cervical active range of motion x30 (Rotation/Flexion-Extension)     Cervical SNAGS 2x10, with a 2 sec hold (Rotation/Extension)   Scapular retractions 3x10  Shoulder shrugs 3x10  Static shoulder external rotation /internal rotation and Abduction stretch - 2' each   External rotation with cane - 30 repititions    Bilateral External Rotation with Theraband 3x10, Red  Theraband     *Bold exercise performed     Regine received the following manual therapy techniques: Soft tissue Mobilization were applied to the: L shoulder for 00 minutes, including:  Soft-Tissue Mobilization to the levator scap, upper trap, sub-occipitals, and paraspinals.      Regine participated in neuromuscular re-education activities to improve: Coordination and Proprioception for 15 minutes. The following activities were included:  Shoulder Isometrics x20 (flexion, extension, IR, ER)  Chin Tucks 3x10 with 3 sec hold   Theraband Diagonals 2x8, Red Theraband   Cervical active range of motion with Ball Resistance 3x8 (Rotation/Flexion-Extension)       Home Exercises Provided and Patient Education Provided     Education provided:   - Continue with HEP    Written Home Exercises Provided: Patient instructed to cont prior HEP.  Exercises were reviewed and Regine was able to demonstrate them prior to the end of the session.  Regine demonstrated good  understanding of the education provided.     See EMR under Patient Instructions for exercises provided prior visit.    Assessment     Patient completed the exercise program with moderate levels of fatigue to the shoulders. Frequent breaks were needed during today session but no modifications to exercise program. Patient would continue to benefit from skilled Physical Therapy to increase mobility and strength of the shoulders and neck.       Regine Is progressing well towards Her goals.     Pt prognosis is Fair.     Pt will continue to benefit from skilled outpatient physical therapy to address the deficits listed in the problem list box on initial evaluation, provide pt/family education and to maximize pt's level of independence in the home and community environment.     Pt's spiritual, cultural and educational needs considered and pt agreeable to plan of care and goals.    Anticipated barriers to physical therapy: None    Goals:  Short Term Goals: 4 weeks    Patient will be independent with HEP at home to increase PT compliance.  Progressing 1/3/2023      Patient will be able to increase active range of motion by 10° in each plane to increase mobility of the shoulder and cervical spine Progressing 1/3/2023      Patient will be able to hold 5 lbs at 90 degrees of shoulder flexion and abduction for 15 sec to increase shoulder strength  Progressing 1/3/2023               Long Term Goals: 8 weeks   Patient will decrease Neck Index score to <20% to increase functional capacity.  Progressing 1/3/2023     Patient will be able to drive for 30 min with < 4/10 pain to the cervical and shoulder region to increase functional capacity  Progressing 1/3/2023    Patient will be able to lift 5 lbs overhead 5 times with < 2/10 pain to the shoulder to increase functional capacity  Progressing 1/3/2023             Plan     Continued with current POC    Qasim Sánchez, PT, DPT  1/3/2023

## 2023-01-04 ENCOUNTER — DOCUMENTATION ONLY (OUTPATIENT)
Dept: REHABILITATION | Facility: HOSPITAL | Age: 68
End: 2023-01-04
Payer: MEDICARE

## 2023-01-04 NOTE — PROGRESS NOTES
30 day PT-PTA face-face discussion with Qasim Sánchez DPT re:Name: Regine Osorio Clinic Number: [id number] patient status, POC, and plan for progression done

## 2023-01-05 ENCOUNTER — CLINICAL SUPPORT (OUTPATIENT)
Dept: REHABILITATION | Facility: HOSPITAL | Age: 68
End: 2023-01-05
Payer: MEDICARE

## 2023-01-05 DIAGNOSIS — M54.2 NECK PAIN: Primary | ICD-10-CM

## 2023-01-05 DIAGNOSIS — G89.29 CHRONIC LEFT SHOULDER PAIN: ICD-10-CM

## 2023-01-05 DIAGNOSIS — M25.512 CHRONIC LEFT SHOULDER PAIN: ICD-10-CM

## 2023-01-05 PROCEDURE — 97112 NEUROMUSCULAR REEDUCATION: CPT | Mod: PN

## 2023-01-05 PROCEDURE — 97110 THERAPEUTIC EXERCISES: CPT | Mod: PN

## 2023-01-05 NOTE — PROGRESS NOTES
Physical Therapy Daily Treatment Note     Name: Regine Osorio  Clinic Number: [id number]    Therapy Diagnosis:   Encounter Diagnoses   Name Primary?    Neck pain Yes    Chronic left shoulder pain      Physician: Fauzia Vidales,*    Visit Date: 1/5/2023    Physician Orders: PT Eval and Treat   Medical Diagnosis from Referral:   Z98.1 (ICD-10-CM) - S/P cervical spinal fusion   M75.02 (ICD-10-CM) - Adhesive capsulitis of left shoulder      Evaluation Date: 12/9/2022  Authorization Period Expiration: 1/6/2023  Plan of Care Expiration: 2/3/2023   Progress Note Due: 2/3/2023  Visit # / Visits authorized: 2/20  Remaining Visits Scheduled - 07  PTA Consecutive Visits - 0/6    5th Visit FOTO - 36 (1/5/2023)  10th Visit FOTO -  (Date, Score/ turn green )  D/C FOTO - (Date, Score/ turn green )    Time In: 8:50  Time Out: 9:30  Billable Time: 40 minutes  Non-Billable Time: 00 minutes    Precautions: Standard, Diabetes, and HTN  Insurance: Payor: [employer] / Plan: [employer] 65 / Product Type: Medicare Advantage /     Subjective     Pt. reports 20% improvement since the beginning of PT.  Current pain levels to the neck and shoulder are 3/10  Limiting factors include:  Range of motion    Activities of daily living with overhead activities (getting dressed/self-care)   Driving, but some improvement     Improvements factors include:   Increase in range of motion of the shoulder on the Left   Pain levels       She is compliant with home exercise program.  Response to previous treatment: soreness    Pre-Treatment Pain Rating: 3 right and 2 left/10  Post-Treatment Pain Rating: 3 right and 2 left/10  Location: left shoulder and cervical spine    Objective     CERVICAL ACTIVE RANGE OF MOTION   Flexion 30°   Extension 30°   Right Side Bend 40°   Left Side Bend 25°   Right Rotation 40°   Left Rotation 47°           SHOULDER ACTIVE RANGE OF MOTION     Left Right    Flexion 90° 145°   Abduction 80°  130°   External Rotation 55° 75°   Internal Rotation (Apley Scratch Test) L3 T8   Extension 30° Within Normal Limits            UPPER LOWER EXTREMITY STRENGTH     Left  Right    Shoulder Flexion 4-/5 4/5   Shoulder Abduction 4-/5 4/5   Shoulder External Rotation 4/5 4+/5   Shoulder Internal Rotation 4/5 4+/5   Elbow Flexion 5/5 5/5   Elbow Extension 4+/5 5/5       Regine received therapeutic exercises to develop strength, endurance, ROM, and flexibility for 30 minutes including:  Cervical/Shoulder Assessment   Cervical active range of motion x30 (Rotation/Flexion-Extension)   Cervical SNAGS 2x10, with a 2 sec hold (Rotation/Extension)   Scapular retractions 3x10  Shoulder shrugs 3x10  Static shoulder external rotation /internal rotation and Abduction stretch - 2' each   External rotation with cane - 30 repititions    Bilateral External Rotation with Theraband 3x10, Red Theraband   Dowel Exercises 2x8 with a 2' sec hold (Flexion/External Rotation/Internal Rotation/Abduction/Extension)   Towel Internal Rotation Stretch 2x8 with a 2' sec hold     *Bold exercise performed     Regine received the following manual therapy techniques: Soft tissue Mobilization were applied to the: L shoulder for 00 minutes, including:  Soft-Tissue Mobilization to the levator scap, upper trap, sub-occipitals, and paraspinals.      Regine participated in neuromuscular re-education activities to improve: Coordination and Proprioception for 15 minutes. The following activities were included:  Shoulder Isometrics x20 (flexion, extension, IR, ER)  Chin Tucks 3x10 with 3 sec hold   Theraband Diagonals 2x8, Red Theraband   Cervical active range of motion with Ball Resistance 3x8 (Rotation/Flexion-Extension)   Cervical Lift Offs 2x10 with a 2' sec hold   Cervical Lift Offs with Rotation 2x5 - stopped due to inability to flex and rotate cervical spine. (Supine Cervical Rotation instead)     Home Exercises Provided and Patient Education Provided      Education provided:   - Continue with HEP    Written Home Exercises Provided: Patient instructed to cont prior HEP.  Exercises were reviewed and Regine was able to demonstrate them prior to the end of the session.  Regine demonstrated good  understanding of the education provided.     See EMR under Patient Instructions for exercises provided prior visit.    Assessment     Pt. has been to [facility] for 6 visits due to neck and shoulder pain. Improvements have been noted with cervical and shoulder range of motion, strength, and pain levels. Limitations still present with Left shoulder and cervical mobility, endurance of the muscular structures in the neck/shoulders, stability, and pain levels. New exercises were given to patient as home exercise program to increase shoulder mobility and improve neck endurance. No modifications and minimal cues needed today with new exercises. Patient would continue to benefit from skilled Physical Therapy to increase mobility and strength of the neck and shoulder.     Regine Is progressing well towards Her goals.     Pt prognosis is Fair.     Pt will continue to benefit from skilled outpatient physical therapy to address the deficits listed in the problem list box on initial evaluation, provide pt/family education and to maximize pt's level of independence in the home and community environment.     Pt's spiritual, cultural and educational needs considered and pt agreeable to plan of care and goals.    Anticipated barriers to physical therapy: None    Goals:  Short Term Goals: 4 weeks   Patient will be independent with HEP at home to increase PT compliance.  Goal Met 1/5/2023      Patient will be able to increase active range of motion by 10° in each plane to increase mobility of the shoulder and cervical spine Goal Met 1/5/2023      Patient will be able to hold 5 lbs at 90 degrees of shoulder flexion and abduction for 15 sec to increase shoulder  strength  Progressing 1/5/2023               Long Term Goals: 8 weeks   Patient will decrease Neck Index score to <20% to increase functional capacity.  Progressing 1/5/2023     Patient will be able to drive for 30 min with < 4/10 pain to the cervical and shoulder region to increase functional capacity  Progressing 1/5/2023    Patient will be able to lift 5 lbs overhead 5 times with < 2/10 pain to the shoulder to increase functional capacity  Progressing 1/5/2023             Plan     Progress duration, workload, and resistance levels of the Therapeutic Activities and Exercises if the patient does not exhibit any pain, swelling, or other symptoms. Progress instruction in-home exercise progression and modification, including symptom management.     Qasim Sánchez, PT, DPT  1/5/2023

## 2023-01-06 ENCOUNTER — CLINICAL SUPPORT (OUTPATIENT)
Dept: OPHTHALMOLOGY | Facility: CLINIC | Age: 68
End: 2023-01-06
Payer: MEDICARE

## 2023-01-06 ENCOUNTER — OFFICE VISIT (OUTPATIENT)
Dept: OPHTHALMOLOGY | Facility: CLINIC | Age: 68
End: 2023-01-06
Payer: MEDICARE

## 2023-01-06 DIAGNOSIS — H40.022 OPEN ANGLE WITH BORDERLINE FINDINGS AND HIGH GLAUCOMA RISK IN LEFT EYE: ICD-10-CM

## 2023-01-06 DIAGNOSIS — H35.362 FAMILIAL DRUSEN OF MACULA OF BOTH EYES: ICD-10-CM

## 2023-01-06 DIAGNOSIS — H25.13 NUCLEAR SCLEROTIC CATARACT OF BOTH EYES: ICD-10-CM

## 2023-01-06 DIAGNOSIS — H40.1111 PRIMARY OPEN-ANGLE GLAUCOMA, RIGHT EYE, MILD STAGE: Primary | ICD-10-CM

## 2023-01-06 DIAGNOSIS — H52.203 MYOPIA OF BOTH EYES WITH ASTIGMATISM AND PRESBYOPIA: ICD-10-CM

## 2023-01-06 DIAGNOSIS — H52.13 MYOPIA OF BOTH EYES WITH ASTIGMATISM AND PRESBYOPIA: ICD-10-CM

## 2023-01-06 DIAGNOSIS — H52.4 MYOPIA OF BOTH EYES WITH ASTIGMATISM AND PRESBYOPIA: ICD-10-CM

## 2023-01-06 DIAGNOSIS — H35.361 FAMILIAL DRUSEN OF MACULA OF BOTH EYES: ICD-10-CM

## 2023-01-06 PROCEDURE — 1126F PR PAIN SEVERITY QUANTIFIED, NO PAIN PRESENT: ICD-10-PCS | Mod: CPTII,S$GLB,, | Performed by: OPHTHALMOLOGY

## 2023-01-06 PROCEDURE — 1126F AMNT PAIN NOTED NONE PRSNT: CPT | Mod: CPTII,S$GLB,, | Performed by: OPHTHALMOLOGY

## 2023-01-06 PROCEDURE — 92014 COMPRE OPH EXAM EST PT 1/>: CPT | Mod: S$GLB,,, | Performed by: OPHTHALMOLOGY

## 2023-01-06 PROCEDURE — 1101F PR PT FALLS ASSESS DOC 0-1 FALLS W/OUT INJ PAST YR: ICD-10-PCS | Mod: CPTII,S$GLB,, | Performed by: OPHTHALMOLOGY

## 2023-01-06 PROCEDURE — 92083 EXTENDED VISUAL FIELD XM: CPT | Mod: S$GLB,,, | Performed by: OPHTHALMOLOGY

## 2023-01-06 PROCEDURE — 1160F PR REVIEW ALL MEDS BY PRESCRIBER/CLIN PHARMACIST DOCUMENTED: ICD-10-PCS | Mod: CPTII,S$GLB,, | Performed by: OPHTHALMOLOGY

## 2023-01-06 PROCEDURE — 4010F ACE/ARB THERAPY RXD/TAKEN: CPT | Mod: CPTII,S$GLB,, | Performed by: OPHTHALMOLOGY

## 2023-01-06 PROCEDURE — 4010F PR ACE/ARB THEARPY RXD/TAKEN: ICD-10-PCS | Mod: CPTII,S$GLB,, | Performed by: OPHTHALMOLOGY

## 2023-01-06 PROCEDURE — 1159F PR MEDICATION LIST DOCUMENTED IN MEDICAL RECORD: ICD-10-PCS | Mod: CPTII,S$GLB,, | Performed by: OPHTHALMOLOGY

## 2023-01-06 PROCEDURE — 1159F MED LIST DOCD IN RCRD: CPT | Mod: CPTII,S$GLB,, | Performed by: OPHTHALMOLOGY

## 2023-01-06 PROCEDURE — 99999 PR PBB SHADOW E&M-EST. PATIENT-LVL III: CPT | Mod: PBBFAC,,, | Performed by: OPHTHALMOLOGY

## 2023-01-06 PROCEDURE — 99999 PR PBB SHADOW E&M-EST. PATIENT-LVL III: ICD-10-PCS | Mod: PBBFAC,,, | Performed by: OPHTHALMOLOGY

## 2023-01-06 PROCEDURE — 1160F RVW MEDS BY RX/DR IN RCRD: CPT | Mod: CPTII,S$GLB,, | Performed by: OPHTHALMOLOGY

## 2023-01-06 PROCEDURE — 92133 POSTERIOR SEGMENT OCT OPTIC NERVE(OCULAR COHERENCE TOMOGRAPHY) - OU - BOTH EYES: ICD-10-PCS | Mod: S$GLB,,, | Performed by: OPHTHALMOLOGY

## 2023-01-06 PROCEDURE — 92083 HUMPHREY VISUAL FIELD - OU - BOTH EYES: ICD-10-PCS | Mod: S$GLB,,, | Performed by: OPHTHALMOLOGY

## 2023-01-06 PROCEDURE — 3288F FALL RISK ASSESSMENT DOCD: CPT | Mod: CPTII,S$GLB,, | Performed by: OPHTHALMOLOGY

## 2023-01-06 PROCEDURE — 92014 PR EYE EXAM, EST PATIENT,COMPREHESV: ICD-10-PCS | Mod: S$GLB,,, | Performed by: OPHTHALMOLOGY

## 2023-01-06 PROCEDURE — 1101F PT FALLS ASSESS-DOCD LE1/YR: CPT | Mod: CPTII,S$GLB,, | Performed by: OPHTHALMOLOGY

## 2023-01-06 PROCEDURE — 92133 CPTRZD OPH DX IMG PST SGM ON: CPT | Mod: S$GLB,,, | Performed by: OPHTHALMOLOGY

## 2023-01-06 PROCEDURE — 3288F PR FALLS RISK ASSESSMENT DOCUMENTED: ICD-10-PCS | Mod: CPTII,S$GLB,, | Performed by: OPHTHALMOLOGY

## 2023-01-06 RX ORDER — ASPIRIN 81 MG/1
81 TABLET ORAL DAILY
COMMUNITY
End: 2023-08-28

## 2023-01-06 RX ORDER — LATANOPROST 50 UG/ML
1 SOLUTION/ DROPS OPHTHALMIC NIGHTLY
Qty: 7.5 ML | Refills: 3 | Status: SHIPPED | OUTPATIENT
Start: 2023-01-06 | End: 2023-10-24 | Stop reason: SDUPTHER

## 2023-01-06 NOTE — PROGRESS NOTES
HPI     Glaucoma     Additional comments: HVF and OCT review today            Dry Eye     Additional comments: Pt c/o dry eye OD>OS with occasional blurred vision           Comments    DLS: 7/8/22    1. Glaucoma Suspect   2. Drusen OU   3. NS OU   4. Hx Breast CA    MEDS:  AT's PRN OU          Last edited by Ivette Garrison MA on 1/6/2023 10:23 AM.            Assessment /Plan     For exam results, see Encounter Report.    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   First photos   2018   Treatment / Drops started   none           Family history    + mother / grtand father / uncle / cousins         Glaucoma meds    nne        H/O adverse rxn to glaucoma drops    none        LASERS    none        GLAUCOMA SURGERIES    none        OTHER EYE SURGERIES    none        CDR    0.8/0.7        Tbase    18-22  od // 16-22 os        Tmax    28/22           Ttarget    ?             HVF    4 test 2019 to  2023 - full in past - now w/ early SAD  od // full os        Gonio    +3-4 ou         CCT    588/585        OCT    4  test 2019 to 2023 - RNFL -  Bord TI od // nl  os        HRT    1 test 2020 to 2020 -  MR -  nl od // nl os /// CDR 0.63 od // 0.51 os        Disc photos    2018, 2022      - Ttoday  28/22  - Test done today     IOP // HVF / DFE / OCT     2. Dominate familial drusen   Mostly outside the arcades ou    See photos - 2018    Pt is taking ARED's     3. NS   Mild -monitor     4. flaoter (one floater - string like)    Warned of signs of PVD and RD    Pt to call if marked increase in floaters or flashes or curtain defect    5. Breast CA       PLAN   IOP is high at 18-28 od and 18-22 os // + Fm Hx - mother and an uncle and possibly a grand parent    HVF inow with early SAD od // still full os   OCT - bord RNFL in one sector od - stable and nl os       Rec begin latatanoprost ou q hs     F/U 2 months with IOP check  on latanoprost and gonio

## 2023-01-06 NOTE — PROGRESS NOTES
Visual field test done.  Patient stated no latex allergies used coverlet      Glasses Prescription     Sphere Cylinder Axis Dist VA Add   Right -4.25 +0.75 115 20/30 +2.75   Left -3.25 +0.75 095 20/30-1 +2.75          Ordered, Authorized, and Signed By: Jen Prado

## 2023-01-09 ENCOUNTER — OFFICE VISIT (OUTPATIENT)
Dept: PRIMARY CARE CLINIC | Facility: CLINIC | Age: 68
End: 2023-01-09
Payer: MEDICARE

## 2023-01-09 ENCOUNTER — CLINICAL SUPPORT (OUTPATIENT)
Dept: REHABILITATION | Facility: HOSPITAL | Age: 68
End: 2023-01-09
Payer: MEDICARE

## 2023-01-09 ENCOUNTER — PATIENT MESSAGE (OUTPATIENT)
Dept: PRIMARY CARE CLINIC | Facility: CLINIC | Age: 68
End: 2023-01-09

## 2023-01-09 VITALS
HEIGHT: 65 IN | OXYGEN SATURATION: 99 % | HEART RATE: 93 BPM | DIASTOLIC BLOOD PRESSURE: 72 MMHG | TEMPERATURE: 100 F | BODY MASS INDEX: 41 KG/M2 | SYSTOLIC BLOOD PRESSURE: 148 MMHG | WEIGHT: 246.06 LBS

## 2023-01-09 DIAGNOSIS — G95.89 MYELOMALACIA OF CERVICAL CORD: ICD-10-CM

## 2023-01-09 DIAGNOSIS — Z85.3 HISTORY OF BREAST CANCER IN FEMALE: ICD-10-CM

## 2023-01-09 DIAGNOSIS — G89.29 CHRONIC LEFT SHOULDER PAIN: ICD-10-CM

## 2023-01-09 DIAGNOSIS — Z98.1 STATUS POST CERVICAL SPINAL FUSION: ICD-10-CM

## 2023-01-09 DIAGNOSIS — I10 ESSENTIAL HYPERTENSION: ICD-10-CM

## 2023-01-09 DIAGNOSIS — F41.9 ANXIETY: ICD-10-CM

## 2023-01-09 DIAGNOSIS — E66.01 SEVERE OBESITY (BMI >= 40): Primary | ICD-10-CM

## 2023-01-09 DIAGNOSIS — L82.1 SEBORRHEIC KERATOSIS: ICD-10-CM

## 2023-01-09 DIAGNOSIS — E11.65 TYPE 2 DIABETES MELLITUS WITH HYPERGLYCEMIA, WITHOUT LONG-TERM CURRENT USE OF INSULIN: ICD-10-CM

## 2023-01-09 DIAGNOSIS — Z90.12 S/P LEFT MASTECTOMY: ICD-10-CM

## 2023-01-09 DIAGNOSIS — M54.2 NECK PAIN: Primary | ICD-10-CM

## 2023-01-09 DIAGNOSIS — G43.009 MIGRAINE WITHOUT AURA AND WITHOUT STATUS MIGRAINOSUS, NOT INTRACTABLE: ICD-10-CM

## 2023-01-09 DIAGNOSIS — I70.0 ATHEROSCLEROSIS OF AORTA: ICD-10-CM

## 2023-01-09 DIAGNOSIS — E11.9 TYPE 2 DIABETES MELLITUS WITHOUT COMPLICATION, WITHOUT LONG-TERM CURRENT USE OF INSULIN: ICD-10-CM

## 2023-01-09 DIAGNOSIS — M25.512 CHRONIC LEFT SHOULDER PAIN: ICD-10-CM

## 2023-01-09 PROCEDURE — 1125F PR PAIN SEVERITY QUANTIFIED, PAIN PRESENT: ICD-10-PCS | Mod: CPTII,S$GLB,, | Performed by: STUDENT IN AN ORGANIZED HEALTH CARE EDUCATION/TRAINING PROGRAM

## 2023-01-09 PROCEDURE — 3077F PR MOST RECENT SYSTOLIC BLOOD PRESSURE >= 140 MM HG: ICD-10-PCS | Mod: CPTII,S$GLB,, | Performed by: STUDENT IN AN ORGANIZED HEALTH CARE EDUCATION/TRAINING PROGRAM

## 2023-01-09 PROCEDURE — 1101F PR PT FALLS ASSESS DOC 0-1 FALLS W/OUT INJ PAST YR: ICD-10-PCS | Mod: CPTII,S$GLB,, | Performed by: STUDENT IN AN ORGANIZED HEALTH CARE EDUCATION/TRAINING PROGRAM

## 2023-01-09 PROCEDURE — 1160F RVW MEDS BY RX/DR IN RCRD: CPT | Mod: CPTII,S$GLB,, | Performed by: STUDENT IN AN ORGANIZED HEALTH CARE EDUCATION/TRAINING PROGRAM

## 2023-01-09 PROCEDURE — 99214 PR OFFICE/OUTPT VISIT, EST, LEVL IV, 30-39 MIN: ICD-10-PCS | Mod: S$GLB,,, | Performed by: STUDENT IN AN ORGANIZED HEALTH CARE EDUCATION/TRAINING PROGRAM

## 2023-01-09 PROCEDURE — 99499 RISK ADDL DX/OHS AUDIT: ICD-10-PCS | Mod: S$GLB,,, | Performed by: STUDENT IN AN ORGANIZED HEALTH CARE EDUCATION/TRAINING PROGRAM

## 2023-01-09 PROCEDURE — 99499 UNLISTED E&M SERVICE: CPT | Mod: S$GLB,,, | Performed by: STUDENT IN AN ORGANIZED HEALTH CARE EDUCATION/TRAINING PROGRAM

## 2023-01-09 PROCEDURE — 4010F ACE/ARB THERAPY RXD/TAKEN: CPT | Mod: CPTII,S$GLB,, | Performed by: STUDENT IN AN ORGANIZED HEALTH CARE EDUCATION/TRAINING PROGRAM

## 2023-01-09 PROCEDURE — 3077F SYST BP >= 140 MM HG: CPT | Mod: CPTII,S$GLB,, | Performed by: STUDENT IN AN ORGANIZED HEALTH CARE EDUCATION/TRAINING PROGRAM

## 2023-01-09 PROCEDURE — 1101F PT FALLS ASSESS-DOCD LE1/YR: CPT | Mod: CPTII,S$GLB,, | Performed by: STUDENT IN AN ORGANIZED HEALTH CARE EDUCATION/TRAINING PROGRAM

## 2023-01-09 PROCEDURE — 1125F AMNT PAIN NOTED PAIN PRSNT: CPT | Mod: CPTII,S$GLB,, | Performed by: STUDENT IN AN ORGANIZED HEALTH CARE EDUCATION/TRAINING PROGRAM

## 2023-01-09 PROCEDURE — 3288F PR FALLS RISK ASSESSMENT DOCUMENTED: ICD-10-PCS | Mod: CPTII,S$GLB,, | Performed by: STUDENT IN AN ORGANIZED HEALTH CARE EDUCATION/TRAINING PROGRAM

## 2023-01-09 PROCEDURE — 1159F MED LIST DOCD IN RCRD: CPT | Mod: CPTII,S$GLB,, | Performed by: STUDENT IN AN ORGANIZED HEALTH CARE EDUCATION/TRAINING PROGRAM

## 2023-01-09 PROCEDURE — 3008F BODY MASS INDEX DOCD: CPT | Mod: CPTII,S$GLB,, | Performed by: STUDENT IN AN ORGANIZED HEALTH CARE EDUCATION/TRAINING PROGRAM

## 2023-01-09 PROCEDURE — 99999 PR PBB SHADOW E&M-EST. PATIENT-LVL V: CPT | Mod: PBBFAC,,, | Performed by: STUDENT IN AN ORGANIZED HEALTH CARE EDUCATION/TRAINING PROGRAM

## 2023-01-09 PROCEDURE — 3078F DIAST BP <80 MM HG: CPT | Mod: CPTII,S$GLB,, | Performed by: STUDENT IN AN ORGANIZED HEALTH CARE EDUCATION/TRAINING PROGRAM

## 2023-01-09 PROCEDURE — 3008F PR BODY MASS INDEX (BMI) DOCUMENTED: ICD-10-PCS | Mod: CPTII,S$GLB,, | Performed by: STUDENT IN AN ORGANIZED HEALTH CARE EDUCATION/TRAINING PROGRAM

## 2023-01-09 PROCEDURE — 97110 THERAPEUTIC EXERCISES: CPT | Mod: PN,CQ

## 2023-01-09 PROCEDURE — 99999 PR PBB SHADOW E&M-EST. PATIENT-LVL V: ICD-10-PCS | Mod: PBBFAC,,, | Performed by: STUDENT IN AN ORGANIZED HEALTH CARE EDUCATION/TRAINING PROGRAM

## 2023-01-09 PROCEDURE — 3078F PR MOST RECENT DIASTOLIC BLOOD PRESSURE < 80 MM HG: ICD-10-PCS | Mod: CPTII,S$GLB,, | Performed by: STUDENT IN AN ORGANIZED HEALTH CARE EDUCATION/TRAINING PROGRAM

## 2023-01-09 PROCEDURE — 1160F PR REVIEW ALL MEDS BY PRESCRIBER/CLIN PHARMACIST DOCUMENTED: ICD-10-PCS | Mod: CPTII,S$GLB,, | Performed by: STUDENT IN AN ORGANIZED HEALTH CARE EDUCATION/TRAINING PROGRAM

## 2023-01-09 PROCEDURE — 4010F PR ACE/ARB THEARPY RXD/TAKEN: ICD-10-PCS | Mod: CPTII,S$GLB,, | Performed by: STUDENT IN AN ORGANIZED HEALTH CARE EDUCATION/TRAINING PROGRAM

## 2023-01-09 PROCEDURE — 97112 NEUROMUSCULAR REEDUCATION: CPT | Mod: PN,CQ

## 2023-01-09 PROCEDURE — 99214 OFFICE O/P EST MOD 30 MIN: CPT | Mod: S$GLB,,, | Performed by: STUDENT IN AN ORGANIZED HEALTH CARE EDUCATION/TRAINING PROGRAM

## 2023-01-09 PROCEDURE — 3288F FALL RISK ASSESSMENT DOCD: CPT | Mod: CPTII,S$GLB,, | Performed by: STUDENT IN AN ORGANIZED HEALTH CARE EDUCATION/TRAINING PROGRAM

## 2023-01-09 PROCEDURE — 1159F PR MEDICATION LIST DOCUMENTED IN MEDICAL RECORD: ICD-10-PCS | Mod: CPTII,S$GLB,, | Performed by: STUDENT IN AN ORGANIZED HEALTH CARE EDUCATION/TRAINING PROGRAM

## 2023-01-09 RX ORDER — HYDROCHLOROTHIAZIDE 25 MG/1
25 TABLET ORAL DAILY
Qty: 90 TABLET | Refills: 3 | Status: SHIPPED | OUTPATIENT
Start: 2023-01-09 | End: 2024-01-25 | Stop reason: SDUPTHER

## 2023-01-09 RX ORDER — GLIPIZIDE 5 MG/1
5 TABLET, FILM COATED, EXTENDED RELEASE ORAL 2 TIMES DAILY
Qty: 180 TABLET | Refills: 3 | Status: SHIPPED | OUTPATIENT
Start: 2023-01-09 | End: 2024-03-04

## 2023-01-09 RX ORDER — ESCITALOPRAM OXALATE 10 MG/1
10 TABLET ORAL DAILY
Qty: 30 TABLET | Refills: 2 | Status: SHIPPED | OUTPATIENT
Start: 2023-01-09 | End: 2023-05-08

## 2023-01-09 NOTE — PROGRESS NOTES
Physical Therapy Daily Treatment Note     Name: Regine Osorio  Clinic Number: [id number]    Therapy Diagnosis:   Encounter Diagnoses   Name Primary?    Neck pain Yes    Chronic left shoulder pain        Physician: Fauzia Vidales,*    Visit Date: 2023    Physician Orders: PT Eval and Treat   Medical Diagnosis from Referral:   Z98.1 (ICD-10-CM) - S/P cervical spinal fusion   M75.02 (ICD-10-CM) - Adhesive capsulitis of left shoulder      Evaluation Date: 2022  Authorization Period Expiration: 2023  Plan of Care Expiration: 2/3/2023   Progress Note Due: 2/3/2023  Visit # / Visits authorized: 3/20  Remaining Visits Scheduled -   PTA Consecutive Visits -     5th Visit FOTO - 36 (2023)  10th Visit FOTO -  (Date, Score/ turn green )  D/C FOTO - (Date, Score/ turn green )    Time In: 8:50  Time Out: 9:30  Billable Time: 40 minutes  Non-Billable Time: 00 minutes    Precautions: Standard, Diabetes, and HTN  Insurance: Payor: [employer]ARE / Plan: [employer] 65 / Product Type: Medicare Advantage /     Subjective     Pt. Reports: Soreness in the neck from ADL and last session      She is compliant with home exercise program.  Response to previous treatment: soreness    Pre-Treatment Pain Ratin/10  Post-Treatment Pain Ratin/10  Location: left shoulder and cervical spine    Objective         Regine received therapeutic exercises to develop strength, endurance, ROM, and flexibility for 30 minutes including:  Cervical/Shoulder Assessment   Cervical active range of motion x30 (Rotation/Flexion-Extension)   Cervical SNAGS 2x10, with a 2 sec hold (Rotation/Extension)   Scapular retractions 2x10  Shoulder shrugs 3x10  Static shoulder external rotation /internal rotation and Abduction stretch - 2' each   External rotation with cane - 30 repititions    Bilateral External Rotation with Theraband 3x10, Red Theraband   Dowel Exercises 2x8 with a 2' sec hold  (Flexion/External Rotation/Internal Rotation/Abduction/Extension)   Towel Internal Rotation Stretch 2x8 with a 2' sec hold     *Bold exercise performed     Regine received the following manual therapy techniques: Soft tissue Mobilization were applied to the: L shoulder for 00 minutes, including:  Soft-Tissue Mobilization to the levator scap, upper trap, sub-occipitals, and paraspinals.      Regine participated in neuromuscular re-education activities to improve: Coordination and Proprioception for 10 minutes. The following activities were included:  Shoulder Isometrics x20 (flexion, extension, IR, ER)  Chin Tucks 3x10 with 3 sec hold   Theraband Diagonals 2x8, Red Theraband   Cervical active range of motion with Ball Resistance 3x8 (Rotation/Flexion-Extension)   Cervical Lift Offs 2x10 with a 2' sec hold   Cervical Lift Offs with Rotation 2x5 - stopped due to inability to flex and rotate cervical spine. (Supine Cervical Rotation instead)     Home Exercises Provided and Patient Education Provided     Education provided:   - Continue with HEP    Written Home Exercises Provided: Patient instructed to cont prior HEP.  Exercises were reviewed and Regine was able to demonstrate them prior to the end of the session.  Regine demonstrated good  understanding of the education provided.     See EMR under Patient Instructions for exercises provided prior visit.    Assessment     Pt tolerated session well. Crepitus in L shoulder audible. ROM in all directions still limited but Pt stated she feels like shoulder and cervical ROM is improving since starting therapy. Cervical lift offs still challenging due to weakness. No adverse effects had during exercise, Regine  will continue to benefit from skilled therapy.     Regine Is progressing well towards Her goals.     Pt prognosis is Fair.     Pt will continue to benefit from skilled outpatient physical therapy to address the deficits listed in the problem list box on  initial evaluation, provide pt/family education and to maximize pt's level of independence in the home and community environment.     Pt's spiritual, cultural and educational needs considered and pt agreeable to plan of care and goals.    Anticipated barriers to physical therapy: None    Goals:  Short Term Goals: 4 weeks   Patient will be independent with HEP at home to increase PT compliance.  Goal Met 1/5/2023      Patient will be able to increase active range of motion by 10° in each plane to increase mobility of the shoulder and cervical spine Goal Met 1/5/2023      Patient will be able to hold 5 lbs at 90 degrees of shoulder flexion and abduction for 15 sec to increase shoulder strength  Progressing 1/9/2023               Long Term Goals: 8 weeks   Patient will decrease Neck Index score to <20% to increase functional capacity.  Progressing 1/9/2023     Patient will be able to drive for 30 min with < 4/10 pain to the cervical and shoulder region to increase functional capacity  Progressing 1/9/2023    Patient will be able to lift 5 lbs overhead 5 times with < 2/10 pain to the shoulder to increase functional capacity  Progressing 1/9/2023             Plan     Progress duration, workload, and resistance levels of the Therapeutic Activities and Exercises if the patient does not exhibit any pain, swelling, or other symptoms. Progress instruction in-home exercise progression and modification, including symptom management.     Aris Cortes PTA,  1/9/2023

## 2023-01-09 NOTE — PROGRESS NOTES
Primary Care  Return/Acute Office Visit - In Person  1/9/2023  Rojelio Ndhlovu      [facility]    Patient is a 67 y.o.   Regine Osorio  has a past medical history of Allergy, Breast cancer (2017), Cataract, Cervical spondylosis (07/29/2022), Diabetes mellitus, type 2, Eczema, Fibromyalgia, Glaucoma, Hypertension, Renal cyst, right (02/06/2020), and Steatosis of liver (02/06/2020).    Patient presents with   Chief Complaint   Patient presents with    Establish Care     Patient presenting to establish care     Patient reports that after abnormal breast cancer screening she has had episodes where she will begin to cry about once a week. She states that there are no specific thoughts that trigger it. She states that she feels an overwhelming sadness. Seems to be associated with new medical diagnosis.   She reports chronic insomnia. No appetite changes.   She denies any changes in energy level or thoughts about being a burden to others.   She denies any suicidal ideation   Reports mother had hx of bipolar disorder in mother.         Social History     Social History Narrative        1 son (estranged)    3 grandchildren ([place])    Born and raised in [place] (moved to [facility] 2017)     Regine Osorio family history includes Bipolar disorder in her maternal aunt, mother, and son; Blindness in her maternal grandfather; Breast cancer in her maternal aunt; Breast cancer (age of onset: 29) in her cousin; Breast cancer (age of onset: 39) in her cousin; Dementia in her mother; Glaucoma in her maternal grandfather, maternal uncle, and mother; Lung cancer in her father; Post-traumatic stress disorder in her son.    Active Medications:  Review of patient's allergies indicates:   Allergen Reactions    Codeine Shortness Of Breath     Pt states she had a reaction as a teenager and was taken to the ER.    Lisinopril      cough     Current Outpatient Medications   Medication Instructions    acetic acid (VOSOL) 2 % otic  "solution 4 drops, Both Ears, As needed (PRN)    aspirin (ECOTRIN) 81 mg, Oral, Daily    azelastine (ASTELIN) 137 mcg (0.1 %) nasal spray 1 SPRAY IN EACH NOSTRIL TWICE DAILY<BR>Strength: 137 mcg (0.1 %)    blood-glucose meter kit To check BG 2 times daily, to use with insurance preferred meter    cetirizine (ZYRTEC) 10 MG tablet TAKE 1 TABLET BY MOUTH EVERY DAY FOR ALLERGIES    clobetasoL (TEMOVATE) 0.05 % external solution APPLY TOPICALLY TO THE SCALP TWICE DAILY AS NEEDED FOR ITCHING OR IRRITATION    dextromethorphan-guaiFENesin  mg/5 ml (ROBITUSSIN-DM)  mg/5 mL liquid 5 mLs, Oral, Every 12 hours (non-standard times)    EScitalopram oxalate (LEXAPRO) 10 mg, Oral, Daily    fluticasone propionate (FLONASE) 50 mcg/actuation nasal spray 2 sprays to each lateral nostril once a day    glipiZIDE 5 mg, Oral, 2 times daily    hydroCHLOROthiazide (HYDRODIURIL) 25 mg, Oral, Daily    ketoconazole (NIZORAL) 2 % cream APPLY EXTERNALLY TO THE AFFECTED AREA EVERY DAY    lancets Misc To check BG 2 times daily, to use with insurance preferred meter    latanoprost 0.005 % ophthalmic solution 1 drop, Both Eyes, Nightly    losartan (COZAAR) 50 mg, Oral, Daily    metFORMIN (GLUCOPHAGE) 500 mg, Oral, 2 times daily with meals    ondansetron (ZOFRAN-ODT) 8 mg, Oral, Every 8 hours PRN    rimegepant 75 mg, Oral, Once as needed, Place ODT tablet on the tongue; alternatively the ODT tablet may be placed under the tongue    rizatriptan (MAXALT-MLT) 10 mg, Oral, Every 2 hours PRN, Max 30 mg/day.    UBRELVY 100 mg, Oral, Every 2 hours PRN, Max 200 mg/day.       Review of Systems   All other systems reviewed and are negative.    Vitals:    01/09/23 1336   BP: (!) 148/72   BP Location: Right arm   Pulse: 93   Temp: 99.5 °F (37.5 °C)   SpO2: 99%   Weight: 111.6 kg (246 lb 0.5 oz)   Height: 5' 5" (1.651 m)       Physical Exam  Vitals reviewed.   Constitutional:       General: She is not in acute distress.  Cardiovascular:      Rate and " Rhythm: Normal rate and regular rhythm.      Pulses: Normal pulses.      Heart sounds: Normal heart sounds.   Pulmonary:      Effort: Pulmonary effort is normal.      Breath sounds: Normal breath sounds.   Abdominal:      General: Abdomen is flat. Bowel sounds are normal.      Palpations: Abdomen is soft.   Musculoskeletal:      Right lower leg: No edema.      Left lower leg: No edema.   Neurological:      General: No focal deficit present.      Mental Status: She is oriented to person, place, and time.        Assessment and Plan     ERYN   Screening less suggestive of depression but more-so anxiety related to healthcare   Starting trial of Lexapro 10 mg daily     Seborrheic keratosis   Recommended applying lotions more frequently throughout the day and increasing water intake   Patient followed by Dr. Izaguirre     Migraines   Followed by Dr. Bueno   She was prescribed Maxalt 10 mg prn and Nurtec 75 mg prn  Patient states that she is still using Ubrelvy and Nurtec     Type 2 DM   Severe obesity BMI 40.94   Most recent HbA1C 7.5   Continue glipizide 5 mg BID and metformin 500 mg BID     HTN   BP poorly controlled on losartan 50 mg daily and HCTZ 25 mg daily   Continue to monitor     Myelomalacia of cervical spine   S/p discectomy in July   Patient doing well post-operatively     Arthrosclerosis of aorta     Left sided breast cancer s/p mastectomy   In remission   Followed by Dr. Lund                    Upcoming Scheduled Appointments and Follow Up:    Future Appointments   Date Time Provider Department Center   1/13/2023  9:30 AM Aris Cortes PTA [facility]   1/17/2023 10:15 AM Hi Fishman DO [facility] [place]   1/17/2023  2:30 PM Aris Cortes PTA [facility]   1/19/2023  8:45 AM Aris Cortes PTA [facility]   1/24/2023  9:30 AM Qasim Sánchez, PT [facility]   1/26/2023  8:00 AM Aris Cortes PTA [facility]   1/30/2023 10:30 AM Riana Bueno, FNP [facility] NEURO [address]   1/31/2023  9:30 AM Qasim Sánchez, PT [facility]   2/6/2023  8:00 AM Austin Edwards III, MD [facility] ENT [address]   2/6/2023  9:45 AM Addison Haynes, DPM [facility] POD [address]   2/9/2023  4:00 PM [facility] [place]   3/20/2023  1:15 PM Jen Prado MD [facility] OPHTHAL [address]   4/17/2023  8:00 AM CRISTI LUCIE MAMMO2 [facility] MAMMO [facility]       Follow Up [facility] (with who? when?): Follow up in about 4 weeks (around 2/6/2023) for Virtual Visit f/u depression .      Extended Emergency Contact Information  Primary Emergency Contact: Rissa Plaza  Address: [address] [place] [address]  Mobile Phone: [phone]  Relation: Relative      Rojelio Zeng MD   Attending Physician  Primary Care  1/9/2023 - 1:46 PM    I spent a total of 55 minutes on the day of the visit.This includes face to face time and non-face to face time preparing to see the patient (eg, review of tests), obtaining and/or reviewing separately obtained history, documenting clinical information in the electronic or other health record, independently interpreting results and communicating results to the patient/family/caregiver, or care coordinator.

## 2023-01-10 ENCOUNTER — PATIENT MESSAGE (OUTPATIENT)
Dept: PRIMARY CARE CLINIC | Facility: CLINIC | Age: 68
End: 2023-01-10
Payer: MEDICARE

## 2023-01-10 ENCOUNTER — TELEPHONE (OUTPATIENT)
Dept: NEUROLOGY | Facility: CLINIC | Age: 68
End: 2023-01-10
Payer: MEDICARE

## 2023-01-10 DIAGNOSIS — E11.65 TYPE 2 DIABETES MELLITUS WITH HYPERGLYCEMIA, WITHOUT LONG-TERM CURRENT USE OF INSULIN: ICD-10-CM

## 2023-01-11 ENCOUNTER — TELEPHONE (OUTPATIENT)
Dept: NEUROLOGY | Facility: CLINIC | Age: 68
End: 2023-01-11
Payer: MEDICARE

## 2023-01-11 NOTE — TELEPHONE ENCOUNTER
Called pt to reschedule appt. Pt is rescheduled for a virtual visit for Jan 20 at 11 am.      ----- Message from Lori Multani sent at 1/11/2023  1:13 PM CST -----  Regarding: Appt  Missed Callback    Pt returning callback from missed call. Requesting to speak with somebody in Dr. Bueno  office. Please call.

## 2023-01-13 ENCOUNTER — CLINICAL SUPPORT (OUTPATIENT)
Dept: REHABILITATION | Facility: HOSPITAL | Age: 68
End: 2023-01-13
Payer: MEDICARE

## 2023-01-13 DIAGNOSIS — M25.512 CHRONIC LEFT SHOULDER PAIN: ICD-10-CM

## 2023-01-13 DIAGNOSIS — G89.29 CHRONIC LEFT SHOULDER PAIN: ICD-10-CM

## 2023-01-13 DIAGNOSIS — M54.2 NECK PAIN: Primary | ICD-10-CM

## 2023-01-13 PROCEDURE — 97112 NEUROMUSCULAR REEDUCATION: CPT | Mod: PN,CQ

## 2023-01-13 PROCEDURE — 97110 THERAPEUTIC EXERCISES: CPT | Mod: PN,CQ

## 2023-01-13 NOTE — PROGRESS NOTES
Physical Therapy Daily Treatment Note     Name: Regine Osorio  Clinic Number: [id number]    Therapy Diagnosis:   Encounter Diagnoses   Name Primary?    Neck pain Yes    Chronic left shoulder pain        Physician: Fauzia Vidales,*    Visit Date: 2023    Physician Orders: PT Eval and Treat   Medical Diagnosis from Referral:   Z98.1 (ICD-10-CM) - S/P cervical spinal fusion   M75.02 (ICD-10-CM) - Adhesive capsulitis of left shoulder      Evaluation Date: 2022  Authorization Period Expiration: 2023  Plan of Care Expiration: 2/3/2023   Progress Note Due: 2/3/2023  Visit # / Visits authorized:   Remaining Visits Scheduled -   PTA Consecutive Visits -     5th Visit FOTO - 36 (2023)  10th Visit FOTO -  (Date, Score/ turn green )  D/C FOTO - (Date, Score/ turn green )    Time In: 9:35  Time Out: 10:15  Billable Time: 40 minutes  Non-Billable Time: 00 minutes    Precautions: Standard, Diabetes, and HTN  Insurance: Payor: [employer] / Plan: [employer] 65 / Product Type: Medicare Advantage /     Subjective     Pt. Reports: Everything feels about the same as last session      She is compliant with home exercise program.  Response to previous treatment: soreness    Pre-Treatment Pain Ratin/10  Post-Treatment Pain Ratin/10  Location: left shoulder and cervical spine    Objective         Regine received therapeutic exercises to develop strength, endurance, ROM, and flexibility for 30 minutes including:  Cervical/Shoulder Assessment   Cervical active range of motion x20 (Rotation/Flexion-Extension)   Cervical SNAGS 2x10, with a 2 sec hold (Rotation/Extension)   Scapular retractions 2x10  Shoulder shrugs 3x10  Static shoulder external rotation /internal rotation and Abduction stretch - 2' each   External rotation with cane - 30 repititions    Bilateral External Rotation with Theraband 3x10, Red Theraband   Dowel Exercises 2x8 with a 2' sec hold  (Flexion/External Rotation/Internal Rotation/Abduction/Extension)   Towel Internal Rotation Stretch 2x8 with a 2' sec hold    Table slides flexion/abduction/scaption - 3' each    *Bold exercise performed     Regine received the following manual therapy techniques: Soft tissue Mobilization were applied to the: L shoulder for 00 minutes, including:  Soft-Tissue Mobilization to the levator scap, upper trap, sub-occipitals, and paraspinals.      Regine participated in neuromuscular re-education activities to improve: Coordination and Proprioception for 10 minutes. The following activities were included:  Shoulder Isometrics x20 (flexion, extension, IR, ER)  Chin Tucks 3x10 with 3 sec hold   Theraband Diagonals 2x8, Red Theraband   Cervical active range of motion with Ball Resistance 3x8 (Rotation/Flexion-Extension)   Cervical Lift Offs 2x10 with a 2' sec hold   Cervical Lift Offs with Rotation 2x5 - stopped due to inability to flex and rotate cervical spine. (Supine Cervical Rotation instead)     Home Exercises Provided and Patient Education Provided     Education provided:   - Continue with HEP (Table slides added 1/13/2022)    Written Home Exercises Provided: Patient instructed to cont prior HEP.  Exercises were reviewed and Regine was able to demonstrate them prior to the end of the session.  Regine demonstrated good  understanding of the education provided.     See EMR under Patient Instructions for exercises provided prior visit.    Assessment     Pt tolerated session well. Table slides added to allow for gentle AAROM, pt tolerated well. Pt still lacks 90 degrees of AAROM flexion and abduction with dowel exercise. Cervical lift offs more challenging today, pt only able to complete 16 out of 20 reps. Table slides added to HEP. Regine had no adverse effects with exercise and will benefit from therapy.     Regine Is progressing well towards Her goals.     Pt prognosis is Fair.     Pt will continue to  benefit from skilled outpatient physical therapy to address the deficits listed in the problem list box on initial evaluation, provide pt/family education and to maximize pt's level of independence in the home and community environment.     Pt's spiritual, cultural and educational needs considered and pt agreeable to plan of care and goals.    Anticipated barriers to physical therapy: None    Goals:  Short Term Goals: 4 weeks   Patient will be independent with HEP at home to increase PT compliance.  Goal Met 1/5/2023      Patient will be able to increase active range of motion by 10° in each plane to increase mobility of the shoulder and cervical spine Goal Met 1/5/2023      Patient will be able to hold 5 lbs at 90 degrees of shoulder flexion and abduction for 15 sec to increase shoulder strength  Progressing 1/13/2023               Long Term Goals: 8 weeks   Patient will decrease Neck Index score to <20% to increase functional capacity.  Progressing 1/13/2023     Patient will be able to drive for 30 min with < 4/10 pain to the cervical and shoulder region to increase functional capacity  Progressing 1/13/2023    Patient will be able to lift 5 lbs overhead 5 times with < 2/10 pain to the shoulder to increase functional capacity  Progressing 1/13/2023             Plan     Progress duration, workload, and resistance levels of the Therapeutic Activities and Exercises if the patient does not exhibit any pain, swelling, or other symptoms. Progress instruction in-home exercise progression and modification, including symptom management.     Aris Cortes, POLLO,  1/13/2023
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
## 2023-01-17 ENCOUNTER — CLINICAL SUPPORT (OUTPATIENT)
Dept: REHABILITATION | Facility: HOSPITAL | Age: 68
End: 2023-01-17
Payer: MEDICARE

## 2023-01-17 ENCOUNTER — OFFICE VISIT (OUTPATIENT)
Dept: PAIN MEDICINE | Facility: CLINIC | Age: 68
End: 2023-01-17
Payer: MEDICARE

## 2023-01-17 ENCOUNTER — PATIENT MESSAGE (OUTPATIENT)
Dept: PRIMARY CARE CLINIC | Facility: CLINIC | Age: 68
End: 2023-01-17
Payer: MEDICARE

## 2023-01-17 VITALS
HEART RATE: 82 BPM | HEIGHT: 65 IN | DIASTOLIC BLOOD PRESSURE: 77 MMHG | BODY MASS INDEX: 40.98 KG/M2 | WEIGHT: 246 LBS | RESPIRATION RATE: 18 BRPM | SYSTOLIC BLOOD PRESSURE: 135 MMHG

## 2023-01-17 DIAGNOSIS — M79.7 FIBROMYALGIA: ICD-10-CM

## 2023-01-17 DIAGNOSIS — G24.3 CERVICAL DYSTONIA: ICD-10-CM

## 2023-01-17 DIAGNOSIS — M75.00 ADHESIVE CAPSULITIS OF SHOULDER, UNSPECIFIED LATERALITY: Primary | ICD-10-CM

## 2023-01-17 DIAGNOSIS — M54.2 NECK PAIN: Primary | ICD-10-CM

## 2023-01-17 DIAGNOSIS — G89.29 CHRONIC LEFT SHOULDER PAIN: ICD-10-CM

## 2023-01-17 DIAGNOSIS — M25.512 CHRONIC LEFT SHOULDER PAIN: ICD-10-CM

## 2023-01-17 DIAGNOSIS — G95.89 MYELOMALACIA OF CERVICAL CORD: ICD-10-CM

## 2023-01-17 PROCEDURE — 3075F PR MOST RECENT SYSTOLIC BLOOD PRESS GE 130-139MM HG: ICD-10-PCS | Mod: CPTII,S$GLB,, | Performed by: STUDENT IN AN ORGANIZED HEALTH CARE EDUCATION/TRAINING PROGRAM

## 2023-01-17 PROCEDURE — 99214 PR OFFICE/OUTPT VISIT, EST, LEVL IV, 30-39 MIN: ICD-10-PCS | Mod: S$GLB,,, | Performed by: STUDENT IN AN ORGANIZED HEALTH CARE EDUCATION/TRAINING PROGRAM

## 2023-01-17 PROCEDURE — 3288F PR FALLS RISK ASSESSMENT DOCUMENTED: ICD-10-PCS | Mod: CPTII,S$GLB,, | Performed by: STUDENT IN AN ORGANIZED HEALTH CARE EDUCATION/TRAINING PROGRAM

## 2023-01-17 PROCEDURE — 99499 RISK ADDL DX/OHS AUDIT: ICD-10-PCS | Mod: S$GLB,,, | Performed by: STUDENT IN AN ORGANIZED HEALTH CARE EDUCATION/TRAINING PROGRAM

## 2023-01-17 PROCEDURE — 3288F FALL RISK ASSESSMENT DOCD: CPT | Mod: CPTII,S$GLB,, | Performed by: STUDENT IN AN ORGANIZED HEALTH CARE EDUCATION/TRAINING PROGRAM

## 2023-01-17 PROCEDURE — 1160F RVW MEDS BY RX/DR IN RCRD: CPT | Mod: CPTII,S$GLB,, | Performed by: STUDENT IN AN ORGANIZED HEALTH CARE EDUCATION/TRAINING PROGRAM

## 2023-01-17 PROCEDURE — 99499 UNLISTED E&M SERVICE: CPT | Mod: S$GLB,,, | Performed by: STUDENT IN AN ORGANIZED HEALTH CARE EDUCATION/TRAINING PROGRAM

## 2023-01-17 PROCEDURE — 3075F SYST BP GE 130 - 139MM HG: CPT | Mod: CPTII,S$GLB,, | Performed by: STUDENT IN AN ORGANIZED HEALTH CARE EDUCATION/TRAINING PROGRAM

## 2023-01-17 PROCEDURE — 1159F MED LIST DOCD IN RCRD: CPT | Mod: CPTII,S$GLB,, | Performed by: STUDENT IN AN ORGANIZED HEALTH CARE EDUCATION/TRAINING PROGRAM

## 2023-01-17 PROCEDURE — 3008F BODY MASS INDEX DOCD: CPT | Mod: CPTII,S$GLB,, | Performed by: STUDENT IN AN ORGANIZED HEALTH CARE EDUCATION/TRAINING PROGRAM

## 2023-01-17 PROCEDURE — 1159F PR MEDICATION LIST DOCUMENTED IN MEDICAL RECORD: ICD-10-PCS | Mod: CPTII,S$GLB,, | Performed by: STUDENT IN AN ORGANIZED HEALTH CARE EDUCATION/TRAINING PROGRAM

## 2023-01-17 PROCEDURE — 99999 PR PBB SHADOW E&M-EST. PATIENT-LVL III: CPT | Mod: PBBFAC,,, | Performed by: STUDENT IN AN ORGANIZED HEALTH CARE EDUCATION/TRAINING PROGRAM

## 2023-01-17 PROCEDURE — 4010F PR ACE/ARB THEARPY RXD/TAKEN: ICD-10-PCS | Mod: CPTII,S$GLB,, | Performed by: STUDENT IN AN ORGANIZED HEALTH CARE EDUCATION/TRAINING PROGRAM

## 2023-01-17 PROCEDURE — 3078F PR MOST RECENT DIASTOLIC BLOOD PRESSURE < 80 MM HG: ICD-10-PCS | Mod: CPTII,S$GLB,, | Performed by: STUDENT IN AN ORGANIZED HEALTH CARE EDUCATION/TRAINING PROGRAM

## 2023-01-17 PROCEDURE — 1101F PT FALLS ASSESS-DOCD LE1/YR: CPT | Mod: CPTII,S$GLB,, | Performed by: STUDENT IN AN ORGANIZED HEALTH CARE EDUCATION/TRAINING PROGRAM

## 2023-01-17 PROCEDURE — 99214 OFFICE O/P EST MOD 30 MIN: CPT | Mod: S$GLB,,, | Performed by: STUDENT IN AN ORGANIZED HEALTH CARE EDUCATION/TRAINING PROGRAM

## 2023-01-17 PROCEDURE — 3078F DIAST BP <80 MM HG: CPT | Mod: CPTII,S$GLB,, | Performed by: STUDENT IN AN ORGANIZED HEALTH CARE EDUCATION/TRAINING PROGRAM

## 2023-01-17 PROCEDURE — 1101F PR PT FALLS ASSESS DOC 0-1 FALLS W/OUT INJ PAST YR: ICD-10-PCS | Mod: CPTII,S$GLB,, | Performed by: STUDENT IN AN ORGANIZED HEALTH CARE EDUCATION/TRAINING PROGRAM

## 2023-01-17 PROCEDURE — 1160F PR REVIEW ALL MEDS BY PRESCRIBER/CLIN PHARMACIST DOCUMENTED: ICD-10-PCS | Mod: CPTII,S$GLB,, | Performed by: STUDENT IN AN ORGANIZED HEALTH CARE EDUCATION/TRAINING PROGRAM

## 2023-01-17 PROCEDURE — 4010F ACE/ARB THERAPY RXD/TAKEN: CPT | Mod: CPTII,S$GLB,, | Performed by: STUDENT IN AN ORGANIZED HEALTH CARE EDUCATION/TRAINING PROGRAM

## 2023-01-17 PROCEDURE — 1125F AMNT PAIN NOTED PAIN PRSNT: CPT | Mod: CPTII,S$GLB,, | Performed by: STUDENT IN AN ORGANIZED HEALTH CARE EDUCATION/TRAINING PROGRAM

## 2023-01-17 PROCEDURE — 99999 PR PBB SHADOW E&M-EST. PATIENT-LVL III: ICD-10-PCS | Mod: PBBFAC,,, | Performed by: STUDENT IN AN ORGANIZED HEALTH CARE EDUCATION/TRAINING PROGRAM

## 2023-01-17 PROCEDURE — 1125F PR PAIN SEVERITY QUANTIFIED, PAIN PRESENT: ICD-10-PCS | Mod: CPTII,S$GLB,, | Performed by: STUDENT IN AN ORGANIZED HEALTH CARE EDUCATION/TRAINING PROGRAM

## 2023-01-17 PROCEDURE — 3008F PR BODY MASS INDEX (BMI) DOCUMENTED: ICD-10-PCS | Mod: CPTII,S$GLB,, | Performed by: STUDENT IN AN ORGANIZED HEALTH CARE EDUCATION/TRAINING PROGRAM

## 2023-01-17 PROCEDURE — 97110 THERAPEUTIC EXERCISES: CPT | Mod: PN,CQ

## 2023-01-17 NOTE — PROGRESS NOTES
Physical Therapy Daily Treatment Note     Name: Regine Osorio  Clinic Number: [id number]    Therapy Diagnosis:   Encounter Diagnoses   Name Primary?    Neck pain Yes    Chronic left shoulder pain        Physician: Fauzia Vidales,*    Visit Date: 1/17/2023    Physician Orders: PT Eval and Treat   Medical Diagnosis from Referral:   Z98.1 (ICD-10-CM) - S/P cervical spinal fusion   M75.02 (ICD-10-CM) - Adhesive capsulitis of left shoulder      Evaluation Date: 12/9/2022  Authorization Period Expiration: 1/6/2023  Plan of Care Expiration: 2/3/2023   Progress Note Due: 2/3/2023  Visit # / Visits authorized: 5/20  Remaining Visits Scheduled - 03  PTA Consecutive Visits - 3/6    5th Visit FOTO - 36 (1/5/2023)  10th Visit FOTO -  (Date, Score/ turn green )  D/C FOTO - (Date, Score/ turn green )    Time In: 2:37  Time Out: 3:20  Billable Time: 43 minutes  Non-Billable Time: 00 minutes    Precautions: Standard, Diabetes, and HTN  Insurance: Payor: [employer] / Plan: [employer] CHOICES 65 / Product Type: Medicare Advantage /     Subjective     Pt. Reports: No significant changes      She is compliant with home exercise program.  Response to previous treatment: soreness    Pre-Treatment Pain Rating: 3/10  Post-Treatment Pain Rating: 3/10  Location: left shoulder and cervical spine    Objective         Regine received therapeutic exercises to develop strength, endurance, ROM, and flexibility for 43 minutes including:  Cervical/Shoulder Assessment   Cervical active range of motion x10 (Rotation/Flexion-Extension)   Cervical SNAGS 2x10, with a 2 sec hold (Rotation/Extension)   Scapular retractions 2x10  Shoulder shrugs 3x10  Static shoulder external rotation /internal rotation and Abduction stretch - 2' each   External rotation with cane - 30 repititions    Bilateral External Rotation with Theraband 3x10, Red Theraband   Dowel Exercises 2x8 with a 2' sec hold (Flexion/External Rotation/Internal  Rotation/Abduction/Extension)   Towel Internal Rotation Stretch 2x8 with a 2' sec hold    Table slides flexion/abduction/scaption - 3' each    Resisted ER/IR 2x10 red theraband/green theraband (very challane  Serratus punches 2x10 (at top of pain free range)        *Bold exercise performed     Regine received the following manual therapy techniques: Soft tissue Mobilization were applied to the: L shoulder for 00 minutes, including:  Soft-Tissue Mobilization to the levator scap, upper trap, sub-occipitals, and paraspinals.      Regine participated in neuromuscular re-education activities to improve: Coordination and Proprioception for 00 minutes. The following activities were included:  Shoulder Isometrics x20 (flexion, extension, IR, ER)  Chin Tucks 3x10 with 3 sec hold   Theraband Diagonals 2x8, Red Theraband   Cervical active range of motion with Ball Resistance 3x8 (Rotation/Flexion-Extension)   Cervical Lift Offs 2x10 with a 2' sec hold   Cervical Lift Offs with Rotation 2x5 - stopped due to inability to flex and rotate cervical spine. (Supine Cervical Rotation instead)     Home Exercises Provided and Patient Education Provided     Education provided:   - Continue with HEP (Table slides added 1/13/2022)    Written Home Exercises Provided: Patient instructed to cont prior HEP.  Exercises were reviewed and Regine was able to demonstrate them prior to the end of the session.  Regine demonstrated good  understanding of the education provided.     See EMR under Patient Instructions for exercises provided prior visit.    Assessment     Pt tolerated session well. Improved AAROM with dowel abduction. Dowel flexion still limited due to pain. New exercises added to strengthen RC and serratus anterior. Pt challenged with new exercises due to weakness, multiple rest breaks required due to fatigue. Multiple rest breaks still needed to complete dowel exercises, no improvement in endurance noted. Pt states she's  noticing improve with ROM during ADL.  Regine will continue to benefit from skilled therapy.     Regine Is progressing well towards Her goals.     Pt prognosis is Fair.     Pt will continue to benefit from skilled outpatient physical therapy to address the deficits listed in the problem list box on initial evaluation, provide pt/family education and to maximize pt's level of independence in the home and community environment.     Pt's spiritual, cultural and educational needs considered and pt agreeable to plan of care and goals.    Anticipated barriers to physical therapy: None    Goals:  Short Term Goals: 4 weeks   Patient will be independent with HEP at home to increase PT compliance.  Goal Met 1/5/2023      Patient will be able to increase active range of motion by 10° in each plane to increase mobility of the shoulder and cervical spine Goal Met 1/5/2023      Patient will be able to hold 5 lbs at 90 degrees of shoulder flexion and abduction for 15 sec to increase shoulder strength  Progressing 1/17/2023               Long Term Goals: 8 weeks   Patient will decrease Neck Index score to <20% to increase functional capacity.  Progressing 1/17/2023     Patient will be able to drive for 30 min with < 4/10 pain to the cervical and shoulder region to increase functional capacity  Progressing 1/17/2023    Patient will be able to lift 5 lbs overhead 5 times with < 2/10 pain to the shoulder to increase functional capacity  Progressing 1/17/2023             Plan     Progress duration, workload, and resistance levels of the Therapeutic Activities and Exercises if the patient does not exhibit any pain, swelling, or other symptoms. Progress instruction in-home exercise progression and modification, including symptom management.     Aris Cortes, PTA,  1/17/2023

## 2023-01-17 NOTE — TELEPHONE ENCOUNTER
Medical necessity form re-faxed to [facility] for One Touch Verio strips.

## 2023-01-19 ENCOUNTER — CLINICAL SUPPORT (OUTPATIENT)
Dept: REHABILITATION | Facility: HOSPITAL | Age: 68
End: 2023-01-19
Payer: MEDICARE

## 2023-01-19 DIAGNOSIS — M54.2 NECK PAIN: Primary | ICD-10-CM

## 2023-01-19 DIAGNOSIS — M25.512 CHRONIC LEFT SHOULDER PAIN: ICD-10-CM

## 2023-01-19 DIAGNOSIS — G89.29 CHRONIC LEFT SHOULDER PAIN: ICD-10-CM

## 2023-01-19 PROCEDURE — 97112 NEUROMUSCULAR REEDUCATION: CPT | Mod: PN,CQ

## 2023-01-19 PROCEDURE — 97110 THERAPEUTIC EXERCISES: CPT | Mod: PN,CQ

## 2023-01-19 NOTE — PROGRESS NOTES
Physical Therapy Daily Treatment Note     Name: Regine Osorio  Clinic Number: [id number]    Therapy Diagnosis:   Encounter Diagnoses   Name Primary?    Neck pain Yes    Chronic left shoulder pain        Physician: Fauzia Vidales,*    Visit Date: 2023    Physician Orders: PT Eval and Treat   Medical Diagnosis from Referral:   Z98.1 (ICD-10-CM) - S/P cervical spinal fusion   M75.02 (ICD-10-CM) - Adhesive capsulitis of left shoulder      Evaluation Date: 2022  Authorization Period Expiration: 2023  Plan of Care Expiration: 2/3/2023   Progress Note Due: 2/3/2023  Visit # / Visits authorized:   Remaining Visits Scheduled -   PTA Consecutive Visits -     5th Visit FOTO - 36 (2023)  12th Visit FOTO -  (Date, Score/ turn green )  D/C FOTO - (Date, Score/ turn green )    Time In: 8:50  Time Out: 9:30  Billable Time: 40 minutes  Non-Billable Time: 00 minutes    Precautions: Standard, Diabetes, and HTN  Insurance: Payor: [employer] / Plan: [employer] CHOICES 65 / Product Type: Medicare Advantage /     Subjective     Pt. Reports: More soreness today than anything else. ROM in shoulder feels the same      She is compliant with home exercise program.  Response to previous treatment: soreness    Pre-Treatment Pain Ratin/10  Post-Treatment Pain Ratin/10  Location: left shoulder and cervical spine    Objective         Regine received therapeutic exercises to develop strength, endurance, ROM, and flexibility for 30 minutes including:  Cervical/Shoulder Assessment   Cervical active range of motion x10 (Rotation/Flexion-Extension)   Cervical SNAGS 2x10, with a 2 sec hold (Rotation/Extension)   Scapular retractions 2x10  Shoulder shrugs 3x10  Static shoulder external rotation /internal rotation and Abduction stretch - 2' each   External rotation with cane - 30 repititions    Bilateral External Rotation with Theraband 3x10, Red Theraband   Supine Dowel Exercises 2x10  with a 2' sec hold (Flexion/External Rotation/Internal Rotation/Abduction/Extension)   Towel Internal Rotation Stretch 2x8 with a 2' sec hold    Table slides flexion/abduction/scaption - 3' each    Resisted ER/IR 2x10 red theraband/green theraband (very challenged)  Serratus punches 2x10 (at top of pain free range)        *Bold exercise performed     Regine received the following manual therapy techniques: Soft tissue Mobilization were applied to the: L shoulder for 00 minutes, including:  Soft-Tissue Mobilization to the levator scap, upper trap, sub-occipitals, and paraspinals.      Regine participated in neuromuscular re-education activities to improve: Coordination and Proprioception for 10 minutes. The following activities were included:  Shoulder Isometrics x20 (flexion, extension, IR, ER)  Chin Tucks with red theraband 3x10 with 3 sec hold   Theraband Diagonals 2x8, Red Theraband   Cervical active range of motion with Ball Resistance 3x8 (Rotation/Flexion-Extension)   Cervical Lift Offs 2x10 with a 2' sec hold   Cervical Lift Offs with Rotation 2x5 - stopped due to inability to flex and rotate cervical spine. (Supine Cervical Rotation instead)     Home Exercises Provided and Patient Education Provided     Education provided:   - Continue with HEP (Table slides added 1/13/2022)    Written Home Exercises Provided: Patient instructed to cont prior HEP.  Exercises were reviewed and Regine was able to demonstrate them prior to the end of the session.  Regine demonstrated good  understanding of the education provided.     See EMR under Patient Instructions for exercises provided prior visit.    Assessment     Pt tolerated session well. Shoulder exercises performed supine to reduce impact of gravity and focus on ROM. Strength and endurance for  Cervical lift off still challenging. Neuromuscular control for supine serratus punches has improved. Pt requires frequent rest breaks due to fatigue. Tactile cues  needed for resisted ER/IR. Regine had no adverse effects today and will benefit from therapy.     Regine Is progressing well towards Her goals.     Pt prognosis is Fair.     Pt will continue to benefit from skilled outpatient physical therapy to address the deficits listed in the problem list box on initial evaluation, provide pt/family education and to maximize pt's level of independence in the home and community environment.     Pt's spiritual, cultural and educational needs considered and pt agreeable to plan of care and goals.    Anticipated barriers to physical therapy: None    Goals:  Short Term Goals: 4 weeks   Patient will be independent with HEP at home to increase PT compliance.  Goal Met 1/5/2023      Patient will be able to increase active range of motion by 10° in each plane to increase mobility of the shoulder and cervical spine Goal Met 1/5/2023      Patient will be able to hold 5 lbs at 90 degrees of shoulder flexion and abduction for 15 sec to increase shoulder strength  Progressing 1/19/2023               Long Term Goals: 8 weeks   Patient will decrease Neck Index score to <20% to increase functional capacity.  Progressing 1/19/2023     Patient will be able to drive for 30 min with < 4/10 pain to the cervical and shoulder region to increase functional capacity  Progressing 1/19/2023    Patient will be able to lift 5 lbs overhead 5 times with < 2/10 pain to the shoulder to increase functional capacity  Progressing 1/19/2023             Plan     Progress duration, workload, and resistance levels of the Therapeutic Activities and Exercises if the patient does not exhibit any pain, swelling, or other symptoms. Progress instruction in-home exercise progression and modification, including symptom management.     Aris Cortes, PTA,  1/19/2023

## 2023-01-20 ENCOUNTER — OFFICE VISIT (OUTPATIENT)
Dept: NEUROLOGY | Facility: CLINIC | Age: 68
End: 2023-01-20
Payer: MEDICARE

## 2023-01-20 DIAGNOSIS — G43.009 MIGRAINE WITHOUT AURA AND WITHOUT STATUS MIGRAINOSUS, NOT INTRACTABLE: Primary | ICD-10-CM

## 2023-01-20 DIAGNOSIS — I10 ESSENTIAL HYPERTENSION: ICD-10-CM

## 2023-01-20 DIAGNOSIS — E11.9 TYPE 2 DIABETES MELLITUS WITHOUT COMPLICATION, WITHOUT LONG-TERM CURRENT USE OF INSULIN: ICD-10-CM

## 2023-01-20 PROCEDURE — 99214 OFFICE O/P EST MOD 30 MIN: CPT | Mod: 95,,, | Performed by: NURSE PRACTITIONER

## 2023-01-20 PROCEDURE — 4010F PR ACE/ARB THEARPY RXD/TAKEN: ICD-10-PCS | Mod: CPTII,95,, | Performed by: NURSE PRACTITIONER

## 2023-01-20 PROCEDURE — 1159F MED LIST DOCD IN RCRD: CPT | Mod: CPTII,95,, | Performed by: NURSE PRACTITIONER

## 2023-01-20 PROCEDURE — 1160F PR REVIEW ALL MEDS BY PRESCRIBER/CLIN PHARMACIST DOCUMENTED: ICD-10-PCS | Mod: CPTII,95,, | Performed by: NURSE PRACTITIONER

## 2023-01-20 PROCEDURE — 99214 PR OFFICE/OUTPT VISIT, EST, LEVL IV, 30-39 MIN: ICD-10-PCS | Mod: 95,,, | Performed by: NURSE PRACTITIONER

## 2023-01-20 PROCEDURE — 1159F PR MEDICATION LIST DOCUMENTED IN MEDICAL RECORD: ICD-10-PCS | Mod: CPTII,95,, | Performed by: NURSE PRACTITIONER

## 2023-01-20 PROCEDURE — 4010F ACE/ARB THERAPY RXD/TAKEN: CPT | Mod: CPTII,95,, | Performed by: NURSE PRACTITIONER

## 2023-01-20 PROCEDURE — 1160F RVW MEDS BY RX/DR IN RCRD: CPT | Mod: CPTII,95,, | Performed by: NURSE PRACTITIONER

## 2023-01-20 RX ORDER — RIZATRIPTAN BENZOATE 10 MG/1
10 TABLET, ORALLY DISINTEGRATING ORAL
Qty: 12 TABLET | Refills: 5 | Status: SHIPPED | OUTPATIENT
Start: 2023-01-20

## 2023-01-20 RX ORDER — ONDANSETRON 8 MG/1
8 TABLET, ORALLY DISINTEGRATING ORAL EVERY 8 HOURS PRN
Qty: 30 TABLET | Refills: 2 | Status: SHIPPED | OUTPATIENT
Start: 2023-01-20

## 2023-01-20 NOTE — PROGRESS NOTES
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   - HTN - BP stable, management per PCP, hold maxalt for SBP > 140 mmHg   - Continue tracking headaches   - RTC in 4 months or sooner if needed    Orders Placed This Encounter    rizatriptan (MAXALT-MLT) 10 MG disintegrating tablet    ubrogepant (UBRELVY) 100 mg tablet    ondansetron (ZOFRAN-ODT) 8 MG TbDL     Questions and concerns were sought and answered to the patient's stated verbal satisfaction.  The patient verbalizes understanding and agreement with the above stated treatment plan.     CC: MD Riana Babcock, FNP-C  [facility]   [phone]    Dr. Mota was available during today's encounter.

## 2023-01-24 ENCOUNTER — PATIENT MESSAGE (OUTPATIENT)
Dept: DERMATOLOGY | Facility: CLINIC | Age: 68
End: 2023-01-24
Payer: MEDICARE

## 2023-01-24 ENCOUNTER — TELEPHONE (OUTPATIENT)
Dept: DERMATOLOGY | Facility: CLINIC | Age: 68
End: 2023-01-24
Payer: MEDICARE

## 2023-01-24 NOTE — TELEPHONE ENCOUNTER
Scheduled patient appointment per message received in the Dermatology department. Patient acknowledges appointment made and confirms.        RD          ----- Message from Genevieve Caceres sent at 1/24/2023  8:36 AM CST -----  Regarding: Appt  Contact: Pt [phone]  Patient requesting appt for today or as soon as possible due to rash on back itchy painful Please call to discuss

## 2023-01-25 ENCOUNTER — PATIENT MESSAGE (OUTPATIENT)
Dept: NEUROLOGY | Facility: CLINIC | Age: 68
End: 2023-01-25
Payer: MEDICARE

## 2023-01-26 ENCOUNTER — CLINICAL SUPPORT (OUTPATIENT)
Dept: REHABILITATION | Facility: HOSPITAL | Age: 68
End: 2023-01-26
Payer: MEDICARE

## 2023-01-26 DIAGNOSIS — G89.29 CHRONIC LEFT SHOULDER PAIN: ICD-10-CM

## 2023-01-26 DIAGNOSIS — M54.2 NECK PAIN: Primary | ICD-10-CM

## 2023-01-26 DIAGNOSIS — M25.512 CHRONIC LEFT SHOULDER PAIN: ICD-10-CM

## 2023-01-26 PROCEDURE — 97112 NEUROMUSCULAR REEDUCATION: CPT | Mod: PN

## 2023-01-26 PROCEDURE — 97110 THERAPEUTIC EXERCISES: CPT | Mod: PN

## 2023-01-26 NOTE — PROGRESS NOTES
Physical Therapy Daily Treatment Note     Name: Regine Osorio  Clinic Number: [id number]    Therapy Diagnosis:   Encounter Diagnoses   Name Primary?    Neck pain Yes    Chronic left shoulder pain      Physician: Fauzia Vidales,*    Visit Date: 2023    Physician Orders: PT Eval and Treat   Medical Diagnosis from Referral:   Z98.1 (ICD-10-CM) - S/P cervical spinal fusion   M75.02 (ICD-10-CM) - Adhesive capsulitis of left shoulder      Evaluation Date: 2022  Authorization Period Expiration: 2023  Plan of Care Expiration: 2/3/2023   Progress Note Due: 2/3/2023  Visit # / Visits authorized:   Remaining Visits Scheduled -  Consecutive Visits - 06    5th Visit FOTO - 36 (2023)  12th Visit FOTO -  (Date, Score/ turn green )  D/C FOTO - (Date, Score/ turn green )    Time In: 8:00  Time Out: 8:45  Billable Time: 45 minutes  Non-Billable Time: 00 minutes    Precautions: Standard, Diabetes, and HTN  Insurance: Payor: [employer] / Plan: [employer] CHOICES 65 / Product Type: Medicare Advantage /     Subjective     Pt. Reports: improvement with range of motion of the shoulders. No soreness today.       She is compliant with home exercise program.  Response to previous treatment: soreness    Pre-Treatment Pain Ratin/10  Post-Treatment Pain Ratin/10  Location: left shoulder and cervical spine    Objective     Regine received therapeutic exercises to develop strength, endurance, ROM, and flexibility for 35 minutes including:  Cervical/Shoulder Assessment   Cervical active range of motion x10 (Rotation/Flexion-Extension)   Cervical SNAGS 2x10, with a 2 sec hold (Rotation/Extension)   Scapular retractions 2x10  Shoulder shrugs 3x10  Static shoulder external rotation /internal rotation and Abduction stretch - 2' each   External rotation with cane - 30 repititions    Bilateral External Rotation with Theraband 3x10, Red Theraband   Supine Dowel Exercises 2x10 with  a 2' sec hold (Flexion/External Rotation/Abduction/Extension)   Towel Internal Rotation Stretch 2x8 with a 2' sec hold    Table slides flexion/abduction/scaption - 3' each    Resisted ER/IR 2x10 red theraband/green theraband (very challenged)  Serratus punches 2x10 (at top of pain free range)  Supine Shoulder Flexion 2x8, Red Theraband   Supine Shoulder Abduction 2x8, Red Theraband   Ball Up the Wall 2x5    *Bold exercise performed     Regine received the following manual therapy techniques: Soft tissue Mobilization were applied to the: L shoulder for 00 minutes, including:  Soft-Tissue Mobilization to the levator scap, upper trap, sub-occipitals, and paraspinals.      Regine participated in neuromuscular re-education activities to improve: Coordination and Proprioception for 10 minutes. The following activities were included:  Shoulder Isometrics x20 (flexion, extension, IR, ER)  Chin Tucks with red theraband 3x10 with 3 sec hold   Theraband Diagonals 2x8, Red Theraband   Cervical active range of motion with Ball Resistance 3x8 (Rotation/Flexion-Extension)   Cervical Lift Offs 2x10 with a 2' sec hold   Cervical Lift Offs with Rotation 2x5 - stopped due to inability to flex and rotate cervical spine. (Supine Cervical Rotation instead)   Cervical Ball Squeezes with active range of motion 2x15 (Flexion-Extension/Rotation)     Home Exercises Provided and Patient Education Provided     Education provided:   - Continue with HEP (Table slides added 1/13/2022)    Written Home Exercises Provided: Patient instructed to cont prior HEP.  Exercises were reviewed and Regine was able to demonstrate them prior to the end of the session.  Regine demonstrated good  understanding of the education provided.     See EMR under Patient Instructions for exercises provided prior visit.    Assessment     Patient completed the exercise program with some soreness and fatigue with new exercises. No increase in pain was reported. New  "exercises focused on overhead strength to help with functional activities at home. Ms. Osorio states that she is seeing improvement with the range of motion and doing thing easier like "going to the bank." Patient would continue to benefit from skilled Physical Therapy to decrease pain and increase overhead activities.     Regine Is progressing well towards Her goals.     Pt prognosis is Fair.     Pt will continue to benefit from skilled outpatient physical therapy to address the deficits listed in the problem list box on initial evaluation, provide pt/family education and to maximize pt's level of independence in the home and community environment.     Pt's spiritual, cultural and educational needs considered and pt agreeable to plan of care and goals.    Anticipated barriers to physical therapy: None    Goals:  Short Term Goals: 4 weeks   Patient will be independent with HEP at home to increase PT compliance.  Goal Met 1/5/2023      Patient will be able to increase active range of motion by 10° in each plane to increase mobility of the shoulder and cervical spine Goal Met 1/5/2023      Patient will be able to hold 5 lbs at 90 degrees of shoulder flexion and abduction for 15 sec to increase shoulder strength  Progressing 1/26/2023               Long Term Goals: 8 weeks   Patient will decrease Neck Index score to <20% to increase functional capacity.  Progressing 1/26/2023     Patient will be able to drive for 30 min with < 4/10 pain to the cervical and shoulder region to increase functional capacity  Progressing 1/26/2023    Patient will be able to lift 5 lbs overhead 5 times with < 2/10 pain to the shoulder to increase functional capacity  Progressing 1/26/2023             Plan     Progress duration, workload, and resistance levels of the Therapeutic Activities and Exercises if the patient does not exhibit any pain, swelling, or other symptoms. Progress instruction in-home exercise progression and modification, " including symptom management.     Qasim Sánchez, PT, DPT   1/26/2023

## 2023-01-27 ENCOUNTER — OFFICE VISIT (OUTPATIENT)
Dept: DERMATOLOGY | Facility: CLINIC | Age: 68
End: 2023-01-27
Payer: MEDICARE

## 2023-01-27 ENCOUNTER — LAB VISIT (OUTPATIENT)
Dept: LAB | Facility: HOSPITAL | Age: 68
End: 2023-01-27
Attending: DERMATOLOGY
Payer: MEDICARE

## 2023-01-27 DIAGNOSIS — L29.9 ITCHING: ICD-10-CM

## 2023-01-27 DIAGNOSIS — L82.1 SK (SEBORRHEIC KERATOSIS): ICD-10-CM

## 2023-01-27 DIAGNOSIS — E61.1 LOW IRON: ICD-10-CM

## 2023-01-27 DIAGNOSIS — L29.9 ITCHING: Primary | ICD-10-CM

## 2023-01-27 DIAGNOSIS — L29.9 SCALP ITCH: ICD-10-CM

## 2023-01-27 DIAGNOSIS — B02.9 HERPES ZOSTER WITHOUT COMPLICATION: ICD-10-CM

## 2023-01-27 DIAGNOSIS — R20.2 PARESTHESIA OF SKIN: ICD-10-CM

## 2023-01-27 DIAGNOSIS — Z76.89 ENCOUNTER FOR SKIN CARE: ICD-10-CM

## 2023-01-27 DIAGNOSIS — L21.9 SEBORRHEIC DERMATITIS OF SCALP: ICD-10-CM

## 2023-01-27 LAB
FERRITIN SERPL-MCNC: 21 NG/ML (ref 20–300)
IRON SERPL-MCNC: 55 UG/DL (ref 30–160)
SATURATED IRON: 14 % (ref 20–50)
TOTAL IRON BINDING CAPACITY: 400 UG/DL (ref 250–450)
TRANSFERRIN SERPL-MCNC: 270 MG/DL (ref 200–375)

## 2023-01-27 PROCEDURE — 1125F AMNT PAIN NOTED PAIN PRSNT: CPT | Mod: CPTII,S$GLB,, | Performed by: DERMATOLOGY

## 2023-01-27 PROCEDURE — 99999 PR PBB SHADOW E&M-EST. PATIENT-LVL III: ICD-10-PCS | Mod: PBBFAC,,, | Performed by: DERMATOLOGY

## 2023-01-27 PROCEDURE — 1159F MED LIST DOCD IN RCRD: CPT | Mod: CPTII,S$GLB,, | Performed by: DERMATOLOGY

## 2023-01-27 PROCEDURE — 99215 OFFICE O/P EST HI 40 MIN: CPT | Mod: S$GLB,,, | Performed by: DERMATOLOGY

## 2023-01-27 PROCEDURE — 1159F PR MEDICATION LIST DOCUMENTED IN MEDICAL RECORD: ICD-10-PCS | Mod: CPTII,S$GLB,, | Performed by: DERMATOLOGY

## 2023-01-27 PROCEDURE — 82728 ASSAY OF FERRITIN: CPT | Performed by: DERMATOLOGY

## 2023-01-27 PROCEDURE — 1101F PR PT FALLS ASSESS DOC 0-1 FALLS W/OUT INJ PAST YR: ICD-10-PCS | Mod: CPTII,S$GLB,, | Performed by: DERMATOLOGY

## 2023-01-27 PROCEDURE — 3288F PR FALLS RISK ASSESSMENT DOCUMENTED: ICD-10-PCS | Mod: CPTII,S$GLB,, | Performed by: DERMATOLOGY

## 2023-01-27 PROCEDURE — 99215 PR OFFICE/OUTPT VISIT, EST, LEVL V, 40-54 MIN: ICD-10-PCS | Mod: S$GLB,,, | Performed by: DERMATOLOGY

## 2023-01-27 PROCEDURE — 99999 PR PBB SHADOW E&M-EST. PATIENT-LVL III: CPT | Mod: PBBFAC,,, | Performed by: DERMATOLOGY

## 2023-01-27 PROCEDURE — 4010F PR ACE/ARB THEARPY RXD/TAKEN: ICD-10-PCS | Mod: CPTII,S$GLB,, | Performed by: DERMATOLOGY

## 2023-01-27 PROCEDURE — 4010F ACE/ARB THERAPY RXD/TAKEN: CPT | Mod: CPTII,S$GLB,, | Performed by: DERMATOLOGY

## 2023-01-27 PROCEDURE — 1125F PR PAIN SEVERITY QUANTIFIED, PAIN PRESENT: ICD-10-PCS | Mod: CPTII,S$GLB,, | Performed by: DERMATOLOGY

## 2023-01-27 PROCEDURE — 36415 COLL VENOUS BLD VENIPUNCTURE: CPT | Mod: PN | Performed by: DERMATOLOGY

## 2023-01-27 PROCEDURE — 1101F PT FALLS ASSESS-DOCD LE1/YR: CPT | Mod: CPTII,S$GLB,, | Performed by: DERMATOLOGY

## 2023-01-27 PROCEDURE — 84466 ASSAY OF TRANSFERRIN: CPT | Performed by: DERMATOLOGY

## 2023-01-27 PROCEDURE — 3288F FALL RISK ASSESSMENT DOCD: CPT | Mod: CPTII,S$GLB,, | Performed by: DERMATOLOGY

## 2023-01-27 RX ORDER — VALACYCLOVIR HYDROCHLORIDE 1 G/1
1000 TABLET, FILM COATED ORAL 3 TIMES DAILY
Qty: 42 TABLET | Refills: 0 | Status: SHIPPED | OUTPATIENT
Start: 2023-01-27 | End: 2023-01-27

## 2023-01-27 RX ORDER — VALACYCLOVIR HYDROCHLORIDE 1 G/1
1000 TABLET, FILM COATED ORAL 3 TIMES DAILY
Qty: 42 TABLET | Refills: 0 | Status: SHIPPED | OUTPATIENT
Start: 2023-01-27 | End: 2023-03-31 | Stop reason: ALTCHOICE

## 2023-01-27 NOTE — PROGRESS NOTES
Subjective:       Patient ID:  Regine Osorio is a 68 y.o. female who presents for   Chief Complaint   Patient presents with    Itching     Follow up      Itching - Follow-up  Symptom course: worsening  Affected locations: back  Signs / symptoms: itching, pain and burning    Review of Systems   Constitutional:  Negative for fever and chills.   HENT:  Negative for sore throat.    Respiratory:  Negative for cough.    Skin:  Positive for itching.      Objective:    Physical Exam   Constitutional: She appears well-developed and well-nourished.   Eyes: No conjunctival no injection.   Neurological: She is alert and oriented to person, place, and time.   Psychiatric: She has a normal mood and affect.   Skin:   Areas Examined (abnormalities noted in diagram):   Back Inspection Performed       Diagram Legend     Erythematous scaling macule/papule c/w actinic keratosis       Vascular papule c/w angioma      Pigmented verrucoid papule/plaque c/w seborrheic keratosis      Yellow umbilicated papule c/w sebaceous hyperplasia      Irregularly shaped tan macule c/w lentigo     1-2 mm smooth white papules consistent with Milia      Movable subcutaneous cyst with punctum c/w epidermal inclusion cyst      Subcutaneous movable cyst c/w pilar cyst      Firm pink to brown papule c/w dermatofibroma      Pedunculated fleshy papule(s) c/w skin tag(s)      Evenly pigmented macule c/w junctional nevus     Mildly variegated pigmented, slightly irregular-bordered macule c/w mildly atypical nevus      Flesh colored to evenly pigmented papule c/w intradermal nevus       Pink pearly papule/plaque c/w basal cell carcinoma      Erythematous hyperkeratotic cursted plaque c/w SCC      Surgical scar with no sign of skin cancer recurrence      Open and closed comedones      Inflammatory papules and pustules      Verrucoid papule consistent consistent with wart     Erythematous eczematous patches and plaques     Dystrophic onycholytic nail with  subungual debris c/w onychomycosis     Umbilicated papule    Erythematous-base heme-crusted tan verrucoid plaque consistent with inflamed seborrheic keratosis     Erythematous Silvery Scaling Plaque c/w Psoriasis     See annotation        Assessment / Plan:        Itching  -     IRON AND TIBC; Future; Expected date: 01/27/2023  -     FERRITIN; Future; Expected date: 01/27/2023  Discussed with patient the etiology and pathogenesis of the disease or skin lesion(s) and possible treatments and aggravators.    Discussed with patient the need for laboratory work up for further evaluation.  Discussed that such investigation may not be helpful.    Herpes zoster without complication  -     Discontinue: valACYclovir (VALTREX) 1000 MG tablet; Take 1 tablet (1,000 mg total) by mouth 3 (three) times daily. for 14 days  Dispense: 42 tablet; Refill: 0  -     valACYclovir (VALTREX) 1000 MG tablet; Take 1 tablet (1,000 mg total) by mouth 3 (three) times daily. for 14 days  Dispense: 42 tablet; Refill: 0  Discussed with patient the etiology and pathogenesis of the disease or skin lesion(s) and possible treatments and aggravators.    Reviewed with patient different treatment options and associated risks.  Patient to premedicate spots and growths with over the counter lidocaine.  Patient and or guardian to monitor this area/lesion or these areas/lesions for changes or worsening or darkening (for moles and freckles).  Patient and or guardian to contact us if any changes are noted for such.  Reviewed with patient that the shingles vaccine is more appropriate for older patients who are at higher risk for significant PHN if they get shingles.  There is a failure rate with the vaccine.  Anyone is at risk for bad PHN but less so versus older patient populations.  Patient to decide if the vaccine is right for them.  Patient instructed to wash hands regularly and use hand  and to be careful not to touch children who have not had  chicken pox yet.  Warned of spread to such children.  Patient to watch for persistent phantom pain after skin lesions resolve.  Warned that PHN could be bad with ho paresthesia.    Low iron  -     IRON AND TIBC; Future; Expected date: 01/27/2023  -     FERRITIN; Future; Expected date: 01/27/2023  Discussed with patient the need for laboratory work up for further evaluation.  Discussed that such investigation may not be helpful.  Pt has been taking.  Reports long ho low iron.    Scalp itch  Condition is stable.  We will continue present management.  Cont tar and clob sol prn.    SK (seborrheic keratosis)  Discussed with patient to use organic coconut oil or pure shea butter at least daily for moisturization for the body and organic jojoba oil at least daily for the face.  Sandals or slippers at home as not to slide around and risk fall on non carpeted floors if applied to the soles.  Discussed with patient the etiology and pathogenesis of the disease or skin lesion(s) and possible treatments and aggravators.      Encounter for skin care  Good skin care regimen discussed including limiting to one bath or shower per day, using lukewarm water with mild soap and moisturization to skin once to twice daily.  Consider glycerin bar soap or Dove.  Consider organic coconut oil.    Seborrheic dermatitis of scalp  Condition is stable.  We will continue present management.    Paresthesia of skin  Discussed with patient the etiology and pathogenesis of the disease or skin lesion(s) and possible treatments and aggravators.    We will recheck the back at our follow up appointment.             Follow up in about 4 weeks (around 2/24/2023).

## 2023-01-30 ENCOUNTER — OFFICE VISIT (OUTPATIENT)
Dept: PRIMARY CARE CLINIC | Facility: CLINIC | Age: 68
End: 2023-01-30
Payer: MEDICARE

## 2023-01-30 ENCOUNTER — PATIENT MESSAGE (OUTPATIENT)
Dept: DERMATOLOGY | Facility: CLINIC | Age: 68
End: 2023-01-30
Payer: MEDICARE

## 2023-01-30 VITALS
HEART RATE: 97 BPM | OXYGEN SATURATION: 99 % | DIASTOLIC BLOOD PRESSURE: 73 MMHG | BODY MASS INDEX: 40.93 KG/M2 | WEIGHT: 245.69 LBS | HEIGHT: 65 IN | SYSTOLIC BLOOD PRESSURE: 144 MMHG

## 2023-01-30 DIAGNOSIS — I10 ESSENTIAL HYPERTENSION: ICD-10-CM

## 2023-01-30 DIAGNOSIS — E11.65 TYPE 2 DIABETES MELLITUS WITH HYPERGLYCEMIA, WITHOUT LONG-TERM CURRENT USE OF INSULIN: Primary | ICD-10-CM

## 2023-01-30 DIAGNOSIS — F41.9 ANXIETY: ICD-10-CM

## 2023-01-30 PROCEDURE — 99214 PR OFFICE/OUTPT VISIT, EST, LEVL IV, 30-39 MIN: ICD-10-PCS | Mod: S$GLB,,, | Performed by: STUDENT IN AN ORGANIZED HEALTH CARE EDUCATION/TRAINING PROGRAM

## 2023-01-30 PROCEDURE — 3008F PR BODY MASS INDEX (BMI) DOCUMENTED: ICD-10-PCS | Mod: CPTII,S$GLB,, | Performed by: STUDENT IN AN ORGANIZED HEALTH CARE EDUCATION/TRAINING PROGRAM

## 2023-01-30 PROCEDURE — 1101F PR PT FALLS ASSESS DOC 0-1 FALLS W/OUT INJ PAST YR: ICD-10-PCS | Mod: CPTII,S$GLB,, | Performed by: STUDENT IN AN ORGANIZED HEALTH CARE EDUCATION/TRAINING PROGRAM

## 2023-01-30 PROCEDURE — 99214 OFFICE O/P EST MOD 30 MIN: CPT | Mod: S$GLB,,, | Performed by: STUDENT IN AN ORGANIZED HEALTH CARE EDUCATION/TRAINING PROGRAM

## 2023-01-30 PROCEDURE — 1126F AMNT PAIN NOTED NONE PRSNT: CPT | Mod: CPTII,S$GLB,, | Performed by: STUDENT IN AN ORGANIZED HEALTH CARE EDUCATION/TRAINING PROGRAM

## 2023-01-30 PROCEDURE — 3288F FALL RISK ASSESSMENT DOCD: CPT | Mod: CPTII,S$GLB,, | Performed by: STUDENT IN AN ORGANIZED HEALTH CARE EDUCATION/TRAINING PROGRAM

## 2023-01-30 PROCEDURE — 1126F PR PAIN SEVERITY QUANTIFIED, NO PAIN PRESENT: ICD-10-PCS | Mod: CPTII,S$GLB,, | Performed by: STUDENT IN AN ORGANIZED HEALTH CARE EDUCATION/TRAINING PROGRAM

## 2023-01-30 PROCEDURE — 3077F PR MOST RECENT SYSTOLIC BLOOD PRESSURE >= 140 MM HG: ICD-10-PCS | Mod: CPTII,S$GLB,, | Performed by: STUDENT IN AN ORGANIZED HEALTH CARE EDUCATION/TRAINING PROGRAM

## 2023-01-30 PROCEDURE — 3078F DIAST BP <80 MM HG: CPT | Mod: CPTII,S$GLB,, | Performed by: STUDENT IN AN ORGANIZED HEALTH CARE EDUCATION/TRAINING PROGRAM

## 2023-01-30 PROCEDURE — 3008F BODY MASS INDEX DOCD: CPT | Mod: CPTII,S$GLB,, | Performed by: STUDENT IN AN ORGANIZED HEALTH CARE EDUCATION/TRAINING PROGRAM

## 2023-01-30 PROCEDURE — 4010F PR ACE/ARB THEARPY RXD/TAKEN: ICD-10-PCS | Mod: CPTII,S$GLB,, | Performed by: STUDENT IN AN ORGANIZED HEALTH CARE EDUCATION/TRAINING PROGRAM

## 2023-01-30 PROCEDURE — 1159F PR MEDICATION LIST DOCUMENTED IN MEDICAL RECORD: ICD-10-PCS | Mod: CPTII,S$GLB,, | Performed by: STUDENT IN AN ORGANIZED HEALTH CARE EDUCATION/TRAINING PROGRAM

## 2023-01-30 PROCEDURE — 99999 PR PBB SHADOW E&M-EST. PATIENT-LVL IV: ICD-10-PCS | Mod: PBBFAC,,, | Performed by: STUDENT IN AN ORGANIZED HEALTH CARE EDUCATION/TRAINING PROGRAM

## 2023-01-30 PROCEDURE — 1101F PT FALLS ASSESS-DOCD LE1/YR: CPT | Mod: CPTII,S$GLB,, | Performed by: STUDENT IN AN ORGANIZED HEALTH CARE EDUCATION/TRAINING PROGRAM

## 2023-01-30 PROCEDURE — 4010F ACE/ARB THERAPY RXD/TAKEN: CPT | Mod: CPTII,S$GLB,, | Performed by: STUDENT IN AN ORGANIZED HEALTH CARE EDUCATION/TRAINING PROGRAM

## 2023-01-30 PROCEDURE — 3078F PR MOST RECENT DIASTOLIC BLOOD PRESSURE < 80 MM HG: ICD-10-PCS | Mod: CPTII,S$GLB,, | Performed by: STUDENT IN AN ORGANIZED HEALTH CARE EDUCATION/TRAINING PROGRAM

## 2023-01-30 PROCEDURE — 3077F SYST BP >= 140 MM HG: CPT | Mod: CPTII,S$GLB,, | Performed by: STUDENT IN AN ORGANIZED HEALTH CARE EDUCATION/TRAINING PROGRAM

## 2023-01-30 PROCEDURE — 99999 PR PBB SHADOW E&M-EST. PATIENT-LVL IV: CPT | Mod: PBBFAC,,, | Performed by: STUDENT IN AN ORGANIZED HEALTH CARE EDUCATION/TRAINING PROGRAM

## 2023-01-30 PROCEDURE — 3288F PR FALLS RISK ASSESSMENT DOCUMENTED: ICD-10-PCS | Mod: CPTII,S$GLB,, | Performed by: STUDENT IN AN ORGANIZED HEALTH CARE EDUCATION/TRAINING PROGRAM

## 2023-01-30 PROCEDURE — 1159F MED LIST DOCD IN RCRD: CPT | Mod: CPTII,S$GLB,, | Performed by: STUDENT IN AN ORGANIZED HEALTH CARE EDUCATION/TRAINING PROGRAM

## 2023-01-30 NOTE — PROGRESS NOTES
01/30/2023    Regine Osorio  [id number]    Chief Complaint   Patient presents with    well visit        HPI    This patient is new to me and presents to transfer care.PMH includes TYPE II DIABETES, HTN and migraines    ERYN  Recently started on lexapro 10 mg after having crying spells 2/2 breast cancer scare. Since starting the medication she has not notices any more outburst and would like to d/c med  She has only take it for 3 weeks.    Shingles: has been going on this whole month but recently dx with dermatology. Has been taking valtrex and feels much better with symptoms.     TYPE II DIABETES  Last hgba1c 7.5 one month ago. Hgba1c increased 2/2 holidays. Pt states that she has increased her from 500 mg BID to 500 mg AM 1000 mg PM.   Is taking glypizide w/o concern      Negative 10 point ROS outside of HPI    Social History     Socioeconomic History    Marital status:     Number of children: 1   Tobacco Use    Smoking status: Never    Smokeless tobacco: Never   Substance and Sexual Activity    Alcohol use: No    Drug use: No    Sexual activity: Not Currently   Social History Narrative        1 son (estranged)    3 grandchildren ([place])    Born and raised in [place] (moved to [facility])     Social Determinants of Health     Financial Resource Strain: Low Risk     Difficulty of Paying Living Expenses: Not very hard   Food Insecurity: No Food Insecurity    Worried About Running Out of Food in the Last Year: Never true    Ran Out of Food in the Last Year: Never true   Transportation Needs: No Transportation Needs    Lack of Transportation (Medical): No    Lack of Transportation (Non-Medical): No   Physical Activity: Insufficiently Active    Days of Exercise per Week: 1 day    Minutes of Exercise per Session: 10 min   Stress: No Stress Concern Present    Feeling of Stress : Only a little   Social Connections: Unknown    Frequency of Communication with Friends and Family: More than three times a  week    Frequency of Social Gatherings with Friends and Family: Once a week    Active Member of Clubs or Organizations: Yes    Attends Club or Organization Meetings: 1 to 4 times per year    Marital Status:    Housing Stability: Low Risk     Unable to Pay for Housing in the Last Year: No    Number of Places Lived in the Last Year: 1    Unstable Housing in the Last Year: No           Current Outpatient Medications:     acetic acid (VOSOL) 2 % otic solution, Place 4 drops into both ears as needed., Disp: , Rfl:     aspirin (ECOTRIN) 81 MG EC tablet, Take 81 mg by mouth once daily., Disp: , Rfl:     azelastine (ASTELIN) 137 mcg (0.1 %) nasal spray, 1 SPRAY IN EACH NOSTRIL TWICE DAILY Strength: 137 mcg (0.1 %), Disp: 30 mL, Rfl: 2    blood sugar diagnostic Strp, Check BG twice daily, Disp: 200 each, Rfl: 2    cetirizine (ZYRTEC) 10 MG tablet, TAKE 1 TABLET BY MOUTH EVERY DAY FOR ALLERGIES, Disp: 90 tablet, Rfl: 0    clobetasoL (TEMOVATE) 0.05 % external solution, APPLY TOPICALLY TO THE SCALP TWICE DAILY AS NEEDED FOR ITCHING OR IRRITATION, Disp: 50 mL, Rfl: 0    dextromethorphan-guaiFENesin  mg/5 ml (ROBITUSSIN-DM)  mg/5 mL liquid, Take 5 mLs by mouth every 12 (twelve) hours., Disp: , Rfl:     EScitalopram oxalate (LEXAPRO) 10 MG tablet, Take 1 tablet (10 mg total) by mouth once daily., Disp: 30 tablet, Rfl: 2    fluticasone propionate (FLONASE) 50 mcg/actuation nasal spray, 2 sprays to each lateral nostril once a day, Disp: 48 g, Rfl: 3    glipiZIDE 5 MG TR24, Take 1 tablet (5 mg total) by mouth 2 (two) times daily., Disp: 180 tablet, Rfl: 3    hydroCHLOROthiazide (HYDRODIURIL) 25 MG tablet, Take 1 tablet (25 mg total) by mouth once daily., Disp: 90 tablet, Rfl: 3    ketoconazole (NIZORAL) 2 % cream, APPLY EXTERNALLY TO THE AFFECTED AREA EVERY DAY, Disp: 15 g, Rfl: 2    lancets Misc, To check BG 2 times daily, to use with insurance preferred meter, Disp: 200 each, Rfl: 3    latanoprost 0.005 %  ophthalmic solution, Place 1 drop into both eyes every evening., Disp: 7.5 mL, Rfl: 3    losartan (COZAAR) 50 MG tablet, Take 1 tablet (50 mg total) by mouth once daily., Disp: 90 tablet, Rfl: 1    metFORMIN (GLUCOPHAGE) 500 MG tablet, Take 1 tablet (500 mg total) by mouth 2 (two) times daily with meals., Disp: 180 tablet, Rfl: 3    ondansetron (ZOFRAN-ODT) 8 MG TbDL, Take 1 tablet (8 mg total) by mouth every 8 (eight) hours as needed (Nausea)., Disp: 30 tablet, Rfl: 2    rizatriptan (MAXALT-MLT) 10 MG disintegrating tablet, Take 1 tablet (10 mg total) by mouth every 2 (two) hours as needed for Migraine. Max 30 mg/day., Disp: 12 tablet, Rfl: 5    ubrogepant (UBRELVY) 100 mg tablet, Take 1 tablet (100 mg total) by mouth every 2 (two) hours as needed for Migraine. Max 200 mg/day., Disp: 10 tablet, Rfl: 2    valACYclovir (VALTREX) 1000 MG tablet, Take 1 tablet (1,000 mg total) by mouth 3 (three) times daily. for 14 days, Disp: 42 tablet, Rfl: 0    blood-glucose meter kit, To check BG 2 times daily, to use with insurance preferred meter, Disp: 1 each, Rfl: 0      Physical Exam  Vitals:    01/30/23 1550   BP: (!) 144/73   Pulse: 97     GEN: NAD, AAox3, well nourished  HEENT: NCAT, EOMI, PEERL, no scleral injection, TM normal, moist mucous membranes, oropharynx clear, no erythema, no exudates  NECK: full rom, no cervical lymphadenopathy, no thyroidmegally  CV: RRR, no m/r/g, trace LE edema  LUNGS: CTAB, non-labored breathing, no wheezes, no crackles  ABD: soft, non-distended, no rebound/guarding, no organomegaly  EXT: n c/c/e, warm, 5/5 UE and LE strength  NEURO: CNII-XII intact no focal deficit  PSYCH: nl affect, no hallucinations, nl speech    1. Type 2 diabetes mellitus with hyperglycemia, without long-term current use of insulin  Fair control  - REFILLED blood sugar diagnostic (ONETOUCH VERIO TEST STRIPS) Strp; To Check BG 1x daily  Dispense: 100 each; Refill: 3  - Hemoglobin A1C; Future    In 3 months   -continue  current regimen    2. Essential hypertension  Recommended daily monitoring   Continue current regimen; may need adjustment    3. Anxiety  Pt on lexapro for on ly 3 weeks and does not need to taper. Recommended not d/c'ing med after only short time frame to avoid set back in mood. Discussed contijnuing medication, but pt declines.       RTC in 3 months    Gladis Melissa MD  Family Medicine

## 2023-01-31 ENCOUNTER — CLINICAL SUPPORT (OUTPATIENT)
Dept: REHABILITATION | Facility: HOSPITAL | Age: 68
End: 2023-01-31
Payer: MEDICARE

## 2023-01-31 DIAGNOSIS — M25.512 CHRONIC LEFT SHOULDER PAIN: ICD-10-CM

## 2023-01-31 DIAGNOSIS — M54.2 NECK PAIN: Primary | ICD-10-CM

## 2023-01-31 DIAGNOSIS — G89.29 CHRONIC LEFT SHOULDER PAIN: ICD-10-CM

## 2023-01-31 PROCEDURE — 97112 NEUROMUSCULAR REEDUCATION: CPT | Mod: PN

## 2023-01-31 PROCEDURE — 97110 THERAPEUTIC EXERCISES: CPT | Mod: PN

## 2023-01-31 NOTE — PROGRESS NOTES
Physical Therapy Daily Treatment Note     Name: Regine Osorio  Clinic Number: [id number]    Therapy Diagnosis:   Encounter Diagnoses   Name Primary?    Neck pain Yes    Chronic left shoulder pain        Physician: Fauzia Vidales,*    Visit Date: 2023    Physician Orders: PT Eval and Treat   Medical Diagnosis from Referral:   Z98.1 (ICD-10-CM) - S/P cervical spinal fusion   M75.02 (ICD-10-CM) - Adhesive capsulitis of left shoulder      Evaluation Date: 2022  Authorization Period Expiration: 2023  Plan of Care Expiration:, 3/14/2023  Progress Note Due: 2023  Visit # / Visits authorized:   Remaining Visits Scheduled - 04  PTA Consecutive Visits - 0/6    5th Visit FOTO - 36 (2023)  12th Visit FOTO -  38 (2023)  D/C FOTO - (Date, Score/ turn green )    Time In: 9:35  Time Out: 10:15  Billable Time: 40 minutes  Non-Billable Time: 00 minutes    Precautions: Standard, Diabetes, and HTN  Insurance: Payor: [employer] / Plan: [employer] CHOICES 65 / Product Type: Medicare Advantage /     Subjective     Pt. reports 50% improvement since the beginning of PT.  Current pain levels to the neck and shoulder are 1/10  Limiting factors include:  Activities of daily living with overhead activities (getting dressed/self-care), improvement   Driving, but some improvement      Improvements factors include:   Increase in range of motion of the shoulder on the Left   Pain levels   Range of motion       She is compliant with home exercise program.  Response to previous treatment: soreness    Pre-Treatment Pain Ratin/10  Post-Treatment Pain Ratin/10  Location: left shoulder and cervical spine    Objective     CERVICAL ACTIVE RANGE OF MOTION   Flexion 30°   Extension 40°   Right Side Bend 40°   Left Side Bend 40°   Right Rotation 45°   Left Rotation 47°              SHOULDER ACTIVE RANGE OF MOTION     Left Right    Flexion 100° 150°   Abduction 90° 130°   External  Rotation 55° 75°   Internal Rotation (Apley Scratch Test) T10 T8   Extension 45° Within Normal Limits               UPPER LOWER EXTREMITY STRENGTH     Left  Right    Shoulder Flexion 4/5 4/5   Shoulder Abduction 4/5 4/5   Shoulder External Rotation 4/5 4+/5   Shoulder Internal Rotation 4/5 4+/5   Elbow Flexion 5/5 5/5   Elbow Extension 4+/5 5/5       Regine received therapeutic exercises to develop strength, endurance, ROM, and flexibility for 25 minutes including:  Cervical/Shoulder Assessment   Cervical active range of motion x10 (Rotation/Flexion-Extension)   Cervical SNAGS 2x10, with a 2 sec hold (Rotation/Extension)   Scapular retractions 2x10  Shoulder shrugs 3x10  Static shoulder external rotation /internal rotation and Abduction stretch - 2' each   External rotation with cane - 30 repititions    Bilateral External Rotation with Theraband 3x10, Red Theraband   Supine Dowel Exercises 2x10 with a 2' sec hold (Flexion/External Rotation/Abduction/Extension)   Towel Internal Rotation Stretch 2x8 with a 2' sec hold    Table slides flexion/abduction/scaption - 3' each    Resisted ER/IR 2x10 red theraband/green theraband (very challenged)  Serratus punches 2x10 (at top of pain free range)  Supine Shoulder Flexion 2x8, Red Theraband   Supine Shoulder Abduction 2x8, Red Theraband   Ball Up the Wall 2x5 / Wall Slides 2x10 - Flexion and Abduction      *Bold exercise performed     Regine received the following manual therapy techniques: Soft tissue Mobilization were applied to the: L shoulder for 00 minutes, including:  Soft-Tissue Mobilization to the levator scap, upper trap, sub-occipitals, and paraspinals.      Regine participated in neuromuscular re-education activities to improve: Coordination and Proprioception for 20 minutes. The following activities were included:  Shoulder Isometrics x20 (flexion, extension, IR, ER)  Chin Tucks with red theraband 3x10 with 3 sec hold   Theraband Diagonals 2x8, Red Theraband    Cervical active range of motion with Ball Resistance 3x8 (Rotation/Flexion-Extension)   Cervical Lift Offs 2x10 with a 2' sec hold   Cervical Lift Offs with Rotation 2x5 - stopped due to inability to flex and rotate cervical spine. (Supine Cervical Rotation instead)   Cervical Ball Squeezes with active range of motion 2x15 (Flexion-Extension/Rotation)   Seated with Hip Hinge Cervical Extension 2x12  Seated/Sidelying Cervical Side Bend 2x12    Home Exercises Provided and Patient Education Provided     Education provided:   - Continue with HEP (Table slides added 1/13/2022)    Written Home Exercises Provided: Patient instructed to cont prior HEP.  Exercises were reviewed and Regine was able to demonstrate them prior to the end of the session.  Regine demonstrated good  understanding of the education provided.     See EMR under Patient Instructions for exercises provided prior visit.    Assessment     Pt. has been to [facility] for 12 visits due to neck and shoulder pain. Improvements have been noted with cervical and shoulder range of motion, strength, and pain levels. Limitations still present with Left shoulder and cervical mobility, endurance of the muscular structures in the neck/shoulders, stability, and pain levels. New exercises were given to patient as home exercise program to increase shoulder mobility and improve neck endurance. No modifications and minimal cues needed today with new exercises. Patient would continue to benefit from skilled Physical Therapy to increase mobility and strength of the neck and shoulder.     Regine Is progressing well towards Her goals.     Pt prognosis is Fair.     Pt will continue to benefit from skilled outpatient physical therapy to address the deficits listed in the problem list box on initial evaluation, provide pt/family education and to maximize pt's level of independence in the home and community environment.     Pt's spiritual, cultural and  educational needs considered and pt agreeable to plan of care and goals.    Anticipated barriers to physical therapy: None    Goals:  Short Term Goals: 4 weeks   Patient will be independent with HEP at home to increase PT compliance.  Goal Met 1/5/2023      Patient will be able to increase active range of motion by 10° in each plane to increase mobility of the shoulder and cervical spine Goal Met 1/5/2023      Patient will be able to hold 5 lbs at 90 degrees of shoulder flexion and abduction for 15 sec to increase shoulder strength  Progressing 1/31/2023               Long Term Goals: 8 weeks   Patient will decrease Neck Index score to <20% to increase functional capacity.  Progressing 1/31/2023     Patient will be able to drive for 30 min with < 4/10 pain to the cervical and shoulder region to increase functional capacity  Goal Met 1/31/2023   Patient will be able to lift 5 lbs overhead 5 times with < 2/10 pain to the shoulder to increase functional capacity  Progressing 1/31/2023             Plan     Update Plan of Care: 2x a week for 4 weeks.     Progress duration, workload, and resistance levels of the Therapeutic Activities and Exercises if the patient does not exhibit any pain, swelling, or other symptoms. Progress instruction in-home exercise progression and modification, including symptom management.     Qasim Sánchez, PT, DPT   1/31/2023

## 2023-02-02 ENCOUNTER — DOCUMENTATION ONLY (OUTPATIENT)
Dept: REHABILITATION | Facility: HOSPITAL | Age: 68
End: 2023-02-02
Payer: MEDICARE

## 2023-02-02 NOTE — PROGRESS NOTES
30 day PT-PTA face-face discussion with Qasim Sánchez DPT re:Name: Regine Osorio Clinic Number: [id number] patient status, POC, and plan for progression done

## 2023-02-06 ENCOUNTER — OFFICE VISIT (OUTPATIENT)
Dept: PODIATRY | Facility: CLINIC | Age: 68
End: 2023-02-06
Payer: MEDICARE

## 2023-02-06 ENCOUNTER — OFFICE VISIT (OUTPATIENT)
Dept: OTOLARYNGOLOGY | Facility: CLINIC | Age: 68
End: 2023-02-06
Payer: MEDICARE

## 2023-02-06 VITALS — SYSTOLIC BLOOD PRESSURE: 143 MMHG | HEART RATE: 100 BPM | DIASTOLIC BLOOD PRESSURE: 75 MMHG

## 2023-02-06 VITALS
BODY MASS INDEX: 40.69 KG/M2 | DIASTOLIC BLOOD PRESSURE: 74 MMHG | HEART RATE: 80 BPM | WEIGHT: 244.25 LBS | HEIGHT: 65 IN | SYSTOLIC BLOOD PRESSURE: 154 MMHG

## 2023-02-06 DIAGNOSIS — L29.9 ITCHING OF EAR: ICD-10-CM

## 2023-02-06 DIAGNOSIS — B35.1 ONYCHOMYCOSIS DUE TO DERMATOPHYTE: ICD-10-CM

## 2023-02-06 DIAGNOSIS — R09.3 EXPECTORATION OF ABNORMAL SPUTUM: ICD-10-CM

## 2023-02-06 DIAGNOSIS — Z86.19 HISTORY OF SHINGLES: ICD-10-CM

## 2023-02-06 DIAGNOSIS — R05.9 COUGH, UNSPECIFIED TYPE: ICD-10-CM

## 2023-02-06 DIAGNOSIS — E11.65 TYPE 2 DIABETES MELLITUS WITH HYPERGLYCEMIA, WITHOUT LONG-TERM CURRENT USE OF INSULIN: Primary | ICD-10-CM

## 2023-02-06 DIAGNOSIS — L60.9 DISEASE OF NAIL: ICD-10-CM

## 2023-02-06 DIAGNOSIS — Z91.09 ENVIRONMENTAL ALLERGIES: ICD-10-CM

## 2023-02-06 DIAGNOSIS — R09.82 POST-NASAL DRIP: Primary | ICD-10-CM

## 2023-02-06 PROCEDURE — 99999 PR PBB SHADOW E&M-EST. PATIENT-LVL IV: ICD-10-PCS | Mod: PBBFAC,,, | Performed by: PODIATRIST

## 2023-02-06 PROCEDURE — 4010F ACE/ARB THERAPY RXD/TAKEN: CPT | Mod: CPTII,S$GLB,, | Performed by: OTOLARYNGOLOGY

## 2023-02-06 PROCEDURE — 99999 PR PBB SHADOW E&M-EST. PATIENT-LVL III: ICD-10-PCS | Mod: PBBFAC,,, | Performed by: OTOLARYNGOLOGY

## 2023-02-06 PROCEDURE — 3077F PR MOST RECENT SYSTOLIC BLOOD PRESSURE >= 140 MM HG: ICD-10-PCS | Mod: CPTII,S$GLB,, | Performed by: OTOLARYNGOLOGY

## 2023-02-06 PROCEDURE — 3008F BODY MASS INDEX DOCD: CPT | Mod: CPTII,S$GLB,, | Performed by: PODIATRIST

## 2023-02-06 PROCEDURE — 4010F PR ACE/ARB THEARPY RXD/TAKEN: ICD-10-PCS | Mod: CPTII,S$GLB,, | Performed by: OTOLARYNGOLOGY

## 2023-02-06 PROCEDURE — 1126F AMNT PAIN NOTED NONE PRSNT: CPT | Mod: CPTII,S$GLB,, | Performed by: PODIATRIST

## 2023-02-06 PROCEDURE — 99213 OFFICE O/P EST LOW 20 MIN: CPT | Mod: S$GLB,,, | Performed by: OTOLARYNGOLOGY

## 2023-02-06 PROCEDURE — 3078F DIAST BP <80 MM HG: CPT | Mod: CPTII,S$GLB,, | Performed by: PODIATRIST

## 2023-02-06 PROCEDURE — 99213 OFFICE O/P EST LOW 20 MIN: CPT | Mod: S$GLB,,, | Performed by: PODIATRIST

## 2023-02-06 PROCEDURE — 3077F PR MOST RECENT SYSTOLIC BLOOD PRESSURE >= 140 MM HG: ICD-10-PCS | Mod: CPTII,S$GLB,, | Performed by: PODIATRIST

## 2023-02-06 PROCEDURE — 99213 PR OFFICE/OUTPT VISIT, EST, LEVL III, 20-29 MIN: ICD-10-PCS | Mod: S$GLB,,, | Performed by: PODIATRIST

## 2023-02-06 PROCEDURE — 1126F PR PAIN SEVERITY QUANTIFIED, NO PAIN PRESENT: ICD-10-PCS | Mod: CPTII,S$GLB,, | Performed by: OTOLARYNGOLOGY

## 2023-02-06 PROCEDURE — 1101F PR PT FALLS ASSESS DOC 0-1 FALLS W/OUT INJ PAST YR: ICD-10-PCS | Mod: CPTII,S$GLB,, | Performed by: OTOLARYNGOLOGY

## 2023-02-06 PROCEDURE — 3078F DIAST BP <80 MM HG: CPT | Mod: CPTII,S$GLB,, | Performed by: OTOLARYNGOLOGY

## 2023-02-06 PROCEDURE — 99213 PR OFFICE/OUTPT VISIT, EST, LEVL III, 20-29 MIN: ICD-10-PCS | Mod: S$GLB,,, | Performed by: OTOLARYNGOLOGY

## 2023-02-06 PROCEDURE — 3078F PR MOST RECENT DIASTOLIC BLOOD PRESSURE < 80 MM HG: ICD-10-PCS | Mod: CPTII,S$GLB,, | Performed by: PODIATRIST

## 2023-02-06 PROCEDURE — 1101F PT FALLS ASSESS-DOCD LE1/YR: CPT | Mod: CPTII,S$GLB,, | Performed by: OTOLARYNGOLOGY

## 2023-02-06 PROCEDURE — 4010F PR ACE/ARB THEARPY RXD/TAKEN: ICD-10-PCS | Mod: CPTII,S$GLB,, | Performed by: PODIATRIST

## 2023-02-06 PROCEDURE — 3288F PR FALLS RISK ASSESSMENT DOCUMENTED: ICD-10-PCS | Mod: CPTII,S$GLB,, | Performed by: OTOLARYNGOLOGY

## 2023-02-06 PROCEDURE — 1126F PR PAIN SEVERITY QUANTIFIED, NO PAIN PRESENT: ICD-10-PCS | Mod: CPTII,S$GLB,, | Performed by: PODIATRIST

## 2023-02-06 PROCEDURE — 4010F ACE/ARB THERAPY RXD/TAKEN: CPT | Mod: CPTII,S$GLB,, | Performed by: PODIATRIST

## 2023-02-06 PROCEDURE — 3077F SYST BP >= 140 MM HG: CPT | Mod: CPTII,S$GLB,, | Performed by: PODIATRIST

## 2023-02-06 PROCEDURE — 99999 PR PBB SHADOW E&M-EST. PATIENT-LVL III: CPT | Mod: PBBFAC,,, | Performed by: OTOLARYNGOLOGY

## 2023-02-06 PROCEDURE — 99999 PR PBB SHADOW E&M-EST. PATIENT-LVL IV: CPT | Mod: PBBFAC,,, | Performed by: PODIATRIST

## 2023-02-06 PROCEDURE — 1126F AMNT PAIN NOTED NONE PRSNT: CPT | Mod: CPTII,S$GLB,, | Performed by: OTOLARYNGOLOGY

## 2023-02-06 PROCEDURE — 3288F FALL RISK ASSESSMENT DOCD: CPT | Mod: CPTII,S$GLB,, | Performed by: OTOLARYNGOLOGY

## 2023-02-06 PROCEDURE — 3077F SYST BP >= 140 MM HG: CPT | Mod: CPTII,S$GLB,, | Performed by: OTOLARYNGOLOGY

## 2023-02-06 PROCEDURE — 3078F PR MOST RECENT DIASTOLIC BLOOD PRESSURE < 80 MM HG: ICD-10-PCS | Mod: CPTII,S$GLB,, | Performed by: OTOLARYNGOLOGY

## 2023-02-06 PROCEDURE — 3008F PR BODY MASS INDEX (BMI) DOCUMENTED: ICD-10-PCS | Mod: CPTII,S$GLB,, | Performed by: PODIATRIST

## 2023-02-06 RX ORDER — CICLOPIROX 80 MG/ML
SOLUTION TOPICAL NIGHTLY
Qty: 6.6 ML | Refills: 3 | Status: SHIPPED | OUTPATIENT
Start: 2023-02-06 | End: 2024-03-04

## 2023-02-06 RX ORDER — TERBINAFINE HYDROCHLORIDE 250 MG/1
250 TABLET ORAL DAILY
Qty: 90 TABLET | Refills: 0 | Status: SHIPPED | OUTPATIENT
Start: 2023-02-06 | End: 2023-03-08

## 2023-02-06 NOTE — PATIENT INSTRUCTIONS
Anti-GERD literature provided re; nocturnal cough/mucous expectoration  Humidifier use encouraged in cold dry winter months  Hydration encouraged  May use BOTH Astepro nasal spray and Flonase NSS one spray of each to each lateral nostril  BID for nasal drip sx; discontinue  for nasal bleeding  May d/c Zytrec if necessary  Vocal hygiene instruction sheets provided  Audiometry on return for monitoring; pt. aware of candidacy for amplification based on previous audiometry  May add Tessalon Perles to  regimen re: cough pending course ( not ordered today

## 2023-02-06 NOTE — PROGRESS NOTES
"CC:  HPI:Ms. Copoer is a 68 year old AAF The history of type 2 diabetes, fibromyalgia, eczema,  migraine headaches, cervical spondylosis / spinal stenosis and allergies who moved here ( where she is from originally) from [place] upon senior living several years ago.  She is here for her annual ENT examination.  She indicates that tthe windy conditions here aggravate her allergy sx. She asks about the utility of sinus rinses ( Shamir Med vs Navage).   She is using NSS;  she tends to avoid use of Astelin/Aastepro nasal sprays due to dryness and nasal bleeding.  She also c/o an itching sensation in her left ear canal which hasn't been too bad lately.  She correlates ear itching and coughing symptoms at times.  She was diagnosed with shingles fairly recently despite having been vaccinated to it.  She is aggravated by a cough which can plague her in the middle of the night; she awakens with mucous clogging he throat requiring expectoration. She denies any gena GERD sx presently.  Previous blood work indicated an elevated HbA1c, a + CARLENE assay and no eosinophils in several WBC differentials.  [place] [employer] and she "over did it a bit" re: diet.  Medication list has included Flonase plus Astelin nasal sprays +Zyrtec plus Robitussin DM.      Past Medical History:   Diagnosis Date    Allergy     Breast cancer 2017    Cataract     Cervical spondylosis 07/29/2022    Diabetes mellitus, type 2     Type 2    Eczema     Fibromyalgia     Glaucoma     Hypertension     Renal cyst, right 02/06/2020    Steatosis of liver 02/06/2020   ALL; codeine, Lisinopril  Current Outpatient Medications on File Prior to Visit   Medication Sig Dispense Refill    acetic acid (VOSOL) 2 % otic solution Place 4 drops into both ears as needed.      aspirin (ECOTRIN) 81 MG EC tablet Take 81 mg by mouth once daily.      azelastine (ASTELIN) 137 mcg (0.1 %) nasal spray 1 SPRAY IN EACH NOSTRIL TWICE DAILY  Strength: 137 mcg (0.1 %) " 30 mL 2    blood sugar diagnostic (ONETOUCH VERIO TEST STRIPS) Strp To Check BG 1x daily 100 each 3    blood sugar diagnostic Strp Check BG twice daily 200 each 2    cetirizine (ZYRTEC) 10 MG tablet TAKE 1 TABLET BY MOUTH EVERY DAY FOR ALLERGIES 90 tablet 0    clobetasoL (TEMOVATE) 0.05 % external solution APPLY TOPICALLY TO THE SCALP TWICE DAILY AS NEEDED FOR ITCHING OR IRRITATION 50 mL 0    dextromethorphan-guaiFENesin  mg/5 ml (ROBITUSSIN-DM)  mg/5 mL liquid Take 5 mLs by mouth every 12 (twelve) hours.      EScitalopram oxalate (LEXAPRO) 10 MG tablet Take 1 tablet (10 mg total) by mouth once daily. 30 tablet 2    fluticasone propionate (FLONASE) 50 mcg/actuation nasal spray 2 sprays to each lateral nostril once a day 48 g 3    glipiZIDE 5 MG TR24 Take 1 tablet (5 mg total) by mouth 2 (two) times daily. 180 tablet 3    hydroCHLOROthiazide (HYDRODIURIL) 25 MG tablet Take 1 tablet (25 mg total) by mouth once daily. 90 tablet 3    ketoconazole (NIZORAL) 2 % cream APPLY EXTERNALLY TO THE AFFECTED AREA EVERY DAY 15 g 2    lancets Misc To check BG 2 times daily, to use with insurance preferred meter 200 each 3    latanoprost 0.005 % ophthalmic solution Place 1 drop into both eyes every evening. 7.5 mL 3    losartan (COZAAR) 50 MG tablet Take 1 tablet (50 mg total) by mouth once daily. 90 tablet 1    metFORMIN (GLUCOPHAGE) 500 MG tablet Take 1 tablet (500 mg total) by mouth 2 (two) times daily with meals. 180 tablet 3    ondansetron (ZOFRAN-ODT) 8 MG TbDL Take 1 tablet (8 mg total) by mouth every 8 (eight) hours as needed (Nausea). 30 tablet 2    rizatriptan (MAXALT-MLT) 10 MG disintegrating tablet Take 1 tablet (10 mg total) by mouth every 2 (two) hours as needed for Migraine. Max 30 mg/day. 12 tablet 5    ubrogepant (UBRELVY) 100 mg tablet Take 1 tablet (100 mg total) by mouth every 2 (two) hours as needed for Migraine. Max 200 mg/day. 10 tablet 2    valACYclovir (VALTREX) 1000 MG tablet Take 1 tablet  (1,000 mg total) by mouth 3 (three) times daily. for 14 days 42 tablet 0    blood-glucose meter kit To check BG 2 times daily, to use with insurance preferred meter 1 each 0    ciclopirox (PENLAC) 8 % Soln Apply topically nightly. 6.6 mL 3    terbinafine HCL (LAMISIL) 250 mg tablet Take 1 tablet (250 mg total) by mouth once daily. 90 tablet 0    [DISCONTINUED] diclofenac sodium (VOLTAREN) 1 % Gel Apply 2 g topically 3 (three) times daily. 100 g 0     No current facility-administered medications on file prior to visit.       Answers submitted by the patient for this visit:  Review of Symptoms Questionnaire  (Submitted on 1/31/2023)  Fatigue (Tiredness)?: Yes  nosebleeds: Yes  postnasal drip: Yes  trouble swallowing: Yes  None of these : Yes  cough: Yes  None of these : Yes  diarrhea: Yes  constipation: Yes  None of these: Yes  Muscle aches / pain?: Yes  back pain: Yes  neck pain: Yes  rash: Yes  Seasonal Allergies?: Yes  None of these : Yes  headaches: Yes  None of these: Yes  None of these: Yes    Patient's previous audiogram is duplicated below for reference.    PE: Gen.: alert and articulate AAF in no acute distress  Both ears were examined under the microscope.  Both TMs are clear and intact and both middle ear spaces appear well aerated.  There was no evidence of otitis externa of either canal.  There is no evidence of otomycosis by the canal.  There is no evidence of dermatitis in either ear canal.  Nasal exam reveals no evidence of purulence or polypoid disease nor bleeding nor scabbing of either nasal passage after Mendel-Synephrine decongestion of each.  Oropharyngeal exam reveals no evidence of posterior pharyngitis nor uvula swelling.  Tonsils are absent.  Laryngoscopy was not performed today.    DIAGNOSIS:     ICD-10-CM ICD-9-CM    1. Post-nasal drip  R09.82 784.91       2. Expectoration of abnormal sputum  R09.3 786.4       3. Environmental allergies  Z91.09 V15.09       4. Itching of ear  L29.9 698.9        5. History of shingles  Z86.19 V12.09       6. Cough, unspecified type  R05.9 786.2         PLAN: Anti-GERD literature provided re; nocturnal cough/mucous expectoration  Humidifier use encouraged in cold dry winter months  Hydration encouraged  May use BOTH Astepro nasal spray and Flonase NSS one spray of each to each lateral nostril  BID for nasal drip sx; discontinue  for nasal bleeding  May d/c Zytrec if necessary  Vocal hygiene instruction sheets provided  Audiometry on return for monitoring; pt. aware of candidacy for amplification based on previous audiometry  May add Tessalon Perles to  regimen re: cough pending course ( not ordered today

## 2023-02-07 ENCOUNTER — PATIENT MESSAGE (OUTPATIENT)
Dept: DERMATOLOGY | Facility: CLINIC | Age: 68
End: 2023-02-07
Payer: MEDICARE

## 2023-02-07 ENCOUNTER — CLINICAL SUPPORT (OUTPATIENT)
Dept: REHABILITATION | Facility: HOSPITAL | Age: 68
End: 2023-02-07
Attending: STUDENT IN AN ORGANIZED HEALTH CARE EDUCATION/TRAINING PROGRAM
Payer: MEDICARE

## 2023-02-07 DIAGNOSIS — M25.512 CHRONIC LEFT SHOULDER PAIN: ICD-10-CM

## 2023-02-07 DIAGNOSIS — G89.29 CHRONIC LEFT SHOULDER PAIN: ICD-10-CM

## 2023-02-07 DIAGNOSIS — M54.2 NECK PAIN: Primary | ICD-10-CM

## 2023-02-07 PROCEDURE — 97112 NEUROMUSCULAR REEDUCATION: CPT | Mod: PN

## 2023-02-07 PROCEDURE — 97110 THERAPEUTIC EXERCISES: CPT | Mod: PN

## 2023-02-07 NOTE — PROGRESS NOTES
Physical Therapy Daily Treatment Note     Name: Regine Osorio  Clinic Number: [id number]    Therapy Diagnosis:   Encounter Diagnoses   Name Primary?    Neck pain Yes    Chronic left shoulder pain        Physician: No ref. provider found    Visit Date: 2023    Physician Orders: PT Eval and Treat   Medical Diagnosis from Referral:   Z98.1 (ICD-10-CM) - S/P cervical spinal fusion   M75.02 (ICD-10-CM) - Adhesive capsulitis of left shoulder      Evaluation Date: 2022  Authorization Period Expiration: 2023  Plan of Care Expiration:, 3/14/2023  Progress Note Due: 2023  Visit # / Visits authorized:   Remaining Visits Scheduled - 04  PTA Consecutive Visits - 0/6    5th Visit FOTO - 36 (2023)  12th Visit FOTO -  38 (2023)  D/C FOTO - (Date, Score/ turn green )    Time In: 8:55 am  Time Out: 9:38 am  Billable Time: 43 minutes  Non-Billable Time: 00 minutes    Precautions: Standard, Diabetes, and HTN  Insurance: Payor: [employer]ARE / Plan: [employer] CHOICES 65 / Product Type: Medicare Advantage /     Subjective     Pt. reports she slept wrong yesterday and is having more discomfort in her neck this morning. Difficulty remains with overhead movements of left UE.      She is compliant with home exercise program.  Response to previous treatment: soreness    Pre-Treatment Pain Rating: 3/10  Post-Treatment Pain Ratin/10  Location: left shoulder and cervical spine    Objective     Regine received therapeutic exercises to develop strength, endurance, ROM, and flexibility for 33 minutes including:    Cervical/Shoulder Assessment   Cervical active range of motion x10 (Rotation/Flexion-Extension)   Cervical SNAGS 2x10, with a 2 sec hold (Rotation/Extension)   Scapular retractions 2x10  Shoulder shrugs 3x10  Static shoulder external rotation /internal rotation and Abduction stretch - 2' each   External rotation with cane - 30 repititions    Bilateral External Rotation with  Theraband 3x10, Red Theraband   Supine Dowel Exercises 2x10 with a 2' sec hold (Flexion/External Rotation/Abduction/Extension)   Towel Internal Rotation Stretch 2x8 with a 2' sec hold    Table slides flexion/abduction/scaption - 3' each    Resisted ER/IR 2x10 red theraband/green theraband (very challenged)  Serratus punches 2x10 (at top of pain free range)  Supine Shoulder Flexion 2x8, Red Theraband   Supine Shoulder Abduction 2x8, Red Theraband   Ball Up the Wall 2x5 / Wall Slides 2x10 - Flexion and Abduction      *Bold exercise performed     Regine received the following manual therapy techniques: Soft tissue Mobilization were applied to the: L shoulder for 00 minutes, including:  Soft-Tissue Mobilization to the levator scap, upper trap, sub-occipitals, and paraspinals.      Regine participated in neuromuscular re-education activities to improve: Coordination and Proprioception for 10 minutes. The following activities were included:    Shoulder Isometrics x20 (flexion, extension, IR, ER)  Chin Tucks with red theraband 3x10 with 3 sec hold   Theraband Diagonals 2x8, Red Theraband   Cervical active range of motion with Ball Resistance 3x8 (Rotation/Flexion-Extension)   Cervical Lift Offs 2x10 with a 2' sec hold   Cervical Lift Offs with Rotation 2x5 - stopped due to inability to flex and rotate cervical spine. (Supine Cervical Rotation instead)   Cervical Ball Squeezes with active range of motion 2x15 (Flexion-Extension/Rotation)   Seated with Hip Hinge Cervical Extension 2x12  Seated/Sidelying Cervical Side Bend 2x12    Home Exercises Provided and Patient Education Provided     Education provided:   - Continue with HEP (Table slides added 1/13/2022)    Written Home Exercises Provided: Patient instructed to cont prior HEP.  Exercises were reviewed and Regine was able to demonstrate them prior to the end of the session.  Regine demonstrated good  understanding of the education provided.     See EMR under  Patient Instructions for exercises provided prior visit.    Assessment     Pt demonstrate limited active range of motion to LUE more than   R. 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 Pain at active end ranges of shoulder motion due to joint capsule and muscular tightness. Patient would continue to benefit from skilled Physical Therapy to increase mobility and strength of the neck and shoulder.     Regine Is progressing well towards Her goals.     Pt prognosis is Fair.     Pt will continue to benefit from skilled outpatient physical therapy to address the deficits listed in the problem list box on initial evaluation, provide pt/family education and to maximize pt's level of independence in the home and community environment.     Pt's spiritual, cultural and educational needs considered and pt agreeable to plan of care and goals.    Anticipated barriers to physical therapy: None    Goals:  Short Term Goals: 4 weeks   Patient will be independent with HEP at home to increase PT compliance.  Goal Met 1/5/2023      Patient will be able to increase active range of motion by 10° in each plane to increase mobility of the shoulder and cervical spine Goal Met 1/5/2023      Patient will be able to hold 5 lbs at 90 degrees of shoulder flexion and abduction for 15 sec to increase shoulder strength  Progressing 2/7/2023               Long Term Goals: 8 weeks   Patient will decrease Neck Index score to <20% to increase functional capacity.  Progressing 2/7/2023     Patient will be able to drive for 30 min with < 4/10 pain to the cervical and shoulder region to increase functional capacity  Goal Met 1/31/2023   Patient will be able to lift 5 lbs overhead 5 times with < 2/10 pain to the shoulder to increase functional capacity  Progressing 2/7/2023             Plan     Update Plan of Care: 2x a week for 4 weeks.      Progress duration, workload, and resistance levels of the Therapeutic Activities and Exercises if the patient does not exhibit any pain, swelling, or other symptoms. Progress instruction in-home exercise progression and modification, including symptom management.     Soniya Gonsales, PT, DPT   2/7/2023

## 2023-02-08 ENCOUNTER — OFFICE VISIT (OUTPATIENT)
Dept: DERMATOLOGY | Facility: CLINIC | Age: 68
End: 2023-02-08
Payer: MEDICARE

## 2023-02-08 DIAGNOSIS — B02.9 HERPES ZOSTER WITHOUT COMPLICATION: ICD-10-CM

## 2023-02-08 DIAGNOSIS — R76.8 ANA POSITIVE: Primary | ICD-10-CM

## 2023-02-08 DIAGNOSIS — L29.9 ITCHING: ICD-10-CM

## 2023-02-08 DIAGNOSIS — E61.1 LOW IRON: ICD-10-CM

## 2023-02-08 DIAGNOSIS — L29.9 SCALP ITCH: ICD-10-CM

## 2023-02-08 DIAGNOSIS — R20.2 PARESTHESIA OF SKIN: ICD-10-CM

## 2023-02-08 DIAGNOSIS — L82.1 SK (SEBORRHEIC KERATOSIS): ICD-10-CM

## 2023-02-08 PROCEDURE — 1126F PR PAIN SEVERITY QUANTIFIED, NO PAIN PRESENT: ICD-10-PCS | Mod: CPTII,S$GLB,, | Performed by: DERMATOLOGY

## 2023-02-08 PROCEDURE — 99999 PR PBB SHADOW E&M-EST. PATIENT-LVL IV: CPT | Mod: PBBFAC,,, | Performed by: DERMATOLOGY

## 2023-02-08 PROCEDURE — 4010F PR ACE/ARB THEARPY RXD/TAKEN: ICD-10-PCS | Mod: CPTII,S$GLB,, | Performed by: DERMATOLOGY

## 2023-02-08 PROCEDURE — 3288F PR FALLS RISK ASSESSMENT DOCUMENTED: ICD-10-PCS | Mod: CPTII,S$GLB,, | Performed by: DERMATOLOGY

## 2023-02-08 PROCEDURE — 1160F PR REVIEW ALL MEDS BY PRESCRIBER/CLIN PHARMACIST DOCUMENTED: ICD-10-PCS | Mod: CPTII,S$GLB,, | Performed by: DERMATOLOGY

## 2023-02-08 PROCEDURE — 99999 PR PBB SHADOW E&M-EST. PATIENT-LVL IV: ICD-10-PCS | Mod: PBBFAC,,, | Performed by: DERMATOLOGY

## 2023-02-08 PROCEDURE — 1160F RVW MEDS BY RX/DR IN RCRD: CPT | Mod: CPTII,S$GLB,, | Performed by: DERMATOLOGY

## 2023-02-08 PROCEDURE — 99215 PR OFFICE/OUTPT VISIT, EST, LEVL V, 40-54 MIN: ICD-10-PCS | Mod: S$GLB,,, | Performed by: DERMATOLOGY

## 2023-02-08 PROCEDURE — 1101F PT FALLS ASSESS-DOCD LE1/YR: CPT | Mod: CPTII,S$GLB,, | Performed by: DERMATOLOGY

## 2023-02-08 PROCEDURE — 1159F MED LIST DOCD IN RCRD: CPT | Mod: CPTII,S$GLB,, | Performed by: DERMATOLOGY

## 2023-02-08 PROCEDURE — 1101F PR PT FALLS ASSESS DOC 0-1 FALLS W/OUT INJ PAST YR: ICD-10-PCS | Mod: CPTII,S$GLB,, | Performed by: DERMATOLOGY

## 2023-02-08 PROCEDURE — 3288F FALL RISK ASSESSMENT DOCD: CPT | Mod: CPTII,S$GLB,, | Performed by: DERMATOLOGY

## 2023-02-08 PROCEDURE — 1159F PR MEDICATION LIST DOCUMENTED IN MEDICAL RECORD: ICD-10-PCS | Mod: CPTII,S$GLB,, | Performed by: DERMATOLOGY

## 2023-02-08 PROCEDURE — 4010F ACE/ARB THERAPY RXD/TAKEN: CPT | Mod: CPTII,S$GLB,, | Performed by: DERMATOLOGY

## 2023-02-08 PROCEDURE — 99215 OFFICE O/P EST HI 40 MIN: CPT | Mod: S$GLB,,, | Performed by: DERMATOLOGY

## 2023-02-08 PROCEDURE — 1126F AMNT PAIN NOTED NONE PRSNT: CPT | Mod: CPTII,S$GLB,, | Performed by: DERMATOLOGY

## 2023-02-08 RX ORDER — BETAMETHASONE DIPROPIONATE 0.5 MG/G
CREAM TOPICAL 2 TIMES DAILY
Qty: 60 G | Refills: 0 | Status: SHIPPED | OUTPATIENT
Start: 2023-02-08 | End: 2023-03-06 | Stop reason: SDUPTHER

## 2023-02-08 NOTE — PROGRESS NOTES
Subjective:       Patient ID:  Regine Osorio is a 68 y.o. female who presents for   Chief Complaint   Patient presents with    Follow-up     Rash is unchanged      Pt felt better with valtrex but then burning and itch recurred.    Follow-up - Follow-up  Diagnosis: shingles.  Symptom course: unchanged  Affected locations: back  Signs / symptoms: itching  Severity: mild    Review of Systems   Constitutional:  Negative for fever, chills and fatigue.   Skin:  Positive for itching. Negative for daily sunscreen use, recent sunburn and wears hat.   Hematologic/Lymphatic: Does not bruise/bleed easily.      Objective:    Physical Exam   Constitutional: She appears well-developed and well-nourished. No distress.   Eyes: No conjunctival no injection.   Neurological: She is alert and oriented to person, place, and time. She is not disoriented.   Psychiatric: She has a normal mood and affect.   Skin:   Areas Examined (abnormalities noted in diagram):   Back Inspection Performed       Diagram Legend     Erythematous scaling macule/papule c/w actinic keratosis       Vascular papule c/w angioma      Pigmented verrucoid papule/plaque c/w seborrheic keratosis      Yellow umbilicated papule c/w sebaceous hyperplasia      Irregularly shaped tan macule c/w lentigo     1-2 mm smooth white papules consistent with Milia      Movable subcutaneous cyst with punctum c/w epidermal inclusion cyst      Subcutaneous movable cyst c/w pilar cyst      Firm pink to brown papule c/w dermatofibroma      Pedunculated fleshy papule(s) c/w skin tag(s)      Evenly pigmented macule c/w junctional nevus     Mildly variegated pigmented, slightly irregular-bordered macule c/w mildly atypical nevus      Flesh colored to evenly pigmented papule c/w intradermal nevus       Pink pearly papule/plaque c/w basal cell carcinoma      Erythematous hyperkeratotic cursted plaque c/w SCC      Surgical scar with no sign of skin cancer recurrence      Open and closed  comedones      Inflammatory papules and pustules      Verrucoid papule consistent consistent with wart     Erythematous eczematous patches and plaques     Dystrophic onycholytic nail with subungual debris c/w onychomycosis     Umbilicated papule    Erythematous-base heme-crusted tan verrucoid plaque consistent with inflamed seborrheic keratosis     Erythematous Silvery Scaling Plaque c/w Psoriasis     See annotation        Assessment / Plan:        CARLENE positive  Only 1:80.  Can recheck later.    Itching  -     augmented betamethasone dipropionate (DIPROLENE-AF) 0.05 % cream; Apply topically 2 (two) times daily. Bid-qid to area of back when itchy or burning.Stop using steroid topical when skin is non itchy.  Do not treat dark or red coloring.  Dispense: 60 g; Refill: 0  Reviewed with patient different treatment options and associated risks.  Proper application of medications and or care for affected area(s) and condition(s) reviewed.  Stop all applicable skin care and hair products, chemicals, perfumes, and treatments to affected areas.  No deodorants and antiperspirants, if applicable.  No store bought lotions or potions.     Watch for contact derm.  May be paresthesia.    Herpes zoster without complication  Finish valtrex course.  Watch for phn.  Also poss not true case of zoster?    Low iron  Pt has gone up to bid for her iron dose.  1 wk now.  Discussed with patient plan to check labs later.  Recheck iron later.    Scalp itch  Cont clob sol prn.  Patient and or guardian to monitor this area/lesion or these areas/lesions for changes or worsening or darkening (for moles and freckles).  Patient and or guardian to contact us if any changes are noted for such.  May be paresthesia.    SK (seborrheic keratosis)    Paresthesia of skin  Discussed with patient the etiology and pathogenesis of the disease or skin lesion(s) and possible treatments and aggravators.    Pt w ho cervical fusion.  Try benfotiamine 500 mg  bid.      Hyperpigmentation  Pt denies scratching the back.  Consider biopsy of any suspicious moles or lesions later.  Watch for mf?  No signs of morphea or fungus.         Follow up if symptoms worsen or fail to improve, for Regularly set appointment.

## 2023-02-08 NOTE — PATIENT INSTRUCTIONS
Stop all applicable skin care and hair products, chemicals, perfumes, and treatments to affected areas.  No deodorants and antiperspirants, if applicable.  No store bought lotions or potions.     Use prescription steroid 2-4 x day as needed for itch or pain of the back.    Try benfotiamine 500 mg 2 x day.

## 2023-02-09 ENCOUNTER — CLINICAL SUPPORT (OUTPATIENT)
Dept: REHABILITATION | Facility: HOSPITAL | Age: 68
End: 2023-02-09
Payer: MEDICARE

## 2023-02-09 DIAGNOSIS — M54.2 NECK PAIN: Primary | ICD-10-CM

## 2023-02-09 DIAGNOSIS — M25.512 CHRONIC LEFT SHOULDER PAIN: ICD-10-CM

## 2023-02-09 DIAGNOSIS — G89.29 CHRONIC LEFT SHOULDER PAIN: ICD-10-CM

## 2023-02-09 PROCEDURE — 97110 THERAPEUTIC EXERCISES: CPT | Mod: PN

## 2023-02-09 PROCEDURE — 97140 MANUAL THERAPY 1/> REGIONS: CPT | Mod: PN

## 2023-02-09 NOTE — PROGRESS NOTES
Physical Therapy Daily Treatment Note     Name: Regine Osorio  Clinic Number: [id number]    Therapy Diagnosis:   Encounter Diagnoses   Name Primary?    Neck pain Yes    Chronic left shoulder pain        Physician: Fauzia Vidales,*    Visit Date: 2023    Physician Orders: PT Eval and Treat   Medical Diagnosis from Referral:   Z98.1 (ICD-10-CM) - S/P cervical spinal fusion   M75.02 (ICD-10-CM) - Adhesive capsulitis of left shoulder      Evaluation Date: 2022  Authorization Period Expiration: 2023  Plan of Care Expiration:, 3/14/2023  Progress Note Due: 2023  Visit # / Visits authorized: 10/20  Remaining Visits Scheduled - 04  PTA Consecutive Visits - 0/6    5th Visit FOTO - 36 (2023)  12th Visit FOTO -  38 (2023)  D/C FOTO - (Date, Score/ turn green )    Time In: 9:28 am  Time Out: 10:19 am  Billable Time: 51 minutes  Non-Billable Time: 00 minutes    Precautions: Standard, Diabetes, and HTN  Insurance: Payor: [employer] / Plan: [employer] CHOICES 65 / Product Type: Medicare Advantage /     Subjective     Pt. reports she feels stiff today. She slept fine last night, was able to get 4 hours which she states is good for her.      She is compliant with home exercise program.  Response to previous treatment: soreness    Pre-Treatment Pain Ratin/10  Post-Treatment Pain Ratin/10  Location: left shoulder and cervical spine    Objective     Regine received therapeutic exercises to develop strength, endurance, ROM, and flexibility for 36 minutes including:    Cervical/Shoulder Assessment   Cervical active range of motion x10 (Rotation/Flexion-Extension)   Cervical SNAGS 2x10, with a 2 sec hold (Rotation/Extension)   Scapular retractions 2x10  Shoulder shrugs 3x10  Static shoulder external rotation /internal rotation and Abduction stretch - 2' each   External rotation with cane - 30 repititions    Bilateral External Rotation with Theraband 3x10, Red  Theraband   Supine Dowel Exercises 2x10 with a 2' sec hold (Flexion/External Rotation/Abduction/Extension)   Towel Internal Rotation Stretch 2x8 with a 2' sec hold    Table slides flexion/abduction/scaption - 3' each    Resisted ER/IR 2x10 red theraband/green theraband(no IR today)  Serratus punches 2x10 2#  Supine Shoulder Flexion 2x8, Red Theraband   Supine Shoulder Abduction 2x8, Red Theraband   Ball Up the Wall 2x5 / Wall Slides 2x10 - Flexion and Abduction      *Bold exercise performed     Regine received the following manual therapy techniques: Soft tissue Mobilization were applied to the: L shoulder for 15 minutes, including:    GHJ inferior, anterior, posterior mobilizations grade III  PROM of L shoulder  Soft-Tissue Mobilization to the levator scap, upper trap, sub-occipitals, and paraspinals.      Regine participated in neuromuscular re-education activities to improve: Coordination and Proprioception for 00 minutes. The following activities were included:      Shoulder Isometrics x20 (flexion, extension, IR, ER)  Chin Tucks with red theraband 3x10 with 3 sec hold   Theraband Diagonals 2x8, Red Theraband   Cervical active range of motion with Ball Resistance 3x8 (Rotation/Flexion-Extension)   Cervical Lift Offs 2x10 with a 2' sec hold   Cervical Lift Offs with Rotation 2x5 - stopped due to inability to flex and rotate cervical spine. (Supine Cervical Rotation instead)   Cervical Ball Squeezes with active range of motion 2x15 (Flexion-Extension/Rotation)   Seated with Hip Hinge Cervical Extension 2x12  Seated/Sidelying Cervical Side Bend 2x12    Home Exercises Provided and Patient Education Provided     Education provided:   - Continue with HEP (Table slides added 1/13/2022)    Written Home Exercises Provided: Patient instructed to cont prior HEP.  Exercises were reviewed and Regine was able to demonstrate them prior to the end of the session.  Regine demonstrated good  understanding of the education  provided.     See EMR under Patient Instructions for exercises provided prior visit.    Assessment     Pt demonstrate limited active range of motion to LUE more than   R. Pain at active end ranges of shoulder motion due to joint capsule and muscular tightness. Pt performed GHJ mobilizations today and passive stretches and range of motion with improvement in symptoms post treatment. Continued AAROM activities and strengthening to maintain new range. Patient would continue to benefit from skilled Physical Therapy to increase mobility and strength of the neck and shoulder.     Regine Is progressing well towards Her goals.     Pt prognosis is Fair.     Pt will continue to benefit from skilled outpatient physical therapy to address the deficits listed in the problem list box on initial evaluation, provide pt/family education and to maximize pt's level of independence in the home and community environment.     Pt's spiritual, cultural and educational needs considered and pt agreeable to plan of care and goals.    Anticipated barriers to physical therapy: None    Goals:  Short Term Goals: 4 weeks   Patient will be independent with HEP at home to increase PT compliance.  Goal Met 1/5/2023      Patient will be able to increase active range of motion by 10° in each plane to increase mobility of the shoulder and cervical spine Goal Met 1/5/2023      Patient will be able to hold 5 lbs at 90 degrees of shoulder flexion and abduction for 15 sec to increase shoulder strength  Progressing 2/9/2023               Long Term Goals: 8 weeks   Patient will decrease Neck Index score to <20% to increase functional capacity.  Progressing 2/9/2023     Patient will be able to drive for 30 min with < 4/10 pain to the cervical and shoulder region to increase functional capacity  Goal Met 1/31/2023   Patient will be able to lift 5 lbs overhead 5 times with < 2/10 pain to the shoulder to increase functional capacity  Progressing 2/9/2023              Plan     Update Plan of Care: 2x a week for 4 weeks.     Progress duration, workload, and resistance levels of the Therapeutic Activities and Exercises if the patient does not exhibit any pain, swelling, or other symptoms. Progress instruction in-home exercise progression and modification, including symptom management.     Soniya Gonsales, PT, DPT   2/9/2023

## 2023-02-16 ENCOUNTER — CLINICAL SUPPORT (OUTPATIENT)
Dept: REHABILITATION | Facility: HOSPITAL | Age: 68
End: 2023-02-16
Payer: MEDICARE

## 2023-02-16 DIAGNOSIS — M25.512 CHRONIC LEFT SHOULDER PAIN: ICD-10-CM

## 2023-02-16 DIAGNOSIS — M54.2 NECK PAIN: Primary | ICD-10-CM

## 2023-02-16 DIAGNOSIS — G89.29 CHRONIC LEFT SHOULDER PAIN: ICD-10-CM

## 2023-02-16 PROCEDURE — 97110 THERAPEUTIC EXERCISES: CPT | Mod: PN,CQ

## 2023-02-16 NOTE — PROGRESS NOTES
Physical Therapy Daily Treatment Note     Name: Regine Osorio  Clinic Number: [id number]    Therapy Diagnosis:   Encounter Diagnoses   Name Primary?    Neck pain Yes    Chronic left shoulder pain        Physician: Fauzia Vidales,*    Visit Date: 2023    Physician Orders: PT Eval and Treat   Medical Diagnosis from Referral:   Z98.1 (ICD-10-CM) - S/P cervical spinal fusion   M75.02 (ICD-10-CM) - Adhesive capsulitis of left shoulder      Evaluation Date: 2022  Authorization Period Expiration: 2023  Plan of Care Expiration:, 3/14/2023  Progress Note Due: 2023  Visit # / Visits authorized:   Remaining Visits Scheduled - 03  PTA Consecutive Visits -     5th Visit FOTO - 36 (2023)  12th Visit FOTO -  38 (2023)  D/C FOTO - (Date, Score/ turn green )    Time In: 9:30 am  Time Out: 10:15 am  Billable Time: 45 minutes  Non-Billable Time: 00 minutes    Precautions: Standard, Diabetes, and HTN  Insurance: Payor: [employer] / Plan: [employer] CHOICES 65 / Product Type: Medicare Advantage /     Subjective     Pt. Reports: Pain in her R shoulder and back most likely from mopping her floor this weekend. L arm has minimal pain      She is compliant with home exercise program.  Response to previous treatment: soreness    Pre-Treatment Pain Ratin/10  Post-Treatment Pain Ratin/10  Location: left shoulder and cervical spine    Objective     Regine received therapeutic exercises to develop strength, endurance, ROM, and flexibility for 45 minutes including:    Cervical/Shoulder Assessment   Cervical active range of motion x10 (Rotation/Flexion-Extension)   Cervical SNAGS 2x10, with a 2 sec hold (Rotation/Extension)   Scapular retractions 2x10  Shoulder shrugs 3x10  Static shoulder external rotation /internal rotation and Abduction stretch - 2' each   External rotation with cane - 30 repititions    Bilateral External Rotation with Theraband 3x10, Red Theraband    Supine Dowel Exercises 2x10 with a 2' sec hold (Flexion/External Rotation/Abduction/Extension)   Towel Internal Rotation Stretch 2x8 with a 2' sec hold    Table slides flexion/abduction/scaption - 3' each    Resisted ER/IR 2x10 red theraband/green theraband(no IR today)  Serratus punches 2x10 2#  Supine Shoulder Flexion 2x10, Red Theraband   Supine Shoulder Abduction 2x10, Red Theraband   Ball Up the Wall 2x5 / Wall Slides 2x10 - Flexion and Abduction      *Bold exercise performed     Regine received the following manual therapy techniques: Soft tissue Mobilization were applied to the: L shoulder for 00 minutes, including:    GHJ inferior, anterior, posterior mobilizations grade III  PROM of L shoulder  Soft-Tissue Mobilization to the levator scap, upper trap, sub-occipitals, and paraspinals.      Regine participated in neuromuscular re-education activities to improve: Coordination and Proprioception for 00 minutes. The following activities were included:      Shoulder Isometrics x20 (flexion, extension, IR, ER)  Chin Tucks with red theraband 3x10 with 3 sec hold   Theraband Diagonals 2x8, Red Theraband   Cervical active range of motion with Ball Resistance 3x8 (Rotation/Flexion-Extension)   Cervical Lift Offs 2x10 with a 2' sec hold   Cervical Lift Offs with Rotation 2x5 - stopped due to inability to flex and rotate cervical spine. (Supine Cervical Rotation instead)   Cervical Ball Squeezes with active range of motion 2x15 (Flexion-Extension/Rotation)   Seated with Hip Hinge Cervical Extension 2x12  Seated/Sidelying Cervical Side Bend 2x12    Home Exercises Provided and Patient Education Provided     Education provided:   - Continue with HEP (Table slides added 1/13/2022)    Written Home Exercises Provided: Patient instructed to cont prior HEP.  Exercises were reviewed and Regine was able to demonstrate them prior to the end of the session.  Regine demonstrated good  understanding of the education  provided.     See EMR under Patient Instructions for exercises provided prior visit.    Assessment     Pt tolerated session well. L shoulder still displays ROM limitations and weakness but shows improvements since starting therapy. Pt states she's been able to perform more ADLs with L shoulder since starting therapy such as using an lito from her car. Regine had no adverse effects and will continue to benefit from skilled therapy.       Regine Is progressing well towards Her goals.     Pt prognosis is Fair.     Pt will continue to benefit from skilled outpatient physical therapy to address the deficits listed in the problem list box on initial evaluation, provide pt/family education and to maximize pt's level of independence in the home and community environment.     Pt's spiritual, cultural and educational needs considered and pt agreeable to plan of care and goals.    Anticipated barriers to physical therapy: None    Goals:  Short Term Goals: 4 weeks   Patient will be independent with HEP at home to increase PT compliance.  Goal Met 1/5/2023      Patient will be able to increase active range of motion by 10° in each plane to increase mobility of the shoulder and cervical spine Goal Met 1/5/2023      Patient will be able to hold 5 lbs at 90 degrees of shoulder flexion and abduction for 15 sec to increase shoulder strength  Progressing 2/16/2023               Long Term Goals: 8 weeks   Patient will decrease Neck Index score to <20% to increase functional capacity.  Progressing 2/16/2023     Patient will be able to drive for 30 min with < 4/10 pain to the cervical and shoulder region to increase functional capacity  Goal Met 1/31/2023   Patient will be able to lift 5 lbs overhead 5 times with < 2/10 pain to the shoulder to increase functional capacity  Progressing 2/16/2023             Plan     Update Plan of Care: 2x a week for 4 weeks.     Progress duration, workload, and resistance levels of the Therapeutic  Activities and Exercises if the patient does not exhibit any pain, swelling, or other symptoms. Progress instruction in-home exercise progression and modification, including symptom management.     Aris Cortes, PTA,  2/16/2023                               No

## 2023-02-23 ENCOUNTER — CLINICAL SUPPORT (OUTPATIENT)
Dept: REHABILITATION | Facility: HOSPITAL | Age: 68
End: 2023-02-23
Payer: MEDICARE

## 2023-02-23 ENCOUNTER — OFFICE VISIT (OUTPATIENT)
Dept: DERMATOLOGY | Facility: CLINIC | Age: 68
End: 2023-02-23
Payer: MEDICARE

## 2023-02-23 DIAGNOSIS — L29.9 SCALP ITCH: ICD-10-CM

## 2023-02-23 DIAGNOSIS — M25.512 CHRONIC LEFT SHOULDER PAIN: ICD-10-CM

## 2023-02-23 DIAGNOSIS — R76.8 ANA POSITIVE: Primary | ICD-10-CM

## 2023-02-23 DIAGNOSIS — M54.2 NECK PAIN: Primary | ICD-10-CM

## 2023-02-23 DIAGNOSIS — G89.29 CHRONIC LEFT SHOULDER PAIN: ICD-10-CM

## 2023-02-23 DIAGNOSIS — B02.9 HERPES ZOSTER WITHOUT COMPLICATION: ICD-10-CM

## 2023-02-23 DIAGNOSIS — L29.9 ITCHING: ICD-10-CM

## 2023-02-23 DIAGNOSIS — R20.2 PARESTHESIA OF SKIN: ICD-10-CM

## 2023-02-23 DIAGNOSIS — E61.1 LOW IRON: ICD-10-CM

## 2023-02-23 DIAGNOSIS — L81.9 HYPERPIGMENTATION: ICD-10-CM

## 2023-02-23 PROCEDURE — 1101F PT FALLS ASSESS-DOCD LE1/YR: CPT | Mod: CPTII,S$GLB,, | Performed by: DERMATOLOGY

## 2023-02-23 PROCEDURE — 3288F FALL RISK ASSESSMENT DOCD: CPT | Mod: CPTII,S$GLB,, | Performed by: DERMATOLOGY

## 2023-02-23 PROCEDURE — 4010F PR ACE/ARB THEARPY RXD/TAKEN: ICD-10-PCS | Mod: CPTII,S$GLB,, | Performed by: DERMATOLOGY

## 2023-02-23 PROCEDURE — 99999 PR PBB SHADOW E&M-EST. PATIENT-LVL IV: CPT | Mod: PBBFAC,,, | Performed by: DERMATOLOGY

## 2023-02-23 PROCEDURE — 99215 OFFICE O/P EST HI 40 MIN: CPT | Mod: S$GLB,,, | Performed by: DERMATOLOGY

## 2023-02-23 PROCEDURE — 1126F PR PAIN SEVERITY QUANTIFIED, NO PAIN PRESENT: ICD-10-PCS | Mod: CPTII,S$GLB,, | Performed by: DERMATOLOGY

## 2023-02-23 PROCEDURE — 99215 PR OFFICE/OUTPT VISIT, EST, LEVL V, 40-54 MIN: ICD-10-PCS | Mod: S$GLB,,, | Performed by: DERMATOLOGY

## 2023-02-23 PROCEDURE — 1101F PR PT FALLS ASSESS DOC 0-1 FALLS W/OUT INJ PAST YR: ICD-10-PCS | Mod: CPTII,S$GLB,, | Performed by: DERMATOLOGY

## 2023-02-23 PROCEDURE — 97112 NEUROMUSCULAR REEDUCATION: CPT | Mod: PN,CQ

## 2023-02-23 PROCEDURE — 1160F PR REVIEW ALL MEDS BY PRESCRIBER/CLIN PHARMACIST DOCUMENTED: ICD-10-PCS | Mod: CPTII,S$GLB,, | Performed by: DERMATOLOGY

## 2023-02-23 PROCEDURE — 4010F ACE/ARB THERAPY RXD/TAKEN: CPT | Mod: CPTII,S$GLB,, | Performed by: DERMATOLOGY

## 2023-02-23 PROCEDURE — 1159F MED LIST DOCD IN RCRD: CPT | Mod: CPTII,S$GLB,, | Performed by: DERMATOLOGY

## 2023-02-23 PROCEDURE — 3288F PR FALLS RISK ASSESSMENT DOCUMENTED: ICD-10-PCS | Mod: CPTII,S$GLB,, | Performed by: DERMATOLOGY

## 2023-02-23 PROCEDURE — 97110 THERAPEUTIC EXERCISES: CPT | Mod: PN,CQ

## 2023-02-23 PROCEDURE — 99999 PR PBB SHADOW E&M-EST. PATIENT-LVL IV: ICD-10-PCS | Mod: PBBFAC,,, | Performed by: DERMATOLOGY

## 2023-02-23 PROCEDURE — 97140 MANUAL THERAPY 1/> REGIONS: CPT | Mod: PN,CQ

## 2023-02-23 PROCEDURE — 1159F PR MEDICATION LIST DOCUMENTED IN MEDICAL RECORD: ICD-10-PCS | Mod: CPTII,S$GLB,, | Performed by: DERMATOLOGY

## 2023-02-23 PROCEDURE — 1126F AMNT PAIN NOTED NONE PRSNT: CPT | Mod: CPTII,S$GLB,, | Performed by: DERMATOLOGY

## 2023-02-23 PROCEDURE — 1160F RVW MEDS BY RX/DR IN RCRD: CPT | Mod: CPTII,S$GLB,, | Performed by: DERMATOLOGY

## 2023-02-23 NOTE — PROGRESS NOTES
Subjective:       Patient ID:  Regine Osorio is a 68 y.o. female who presents for   Chief Complaint   Patient presents with    Rash     Follow up      Rash - Follow-up  Symptom course: improving  Affected locations: back  Signs / symptoms: asymptomatic    Review of Systems   Constitutional: Negative.    HENT: Negative.     Respiratory: Negative.     Skin:  Positive for rash.      Objective:    Physical Exam   Constitutional: She appears well-developed and well-nourished.   Eyes: No conjunctival no injection.   Neurological: She is alert and oriented to person, place, and time.   Psychiatric: She has a normal mood and affect.   Skin:   Areas Examined (abnormalities noted in diagram):   Back Inspection Performed       Diagram Legend     Erythematous scaling macule/papule c/w actinic keratosis       Vascular papule c/w angioma      Pigmented verrucoid papule/plaque c/w seborrheic keratosis      Yellow umbilicated papule c/w sebaceous hyperplasia      Irregularly shaped tan macule c/w lentigo     1-2 mm smooth white papules consistent with Milia      Movable subcutaneous cyst with punctum c/w epidermal inclusion cyst      Subcutaneous movable cyst c/w pilar cyst      Firm pink to brown papule c/w dermatofibroma      Pedunculated fleshy papule(s) c/w skin tag(s)      Evenly pigmented macule c/w junctional nevus     Mildly variegated pigmented, slightly irregular-bordered macule c/w mildly atypical nevus      Flesh colored to evenly pigmented papule c/w intradermal nevus       Pink pearly papule/plaque c/w basal cell carcinoma      Erythematous hyperkeratotic cursted plaque c/w SCC      Surgical scar with no sign of skin cancer recurrence      Open and closed comedones      Inflammatory papules and pustules      Verrucoid papule consistent consistent with wart     Erythematous eczematous patches and plaques     Dystrophic onycholytic nail with subungual debris c/w onychomycosis     Umbilicated papule     Erythematous-base heme-crusted tan verrucoid plaque consistent with inflamed seborrheic keratosis     Erythematous Silvery Scaling Plaque c/w Psoriasis     See annotation        Assessment / Plan:        CARLENE positive  Recheck later.  Only 1:80.    Itching  -     Ferritin; Future; Expected date: 02/23/2023  -     Iron and TIBC; Future; Expected date: 02/23/2023  Better per pt.  Cont diprolene bid prn.  Discussed with patient the risks of topical and injectable steroids, including, but not limited, to atrophy, rosacea, acne, glaucoma, cataracts, adrenal suppression, striae.  Do not touch eyes with medication.    Herpes zoster without complication  Discussed with patient the etiology and pathogenesis of the disease or skin lesion(s) and possible treatments and aggravators.    Rev'd very poss had recent zoster that exacerbated everything.  Pt did improve with antivirals.    Low iron  -     Ferritin; Future; Expected date: 02/23/2023  -     Iron and TIBC; Future; Expected date: 02/23/2023  Pt with long ho this.  Discussed with patient the etiology and pathogenesis of the disease or skin lesion(s) and possible treatments and aggravators.    Consider heme consult.  Consider liver pills later?  Cont taking iron.  Discussed with patient the need for laboratory work up for further evaluation.  Discussed that such investigation may not be helpful.  Recheck iron labs in 4 wks to assess status.    Scalp itch  Condition is stable.  We will continue present management.  Cont clob sol bid prn.  Patient and or guardian to monitor this area/lesion or these areas/lesions for changes or worsening or darkening (for moles and freckles).  Patient and or guardian to contact us if any changes are noted for such.    Paresthesia of skin  -     Ferritin; Future; Expected date: 02/23/2023  -     Iron and TIBC; Future; Expected date: 02/23/2023  Discussed with patient the etiology and pathogenesis of the disease or skin lesion(s) and possible  treatments and aggravators.    Try benfotiamine 500 mg bid.  Life extension and doctors best examples of company makers.  Rev'd with pt on internet MOA.  Pt to start this.  Chronic nature of this condition discussed with patient.  Suspect this has been occurring since pt first presented.  No signs of mf or morphea.  Wait on bx of back.    Hyperpigmentation  Discussed with the patient the risk of color scars, erythema, or hyperpigmentation that could take months to resolve.             Follow up if symptoms worsen or fail to improve, for Post labs.

## 2023-02-23 NOTE — PROGRESS NOTES
Physical Therapy Daily Treatment Note     Name: Regine Osorio  Clinic Number: [id number]    Therapy Diagnosis:   Encounter Diagnoses   Name Primary?    Neck pain Yes    Chronic left shoulder pain        Physician: Fauzia Vidales,*    Visit Date: 2/23/2023    Physician Orders: PT Eval and Treat   Medical Diagnosis from Referral:   Z98.1 (ICD-10-CM) - S/P cervical spinal fusion   M75.02 (ICD-10-CM) - Adhesive capsulitis of left shoulder      Evaluation Date: 12/9/2022  Authorization Period Expiration: 1/6/2023  Plan of Care Expiration:, 3/14/2023  Progress Note Due: 2/28/2023  Visit # / Visits authorized: 12/20  Remaining Visits Scheduled - 5  PTA Consecutive Visits - 2/6    5th Visit FOTO - 36 (1/5/2023)  12th Visit FOTO -  38 (1/31/2023)  15th visit - 45.54  (2/23/2023)  D/C FOTO - (Date, Score/ turn green )    Time In: 9:20 am  Time Out: 10:15 am  Billable Time: 55 minutes  Non-Billable Time: 00 minutes    Precautions: Standard, Diabetes, and HTN  Insurance: Payor: [employer] / Plan: [employer] CHOICES 65 / Product Type: Medicare Advantage /     Subjective     Pt. Reports: Pt mopped again so some increase in pain. R shoulder still hurting more than usually      She is compliant with home exercise program.  Response to previous treatment: soreness    Pre-Treatment Pain Rating: 3/10  Post-Treatment Pain Rating: 3/10  Location: left shoulder and cervical spine    Objective     Regine received therapeutic exercises to develop strength, endurance, ROM, and flexibility for 25 minutes including:    Cervical/Shoulder Assessment   Cervical active range of motion x10 (Rotation/Flexion-Extension)   Cervical SNAGS 2x10, with a 2 sec hold (Rotation/Extension)   Scapular retractions 2x10  Shoulder shrugs 3x10  Static shoulder external rotation /internal rotation and Abduction stretch - 2' each   External rotation with cane - 30 repititions    Bilateral External Rotation with Theraband 3x10,  Red Theraband   Supine Dowel Exercises 2x10 with a 2' sec hold (Flexion/External Rotation/Abduction/Extension)   Towel Internal Rotation Stretch 2x8 with a 2' sec hold    Table slides flexion/abduction/scaption - 3' each    Resisted ER/IR 2x10 red theraband/green theraband(no IR today)  Serratus punches 2x10 2#  Supine Shoulder Flexion 2x10, Red Theraband   Supine Shoulder Abduction 2x10, Red Theraband   Ball Up the Wall 2x5 / Wall Slides 2x10 - Flexion and Abduction      *Bold exercise performed     Regine received the following manual therapy techniques: Soft tissue Mobilization were applied to the: L shoulder for 15 minutes, including:    GHJ inferior, anterior, posterior mobilizations grade III  PROM of L shoulder  Soft-Tissue Mobilization to the levator scap, upper trap, sub-occipitals, and paraspinals.      Regine participated in neuromuscular re-education activities to improve: Coordination and Proprioception for 15 minutes. The following activities were included:      Shoulder Isometrics x20 (flexion, extension, IR, ER)  Chin Tucks with red theraband 2x10 with 3 sec hold   Theraband Diagonals 2x8, Red Theraband   Cervical active range of motion with Ball Resistance 3x8 (Rotation/Flexion-Extension)   Cervical Lift Offs 2x10 with a 2' sec hold   Cervical Lift Offs with Rotation 2x5 - stopped due to inability to flex and rotate cervical spine. (Supine Cervical Rotation instead)   Cervical Ball Squeezes with active range of motion 2x15 (Flexion-Extension/Rotation)   Seated with Hip Hinge Cervical Extension 2x12  Seated/Sidelying Cervical Side Bend 2x12    Home Exercises Provided and Patient Education Provided     Education provided:   - Continue with HEP (Table slides added 1/13/2022)    Written Home Exercises Provided: Patient instructed to cont prior HEP.  Exercises were reviewed and Regine was able to demonstrate them prior to the end of the session.  Regine demonstrated good  understanding of the  education provided.     See EMR under Patient Instructions for exercises provided prior visit.    Assessment     Pt tolerated session well. She presented with pain in both shoulders. Manual therapy used to reduce soreness in shoulders and improve ROM. Shoulder flexion and abduction showed improved AAROM after manual therapy. Cervical lift offs still challenging due to weakness. Cervical ROM is still limited but shows improvement from the start of therapy. . Regine had no adverse effects with exercise and will continue to benefit from skilled therapy.       Regine Is progressing well towards Her goals.     Pt prognosis is Fair.     Pt will continue to benefit from skilled outpatient physical therapy to address the deficits listed in the problem list box on initial evaluation, provide pt/family education and to maximize pt's level of independence in the home and community environment.     Pt's spiritual, cultural and educational needs considered and pt agreeable to plan of care and goals.    Anticipated barriers to physical therapy: None    Goals:  Short Term Goals: 4 weeks   Patient will be independent with HEP at home to increase PT compliance.  Goal Met 1/5/2023      Patient will be able to increase active range of motion by 10° in each plane to increase mobility of the shoulder and cervical spine Goal Met 1/5/2023      Patient will be able to hold 5 lbs at 90 degrees of shoulder flexion and abduction for 15 sec to increase shoulder strength  Progressing 2/23/2023               Long Term Goals: 8 weeks   Patient will decrease Neck Index score to <20% to increase functional capacity.  Progressing 2/23/2023     Patient will be able to drive for 30 min with < 4/10 pain to the cervical and shoulder region to increase functional capacity  Goal Met 1/31/2023   Patient will be able to lift 5 lbs overhead 5 times with < 2/10 pain to the shoulder to increase functional capacity  Progressing 2/23/2023             Plan      Update Plan of Care: 2x a week for 4 weeks.     Progress duration, workload, and resistance levels of the Therapeutic Activities and Exercises if the patient does not exhibit any pain, swelling, or other symptoms. Progress instruction in-home exercise progression and modification, including symptom management.     Aris Cortes, PTA,  2/23/2023

## 2023-02-24 NOTE — PROGRESS NOTES
Subjective:      Patient ID: Regine Osorio is a 68 y.o. female.    Chief Complaint: Nail Care and Diabetes Mellitus (PCP- 01/30/2023/Gladis Melissa)    Regine is a 68 y.o. female who presents to the clinic upon referral from Dr. Tracy aguilar. provider found  for evaluation and treatment of diabetic feet. Regine has a past medical history of Allergy, Breast cancer (2017), Cataract, Cervical spondylosis (07/29/2022), Diabetes mellitus, type 2, Eczema, Fibromyalgia, Glaucoma, Hypertension, Renal cyst, right (02/06/2020), and Steatosis of liver (02/06/2020). Patient relates no major problem with feet. Only complaints today consist of routine diabetic foot evaluation.  PCP: Gladis Melissa MD    Date Last Seen by PCP:   Chief Complaint   Patient presents with    Nail Care    Diabetes Mellitus     PCP- 01/30/2023  Gladis Melissa         Current shoe gear: Casual shoes    Hemoglobin A1C   Date Value Ref Range Status   12/12/2022 7.5 (H) 4.0 - 5.6 % Final     Comment:     ADA Screening Guidelines:  5.7-6.4%  Consistent with prediabetes  >or=6.5%  Consistent with diabetes    High levels of fetal hemoglobin interfere with the HbA1C  assay. Heterozygous hemoglobin variants (HbS, HgC, etc)do  not significantly interfere with this assay.   However, presence of multiple variants may affect accuracy.     07/22/2022 6.6 (H) 4.0 - 5.6 % Final     Comment:     ADA Screening Guidelines:  5.7-6.4%  Consistent with prediabetes  >or=6.5%  Consistent with diabetes    High levels of fetal hemoglobin interfere with the HbA1C  assay. Heterozygous hemoglobin variants (HbS, HgC, etc)do  not significantly interfere with this assay.   However, presence of multiple variants may affect accuracy.     03/15/2022 6.0 (H) 4.0 - 5.6 % Final     Comment:     ADA Screening Guidelines:  5.7-6.4%  Consistent with prediabetes  >or=6.5%  Consistent with diabetes    High levels of fetal hemoglobin interfere with the HbA1C  assay. Heterozygous hemoglobin  variants (HbS, HgC, etc)do  not significantly interfere with this assay.   However, presence of multiple variants may affect accuracy.             Review of Systems   Constitutional: Positive for night sweats. Negative for chills and fever.   HENT:  Negative for congestion and tinnitus.    Eyes:  Negative for double vision and visual disturbance.   Cardiovascular:  Negative for chest pain and claudication.   Respiratory:  Negative for hemoptysis and shortness of breath.    Endocrine: Negative for cold intolerance and heat intolerance.   Hematologic/Lymphatic: Negative for adenopathy and bleeding problem.   Skin:  Negative for color change and nail changes.   Musculoskeletal:  Positive for back pain and joint pain. Negative for myalgias and stiffness.   Gastrointestinal:  Negative for nausea and vomiting.   Genitourinary:  Negative for dysuria and hematuria.   Neurological:  Positive for paresthesias. Negative for numbness.   Psychiatric/Behavioral:  Negative for altered mental status and suicidal ideas.    Allergic/Immunologic: Negative for environmental allergies and persistent infections.         Objective:      Physical Exam  Vitals reviewed.   Constitutional:       Appearance: She is well-developed.   Cardiovascular:      Pulses:           Dorsalis pedis pulses are 2+ on the right side and 2+ on the left side.        Posterior tibial pulses are 2+ on the right side and 2+ on the left side.   Pulmonary:      Effort: Pulmonary effort is normal.   Musculoskeletal:         General: Normal range of motion.      Comments: Inspection and palpation of the muscles joints and bones of both lower extremities reveal that muscle strength for the anterior lateral and posterior muscle groups and intrinsic muscle groups of the foot are all 5 over 5 symmetrical.  Ankle subtalar midtarsal and digital joint range of motion are within normal limits, nonpainful, without crepitus or effusion.  Patient exhibits a normal angle and base  of gait.  Palpation of the tendons reveal no defects.   Skin:     General: Skin is warm and dry.      Capillary Refill: Capillary refill takes 2 to 3 seconds.      Comments: Skin turgor is normal bilaterally.  Skin texture is well hydrated to both lower extremities.  No lesions or rashes or wounds appreciated bilaterally.  Nail plates 1 through 5 bilaterally are within normal limits for length and thickness.  No nail clubbing or incurvation noted.   Neurological:      Mental Status: She is alert and oriented to person, place, and time.      Comments: Sharp dull light touch vibratory proprioceptive sensation are intact bilaterally.  Deep tendon reflexes to patellar and Achilles tendon are symmetrical 2 over 4 bilaterally.  No ankle clonus or Babinski reflexes noted bilaterally.  Coordination is normal to both feet and lower extremities.             Assessment:       Encounter Diagnoses   Name Primary?    Type 2 diabetes mellitus with hyperglycemia, without long-term current use of insulin Yes    Onychomycosis due to dermatophyte     Disease of nail          Plan:       Regine was seen today for nail care and diabetes mellitus.    Diagnoses and all orders for this visit:    Type 2 diabetes mellitus with hyperglycemia, without long-term current use of insulin    Onychomycosis due to dermatophyte    Disease of nail    Other orders  -     terbinafine HCL (LAMISIL) 250 mg tablet; Take 1 tablet (250 mg total) by mouth once daily.  -     ciclopirox (PENLAC) 8 % Soln; Apply topically nightly.    I counseled the patient on her conditions, their implications and medical management.    We discussed using Lamisil for onychomycosis. This drug offers a fairly high but not universal cure rate. A 12 week treatment course is recommended. The patient is aware that rare cases of liver injury have been reported; and agrees to come in for liver function tests at 6 weeks of treatment. The symptoms of liver disease have been discussed;  call if such occurs. In addition, some insurance plans do not cover the expense of this drug for treating a cosmetic condition, and the patient understands they may have to pay for the medication. Other side effects, such as headaches and rashes, have also been discussed.    Shoe inspection. Diabetic Foot Education. Patient reminded of the importance of good nutrition and blood sugar control to help prevent podiatric complications of diabetes. Patient instructed on proper foot hygeine. We discussed wearing proper shoe gear, daily foot inspections and Diabetic foot education in detail.    Return to clinic in 12 months or sooner if problems arise   .

## 2023-03-03 ENCOUNTER — CLINICAL SUPPORT (OUTPATIENT)
Dept: REHABILITATION | Facility: HOSPITAL | Age: 68
End: 2023-03-03
Payer: MEDICARE

## 2023-03-03 DIAGNOSIS — G89.29 CHRONIC LEFT SHOULDER PAIN: ICD-10-CM

## 2023-03-03 DIAGNOSIS — M25.512 CHRONIC LEFT SHOULDER PAIN: ICD-10-CM

## 2023-03-03 DIAGNOSIS — M54.2 NECK PAIN: Primary | ICD-10-CM

## 2023-03-03 PROCEDURE — 97140 MANUAL THERAPY 1/> REGIONS: CPT | Mod: PN

## 2023-03-03 PROCEDURE — 97110 THERAPEUTIC EXERCISES: CPT | Mod: PN

## 2023-03-03 PROCEDURE — 97112 NEUROMUSCULAR REEDUCATION: CPT | Mod: PN

## 2023-03-03 NOTE — PROGRESS NOTES
Physical Therapy Daily Treatment Note     Name: Regine Osorio  Clinic Number: [id number]    Therapy Diagnosis:   Encounter Diagnoses   Name Primary?    Neck pain Yes    Chronic left shoulder pain        Physician: Fauzia Vidales,*    Visit Date: 3/3/2023    Physician Orders: PT Eval and Treat   Medical Diagnosis from Referral:   Z98.1 (ICD-10-CM) - S/P cervical spinal fusion   M75.02 (ICD-10-CM) - Adhesive capsulitis of left shoulder      Evaluation Date: 12/9/2022  Authorization Period Expiration: 1/6/2023  Plan of Care Expiration:, 3/14/2023  Progress Note Due: 2/28/2023  Visit # / Visits authorized: 13/20  Remaining Visits Scheduled - 4  PTA Consecutive Visits - 0/6    5th Visit FOTO - 36 (1/5/2023)  12th Visit FOTO -  38 (1/31/2023)  15th visit - 45.54  (2/23/2023)  D/C FOTO - (Date, Score/ turn green )    Time In: 9:30 am  Time Out: 10:15 am  Billable Time: 45 minutes  Non-Billable Time: 00 minutes    Precautions: Standard, Diabetes, and HTN  Insurance: Payor: [employer] / Plan: [employer] CHOICES 65 / Product Type: Medicare Advantage /     Subjective     Pt. Reports: continued right shoulder pain and left shoulder pain with mopping      She is compliant with home exercise program.  Response to previous treatment: soreness    Pre-Treatment Pain Rating: 3/10 with muscle relaxer   Post-Treatment Pain Rating: 3/10  Location: left shoulder and cervical spine    Objective     Regine received therapeutic exercises to develop strength, endurance, ROM, and flexibility for 20 minutes including:    Cervical/Shoulder Assessment   Cervical active range of motion x10 (Rotation/Flexion-Extension)   Cervical SNAGS 2x10, with a 2 sec hold (Rotation/Extension)   Scapular retractions 2x10  Shoulder shrugs 3x10  Static shoulder external rotation /internal rotation and Abduction stretch - 2' each   External rotation with cane - 30 repititions    Bilateral External Rotation with Theraband 3x10,  "Red Theraband   Supine Dowel Exercises 2x10 with a 2' sec hold (Flexion/External0 Rotation/Abduction/Extension)   Towel Internal Rotation Stretch 2x8 with a 2' sec hold    Table slides flexion/abduction/scaption - 3' each    Resisted ER/IR 2x10 red theraband/green theraband(no IR today)  Serratus punches 2x10 2#  Supine Shoulder Flexion 2x10, Red Theraband   Supine Shoulder Abduction 2x10, Red Theraband   Ball Up the Wall 2x5 / Wall Slides 2x10 - Flexion and Abduction    Side lying sleeper stretch - 3X30"    *Bold exercise performed     Regine received the following manual therapy techniques: Soft tissue Mobilization were applied to the: L shoulder for 15 minutes, including:    GHJ inferior, anterior, posterior mobilizations grade III  PROM of L shoulder into external rotation  at 90 degrees   Soft-Tissue Mobilization to the levator scap, upper trap, sub-occipitals, and paraspinals.      Regine participated in neuromuscular re-education activities to improve: Coordination and Proprioception for 10 minutes. The following activities were included:      Shoulder Isometrics x20 (flexion, extension, IR, ER)  Chin Tucks with red theraband 2x10 with 3 sec hold   Theraband Diagonals 2x8, Red Theraband   Cervical active range of motion with Ball Resistance 3x8 (Rotation/Flexion-Extension)   Cervical Lift Offs 2x10 with a 2' sec hold   Cervical Lift Offs with Rotation 2x5 - stopped due to inability to flex and rotate cervical spine. (Supine Cervical Rotation instead)   Cervical Ball Squeezes with active range of motion 2x15 (Flexion-Extension/Rotation)   Seated with Hip Hinge Cervical Extension 2x12  Seated/Sidelying Cervical Side Bend 2x12    Home Exercises Provided and Patient Education Provided     Education provided:   - Continue with HEP (Table slides added 1/13/2022)    Written Home Exercises Provided: Patient instructed to cont prior HEP.  Exercises were reviewed and Regine was able to demonstrate them prior to " the end of the session.  Regine demonstrated good  understanding of the education provided.     See EMR under Patient Instructions for exercises provided prior visit.    Assessment     Patient currently presents to therapy with displays of limited bilateral  shoulder mobility and pain with functional activity. Patient issued additional home exercise program  to improve mobility of the GH joint. Patient tolerate manual therapy fair secondary to pain. Patient was also taped during today's treatment session to improve posture and reduce rounded shoulder posture. Regine had no adverse effects with exercise and will continue to benefit from skilled therapy.       Regine Is progressing well towards Her goals.     Pt prognosis is Fair.     Pt will continue to benefit from skilled outpatient physical therapy to address the deficits listed in the problem list box on initial evaluation, provide pt/family education and to maximize pt's level of independence in the home and community environment.     Pt's spiritual, cultural and educational needs considered and pt agreeable to plan of care and goals.    Anticipated barriers to physical therapy: None    Goals:  Short Term Goals: 4 weeks   Patient will be independent with HEP at home to increase PT compliance.  Goal Met 1/5/2023      Patient will be able to increase active range of motion by 10° in each plane to increase mobility of the shoulder and cervical spine Goal Met 1/5/2023      Patient will be able to hold 5 lbs at 90 degrees of shoulder flexion and abduction for 15 sec to increase shoulder strength  Progressing 3/3/2023               Long Term Goals: 8 weeks   Patient will decrease Neck Index score to <20% to increase functional capacity.  Progressing 3/3/2023     Patient will be able to drive for 30 min with < 4/10 pain to the cervical and shoulder region to increase functional capacity  Goal Met 1/31/2023   Patient will be able to lift 5 lbs overhead 5 times  with < 2/10 pain to the shoulder to increase functional capacity  Progressing 3/3/2023             Plan     Continue with current POC     Progress duration, workload, and resistance levels of the Therapeutic Activities and Exercises if the patient does not exhibit any pain, swelling, or other symptoms. Progress instruction in-home exercise progression and modification, including symptom management.     Pepe Jung, PT,  3/3/2023

## 2023-03-06 ENCOUNTER — OFFICE VISIT (OUTPATIENT)
Dept: PRIMARY CARE CLINIC | Facility: CLINIC | Age: 68
End: 2023-03-06
Payer: MEDICARE

## 2023-03-06 VITALS
HEART RATE: 116 BPM | WEIGHT: 242.38 LBS | SYSTOLIC BLOOD PRESSURE: 140 MMHG | HEIGHT: 65 IN | BODY MASS INDEX: 40.38 KG/M2 | OXYGEN SATURATION: 97 % | DIASTOLIC BLOOD PRESSURE: 67 MMHG

## 2023-03-06 DIAGNOSIS — I10 ESSENTIAL HYPERTENSION: ICD-10-CM

## 2023-03-06 DIAGNOSIS — L29.9 ITCHING: ICD-10-CM

## 2023-03-06 DIAGNOSIS — R13.10 DYSPHAGIA, UNSPECIFIED TYPE: Primary | ICD-10-CM

## 2023-03-06 PROCEDURE — 3077F PR MOST RECENT SYSTOLIC BLOOD PRESSURE >= 140 MM HG: ICD-10-PCS | Mod: CPTII,S$GLB,, | Performed by: STUDENT IN AN ORGANIZED HEALTH CARE EDUCATION/TRAINING PROGRAM

## 2023-03-06 PROCEDURE — 4010F ACE/ARB THERAPY RXD/TAKEN: CPT | Mod: CPTII,S$GLB,, | Performed by: STUDENT IN AN ORGANIZED HEALTH CARE EDUCATION/TRAINING PROGRAM

## 2023-03-06 PROCEDURE — 3288F FALL RISK ASSESSMENT DOCD: CPT | Mod: CPTII,S$GLB,, | Performed by: STUDENT IN AN ORGANIZED HEALTH CARE EDUCATION/TRAINING PROGRAM

## 2023-03-06 PROCEDURE — 4010F PR ACE/ARB THEARPY RXD/TAKEN: ICD-10-PCS | Mod: CPTII,S$GLB,, | Performed by: STUDENT IN AN ORGANIZED HEALTH CARE EDUCATION/TRAINING PROGRAM

## 2023-03-06 PROCEDURE — 99214 PR OFFICE/OUTPT VISIT, EST, LEVL IV, 30-39 MIN: ICD-10-PCS | Mod: S$GLB,,, | Performed by: STUDENT IN AN ORGANIZED HEALTH CARE EDUCATION/TRAINING PROGRAM

## 2023-03-06 PROCEDURE — 3077F SYST BP >= 140 MM HG: CPT | Mod: CPTII,S$GLB,, | Performed by: STUDENT IN AN ORGANIZED HEALTH CARE EDUCATION/TRAINING PROGRAM

## 2023-03-06 PROCEDURE — 3008F PR BODY MASS INDEX (BMI) DOCUMENTED: ICD-10-PCS | Mod: CPTII,S$GLB,, | Performed by: STUDENT IN AN ORGANIZED HEALTH CARE EDUCATION/TRAINING PROGRAM

## 2023-03-06 PROCEDURE — 99999 PR PBB SHADOW E&M-EST. PATIENT-LVL V: ICD-10-PCS | Mod: PBBFAC,,, | Performed by: STUDENT IN AN ORGANIZED HEALTH CARE EDUCATION/TRAINING PROGRAM

## 2023-03-06 PROCEDURE — 3008F BODY MASS INDEX DOCD: CPT | Mod: CPTII,S$GLB,, | Performed by: STUDENT IN AN ORGANIZED HEALTH CARE EDUCATION/TRAINING PROGRAM

## 2023-03-06 PROCEDURE — 1101F PT FALLS ASSESS-DOCD LE1/YR: CPT | Mod: CPTII,S$GLB,, | Performed by: STUDENT IN AN ORGANIZED HEALTH CARE EDUCATION/TRAINING PROGRAM

## 2023-03-06 PROCEDURE — 3288F PR FALLS RISK ASSESSMENT DOCUMENTED: ICD-10-PCS | Mod: CPTII,S$GLB,, | Performed by: STUDENT IN AN ORGANIZED HEALTH CARE EDUCATION/TRAINING PROGRAM

## 2023-03-06 PROCEDURE — 99999 PR PBB SHADOW E&M-EST. PATIENT-LVL V: CPT | Mod: PBBFAC,,, | Performed by: STUDENT IN AN ORGANIZED HEALTH CARE EDUCATION/TRAINING PROGRAM

## 2023-03-06 PROCEDURE — 1159F PR MEDICATION LIST DOCUMENTED IN MEDICAL RECORD: ICD-10-PCS | Mod: CPTII,S$GLB,, | Performed by: STUDENT IN AN ORGANIZED HEALTH CARE EDUCATION/TRAINING PROGRAM

## 2023-03-06 PROCEDURE — 3078F PR MOST RECENT DIASTOLIC BLOOD PRESSURE < 80 MM HG: ICD-10-PCS | Mod: CPTII,S$GLB,, | Performed by: STUDENT IN AN ORGANIZED HEALTH CARE EDUCATION/TRAINING PROGRAM

## 2023-03-06 PROCEDURE — 3078F DIAST BP <80 MM HG: CPT | Mod: CPTII,S$GLB,, | Performed by: STUDENT IN AN ORGANIZED HEALTH CARE EDUCATION/TRAINING PROGRAM

## 2023-03-06 PROCEDURE — 99214 OFFICE O/P EST MOD 30 MIN: CPT | Mod: S$GLB,,, | Performed by: STUDENT IN AN ORGANIZED HEALTH CARE EDUCATION/TRAINING PROGRAM

## 2023-03-06 PROCEDURE — 1126F AMNT PAIN NOTED NONE PRSNT: CPT | Mod: CPTII,S$GLB,, | Performed by: STUDENT IN AN ORGANIZED HEALTH CARE EDUCATION/TRAINING PROGRAM

## 2023-03-06 PROCEDURE — 1101F PR PT FALLS ASSESS DOC 0-1 FALLS W/OUT INJ PAST YR: ICD-10-PCS | Mod: CPTII,S$GLB,, | Performed by: STUDENT IN AN ORGANIZED HEALTH CARE EDUCATION/TRAINING PROGRAM

## 2023-03-06 PROCEDURE — 1159F MED LIST DOCD IN RCRD: CPT | Mod: CPTII,S$GLB,, | Performed by: STUDENT IN AN ORGANIZED HEALTH CARE EDUCATION/TRAINING PROGRAM

## 2023-03-06 PROCEDURE — 1126F PR PAIN SEVERITY QUANTIFIED, NO PAIN PRESENT: ICD-10-PCS | Mod: CPTII,S$GLB,, | Performed by: STUDENT IN AN ORGANIZED HEALTH CARE EDUCATION/TRAINING PROGRAM

## 2023-03-06 RX ORDER — PANTOPRAZOLE SODIUM 40 MG/1
40 TABLET, DELAYED RELEASE ORAL DAILY
Qty: 30 TABLET | Refills: 1 | Status: SHIPPED | OUTPATIENT
Start: 2023-03-06 | End: 2023-03-09

## 2023-03-07 ENCOUNTER — PATIENT MESSAGE (OUTPATIENT)
Dept: DERMATOLOGY | Facility: CLINIC | Age: 68
End: 2023-03-07
Payer: MEDICARE

## 2023-03-07 ENCOUNTER — DOCUMENTATION ONLY (OUTPATIENT)
Dept: REHABILITATION | Facility: HOSPITAL | Age: 68
End: 2023-03-07

## 2023-03-07 ENCOUNTER — CLINICAL SUPPORT (OUTPATIENT)
Dept: REHABILITATION | Facility: HOSPITAL | Age: 68
End: 2023-03-07
Payer: MEDICARE

## 2023-03-07 DIAGNOSIS — M54.2 NECK PAIN: Primary | ICD-10-CM

## 2023-03-07 DIAGNOSIS — G89.29 CHRONIC LEFT SHOULDER PAIN: ICD-10-CM

## 2023-03-07 DIAGNOSIS — M25.512 CHRONIC LEFT SHOULDER PAIN: ICD-10-CM

## 2023-03-07 PROCEDURE — 97110 THERAPEUTIC EXERCISES: CPT | Mod: PN,CQ

## 2023-03-07 PROCEDURE — 97112 NEUROMUSCULAR REEDUCATION: CPT | Mod: PN,CQ

## 2023-03-07 NOTE — PROGRESS NOTES
"Subjective:   Patient ID: Regine Osorio is a 68 y.o. female.  Chief Complaint:  Emesis and Nausea    Ms. Osorio presents today to discuss her nausea and vomiting. She says her symptoms began about 1 month ago and have stayed about the same over this period of time with the exception of one nausea episode on 3/3/23 where she experienced nausea while driving and shortly after her "throat closed up." She had difficulty speaking and swallowing and was forced to pull over to the side of the road until the episode passed several minutes later.   The only medication change that occurred around the time of symptom onset was a change in iron supplement dose from 25mg to 50mg daily.    Emesis   This is a new problem. The current episode started 1 to 4 weeks ago. The problem occurs less than 2 times per day. The problem has been unchanged. Emesis appearance: Mostly dry heaving with occasional mucous emesis. There has been no fever. Pertinent negatives include no abdominal pain, chest pain, chills, coughing, diarrhea, dizziness, fever, headaches, sweats or weight loss. She has tried anti-emetic for the symptoms. The treatment provided moderate relief.   Nausea  This is a new problem. The current episode started 1 to 4 weeks ago. The problem occurs every several days. The problem has been unchanged. Associated symptoms include nausea and vomiting. Pertinent negatives include no abdominal pain, change in bowel habit, chest pain, chills, congestion, coughing, diaphoresis, fatigue, fever, headaches, vertigo, visual change or weakness. Nothing aggravates the symptoms. She has tried rest, relaxation and drinking for the symptoms. The treatment provided mild relief.     Current Outpatient Medications:     acetic acid (VOSOL) 2 % otic solution, Place 4 drops into both ears as needed., Disp: , Rfl:     aspirin (ECOTRIN) 81 MG EC tablet, Take 81 mg by mouth once daily., Disp: , Rfl:     augmented betamethasone dipropionate " (DIPROLENE-AF) 0.05 % cream, Apply topically 2 (two) times to 4 times daily to area of back when itchy or burning.Stop using steroid topical when skin is non itchy.  Do not treat dark or red coloring., Disp: 60 g, Rfl: 0    azelastine (ASTELIN) 137 mcg (0.1 %) nasal spray, 1 SPRAY IN EACH NOSTRIL TWICE DAILY Strength: 137 mcg (0.1 %), Disp: 30 mL, Rfl: 2    blood sugar diagnostic (ONETOUCH VERIO TEST STRIPS) Strp, To Check BG 1x daily, Disp: 100 each, Rfl: 3    blood sugar diagnostic Strp, Check BG twice daily, Disp: 200 each, Rfl: 2    cetirizine (ZYRTEC) 10 MG tablet, TAKE 1 TABLET BY MOUTH EVERY DAY FOR ALLERGIES, Disp: 90 tablet, Rfl: 0    ciclopirox (PENLAC) 8 % Soln, Apply topically nightly., Disp: 6.6 mL, Rfl: 3    clobetasoL (TEMOVATE) 0.05 % external solution, APPLY TOPICALLY TO THE SCALP TWICE DAILY AS NEEDED FOR ITCHING OR IRRITATION, Disp: 50 mL, Rfl: 0    dextromethorphan-guaiFENesin  mg/5 ml (ROBITUSSIN-DM)  mg/5 mL liquid, Take 5 mLs by mouth every 12 (twelve) hours., Disp: , Rfl:     EScitalopram oxalate (LEXAPRO) 10 MG tablet, Take 1 tablet (10 mg total) by mouth once daily., Disp: 30 tablet, Rfl: 2    fluticasone propionate (FLONASE) 50 mcg/actuation nasal spray, 2 sprays to each lateral nostril once a day, Disp: 48 g, Rfl: 3    glipiZIDE 5 MG TR24, Take 1 tablet (5 mg total) by mouth 2 (two) times daily., Disp: 180 tablet, Rfl: 3    hydroCHLOROthiazide (HYDRODIURIL) 25 MG tablet, Take 1 tablet (25 mg total) by mouth once daily., Disp: 90 tablet, Rfl: 3    ketoconazole (NIZORAL) 2 % cream, APPLY EXTERNALLY TO THE AFFECTED AREA EVERY DAY, Disp: 15 g, Rfl: 2    lancets Misc, To check BG 2 times daily, to use with insurance preferred meter, Disp: 200 each, Rfl: 3    latanoprost 0.005 % ophthalmic solution, Place 1 drop into both eyes every evening., Disp: 7.5 mL, Rfl: 3    losartan (COZAAR) 50 MG tablet, Take 1 tablet (50 mg total) by mouth once daily., Disp: 90 tablet, Rfl: 1    metFORMIN  "(GLUCOPHAGE) 500 MG tablet, Take 1 tablet (500 mg total) by mouth 2 (two) times daily with meals., Disp: 180 tablet, Rfl: 3    ondansetron (ZOFRAN-ODT) 8 MG TbDL, Take 1 tablet (8 mg total) by mouth every 8 (eight) hours as needed (Nausea)., Disp: 30 tablet, Rfl: 2    rizatriptan (MAXALT-MLT) 10 MG disintegrating tablet, Take 1 tablet (10 mg total) by mouth every 2 (two) hours as needed for Migraine. Max 30 mg/day., Disp: 12 tablet, Rfl: 5    terbinafine HCL (LAMISIL) 250 mg tablet, Take 1 tablet (250 mg total) by mouth once daily., Disp: 90 tablet, Rfl: 0    ubrogepant (UBRELVY) 100 mg tablet, Take 1 tablet (100 mg total) by mouth every 2 (two) hours as needed for Migraine. Max 200 mg/day., Disp: 10 tablet, Rfl: 2    blood-glucose meter kit, To check BG 2 times daily, to use with insurance preferred meter, Disp: 1 each, Rfl: 0    pantoprazole (PROTONIX) 40 MG tablet, Take 1 tablet (40 mg total) by mouth once daily., Disp: 30 tablet, Rfl: 1    valACYclovir (VALTREX) 1000 MG tablet, Take 1 tablet (1,000 mg total) by mouth 3 (three) times daily. for 14 days, Disp: 42 tablet, Rfl: 0    Review of Systems   Constitutional:  Negative for chills, diaphoresis, fatigue, fever and weight loss.   HENT:  Positive for trouble swallowing. Negative for congestion.    Respiratory:  Negative for cough, shortness of breath and wheezing.    Cardiovascular:  Negative for chest pain, palpitations and leg swelling.   Gastrointestinal:  Positive for nausea and vomiting. Negative for abdominal distention, abdominal pain, blood in stool, change in bowel habit, constipation and diarrhea.   Endocrine: Negative for polydipsia and polyuria.   Neurological:  Negative for dizziness, vertigo, weakness and headaches.     Objective:   BP (!) 140/67 (BP Location: Left arm, Patient Position: Sitting, BP Method: X-Large (Automatic))   Pulse (!) 116   Ht 5' 5" (1.651 m)   Wt 109.9 kg (242 lb 6.3 oz)   SpO2 97%   BMI 40.34 kg/m²     Physical " Exam  Constitutional:       Appearance: She is obese. She is not ill-appearing.   Cardiovascular:      Rate and Rhythm: Regular rhythm. Tachycardia present.      Pulses: Normal pulses.      Heart sounds: Normal heart sounds.   Pulmonary:      Effort: Pulmonary effort is normal.      Breath sounds: Normal breath sounds.   Abdominal:      General: Bowel sounds are normal. There is no distension.      Palpations: Abdomen is soft.      Tenderness: There is no abdominal tenderness.   Skin:     General: Skin is warm and dry.   Neurological:      General: No focal deficit present.      Mental Status: She is alert and oriented to person, place, and time.   Psychiatric:         Mood and Affect: Mood normal.         Behavior: Behavior normal.         Thought Content: Thought content normal.         Judgment: Judgment normal.       Assessment:       ICD-10-CM ICD-9-CM   1. Dysphagia, unspecified type  R13.10 787.20   2. Essential hypertension  I10 401.9     Plan:   Dysphagia, unspecified type  -     pantoprazole (PROTONIX) 40 MG tablet; Take 1 tablet (40 mg total) by mouth once daily.  Dispense: 30 tablet; Refill: 1  -     Ambulatory referral/consult to Gastroenterology; Future; Expected date: 03/13/2023  Continue taking ondansetron 8mg every 8 hours PRN for nausea.   Discussed how her nausea and vomiting may be related to her increased iron supplement usage. Pt will temporarily stop taking iron supplements and see if her symptoms improve.  Previous Hx of GERD on Protonix but she stopped taking it about 2 years ago because her symptoms improved. Discussed how her symptoms and dysphagia episode may be related to reflux. Prescribed Protonix 40mg and ordered gastroenterology referral to evaluate her dysphagia.     Essential hypertension  Previously hypertensive to 154/74 last visit despite taking HCTZ 25mg and losartan 50mg. Improved today to 140/67, discussed to need to increase the dose of her losartan if she is still  hypertensive next visit.     Cristina Schmidt, MS4    I hereby acknowledge that I am relying upon documentation authored by a medical student working under my supervision and further I hereby attest that I have verified the student documentation or findings by personally performing the physical exam and medical decision making activities of the Evaluation and Management service to be billed.    Gladis Melissa MD

## 2023-03-07 NOTE — PROGRESS NOTES
30 day PT-PTA face-face discussion with Qasim Sánchez DPT re:Name: Regine Osorio Clinic Number: [id number] patient status, POC, and plan for progression done

## 2023-03-07 NOTE — PROGRESS NOTES
Physical Therapy Daily Treatment Note     Name: Regine Osorio  Clinic Number: [id number]    Therapy Diagnosis:   Encounter Diagnoses   Name Primary?    Neck pain Yes    Chronic left shoulder pain        Physician: Fauzia Vidales,*    Visit Date: 3/7/2023    Physician Orders: PT Eval and Treat   Medical Diagnosis from Referral:   Z98.1 (ICD-10-CM) - S/P cervical spinal fusion   M75.02 (ICD-10-CM) - Adhesive capsulitis of left shoulder      Evaluation Date: 2022  Authorization Period Expiration: 2023  Plan of Care Expiration:, 3/14/2023  Progress Note Due: 2023  Visit # / Visits authorized:   Remaining Visits Scheduled - 3  PTA Consecutive Visits -     5th Visit FOTO - 36 (2023)  12th Visit FOTO -  38 (2023)  15th visit - 45.54  (2023)  D/C FOTO - (Date, Score/ turn green )    Time In: 9:34 am  Time Out: 10:15 am  Billable Time: 41 minutes  Non-Billable Time: 00 minutes    Precautions: Standard, Diabetes, and HTN  Insurance: Payor: [employer] / Plan: [employer] CHOICES 65 / Product Type: Medicare Advantage /     Subjective     Pt. Reports: Still some soreness in both shoulders. Tape came off on . Pt can reach some of her hair with left arm       She is compliant with home exercise program.  Response to previous treatment: soreness    Pre-Treatment Pain Ratin/10 with muscle relaxer   Post-Treatment Pain Ratin/10  Location: left shoulder and cervical spine    Objective     Regine received therapeutic exercises to develop strength, endurance, ROM, and flexibility for 31 minutes including:    Cervical/Shoulder Assessment   Cervical active range of motion x10 (Rotation/Flexion-Extension)   Cervical SNAGS 2x10, with a 2 sec hold (Rotation/Extension)   Scapular retractions 2x10  Shoulder shrugs 3x10  Static shoulder external rotation /internal rotation and Abduction stretch - 2' each   External rotation with cane - 30 repititions   "  Bilateral External Rotation with Theraband 3x10, Red Theraband   Supine Dowel Exercises 2x10 with a 2' sec hold (Flexion/External0 Rotation/Abduction/Extension)   Towel Internal Rotation Stretch 2x8 with a 2' sec hold    Table slides flexion/abduction/scaption - 3' each    Resisted ER/IR 2x10 red theraband/green theraband(no IR today)  Serratus punches 2x10 3#  Supine Shoulder Flexion 2x10, Red Theraband   Supine Shoulder Abduction 2x10, Red Theraband   Ball Up the Wall 2x5 / Wall Slides 2x10 - Flexion and Abduction    Side lying sleeper stretch - 3X30"    *Bold exercise performed     Regine received the following manual therapy techniques: Soft tissue Mobilization were applied to the: L shoulder for 00 minutes, including:    GHJ inferior, anterior, posterior mobilizations grade III  PROM of L shoulder into external rotation  at 90 degrees   Soft-Tissue Mobilization to the levator scap, upper trap, sub-occipitals, and paraspinals.      Regine participated in neuromuscular re-education activities to improve: Coordination and Proprioception for 10 minutes. The following activities were included:      Shoulder Isometrics x20 (flexion, extension, IR, ER)  Chin Tucks with red theraband 2x10 with 3 sec hold   Theraband Diagonals 2x8, Red Theraband   Cervical active range of motion with Ball Resistance 3x8 (Rotation/Flexion-Extension)   Cervical Lift Offs 2x10 with a 2' sec hold   Cervical Lift Offs with Rotation 2x5 - stopped due to inability to flex and rotate cervical spine. (Supine Cervical Rotation instead)   Cervical Ball Squeezes with active range of motion 2x15 (Flexion-Extension/Rotation)   Seated with Hip Hinge Cervical Extension 2x12  Seated/Sidelying Cervical Side Bend 2x12    Home Exercises Provided and Patient Education Provided     Education provided:   - Continue with HEP (Table slides added 1/13/2022)    Written Home Exercises Provided: Patient instructed to cont prior HEP.  Exercises were reviewed " and Regine was able to demonstrate them prior to the end of the session.  Regine demonstrated good  understanding of the education provided.     See EMR under Patient Instructions for exercises provided prior visit.    Assessment     Pt tolerated session well. She continues to see improvements with ADL such as starting to comb hair with L arm. Limitations with shoulder ROM and strength still present. Regine had no adverse effects with exercise and will continue to benefit from skilled therapy.       Regine Is progressing well towards Her goals.     Pt prognosis is Fair.     Pt will continue to benefit from skilled outpatient physical therapy to address the deficits listed in the problem list box on initial evaluation, provide pt/family education and to maximize pt's level of independence in the home and community environment.     Pt's spiritual, cultural and educational needs considered and pt agreeable to plan of care and goals.    Anticipated barriers to physical therapy: None    Goals:  Short Term Goals: 4 weeks   Patient will be independent with HEP at home to increase PT compliance.  Goal Met 1/5/2023      Patient will be able to increase active range of motion by 10° in each plane to increase mobility of the shoulder and cervical spine Goal Met 1/5/2023      Patient will be able to hold 5 lbs at 90 degrees of shoulder flexion and abduction for 15 sec to increase shoulder strength  Progressing 3/7/2023               Long Term Goals: 8 weeks   Patient will decrease Neck Index score to <20% to increase functional capacity.  Progressing 3/7/2023     Patient will be able to drive for 30 min with < 4/10 pain to the cervical and shoulder region to increase functional capacity  Goal Met 1/31/2023   Patient will be able to lift 5 lbs overhead 5 times with < 2/10 pain to the shoulder to increase functional capacity  Progressing 3/7/2023             Plan     Continue with current POC     Progress duration,  workload, and resistance levels of the Therapeutic Activities and Exercises if the patient does not exhibit any pain, swelling, or other symptoms. Progress instruction in-home exercise progression and modification, including symptom management.     Aris Cortes, POLLO,  3/7/2023

## 2023-03-08 RX ORDER — BETAMETHASONE DIPROPIONATE 0.5 MG/G
CREAM TOPICAL 2 TIMES DAILY
Qty: 50 G | Refills: 0 | Status: SHIPPED | OUTPATIENT
Start: 2023-03-08 | End: 2023-04-03 | Stop reason: SDUPTHER

## 2023-03-09 ENCOUNTER — CLINICAL SUPPORT (OUTPATIENT)
Dept: REHABILITATION | Facility: HOSPITAL | Age: 68
End: 2023-03-09
Payer: MEDICARE

## 2023-03-09 ENCOUNTER — PATIENT OUTREACH (OUTPATIENT)
Dept: ADMINISTRATIVE | Facility: HOSPITAL | Age: 68
End: 2023-03-09
Payer: MEDICARE

## 2023-03-09 DIAGNOSIS — M25.512 CHRONIC LEFT SHOULDER PAIN: ICD-10-CM

## 2023-03-09 DIAGNOSIS — M54.2 NECK PAIN: Primary | ICD-10-CM

## 2023-03-09 DIAGNOSIS — G89.29 CHRONIC LEFT SHOULDER PAIN: ICD-10-CM

## 2023-03-09 PROCEDURE — 97140 MANUAL THERAPY 1/> REGIONS: CPT | Mod: PN

## 2023-03-09 PROCEDURE — 97110 THERAPEUTIC EXERCISES: CPT | Mod: PN

## 2023-03-09 PROCEDURE — 97112 NEUROMUSCULAR REEDUCATION: CPT | Mod: PN

## 2023-03-09 NOTE — PROGRESS NOTES
Physical Therapy Daily Treatment Note     Name: Regine Osorio  Clinic Number: [id number]    Therapy Diagnosis:   Encounter Diagnoses   Name Primary?    Neck pain Yes    Chronic left shoulder pain      Physician: Fauzia Vidales,*    Visit Date: 3/9/2023    Physician Orders: PT Eval and Treat   Medical Diagnosis from Referral:   Z98.1 (ICD-10-CM) - S/P cervical spinal fusion   M75.02 (ICD-10-CM) - Adhesive capsulitis of left shoulder      Evaluation Date: 2022  Authorization Period Expiration: 2023  Plan of Care Expiration:,, 2023  Progress Note Due: 2023  Visit # / Visits authorized: 15/20  Remaining Visits Scheduled - 2  PTA Consecutive Visits - 0/6    5th Visit FOTO - 36 (2023)  12th Visit FOTO -  38 (2023)  15th visit - 45.54  (2023)  D/C FOTO - (Date, Score/ turn green )    Time In: 8:00 am  Time Out: 8:55 am  Billable Time: 55 minutes  Non-Billable Time: 00 minutes    Precautions: Standard, Diabetes, and HTN  Insurance: Payor: [employer] / Plan: [employer] CHOICES 65 / Product Type: Medicare Advantage /     Subjective     Pt. reports 75% improvement since the beginning of PT.  Current pain levels to the neck and shoulder are 0/10 but does report soreness to the superior aspect of the shoulders.   Limiting factors include:  Activities of daily living with overhead activities (getting dressed/self-care), improvement   Driving, but some improvement (switching lanes - looking side to side to merge)   Lifting Items overhead with Left hand (able to do better with Right upper extremity)      Improvements factors include:   Increase in range of motion of the shoulder on the Left   Pain levels   Range of motion   Activities of daily living (donning on clothes, combing hair)   Sweeping (instrumental activities of daily living)       She is compliant with home exercise program.  Response to previous treatment: soreness    Pre-Treatment Pain Ratin/10  "  Post-Treatment Pain Ratin/10  Location: left shoulder and cervical spine    Objective     CERVICAL ACTIVE RANGE OF MOTION   Flexion 30°   Extension 40°   Right Side Bend 40°   Left Side Bend 40°   Right Rotation 45°   Left Rotation 47°              SHOULDER ACTIVE RANGE OF MOTION     Left Right    Flexion 100° 150°   Abduction 105° 150°   External Rotation 55° 75°   Internal Rotation (Apley Scratch Test) T10 T8   Extension Within Normal Limits  Within Normal Limits               UPPER LOWER EXTREMITY STRENGTH     Left  Right    Shoulder Flexion 4/5 4/5   Shoulder Abduction 4+/5 4+/5   Shoulder External Rotation 4/5 4+/5   Shoulder Internal Rotation 4+/5 4+/5   Elbow Flexion 5/5 5/5   Elbow Extension 5/5 5/5       Regine received therapeutic exercises to develop strength, endurance, ROM, and flexibility for 25 minutes including:  Reassessment  Cervical/Shoulder Assessment   Cervical active range of motion x10 (Rotation/Flexion-Extension)   Cervical SNAGS 2x10, with a 2 sec hold (Rotation/Extension)   Scapular retractions 2x10  Shoulder shrugs 3x10  Static shoulder external rotation /internal rotation and Abduction stretch - 2' each   External rotation with cane - 30 repititions    Bilateral External Rotation with Theraband 3x10, Red Theraband   Supine Dowel Exercises 2x10 with a 2' sec hold (Flexion/External0 Rotation/Abduction/Extension)   Towel Internal Rotation Stretch 2x8 with a 2' sec hold    Table slides flexion/abduction/scaption - 3' each    Resisted ER/IR 2x10 red theraband/green theraband(no IR today)  Serratus punches 2x10 3#  Supine Shoulder Flexion 2x8, Green Theraband, bilaterally   Supine Shoulder Abduction 2x8, Green Theraband, bilaterally   Ball Up the Wall 2x5 / Wall Slides 2x10 - Flexion and Abduction    Side lying sleeper stretch - 3X30"  Supine Shoulder External Rotation --> Internal Rotation at 90° of Abduction 2x8, Left #2, Right #3    *Bold exercise performed     Regine received the " following manual therapy techniques: Soft tissue Mobilization were applied to the: L shoulder for 15 minutes, including:  GHJ inferior, anterior, posterior mobilizations grade III  PROM of L shoulder and cervical spine in all planes with overpressure   UPAs of the Cervical Spine C2-4, Grade II  Soft-Tissue Mobilization to the levator scap, upper trap, sub-occipitals, and paraspinals.      Regine participated in neuromuscular re-education activities to improve: Coordination and Proprioception for 15 minutes. The following activities were included:  Shoulder Isometrics x20 (flexion, extension, IR, ER)  Chin Tucks with red theraband 2x10 with 3 sec hold   Theraband Diagonals 2x8, Red Theraband   Cervical active range of motion with Ball Resistance 3x8 (Rotation/Flexion-Extension)   Cervical Lift Offs 2x10 with a 2' sec hold   Cervical Lift Offs with Rotation 3x8   Cervical Ball Squeezes with active range of motion 2x15 (Flexion-Extension/Rotation)   Seated with Hip Hinge Cervical Extension 2x12  Seated/Sidelying Cervical Side Bend 2x12  Bus Drivers 3x8, Red Theraband   Bent Over Rows 3x8, #4     Home Exercises Provided and Patient Education Provided     Education provided:   - Continue with HEP (Table slides added 1/13/2022)    Written Home Exercises Provided: Patient instructed to cont prior HEP.  Exercises were reviewed and Regine was able to demonstrate them prior to the end of the session.  Regine demonstrated good  understanding of the education provided.     See EMR under Patient Instructions for exercises provided prior visit.    Assessment     Pt. has been to [facility] for 19 visits due to neck and shoulder pain. Improvements have been noted with cervical and shoulder range of motion, strength, and pain levels. Limitations still present with Left shoulder and cervical mobility, endurance of the muscular structures in the neck/shoulders, stability, and pain levels. After Soft-Tissue  Mobilization and Mobilization to the Left shoulder, patient was able to passively lift upper extremity to about 110°. This was followed up with strength training to maintain new mobility. No modifications and minimal cues needed today with new exercises. Fatigue and soreness limited number of exercise performed today. Patient would continue to benefit from skilled Physical Therapy to increase mobility and strength of the neck and shoulder.     Regine Is progressing well towards Her goals.     Pt prognosis is Fair.     Pt will continue to benefit from skilled outpatient physical therapy to address the deficits listed in the problem list box on initial evaluation, provide pt/family education and to maximize pt's level of independence in the home and community environment.     Pt's spiritual, cultural and educational needs considered and pt agreeable to plan of care and goals.    Anticipated barriers to physical therapy: None    Goals:  Short Term Goals: 4 weeks   Patient will be independent with HEP at home to increase PT compliance.  Goal Met 1/5/2023      Patient will be able to increase active range of motion by 10° in each plane to increase mobility of the shoulder and cervical spine Goal Met 1/5/2023      Patient will be able to hold 5 lbs at 90 degrees of shoulder flexion and abduction for 15 sec to increase shoulder strength  Progressing 3/9/2023               Long Term Goals: 8 weeks   Patient will decrease Neck Index score to <20% to increase functional capacity.  Progressing 3/9/2023     Patient will be able to drive for 30 min with < 4/10 pain to the cervical and shoulder region to increase functional capacity  Goal Met 1/31/2023   Patient will be able to lift 5 lbs overhead 5 times with < 2/10 pain to the shoulder to increase functional capacity  Progressing 3/9/2023             Plan     Updated Plan of Care: 1x a week for 4 weeks after 2 currently scheduled.     Progress to sitting/standing exercises  for shoulder.     Progress duration, workload, and resistance levels of the Therapeutic Activities and Exercises if the patient does not exhibit any pain, swelling, or other symptoms. Progress instruction in-home exercise progression and modification, including symptom management.     Qasim Sánchez, PT, DPT  3/9/2023

## 2023-03-14 ENCOUNTER — CLINICAL SUPPORT (OUTPATIENT)
Dept: REHABILITATION | Facility: HOSPITAL | Age: 68
End: 2023-03-14
Payer: MEDICARE

## 2023-03-14 DIAGNOSIS — M25.512 CHRONIC LEFT SHOULDER PAIN: ICD-10-CM

## 2023-03-14 DIAGNOSIS — M54.2 NECK PAIN: Primary | ICD-10-CM

## 2023-03-14 DIAGNOSIS — G89.29 CHRONIC LEFT SHOULDER PAIN: ICD-10-CM

## 2023-03-14 PROCEDURE — 97140 MANUAL THERAPY 1/> REGIONS: CPT | Mod: PN

## 2023-03-14 PROCEDURE — 97112 NEUROMUSCULAR REEDUCATION: CPT | Mod: PN

## 2023-03-14 PROCEDURE — 97110 THERAPEUTIC EXERCISES: CPT | Mod: PN

## 2023-03-14 NOTE — PROGRESS NOTES
Physical Therapy Daily Treatment Note     Name: Regine Osorio  Clinic Number: [id number]    Therapy Diagnosis:   Encounter Diagnoses   Name Primary?    Neck pain Yes    Chronic left shoulder pain        Physician: Fauzia Vidales,*    Visit Date: 3/14/2023    Physician Orders: PT Eval and Treat   Medical Diagnosis from Referral:   Z98.1 (ICD-10-CM) - S/P cervical spinal fusion   M75.02 (ICD-10-CM) - Adhesive capsulitis of left shoulder      Evaluation Date: 2022  Authorization Period Expiration: 2023  Plan of Care Expiration:,, 2023  Progress Note Due: 2023  Visit # / Visits authorized:   Remaining Visits Scheduled - 5  PTA Consecutive Visits - 0/6    5th Visit FOTO - 36 (2023)  12th Visit FOTO -  38 (2023)  15th visit - 45.54  (2023)   Visit - 37 (3/14/2023)  D/C FOTO - (Date, Score/ turn green )    Time In: 9:30 am  Time Out: 10:25 am  Billable Time: 55 minutes  Non-Billable Time: 00 minutes    Precautions: Standard, Diabetes, and HTN  Insurance: Payor: [employer] / Plan: [employer] 65 / Product Type: Medicare Advantage /     Subjective     Pt. Reports: no pain to the shoulders, just soreness. She takes a muscle relaxer 1x a week when the discomfort is too much. Ms. Osorio states that she felt some discomfort to the Right shoulder last week to the top of the shoulder. It subsided with the medication.      She is compliant with home exercise program.  Response to previous treatment: soreness    Pre-Treatment Pain Ratin/10   Post-Treatment Pain Ratin/10  Location: left shoulder and cervical spine    Objective     Regine received therapeutic exercises to develop strength, endurance, ROM, and flexibility for 25 minutes including:  Cervical/Shoulder Assessment   Cervical active range of motion x10 (Rotation/Flexion-Extension)   Cervical SNAGS 2x10, with a 2 sec hold (Rotation/Extension)   Scapular retractions 2x10  Shoulder  "shrugs 3x10  Static shoulder external rotation /internal rotation and Abduction stretch - 2' each   External rotation with cane - 30 repititions    Bilateral External Rotation with Theraband 3x10, Red Theraband   Supine Dowel Exercises 2x10 with a 2' sec hold (Flexion/External0 Rotation/Abduction/Extension)   Towel Internal Rotation Stretch 2x8 with a 2' sec hold    Table slides flexion/abduction/scaption - 3' each    Resisted ER/IR 2x10 red theraband/green theraband(no IR today)  Serratus punches 2x10 3#  Seated Shoulder Flexion 2x10, Green Theraband, bilaterally   Seated Shoulder Abduction 2x10, Green Theraband, bilaterally   Ball Up the Wall 2x5 / Wall Slides 2x10 - Flexion and Abduction    Side lying sleeper stretch - 3X30"  Seated Shoulder External Rotation --> Internal Rotation at 90° of Abduction 2x8, Left #2, Right #3  Door Way Pec Stretch 2x30 sec (Low)     *Bold exercise performed     Regine received the following manual therapy techniques: Soft tissue Mobilization were applied to the: L shoulder for 15 minutes, including:  GHJ inferior, anterior, posterior mobilizations grade III  PROM of L shoulder and cervical spine in all planes with overpressure   UPAs of the Cervical Spine C2-4, Grade II  Soft-Tissue Mobilization to the levator scap, upper trap, sub-occipitals, and paraspinals.      Regine participated in neuromuscular re-education activities to improve: Coordination and Proprioception for 15 minutes. The following activities were included:  Shoulder Isometrics x20 (flexion, extension, IR, ER)  Chin Tucks with red theraband 2x10 with 3 sec hold   Theraband Diagonals 2x8, Red Theraband   Cervical active range of motion with Ball Resistance 3x8 (Rotation/Flexion-Extension)   Cervical Lift Offs 2x10 with a 2' sec hold   Cervical Lift Offs with Rotation 3x8   Cervical Ball Squeezes with active range of motion 2x15 (Flexion-Extension/Rotation)   Seated with Hip Hinge Cervical Extension " 2x12  Seated/Sidelying Cervical Side Bend 2x12  Bus Drivers 3x8, Red Theraband   Bent Over Rows 3x8, #4   Standing Single Arm Pull Downs 2x10, Green Theraband   Rhythm Stabilizers on Wall with Ball 2x12 (Next Visit)     Home Exercises Provided and Patient Education Provided     Education provided:   - Continue with HEP (Table slides added 1/13/2022)    Written Home Exercises Provided: Patient instructed to cont prior HEP.  Exercises were reviewed and Regine was able to demonstrate them prior to the end of the session.  Regine demonstrated good  understanding of the education provided.     See EMR under Patient Instructions for exercises provided prior visit.    Assessment     Patient completed the exercise program with moderate levels of discomfort and fatigue to the shoulders, Left > Right. Frequent breaks were needed due to fatigue and soreness to the shoulders. New exercise focused on stability and endurance levels and required some tactile and visual cues to correct shoulder mechanics. No modifications were needed. Patient would continue to benefit from skilled Physical Therapy to decrease discomfort and increase shoulder strength/endurance.     Regine Is progressing well towards Her goals.     Pt prognosis is Fair.     Pt will continue to benefit from skilled outpatient physical therapy to address the deficits listed in the problem list box on initial evaluation, provide pt/family education and to maximize pt's level of independence in the home and community environment.     Pt's spiritual, cultural and educational needs considered and pt agreeable to plan of care and goals.    Anticipated barriers to physical therapy: None    Goals:  Short Term Goals: 4 weeks   Patient will be independent with HEP at home to increase PT compliance.  Goal Met 1/5/2023      Patient will be able to increase active range of motion by 10° in each plane to increase mobility of the shoulder and cervical spine Goal Met  1/5/2023      Patient will be able to hold 5 lbs at 90 degrees of shoulder flexion and abduction for 15 sec to increase shoulder strength  Progressing 3/14/2023               Long Term Goals: 8 weeks   Patient will decrease Neck Index score to <20% to increase functional capacity.  Progressing 3/14/2023     Patient will be able to drive for 30 min with < 4/10 pain to the cervical and shoulder region to increase functional capacity  Goal Met 1/31/2023   Patient will be able to lift 5 lbs overhead 5 times with < 2/10 pain to the shoulder to increase functional capacity  Progressing 3/14/2023             Plan     Updated Plan of Care: 1x a week for 4 weeks after 2 currently scheduled.     Progress to sitting/standing exercises for shoulder.     Progress duration, workload, and resistance levels of the Therapeutic Activities and Exercises if the patient does not exhibit any pain, swelling, or other symptoms. Progress instruction in-home exercise progression and modification, including symptom management.     Qasim Sánchez, PT, DPT  3/14/2023

## 2023-03-16 ENCOUNTER — CLINICAL SUPPORT (OUTPATIENT)
Dept: REHABILITATION | Facility: HOSPITAL | Age: 68
End: 2023-03-16
Payer: MEDICARE

## 2023-03-16 DIAGNOSIS — M25.512 CHRONIC LEFT SHOULDER PAIN: ICD-10-CM

## 2023-03-16 DIAGNOSIS — M54.2 NECK PAIN: Primary | ICD-10-CM

## 2023-03-16 DIAGNOSIS — G89.29 CHRONIC LEFT SHOULDER PAIN: ICD-10-CM

## 2023-03-16 PROCEDURE — 97112 NEUROMUSCULAR REEDUCATION: CPT | Mod: PN,CQ

## 2023-03-16 PROCEDURE — 97110 THERAPEUTIC EXERCISES: CPT | Mod: PN,CQ

## 2023-03-16 NOTE — PROGRESS NOTES
HPI    DLS: 1/06/2023    Pt here for 2 Month Check;  Pt states it always feels like something is in her OD.     Meds:  Latanoprost QHS OU  Refresh 1-2 X DAY OU    1. Glaucoma Suspect   2. Drusen OU   3. NS OU   4. Hx Breast CA     Last edited by Margie Lakhani on 3/20/2023  1:10 PM.            Assessment /Plan     For exam results, see Encounter Report.    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   First photos   2018   Treatment / Drops started   latanoprost - started 1/6/2023 for IOP 28/22           Family history    + mother / grtand father / uncle / cousins         Glaucoma meds    latanoprost ou - 1/6/2023         H/O adverse rxn to glaucoma drops    none        LASERS    none        GLAUCOMA SURGERIES    none        OTHER EYE SURGERIES    none        CDR    0.8/0.7        Tbase    18-28  od // 16-22 os        Tmax    28/22           Ttarget    ?             HVF    4 test 2019 to  2023 - full in past - now w/ early SAD  od // full os        Gonio    +3-4 ou         CCT    588/585        OCT    4  test 2019 to 2023 - RNFL -  Bord TI od // nl  os        HRT    1 test 2020 to 2020 -  MR -  nl od // nl os /// CDR 0.63 od // 0.51 os        Disc photos    2018, 2022      - Ttoday  22/18 (good resp to latanoprost down from 28/22 )  - Test done today     IOP / with addition of  latanorpst ou     2. Dominate familial drusen   Mostly outside the arcades ou    See photos - 2018    Pt is taking ARED's     3. NS   Mild -monitor     4. flaoter (one floater - string like)    Warned of signs of PVD and RD    Pt to call if marked increase in floaters or flashes or curtain defect    5. Breast CA        PLAN   IOP is high at 18-28 od and 18-22 os // + Fm Hx - mother and an uncle and possibly a grand parent   HVF inow with early SAD od // still full os   OCT - bord RNFL in one sector od - stable and nl os     Rec cont  latatanoprost ou q hs     F/U 4 months with IOP check  and gonio - ? Consider slt as 2nd step if needed vs more drops

## 2023-03-16 NOTE — PROGRESS NOTES
Physical Therapy Daily Treatment Note     Name: Regine Osorio  Clinic Number: [id number]    Therapy Diagnosis:   Encounter Diagnoses   Name Primary?    Neck pain Yes    Chronic left shoulder pain        Physician: Fauzia Vidales,*    Visit Date: 3/16/2023    Physician Orders: PT Eval and Treat   Medical Diagnosis from Referral:   Z98.1 (ICD-10-CM) - S/P cervical spinal fusion   M75.02 (ICD-10-CM) - Adhesive capsulitis of left shoulder      Evaluation Date: 2022  Authorization Period Expiration: 2023  Plan of Care Expiration:,, 2023  Progress Note Due: 2023  Visit # / Visits authorized:   Remaining Visits Scheduled - 4  PTA Consecutive Visits -     5th Visit FOTO - 36 (2023)   Visit FOTO -  38 (2023)  15th visit - 45.54  (2023)   Visit - 37 (3/14/2023)  D/C FOTO - (Date, Score/ turn green )    Time In: 9:30 am  Time Out: 10:15 am  Billable Time:45 minutes  Non-Billable Time: 00 minutes    Precautions: Standard, Diabetes, and HTN  Insurance: Payor: [employer] / Plan: [employer] 65 / Product Type: Medicare Advantage /     Subjective     Pt. Reports: No soreness today and no pain     She is compliant with home exercise program.  Response to previous treatment: soreness    Pre-Treatment Pain Ratin/10   Post-Treatment Pain Ratin/10  Location: left shoulder and cervical spine    Objective     Regine received therapeutic exercises to develop strength, endurance, ROM, and flexibility for 30 minutes including:  Cervical/Shoulder Assessment   Cervical active range of motion x10 (Rotation/Flexion-Extension)   Cervical SNAGS 2x10, with a 2 sec hold (Rotation/Extension)   Scapular retractions 2x10  Shoulder shrugs 3x10  Static shoulder external rotation /internal rotation and Abduction stretch - 2' each   External rotation with cane - 30 repititions    Bilateral External Rotation with Theraband 2x10, Green Theraband   Supine Dowel  "Exercises 2x10 with a 2' sec hold (Flexion/External0 Rotation/Abduction/Extension)   Towel Internal Rotation Stretch 2x8 with a 2' sec hold    Table slides flexion/abduction/scaption - 3' each    Resisted ER/IR 2x10 red theraband/green theraband(no IR today)  Serratus punches 2x10 3#  Seated Shoulder Flexion 2x10, Green Theraband, bilaterally   Seated Shoulder Abduction 2x10, Green Theraband, bilaterally   Ball Up the Wall 2x5 / Wall Slides 2x10 - Flexion and Abduction    Side lying sleeper stretch - 3X30"  Seated Shoulder External Rotation --> Internal Rotation at 90° of Abduction 2x8, Left #2, Right #3  Door Way Pec Stretch 2x30 sec (Low)   GTB rows x 30  L Tricep ext, RTB x 30    *Bold exercise performed     Regine received the following manual therapy techniques: Soft tissue Mobilization were applied to the: L shoulder for 00 minutes, including:  GHJ inferior, anterior, posterior mobilizations grade III  PROM of L shoulder and cervical spine in all planes with overpressure   UPAs of the Cervical Spine C2-4, Grade II  Soft-Tissue Mobilization to the levator scap, upper trap, sub-occipitals, and paraspinals.      Regine participated in neuromuscular re-education activities to improve: Coordination and Proprioception for 15 minutes. The following activities were included:  Shoulder Isometrics x20 (flexion, extension, IR, ER)  Chin Tucks with red theraband 2x10 with 3 sec hold   Theraband Diagonals 2x8, Red Theraband   Cervical active range of motion with Ball Resistance 3x8 (Rotation/Flexion-Extension)   Cervical Lift Offs 2x10 with a 2' sec hold   Cervical Lift Offs with Rotation 3x8   Cervical Ball Squeezes with active range of motion 2x15 (Flexion-Extension/Rotation)   Seated with Hip Hinge Cervical Extension 2x12  Seated/Sidelying Cervical Side Bend 2x12  Bus Drivers 3x8, Red Theraband   Bent Over Rows 3x8, #4   Standing Single Arm Pull Downs 2x10, Green Theraband   Rhythm Stabilizers on Wall with Ball 2x12 " (Next Visit)     Home Exercises Provided and Patient Education Provided     Education provided:   - Continue with HEP (Table slides added 1/13/2022)    Written Home Exercises Provided: Patient instructed to cont prior HEP.  Exercises were reviewed and Regine was able to demonstrate them prior to the end of the session.  Regine demonstrated good  understanding of the education provided.     See EMR under Patient Instructions for exercises provided prior visit.    Assessment     Pt tolerated session well. GTB seated flexion/abduction attempted but was too challenging for L shoulder, RTB used instead, pt was able to improve technique. Weighted ER challenging bilaterally, tactile cues needed to keep arm in proper position. Weight also decreased after first set, L #3->#2, R #2 ->#1. Tricep weakness noted with new exercises, tricep extension added. Regine was challenged with exercises today but completed all reps with modifications. She will continue to benefit from skilled therapy.     Regine Is progressing well towards Her goals.     Pt prognosis is Fair.     Pt will continue to benefit from skilled outpatient physical therapy to address the deficits listed in the problem list box on initial evaluation, provide pt/family education and to maximize pt's level of independence in the home and community environment.     Pt's spiritual, cultural and educational needs considered and pt agreeable to plan of care and goals.    Anticipated barriers to physical therapy: None    Goals:  Short Term Goals: 4 weeks   Patient will be independent with HEP at home to increase PT compliance.  Goal Met 1/5/2023      Patient will be able to increase active range of motion by 10° in each plane to increase mobility of the shoulder and cervical spine Goal Met 1/5/2023      Patient will be able to hold 5 lbs at 90 degrees of shoulder flexion and abduction for 15 sec to increase shoulder strength  Progressing 3/16/2023               Long  Term Goals: 8 weeks   Patient will decrease Neck Index score to <20% to increase functional capacity.  Progressing 3/16/2023     Patient will be able to drive for 30 min with < 4/10 pain to the cervical and shoulder region to increase functional capacity  Goal Met 1/31/2023   Patient will be able to lift 5 lbs overhead 5 times with < 2/10 pain to the shoulder to increase functional capacity  Progressing 3/16/2023             Plan     Updated Plan of Care: 1x a week for 4 weeks after 2 currently scheduled.     Progress to sitting/standing exercises for shoulder.     Progress duration, workload, and resistance levels of the Therapeutic Activities and Exercises if the patient does not exhibit any pain, swelling, or other symptoms. Progress instruction in-home exercise progression and modification, including symptom management.     Aris Cortes PTA,   3/16/2023

## 2023-03-17 NOTE — TELEPHONE ENCOUNTER
Called pt to rescheduled no answer no voicemail set up    Inpatient Endoscopy Brief Operative Note      Admit Date: 3/16/2023    Procedure Date and Time:  3/17/2023 2:03 PM    Attending Physician: Rhiannon Ribeiro MD     Principal Admitting Diagnoses: Acute gastric ulcer with hemorrhage         Discharge Diagnosis: The primary encounter diagnosis was Melena. Diagnoses of Dyspnea, Upper GI bleed, Symptomatic anemia, and GI bleed were also pertinent to this visit.     Discharged Condition: Stable    Indication for Admission: Acute gastric ulcer with hemorrhage     Procedure Performed: EGD with biopsy    SEE [employer] REPORTS FOR DETAILS.    Pathology (if any):  Specimen (24h ago, onward)       Start     Ordered    03/17/23 1355  Specimen to Pathology, Surgery Gastrointestinal tract  Once        Comments: Pre-op Diagnosis: Melena [K92.1]Procedure(s):EGD (ESOPHAGOGASTRODUODENOSCOPY) 1. Fundus biopsies, gastric ulcer, R/O H.pylori     References:    Click here for ordering Quick Tip   Question Answer Comment   Procedure Type: Gastrointestinal tract    Which provider would you like to cc? ASHLEY STEVENSON    Release to patient Immediate        03/17/23 1357                    Estimated Blood Loss: 2 ml.    Discussed with: patient.    Disposition: Return to hospital [facility]hman.    Recommendations:   Full liquids  2. Continue IV PPI BID then switch to PO tomorrow  3. Start carafate 1g suspension QID x 2 weeks  4. NO NSAIDs  5. Repeat EGD in 2 months to check healing.        Communicated with hospital medicine service regarding the above findings.       Ashley Stevenson MD  Inpatient Gastroenterology  [facility]

## 2023-03-20 ENCOUNTER — OFFICE VISIT (OUTPATIENT)
Dept: OPHTHALMOLOGY | Facility: CLINIC | Age: 68
End: 2023-03-20
Payer: MEDICARE

## 2023-03-20 ENCOUNTER — TELEPHONE (OUTPATIENT)
Dept: GASTROENTEROLOGY | Facility: CLINIC | Age: 68
End: 2023-03-20
Payer: MEDICARE

## 2023-03-20 DIAGNOSIS — H40.1111 PRIMARY OPEN-ANGLE GLAUCOMA, RIGHT EYE, MILD STAGE: Primary | ICD-10-CM

## 2023-03-20 DIAGNOSIS — H35.362 FAMILIAL DRUSEN OF MACULA OF BOTH EYES: ICD-10-CM

## 2023-03-20 DIAGNOSIS — H52.203 MYOPIA OF BOTH EYES WITH ASTIGMATISM AND PRESBYOPIA: ICD-10-CM

## 2023-03-20 DIAGNOSIS — H52.4 MYOPIA OF BOTH EYES WITH ASTIGMATISM AND PRESBYOPIA: ICD-10-CM

## 2023-03-20 DIAGNOSIS — H52.13 MYOPIA OF BOTH EYES WITH ASTIGMATISM AND PRESBYOPIA: ICD-10-CM

## 2023-03-20 DIAGNOSIS — H35.361 FAMILIAL DRUSEN OF MACULA OF BOTH EYES: ICD-10-CM

## 2023-03-20 DIAGNOSIS — H40.022 OPEN ANGLE WITH BORDERLINE FINDINGS AND HIGH GLAUCOMA RISK IN LEFT EYE: ICD-10-CM

## 2023-03-20 DIAGNOSIS — H25.13 NUCLEAR SCLEROTIC CATARACT OF BOTH EYES: ICD-10-CM

## 2023-03-20 PROCEDURE — 4010F ACE/ARB THERAPY RXD/TAKEN: CPT | Mod: CPTII,S$GLB,, | Performed by: OPHTHALMOLOGY

## 2023-03-20 PROCEDURE — 1101F PT FALLS ASSESS-DOCD LE1/YR: CPT | Mod: CPTII,S$GLB,, | Performed by: OPHTHALMOLOGY

## 2023-03-20 PROCEDURE — 1159F MED LIST DOCD IN RCRD: CPT | Mod: CPTII,S$GLB,, | Performed by: OPHTHALMOLOGY

## 2023-03-20 PROCEDURE — 1160F PR REVIEW ALL MEDS BY PRESCRIBER/CLIN PHARMACIST DOCUMENTED: ICD-10-PCS | Mod: CPTII,S$GLB,, | Performed by: OPHTHALMOLOGY

## 2023-03-20 PROCEDURE — 1126F PR PAIN SEVERITY QUANTIFIED, NO PAIN PRESENT: ICD-10-PCS | Mod: CPTII,S$GLB,, | Performed by: OPHTHALMOLOGY

## 2023-03-20 PROCEDURE — 3288F FALL RISK ASSESSMENT DOCD: CPT | Mod: CPTII,S$GLB,, | Performed by: OPHTHALMOLOGY

## 2023-03-20 PROCEDURE — 4010F PR ACE/ARB THEARPY RXD/TAKEN: ICD-10-PCS | Mod: CPTII,S$GLB,, | Performed by: OPHTHALMOLOGY

## 2023-03-20 PROCEDURE — 1159F PR MEDICATION LIST DOCUMENTED IN MEDICAL RECORD: ICD-10-PCS | Mod: CPTII,S$GLB,, | Performed by: OPHTHALMOLOGY

## 2023-03-20 PROCEDURE — 3288F PR FALLS RISK ASSESSMENT DOCUMENTED: ICD-10-PCS | Mod: CPTII,S$GLB,, | Performed by: OPHTHALMOLOGY

## 2023-03-20 PROCEDURE — 99214 PR OFFICE/OUTPT VISIT, EST, LEVL IV, 30-39 MIN: ICD-10-PCS | Mod: S$GLB,,, | Performed by: OPHTHALMOLOGY

## 2023-03-20 PROCEDURE — 1160F RVW MEDS BY RX/DR IN RCRD: CPT | Mod: CPTII,S$GLB,, | Performed by: OPHTHALMOLOGY

## 2023-03-20 PROCEDURE — 1101F PR PT FALLS ASSESS DOC 0-1 FALLS W/OUT INJ PAST YR: ICD-10-PCS | Mod: CPTII,S$GLB,, | Performed by: OPHTHALMOLOGY

## 2023-03-20 PROCEDURE — 99214 OFFICE O/P EST MOD 30 MIN: CPT | Mod: S$GLB,,, | Performed by: OPHTHALMOLOGY

## 2023-03-20 PROCEDURE — 99999 PR PBB SHADOW E&M-EST. PATIENT-LVL III: CPT | Mod: PBBFAC,,, | Performed by: OPHTHALMOLOGY

## 2023-03-20 PROCEDURE — 99999 PR PBB SHADOW E&M-EST. PATIENT-LVL III: ICD-10-PCS | Mod: PBBFAC,,, | Performed by: OPHTHALMOLOGY

## 2023-03-20 PROCEDURE — 1126F AMNT PAIN NOTED NONE PRSNT: CPT | Mod: CPTII,S$GLB,, | Performed by: OPHTHALMOLOGY

## 2023-03-20 NOTE — TELEPHONE ENCOUNTER
Returned call to patient to assist. Patient states that she wanted to see if she can be seen later on date that she was offer. Date nor time available.

## 2023-03-20 NOTE — TELEPHONE ENCOUNTER
----- Message from Kacie Campos, Patient Care Assistant sent at 3/20/2023  1:55 PM CDT -----  Contact: self  Type:  Patient Returning Call    Who Called:  self  Who Left Message for Patient:  Gladis  Does the patient know what this is regarding?:  appt   Best Call Back Number:  [phone]  Additional Information:  thanks

## 2023-03-21 ENCOUNTER — CLINICAL SUPPORT (OUTPATIENT)
Dept: REHABILITATION | Facility: HOSPITAL | Age: 68
End: 2023-03-21
Payer: MEDICARE

## 2023-03-21 DIAGNOSIS — M54.2 NECK PAIN: Primary | ICD-10-CM

## 2023-03-21 DIAGNOSIS — G89.29 CHRONIC LEFT SHOULDER PAIN: ICD-10-CM

## 2023-03-21 DIAGNOSIS — M25.512 CHRONIC LEFT SHOULDER PAIN: ICD-10-CM

## 2023-03-21 PROCEDURE — 97110 THERAPEUTIC EXERCISES: CPT | Mod: PN,CQ

## 2023-03-21 NOTE — PROGRESS NOTES
Physical Therapy Daily Treatment Note     Name: Regine Osorio  Clinic Number: [id number]    Therapy Diagnosis:   Encounter Diagnoses   Name Primary?    Neck pain Yes    Chronic left shoulder pain          Physician: Fauzia Vidales,*    Visit Date: 3/21/2023    Physician Orders: PT Eval and Treat   Medical Diagnosis from Referral:   Z98.1 (ICD-10-CM) - S/P cervical spinal fusion   M75.02 (ICD-10-CM) - Adhesive capsulitis of left shoulder      Evaluation Date: 2022  Authorization Period Expiration: 2023  Plan of Care Expiration:,, 2023  Progress Note Due: 2023  Visit # / Visits authorized:   Remaining Visits Scheduled - 4  PTA Consecutive Visits -     5th Visit FOTO - 36 (2023)   Visit FOTO -  38 (2023)  15th visit - 45.54  (2023)   Visit - 37 (3/14/2023)  D/C FOTO - (Date, Score/ turn green )    Time In: 9:35 am  Time Out: 10:15 am  Billable Time:40 minutes  Non-Billable Time: 00 minutes    Precautions: Standard, Diabetes, and HTN  Insurance: Payor: [employer] / Plan: [employer] CHOICES 65 / Product Type: Medicare Advantage /     Subjective     Pt. Reports: No soreness today and no pain (con)     She is compliant with home exercise program.  Response to previous treatment: soreness    Pre-Treatment Pain Ratin/10   Post-Treatment Pain Ratin/10  Location: left shoulder and cervical spine    Objective     Regine received therapeutic exercises to develop strength, endurance, ROM, and flexibility for 40 minutes including:  Cervical/Shoulder Assessment   Cervical active range of motion x10 (Rotation/Flexion-Extension)   Cervical SNAGS 2x10, with a 2 sec hold (Rotation/Extension)   Scapular retractions 2x10  Shoulder shrugs 3x10  Static shoulder external rotation /internal rotation and Abduction stretch - 2' each   External rotation with cane - 30 repititions    Bilateral External Rotation with Theraband 2x10, Green Theraband  "  Supine Dowel Exercises 2x10 with a 2' sec hold (Flexion/External0 Rotation/Abduction/Extension)   Towel Internal Rotation Stretch 2x8 with a 2' sec hold    Table slides flexion/abduction/scaption - 3' each    Resisted ER/IR 2x10 red theraband/green theraband(no IR today)  Serratus punches 2x10 3#  Seated Shoulder Flexion 2x10, Green Theraband, bilaterally   Seated Shoulder Abduction 2x10, Green Theraband, bilaterally   Ball Up the Wall 2x5 / Wall Slides 2x10 - Flexion and Abduction    Side lying sleeper stretch - 3X30"  Seated Shoulder External Rotation --> Internal Rotation at 90° of Abduction 2x8, Left #2, Right #3  Door Way Pec Stretch 2x30 sec (Low)   GTB rows x 30  L Tricep ext, RTB 3x8  Pulleys - 5'    *Bold exercise performed     Regine received the following manual therapy techniques: Soft tissue Mobilization were applied to the: L shoulder for 00 minutes, including:  GHJ inferior, anterior, posterior mobilizations grade III  PROM of L shoulder and cervical spine in all planes with overpressure   UPAs of the Cervical Spine C2-4, Grade II  Soft-Tissue Mobilization to the levator scap, upper trap, sub-occipitals, and paraspinals.      Regine participated in neuromuscular re-education activities to improve: Coordination and Proprioception for 00 minutes. The following activities were included:  Shoulder Isometrics x20 (flexion, extension, IR, ER)  Chin Tucks with red theraband 2x10 with 3 sec hold   Theraband Diagonals 2x8, Red Theraband   Cervical active range of motion with Ball Resistance 3x8 (Rotation/Flexion-Extension)   Cervical Lift Offs 2x10 with a 2' sec hold   Cervical Lift Offs with Rotation 3x8   Cervical Ball Squeezes with active range of motion 2x15 (Flexion-Extension/Rotation)   Seated with Hip Hinge Cervical Extension 2x12  Seated/Sidelying Cervical Side Bend 2x12  Bus Drivers 3x8, Red Theraband   Bent Over Rows 3x8, #4   Standing Single Arm Pull Downs 2x10, Green Theraband   Rhythm " Stabilizers on Wall with Ball 2x12 (Next Visit)     Home Exercises Provided and Patient Education Provided     Education provided:   - Continue with HEP (Table slides added 1/13/2022)    Written Home Exercises Provided: Patient instructed to cont prior HEP.  Exercises were reviewed and Regine was able to demonstrate them prior to the end of the session.  Regine demonstrated good  understanding of the education provided.     See EMR under Patient Instructions for exercises provided prior visit.    Assessment     Pt tolerated session well. Seated ER with weights challenging for both arms, pt only able to complete 2 reps at  a time on L arm. Pulleys, added to passively stretch L shoulder, pt had no adverse effects. Tactile cues still needed for tricep extension to prevent shoulder ext with exercise. Pt still requires increased rest breaks due to fatigue but she continues to report greater ease with ADLs.. Regine will continue to benefit from skilled therapy.     Regine Is progressing well towards Her goals.     Pt prognosis is Fair.     Pt will continue to benefit from skilled outpatient physical therapy to address the deficits listed in the problem list box on initial evaluation, provide pt/family education and to maximize pt's level of independence in the home and community environment.     Pt's spiritual, cultural and educational needs considered and pt agreeable to plan of care and goals.    Anticipated barriers to physical therapy: None    Goals:  Short Term Goals: 4 weeks   Patient will be independent with HEP at home to increase PT compliance.  Goal Met 1/5/2023      Patient will be able to increase active range of motion by 10° in each plane to increase mobility of the shoulder and cervical spine Goal Met 1/5/2023      Patient will be able to hold 5 lbs at 90 degrees of shoulder flexion and abduction for 15 sec to increase shoulder strength  Progressing 3/21/2023               Long Term Goals: 8 weeks    Patient will decrease Neck Index score to <20% to increase functional capacity.  Progressing 3/21/2023     Patient will be able to drive for 30 min with < 4/10 pain to the cervical and shoulder region to increase functional capacity  Goal Met 1/31/2023   Patient will be able to lift 5 lbs overhead 5 times with < 2/10 pain to the shoulder to increase functional capacity  Progressing 3/21/2023             Plan     Updated Plan of Care: 1x a week for 4 weeks after 2 currently scheduled.     Progress to sitting/standing exercises for shoulder.     Progress duration, workload, and resistance levels of the Therapeutic Activities and Exercises if the patient does not exhibit any pain, swelling, or other symptoms. Progress instruction in-home exercise progression and modification, including symptom management.     Aris Cortes PTA,   3/21/2023

## 2023-03-28 ENCOUNTER — LAB VISIT (OUTPATIENT)
Dept: PRIMARY CARE CLINIC | Facility: CLINIC | Age: 68
End: 2023-03-28
Payer: MEDICARE

## 2023-03-28 DIAGNOSIS — E61.1 LOW IRON: ICD-10-CM

## 2023-03-28 DIAGNOSIS — R20.2 PARESTHESIA OF SKIN: ICD-10-CM

## 2023-03-28 DIAGNOSIS — E11.65 TYPE 2 DIABETES MELLITUS WITH HYPERGLYCEMIA, WITHOUT LONG-TERM CURRENT USE OF INSULIN: ICD-10-CM

## 2023-03-28 DIAGNOSIS — L29.9 ITCHING: ICD-10-CM

## 2023-03-28 LAB
ESTIMATED AVG GLUCOSE: 174 MG/DL (ref 68–131)
FERRITIN SERPL-MCNC: 23 NG/ML (ref 20–300)
HBA1C MFR BLD: 7.7 % (ref 4–5.6)
IRON SERPL-MCNC: 60 UG/DL (ref 30–160)
SATURATED IRON: 15 % (ref 20–50)
TOTAL IRON BINDING CAPACITY: 398 UG/DL (ref 250–450)
TRANSFERRIN SERPL-MCNC: 269 MG/DL (ref 200–375)

## 2023-03-28 PROCEDURE — 83036 HEMOGLOBIN GLYCOSYLATED A1C: CPT | Performed by: STUDENT IN AN ORGANIZED HEALTH CARE EDUCATION/TRAINING PROGRAM

## 2023-03-28 PROCEDURE — 84466 ASSAY OF TRANSFERRIN: CPT | Performed by: DERMATOLOGY

## 2023-03-28 PROCEDURE — 82728 ASSAY OF FERRITIN: CPT | Performed by: DERMATOLOGY

## 2023-03-29 ENCOUNTER — PATIENT MESSAGE (OUTPATIENT)
Dept: PSYCHIATRY | Facility: OTHER | Age: 68
End: 2023-03-29
Payer: MEDICARE

## 2023-03-29 ENCOUNTER — CLINICAL SUPPORT (OUTPATIENT)
Dept: PSYCHIATRY | Facility: OTHER | Age: 68
End: 2023-03-29
Payer: MEDICARE

## 2023-03-29 DIAGNOSIS — F43.29 ADJUSTMENT DISORDER WITH PHYSICAL COMPLAINTS: Primary | ICD-10-CM

## 2023-03-29 PROCEDURE — 99499 UNLISTED E&M SERVICE: CPT | Mod: 95,,,

## 2023-03-29 PROCEDURE — 99499 NO LOS: ICD-10-PCS | Mod: 95,,,

## 2023-03-29 NOTE — PROGRESS NOTES
Please let pt know that her a1c has increased. 7.7 up from 7.5. she needs to make sure that she is working hard with her portion sizes and reducing carbs/starches.

## 2023-03-29 NOTE — PROGRESS NOTES
Met with pt for 9 month f/u from [facility]. Pt filled out FRP measures, results below.    Patient had neck and spine surgery after FRP. It took her several months to be able sleep again in the bed. However she can now drive again and sleep in the bed. She is continuing to do exercises for her shoulder as prescribed by the PT and exercises from [facility]P. Patient mood is good.     Discussed with pt progress in treating chronic pain after FRP. Discussed how pt is doing integrating regular workouts, stretching, lifting, walking, mindfulness and CBT techniques into daily life.     Type of therapy provided: Behavioral modification, supportive counseling, testing. Rationale: appropriate to modality and program.     Pt denies SI/HI. AAOx3, able to participate in session fully. Will f/u as needed.

## 2023-03-30 ENCOUNTER — PATIENT MESSAGE (OUTPATIENT)
Dept: DERMATOLOGY | Facility: CLINIC | Age: 68
End: 2023-03-30
Payer: MEDICARE

## 2023-03-30 ENCOUNTER — CLINICAL SUPPORT (OUTPATIENT)
Dept: REHABILITATION | Facility: HOSPITAL | Age: 68
End: 2023-03-30
Payer: MEDICARE

## 2023-03-30 DIAGNOSIS — M54.2 NECK PAIN: Primary | ICD-10-CM

## 2023-03-30 DIAGNOSIS — M25.512 CHRONIC LEFT SHOULDER PAIN: ICD-10-CM

## 2023-03-30 DIAGNOSIS — E61.1 LOW IRON: Primary | ICD-10-CM

## 2023-03-30 DIAGNOSIS — G89.29 CHRONIC LEFT SHOULDER PAIN: ICD-10-CM

## 2023-03-30 PROCEDURE — 97110 THERAPEUTIC EXERCISES: CPT | Mod: PN

## 2023-03-30 NOTE — PATIENT INSTRUCTIONS
Access Code: [email or web address]  URL: [email or web address]  Date: 03/30/2023  Prepared by: Qasim Sánchez

## 2023-03-30 NOTE — PROGRESS NOTES
Physical Therapy Daily Treatment Note     Name: Regine Osorio  Clinic Number: [id number]    Therapy Diagnosis:   Encounter Diagnoses   Name Primary?    Neck pain Yes    Chronic left shoulder pain        Physician: Fauzia Vidales,*    Visit Date: 3/30/2023    Physician Orders: PT Eval and Treat   Medical Diagnosis from Referral:   Z98.1 (ICD-10-CM) - S/P cervical spinal fusion   M75.02 (ICD-10-CM) - Adhesive capsulitis of left shoulder      Evaluation Date: 2022  Authorization Period Expiration: 3/9/2023  Plan of Care Expiration:,, 2023  Progress Note Due: 2023  Visit # / Visits authorized:   Remaining Visits Scheduled - 2  PTA Consecutive Visits - 0/6    5th Visit FOTO - 36 (2023)  12th Visit FOTO -  38 (2023)  15th visit - 45.54  (2023)   Visit - 37 (3/14/2023)  D/C FOTO - (Date, Score/ turn green )    Time In: 8:05 am  Time Out: 8:45 am  Billable Time:40 minutes  Non-Billable Time: 00 minutes    Precautions: Standard, Diabetes, and HTN  Insurance: Payor: [employer] / Plan: [employer] CHOICES 65 / Product Type: Medicare Advantage /     Subjective     Pt. Reports: no increase in pain with good progress of the shoulder.      She is compliant with home exercise program.  Response to previous treatment: soreness    Pre-Treatment Pain Ratin/10   Post-Treatment Pain Ratin/10  Location: left shoulder and cervical spine    Objective     Regine received therapeutic exercises to develop strength, endurance, ROM, and flexibility for 40 minutes including:  Cervical/Shoulder Assessment   Cervical active range of motion x10 (Rotation/Flexion-Extension)   Cervical SNAGS 2x10, with a 2 sec hold (Rotation/Extension)   Scapular retractions 2x10  Shoulder shrugs 3x10  Static shoulder external rotation /internal rotation and Abduction stretch - 2' each   External rotation with cane - 30 repititions    Bilateral External Rotation with Theraband 2x10,  "Green Theraband   Supine Dowel Exercises 2x10 with a 2' sec hold (Flexion/External0 Rotation/Abduction/Extension)   Towel Internal Rotation Stretch 2x8 with a 2' sec hold    Table slides flexion/abduction/scaption - 3' each    Resisted ER/IR 2x10 red theraband/green theraband(no IR today)  Serratus punches 2x10 3#  Standing Shoulder Flexion 2x8, Green Theraband, bilaterally   Standing Shoulder Abduction 2x8, Green Theraband, bilaterally   Ball Up the Wall 2x10 / Wall Slides 2x10 - Flexion and Abduction    Side lying sleeper stretch - 3X30"  Seated Shoulder External Rotation --> Internal Rotation at 90° of Abduction 2x8, Left #2, Right #3  Door Way Pec Stretch 2x30 sec (Low) (unable to perform due to time restrictions)   GTB rows x 30 (x20 today)  L Tricep ext, RTB 3x8 (2 sets today)  Pulleys - 5'    *Bold exercise performed     Regine received the following manual therapy techniques: Soft tissue Mobilization were applied to the: L shoulder for 00 minutes, including:  GHJ inferior, anterior, posterior mobilizations grade III  PROM of L shoulder and cervical spine in all planes with overpressure   UPAs of the Cervical Spine C2-4, Grade II  Soft-Tissue Mobilization to the levator scap, upper trap, sub-occipitals, and paraspinals.      Regine participated in neuromuscular re-education activities to improve: Coordination and Proprioception for 00 minutes. The following activities were included:  Shoulder Isometrics x20 (flexion, extension, IR, ER)  Chin Tucks with red theraband 2x10 with 3 sec hold   Theraband Diagonals 2x8, Red Theraband   Cervical active range of motion with Ball Resistance 3x8 (Rotation/Flexion-Extension)   Cervical Lift Offs 2x10 with a 2' sec hold   Cervical Lift Offs with Rotation 3x8   Cervical Ball Squeezes with active range of motion 2x15 (Flexion-Extension/Rotation)   Seated with Hip Hinge Cervical Extension 2x12  Seated/Sidelying Cervical Side Bend 2x12  Bus Drivers 3x8, Red Theraband "   Bent Over Rows 3x8, #4   Standing Single Arm Pull Downs 2x10, Green Theraband   Rhythm Stabilizers on Wall with Ball 2x12 (Next Visit)     Home Exercises Provided and Patient Education Provided     Education provided:   - Continue with HEP (Table slides added 1/13/2022)    Written Home Exercises Provided: Patient instructed to cont prior HEP.  Exercises were reviewed and Regine was able to demonstrate them prior to the end of the session.  Regine demonstrated good  understanding of the education provided.     See EMR under Patient Instructions for exercises provided prior visit.    Assessment     Patient completed the exercise program with moderate levels of fatigue to the shoulders. Ms. Osorio presents with shoulder weakness and lack of endurance causing her to decrease in reps and exercises. No modifications were needed with minimal cues needed with shoulder mechanics. Additional breaks were needed due to patient feeling overheated in the clinic today. Patient would continue to benefit from skilled Physical Therapy to increase strength and endurance.     Regine Is progressing well towards Her goals.     Pt prognosis is Fair.     Pt will continue to benefit from skilled outpatient physical therapy to address the deficits listed in the problem list box on initial evaluation, provide pt/family education and to maximize pt's level of independence in the home and community environment.     Pt's spiritual, cultural and educational needs considered and pt agreeable to plan of care and goals.    Anticipated barriers to physical therapy: None    Goals:  Short Term Goals: 4 weeks   Patient will be independent with HEP at home to increase PT compliance.  Goal Met 1/5/2023      Patient will be able to increase active range of motion by 10° in each plane to increase mobility of the shoulder and cervical spine Goal Met 1/5/2023      Patient will be able to hold 5 lbs at 90 degrees of shoulder flexion and abduction for  15 sec to increase shoulder strength  Progressing 3/30/2023               Long Term Goals: 8 weeks   Patient will decrease Neck Index score to <20% to increase functional capacity.  Progressing 3/30/2023     Patient will be able to drive for 30 min with < 4/10 pain to the cervical and shoulder region to increase functional capacity  Goal Met 1/31/2023   Patient will be able to lift 5 lbs overhead 5 times with < 2/10 pain to the shoulder to increase functional capacity  Progressing 3/30/2023             Plan     Updated Plan of Care: 1x a week for 4 weeks after 2 currently scheduled.     Progress to sitting/standing exercises for shoulder.     Progress duration, workload, and resistance levels of the Therapeutic Activities and Exercises if the patient does not exhibit any pain, swelling, or other symptoms. Progress instruction in-home exercise progression and modification, including symptom management.     Qasim Sánchez, PT,   3/30/2023

## 2023-03-31 ENCOUNTER — OFFICE VISIT (OUTPATIENT)
Dept: PRIMARY CARE CLINIC | Facility: CLINIC | Age: 68
End: 2023-03-31
Payer: MEDICARE

## 2023-03-31 ENCOUNTER — PATIENT MESSAGE (OUTPATIENT)
Dept: PRIMARY CARE CLINIC | Facility: CLINIC | Age: 68
End: 2023-03-31

## 2023-03-31 ENCOUNTER — PATIENT OUTREACH (OUTPATIENT)
Dept: ADMINISTRATIVE | Facility: OTHER | Age: 68
End: 2023-03-31
Payer: MEDICARE

## 2023-03-31 VITALS
WEIGHT: 244.19 LBS | OXYGEN SATURATION: 98 % | DIASTOLIC BLOOD PRESSURE: 76 MMHG | HEART RATE: 105 BPM | SYSTOLIC BLOOD PRESSURE: 134 MMHG | BODY MASS INDEX: 40.68 KG/M2 | HEIGHT: 65 IN

## 2023-03-31 DIAGNOSIS — I10 ESSENTIAL HYPERTENSION: ICD-10-CM

## 2023-03-31 DIAGNOSIS — E61.1 LOW IRON: Primary | ICD-10-CM

## 2023-03-31 DIAGNOSIS — E11.65 TYPE 2 DIABETES MELLITUS WITH HYPERGLYCEMIA, WITHOUT LONG-TERM CURRENT USE OF INSULIN: ICD-10-CM

## 2023-03-31 DIAGNOSIS — R05.8 DRY COUGH: ICD-10-CM

## 2023-03-31 PROCEDURE — 1101F PR PT FALLS ASSESS DOC 0-1 FALLS W/OUT INJ PAST YR: ICD-10-PCS | Mod: CPTII,S$GLB,, | Performed by: STUDENT IN AN ORGANIZED HEALTH CARE EDUCATION/TRAINING PROGRAM

## 2023-03-31 PROCEDURE — 99214 PR OFFICE/OUTPT VISIT, EST, LEVL IV, 30-39 MIN: ICD-10-PCS | Mod: S$GLB,,, | Performed by: STUDENT IN AN ORGANIZED HEALTH CARE EDUCATION/TRAINING PROGRAM

## 2023-03-31 PROCEDURE — 3051F PR MOST RECENT HEMOGLOBIN A1C LEVEL 7.0 - < 8.0%: ICD-10-PCS | Mod: CPTII,S$GLB,, | Performed by: STUDENT IN AN ORGANIZED HEALTH CARE EDUCATION/TRAINING PROGRAM

## 2023-03-31 PROCEDURE — 1125F AMNT PAIN NOTED PAIN PRSNT: CPT | Mod: CPTII,S$GLB,, | Performed by: STUDENT IN AN ORGANIZED HEALTH CARE EDUCATION/TRAINING PROGRAM

## 2023-03-31 PROCEDURE — 1159F MED LIST DOCD IN RCRD: CPT | Mod: CPTII,S$GLB,, | Performed by: STUDENT IN AN ORGANIZED HEALTH CARE EDUCATION/TRAINING PROGRAM

## 2023-03-31 PROCEDURE — 3051F HG A1C>EQUAL 7.0%<8.0%: CPT | Mod: CPTII,S$GLB,, | Performed by: STUDENT IN AN ORGANIZED HEALTH CARE EDUCATION/TRAINING PROGRAM

## 2023-03-31 PROCEDURE — 99999 PR PBB SHADOW E&M-EST. PATIENT-LVL V: ICD-10-PCS | Mod: PBBFAC,,, | Performed by: STUDENT IN AN ORGANIZED HEALTH CARE EDUCATION/TRAINING PROGRAM

## 2023-03-31 PROCEDURE — 1159F PR MEDICATION LIST DOCUMENTED IN MEDICAL RECORD: ICD-10-PCS | Mod: CPTII,S$GLB,, | Performed by: STUDENT IN AN ORGANIZED HEALTH CARE EDUCATION/TRAINING PROGRAM

## 2023-03-31 PROCEDURE — 3288F FALL RISK ASSESSMENT DOCD: CPT | Mod: CPTII,S$GLB,, | Performed by: STUDENT IN AN ORGANIZED HEALTH CARE EDUCATION/TRAINING PROGRAM

## 2023-03-31 PROCEDURE — 3078F DIAST BP <80 MM HG: CPT | Mod: CPTII,S$GLB,, | Performed by: STUDENT IN AN ORGANIZED HEALTH CARE EDUCATION/TRAINING PROGRAM

## 2023-03-31 PROCEDURE — 3008F PR BODY MASS INDEX (BMI) DOCUMENTED: ICD-10-PCS | Mod: CPTII,S$GLB,, | Performed by: STUDENT IN AN ORGANIZED HEALTH CARE EDUCATION/TRAINING PROGRAM

## 2023-03-31 PROCEDURE — 99214 OFFICE O/P EST MOD 30 MIN: CPT | Mod: S$GLB,,, | Performed by: STUDENT IN AN ORGANIZED HEALTH CARE EDUCATION/TRAINING PROGRAM

## 2023-03-31 PROCEDURE — 4010F ACE/ARB THERAPY RXD/TAKEN: CPT | Mod: CPTII,S$GLB,, | Performed by: STUDENT IN AN ORGANIZED HEALTH CARE EDUCATION/TRAINING PROGRAM

## 2023-03-31 PROCEDURE — 99999 PR PBB SHADOW E&M-EST. PATIENT-LVL V: CPT | Mod: PBBFAC,,, | Performed by: STUDENT IN AN ORGANIZED HEALTH CARE EDUCATION/TRAINING PROGRAM

## 2023-03-31 PROCEDURE — 1101F PT FALLS ASSESS-DOCD LE1/YR: CPT | Mod: CPTII,S$GLB,, | Performed by: STUDENT IN AN ORGANIZED HEALTH CARE EDUCATION/TRAINING PROGRAM

## 2023-03-31 PROCEDURE — 3288F PR FALLS RISK ASSESSMENT DOCUMENTED: ICD-10-PCS | Mod: CPTII,S$GLB,, | Performed by: STUDENT IN AN ORGANIZED HEALTH CARE EDUCATION/TRAINING PROGRAM

## 2023-03-31 PROCEDURE — 4010F PR ACE/ARB THEARPY RXD/TAKEN: ICD-10-PCS | Mod: CPTII,S$GLB,, | Performed by: STUDENT IN AN ORGANIZED HEALTH CARE EDUCATION/TRAINING PROGRAM

## 2023-03-31 PROCEDURE — 3075F SYST BP GE 130 - 139MM HG: CPT | Mod: CPTII,S$GLB,, | Performed by: STUDENT IN AN ORGANIZED HEALTH CARE EDUCATION/TRAINING PROGRAM

## 2023-03-31 PROCEDURE — 3075F PR MOST RECENT SYSTOLIC BLOOD PRESS GE 130-139MM HG: ICD-10-PCS | Mod: CPTII,S$GLB,, | Performed by: STUDENT IN AN ORGANIZED HEALTH CARE EDUCATION/TRAINING PROGRAM

## 2023-03-31 PROCEDURE — 3008F BODY MASS INDEX DOCD: CPT | Mod: CPTII,S$GLB,, | Performed by: STUDENT IN AN ORGANIZED HEALTH CARE EDUCATION/TRAINING PROGRAM

## 2023-03-31 PROCEDURE — 3078F PR MOST RECENT DIASTOLIC BLOOD PRESSURE < 80 MM HG: ICD-10-PCS | Mod: CPTII,S$GLB,, | Performed by: STUDENT IN AN ORGANIZED HEALTH CARE EDUCATION/TRAINING PROGRAM

## 2023-03-31 PROCEDURE — 1125F PR PAIN SEVERITY QUANTIFIED, PAIN PRESENT: ICD-10-PCS | Mod: CPTII,S$GLB,, | Performed by: STUDENT IN AN ORGANIZED HEALTH CARE EDUCATION/TRAINING PROGRAM

## 2023-03-31 RX ORDER — INSULIN PUMP SYRINGE, 3 ML
EACH MISCELLANEOUS
Qty: 1 EACH | Refills: 0 | Status: SHIPPED | OUTPATIENT
Start: 2023-03-31 | End: 2024-03-30

## 2023-03-31 RX ORDER — BENZONATATE 100 MG/1
100 CAPSULE ORAL 3 TIMES DAILY PRN
Qty: 30 CAPSULE | Refills: 1 | Status: SHIPPED | OUTPATIENT
Start: 2023-03-31 | End: 2023-04-10

## 2023-03-31 RX ORDER — LOSARTAN POTASSIUM 50 MG/1
50 TABLET ORAL DAILY
Qty: 90 TABLET | Refills: 3 | Status: SHIPPED | OUTPATIENT
Start: 2023-03-31

## 2023-03-31 NOTE — PROGRESS NOTES
03/31/2023    Regine Osorio  [id number]    Chief Complaint   Patient presents with    Follow-up Clinical Reassessment    Cough       HPI    This pt is known to me and presents for follow up.    Nausea/vomitting  Resolved after dc iron    Iron deficiency  Referral placed by dermatologist to hematology    Dry cough  Present for the last 3-4 weeks. Happens annually. Was told by ENT that it was GERD related and not allergies. Will be seeing gastroenterology since May.    TYPE II DIABETES  Hgba1c 7.7 from 7.5  Fasting BG was 100 this morning and ranging  up to 120'sf  Not understanding why her a1c would have gone up based on her fasting BG      Negative 10 point ROS outside of HPI    Social History     Socioeconomic History    Marital status:     Number of children: 1   Tobacco Use    Smoking status: Never    Smokeless tobacco: Never   Substance and Sexual Activity    Alcohol use: No    Drug use: No    Sexual activity: Not Currently   Social History Narrative        1 son (estranged)    3 grandchildren ([place])    Born and raised in [place] (moved to [facility] 2017)     Social Determinants of Health     Financial Resource Strain: Low Risk     Difficulty of Paying Living Expenses: Not very hard   Food Insecurity: No Food Insecurity    Worried About Running Out of Food in the Last Year: Never true    Ran Out of Food in the Last Year: Never true   Transportation Needs: No Transportation Needs    Lack of Transportation (Medical): No    Lack of Transportation (Non-Medical): No   Physical Activity: Insufficiently Active    Days of Exercise per Week: 1 day    Minutes of Exercise per Session: 10 min   Stress: No Stress Concern Present    Feeling of Stress : Only a little   Social Connections: Unknown    Frequency of Communication with Friends and Family: More than three times a week    Frequency of Social Gatherings with Friends and Family: Once a week    Active Member of Clubs or Organizations: Yes    Attends  Club or Organization Meetings: 1 to 4 times per year    Marital Status:    Housing Stability: Low Risk     Unable to Pay for Housing in the Last Year: No    Number of Places Lived in the Last Year: 1    Unstable Housing in the Last Year: No           Current Outpatient Medications:     acetic acid (VOSOL) 2 % otic solution, Place 4 drops into both ears as needed., Disp: , Rfl:     aspirin (ECOTRIN) 81 MG EC tablet, Take 81 mg by mouth once daily., Disp: , Rfl:     augmented betamethasone dipropionate (DIPROLENE-AF) 0.05 % cream, Apply topically 2 (two) times to 4 times daily to area of back when itchy or burning.Stop using steroid topical when skin is non itchy.  Do not treat dark or red coloring., Disp: 50 g, Rfl: 0    azelastine (ASTELIN) 137 mcg (0.1 %) nasal spray, 1 SPRAY IN EACH NOSTRIL TWICE DAILY Strength: 137 mcg (0.1 %), Disp: 30 mL, Rfl: 2    blood sugar diagnostic (ONETOUCH VERIO TEST STRIPS) Strp, To Check BG 1x daily, Disp: 100 each, Rfl: 3    blood sugar diagnostic Strp, Check BG twice daily, Disp: 200 each, Rfl: 2    cetirizine (ZYRTEC) 10 MG tablet, TAKE 1 TABLET BY MOUTH EVERY DAY FOR ALLERGIES, Disp: 90 tablet, Rfl: 0    ciclopirox (PENLAC) 8 % Soln, Apply topically nightly., Disp: 6.6 mL, Rfl: 3    clobetasoL (TEMOVATE) 0.05 % external solution, APPLY TOPICALLY TO THE SCALP TWICE DAILY AS NEEDED FOR ITCHING OR IRRITATION, Disp: 50 mL, Rfl: 0    dextromethorphan-guaiFENesin  mg/5 ml (ROBITUSSIN-DM)  mg/5 mL liquid, Take 5 mLs by mouth every 12 (twelve) hours., Disp: , Rfl:     EScitalopram oxalate (LEXAPRO) 10 MG tablet, Take 1 tablet (10 mg total) by mouth once daily., Disp: 30 tablet, Rfl: 2    fluticasone propionate (FLONASE) 50 mcg/actuation nasal spray, 2 sprays to each lateral nostril once a day, Disp: 48 g, Rfl: 3    glipiZIDE 5 MG TR24, Take 1 tablet (5 mg total) by mouth 2 (two) times daily., Disp: 180 tablet, Rfl: 3    hydroCHLOROthiazide (HYDRODIURIL) 25 MG tablet,  Take 1 tablet (25 mg total) by mouth once daily., Disp: 90 tablet, Rfl: 3    ketoconazole (NIZORAL) 2 % cream, APPLY EXTERNALLY TO THE AFFECTED AREA EVERY DAY, Disp: 15 g, Rfl: 2    lancets Misc, To check BG 2 times daily, to use with insurance preferred meter, Disp: 200 each, Rfl: 3    latanoprost 0.005 % ophthalmic solution, Place 1 drop into both eyes every evening., Disp: 7.5 mL, Rfl: 3    losartan (COZAAR) 50 MG tablet, Take 1 tablet (50 mg total) by mouth once daily., Disp: 90 tablet, Rfl: 1    metFORMIN (GLUCOPHAGE) 500 MG tablet, Take 1 tablet (500 mg total) by mouth 2 (two) times daily with meals., Disp: 180 tablet, Rfl: 3    ondansetron (ZOFRAN-ODT) 8 MG TbDL, Take 1 tablet (8 mg total) by mouth every 8 (eight) hours as needed (Nausea)., Disp: 30 tablet, Rfl: 2    pantoprazole (PROTONIX) 40 MG tablet, TAKE 1 TABLET(40 MG) BY MOUTH EVERY DAY, Disp: 90 tablet, Rfl: 0    rizatriptan (MAXALT-MLT) 10 MG disintegrating tablet, Take 1 tablet (10 mg total) by mouth every 2 (two) hours as needed for Migraine. Max 30 mg/day., Disp: 12 tablet, Rfl: 5    ubrogepant (UBRELVY) 100 mg tablet, Take 1 tablet (100 mg total) by mouth every 2 (two) hours as needed for Migraine. Max 200 mg/day., Disp: 10 tablet, Rfl: 2    benzonatate (TESSALON) 100 MG capsule, Take 1 capsule (100 mg total) by mouth 3 (three) times daily as needed for Cough., Disp: 30 capsule, Rfl: 1    blood-glucose meter kit, Use as instructed, Disp: 1 each, Rfl: 0      Physical Exam  Vitals:    03/31/23 0906   BP: 134/76   Pulse: 105       Gen: well appearing, NAD  Resp: non labored breathing, no crackles, no wheezes, CTAB  CV: RRR no murmur, gallops, rubs, no LE edema    1. Low iron  Iron sat 15 from 14 and now pt has d/c iron 2/2 side effects  Will schedule with hematology    2. Type 2 diabetes mellitus with hyperglycemia, without long-term current use of insulin  Hgba1c 7.7 from 7.5  - blood-glucose meter kit; Use as instructed  Dispense: 1 each; Refill:  0   Will see if glucometer is accurate  -may need to increase metformin to 1000 bid    3. Dry cough  Will be seeing gi in  2 months  Continue  pantoprazole  Tessalon perles sent to pharmacy     RTC in 3 months     Gladis Melissa MD  Family Medicine

## 2023-04-03 ENCOUNTER — DOCUMENTATION ONLY (OUTPATIENT)
Dept: REHABILITATION | Facility: HOSPITAL | Age: 68
End: 2023-04-03
Payer: MEDICARE

## 2023-04-03 ENCOUNTER — PATIENT MESSAGE (OUTPATIENT)
Dept: PRIMARY CARE CLINIC | Facility: CLINIC | Age: 68
End: 2023-04-03
Payer: MEDICARE

## 2023-04-03 ENCOUNTER — PATIENT MESSAGE (OUTPATIENT)
Dept: DERMATOLOGY | Facility: CLINIC | Age: 68
End: 2023-04-03
Payer: MEDICARE

## 2023-04-03 DIAGNOSIS — E11.9 TYPE 2 DIABETES MELLITUS WITHOUT COMPLICATION, WITHOUT LONG-TERM CURRENT USE OF INSULIN: ICD-10-CM

## 2023-04-03 DIAGNOSIS — L29.9 ITCHING: ICD-10-CM

## 2023-04-03 RX ORDER — METFORMIN HYDROCHLORIDE 500 MG/1
500 TABLET ORAL 2 TIMES DAILY WITH MEALS
Qty: 180 TABLET | Refills: 3 | Status: SHIPPED | OUTPATIENT
Start: 2023-04-03 | End: 2023-04-05 | Stop reason: SDUPTHER

## 2023-04-03 RX ORDER — BETAMETHASONE DIPROPIONATE 0.5 MG/G
CREAM TOPICAL 2 TIMES DAILY
Qty: 50 G | Refills: 0 | Status: SHIPPED | OUTPATIENT
Start: 2023-04-03 | End: 2024-03-13 | Stop reason: SDUPTHER

## 2023-04-03 NOTE — PROGRESS NOTES
30 day PT-PTA face-face discussion with Pepe Jung DPT re: Name: Regine Osorio  [facility] Number: [id number] patient status, POC, and plan for progression done .

## 2023-04-04 ENCOUNTER — CLINICAL SUPPORT (OUTPATIENT)
Dept: REHABILITATION | Facility: HOSPITAL | Age: 68
End: 2023-04-04
Payer: MEDICARE

## 2023-04-04 DIAGNOSIS — M54.2 NECK PAIN: Primary | ICD-10-CM

## 2023-04-04 DIAGNOSIS — G89.29 CHRONIC LEFT SHOULDER PAIN: ICD-10-CM

## 2023-04-04 DIAGNOSIS — M25.512 CHRONIC LEFT SHOULDER PAIN: ICD-10-CM

## 2023-04-04 PROCEDURE — 97110 THERAPEUTIC EXERCISES: CPT | Mod: PN

## 2023-04-04 NOTE — PROGRESS NOTES
Outpatient Therapy Discharge Summary     Name: Regine Osorio  Clinic Number: [id number]    Therapy Diagnosis:   Encounter Diagnoses   Name Primary?    Neck pain Yes    Chronic left shoulder pain        Physician: Fauzia Vidales,*    Visit Date: 2023    Physician Orders: PT Eval and Treat   Medical Diagnosis from Referral:   Z98.1 (ICD-10-CM) - S/P cervical spinal fusion   M75.02 (ICD-10-CM) - Adhesive capsulitis of left shoulder      Evaluation Date: 2022  Authorization Period Expiration: 3/9/2023  Plan of Care Expiration:,, 2023  Progress Note Due: 2023  Visit # / Visits authorized:   Remaining Visits Scheduled - 2  PTA Consecutive Visits - 0/6    5th Visit FOTO - 36 (2023)  12th Visit FOTO -  38 (2023)  15th visit - 45.54  (2023)   Visit - 37 (3/14/2023)  D/C FOTO - 49 (2023)    Time In: 8:03 am  Time Out: 8:45 am  Billable Time:42 minutes (3TE)  Non-Billable Time: 00 minutes    Precautions: Standard, Diabetes, and HTN  Insurance: Payor: [employer] / Plan: [employer] 65 / Product Type: Medicare Advantage /     Subjective     Pt. Reports: soreness on BL shoulders but stiffness and pain have since resolved.     She is compliant with home exercise program.  Response to previous treatment: soreness    Pre-Treatment Pain Ratin/10   Post-Treatment Pain Ratin/10  Location: left shoulder and cervical spine    Objective          CERVICAL ACTIVE RANGE OF MOTION   Flexion 30°   Extension 40°   Right Side Bend 40°   Left Side Bend 40°   Right Rotation 48°   Left Rotation 49°              SHOULDER ACTIVE RANGE OF MOTION     Left Right    Flexion 105° 150°   Abduction 105° 150°   External Rotation 55° 75°   Internal Rotation (Apley Scratch Test) T11 T9   Extension Within Normal Limits  Within Normal Limits               UPPER EXTREMITY STRENGTH     Left  Right    Shoulder Flexion 4+/5 4+/5   Shoulder Abduction 4+/5 4+/5   Shoulder  "External Rotation 4/5 4+/5   Shoulder Internal Rotation 4+/5 4+/5   Elbow Flexion 5/5 5/5   Elbow Extension 5/5 5/5       Regine received therapeutic exercises to develop strength, endurance, ROM, and flexibility for 42 minutes including:  Cervical/Shoulder Reassessment   Ball Up the Wall 2x10 / Wall Slides 2x10 - Flexion and Abduction    Reaching in sitting with lift off at end range of motion 30x abduction and 30x flexion  Shoulder adduction with ball 5'' hold x20  Scapular retractions 4x10  Cervical active range of motion x10 (Rotation/Flexion-Extension)   Cervical SNAGS 2x10, with a 2 sec hold (Rotation/Extension)   Shoulder shrugs 3x10  Static shoulder external rotation /internal rotation and Abduction stretch - 2' each   External rotation with cane - 30 repititions    Bilateral External Rotation with Theraband 2x10, Green Theraband   Supine Dowel Exercises 2x10 with a 2' sec hold (Flexion/External0 Rotation/Abduction/Extension)   Towel Internal Rotation Stretch 2x8 with a 2' sec hold    Table slides flexion/abduction/scaption - 3' each    Resisted ER/IR 2x10 red theraband/green theraband(no IR today)  Serratus punches 2x10 3#  Standing Shoulder Flexion 2x8, Green Theraband, bilaterally   Standing Shoulder Abduction 2x8, Green Theraband, bilaterally   Side lying sleeper stretch - 3X30"  Seated Shoulder External Rotation --> Internal Rotation at 90° of Abduction 2x8, Left #2, Right #3  Door Way Pec Stretch 2x30 sec (Low) (unable to perform due to time restrictions)   GTB rows x 30 (x20 today)  L Tricep ext, RTB 3x8 (2 sets today)  Pulleys - 5'    *Bold exercise performed     Regine received the following manual therapy techniques: Soft tissue Mobilization were applied to the: L shoulder for 00 minutes, including:  GHJ inferior, anterior, posterior mobilizations grade III  PROM of L shoulder and cervical spine in all planes with overpressure   UPAs of the Cervical Spine C2-4, Grade II  Soft-Tissue " "Mobilization to the levator scap, upper trap, sub-occipitals, and paraspinals.      Regine participated in neuromuscular re-education activities to improve: Coordination and Proprioception for 00 minutes. The following activities were included:  Shoulder Isometrics x20 (flexion, extension, IR, ER)  Chin Tucks with red theraband 2x10 with 3 sec hold   Theraband Diagonals 2x8, Red Theraband   Cervical active range of motion with Ball Resistance 3x8 (Rotation/Flexion-Extension)   Cervical Lift Offs 2x10 with a 2' sec hold   Cervical Lift Offs with Rotation 3x8   Cervical Ball Squeezes with active range of motion 2x15 (Flexion-Extension/Rotation)   Seated with Hip Hinge Cervical Extension 2x12  Seated/Sidelying Cervical Side Bend 2x12  Bus Drivers 3x8, Red Theraband   Bent Over Rows 3x8, #4   Standing Single Arm Pull Downs 2x10, Green Theraband   Rhythm Stabilizers on Wall with Ball 2x12 (Next Visit)     Home Exercises Provided and Patient Education Provided     Education provided:   - Continue with HEP (Table slides added 1/13/2022)    Written Home Exercises Provided: Patient instructed to cont prior HEP.  Exercises were reviewed and Regine was able to demonstrate them prior to the end of the session.  Regine demonstrated good  understanding of the education provided.     See EMR under Patient Instructions for exercises provided prior visit.    Assessment     Patient presented to treatment session with no pain but notable soreness in BL shoulders. Upon discussion patient presents with increased motivation to be discharged and felt "much better" and ready to be fully independent with her treatment. Patient was reassessed and demonstrated improvement in BL shoulder flexion strength, AROM of cervical rotation and shoulder flexion. Patient expressed satisfaction with current functional state and noted benefit with strengthening of BL shoulders. Patient completed all short term goals, however did not achieve all long " term goals. Patient was provided education on continuation of functional strengthening and a new HEP to further progress patient through her functional recovery.      Discharge reason: Patient requested discharge    Discharge FOTO Score: 49    Goals:  Short Term Goals: 4 weeks   Patient will be independent with HEP at home to increase PT compliance.  Goal Met 1/5/2023      Patient will be able to increase active range of motion by 10° in each plane to increase mobility of the shoulder and cervical spine Goal Met 1/5/2023      Patient will be able to hold 5 lbs at 90 degrees of shoulder flexion and abduction for 15 sec to increase shoulder strength  Goal met 4/4/2023               Long Term Goals: 8 weeks   Patient will decrease Neck Index score to <20% to increase functional capacity.  Not met 4/4/2023     Patient will be able to drive for 30 min with < 4/10 pain to the cervical and shoulder region to increase functional capacity  Goal Met 1/31/2023   Patient will be able to lift 5 lbs overhead 5 times with < 2/10 pain to the shoulder to increase functional capacity  Not met 4/4/2023             Plan     This patient is discharged from Physical Therapy      James Martinez, PT, DPT  4/4/2023

## 2023-04-05 ENCOUNTER — PATIENT MESSAGE (OUTPATIENT)
Dept: PRIMARY CARE CLINIC | Facility: CLINIC | Age: 68
End: 2023-04-05
Payer: MEDICARE

## 2023-04-05 RX ORDER — METFORMIN HYDROCHLORIDE 500 MG/1
TABLET ORAL
Qty: 180 TABLET | Refills: 3 | Status: SHIPPED | OUTPATIENT
Start: 2023-04-05 | End: 2023-05-18 | Stop reason: SDUPTHER

## 2023-04-13 ENCOUNTER — PATIENT MESSAGE (OUTPATIENT)
Dept: PRIMARY CARE CLINIC | Facility: CLINIC | Age: 68
End: 2023-04-13
Payer: MEDICARE

## 2023-04-17 ENCOUNTER — OFFICE VISIT (OUTPATIENT)
Dept: HEMATOLOGY/ONCOLOGY | Facility: CLINIC | Age: 68
End: 2023-04-17
Payer: MEDICARE

## 2023-04-17 ENCOUNTER — HOSPITAL ENCOUNTER (OUTPATIENT)
Dept: RADIOLOGY | Facility: HOSPITAL | Age: 68
Discharge: HOME | End: 2023-04-17
Attending: INTERNAL MEDICINE
Payer: MEDICARE

## 2023-04-17 VITALS
DIASTOLIC BLOOD PRESSURE: 67 MMHG | OXYGEN SATURATION: 99 % | TEMPERATURE: 98 F | BODY MASS INDEX: 41.29 KG/M2 | SYSTOLIC BLOOD PRESSURE: 142 MMHG | WEIGHT: 247.81 LBS | HEIGHT: 65 IN | RESPIRATION RATE: 18 BRPM | HEART RATE: 99 BPM

## 2023-04-17 DIAGNOSIS — Z85.3 HISTORY OF BREAST CANCER IN FEMALE: Primary | ICD-10-CM

## 2023-04-17 DIAGNOSIS — E61.1 LOW IRON: ICD-10-CM

## 2023-04-17 DIAGNOSIS — Z85.3 HISTORY OF BREAST CANCER IN FEMALE: ICD-10-CM

## 2023-04-17 DIAGNOSIS — Z12.31 ENCOUNTER FOR SCREENING MAMMOGRAM FOR MALIGNANT NEOPLASM OF BREAST: ICD-10-CM

## 2023-04-17 LAB
BILIRUB UR QL STRIP: NEGATIVE
CLARITY UR REFRACT.AUTO: CLEAR
COLOR UR AUTO: YELLOW
GLUCOSE UR QL STRIP: ABNORMAL
HGB UR QL STRIP: NEGATIVE
KETONES UR QL STRIP: NEGATIVE
LEUKOCYTE ESTERASE UR QL STRIP: NEGATIVE
NITRITE UR QL STRIP: NEGATIVE
PH UR STRIP: 6 [PH] (ref 5–8)
PROT UR QL STRIP: NEGATIVE
SP GR UR STRIP: 1.01 (ref 1–1.03)
URN SPEC COLLECT METH UR: ABNORMAL

## 2023-04-17 PROCEDURE — 3078F DIAST BP <80 MM HG: CPT | Mod: CPTII,S$GLB,, | Performed by: INTERNAL MEDICINE

## 2023-04-17 PROCEDURE — 1159F PR MEDICATION LIST DOCUMENTED IN MEDICAL RECORD: ICD-10-PCS | Mod: CPTII,S$GLB,, | Performed by: INTERNAL MEDICINE

## 2023-04-17 PROCEDURE — 1101F PR PT FALLS ASSESS DOC 0-1 FALLS W/OUT INJ PAST YR: ICD-10-PCS | Mod: CPTII,S$GLB,, | Performed by: INTERNAL MEDICINE

## 2023-04-17 PROCEDURE — 1126F AMNT PAIN NOTED NONE PRSNT: CPT | Mod: CPTII,S$GLB,, | Performed by: INTERNAL MEDICINE

## 2023-04-17 PROCEDURE — 3051F PR MOST RECENT HEMOGLOBIN A1C LEVEL 7.0 - < 8.0%: ICD-10-PCS | Mod: CPTII,S$GLB,, | Performed by: INTERNAL MEDICINE

## 2023-04-17 PROCEDURE — 1101F PT FALLS ASSESS-DOCD LE1/YR: CPT | Mod: CPTII,S$GLB,, | Performed by: INTERNAL MEDICINE

## 2023-04-17 PROCEDURE — 3077F SYST BP >= 140 MM HG: CPT | Mod: CPTII,S$GLB,, | Performed by: INTERNAL MEDICINE

## 2023-04-17 PROCEDURE — 77063 MAMMO DIGITAL SCREENING RIGHT WITH TOMO: ICD-10-PCS | Mod: 26,52,, | Performed by: RADIOLOGY

## 2023-04-17 PROCEDURE — 77067 SCR MAMMO BI INCL CAD: CPT | Mod: 26,52,, | Performed by: RADIOLOGY

## 2023-04-17 PROCEDURE — 77067 MAMMO DIGITAL SCREENING RIGHT WITH TOMO: ICD-10-PCS | Mod: 26,52,, | Performed by: RADIOLOGY

## 2023-04-17 PROCEDURE — 3008F BODY MASS INDEX DOCD: CPT | Mod: CPTII,S$GLB,, | Performed by: INTERNAL MEDICINE

## 2023-04-17 PROCEDURE — 1126F PR PAIN SEVERITY QUANTIFIED, NO PAIN PRESENT: ICD-10-PCS | Mod: CPTII,S$GLB,, | Performed by: INTERNAL MEDICINE

## 2023-04-17 PROCEDURE — 3288F FALL RISK ASSESSMENT DOCD: CPT | Mod: CPTII,S$GLB,, | Performed by: INTERNAL MEDICINE

## 2023-04-17 PROCEDURE — 3077F PR MOST RECENT SYSTOLIC BLOOD PRESSURE >= 140 MM HG: ICD-10-PCS | Mod: CPTII,S$GLB,, | Performed by: INTERNAL MEDICINE

## 2023-04-17 PROCEDURE — 77063 BREAST TOMOSYNTHESIS BI: CPT | Mod: 26,52,, | Performed by: RADIOLOGY

## 2023-04-17 PROCEDURE — 3008F PR BODY MASS INDEX (BMI) DOCUMENTED: ICD-10-PCS | Mod: CPTII,S$GLB,, | Performed by: INTERNAL MEDICINE

## 2023-04-17 PROCEDURE — 99999 PR PBB SHADOW E&M-EST. PATIENT-LVL V: ICD-10-PCS | Mod: PBBFAC,,, | Performed by: INTERNAL MEDICINE

## 2023-04-17 PROCEDURE — 4010F ACE/ARB THERAPY RXD/TAKEN: CPT | Mod: CPTII,S$GLB,, | Performed by: INTERNAL MEDICINE

## 2023-04-17 PROCEDURE — 1159F MED LIST DOCD IN RCRD: CPT | Mod: CPTII,S$GLB,, | Performed by: INTERNAL MEDICINE

## 2023-04-17 PROCEDURE — 99214 OFFICE O/P EST MOD 30 MIN: CPT | Mod: S$GLB,,, | Performed by: INTERNAL MEDICINE

## 2023-04-17 PROCEDURE — 3051F HG A1C>EQUAL 7.0%<8.0%: CPT | Mod: CPTII,S$GLB,, | Performed by: INTERNAL MEDICINE

## 2023-04-17 PROCEDURE — 99999 PR PBB SHADOW E&M-EST. PATIENT-LVL V: CPT | Mod: PBBFAC,,, | Performed by: INTERNAL MEDICINE

## 2023-04-17 PROCEDURE — 4010F PR ACE/ARB THEARPY RXD/TAKEN: ICD-10-PCS | Mod: CPTII,S$GLB,, | Performed by: INTERNAL MEDICINE

## 2023-04-17 PROCEDURE — 3078F PR MOST RECENT DIASTOLIC BLOOD PRESSURE < 80 MM HG: ICD-10-PCS | Mod: CPTII,S$GLB,, | Performed by: INTERNAL MEDICINE

## 2023-04-17 PROCEDURE — 81003 URINALYSIS AUTO W/O SCOPE: CPT | Performed by: INTERNAL MEDICINE

## 2023-04-17 PROCEDURE — 3288F PR FALLS RISK ASSESSMENT DOCUMENTED: ICD-10-PCS | Mod: CPTII,S$GLB,, | Performed by: INTERNAL MEDICINE

## 2023-04-17 PROCEDURE — 77067 SCR MAMMO BI INCL CAD: CPT | Mod: TC,52

## 2023-04-17 PROCEDURE — 99214 PR OFFICE/OUTPT VISIT, EST, LEVL IV, 30-39 MIN: ICD-10-PCS | Mod: S$GLB,,, | Performed by: INTERNAL MEDICINE

## 2023-04-17 NOTE — PROGRESS NOTES
Subjective:       Patient ID: Regine Osorio is a 68 y.o. female.    Chief Complaint: No chief complaint on file.      HPI      Mrs. Alfred returns today for follow up.  I had last seen her in September 2022.     Briefly, she is a 68-year-old female who has a history of a stage I triple negative breast cancer that was diagnosed in late 2017.  She underwent a left modified radical mastectomy in [facility] on 11/01/2017 and had a stage IA carcinoma measuring 1.2 cm in greatest diameter.  Resection margins were clear, while four sentinel lymph nodes were negative.  Postoperatively, she received four cycles of docetaxel and cyclophosphamide.  The first cycle was administered on 11/30/2017 and she completed her treatment by 02/01/2018.  She has been followed expectantly and has been MERRY since then.  We assumed her care when she relocated to [place] 3 years ago.    Her mammogram last April was read as BIRADS I, and a one year follow up was recommended.    Recent labs drawn by the primary care physician showed a ferritin of 23 ng/mL.  She has been recommended oral iron, however, when she took him in the past she tolerated it poorly and she has not restarted it.      Review of Systems    Overall she feels OK, she has no specific complaints today  ECOG PS is 1.  She denies any anxiety, depression, easy bruising, fevers, chills, night  sweats, weight loss, nausea, vomiting, diarrhea, constipation, diplopia, blurred vision, headache, chest pain, palpitations, shortness of breath, breast pain, lower abdominal pain, extremity pain, or difficulty ambulating.  The remainder of the ten-point ROS, including general, skin, lymph, H/N, cardiorespiratory, GI, , Neuro, Endocrine, and psychiatric is negative.     Objective:      Physical Exam      She is alert, oriented to time, place, person, pleasant, well      nourished, in no acute physical distress.                                    VITAL SIGNS:  Reviewed                                       HEENT:  Normal.  There are no nasal, oral, lip, gingival, auricular, lid,    or conjunctival lesions.  Mucosae are moist and pink, and there is no        thrush.  Pupils are equal, reactive to light and accommodation.              Extraocular muscle movements are intact.  Dentition is good.  There is no frontal or maxillary tenderness.                                     NECK:  Supple without JVD, adenopathy, but she does have thyromegaly.                       LUNGS:  Clear to auscultation without wheezing, rales, or rhonchi.           CARDIOVASCULAR:  Reveals an S1, S2, no murmurs, no rubs, no gallops.         ABDOMEN:  Soft, nontender, without organomegaly.  Bowel sounds are    present.                                                                     EXTREMITIES:  No cyanosis, clubbing, or edema.                               BREASTS:  She is status post left mastectomy with a well-healed periareolar incision.    There are no masses in the right breast.    LYMPHATIC:  There is no cervical, axillary, or supraclavicular adenopathy.   SKIN:  Warm and moist, without petechiae, rashes, induration, or ecchymoses.           NEUROLOGIC:  DTRs are 0-1+ bilaterally, symmetrical, motor function is 5/5,  and cranial nerves are  within normal limits.    Assessment:       1. History of breast cancer in female, T1qB8T7, status post mastectomy and 4 cycles of docetaxel cyclophosphamide, clinically MERRY, doing well 62 months after completion of her chemotherapy        2.     Borderline low ferritin  Plan:         Clinically Mrs. Alfred remains in remission.  I explained to her that her chance of a cure at this point is in the range of 99%.  In regards to her low iron, out of abundance of caution we will obtain a urinalysis today, we will repeat the CBC and a ferritin and I will send her specific instructions based on the results.  If she has anemia and low ferritin she will be supplemented IV since she did  not tolerate the oral iron.  I will see her with a repeat CBC in 2 months and she will submit 3 stool samples at that time        Her multiple questions were answered to her satisfaction.          Route Chart for Scheduling    Med Onc Chart Routing      Follow up with physician 2 months. Please check labs today.  Needs a urinalysis today.  See me in 2 months with a repeat CBC and a repeat ferritin   Follow up with [facility]    Infusion scheduling note    Injection scheduling note    Labs    Imaging    Pharmacy appointment    Other referrals

## 2023-04-18 DIAGNOSIS — E11.9 TYPE 2 DIABETES MELLITUS WITHOUT COMPLICATION, WITHOUT LONG-TERM CURRENT USE OF INSULIN: Primary | ICD-10-CM

## 2023-04-30 ENCOUNTER — PATIENT MESSAGE (OUTPATIENT)
Dept: HEMATOLOGY/ONCOLOGY | Facility: CLINIC | Age: 68
End: 2023-04-30
Payer: MEDICARE

## 2023-05-05 ENCOUNTER — TELEPHONE (OUTPATIENT)
Dept: ENDOSCOPY | Facility: HOSPITAL | Age: 68
End: 2023-05-05
Payer: MEDICARE

## 2023-05-05 NOTE — TELEPHONE ENCOUNTER
----- Message from Dena Underwood sent at 5/5/2023  9:30 AM CDT -----  Regarding: khalida  Contact: [phone]  Pt calling in regarding apt that ws moved to 5/31/23 pt stated that will not work please call to discuss further

## 2023-05-08 ENCOUNTER — TELEPHONE (OUTPATIENT)
Dept: NEUROLOGY | Facility: CLINIC | Age: 68
End: 2023-05-08
Payer: MEDICARE

## 2023-05-08 ENCOUNTER — OFFICE VISIT (OUTPATIENT)
Dept: PRIMARY CARE CLINIC | Facility: CLINIC | Age: 68
End: 2023-05-08
Payer: MEDICARE

## 2023-05-08 VITALS
OXYGEN SATURATION: 97 % | BODY MASS INDEX: 40.28 KG/M2 | TEMPERATURE: 100 F | HEART RATE: 100 BPM | HEIGHT: 65 IN | SYSTOLIC BLOOD PRESSURE: 133 MMHG | DIASTOLIC BLOOD PRESSURE: 80 MMHG | WEIGHT: 241.75 LBS

## 2023-05-08 DIAGNOSIS — I15.2 HYPERTENSION ASSOCIATED WITH TYPE 2 DIABETES MELLITUS: ICD-10-CM

## 2023-05-08 DIAGNOSIS — E11.59 HYPERTENSION ASSOCIATED WITH TYPE 2 DIABETES MELLITUS: ICD-10-CM

## 2023-05-08 DIAGNOSIS — J30.1 NON-SEASONAL ALLERGIC RHINITIS DUE TO POLLEN: Primary | ICD-10-CM

## 2023-05-08 DIAGNOSIS — J30.1 NON-SEASONAL ALLERGIC RHINITIS DUE TO POLLEN: ICD-10-CM

## 2023-05-08 PROBLEM — D64.9 ANEMIA: Status: ACTIVE | Noted: 2018-02-01

## 2023-05-08 PROBLEM — C50.912 MALIGNANT NEOPLASM OF UNSPECIFIED SITE OF LEFT FEMALE BREAST: Status: ACTIVE | Noted: 2017-10-16

## 2023-05-08 PROCEDURE — 1101F PR PT FALLS ASSESS DOC 0-1 FALLS W/OUT INJ PAST YR: ICD-10-PCS | Mod: CPTII,S$GLB,, | Performed by: STUDENT IN AN ORGANIZED HEALTH CARE EDUCATION/TRAINING PROGRAM

## 2023-05-08 PROCEDURE — 99999 PR PBB SHADOW E&M-EST. PATIENT-LVL V: CPT | Mod: PBBFAC,,, | Performed by: STUDENT IN AN ORGANIZED HEALTH CARE EDUCATION/TRAINING PROGRAM

## 2023-05-08 PROCEDURE — 3051F HG A1C>EQUAL 7.0%<8.0%: CPT | Mod: CPTII,S$GLB,, | Performed by: STUDENT IN AN ORGANIZED HEALTH CARE EDUCATION/TRAINING PROGRAM

## 2023-05-08 PROCEDURE — 3288F PR FALLS RISK ASSESSMENT DOCUMENTED: ICD-10-PCS | Mod: CPTII,S$GLB,, | Performed by: STUDENT IN AN ORGANIZED HEALTH CARE EDUCATION/TRAINING PROGRAM

## 2023-05-08 PROCEDURE — 4010F ACE/ARB THERAPY RXD/TAKEN: CPT | Mod: CPTII,S$GLB,, | Performed by: STUDENT IN AN ORGANIZED HEALTH CARE EDUCATION/TRAINING PROGRAM

## 2023-05-08 PROCEDURE — 3288F FALL RISK ASSESSMENT DOCD: CPT | Mod: CPTII,S$GLB,, | Performed by: STUDENT IN AN ORGANIZED HEALTH CARE EDUCATION/TRAINING PROGRAM

## 2023-05-08 PROCEDURE — 3008F PR BODY MASS INDEX (BMI) DOCUMENTED: ICD-10-PCS | Mod: CPTII,S$GLB,, | Performed by: STUDENT IN AN ORGANIZED HEALTH CARE EDUCATION/TRAINING PROGRAM

## 2023-05-08 PROCEDURE — 1159F MED LIST DOCD IN RCRD: CPT | Mod: CPTII,S$GLB,, | Performed by: STUDENT IN AN ORGANIZED HEALTH CARE EDUCATION/TRAINING PROGRAM

## 2023-05-08 PROCEDURE — 3079F DIAST BP 80-89 MM HG: CPT | Mod: CPTII,S$GLB,, | Performed by: STUDENT IN AN ORGANIZED HEALTH CARE EDUCATION/TRAINING PROGRAM

## 2023-05-08 PROCEDURE — 3051F PR MOST RECENT HEMOGLOBIN A1C LEVEL 7.0 - < 8.0%: ICD-10-PCS | Mod: CPTII,S$GLB,, | Performed by: STUDENT IN AN ORGANIZED HEALTH CARE EDUCATION/TRAINING PROGRAM

## 2023-05-08 PROCEDURE — 3075F SYST BP GE 130 - 139MM HG: CPT | Mod: CPTII,S$GLB,, | Performed by: STUDENT IN AN ORGANIZED HEALTH CARE EDUCATION/TRAINING PROGRAM

## 2023-05-08 PROCEDURE — 3075F PR MOST RECENT SYSTOLIC BLOOD PRESS GE 130-139MM HG: ICD-10-PCS | Mod: CPTII,S$GLB,, | Performed by: STUDENT IN AN ORGANIZED HEALTH CARE EDUCATION/TRAINING PROGRAM

## 2023-05-08 PROCEDURE — 3079F PR MOST RECENT DIASTOLIC BLOOD PRESSURE 80-89 MM HG: ICD-10-PCS | Mod: CPTII,S$GLB,, | Performed by: STUDENT IN AN ORGANIZED HEALTH CARE EDUCATION/TRAINING PROGRAM

## 2023-05-08 PROCEDURE — 99214 PR OFFICE/OUTPT VISIT, EST, LEVL IV, 30-39 MIN: ICD-10-PCS | Mod: S$GLB,,, | Performed by: STUDENT IN AN ORGANIZED HEALTH CARE EDUCATION/TRAINING PROGRAM

## 2023-05-08 PROCEDURE — 3008F BODY MASS INDEX DOCD: CPT | Mod: CPTII,S$GLB,, | Performed by: STUDENT IN AN ORGANIZED HEALTH CARE EDUCATION/TRAINING PROGRAM

## 2023-05-08 PROCEDURE — 1126F AMNT PAIN NOTED NONE PRSNT: CPT | Mod: CPTII,S$GLB,, | Performed by: STUDENT IN AN ORGANIZED HEALTH CARE EDUCATION/TRAINING PROGRAM

## 2023-05-08 PROCEDURE — 1159F PR MEDICATION LIST DOCUMENTED IN MEDICAL RECORD: ICD-10-PCS | Mod: CPTII,S$GLB,, | Performed by: STUDENT IN AN ORGANIZED HEALTH CARE EDUCATION/TRAINING PROGRAM

## 2023-05-08 PROCEDURE — 1101F PT FALLS ASSESS-DOCD LE1/YR: CPT | Mod: CPTII,S$GLB,, | Performed by: STUDENT IN AN ORGANIZED HEALTH CARE EDUCATION/TRAINING PROGRAM

## 2023-05-08 PROCEDURE — 99999 PR PBB SHADOW E&M-EST. PATIENT-LVL V: ICD-10-PCS | Mod: PBBFAC,,, | Performed by: STUDENT IN AN ORGANIZED HEALTH CARE EDUCATION/TRAINING PROGRAM

## 2023-05-08 PROCEDURE — 99214 OFFICE O/P EST MOD 30 MIN: CPT | Mod: S$GLB,,, | Performed by: STUDENT IN AN ORGANIZED HEALTH CARE EDUCATION/TRAINING PROGRAM

## 2023-05-08 PROCEDURE — 1126F PR PAIN SEVERITY QUANTIFIED, NO PAIN PRESENT: ICD-10-PCS | Mod: CPTII,S$GLB,, | Performed by: STUDENT IN AN ORGANIZED HEALTH CARE EDUCATION/TRAINING PROGRAM

## 2023-05-08 PROCEDURE — 4010F PR ACE/ARB THEARPY RXD/TAKEN: ICD-10-PCS | Mod: CPTII,S$GLB,, | Performed by: STUDENT IN AN ORGANIZED HEALTH CARE EDUCATION/TRAINING PROGRAM

## 2023-05-08 RX ORDER — PREDNISONE 20 MG/1
20 TABLET ORAL 2 TIMES DAILY
Qty: 6 TABLET | Refills: 0 | Status: SHIPPED | OUTPATIENT
Start: 2023-05-08 | End: 2023-05-11

## 2023-05-08 RX ORDER — LANCETS 30 GAUGE
EACH MISCELLANEOUS
COMMUNITY
Start: 2023-03-31

## 2023-05-08 RX ORDER — MONTELUKAST SODIUM 10 MG/1
10 TABLET ORAL NIGHTLY
Qty: 30 TABLET | Refills: 0 | Status: SHIPPED | OUTPATIENT
Start: 2023-05-08 | End: 2023-05-31

## 2023-05-08 NOTE — PROGRESS NOTES
05/08/2023    Regine Osorio  [id number]    Chief Complaint   Patient presents with    Cough              HPI  This pt is known to me and presents for chronic cough follow up.    Has not been able to see gastroenterology b/c they keep rescheduling appointment  In the morning coughs up more phelgm  Is still using asetlin and flonase  Hx of an allergist in the past         Negative 10 point ROS outside of HPI    Social History     Socioeconomic History    Marital status:     Number of children: 1   Tobacco Use    Smoking status: Never    Smokeless tobacco: Never   Substance and Sexual Activity    Alcohol use: No    Drug use: No    Sexual activity: Not Currently   Social History Narrative        1 son (estranged)    3 grandchildren ([place])    Born and raised in [place] (moved to [facility] 2017)     Social Determinants of Health     Financial Resource Strain: Low Risk     Difficulty of Paying Living Expenses: Not very hard   Food Insecurity: No Food Insecurity    Worried About Running Out of Food in the Last Year: Never true    Ran Out of Food in the Last Year: Never true   Transportation Needs: No Transportation Needs    Lack of Transportation (Medical): No    Lack of Transportation (Non-Medical): No   Physical Activity: Insufficiently Active    Days of Exercise per Week: 1 day    Minutes of Exercise per Session: 40 min   Stress: No Stress Concern Present    Feeling of Stress : Only a little   Social Connections: Unknown    Frequency of Communication with Friends and Family: More than three times a week    Frequency of Social Gatherings with Friends and Family: Once a week    Active Member of Clubs or Organizations: Yes    Attends Club or Organization Meetings: More than 4 times per year    Marital Status:    Housing Stability: Low Risk     Unable to Pay for Housing in the Last Year: No    Number of Places Lived in the Last Year: 1    Unstable Housing in the Last Year: No           Current  Outpatient Medications:     acetic acid (VOSOL) 2 % otic solution, Place 4 drops into both ears as needed., Disp: , Rfl:     aspirin (ECOTRIN) 81 MG EC tablet, Take 81 mg by mouth once daily., Disp: , Rfl:     augmented betamethasone dipropionate (DIPROLENE-AF) 0.05 % cream, Apply topically 2 (two) times to 4 times daily to area of back when itchy or burning.Stop using steroid topical when skin is non itchy.  Do not treat dark or red coloring., Disp: 50 g, Rfl: 0    azelastine (ASTELIN) 137 mcg (0.1 %) nasal spray, 1 SPRAY IN EACH NOSTRIL TWICE DAILY Strength: 137 mcg (0.1 %), Disp: 30 mL, Rfl: 2    blood sugar diagnostic (ONETOUCH VERIO TEST STRIPS) Strp, To Check BG 1x daily, Disp: 100 each, Rfl: 3    blood sugar diagnostic Strp, Check BG twice daily, Disp: 200 each, Rfl: 2    blood-glucose meter kit, Use as instructed, Disp: 1 each, Rfl: 0    cetirizine (ZYRTEC) 10 MG tablet, TAKE 1 TABLET BY MOUTH EVERY DAY FOR ALLERGIES, Disp: 90 tablet, Rfl: 0    ciclopirox (PENLAC) 8 % Soln, Apply topically nightly., Disp: 6.6 mL, Rfl: 3    clobetasoL (TEMOVATE) 0.05 % external solution, APPLY TOPICALLY TO THE SCALP TWICE DAILY AS NEEDED FOR ITCHING OR IRRITATION, Disp: 50 mL, Rfl: 0    dextromethorphan-guaiFENesin  mg/5 ml (ROBITUSSIN-DM)  mg/5 mL liquid, Take 5 mLs by mouth every 12 (twelve) hours., Disp: , Rfl:     fluticasone propionate (FLONASE) 50 mcg/actuation nasal spray, 2 sprays to each lateral nostril once a day, Disp: 48 g, Rfl: 3    glipiZIDE 5 MG TR24, Take 1 tablet (5 mg total) by mouth 2 (two) times daily., Disp: 180 tablet, Rfl: 3    hydroCHLOROthiazide (HYDRODIURIL) 25 MG tablet, Take 1 tablet (25 mg total) by mouth once daily., Disp: 90 tablet, Rfl: 3    ketoconazole (NIZORAL) 2 % cream, APPLY EXTERNALLY TO THE AFFECTED AREA EVERY DAY, Disp: 15 g, Rfl: 2    lancets Misc, To check BG 2 times daily, to use with insurance preferred meter, Disp: 200 each, Rfl: 3    latanoprost 0.005 % ophthalmic  solution, Place 1 drop into both eyes every evening., Disp: 7.5 mL, Rfl: 3    losartan (COZAAR) 50 MG tablet, Take 1 tablet (50 mg total) by mouth once daily., Disp: 90 tablet, Rfl: 3    metFORMIN (GLUCOPHAGE) 500 MG tablet, Take 500 mg in the morning and 1000 mg in the evening time for diabetes, Disp: 180 tablet, Rfl: 3    ondansetron (ZOFRAN-ODT) 8 MG TbDL, Take 1 tablet (8 mg total) by mouth every 8 (eight) hours as needed (Nausea)., Disp: 30 tablet, Rfl: 2    ONETOUCH VERIO REFLECT METER [facility], USE AS DIRECTED TO TEST BLOOD GLUCOSE, Disp: , Rfl:     pantoprazole (PROTONIX) 40 MG tablet, TAKE 1 TABLET(40 MG) BY MOUTH EVERY DAY, Disp: 90 tablet, Rfl: 0    rizatriptan (MAXALT-MLT) 10 MG disintegrating tablet, Take 1 tablet (10 mg total) by mouth every 2 (two) hours as needed for Migraine. Max 30 mg/day., Disp: 12 tablet, Rfl: 5    ubrogepant (UBRELVY) 100 mg tablet, Take 1 tablet (100 mg total) by mouth every 2 (two) hours as needed for Migraine. Max 200 mg/day., Disp: 10 tablet, Rfl: 2      Physical Exam  Vitals:    05/08/23 1336   BP: 133/80   Pulse: 100   Temp: 99.5 °F (37.5 °C)     Gen: well appearing, NAD  Resp: non labored breathing, no crackles, no wheezes, CTAB  Nose: bilateral congestion of turbinates, sinuses non tender to touch,   Throat: no erythema, no exudates  CV: RRR no murmur, gallops, rubs, no LE edema    1. Non-seasonal allergic rhinitis due to pollen  - montelukast (SINGULAIR) 10 mg tablet; Take 1 tablet (10 mg total) by mouth every evening.  Dispense: 30 tablet; Refill: 0   Discussed med side effect profile  - predniSONE (DELTASONE) 20 MG tablet; Take 1 tablet (20 mg total) by mouth 2 (two) times daily. for 3 days  Dispense: 6 tablet; Refill: 0    2. Hypertension associated with type 2 diabetes mellitus  Well controlled continue current regimen      Gladis Melissa MD  Family Medicine

## 2023-05-08 NOTE — TELEPHONE ENCOUNTER
Called Pt to reschedule her May 19 appt since provider will not be available. Pt is rescheduled for May 15 at 11 am.

## 2023-05-10 ENCOUNTER — PATIENT MESSAGE (OUTPATIENT)
Dept: PRIMARY CARE CLINIC | Facility: CLINIC | Age: 68
End: 2023-05-10
Payer: MEDICARE

## 2023-05-15 ENCOUNTER — OFFICE VISIT (OUTPATIENT)
Dept: NEUROLOGY | Facility: CLINIC | Age: 68
End: 2023-05-15
Payer: MEDICARE

## 2023-05-15 DIAGNOSIS — I10 ESSENTIAL HYPERTENSION: ICD-10-CM

## 2023-05-15 DIAGNOSIS — E11.9 TYPE 2 DIABETES MELLITUS WITHOUT COMPLICATION, WITHOUT LONG-TERM CURRENT USE OF INSULIN: ICD-10-CM

## 2023-05-15 DIAGNOSIS — G43.009 MIGRAINE WITHOUT AURA AND WITHOUT STATUS MIGRAINOSUS, NOT INTRACTABLE: Primary | ICD-10-CM

## 2023-05-15 PROCEDURE — 3051F PR MOST RECENT HEMOGLOBIN A1C LEVEL 7.0 - < 8.0%: ICD-10-PCS | Mod: CPTII,95,, | Performed by: NURSE PRACTITIONER

## 2023-05-15 PROCEDURE — 99214 OFFICE O/P EST MOD 30 MIN: CPT | Mod: 95,,, | Performed by: NURSE PRACTITIONER

## 2023-05-15 PROCEDURE — 1160F RVW MEDS BY RX/DR IN RCRD: CPT | Mod: CPTII,95,, | Performed by: NURSE PRACTITIONER

## 2023-05-15 PROCEDURE — 4010F PR ACE/ARB THEARPY RXD/TAKEN: ICD-10-PCS | Mod: CPTII,95,, | Performed by: NURSE PRACTITIONER

## 2023-05-15 PROCEDURE — 99214 PR OFFICE/OUTPT VISIT, EST, LEVL IV, 30-39 MIN: ICD-10-PCS | Mod: 95,,, | Performed by: NURSE PRACTITIONER

## 2023-05-15 PROCEDURE — 1159F PR MEDICATION LIST DOCUMENTED IN MEDICAL RECORD: ICD-10-PCS | Mod: CPTII,95,, | Performed by: NURSE PRACTITIONER

## 2023-05-15 PROCEDURE — 3051F HG A1C>EQUAL 7.0%<8.0%: CPT | Mod: CPTII,95,, | Performed by: NURSE PRACTITIONER

## 2023-05-15 PROCEDURE — 4010F ACE/ARB THERAPY RXD/TAKEN: CPT | Mod: CPTII,95,, | Performed by: NURSE PRACTITIONER

## 2023-05-15 PROCEDURE — 1160F PR REVIEW ALL MEDS BY PRESCRIBER/CLIN PHARMACIST DOCUMENTED: ICD-10-PCS | Mod: CPTII,95,, | Performed by: NURSE PRACTITIONER

## 2023-05-15 PROCEDURE — 1159F MED LIST DOCD IN RCRD: CPT | Mod: CPTII,95,, | Performed by: NURSE PRACTITIONER
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
## 2023-05-16 ENCOUNTER — LAB VISIT (OUTPATIENT)
Dept: LAB | Facility: HOSPITAL | Age: 68
End: 2023-05-16
Attending: INTERNAL MEDICINE
Payer: MEDICARE

## 2023-05-16 ENCOUNTER — PATIENT MESSAGE (OUTPATIENT)
Dept: PRIMARY CARE CLINIC | Facility: CLINIC | Age: 68
End: 2023-05-16
Payer: MEDICARE

## 2023-05-16 ENCOUNTER — OFFICE VISIT (OUTPATIENT)
Dept: GASTROENTEROLOGY | Facility: CLINIC | Age: 68
End: 2023-05-16
Payer: MEDICARE

## 2023-05-16 ENCOUNTER — TELEPHONE (OUTPATIENT)
Dept: ENDOSCOPY | Facility: HOSPITAL | Age: 68
End: 2023-05-16
Payer: MEDICARE

## 2023-05-16 VITALS
HEIGHT: 65 IN | DIASTOLIC BLOOD PRESSURE: 85 MMHG | BODY MASS INDEX: 40.18 KG/M2 | SYSTOLIC BLOOD PRESSURE: 144 MMHG | HEART RATE: 108 BPM | WEIGHT: 241.19 LBS

## 2023-05-16 DIAGNOSIS — G47.30 SLEEP APNEA, UNSPECIFIED TYPE: ICD-10-CM

## 2023-05-16 DIAGNOSIS — K21.9 GASTROESOPHAGEAL REFLUX DISEASE, UNSPECIFIED WHETHER ESOPHAGITIS PRESENT: ICD-10-CM

## 2023-05-16 DIAGNOSIS — K59.09 CONSTIPATION, CHRONIC: ICD-10-CM

## 2023-05-16 DIAGNOSIS — E73.8 OTHER LACTOSE INTOLERANCE: ICD-10-CM

## 2023-05-16 DIAGNOSIS — K21.9 GASTROESOPHAGEAL REFLUX DISEASE, UNSPECIFIED WHETHER ESOPHAGITIS PRESENT: Primary | ICD-10-CM

## 2023-05-16 DIAGNOSIS — K59.00 CONSTIPATION, UNSPECIFIED CONSTIPATION TYPE: ICD-10-CM

## 2023-05-16 DIAGNOSIS — R11.2 NAUSEA VOMITING AND DIARRHEA: ICD-10-CM

## 2023-05-16 DIAGNOSIS — K52.838 OTHER MICROSCOPIC COLITIS: ICD-10-CM

## 2023-05-16 DIAGNOSIS — R19.7 NAUSEA VOMITING AND DIARRHEA: ICD-10-CM

## 2023-05-16 DIAGNOSIS — R11.2 NAUSEA AND VOMITING, UNSPECIFIED VOMITING TYPE: ICD-10-CM

## 2023-05-16 DIAGNOSIS — R19.7 DIARRHEA IN ADULT PATIENT: ICD-10-CM

## 2023-05-16 DIAGNOSIS — K90.0 CELIAC SPRUE: ICD-10-CM

## 2023-05-16 DIAGNOSIS — K59.09 CONSTIPATION, CHRONIC: Primary | ICD-10-CM

## 2023-05-16 LAB
IGA SERPL-MCNC: 201 MG/DL (ref 40–350)
LIPASE SERPL-CCNC: 37 U/L (ref 4–60)

## 2023-05-16 PROCEDURE — 86753 PROTOZOA ANTIBODY NOS: CPT | Performed by: INTERNAL MEDICINE

## 2023-05-16 PROCEDURE — 3079F DIAST BP 80-89 MM HG: CPT | Mod: CPTII,,, | Performed by: INTERNAL MEDICINE

## 2023-05-16 PROCEDURE — 86364 TISS TRNSGLTMNASE EA IG CLAS: CPT | Performed by: INTERNAL MEDICINE

## 2023-05-16 PROCEDURE — 36415 COLL VENOUS BLD VENIPUNCTURE: CPT | Performed by: INTERNAL MEDICINE

## 2023-05-16 PROCEDURE — 1160F PR REVIEW ALL MEDS BY PRESCRIBER/CLIN PHARMACIST DOCUMENTED: ICD-10-PCS | Mod: CPTII,,, | Performed by: INTERNAL MEDICINE

## 2023-05-16 PROCEDURE — 99204 OFFICE O/P NEW MOD 45 MIN: CPT | Mod: ,,, | Performed by: INTERNAL MEDICINE

## 2023-05-16 PROCEDURE — 82784 ASSAY IGA/IGD/IGG/IGM EACH: CPT | Performed by: INTERNAL MEDICINE

## 2023-05-16 PROCEDURE — 1125F PR PAIN SEVERITY QUANTIFIED, PAIN PRESENT: ICD-10-PCS | Mod: CPTII,,, | Performed by: INTERNAL MEDICINE

## 2023-05-16 PROCEDURE — 3008F PR BODY MASS INDEX (BMI) DOCUMENTED: ICD-10-PCS | Mod: CPTII,,, | Performed by: INTERNAL MEDICINE

## 2023-05-16 PROCEDURE — 99204 PR OFFICE/OUTPT VISIT, NEW, LEVL IV, 45-59 MIN: ICD-10-PCS | Mod: ,,, | Performed by: INTERNAL MEDICINE

## 2023-05-16 PROCEDURE — 1159F PR MEDICATION LIST DOCUMENTED IN MEDICAL RECORD: ICD-10-PCS | Mod: CPTII,,, | Performed by: INTERNAL MEDICINE

## 2023-05-16 PROCEDURE — 3008F BODY MASS INDEX DOCD: CPT | Mod: CPTII,,, | Performed by: INTERNAL MEDICINE

## 2023-05-16 PROCEDURE — 1160F RVW MEDS BY RX/DR IN RCRD: CPT | Mod: CPTII,,, | Performed by: INTERNAL MEDICINE

## 2023-05-16 PROCEDURE — 3051F PR MOST RECENT HEMOGLOBIN A1C LEVEL 7.0 - < 8.0%: ICD-10-PCS | Mod: CPTII,,, | Performed by: INTERNAL MEDICINE

## 2023-05-16 PROCEDURE — 3079F PR MOST RECENT DIASTOLIC BLOOD PRESSURE 80-89 MM HG: ICD-10-PCS | Mod: CPTII,,, | Performed by: INTERNAL MEDICINE

## 2023-05-16 PROCEDURE — 1101F PR PT FALLS ASSESS DOC 0-1 FALLS W/OUT INJ PAST YR: ICD-10-PCS | Mod: CPTII,,, | Performed by: INTERNAL MEDICINE

## 2023-05-16 PROCEDURE — 1125F AMNT PAIN NOTED PAIN PRSNT: CPT | Mod: CPTII,,, | Performed by: INTERNAL MEDICINE

## 2023-05-16 PROCEDURE — 86682 HELMINTH ANTIBODY: CPT | Performed by: INTERNAL MEDICINE

## 2023-05-16 PROCEDURE — 3077F PR MOST RECENT SYSTOLIC BLOOD PRESSURE >= 140 MM HG: ICD-10-PCS | Mod: CPTII,,, | Performed by: INTERNAL MEDICINE

## 2023-05-16 PROCEDURE — 3288F PR FALLS RISK ASSESSMENT DOCUMENTED: ICD-10-PCS | Mod: CPTII,,, | Performed by: INTERNAL MEDICINE

## 2023-05-16 PROCEDURE — 4010F PR ACE/ARB THEARPY RXD/TAKEN: ICD-10-PCS | Mod: CPTII,,, | Performed by: INTERNAL MEDICINE

## 2023-05-16 PROCEDURE — 4010F ACE/ARB THERAPY RXD/TAKEN: CPT | Mod: CPTII,,, | Performed by: INTERNAL MEDICINE

## 2023-05-16 PROCEDURE — 99999 PR PBB SHADOW E&M-EST. PATIENT-LVL V: CPT | Mod: PBBFAC,,, | Performed by: INTERNAL MEDICINE

## 2023-05-16 PROCEDURE — 1101F PT FALLS ASSESS-DOCD LE1/YR: CPT | Mod: CPTII,,, | Performed by: INTERNAL MEDICINE

## 2023-05-16 PROCEDURE — 83690 ASSAY OF LIPASE: CPT | Performed by: INTERNAL MEDICINE

## 2023-05-16 PROCEDURE — 3051F HG A1C>EQUAL 7.0%<8.0%: CPT | Mod: CPTII,,, | Performed by: INTERNAL MEDICINE

## 2023-05-16 PROCEDURE — 3077F SYST BP >= 140 MM HG: CPT | Mod: CPTII,,, | Performed by: INTERNAL MEDICINE

## 2023-05-16 PROCEDURE — 1159F MED LIST DOCD IN RCRD: CPT | Mod: CPTII,,, | Performed by: INTERNAL MEDICINE

## 2023-05-16 PROCEDURE — 3288F FALL RISK ASSESSMENT DOCD: CPT | Mod: CPTII,,, | Performed by: INTERNAL MEDICINE

## 2023-05-16 PROCEDURE — 99999 PR PBB SHADOW E&M-EST. PATIENT-LVL V: ICD-10-PCS | Mod: PBBFAC,,, | Performed by: INTERNAL MEDICINE

## 2023-05-16 NOTE — Clinical Note
"Procedure: EGD/Colonoscopy  Diagnosis:  GERD nausea vomiting diarrhea constipation rule out lactose intolerance rule out celiac sprue or H pylori or microscopic colitis  Procedure Timin-12 weeks  *If within 4 weeks selected, please gt as high priority*  *If greater than 12 weeks, please select "5-12 weeks" and delay sending until 2 months prior to requested date*  Provider: Myself  Location: [address]  Additional Scheduling Information:  Sleep apnea morbid obesity  Prep Specifications:Extended/Constipation prep  Have you attached a patient to this message: yes "

## 2023-05-16 NOTE — PROGRESS NOTES
[facility] Consultation Note    Reason for Consult:  The primary encounter diagnosis was Constipation, chronic. Diagnoses of Diarrhea in adult patient, Nausea and vomiting, unspecified vomiting type, Gastroesophageal reflux disease, unspecified whether esophagitis present, Sleep apnea, unspecified type, and BMI 40.0-44.9, adult were also pertinent to this visit.    PCP:   Gladis Melissa   [address] / [place] [address]    Referring MD:  Gladis Melissa Md  [address] [place] [address]    Initial History of Present Illness (HPI):  This is a 68 y.o. female here for evaluation of episodic nausea vomiting longstanding GERD symptoms history of IBS constipation diarrhea for many years patient has recently moved from [facility] to [place] where her dad is from she would like further evaluation does take an aspirin no weight loss no blood in her stool her last colonoscopy was August 2018 we do not have report but just piece of paper that said she had diverticulosis and internal hemorrhoids.  No fever no chills no shortness of breath she has very little notice when she has to have a bowel movement it can be explosive diarrhea after she is constipated she does not take any laxatives or stool softeners no flushing no wheezing no palpitation she still has a gallbladder in place no family history of esophageal cancer or stomach cancer or pancreatitis or pancreatic cancer no personal history of pancreatitis she does have a history of breast cancer nobody in the family with colon cancer or advanced colon adenomas polyps no FAP no attenuated FAP no MA P no Jiménez syndrome nobody with celiac sprue or inflammatory bowel disease she does not think she has lactose intolerance she is not on a gluten free diet.    Abdominal pain - no  Reflux - as above  Dysphagia - no   Bowel habits - as above  GI bleeding - none  NSAID usage - aspirin    Interval HPI 05/16/2023:  The patient's last visit  with me was on Visit date not found.      ROS:  Constitutional: No fevers, chills, No weight loss  ENT:  As heartburn no dysphagia no odynophagia no hoarseness  CV: No chest pain, no palpitation  Pulm: No cough, No shortness of breath, no wheezing  Ophtho: No acute vision changes, glaucoma  GI: see HPI  Derm: No rash, no itching  Heme: No lymphadenopathy, No easy bruising  MSK:  Fibromyalgia  : No dysuria, No hematuria  Endo: No hot or cold intolerance  Neuro: No syncope, No seizure, no strokes  Psych: No uncontrolled anxiety, No uncontrolled depression    Medical History:  has a past medical history of Allergy, Breast cancer (2017), Cataract, Cervical spondylosis (07/29/2022), Diabetes mellitus, type 2, Eczema, Fibromyalgia, Glaucoma, Hypertension, Renal cyst, right (02/06/2020), and Steatosis of liver (02/06/2020).    Surgical History:  has a past surgical history that includes Tonsillectomy; Breast surgery; Breast biopsy; Mastectomy (Left, 2017); Vaginal delivery; Myomectomy; Laparoscopic supracervical hysterectomy (2005); Hysterectomy; Adenoidectomy; Tympanostomy tube placement; and Anterior cervical discectomy w/ fusion (N/A, 7/29/2022).    Family History: family history includes Bipolar disorder in her maternal aunt, mother, and son; Blindness in her maternal grandfather; Breast cancer in her maternal aunt; Breast cancer (age of onset: 29) in her cousin; Breast cancer (age of onset: 39) in her cousin; Dementia in her mother; Glaucoma in her maternal grandfather, maternal uncle, and mother; Lung cancer in her father; Post-traumatic stress disorder in her son..     Social History:  reports that she has never smoked. She has never used smokeless tobacco. She reports that she does not drink alcohol and does not use drugs.    Review of patient's allergies indicates:   Allergen Reactions    Codeine Shortness Of Breath     Pt states she had a reaction as a teenager and was taken to the ER.    Lisinopril      cough        Medication List with Changes/Refills   Current Medications    ACETIC ACID (VOSOL) 2 % OTIC SOLUTION    Place 4 drops into both ears as needed.    ASPIRIN (ECOTRIN) 81 MG EC TABLET    Take 81 mg by mouth once daily.    AUGMENTED BETAMETHASONE DIPROPIONATE (DIPROLENE-AF) 0.05 % CREAM    Apply topically 2 (two) times to 4 times daily to area of back when itchy or burning.Stop using steroid topical when skin is non itchy.  Do not treat dark or red coloring.    AZELASTINE (ASTELIN) 137 MCG (0.1 %) NASAL SPRAY    1 SPRAY IN EACH NOSTRIL TWICE DAILY  Strength: 137 mcg (0.1 %)    BLOOD SUGAR DIAGNOSTIC (ONETOUCH VERIO TEST STRIPS) STRP    To Check BG 1x daily    BLOOD SUGAR DIAGNOSTIC STRP    Check BG twice daily    BLOOD-GLUCOSE METER KIT    Use as instructed    CETIRIZINE (ZYRTEC) 10 MG TABLET    TAKE 1 TABLET BY MOUTH EVERY DAY FOR ALLERGIES    CICLOPIROX (PENLAC) 8 % SOLN    Apply topically nightly.    CLOBETASOL (TEMOVATE) 0.05 % EXTERNAL SOLUTION    APPLY TOPICALLY TO THE SCALP TWICE DAILY AS NEEDED FOR ITCHING OR IRRITATION    FLUTICASONE PROPIONATE (FLONASE) 50 MCG/ACTUATION NASAL SPRAY    2 sprays to each lateral nostril once a day    GLIPIZIDE 5 MG TR24    Take 1 tablet (5 mg total) by mouth 2 (two) times daily.    HYDROCHLOROTHIAZIDE (HYDRODIURIL) 25 MG TABLET    Take 1 tablet (25 mg total) by mouth once daily.    KETOCONAZOLE (NIZORAL) 2 % CREAM    APPLY EXTERNALLY TO THE AFFECTED AREA EVERY DAY    LANCETS MISC    To check BG 2 times daily, to use with insurance preferred meter    LATANOPROST 0.005 % OPHTHALMIC SOLUTION    Place 1 drop into both eyes every evening.    LOSARTAN (COZAAR) 50 MG TABLET    Take 1 tablet (50 mg total) by mouth once daily.    METFORMIN (GLUCOPHAGE) 500 MG TABLET    Take 500 mg in the morning and 1000 mg in the evening time for diabetes    MONTELUKAST (SINGULAIR) 10 MG TABLET    Take 1 tablet (10 mg total) by mouth every evening.    ONDANSETRON (ZOFRAN-ODT) 8 MG TBDL    Take 1  "tablet (8 mg total) by mouth every 8 (eight) hours as needed (Nausea).    ONETOUCH VERIO REFLECT METER MISC    USE AS DIRECTED TO TEST BLOOD GLUCOSE    PANTOPRAZOLE (PROTONIX) 40 MG TABLET    TAKE 1 TABLET(40 MG) BY MOUTH EVERY DAY    RIZATRIPTAN (MAXALT-MLT) 10 MG DISINTEGRATING TABLET    Take 1 tablet (10 mg total) by mouth every 2 (two) hours as needed for Migraine. Max 30 mg/day.    UBROGEPANT (UBRELVY) 100 MG TABLET    Take 1 tablet (100 mg total) by mouth every 2 (two) hours as needed for Migraine. Max 200 mg/day.   Discontinued Medications    DEXTROMETHORPHAN-GUAIFENESIN  MG/5 ML (ROBITUSSIN-DM)  MG/5 ML LIQUID    Take 5 mLs by mouth every 12 (twelve) hours.         Objective Findings:    Vital Signs:  BP (!) 144/85   Pulse 108   Ht 5' 5" (1.651 m)   Wt 109.4 kg (241 lb 2.9 oz)   BMI 40.13 kg/m²   Body mass index is 40.13 kg/m².    Physical Exam:  General Appearance: Well appearing in no acute distress  Eyes:    No scleral icterus  ENT:  No lesions or masses   Lungs: CTA bilaterally, no wheezes, no rhonchi, no rales  Heart:  S1, S2 normal, no murmurs heard  Abdomen:  Obese, Non distended, soft, no guarding, no rebound, no tenderness, no appreciated ascites, no bruits, no hepatosplenomegaly,  No CVA tenderness, no appreciated hernias, no Allen sign, no McBurney point tenderness  Musculoskeletal:  No major joint deformities  Skin: No petechiae or rash on exposed skin areas  Neurologic:  Alert and oriented x4  Psychiatric:  Normal speech mentation and affect    Labs:  Lab Results   Component Value Date    WBC 8.94 04/17/2023    HGB 12.5 04/17/2023    HCT 40.7 04/17/2023     04/17/2023    CHOL 163 12/12/2022    TRIG 99 12/12/2022    HDL 52 12/12/2022    ALT 11 12/12/2022    AST 13 12/12/2022     12/12/2022    K 4.4 12/12/2022    CL 99 12/12/2022    CREATININE 0.7 12/12/2022    BUN 11 12/12/2022    CO2 30 (H) 12/12/2022    TSH 1.515 07/27/2022    INR 1.0 07/22/2022    HGBA1C 7.7 (H) " 03/28/2023    MICROALBUR 9.4 08/12/2018             Medical Decision Making:  Last colonoscopy was done August 2018 showed hemorrhoids and diverticulosis but do not have a report just a piece of paper with that printed on  Prior colonoscopy to that was in 2008 appears to be normal  Lab work talk given stool studies talk given abdominal ultrasound talk given EGD colonoscopy talk given      Assessment:  1. Constipation, chronic    2. Diarrhea in adult patient    3. Nausea and vomiting, unspecified vomiting type    4. Gastroesophageal reflux disease, unspecified whether esophagitis present    5. Sleep apnea, unspecified type    6. BMI 40.0-44.9, adult         Recommendations:  1. Lab work today  2. Stool studies  3. Abdominal ultrasound rule out gallstones  4. Referral to endoscopy schedulers to schedule EGD colonoscopy for further evaluation of longstanding GERD nausea vomiting diarrhea constipation rule out lactase deficiency celiac sprue H pylori or microscopic colitis  5. Return GI clinic 3 months okay for telemedicine video visit    Follow up in about 3 months (around 8/16/2023).      Order summary:  Orders Placed This Encounter    Clostridium difficile EIA    Stool culture    US Abdomen Complete    Strongyloides IgG Antibodies    Anti-Ent. Histolytica Ab    Micropsoridia species, PCR    Cyclospora    Giardia / Cryptosporidum, EIA    Stool Exam-Ova,Cysts,Parasites    Fecal fat, qualitative    Pancreatic elastase, fecal    Lipase         Thank you so much for allowing me to participate in the care of Regine Mcclain Taryn Garcia MD    DISCLAIMER: This note was prepared with [employer] voice recognition transcription software. Garbled syntax, mangled or inadvertent pronouns, and other bizarre constructions may be attributed to that software system. While efforts were made to correct any mistakes made by this voice recognition program, some errors and/or omissions may remain in the note that were missed when the  note was originally created.

## 2023-05-16 NOTE — Clinical Note
Lab work today Stool studies to be turn in 2nd floor Main [place] fresh within 3 or 4 hours of collection Abdominal ultrasound to evaluate for gallstones To see endoscopy schedulers today for EGD colonoscopy referral placed Return GI clinic telemedicine video visit 3 months for follow-up Thank you

## 2023-05-18 ENCOUNTER — TELEPHONE (OUTPATIENT)
Dept: PRIMARY CARE CLINIC | Facility: CLINIC | Age: 68
End: 2023-05-18
Payer: MEDICARE

## 2023-05-18 ENCOUNTER — LAB VISIT (OUTPATIENT)
Dept: LAB | Facility: HOSPITAL | Age: 68
End: 2023-05-18
Attending: INTERNAL MEDICINE
Payer: MEDICARE

## 2023-05-18 DIAGNOSIS — K59.09 CONSTIPATION, CHRONIC: ICD-10-CM

## 2023-05-18 DIAGNOSIS — E11.9 TYPE 2 DIABETES MELLITUS WITHOUT COMPLICATION, WITHOUT LONG-TERM CURRENT USE OF INSULIN: ICD-10-CM

## 2023-05-18 DIAGNOSIS — K21.9 GASTROESOPHAGEAL REFLUX DISEASE, UNSPECIFIED WHETHER ESOPHAGITIS PRESENT: ICD-10-CM

## 2023-05-18 DIAGNOSIS — R11.2 NAUSEA AND VOMITING, UNSPECIFIED VOMITING TYPE: ICD-10-CM

## 2023-05-18 DIAGNOSIS — R19.7 DIARRHEA IN ADULT PATIENT: ICD-10-CM

## 2023-05-18 LAB
C DIFF GDH STL QL: NEGATIVE
C DIFF TOX A+B STL QL IA: NEGATIVE
E HISTOLYT AB SER QL: NEGATIVE

## 2023-05-18 PROCEDURE — 82705 FATS/LIPIDS FECES QUAL: CPT | Performed by: INTERNAL MEDICINE

## 2023-05-18 PROCEDURE — 87427 SHIGA-LIKE TOXIN AG IA: CPT | Performed by: INTERNAL MEDICINE

## 2023-05-18 PROCEDURE — 87449 NOS EACH ORGANISM AG IA: CPT | Performed by: INTERNAL MEDICINE

## 2023-05-18 PROCEDURE — 87015 SPECIMEN INFECT AGNT CONCNTJ: CPT | Mod: 59 | Performed by: INTERNAL MEDICINE

## 2023-05-18 PROCEDURE — 87209 SMEAR COMPLEX STAIN: CPT | Performed by: INTERNAL MEDICINE

## 2023-05-18 PROCEDURE — 87045 FECES CULTURE AEROBIC BACT: CPT | Performed by: INTERNAL MEDICINE

## 2023-05-18 PROCEDURE — 87798 DETECT AGENT NOS DNA AMP: CPT | Performed by: INTERNAL MEDICINE

## 2023-05-18 PROCEDURE — 87449 NOS EACH ORGANISM AG IA: CPT | Mod: 91 | Performed by: INTERNAL MEDICINE

## 2023-05-18 PROCEDURE — 87046 STOOL CULTR AEROBIC BACT EA: CPT | Performed by: INTERNAL MEDICINE

## 2023-05-18 PROCEDURE — 82653 EL-1 FECAL QUANTITATIVE: CPT | Performed by: INTERNAL MEDICINE

## 2023-05-18 PROCEDURE — 87177 OVA AND PARASITES SMEARS: CPT | Performed by: INTERNAL MEDICINE

## 2023-05-18 PROCEDURE — 87207 SMEAR SPECIAL STAIN: CPT | Mod: 59 | Performed by: INTERNAL MEDICINE

## 2023-05-18 RX ORDER — METFORMIN HYDROCHLORIDE 1000 MG/1
1000 TABLET ORAL 2 TIMES DAILY WITH MEALS
Qty: 180 TABLET | Refills: 3 | Status: SHIPPED | OUTPATIENT
Start: 2023-05-18 | End: 2023-05-18

## 2023-05-18 RX ORDER — METFORMIN HYDROCHLORIDE 1000 MG/1
1000 TABLET ORAL 2 TIMES DAILY WITH MEALS
Qty: 180 TABLET | Refills: 3 | Status: SHIPPED | OUTPATIENT
Start: 2023-05-18

## 2023-05-18 NOTE — TELEPHONE ENCOUNTER
Returned called to informed pt that her medication refill was sent to [facility] [address].

## 2023-05-18 NOTE — TELEPHONE ENCOUNTER
----- Message from Gladis Melissa MD sent at 5/18/2023 10:32 AM CDT -----  Regarding: RE: Refill request  Contact: [phone]  Updated rx sent  ----- Message -----  From: Irma Downey LPN  Sent: 5/17/2023   2:22 PM CDT  To: Gladis Melissa MD  Subject: Refill request                                   Status on refill request on Monday.  ----- Message -----  From: Mary De León  Sent: 5/17/2023   2:12 PM CDT  To: [facility] Staff    Patient called, stated that she has requested for refill since Monday but has not been sent. Please call and advise. Thank you

## 2023-05-19 ENCOUNTER — HOSPITAL ENCOUNTER (OUTPATIENT)
Dept: RADIOLOGY | Facility: HOSPITAL | Age: 68
Discharge: HOME | End: 2023-05-19
Attending: INTERNAL MEDICINE
Payer: MEDICARE

## 2023-05-19 DIAGNOSIS — K21.9 GASTROESOPHAGEAL REFLUX DISEASE, UNSPECIFIED WHETHER ESOPHAGITIS PRESENT: ICD-10-CM

## 2023-05-19 DIAGNOSIS — R19.7 DIARRHEA IN ADULT PATIENT: ICD-10-CM

## 2023-05-19 DIAGNOSIS — R11.2 NAUSEA AND VOMITING, UNSPECIFIED VOMITING TYPE: ICD-10-CM

## 2023-05-19 DIAGNOSIS — K59.09 CONSTIPATION, CHRONIC: ICD-10-CM

## 2023-05-19 LAB
STRONGYLOIDES ANTIBODY IGG: NEGATIVE
TTG IGA SER-ACNC: 0.3 U/ML

## 2023-05-19 PROCEDURE — 76700 US EXAM ABDOM COMPLETE: CPT | Mod: TC

## 2023-05-19 PROCEDURE — 76700 US EXAM ABDOM COMPLETE: CPT | Mod: 26,,, | Performed by: STUDENT IN AN ORGANIZED HEALTH CARE EDUCATION/TRAINING PROGRAM

## 2023-05-19 PROCEDURE — 76700 US ABDOMEN COMPLETE: ICD-10-PCS | Mod: 26,,, | Performed by: STUDENT IN AN ORGANIZED HEALTH CARE EDUCATION/TRAINING PROGRAM

## 2023-05-20 LAB — BACTERIA STL CULT: NORMAL

## 2023-05-21 LAB
E COLI SXT1 STL QL IA: NEGATIVE
E COLI SXT2 STL QL IA: NEGATIVE
FAT STL QL: NORMAL
NEUTRAL FAT STL QL: NORMAL

## 2023-05-22 LAB
ELASTASE 1, FECAL: 331 MCG/G
ENCEPHALITOZOON BIENEUSI: NEGATIVE
ENCEPHALITOZOON SPECIES: NEGATIVE
MICROSPORIDIA SPECIMEN SOURCE: NORMAL

## 2023-05-25 LAB — CYCLOSPORA STL SAFRANIN STN: NORMAL

## 2023-05-26 ENCOUNTER — TELEPHONE (OUTPATIENT)
Dept: GASTROENTEROLOGY | Facility: CLINIC | Age: 68
End: 2023-05-26
Payer: MEDICARE

## 2023-05-26 LAB — O+P STL MICRO: NORMAL

## 2023-05-28 ENCOUNTER — PATIENT MESSAGE (OUTPATIENT)
Dept: ENDOSCOPY | Facility: HOSPITAL | Age: 68
End: 2023-05-28
Payer: MEDICARE

## 2023-05-28 DIAGNOSIS — E66.01 MORBID OBESITY: ICD-10-CM

## 2023-05-28 DIAGNOSIS — K76.0 FATTY LIVER: Primary | ICD-10-CM

## 2023-05-28 NOTE — PROGRESS NOTES
"GI MA team please refer Regine to hepatology Clinic to evaluate her enlarged liver and her fatty liver referral has been placed.    Your liver imaging study shows "Fatty Liver" and recommend a very gradual weight loss ideally about 10% of ones current body weight if they happen to be over weight (a Body Mass Index BMI of over 26 is considered over weight).  Recommend gradual weight loss of 24 lb over the next 12-18 months should help out your liver significantly.     fatty liver in some patients over time can progress to liver cirrhosis, liver cancer, liver failure, death due to liver failure, and need for liver transplant.    The best current treatment is gradual weight loss.    Avoidance of alcohol completely if possible is the recommendation if a person drinks alcohol at all.       Regine your ultrasound was read as no gallstones but    Impression:     Mild hepatomegaly and increased parenchymal echogenicity with borderline HRI which may represent hepatic steatosis.  Recommend correlation with LFTs.     Electronically signed by resident: Brenda Gonzalez  Date:                                            05/19/2023  Time:                                           13:56     Electronically signed by: Sami Bennett  Date:                                            05/19/2023"

## 2023-05-29 ENCOUNTER — TELEPHONE (OUTPATIENT)
Dept: ENDOSCOPY | Facility: HOSPITAL | Age: 68
End: 2023-05-29
Payer: MEDICARE

## 2023-05-29 NOTE — TELEPHONE ENCOUNTER
"----- Message from Eliseo Garcia MD sent at 5/28/2023  4:52 PM CDT -----  GI MA team please refer Regine to hepatology Clinic to evaluate her enlarged liver and her fatty liver referral has been placed.    Your liver imaging study shows "Fatty Liver" and recommend a very gradual weight loss ideally about 10% of ones current body weight if they happen to be over weight (a Body Mass Index BMI of over 26 is considered over weight).  Recommend gradual weight loss of 24 lb over the next 12-18 months should help out your liver significantly.     fatty liver in some patients over time can progress to liver cirrhosis, liver cancer, liver failure, death due to liver failure, and need for liver transplant.    The best current treatment is gradual weight loss.    Avoidance of alcohol completely if possible is the recommendation if a person drinks alcohol at all.       Regine your ultrasound was read as no gallstones but    Impression:     Mild hepatomegaly and increased parenchymal echogenicity with borderline HRI which may represent hepatic steatosis.  Recommend correlation with LFTs.     Electronically signed by resident: Brenda Gonzalez  Date:                                            05/19/2023  Time:                                           13:56     Electronically signed by: Sami Bennett  Date:                                            05/19/2023  "

## 2023-05-31 RX ORDER — MONTELUKAST SODIUM 10 MG/1
TABLET ORAL
Qty: 90 TABLET | Refills: 3 | Status: SHIPPED | OUTPATIENT
Start: 2023-05-31

## 2023-06-05 ENCOUNTER — PATIENT MESSAGE (OUTPATIENT)
Dept: ORTHOPEDICS | Facility: CLINIC | Age: 68
End: 2023-06-05
Payer: MEDICARE

## 2023-06-06 ENCOUNTER — OFFICE VISIT (OUTPATIENT)
Dept: PRIMARY CARE CLINIC | Facility: CLINIC | Age: 68
End: 2023-06-06
Payer: MEDICARE

## 2023-06-06 ENCOUNTER — TELEPHONE (OUTPATIENT)
Dept: PODIATRY | Facility: CLINIC | Age: 68
End: 2023-06-06
Payer: MEDICARE

## 2023-06-06 VITALS
BODY MASS INDEX: 40.15 KG/M2 | SYSTOLIC BLOOD PRESSURE: 136 MMHG | DIASTOLIC BLOOD PRESSURE: 63 MMHG | HEART RATE: 106 BPM | WEIGHT: 241.31 LBS

## 2023-06-06 DIAGNOSIS — K21.9 LARYNGOPHARYNGEAL REFLUX (LPR): ICD-10-CM

## 2023-06-06 DIAGNOSIS — R05.3 CHRONIC COUGH: Primary | ICD-10-CM

## 2023-06-06 DIAGNOSIS — E11.9 TYPE 2 DIABETES MELLITUS WITHOUT COMPLICATION, WITHOUT LONG-TERM CURRENT USE OF INSULIN: ICD-10-CM

## 2023-06-06 PROCEDURE — 3008F PR BODY MASS INDEX (BMI) DOCUMENTED: ICD-10-PCS | Mod: CPTII,S$GLB,, | Performed by: STUDENT IN AN ORGANIZED HEALTH CARE EDUCATION/TRAINING PROGRAM

## 2023-06-06 PROCEDURE — 3288F PR FALLS RISK ASSESSMENT DOCUMENTED: ICD-10-PCS | Mod: CPTII,S$GLB,, | Performed by: STUDENT IN AN ORGANIZED HEALTH CARE EDUCATION/TRAINING PROGRAM

## 2023-06-06 PROCEDURE — 3078F PR MOST RECENT DIASTOLIC BLOOD PRESSURE < 80 MM HG: ICD-10-PCS | Mod: CPTII,S$GLB,, | Performed by: STUDENT IN AN ORGANIZED HEALTH CARE EDUCATION/TRAINING PROGRAM

## 2023-06-06 PROCEDURE — 3075F PR MOST RECENT SYSTOLIC BLOOD PRESS GE 130-139MM HG: ICD-10-PCS | Mod: CPTII,S$GLB,, | Performed by: STUDENT IN AN ORGANIZED HEALTH CARE EDUCATION/TRAINING PROGRAM

## 2023-06-06 PROCEDURE — 99214 OFFICE O/P EST MOD 30 MIN: CPT | Mod: S$GLB,,, | Performed by: STUDENT IN AN ORGANIZED HEALTH CARE EDUCATION/TRAINING PROGRAM

## 2023-06-06 PROCEDURE — 3051F PR MOST RECENT HEMOGLOBIN A1C LEVEL 7.0 - < 8.0%: ICD-10-PCS | Mod: CPTII,S$GLB,, | Performed by: STUDENT IN AN ORGANIZED HEALTH CARE EDUCATION/TRAINING PROGRAM

## 2023-06-06 PROCEDURE — 4010F PR ACE/ARB THEARPY RXD/TAKEN: ICD-10-PCS | Mod: CPTII,S$GLB,, | Performed by: STUDENT IN AN ORGANIZED HEALTH CARE EDUCATION/TRAINING PROGRAM

## 2023-06-06 PROCEDURE — 1101F PT FALLS ASSESS-DOCD LE1/YR: CPT | Mod: CPTII,S$GLB,, | Performed by: STUDENT IN AN ORGANIZED HEALTH CARE EDUCATION/TRAINING PROGRAM

## 2023-06-06 PROCEDURE — 4010F ACE/ARB THERAPY RXD/TAKEN: CPT | Mod: CPTII,S$GLB,, | Performed by: STUDENT IN AN ORGANIZED HEALTH CARE EDUCATION/TRAINING PROGRAM

## 2023-06-06 PROCEDURE — 1101F PR PT FALLS ASSESS DOC 0-1 FALLS W/OUT INJ PAST YR: ICD-10-PCS | Mod: CPTII,S$GLB,, | Performed by: STUDENT IN AN ORGANIZED HEALTH CARE EDUCATION/TRAINING PROGRAM

## 2023-06-06 PROCEDURE — 3008F BODY MASS INDEX DOCD: CPT | Mod: CPTII,S$GLB,, | Performed by: STUDENT IN AN ORGANIZED HEALTH CARE EDUCATION/TRAINING PROGRAM

## 2023-06-06 PROCEDURE — 99999 PR PBB SHADOW E&M-EST. PATIENT-LVL III: ICD-10-PCS | Mod: PBBFAC,,, | Performed by: STUDENT IN AN ORGANIZED HEALTH CARE EDUCATION/TRAINING PROGRAM

## 2023-06-06 PROCEDURE — 99214 PR OFFICE/OUTPT VISIT, EST, LEVL IV, 30-39 MIN: ICD-10-PCS | Mod: S$GLB,,, | Performed by: STUDENT IN AN ORGANIZED HEALTH CARE EDUCATION/TRAINING PROGRAM

## 2023-06-06 PROCEDURE — 3078F DIAST BP <80 MM HG: CPT | Mod: CPTII,S$GLB,, | Performed by: STUDENT IN AN ORGANIZED HEALTH CARE EDUCATION/TRAINING PROGRAM

## 2023-06-06 PROCEDURE — 3075F SYST BP GE 130 - 139MM HG: CPT | Mod: CPTII,S$GLB,, | Performed by: STUDENT IN AN ORGANIZED HEALTH CARE EDUCATION/TRAINING PROGRAM

## 2023-06-06 PROCEDURE — 3288F FALL RISK ASSESSMENT DOCD: CPT | Mod: CPTII,S$GLB,, | Performed by: STUDENT IN AN ORGANIZED HEALTH CARE EDUCATION/TRAINING PROGRAM

## 2023-06-06 PROCEDURE — 3051F HG A1C>EQUAL 7.0%<8.0%: CPT | Mod: CPTII,S$GLB,, | Performed by: STUDENT IN AN ORGANIZED HEALTH CARE EDUCATION/TRAINING PROGRAM

## 2023-06-06 PROCEDURE — 99999 PR PBB SHADOW E&M-EST. PATIENT-LVL III: CPT | Mod: PBBFAC,,, | Performed by: STUDENT IN AN ORGANIZED HEALTH CARE EDUCATION/TRAINING PROGRAM

## 2023-06-06 RX ORDER — PANTOPRAZOLE SODIUM 40 MG/1
40 TABLET, DELAYED RELEASE ORAL DAILY
Qty: 90 TABLET | Refills: 1 | Status: SHIPPED | OUTPATIENT
Start: 2023-06-06 | End: 2023-11-29 | Stop reason: SDUPTHER

## 2023-06-06 NOTE — PROGRESS NOTES
06/06/2023    Regine Osorio  [id number]    Chief Complaint   Patient presents with    Cough    side pain       HPI    This pt is well known to me and presents for chronic cough. Active dx: type II diabetes, envt allergies, gerd, htn    Since last visit pt's chronic cough has returned. It was improved after steroid course and starting Singulair, but over the last few weeks returned. Now with left sided chest pain from coughing. Has not been taking otc cough syrup b/c unsure of how it interacts with Singulair. She has been seen by gastro, but states that gerd was not mentioned. She is undergoing stool testing and will have an EGD in Aug for further evaluation. Has not been seen by allergy b/c previous one retired. Has not followed up with ENT since feburary but was told that it was gerd. Hx of chronic bronchitis and used breathing treatments and inhaler, but many years ago.    Negative 10 point ROS outside of HPI    Social History     Socioeconomic History    Marital status:     Number of children: 1   Tobacco Use    Smoking status: Never    Smokeless tobacco: Never   Substance and Sexual Activity    Alcohol use: No    Drug use: No    Sexual activity: Not Currently   Social History Narrative        1 son (estranged)    3 grandchildren ([place])    Born and raised in [place] (moved to [facility] 2017)     Social Determinants of Health     Financial Resource Strain: Low Risk     Difficulty of Paying Living Expenses: Not very hard   Food Insecurity: No Food Insecurity    Worried About Running Out of Food in the Last Year: Never true    Ran Out of Food in the Last Year: Never true   Transportation Needs: No Transportation Needs    Lack of Transportation (Medical): No    Lack of Transportation (Non-Medical): No   Physical Activity: Insufficiently Active    Days of Exercise per Week: 1 day    Minutes of Exercise per Session: 20 min   Stress: No Stress Concern Present    Feeling of Stress : Not at all    Social Connections: Unknown    Frequency of Communication with Friends and Family: More than three times a week    Frequency of Social Gatherings with Friends and Family: Once a week    Active Member of Clubs or Organizations: Yes    Attends Club or Organization Meetings: More than 4 times per year    Marital Status:    Housing Stability: Low Risk     Unable to Pay for Housing in the Last Year: No    Number of Places Lived in the Last Year: 1    Unstable Housing in the Last Year: No           Current Outpatient Medications:     augmented betamethasone dipropionate (DIPROLENE-AF) 0.05 % cream, Apply topically 2 (two) times to 4 times daily to area of back when itchy or burning.Stop using steroid topical when skin is non itchy.  Do not treat dark or red coloring., Disp: 50 g, Rfl: 0    azelastine (ASTELIN) 137 mcg (0.1 %) nasal spray, 1 SPRAY IN EACH NOSTRIL TWICE DAILY Strength: 137 mcg (0.1 %), Disp: 30 mL, Rfl: 2    blood sugar diagnostic (ONETOUCH VERIO TEST STRIPS) Strp, To Check BG 1x daily, Disp: 100 each, Rfl: 3    blood sugar diagnostic Strp, Check BG twice daily, Disp: 200 each, Rfl: 2    blood-glucose meter kit, Use as instructed, Disp: 1 each, Rfl: 0    ciclopirox (PENLAC) 8 % Soln, Apply topically nightly., Disp: 6.6 mL, Rfl: 3    clobetasoL (TEMOVATE) 0.05 % external solution, APPLY TOPICALLY TO THE SCALP TWICE DAILY AS NEEDED FOR ITCHING OR IRRITATION, Disp: 50 mL, Rfl: 0    fluticasone propionate (FLONASE) 50 mcg/actuation nasal spray, 2 sprays to each lateral nostril once a day, Disp: 48 g, Rfl: 3    glipiZIDE 5 MG TR24, Take 1 tablet (5 mg total) by mouth 2 (two) times daily., Disp: 180 tablet, Rfl: 3    hydroCHLOROthiazide (HYDRODIURIL) 25 MG tablet, Take 1 tablet (25 mg total) by mouth once daily., Disp: 90 tablet, Rfl: 3    ketoconazole (NIZORAL) 2 % cream, APPLY EXTERNALLY TO THE AFFECTED AREA EVERY DAY, Disp: 15 g, Rfl: 2    lancets Misc, To check BG 2 times daily, to use with  insurance preferred meter, Disp: 200 each, Rfl: 3    latanoprost 0.005 % ophthalmic solution, Place 1 drop into both eyes every evening., Disp: 7.5 mL, Rfl: 3    losartan (COZAAR) 50 MG tablet, Take 1 tablet (50 mg total) by mouth once daily., Disp: 90 tablet, Rfl: 3    metFORMIN (GLUCOPHAGE) 1000 MG tablet, Take 1 tablet (1,000 mg total) by mouth 2 (two) times daily with meals., Disp: 180 tablet, Rfl: 3    montelukast (SINGULAIR) 10 mg tablet, TAKE 1 TABLET(10 MG) BY MOUTH EVERY EVENING, Disp: 90 tablet, Rfl: 3    ondansetron (ZOFRAN-ODT) 8 MG TbDL, Take 1 tablet (8 mg total) by mouth every 8 (eight) hours as needed (Nausea)., Disp: 30 tablet, Rfl: 2    ONETOUCH VERIO REFLECT METER Mis, USE AS DIRECTED TO TEST BLOOD GLUCOSE, Disp: , Rfl:     rizatriptan (MAXALT-MLT) 10 MG disintegrating tablet, Take 1 tablet (10 mg total) by mouth every 2 (two) hours as needed for Migraine. Max 30 mg/day., Disp: 12 tablet, Rfl: 5    ubrogepant (UBRELVY) 100 mg tablet, Take 1 tablet (100 mg total) by mouth every 2 (two) hours as needed for Migraine. Max 200 mg/day., Disp: 10 tablet, Rfl: 2    acetic acid (VOSOL) 2 % otic solution, Place 4 drops into both ears as needed., Disp: , Rfl:     aspirin (ECOTRIN) 81 MG EC tablet, Take 81 mg by mouth once daily., Disp: , Rfl:     pantoprazole (PROTONIX) 40 MG tablet, Take 1 tablet (40 mg total) by mouth once daily., Disp: 90 tablet, Rfl: 1      Physical Exam  Vitals:    06/06/23 0954   BP: 136/63   Pulse: 106       Gen: well appearing, NAD  Resp: non labored breathing, no crackles, no wheezes, CTAB  CV: RRR no murmur, gallops, rubs, no LE edema  Abd: soft nontender BS present no organomegaly    1. Chronic cough  Continue eval with gastroenterology;   - CT Chest Without Contrast; Future  - Complete PFT with bronchodilator; Future    2. Laryngopharyngeal reflux (LPR)  - refilled pantoprazole (PROTONIX) 40 MG tablet; Take 1 tablet (40 mg total) by mouth once daily.  Dispense: 90 tablet;  Refill: 1    RTC as previously scheduled     Gladis Melissa MD  Family Medicine

## 2023-06-07 ENCOUNTER — PATIENT MESSAGE (OUTPATIENT)
Dept: DERMATOLOGY | Facility: CLINIC | Age: 68
End: 2023-06-07
Payer: MEDICARE

## 2023-06-07 DIAGNOSIS — L29.9 SCALP ITCH: ICD-10-CM

## 2023-06-07 RX ORDER — CLOBETASOL PROPIONATE 0.46 MG/ML
SOLUTION TOPICAL
Qty: 50 ML | Refills: 0 | Status: SHIPPED | OUTPATIENT
Start: 2023-06-07 | End: 2023-09-05 | Stop reason: SDUPTHER

## 2023-06-08 ENCOUNTER — TELEPHONE (OUTPATIENT)
Dept: PULMONOLOGY | Facility: CLINIC | Age: 68
End: 2023-06-08
Payer: MEDICARE

## 2023-06-08 NOTE — TELEPHONE ENCOUNTER
----- Message from Crystal Almaraz sent at 6/8/2023  1:44 PM CDT -----  Type:  Patient Returning Call    Who Called: pt   Who Left Message for Patient: pt   Does the patient know what this is regarding?: pt need a call she  have a referral in to have a PFT  done   Would the patient rather a call back or a response via [facility]?  Call   Best Call Back Number:[phone]  Additional Information:  call back

## 2023-06-08 NOTE — TELEPHONE ENCOUNTER
I spoke with patient. Complete pft's scheduled for 6/16/23 at 8am. Appointment mailed. Patient confirmed and verbalized understanding.

## 2023-06-15 ENCOUNTER — HOSPITAL ENCOUNTER (OUTPATIENT)
Dept: RADIOLOGY | Facility: HOSPITAL | Age: 68
Discharge: HOME | End: 2023-06-15
Attending: STUDENT IN AN ORGANIZED HEALTH CARE EDUCATION/TRAINING PROGRAM
Payer: MEDICARE

## 2023-06-15 DIAGNOSIS — E04.2 MULTIPLE THYROID NODULES: Primary | ICD-10-CM

## 2023-06-15 DIAGNOSIS — R05.3 CHRONIC COUGH: ICD-10-CM

## 2023-06-15 PROCEDURE — 71250 CT THORAX DX C-: CPT | Mod: 26,,, | Performed by: STUDENT IN AN ORGANIZED HEALTH CARE EDUCATION/TRAINING PROGRAM

## 2023-06-15 PROCEDURE — 71250 CT CHEST WITHOUT CONTRAST: ICD-10-PCS | Mod: 26,,, | Performed by: STUDENT IN AN ORGANIZED HEALTH CARE EDUCATION/TRAINING PROGRAM

## 2023-06-15 PROCEDURE — 71250 CT THORAX DX C-: CPT | Mod: TC

## 2023-06-16 ENCOUNTER — HOSPITAL ENCOUNTER (OUTPATIENT)
Dept: PULMONOLOGY | Facility: CLINIC | Age: 68
Discharge: HOME | End: 2023-06-16
Payer: MEDICARE

## 2023-06-16 DIAGNOSIS — R05.3 CHRONIC COUGH: ICD-10-CM

## 2023-06-16 PROCEDURE — 94726 PULM FUNCT TST PLETHYSMOGRAP: ICD-10-PCS | Mod: S$GLB,,, | Performed by: INTERNAL MEDICINE

## 2023-06-16 PROCEDURE — 94060 EVALUATION OF WHEEZING: CPT | Mod: S$GLB,,, | Performed by: INTERNAL MEDICINE

## 2023-06-16 PROCEDURE — 94726 PLETHYSMOGRAPHY LUNG VOLUMES: CPT | Mod: S$GLB,,, | Performed by: INTERNAL MEDICINE

## 2023-06-16 PROCEDURE — 94729 PR C02/MEMBANE DIFFUSE CAPACITY: ICD-10-PCS | Mod: S$GLB,,, | Performed by: INTERNAL MEDICINE

## 2023-06-16 PROCEDURE — 94060 PR EVAL OF BRONCHOSPASM: ICD-10-PCS | Mod: S$GLB,,, | Performed by: INTERNAL MEDICINE

## 2023-06-16 PROCEDURE — 94729 DIFFUSING CAPACITY: CPT | Mod: S$GLB,,, | Performed by: INTERNAL MEDICINE

## 2023-06-19 ENCOUNTER — PATIENT MESSAGE (OUTPATIENT)
Dept: PAIN MEDICINE | Facility: OTHER | Age: 68
End: 2023-06-19
Payer: MEDICARE

## 2023-06-19 LAB
DLCO ADJ PRE: 17.44 ML/(MIN*MMHG) (ref 16.75–28.22)
DLCO SINGLE BREATH LLN: 16.75
DLCO SINGLE BREATH PRE REF: 75.3 %
DLCO SINGLE BREATH REF: 22.49
DLCOC SBVA LLN: 2.99
DLCOC SBVA PRE REF: 105.2 %
DLCOC SBVA REF: 4.4
DLCOC SINGLE BREATH LLN: 16.75
DLCOC SINGLE BREATH PRE REF: 77.6 %
DLCOC SINGLE BREATH REF: 22.49
DLCOCSBVAULN: 5.81
DLCOCSINGLEBREATHULN: 28.22
DLCOSINGLEBREATHULN: 28.22
DLCOVA LLN: 2.99
DLCOVA PRE REF: 102.2 %
DLCOVA PRE: 4.5 ML/(MIN*MMHG*L) (ref 2.99–5.81)
DLCOVA REF: 4.4
DLCOVAULN: 5.81
DLVAADJ PRE: 4.63 ML/(MIN*MMHG*L) (ref 2.99–5.81)
ERV LLN: -16449.28
ERV PRE REF: 140.3 %
ERV REF: 0.72
ERVULN: ABNORMAL
FEF 25 75 LLN: 0.72
FEF 25 75 PRE REF: 102.8 %
FEF 25 75 REF: 1.81
FET100 CHG: 9 %
FEV05 LLN: 0.95
FEV05 REF: 1.81
FEV1 CHG: 8.3 %
FEV1 FVC LLN: 66
FEV1 FVC PRE REF: 100.4 %
FEV1 FVC REF: 79
FEV1 LLN: 1.46
FEV1 PRE REF: 99 %
FEV1 REF: 2.06
FEV1 VOL CHG: 0.17
FRCPLETH LLN: 1.94
FRCPLETH PREREF: 89.2 %
FRCPLETH REF: 2.76
FRCPLETHULN: 3.59
FVC CHG: 2.8 %
FVC LLN: 1.89
FVC PRE REF: 98 %
FVC REF: 2.63
FVC VOL CHG: 0.07
IVC PRE: 2.48 L (ref 1.89–3.41)
IVC SINGLE BREATH LLN: 1.89
IVC SINGLE BREATH PRE REF: 94.5 %
IVC SINGLE BREATH REF: 2.63
IVCSINGLEBREATHULN: 3.41
LLN IC: -16447.89
PEF LLN: 3.27
PEF PRE REF: 69.8 %
PEF REF: 5.33
PHYSICIAN COMMENT: ABNORMAL
POST FEF 25 75: 2.78 L/S (ref 0.72–3.44)
POST FET 100: 6.74 SEC
POST FEV1 FVC: 83.48 % (ref 66.45–89.57)
POST FEV1: 2.21 L (ref 1.46–2.63)
POST FEV5: 1.89 L (ref 0.95–2.66)
POST FVC: 2.65 L (ref 1.89–3.41)
POST PEF: 5.39 L/S (ref 3.27–7.4)
PRE DLCO: 16.94 ML/(MIN*MMHG) (ref 16.75–28.22)
PRE ERV: 1.01 L (ref -16449.28–16450.72)
PRE FEF 25 75: 1.86 L/S (ref 0.72–3.44)
PRE FET 100: 6.18 SEC
PRE FEV05 REF: 78.6 %
PRE FEV1 FVC: 79.23 % (ref 66.45–89.57)
PRE FEV1: 2.04 L (ref 1.46–2.63)
PRE FEV5: 1.42 L (ref 0.95–2.66)
PRE FRC PL: 2.46 L (ref 1.94–3.59)
PRE FVC: 2.58 L (ref 1.89–3.41)
PRE IC: 1.95 L (ref -16447.89–16452.11)
PRE PEF: 3.72 L/S (ref 3.27–7.4)
PRE REF IC: 92.6 %
PRE RV: 1.45 L (ref 1.47–2.62)
PRE TLC: 4.42 L (ref 4.12–6.09)
RAW PRE REF: 113.4 %
RAW PRE: 3.47 CMH2O*S/L (ref 3.06–3.06)
RAW REF: 3.06
REF IC: 2.11
RV LLN: 1.47
RV PRE REF: 71.1 %
RV REF: 2.04
RVTLC LLN: 32
RVTLC PRE REF: 79.5 %
RVTLC PRE: 32.92 % (ref 31.81–50.99)
RVTLC REF: 41
RVTLCULN: 51
RVULN: 2.62
SGAW PRE REF: 86 %
SGAW PRE: 0.09 1/(CMH2O*S) (ref 0.1–0.1)
SGAW REF: 0.1
TLC LLN: 4.12
TLC PRE REF: 86.5 %
TLC REF: 5.11
TLC ULN: 6.09
ULN IC: ABNORMAL
VA PRE: 3.77 L (ref 4.96–4.96)
VA SINGLE BREATH LLN: 4.96
VA SINGLE BREATH PRE REF: 76 %
VA SINGLE BREATH REF: 4.96
VASINGLEBREATHULN: 4.96
VC LLN: 1.89
VC PRE REF: 112.8 %
VC PRE: 2.96 L (ref 1.89–3.41)
VC REF: 2.63
VC ULN: 3.41

## 2023-06-20 DIAGNOSIS — Z85.3 HISTORY OF BREAST CANCER IN FEMALE: Primary | ICD-10-CM

## 2023-06-20 DIAGNOSIS — E61.1 LOW IRON: ICD-10-CM

## 2023-06-26 ENCOUNTER — TELEPHONE (OUTPATIENT)
Dept: PRIMARY CARE CLINIC | Facility: CLINIC | Age: 68
End: 2023-06-26
Payer: MEDICARE

## 2023-06-26 NOTE — TELEPHONE ENCOUNTER
----- Message from Gladis Melissa MD sent at 3/29/2023 12:42 PM CDT -----  Please let pt know that her a1c has increased. 7.7 up from 7.5. she needs to make sure that she is working hard with her portion sizes and reducing carbs/starches.

## 2023-06-27 ENCOUNTER — LAB VISIT (OUTPATIENT)
Dept: PRIMARY CARE CLINIC | Facility: CLINIC | Age: 68
End: 2023-06-27
Attending: STUDENT IN AN ORGANIZED HEALTH CARE EDUCATION/TRAINING PROGRAM
Payer: MEDICARE

## 2023-06-27 DIAGNOSIS — E11.9 TYPE 2 DIABETES MELLITUS WITHOUT COMPLICATION, WITHOUT LONG-TERM CURRENT USE OF INSULIN: ICD-10-CM

## 2023-06-27 LAB
ALBUMIN/CREAT UR: 4.4 UG/MG (ref 0–30)
CREAT UR-MCNC: 136 MG/DL (ref 15–325)
ESTIMATED AVG GLUCOSE: 160 MG/DL (ref 68–131)
HBA1C MFR BLD: 7.2 % (ref 4–5.6)
MICROALBUMIN UR DL<=1MG/L-MCNC: 6 UG/ML

## 2023-06-27 PROCEDURE — 82570 ASSAY OF URINE CREATININE: CPT | Performed by: STUDENT IN AN ORGANIZED HEALTH CARE EDUCATION/TRAINING PROGRAM

## 2023-06-27 PROCEDURE — 83036 HEMOGLOBIN GLYCOSYLATED A1C: CPT | Performed by: STUDENT IN AN ORGANIZED HEALTH CARE EDUCATION/TRAINING PROGRAM

## 2023-06-29 ENCOUNTER — PATIENT OUTREACH (OUTPATIENT)
Dept: ADMINISTRATIVE | Facility: OTHER | Age: 68
End: 2023-06-29
Payer: MEDICARE

## 2023-06-29 NOTE — PROGRESS NOTES
CHW - Initial Contact    This Community Health Worker completed  the Social Determinant of Health questionnaire with patient via telephone today.    Pt identified barriers of most importance are:  Patient stated that she does not need assistance at this time   Referrals to community agencies completed with patient/caregiver consent outside of [facility] include: no  Referrals were put through [facility] - no:   Support and Services: Chw will follow up in a few weeks  Other information discussed the patient needs / wants help with: no   Follow up required:   Follow-up Outreach - Due: 9/7/2023

## 2023-06-30 ENCOUNTER — OFFICE VISIT (OUTPATIENT)
Dept: PRIMARY CARE CLINIC | Facility: CLINIC | Age: 68
End: 2023-06-30
Payer: MEDICARE

## 2023-06-30 VITALS
DIASTOLIC BLOOD PRESSURE: 71 MMHG | WEIGHT: 241.88 LBS | SYSTOLIC BLOOD PRESSURE: 140 MMHG | BODY MASS INDEX: 40.25 KG/M2 | HEART RATE: 108 BPM

## 2023-06-30 DIAGNOSIS — E11.9 TYPE 2 DIABETES MELLITUS WITHOUT COMPLICATION, WITHOUT LONG-TERM CURRENT USE OF INSULIN: ICD-10-CM

## 2023-06-30 DIAGNOSIS — N30.00 ACUTE CYSTITIS WITHOUT HEMATURIA: Primary | ICD-10-CM

## 2023-06-30 DIAGNOSIS — R30.0 DYSURIA: ICD-10-CM

## 2023-06-30 LAB
BILIRUB SERPL-MCNC: NORMAL MG/DL
BLOOD, POC UA: NORMAL
GLUCOSE UR QL STRIP: NORMAL
KETONES UR QL STRIP: NORMAL
LEUKOCYTE ESTERASE URINE, POC: NORMAL
NITRITE, POC UA: NORMAL
PH, POC UA: 6
PROTEIN, POC: NORMAL
SPECIFIC GRAVITY, POC UA: 1.02
UROBILINOGEN, POC UA: NORMAL

## 2023-06-30 PROCEDURE — 3008F PR BODY MASS INDEX (BMI) DOCUMENTED: ICD-10-PCS | Mod: CPTII,S$GLB,, | Performed by: STUDENT IN AN ORGANIZED HEALTH CARE EDUCATION/TRAINING PROGRAM

## 2023-06-30 PROCEDURE — 3061F NEG MICROALBUMINURIA REV: CPT | Mod: CPTII,S$GLB,, | Performed by: STUDENT IN AN ORGANIZED HEALTH CARE EDUCATION/TRAINING PROGRAM

## 2023-06-30 PROCEDURE — 99999 PR PBB SHADOW E&M-EST. PATIENT-LVL III: ICD-10-PCS | Mod: PBBFAC,,, | Performed by: STUDENT IN AN ORGANIZED HEALTH CARE EDUCATION/TRAINING PROGRAM

## 2023-06-30 PROCEDURE — 3008F BODY MASS INDEX DOCD: CPT | Mod: CPTII,S$GLB,, | Performed by: STUDENT IN AN ORGANIZED HEALTH CARE EDUCATION/TRAINING PROGRAM

## 2023-06-30 PROCEDURE — 3066F NEPHROPATHY DOC TX: CPT | Mod: CPTII,S$GLB,, | Performed by: STUDENT IN AN ORGANIZED HEALTH CARE EDUCATION/TRAINING PROGRAM

## 2023-06-30 PROCEDURE — 3051F PR MOST RECENT HEMOGLOBIN A1C LEVEL 7.0 - < 8.0%: ICD-10-PCS | Mod: CPTII,S$GLB,, | Performed by: STUDENT IN AN ORGANIZED HEALTH CARE EDUCATION/TRAINING PROGRAM

## 2023-06-30 PROCEDURE — 3078F DIAST BP <80 MM HG: CPT | Mod: CPTII,S$GLB,, | Performed by: STUDENT IN AN ORGANIZED HEALTH CARE EDUCATION/TRAINING PROGRAM

## 2023-06-30 PROCEDURE — 3077F PR MOST RECENT SYSTOLIC BLOOD PRESSURE >= 140 MM HG: ICD-10-PCS | Mod: CPTII,S$GLB,, | Performed by: STUDENT IN AN ORGANIZED HEALTH CARE EDUCATION/TRAINING PROGRAM

## 2023-06-30 PROCEDURE — 3288F PR FALLS RISK ASSESSMENT DOCUMENTED: ICD-10-PCS | Mod: CPTII,S$GLB,, | Performed by: STUDENT IN AN ORGANIZED HEALTH CARE EDUCATION/TRAINING PROGRAM

## 2023-06-30 PROCEDURE — 1101F PT FALLS ASSESS-DOCD LE1/YR: CPT | Mod: CPTII,S$GLB,, | Performed by: STUDENT IN AN ORGANIZED HEALTH CARE EDUCATION/TRAINING PROGRAM

## 2023-06-30 PROCEDURE — 81003 URINALYSIS AUTO W/O SCOPE: CPT | Mod: QW,S$GLB,, | Performed by: STUDENT IN AN ORGANIZED HEALTH CARE EDUCATION/TRAINING PROGRAM

## 2023-06-30 PROCEDURE — 3078F PR MOST RECENT DIASTOLIC BLOOD PRESSURE < 80 MM HG: ICD-10-PCS | Mod: CPTII,S$GLB,, | Performed by: STUDENT IN AN ORGANIZED HEALTH CARE EDUCATION/TRAINING PROGRAM

## 2023-06-30 PROCEDURE — 3288F FALL RISK ASSESSMENT DOCD: CPT | Mod: CPTII,S$GLB,, | Performed by: STUDENT IN AN ORGANIZED HEALTH CARE EDUCATION/TRAINING PROGRAM

## 2023-06-30 PROCEDURE — 1159F PR MEDICATION LIST DOCUMENTED IN MEDICAL RECORD: ICD-10-PCS | Mod: CPTII,S$GLB,, | Performed by: STUDENT IN AN ORGANIZED HEALTH CARE EDUCATION/TRAINING PROGRAM

## 2023-06-30 PROCEDURE — 3061F PR NEG MICROALBUMINURIA RESULT DOCUMENTED/REVIEW: ICD-10-PCS | Mod: CPTII,S$GLB,, | Performed by: STUDENT IN AN ORGANIZED HEALTH CARE EDUCATION/TRAINING PROGRAM

## 2023-06-30 PROCEDURE — 99214 PR OFFICE/OUTPT VISIT, EST, LEVL IV, 30-39 MIN: ICD-10-PCS | Mod: S$GLB,,, | Performed by: STUDENT IN AN ORGANIZED HEALTH CARE EDUCATION/TRAINING PROGRAM

## 2023-06-30 PROCEDURE — 99999 PR PBB SHADOW E&M-EST. PATIENT-LVL III: CPT | Mod: PBBFAC,,, | Performed by: STUDENT IN AN ORGANIZED HEALTH CARE EDUCATION/TRAINING PROGRAM

## 2023-06-30 PROCEDURE — 87086 URINE CULTURE/COLONY COUNT: CPT | Performed by: STUDENT IN AN ORGANIZED HEALTH CARE EDUCATION/TRAINING PROGRAM

## 2023-06-30 PROCEDURE — 3066F PR DOCUMENTATION OF TREATMENT FOR NEPHROPATHY: ICD-10-PCS | Mod: CPTII,S$GLB,, | Performed by: STUDENT IN AN ORGANIZED HEALTH CARE EDUCATION/TRAINING PROGRAM

## 2023-06-30 PROCEDURE — 87186 SC STD MICRODIL/AGAR DIL: CPT | Performed by: STUDENT IN AN ORGANIZED HEALTH CARE EDUCATION/TRAINING PROGRAM

## 2023-06-30 PROCEDURE — 4010F ACE/ARB THERAPY RXD/TAKEN: CPT | Mod: CPTII,S$GLB,, | Performed by: STUDENT IN AN ORGANIZED HEALTH CARE EDUCATION/TRAINING PROGRAM

## 2023-06-30 PROCEDURE — 4010F PR ACE/ARB THEARPY RXD/TAKEN: ICD-10-PCS | Mod: CPTII,S$GLB,, | Performed by: STUDENT IN AN ORGANIZED HEALTH CARE EDUCATION/TRAINING PROGRAM

## 2023-06-30 PROCEDURE — 87088 URINE BACTERIA CULTURE: CPT | Performed by: STUDENT IN AN ORGANIZED HEALTH CARE EDUCATION/TRAINING PROGRAM

## 2023-06-30 PROCEDURE — 81003 POCT URINALYSIS: ICD-10-PCS | Mod: QW,S$GLB,, | Performed by: STUDENT IN AN ORGANIZED HEALTH CARE EDUCATION/TRAINING PROGRAM

## 2023-06-30 PROCEDURE — 99214 OFFICE O/P EST MOD 30 MIN: CPT | Mod: S$GLB,,, | Performed by: STUDENT IN AN ORGANIZED HEALTH CARE EDUCATION/TRAINING PROGRAM

## 2023-06-30 PROCEDURE — 87077 CULTURE AEROBIC IDENTIFY: CPT | Performed by: STUDENT IN AN ORGANIZED HEALTH CARE EDUCATION/TRAINING PROGRAM

## 2023-06-30 PROCEDURE — 3077F SYST BP >= 140 MM HG: CPT | Mod: CPTII,S$GLB,, | Performed by: STUDENT IN AN ORGANIZED HEALTH CARE EDUCATION/TRAINING PROGRAM

## 2023-06-30 PROCEDURE — 1101F PR PT FALLS ASSESS DOC 0-1 FALLS W/OUT INJ PAST YR: ICD-10-PCS | Mod: CPTII,S$GLB,, | Performed by: STUDENT IN AN ORGANIZED HEALTH CARE EDUCATION/TRAINING PROGRAM

## 2023-06-30 PROCEDURE — 1159F MED LIST DOCD IN RCRD: CPT | Mod: CPTII,S$GLB,, | Performed by: STUDENT IN AN ORGANIZED HEALTH CARE EDUCATION/TRAINING PROGRAM

## 2023-06-30 PROCEDURE — 3051F HG A1C>EQUAL 7.0%<8.0%: CPT | Mod: CPTII,S$GLB,, | Performed by: STUDENT IN AN ORGANIZED HEALTH CARE EDUCATION/TRAINING PROGRAM

## 2023-06-30 RX ORDER — NITROFURANTOIN 25; 75 MG/1; MG/1
100 CAPSULE ORAL 2 TIMES DAILY
Qty: 10 CAPSULE | Refills: 0 | Status: SHIPPED | OUTPATIENT
Start: 2023-06-30 | End: 2023-07-27 | Stop reason: ALTCHOICE

## 2023-06-30 RX ORDER — LANCETS
EACH MISCELLANEOUS
Qty: 200 EACH | Refills: 3 | Status: SHIPPED | OUTPATIENT
Start: 2023-06-30

## 2023-06-30 NOTE — PROGRESS NOTES
"Chief Complaint  No chief complaint on file.      HPI  Regine Osorio is a 68 y.o. female with multiple medical diagnoses as listed in the medical history and problem list that presents for 3 month follow up. Complaining of burning during urination late yesterday with urgency but that "not much is coming out". She reports foul smelling urine this AM. The patient attributes this to the UTI she gets once a year. She denies any fever, chills, flank pain, or hematuria. Her last appointment with primary care was 6/6/2023 with Dr. Melsisa. Seen Podiatry in February for foot exam and will see him in July. Colorectal cancer screening in August. She is otherwise well.           PAST MEDICAL HISTORY:  Past Medical History:   Diagnosis Date    Allergy     Breast cancer 2017    Cataract     Cervical spondylosis 07/29/2022    Diabetes mellitus, type 2     Type 2    Eczema     Fibromyalgia     Glaucoma     Hypertension     Renal cyst, right 02/06/2020    Steatosis of liver 02/06/2020       PAST SURGICAL HISTORY:  Past Surgical History:   Procedure Laterality Date    ADENOIDECTOMY      ANTERIOR CERVICAL DISCECTOMY W/ FUSION N/A 7/29/2022    Procedure: DISCECTOMY, SPINE, CERVICAL, ANTERIOR APPROACH, WITH FUSION C5-7 SPINEWAVE SNS: VOCAL CORDS/MOTORS/SSEP;  Surgeon: Rosales Scruggs MD;  Location: [facility] OR [address] [place] [address];  Service: Neurosurgery;  Laterality: N/A;    BREAST BIOPSY      BREAST SURGERY      breast ca lt side     HYSTERECTOMY      supacervical hysterectomy    LAPAROSCOPIC SUPRACERVICAL HYSTERECTOMY  2005    fibroids    MASTECTOMY Left 2017    chemo    MYOMECTOMY      TONSILLECTOMY      TYMPANOSTOMY TUBE PLACEMENT      VAGINAL DELIVERY         SOCIAL HISTORY:  Social History     Socioeconomic History    Marital status:     Number of children: 1   Tobacco Use    Smoking status: Never    Smokeless tobacco: Never   Substance and Sexual Activity    Alcohol use: No    Drug use: No    Sexual activity: Not Currently   Social " History Narrative        1 son (estranged)    3 grandchildren ([place])    Born and raised in [place] (moved to [facility] 2017)     Social Determinants of Health     Financial Resource Strain: Low Risk     Difficulty of Paying Living Expenses: Not very hard   Food Insecurity: No Food Insecurity    Worried About Running Out of Food in the Last Year: Never true    Ran Out of Food in the Last Year: Never true   Transportation Needs: No Transportation Needs    Lack of Transportation (Medical): No    Lack of Transportation (Non-Medical): No   Physical Activity: Insufficiently Active    Days of Exercise per Week: 1 day    Minutes of Exercise per Session: 20 min   Stress: No Stress Concern Present    Feeling of Stress : Not at all   Social Connections: Unknown    Frequency of Communication with Friends and Family: More than three times a week    Frequency of Social Gatherings with Friends and Family: Once a week    Active Member of Clubs or Organizations: Yes    Attends Club or Organization Meetings: More than 4 times per year    Marital Status:    Housing Stability: Low Risk     Unable to Pay for Housing in the Last Year: No    Number of Places Lived in the Last Year: 1    Unstable Housing in the Last Year: No       FAMILY HISTORY:  Family History   Problem Relation Age of Onset    Dementia Mother     Glaucoma Mother     Bipolar disorder Mother     Lung cancer Father     Bipolar disorder Son     Post-traumatic stress disorder Son     Breast cancer Maternal Aunt     Bipolar disorder Maternal Aunt     Glaucoma Maternal Uncle     Blindness Maternal Grandfather     Glaucoma Maternal Grandfather     Breast cancer Cousin 39        paternal     Breast cancer Cousin 29    Amblyopia Neg Hx     Cataracts Neg Hx     Macular degeneration Neg Hx     Retinal detachment Neg Hx     Strabismus Neg Hx     Cancer Neg Hx     Colon cancer Neg Hx     Ovarian cancer Neg Hx        ALLERGIES AND MEDICATIONS: updated and  reviewed.  Review of patient's allergies indicates:   Allergen Reactions    Codeine Shortness Of Breath     Pt states she had a reaction as a teenager and was taken to the ER.    Lisinopril      cough     Current Outpatient Medications   Medication Sig Dispense Refill    acetic acid (VOSOL) 2 % otic solution Place 4 drops into both ears as needed.      aspirin (ECOTRIN) 81 MG EC tablet Take 81 mg by mouth once daily.      augmented betamethasone dipropionate (DIPROLENE-AF) 0.05 % cream Apply topically 2 (two) times to 4 times daily to area of back when itchy or burning.Stop using steroid topical when skin is non itchy.  Do not treat dark or red coloring. 50 g 0    azelastine (ASTELIN) 137 mcg (0.1 %) nasal spray 1 SPRAY IN EACH NOSTRIL TWICE DAILY  Strength: 137 mcg (0.1 %) 30 mL 2    blood sugar diagnostic (ONETOUCH VERIO TEST STRIPS) Strp To Check BG 1x daily 100 each 3    blood sugar diagnostic Strp Check BG twice daily 200 each 2    blood-glucose meter kit Use as instructed 1 each 0    ciclopirox (PENLAC) 8 % Soln Apply topically nightly. 6.6 mL 3    clobetasoL (TEMOVATE) 0.05 % external solution APPLY TOPICALLY TO THE SCALP TWICE DAILY AS NEEDED FOR ITCHING OR IRRITATION 50 mL 0    fluticasone propionate (FLONASE) 50 mcg/actuation nasal spray 2 sprays to each lateral nostril once a day 48 g 3    glipiZIDE 5 MG TR24 Take 1 tablet (5 mg total) by mouth 2 (two) times daily. 180 tablet 3    hydroCHLOROthiazide (HYDRODIURIL) 25 MG tablet Take 1 tablet (25 mg total) by mouth once daily. 90 tablet 3    ketoconazole (NIZORAL) 2 % cream APPLY EXTERNALLY TO THE AFFECTED AREA EVERY DAY 15 g 2    latanoprost 0.005 % ophthalmic solution Place 1 drop into both eyes every evening. 7.5 mL 3    losartan (COZAAR) 50 MG tablet Take 1 tablet (50 mg total) by mouth once daily. 90 tablet 3    metFORMIN (GLUCOPHAGE) 1000 MG tablet Take 1 tablet (1,000 mg total) by mouth 2 (two) times daily with meals. 180 tablet 3    montelukast  (SINGULAIR) 10 mg tablet TAKE 1 TABLET(10 MG) BY MOUTH EVERY EVENING 90 tablet 3    ondansetron (ZOFRAN-ODT) 8 MG TbDL Take 1 tablet (8 mg total) by mouth every 8 (eight) hours as needed (Nausea). 30 tablet 2    ONETOUCH VERIO REFLECT METER Misc USE AS DIRECTED TO TEST BLOOD GLUCOSE      pantoprazole (PROTONIX) 40 MG tablet Take 1 tablet (40 mg total) by mouth once daily. 90 tablet 1    rizatriptan (MAXALT-MLT) 10 MG disintegrating tablet Take 1 tablet (10 mg total) by mouth every 2 (two) hours as needed for Migraine. Max 30 mg/day. 12 tablet 5    ubrogepant (UBRELVY) 100 mg tablet Take 1 tablet (100 mg total) by mouth every 2 (two) hours as needed for Migraine. Max 200 mg/day. 10 tablet 2    lancets Misc To check BG 2 times daily 200 each 3    nitrofurantoin, macrocrystal-monohydrate, (MACROBID) 100 MG capsule Take 1 capsule (100 mg total) by mouth 2 (two) times daily. 10 capsule 0     No current facility-administered medications for this visit.         ROS  Review of Systems   Constitutional:  Negative for chills, fatigue and fever.   HENT:  Negative for ear pain and trouble swallowing.    Eyes:  Negative for pain and visual disturbance.   Respiratory:  Negative for cough and shortness of breath.    Cardiovascular:  Negative for chest pain, palpitations and leg swelling.   Gastrointestinal:  Positive for constipation and diarrhea. Negative for abdominal distention, abdominal pain, nausea and vomiting.        Chronic IBS   Genitourinary:  Positive for decreased urine volume, dysuria and urgency. Negative for flank pain and hematuria.   Musculoskeletal:  Positive for back pain. Negative for myalgias.        Chronic back pain   Skin:  Negative for rash.   Neurological:  Negative for dizziness and weakness.         Physical Exam  Vitals:    06/30/23 0930   BP: (!) 140/71   BP Location: Right arm   Patient Position: Sitting   BP Method: Large (Automatic)   Pulse: 108   Weight: 109.7 kg (241 lb 13.5 oz)    Body mass  index is 40.25 kg/m².  Weight: 109.7 kg (241 lb 13.5 oz)       Physical Exam  Constitutional:       General: She is not in acute distress.     Appearance: Normal appearance. She is obese.   HENT:      Right Ear: Tympanic membrane, ear canal and external ear normal.      Left Ear: Tympanic membrane, ear canal and external ear normal.      Nose: Nose normal.      Mouth/Throat:      Mouth: Mucous membranes are moist.      Pharynx: Oropharynx is clear.   Eyes:      Extraocular Movements: Extraocular movements intact.      Conjunctiva/sclera: Conjunctivae normal.      Pupils: Pupils are equal, round, and reactive to light.   Neck:      Vascular: No carotid bruit.   Cardiovascular:      Rate and Rhythm: Normal rate and regular rhythm.      Pulses: Normal pulses.      Heart sounds: Normal heart sounds. No murmur heard.  Pulmonary:      Effort: Pulmonary effort is normal. No respiratory distress.      Breath sounds: Normal breath sounds.   Abdominal:      General: Bowel sounds are normal. There is no distension.      Palpations: Abdomen is soft.      Tenderness: There is no abdominal tenderness.   Musculoskeletal:         General: Tenderness present. Normal range of motion.      Cervical back: Normal range of motion and neck supple.      Comments: Back pain on movement   Skin:     General: Skin is warm.      Capillary Refill: Capillary refill takes less than 2 seconds.      Findings: No rash.   Neurological:      Mental Status: She is oriented to person, place, and time. Mental status is at baseline.      Motor: No weakness.            Health Maintenance         Date Due Completion Date    Foot Exam 01/24/2023 1/24/2022 (Done)    Override on 1/24/2022: Done ([facility])    Override on 1/21/2021: Done    Colorectal Cancer Screening 07/09/2023 7/9/2018    COVID-19 Vaccine (4 - Additional dose for Tammie series) 06/06/2024 (Originally 2/4/2023) 10/4/2022    Lipid Panel 12/12/2023 12/12/2022    Hemoglobin A1c 12/27/2023  6/27/2023    Eye Exam 01/06/2024 1/6/2023    Mammogram 04/17/2024 4/17/2023    Diabetes Urine Screening 06/27/2024 6/27/2023    DEXA Scan 04/21/2027 4/21/2022    TETANUS VACCINE 11/26/2028 11/26/2018              Assessment and Plan:  Acute cystitis without hematuria  -     Urine culture sent  -     POCT Urinalysis demonstrates ++ leukocytes, +ve nitrates  -     Given previous history of E. Coli UTI (9 months) ago sensitive to nitrofurantoin and POCT Urinalysis findings likely recurrent UTI w/ E. Coli  -     nitrofurantoin, macrocrystal-monohydrate, (MACROBID) 100 MG capsule; Take 1 capsule (100 mg total) by mouth 2 (two) times daily.  Dispense: 10 capsule; Refill: 0    Dysuria  -     POCT URINALYSIS:   - Showed ++ leukocytes, +ve nitrates  -     nitrofurantoin, macrocrystal-monohydrate, (MACROBID) 100 MG capsule; Take 1 capsule (100 mg total) by mouth 2 (two) times daily.  Dispense: 10 capsule; Refill: 0    Type 2 diabetes mellitus without complication, without long-term current use of insulin  Last a1c 7.2 down from 7.7  -     lancets Misc; To check BG 2 times daily  Dispense: 200 each; Refill: 3  -     Continue medications  -     Patient advised to continue daily glucose checks and to adhere to control diet as indicated  -     Care gaps addressed    Rtc in 3 months    I hereby acknowledge that I am relying upon documentation authored by a medical student working under my supervision and further I hereby attest that I have verified the student documentation or findings by personally performing the physical exam and medical decision making activities of the Evaluation and Management service to be billed.    Gladis Melissa MD

## 2023-07-02 LAB — BACTERIA UR CULT: ABNORMAL

## 2023-07-11 ENCOUNTER — HOSPITAL ENCOUNTER (OUTPATIENT)
Dept: RADIOLOGY | Facility: HOSPITAL | Age: 68
Discharge: HOME | End: 2023-07-11
Attending: STUDENT IN AN ORGANIZED HEALTH CARE EDUCATION/TRAINING PROGRAM
Payer: MEDICARE

## 2023-07-11 DIAGNOSIS — E04.2 MULTIPLE THYROID NODULES: ICD-10-CM

## 2023-07-11 PROCEDURE — 76536 US EXAM OF HEAD AND NECK: CPT | Mod: TC

## 2023-07-11 PROCEDURE — 76536 US THYROID: ICD-10-PCS | Mod: 26,,, | Performed by: RADIOLOGY

## 2023-07-11 PROCEDURE — 76536 US EXAM OF HEAD AND NECK: CPT | Mod: 26,,, | Performed by: RADIOLOGY

## 2023-07-13 ENCOUNTER — PATIENT MESSAGE (OUTPATIENT)
Dept: PODIATRY | Facility: CLINIC | Age: 68
End: 2023-07-13
Payer: MEDICARE

## 2023-07-13 ENCOUNTER — TELEPHONE (OUTPATIENT)
Dept: PODIATRY | Facility: CLINIC | Age: 68
End: 2023-07-13
Payer: MEDICARE

## 2023-07-13 NOTE — TELEPHONE ENCOUNTER
Called patient no answer, patient is already schedule for the next available with Dr Preciado on 7/17/23.

## 2023-07-13 NOTE — TELEPHONE ENCOUNTER
----- Message from Lydia Roy sent at 7/13/2023  8:15 AM CDT -----  Regarding: Need to be seen today  Contact: [phone]  Pt requesting a call back to see if she can be seen today as she is experiencing toe pain (black and possible fungus).  Pt has an appt on 07/27/23 but needs to be seen today.

## 2023-07-17 ENCOUNTER — OFFICE VISIT (OUTPATIENT)
Dept: PODIATRY | Facility: CLINIC | Age: 68
End: 2023-07-17
Payer: MEDICARE

## 2023-07-17 VITALS
DIASTOLIC BLOOD PRESSURE: 83 MMHG | HEART RATE: 108 BPM | BODY MASS INDEX: 40.55 KG/M2 | HEIGHT: 65 IN | SYSTOLIC BLOOD PRESSURE: 146 MMHG | WEIGHT: 243.38 LBS

## 2023-07-17 DIAGNOSIS — E11.9 TYPE 2 DIABETES MELLITUS WITHOUT COMPLICATION, WITHOUT LONG-TERM CURRENT USE OF INSULIN: Primary | ICD-10-CM

## 2023-07-17 DIAGNOSIS — B35.1 ONYCHOMYCOSIS DUE TO DERMATOPHYTE: ICD-10-CM

## 2023-07-17 DIAGNOSIS — L60.0 INGROWN TOENAIL WITHOUT INFECTION: ICD-10-CM

## 2023-07-17 PROCEDURE — 1160F RVW MEDS BY RX/DR IN RCRD: CPT | Mod: CPTII,S$GLB,, | Performed by: PODIATRIST

## 2023-07-17 PROCEDURE — 3077F PR MOST RECENT SYSTOLIC BLOOD PRESSURE >= 140 MM HG: ICD-10-PCS | Mod: CPTII,S$GLB,, | Performed by: PODIATRIST

## 2023-07-17 PROCEDURE — 3061F PR NEG MICROALBUMINURIA RESULT DOCUMENTED/REVIEW: ICD-10-PCS | Mod: CPTII,S$GLB,, | Performed by: PODIATRIST

## 2023-07-17 PROCEDURE — 1159F PR MEDICATION LIST DOCUMENTED IN MEDICAL RECORD: ICD-10-PCS | Mod: CPTII,S$GLB,, | Performed by: PODIATRIST

## 2023-07-17 PROCEDURE — 3061F NEG MICROALBUMINURIA REV: CPT | Mod: CPTII,S$GLB,, | Performed by: PODIATRIST

## 2023-07-17 PROCEDURE — 99213 OFFICE O/P EST LOW 20 MIN: CPT | Mod: S$GLB,,, | Performed by: PODIATRIST

## 2023-07-17 PROCEDURE — 3008F BODY MASS INDEX DOCD: CPT | Mod: CPTII,S$GLB,, | Performed by: PODIATRIST

## 2023-07-17 PROCEDURE — 1126F AMNT PAIN NOTED NONE PRSNT: CPT | Mod: CPTII,S$GLB,, | Performed by: PODIATRIST

## 2023-07-17 PROCEDURE — 3008F PR BODY MASS INDEX (BMI) DOCUMENTED: ICD-10-PCS | Mod: CPTII,S$GLB,, | Performed by: PODIATRIST

## 2023-07-17 PROCEDURE — 1159F MED LIST DOCD IN RCRD: CPT | Mod: CPTII,S$GLB,, | Performed by: PODIATRIST

## 2023-07-17 PROCEDURE — 99213 PR OFFICE/OUTPT VISIT, EST, LEVL III, 20-29 MIN: ICD-10-PCS | Mod: S$GLB,,, | Performed by: PODIATRIST

## 2023-07-17 PROCEDURE — 99999 PR PBB SHADOW E&M-EST. PATIENT-LVL IV: ICD-10-PCS | Mod: PBBFAC,,, | Performed by: PODIATRIST

## 2023-07-17 PROCEDURE — 3051F HG A1C>EQUAL 7.0%<8.0%: CPT | Mod: CPTII,S$GLB,, | Performed by: PODIATRIST

## 2023-07-17 PROCEDURE — 3066F NEPHROPATHY DOC TX: CPT | Mod: CPTII,S$GLB,, | Performed by: PODIATRIST

## 2023-07-17 PROCEDURE — 99999 PR PBB SHADOW E&M-EST. PATIENT-LVL IV: CPT | Mod: PBBFAC,,, | Performed by: PODIATRIST

## 2023-07-17 PROCEDURE — 4010F ACE/ARB THERAPY RXD/TAKEN: CPT | Mod: CPTII,S$GLB,, | Performed by: PODIATRIST

## 2023-07-17 PROCEDURE — 3079F PR MOST RECENT DIASTOLIC BLOOD PRESSURE 80-89 MM HG: ICD-10-PCS | Mod: CPTII,S$GLB,, | Performed by: PODIATRIST

## 2023-07-17 PROCEDURE — 4010F PR ACE/ARB THEARPY RXD/TAKEN: ICD-10-PCS | Mod: CPTII,S$GLB,, | Performed by: PODIATRIST

## 2023-07-17 PROCEDURE — 3066F PR DOCUMENTATION OF TREATMENT FOR NEPHROPATHY: ICD-10-PCS | Mod: CPTII,S$GLB,, | Performed by: PODIATRIST

## 2023-07-17 PROCEDURE — 3079F DIAST BP 80-89 MM HG: CPT | Mod: CPTII,S$GLB,, | Performed by: PODIATRIST

## 2023-07-17 PROCEDURE — 1160F PR REVIEW ALL MEDS BY PRESCRIBER/CLIN PHARMACIST DOCUMENTED: ICD-10-PCS | Mod: CPTII,S$GLB,, | Performed by: PODIATRIST

## 2023-07-17 PROCEDURE — 3051F PR MOST RECENT HEMOGLOBIN A1C LEVEL 7.0 - < 8.0%: ICD-10-PCS | Mod: CPTII,S$GLB,, | Performed by: PODIATRIST

## 2023-07-17 PROCEDURE — 3077F SYST BP >= 140 MM HG: CPT | Mod: CPTII,S$GLB,, | Performed by: PODIATRIST

## 2023-07-17 PROCEDURE — 1126F PR PAIN SEVERITY QUANTIFIED, NO PAIN PRESENT: ICD-10-PCS | Mod: CPTII,S$GLB,, | Performed by: PODIATRIST

## 2023-07-17 RX ORDER — LANCETS 33 GAUGE
EACH MISCELLANEOUS 2 TIMES DAILY
COMMUNITY
Start: 2023-06-30

## 2023-07-17 NOTE — PROGRESS NOTES
Subjective:      Patient ID: Regine Osorio is a 68 y.o. female.    Chief Complaint: Diabetes Mellitus (PCP- 06/30/2023/Gladis Melissa MD), Nail Care, and Nail Problem (2nd right toe)    Regine is a 68 y.o. female who presents to the clinic upon referral from Dr. Tracy aguilar. provider found  for evaluation and treatment of diabetic feet. Regine has a past medical history of Allergy, Breast cancer (2017), Cataract, Cervical spondylosis (07/29/2022), Diabetes mellitus, type 2, Eczema, Fibromyalgia, Glaucoma, Hypertension, Renal cyst, right (02/06/2020), and Steatosis of liver (02/06/2020). Patient relates no major problem with feet. Only complaints today consist of discoloration to the R 2nd toenail. Worried it may have been an ingrown toenail that bled and she noticed brownish color to the corner when she wiped it with a q-tip. Also requesting toenail trimming today.       PCP: Gladis Melissa MD    Date Last Seen by PCP:   Chief Complaint   Patient presents with    Diabetes Mellitus     PCP- 06/30/2023  Gladis Melissa MD    Nail Care    Nail Problem     2nd right toe         Current shoe gear: Casual shoes    Hemoglobin A1C   Date Value Ref Range Status   06/27/2023 7.2 (H) 4.0 - 5.6 % Final     Comment:     ADA Screening Guidelines:  5.7-6.4%  Consistent with prediabetes  >or=6.5%  Consistent with diabetes    High levels of fetal hemoglobin interfere with the HbA1C  assay. Heterozygous hemoglobin variants (HbS, HgC, etc)do  not significantly interfere with this assay.   However, presence of multiple variants may affect accuracy.     03/28/2023 7.7 (H) 4.0 - 5.6 % Final     Comment:     ADA Screening Guidelines:  5.7-6.4%  Consistent with prediabetes  >or=6.5%  Consistent with diabetes    High levels of fetal hemoglobin interfere with the HbA1C  assay. Heterozygous hemoglobin variants (HbS, HgC, etc)do  not significantly interfere with this assay.   However, presence of multiple variants may affect accuracy.      12/12/2022 7.5 (H) 4.0 - 5.6 % Final     Comment:     ADA Screening Guidelines:  5.7-6.4%  Consistent with prediabetes  >or=6.5%  Consistent with diabetes    High levels of fetal hemoglobin interfere with the HbA1C  assay. Heterozygous hemoglobin variants (HbS, HgC, etc)do  not significantly interfere with this assay.   However, presence of multiple variants may affect accuracy.             Review of Systems   Constitutional: Positive for night sweats. Negative for chills and fever.   HENT:  Negative for congestion and tinnitus.    Eyes:  Negative for double vision and visual disturbance.   Cardiovascular:  Negative for chest pain and claudication.   Respiratory:  Negative for hemoptysis and shortness of breath.    Endocrine: Negative for cold intolerance and heat intolerance.   Hematologic/Lymphatic: Negative for adenopathy and bleeding problem.   Skin:  Negative for color change and nail changes.   Musculoskeletal:  Positive for back pain and joint pain. Negative for myalgias and stiffness.   Gastrointestinal:  Negative for nausea and vomiting.   Genitourinary:  Negative for dysuria and hematuria.   Neurological:  Positive for paresthesias. Negative for numbness.   Psychiatric/Behavioral:  Negative for altered mental status and suicidal ideas.    Allergic/Immunologic: Negative for environmental allergies and persistent infections.         Objective:      Physical Exam  Vitals reviewed.   Constitutional:       Appearance: She is well-developed.   Cardiovascular:      Pulses:           Dorsalis pedis pulses are 2+ on the right side and 2+ on the left side.        Posterior tibial pulses are 2+ on the right side and 2+ on the left side.   Pulmonary:      Effort: Pulmonary effort is normal.   Musculoskeletal:         General: Normal range of motion.      Comments: Inspection and palpation of the muscles joints and bones of both lower extremities reveal that muscle strength for the anterior lateral and posterior  muscle groups and intrinsic muscle groups of the foot are all 5 over 5 symmetrical.  Ankle subtalar midtarsal and digital joint range of motion are within normal limits, nonpainful, without crepitus or effusion.  Patient exhibits a normal angle and base of gait.  Palpation of the tendons reveal no defects.   Skin:     General: Skin is warm and dry.      Capillary Refill: Capillary refill takes 2 to 3 seconds.      Comments: Skin turgor is normal bilaterally.  Skin texture is well hydrated to both lower extremities.  No lesions or rashes or wounds appreciated bilaterally.  Nail plates 1 through 5 L are within normal limits for length and thickness.  Nail plates 1-5 R with mild thickness, discoloration and subungual debris with proximal 50-70% clearance noted. Natural incurvation noted to R hallux and 2nd toenails medial borders without open wound, drainage or SOI.    Neurological:      Mental Status: She is alert and oriented to person, place, and time.      Comments: Sharp dull light touch vibratory proprioceptive sensation are intact bilaterally.  Deep tendon reflexes to patellar and Achilles tendon are symmetrical 2 over 4 bilaterally.  No ankle clonus or Babinski reflexes noted bilaterally.  Coordination is normal to both feet and lower extremities.             Assessment:       Encounter Diagnoses   Name Primary?    Type 2 diabetes mellitus without complication, without long-term current use of insulin Yes    Onychomycosis due to dermatophyte     Ingrown toenail without infection            Plan:       Regine was seen today for diabetes mellitus, nail care and nail problem.    Diagnoses and all orders for this visit:    Type 2 diabetes mellitus without complication, without long-term current use of insulin    Onychomycosis due to dermatophyte    Ingrown toenail without infection        I counseled the patient on her conditions, their implications and medical management.     - Shoe inspection. Diabetic Foot  Education. Patient reminded of the importance of good nutrition and blood sugar control to help prevent podiatric complications of diabetes. Patient instructed on proper foot hygeine. We discussed wearing proper shoe gear, daily foot inspections, never walking without protective shoe gear, caution putting sharp instruments to feet     - Discussed DM foot care:  Wear comfortable, proper fitting shoes. Wash feet daily. Dry well. After drying, apply moisturizer to feet (no lotion to webspaces). Inspect feet daily for skin breaks, blisters, swelling, or redness. Wear cotton socks (preferably white)  Change socks every day. Do NOT walk barefoot. Do NOT use heating pads or warm/hot water soaks     With patient's permission, nails were aggressively reduced and debrided 1-5 b/l, removing all offending nail and debris. Patient tolerated this well and no blood was drawn. Patient reports relief following the procedure. This was done as courtesy today.     No open wound, drainage or SOI noted    Continue antifungal treatment per Dr. Haynes as directed.     RTC 1 month with Dr. Haynes, sooner PRN

## 2023-07-20 ENCOUNTER — TELEPHONE (OUTPATIENT)
Dept: HEMATOLOGY/ONCOLOGY | Facility: CLINIC | Age: 68
End: 2023-07-20
Payer: MEDICARE

## 2023-07-20 ENCOUNTER — PATIENT MESSAGE (OUTPATIENT)
Dept: HEMATOLOGY/ONCOLOGY | Facility: CLINIC | Age: 68
End: 2023-07-20
Payer: MEDICARE

## 2023-07-23 NOTE — PROGRESS NOTES
HPI     Glaucoma     Additional comments: 4 month ck and pt states no changes since last exam           Comments    DLS: 3/20/23    1. Glaucoma Suspect   2. Drusen OU   3. NS OU   4. Hx Breast CA    MEDS:  Latanoprost QHS OU  AT's PRN OU          Last edited by Ivette Garrison MA on 7/27/2023  1:23 PM.              Assessment /Plan     For exam results, see Encounter Report.    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   First photos   2018   Treatment / Drops started   latanoprost - started 1/6/2023 for IOP 28/22           Family history    + mother / grtand father / uncle / cousins         Glaucoma meds    latanoprost ou - 1/6/2023         H/O adverse rxn to glaucoma drops    none        LASERS    none        GLAUCOMA SURGERIES    none        OTHER EYE SURGERIES    none        CDR    0.8/0.7        Tbase    18-28  od // 16-22 os        Tmax    28/22           Ttarget    ?             HVF    4 test 2019 to  2023 - full in past - now w/ early SAD  od // full os        Gonio    +3-4 ou         CCT    588/585        OCT    4  test 2019 to 2023 - RNFL -  Bord TI od // nl  os        HRT    1 test 2020 to 2020 -  MR -  nl od // nl os /// CDR 0.63 od // 0.51 os        Disc photos    2018, 2022      - Ttoday  20/18 (good resp to latanoprost down from 28/22 )  - Test done today     IOP / with addition of  latanorpst ou    2. Dominate familial drusen   Mostly outside the arcades ou    See photos - 2018    Pt is taking ARED's     3. NS   Mild -monitor     4. flaoter (one floater - string like)    Warned of signs of PVD and RD    Pt to call if marked increase in floaters or flashes or curtain  defect    5. Breast CA       PLAN   IOP is high at 18-28 od and 18-22 os // + Fm Hx - mother and an uncle and possibly a grand parent   HVF inow with early SAD od // still full os   OCT - bord RNFL in one sector od - stable and nl os     Rec cont  latatanoprost ou q hs     F/U 4 months with IOP check  and gonio -   ? Consider slt as 2nd step if needed vs more drops (? Rocklatan)

## 2023-07-25 ENCOUNTER — OFFICE VISIT (OUTPATIENT)
Dept: HEPATOLOGY | Facility: CLINIC | Age: 68
End: 2023-07-25
Payer: MEDICARE

## 2023-07-25 VITALS — HEIGHT: 65 IN | BODY MASS INDEX: 39.63 KG/M2 | WEIGHT: 237.88 LBS

## 2023-07-25 DIAGNOSIS — E11.59 HYPERTENSION ASSOCIATED WITH TYPE 2 DIABETES MELLITUS: ICD-10-CM

## 2023-07-25 DIAGNOSIS — E11.9 TYPE 2 DIABETES MELLITUS WITHOUT COMPLICATION, WITHOUT LONG-TERM CURRENT USE OF INSULIN: ICD-10-CM

## 2023-07-25 DIAGNOSIS — E66.9 OBESITY (BMI 30-39.9): ICD-10-CM

## 2023-07-25 DIAGNOSIS — I15.2 HYPERTENSION ASSOCIATED WITH TYPE 2 DIABETES MELLITUS: ICD-10-CM

## 2023-07-25 DIAGNOSIS — K76.0 FATTY LIVER: Primary | ICD-10-CM

## 2023-07-25 PROBLEM — E66.01 MORBID OBESITY: Status: ACTIVE | Noted: 2023-07-25

## 2023-07-25 PROCEDURE — 99214 OFFICE O/P EST MOD 30 MIN: CPT | Mod: S$GLB,,, | Performed by: NURSE PRACTITIONER

## 2023-07-25 PROCEDURE — 1160F PR REVIEW ALL MEDS BY PRESCRIBER/CLIN PHARMACIST DOCUMENTED: ICD-10-PCS | Mod: CPTII,S$GLB,, | Performed by: NURSE PRACTITIONER

## 2023-07-25 PROCEDURE — 3051F HG A1C>EQUAL 7.0%<8.0%: CPT | Mod: CPTII,S$GLB,, | Performed by: NURSE PRACTITIONER

## 2023-07-25 PROCEDURE — 3051F PR MOST RECENT HEMOGLOBIN A1C LEVEL 7.0 - < 8.0%: ICD-10-PCS | Mod: CPTII,S$GLB,, | Performed by: NURSE PRACTITIONER

## 2023-07-25 PROCEDURE — 4010F ACE/ARB THERAPY RXD/TAKEN: CPT | Mod: CPTII,S$GLB,, | Performed by: NURSE PRACTITIONER

## 2023-07-25 PROCEDURE — 3061F NEG MICROALBUMINURIA REV: CPT | Mod: CPTII,S$GLB,, | Performed by: NURSE PRACTITIONER

## 2023-07-25 PROCEDURE — 4010F PR ACE/ARB THEARPY RXD/TAKEN: ICD-10-PCS | Mod: CPTII,S$GLB,, | Performed by: NURSE PRACTITIONER

## 2023-07-25 PROCEDURE — 3061F PR NEG MICROALBUMINURIA RESULT DOCUMENTED/REVIEW: ICD-10-PCS | Mod: CPTII,S$GLB,, | Performed by: NURSE PRACTITIONER

## 2023-07-25 PROCEDURE — 1159F PR MEDICATION LIST DOCUMENTED IN MEDICAL RECORD: ICD-10-PCS | Mod: CPTII,S$GLB,, | Performed by: NURSE PRACTITIONER

## 2023-07-25 PROCEDURE — 3066F NEPHROPATHY DOC TX: CPT | Mod: CPTII,S$GLB,, | Performed by: NURSE PRACTITIONER

## 2023-07-25 PROCEDURE — 1160F RVW MEDS BY RX/DR IN RCRD: CPT | Mod: CPTII,S$GLB,, | Performed by: NURSE PRACTITIONER

## 2023-07-25 PROCEDURE — 99999 PR PBB SHADOW E&M-EST. PATIENT-LVL V: ICD-10-PCS | Mod: PBBFAC,,, | Performed by: NURSE PRACTITIONER

## 2023-07-25 PROCEDURE — 3288F PR FALLS RISK ASSESSMENT DOCUMENTED: ICD-10-PCS | Mod: CPTII,S$GLB,, | Performed by: NURSE PRACTITIONER

## 2023-07-25 PROCEDURE — 99999 PR PBB SHADOW E&M-EST. PATIENT-LVL V: CPT | Mod: PBBFAC,,, | Performed by: NURSE PRACTITIONER

## 2023-07-25 PROCEDURE — 1125F PR PAIN SEVERITY QUANTIFIED, PAIN PRESENT: ICD-10-PCS | Mod: CPTII,S$GLB,, | Performed by: NURSE PRACTITIONER

## 2023-07-25 PROCEDURE — 1125F AMNT PAIN NOTED PAIN PRSNT: CPT | Mod: CPTII,S$GLB,, | Performed by: NURSE PRACTITIONER

## 2023-07-25 PROCEDURE — 99214 PR OFFICE/OUTPT VISIT, EST, LEVL IV, 30-39 MIN: ICD-10-PCS | Mod: S$GLB,,, | Performed by: NURSE PRACTITIONER

## 2023-07-25 PROCEDURE — 1101F PR PT FALLS ASSESS DOC 0-1 FALLS W/OUT INJ PAST YR: ICD-10-PCS | Mod: CPTII,S$GLB,, | Performed by: NURSE PRACTITIONER

## 2023-07-25 PROCEDURE — 3288F FALL RISK ASSESSMENT DOCD: CPT | Mod: CPTII,S$GLB,, | Performed by: NURSE PRACTITIONER

## 2023-07-25 PROCEDURE — 3008F BODY MASS INDEX DOCD: CPT | Mod: CPTII,S$GLB,, | Performed by: NURSE PRACTITIONER

## 2023-07-25 PROCEDURE — 3008F PR BODY MASS INDEX (BMI) DOCUMENTED: ICD-10-PCS | Mod: CPTII,S$GLB,, | Performed by: NURSE PRACTITIONER

## 2023-07-25 PROCEDURE — 3066F PR DOCUMENTATION OF TREATMENT FOR NEPHROPATHY: ICD-10-PCS | Mod: CPTII,S$GLB,, | Performed by: NURSE PRACTITIONER

## 2023-07-25 PROCEDURE — 1159F MED LIST DOCD IN RCRD: CPT | Mod: CPTII,S$GLB,, | Performed by: NURSE PRACTITIONER

## 2023-07-25 PROCEDURE — 1101F PT FALLS ASSESS-DOCD LE1/YR: CPT | Mod: CPTII,S$GLB,, | Performed by: NURSE PRACTITIONER

## 2023-07-25 NOTE — PATIENT INSTRUCTIONS
1. Schedule Fibroscan to assess for fat and scar tissue in the liver.  2. Recommend an Ultrasound of the liver every 2-3 years.  3. Repeat liver function tests in August.   4. Avoid alcohol and herbal supplements.  5. Return to clinic to be determined by Fibroscan and lab results.    There is no FDA approved therapy for non-alcoholic fatty liver disease. Therefore, these things are important:  1. Weight loss goal of 20-25 lbs.  2. Low carb/sugar, high fiber and protein diet.Try to limit your carb intake to LESS than 30-45 grams of carbs with a meal or LESS than 5-10 grams with any snack (total of any snack foods eaten during that time). Use MyFitness Pal mahesh to add up your carbs through the day. Do NOT drink any beverages with calories or carbs (this can lead to high blood sugar and weight gain). Also, some of our patients have been very successful with weight loss using the pre made/planned meal planning services that limit calories and portion size (one example is Sensible Meals but there are many out there).  3. Exercise, 5 days per week, 30 minutes per day, as tolerated.  4. Recommend good control of cholesterol, blood pressure, & blood sugar levels.    In some people, fatty liver can progress to steatohepatitis (inflamatory fatty liver) and possibly to cirrhosis, putting one at increased risk for liver cancer, liver failure, and death. Therefore, the lifestyle changes are very important to decrease this risk.     Website with information about fatty liver and inflammation related to fatty liver (SALE[facility]) = www.nashtruth.com  AND www.NASHactually.com

## 2023-07-25 NOTE — PROGRESS NOTES
[facility] New Patient Visit    Reason for Visit: Fatty Liver    PCP: Gladis Melissa    HPI:  This is a 68 y.o. female with PMH noted below, here for evaluation of fatty liver, referred by GI.    The patient's risk factors for NAFLD/SALEH include:     Obesity                                        Yes; BMI 39.58 - Net weight loss of 10 lbs since 1/2023.  Dyslipidemia                                No; Refer to Lipid panel results below:   Latest Reference Range & Units 08/12/18 00:00 11/29/18 08:00 12/02/19 08:30 11/24/20 08:39 12/10/21 09:07 12/12/22 10:50   Cholesterol 120 - 199 mg/dL  155 151 145 151 163   Cholesterol, Total  132 (E)        HDL 40 - 75 mg/dL 46 (E) 50 42 42 52 52   HDL/Cholesterol Ratio 20.0 - 50.0 %  32.3 27.8 29.0 34.4 31.9   LDL Cholesterol External 63.0 - 159.0 mg/dL 78 (E) 89.4 91.8 83.6 82.8 91.2   Non HDL Chol. (LDL+VLDL)  86 (E)        Non-HDL Cholesterol mg/dL  105 109 103 99 111   Total Cholesterol/HDL Ratio 2.0 - 5.0   3.1 3.6 3.5 2.9 3.1   Triglycerides 30 - 150 mg/dL 72 (E) 78 86 97 81 99     Insulin resistance/Diabetes         Yes; Last HgbA1c was 7.2% (6/2023)    She has had intermittently elevated liver enzymes in a hepatocellular pattern since at least 12/2019. However, more recent LFT's were normal, as below:     Latest Reference Range & Units 12/10/21 09:07 12/12/22 10:50   Alkaline Phosphatase 55 - 135 U/L 69 91   PROTEIN TOTAL 6.0 - 8.4 g/dL 7.6 7.7   Albumin 3.5 - 5.2 g/dL 3.9 4.0   BILIRUBIN TOTAL 0.1 - 1.0 mg/dL 0.6 0.6   AST 10 - 40 U/L 17 13   ALT 10 - 44 U/L 15 11     Last Abdominal US in 5/2023 showed fatty infiltration of the liver. She has never undergone a liver biopsy or non-invasive staging exam. She has no known family history of liver disease. She does not drink alcohol or use illicit drugs. HCV and HIV negative on prior labs. She has received 2 vaccinations for Hepatitis B in 1998. She is well appearing, and denies any signs or symptoms of hepatic  decompensation including jaundice, dark urine, pruritus, abdominal distention, lower extremity edema, hematemesis, melena, or periods of confusion suggestive of hepatic encephalopathy.      PMHX:  has a past medical history of Allergy, Breast cancer (2017), Cataract, Cervical spondylosis (07/29/2022), Diabetes mellitus, type 2, Eczema, Fibromyalgia, Glaucoma, Hypertension, Renal cyst, right (02/06/2020), and Steatosis of liver (02/06/2020).    PSHX:  has a past surgical history that includes Tonsillectomy; Breast surgery; Breast biopsy; Mastectomy (Left, 2017); Vaginal delivery; Myomectomy; Laparoscopic supracervical hysterectomy (2005); Hysterectomy; Adenoidectomy; Tympanostomy tube placement; and Anterior cervical discectomy w/ fusion (N/A, 7/29/2022).    The patient's social and family histories were reviewed by me and updated in the appropriate section of the electronic medical record.    Review of patient's allergies indicates:   Allergen Reactions    Codeine Shortness Of Breath     Pt states she had a reaction as a teenager and was taken to the ER.    Lisinopril      cough     Current Outpatient Medications on File Prior to Visit   Medication Sig Dispense Refill    acetic acid (VOSOL) 2 % otic solution Place 4 drops into both ears as needed.      aspirin (ECOTRIN) 81 MG EC tablet Take 81 mg by mouth once daily.      augmented betamethasone dipropionate (DIPROLENE-AF) 0.05 % cream Apply topically 2 (two) times to 4 times daily to area of back when itchy or burning.Stop using steroid topical when skin is non itchy.  Do not treat dark or red coloring. 50 g 0    azelastine (ASTELIN) 137 mcg (0.1 %) nasal spray 1 SPRAY IN EACH NOSTRIL TWICE DAILY  Strength: 137 mcg (0.1 %) 30 mL 2    blood sugar diagnostic (ONETOUCH VERIO TEST STRIPS) Strp To Check BG 1x daily 100 each 3    blood sugar diagnostic Strp Check BG twice daily 200 each 2    blood-glucose meter kit Use as instructed 1 each 0    ciclopirox (PENLAC) 8 %  Soln Apply topically nightly. 6.6 mL 3    clobetasoL (TEMOVATE) 0.05 % external solution APPLY TOPICALLY TO THE SCALP TWICE DAILY AS NEEDED FOR ITCHING OR IRRITATION 50 mL 0    fluticasone propionate (FLONASE) 50 mcg/actuation nasal spray 2 sprays to each lateral nostril once a day 48 g 3    glipiZIDE 5 MG TR24 Take 1 tablet (5 mg total) by mouth 2 (two) times daily. 180 tablet 3    hydroCHLOROthiazide (HYDRODIURIL) 25 MG tablet Take 1 tablet (25 mg total) by mouth once daily. 90 tablet 3    ketoconazole (NIZORAL) 2 % cream APPLY EXTERNALLY TO THE AFFECTED AREA EVERY DAY 15 g 2    lancets Misc To check BG 2 times daily 200 each 3    latanoprost 0.005 % ophthalmic solution Place 1 drop into both eyes every evening. 7.5 mL 3    losartan (COZAAR) 50 MG tablet Take 1 tablet (50 mg total) by mouth once daily. 90 tablet 3    metFORMIN (GLUCOPHAGE) 1000 MG tablet Take 1 tablet (1,000 mg total) by mouth 2 (two) times daily with meals. 180 tablet 3    montelukast (SINGULAIR) 10 mg tablet TAKE 1 TABLET(10 MG) BY MOUTH EVERY EVENING 90 tablet 3    nitrofurantoin, macrocrystal-monohydrate, (MACROBID) 100 MG capsule Take 1 capsule (100 mg total) by mouth 2 (two) times daily. 10 capsule 0    ondansetron (ZOFRAN-ODT) 8 MG TbDL Take 1 tablet (8 mg total) by mouth every 8 (eight) hours as needed (Nausea). 30 tablet 2    ONETOUCH DELICA PLUS LANCET 33 gauge Misc 2 (two) times daily.      ONETOUCH VERIO REFLECT METER Misc USE AS DIRECTED TO TEST BLOOD GLUCOSE      pantoprazole (PROTONIX) 40 MG tablet Take 1 tablet (40 mg total) by mouth once daily. 90 tablet 1    rizatriptan (MAXALT-MLT) 10 MG disintegrating tablet Take 1 tablet (10 mg total) by mouth every 2 (two) hours as needed for Migraine. Max 30 mg/day. 12 tablet 5    ubrogepant (UBRELVY) 100 mg tablet Take 1 tablet (100 mg total) by mouth every 2 (two) hours as needed for Migraine. Max 200 mg/day. 10 tablet 2    [DISCONTINUED] diclofenac sodium (VOLTAREN) 1 % Gel Apply 2 g  "topically 3 (three) times daily. 100 g 0     No current facility-administered medications on file prior to visit.     SOCIAL HISTORY:   Social History     Tobacco Use   Smoking Status Never   Smokeless Tobacco Never     Social History     Substance and Sexual Activity   Alcohol Use No     Social History     Substance and Sexual Activity   Drug Use No     ROS:   GENERAL: Denies fever, chills, weight loss/gain, fatigue  HEENT: Denies headaches, dizziness, vision/hearing changes  CARDIOVASCULAR: Denies chest pain, palpitations, or edema  RESPIRATORY: Denies dyspnea, cough  GI: Denies abdominal pain, rectal bleeding, nausea, vomiting. No change in bowel pattern or color  : Denies dysuria, hematuria   SKIN: Denies rash, itching   NEURO: Denies confusion, memory loss, or mood changes  PSYCH: Denies depression or anxiety  HEME/LYMPH: Denies easy bruising or bleeding    Objective Findings:    PHYSICAL EXAM:   Friendly Black or  female, in no acute distress; alert and oriented to person, place and time.  VITALS: Ht 5' 5" (1.651 m)   Wt 107.9 kg (237 lb 14 oz)   BMI 39.58 kg/m²   HENT: Normocephalic, without obvious abnormality.   EYES: Sclerae anicteric.   NECK: No obvious masses.  CARDIOVASCULAR: No peripheral edema.  RESPIRATORY: Normal respiratory effort.  GI: Obese abdomen.  EXTREMITIES:  No clubbing, cyanosis or edema.  SKIN: Warm and dry. No jaundice. No rashes noted to exposed skin.   NEURO:  Normal gait. No asterixis.  PSYCH:  Memory intact. Thought and speech pattern appropriate. Behavior normal. No depression or anxiety noted.    DIAGNOSTIC STUDIES:    US ABDOMEN COMPLETE 5/19/2023:    FINDINGS:    Pancreas: The visualized portions of pancreas appear normal.     Aorta: No aneurysm.     Liver: 18.7 cm, enlarged.  Diffusely increased parenchymal echogenicity consistent with steatosis. No focal lesions. HRI measures 1.4, borderline measurement..     Gallbladder: No calculi, wall thickening, or " pericholecystic fluid.  Negative sonographic Allen's sign.     Biliary system: 2 mm common bile duct.  No intrahepatic ductal dilatation.     Inferior vena cava: Normal in appearance.     Right kidney: 11.4 cm. No hydronephrosis.     Left kidney: 9.6 cm. No hydronephrosis.     Spleen: 9.7 x 3.4 cm.  Normal in size with homogeneous echotexture.     Miscellaneous: No ascites.     Impression:     Mild hepatomegaly and increased parenchymal echogenicity with borderline HRI which may represent hepatic steatosis.  Recommend correlation with LFTs.    FIBROSCAN - Ordered at visit.    EDUCATION:  Per AVS.    ASSESSMENT & PLAN:  68 y.o. Black or  female with:    1. Fatty liver  Ambulatory referral/consult to Hepatology    FibroScan [place] (Vibration Controlled Transient Elastography)    Hepatic Function Panel      2. Obesity (BMI 30-39.9)  FibroScan [place] (Vibration Controlled Transient Elastography)    Hepatic Function Panel      3. Type 2 diabetes mellitus without complication, without long-term current use of insulin  FibroScan [place] (Vibration Controlled Transient Elastography)    Hepatic Function Panel      4. Hypertension associated with type 2 diabetes mellitus  FibroScan [place] (Vibration Controlled Transient Elastography)    Hepatic Function Panel        - Schedule Fibroscan to stage liver disease.  - Recommend an Ultrasound of the liver every 2-3 years.  - Repeat liver function tests in August.   - Avoid alcohol and herbal supplements.  - Recommend additional weight loss of 20-25 lbs, through diet and exercise.  - Recommend good control of cholesterol, blood pressure, & blood sugar levels.  - Return to clinic to be determined by Fibroscan and lab results.    Thank you for allowing me to participate in the care of Regine Osorio       Hepatology Nurse Practitioner  [facility]    CC'ed note to:   Eliseo Garcia MD Mariah Dunbar,  MD

## 2023-07-27 ENCOUNTER — PROCEDURE VISIT (OUTPATIENT)
Dept: HEPATOLOGY | Facility: CLINIC | Age: 68
End: 2023-07-27
Payer: MEDICARE

## 2023-07-27 ENCOUNTER — OFFICE VISIT (OUTPATIENT)
Dept: OPHTHALMOLOGY | Facility: CLINIC | Age: 68
End: 2023-07-27
Payer: MEDICARE

## 2023-07-27 ENCOUNTER — PATIENT MESSAGE (OUTPATIENT)
Dept: HEPATOLOGY | Facility: CLINIC | Age: 68
End: 2023-07-27

## 2023-07-27 DIAGNOSIS — H40.1111 PRIMARY OPEN-ANGLE GLAUCOMA, RIGHT EYE, MILD STAGE: Primary | ICD-10-CM

## 2023-07-27 DIAGNOSIS — E66.9 OBESITY (BMI 30-39.9): ICD-10-CM

## 2023-07-27 DIAGNOSIS — H35.362 FAMILIAL DRUSEN OF MACULA OF BOTH EYES: ICD-10-CM

## 2023-07-27 DIAGNOSIS — H40.022 OPEN ANGLE WITH BORDERLINE FINDINGS AND HIGH GLAUCOMA RISK IN LEFT EYE: ICD-10-CM

## 2023-07-27 DIAGNOSIS — H52.4 MYOPIA OF BOTH EYES WITH ASTIGMATISM AND PRESBYOPIA: ICD-10-CM

## 2023-07-27 DIAGNOSIS — H35.361 FAMILIAL DRUSEN OF MACULA OF BOTH EYES: ICD-10-CM

## 2023-07-27 DIAGNOSIS — E11.9 TYPE 2 DIABETES MELLITUS WITHOUT COMPLICATION, WITHOUT LONG-TERM CURRENT USE OF INSULIN: ICD-10-CM

## 2023-07-27 DIAGNOSIS — H52.13 MYOPIA OF BOTH EYES WITH ASTIGMATISM AND PRESBYOPIA: ICD-10-CM

## 2023-07-27 DIAGNOSIS — H25.13 NUCLEAR SCLEROTIC CATARACT OF BOTH EYES: ICD-10-CM

## 2023-07-27 DIAGNOSIS — I15.2 HYPERTENSION ASSOCIATED WITH TYPE 2 DIABETES MELLITUS: ICD-10-CM

## 2023-07-27 DIAGNOSIS — E11.59 HYPERTENSION ASSOCIATED WITH TYPE 2 DIABETES MELLITUS: ICD-10-CM

## 2023-07-27 DIAGNOSIS — K76.0 FATTY LIVER: ICD-10-CM

## 2023-07-27 DIAGNOSIS — H52.203 MYOPIA OF BOTH EYES WITH ASTIGMATISM AND PRESBYOPIA: ICD-10-CM

## 2023-07-27 PROCEDURE — 99214 OFFICE O/P EST MOD 30 MIN: CPT | Mod: S$GLB,,, | Performed by: OPHTHALMOLOGY

## 2023-07-27 PROCEDURE — 99214 PR OFFICE/OUTPT VISIT, EST, LEVL IV, 30-39 MIN: ICD-10-PCS | Mod: S$GLB,,, | Performed by: OPHTHALMOLOGY

## 2023-07-27 PROCEDURE — 1160F RVW MEDS BY RX/DR IN RCRD: CPT | Mod: CPTII,S$GLB,, | Performed by: OPHTHALMOLOGY

## 2023-07-27 PROCEDURE — 3051F PR MOST RECENT HEMOGLOBIN A1C LEVEL 7.0 - < 8.0%: ICD-10-PCS | Mod: CPTII,S$GLB,, | Performed by: OPHTHALMOLOGY

## 2023-07-27 PROCEDURE — 3061F PR NEG MICROALBUMINURIA RESULT DOCUMENTED/REVIEW: ICD-10-PCS | Mod: CPTII,S$GLB,, | Performed by: OPHTHALMOLOGY

## 2023-07-27 PROCEDURE — 3066F PR DOCUMENTATION OF TREATMENT FOR NEPHROPATHY: ICD-10-PCS | Mod: CPTII,S$GLB,, | Performed by: OPHTHALMOLOGY

## 2023-07-27 PROCEDURE — 1126F AMNT PAIN NOTED NONE PRSNT: CPT | Mod: CPTII,S$GLB,, | Performed by: OPHTHALMOLOGY

## 2023-07-27 PROCEDURE — 1159F MED LIST DOCD IN RCRD: CPT | Mod: CPTII,S$GLB,, | Performed by: OPHTHALMOLOGY

## 2023-07-27 PROCEDURE — 1101F PR PT FALLS ASSESS DOC 0-1 FALLS W/OUT INJ PAST YR: ICD-10-PCS | Mod: CPTII,S$GLB,, | Performed by: OPHTHALMOLOGY

## 2023-07-27 PROCEDURE — 1160F PR REVIEW ALL MEDS BY PRESCRIBER/CLIN PHARMACIST DOCUMENTED: ICD-10-PCS | Mod: CPTII,S$GLB,, | Performed by: OPHTHALMOLOGY

## 2023-07-27 PROCEDURE — 99999 PR PBB SHADOW E&M-EST. PATIENT-LVL III: ICD-10-PCS | Mod: PBBFAC,,, | Performed by: OPHTHALMOLOGY

## 2023-07-27 PROCEDURE — 3061F NEG MICROALBUMINURIA REV: CPT | Mod: CPTII,S$GLB,, | Performed by: OPHTHALMOLOGY

## 2023-07-27 PROCEDURE — 76981 USE PARENCHYMA: CPT | Mod: S$GLB,,, | Performed by: NURSE PRACTITIONER

## 2023-07-27 PROCEDURE — 99999 PR PBB SHADOW E&M-EST. PATIENT-LVL III: CPT | Mod: PBBFAC,,, | Performed by: OPHTHALMOLOGY

## 2023-07-27 PROCEDURE — 3288F PR FALLS RISK ASSESSMENT DOCUMENTED: ICD-10-PCS | Mod: CPTII,S$GLB,, | Performed by: OPHTHALMOLOGY

## 2023-07-27 PROCEDURE — 4010F ACE/ARB THERAPY RXD/TAKEN: CPT | Mod: CPTII,S$GLB,, | Performed by: OPHTHALMOLOGY

## 2023-07-27 PROCEDURE — 1159F PR MEDICATION LIST DOCUMENTED IN MEDICAL RECORD: ICD-10-PCS | Mod: CPTII,S$GLB,, | Performed by: OPHTHALMOLOGY

## 2023-07-27 PROCEDURE — 1101F PT FALLS ASSESS-DOCD LE1/YR: CPT | Mod: CPTII,S$GLB,, | Performed by: OPHTHALMOLOGY

## 2023-07-27 PROCEDURE — 1126F PR PAIN SEVERITY QUANTIFIED, NO PAIN PRESENT: ICD-10-PCS | Mod: CPTII,S$GLB,, | Performed by: OPHTHALMOLOGY

## 2023-07-27 PROCEDURE — 4010F PR ACE/ARB THEARPY RXD/TAKEN: ICD-10-PCS | Mod: CPTII,S$GLB,, | Performed by: OPHTHALMOLOGY

## 2023-07-27 PROCEDURE — 3066F NEPHROPATHY DOC TX: CPT | Mod: CPTII,S$GLB,, | Performed by: OPHTHALMOLOGY

## 2023-07-27 PROCEDURE — 3051F HG A1C>EQUAL 7.0%<8.0%: CPT | Mod: CPTII,S$GLB,, | Performed by: OPHTHALMOLOGY

## 2023-07-27 PROCEDURE — 3288F FALL RISK ASSESSMENT DOCD: CPT | Mod: CPTII,S$GLB,, | Performed by: OPHTHALMOLOGY

## 2023-07-27 PROCEDURE — 76981 FIBROSCAN NEW ORLEANS (VIBRATION CONTROLLED TRANSIENT ELASTOGRAPHY): ICD-10-PCS | Mod: S$GLB,,, | Performed by: NURSE PRACTITIONER

## 2023-07-27 NOTE — PROCEDURES
FibroScan [place] (Vibration Controlled Transient Elastography)    Date/Time: 7/27/2023 11:45 AM  Performed by: Soniya Fernandes NP  Authorized by: Soniya Fernandes NP     Diagnosis:  NAFLD    Probe:  XL    Universal Protocol: Patient's identity, procedure and site were verified, confirmatory pause was performed.  Discussed procedure including risks and potential complications.  Questions answered.  Patient verbalizes understanding and wishes to proceed with VCTE.     Procedure: After providing explanations of the procedure, patient was placed in the supine position with right arm in maximum abduction to allow optimal exposure of right lateral abdomen.  Patient was briefly assessed, Testing was performed in the mid-axillary location, 50Hz Shear Wave pulses were applied and the resulting Shear Wave and Propagation Speed detected with a 3.5 MHz ultrasonic signal, using the FibroScan probe, Skin to liver capsule distance and liver parenchyma were accessed during the entire examination with the FibroScan probe, Patient was instructed to breathe normally and to abstain from sudden movements during the procedure, allowing for random measurements of liver stiffness. At least 10 Shear Waves were produced, Individual measurements of each Shear Wave were calculated.  Patient tolerated the procedure well with no complications.  Meets discharge criteria as was dismissed.  Rates pain 0 out of 10.  Patient will follow up with ordering provider to review results.    Findings  Median liver stiffness score:  6.1  CAP Reading: dB/m:  309    IQR/med %:  5  Interpretation  Fibrosis interpretation is based on medial liver stiffness - Kilopascal (kPa).    Fibrosis Stage:  F 0-1  Steatosis interpretation is based on controlled attenuation parameter - (dB/m).    Steatosis Grade:  S3

## 2023-07-30 ENCOUNTER — PATIENT MESSAGE (OUTPATIENT)
Dept: ADMINISTRATIVE | Facility: OTHER | Age: 68
End: 2023-07-30
Payer: MEDICARE

## 2023-08-03 ENCOUNTER — PATIENT MESSAGE (OUTPATIENT)
Dept: ORTHOPEDICS | Facility: CLINIC | Age: 68
End: 2023-08-03
Payer: MEDICARE

## 2023-08-04 DIAGNOSIS — M50.30 DDD (DEGENERATIVE DISC DISEASE), CERVICAL: Primary | ICD-10-CM

## 2023-08-08 NOTE — PROGRESS NOTES
Date: 08/08/2023    Supervising Physician: Rosales Scruggs M.D.    Date of Surgery: 7/29/22    Procedure: C5-7 ACDF    History: Regine Osorio is seen today for follow-up following the above listed procedure. Overall the patient is doing well but today notes she has been doing very well in regards to her neck and arm pain.   she denies fever, chills, and sweats since the time of the surgery.       Exam: Post op dressing taken down.  Incision is healing well, clean, dry and intact.   There is no sign of infection. Neuro exam is stable. No signs of DVT.    Radiographs: hardware in place no failure    Assessment/Plan: 4 weeks post op.    Doing well postoperatively.  reviewed.    I will plan to see the patient back for the next postop visit as needed     Thank you for the opportunity to participate in this patient's care. Please give me a call if there are any concerns or questions.

## 2023-08-10 ENCOUNTER — HOSPITAL ENCOUNTER (OUTPATIENT)
Dept: RADIOLOGY | Facility: HOSPITAL | Age: 68
Discharge: HOME | End: 2023-08-10
Attending: ORTHOPAEDIC SURGERY
Payer: MEDICARE

## 2023-08-10 ENCOUNTER — OFFICE VISIT (OUTPATIENT)
Dept: ORTHOPEDICS | Facility: CLINIC | Age: 68
End: 2023-08-10
Payer: MEDICARE

## 2023-08-10 VITALS — HEIGHT: 65 IN | BODY MASS INDEX: 40.09 KG/M2 | WEIGHT: 240.63 LBS

## 2023-08-10 DIAGNOSIS — M50.30 DDD (DEGENERATIVE DISC DISEASE), CERVICAL: ICD-10-CM

## 2023-08-10 DIAGNOSIS — Z98.890 S/P SPINAL SURGERY: Primary | ICD-10-CM

## 2023-08-10 PROCEDURE — 4010F PR ACE/ARB THEARPY RXD/TAKEN: ICD-10-PCS | Mod: CPTII,S$GLB,, | Performed by: ORTHOPAEDIC SURGERY

## 2023-08-10 PROCEDURE — 99024 POSTOP FOLLOW-UP VISIT: CPT | Mod: S$GLB,,, | Performed by: ORTHOPAEDIC SURGERY

## 2023-08-10 PROCEDURE — 3288F PR FALLS RISK ASSESSMENT DOCUMENTED: ICD-10-PCS | Mod: CPTII,S$GLB,, | Performed by: ORTHOPAEDIC SURGERY

## 2023-08-10 PROCEDURE — 1126F AMNT PAIN NOTED NONE PRSNT: CPT | Mod: CPTII,S$GLB,, | Performed by: ORTHOPAEDIC SURGERY

## 2023-08-10 PROCEDURE — 99999 PR PBB SHADOW E&M-EST. PATIENT-LVL IV: CPT | Mod: PBBFAC,,, | Performed by: ORTHOPAEDIC SURGERY

## 2023-08-10 PROCEDURE — 3051F HG A1C>EQUAL 7.0%<8.0%: CPT | Mod: CPTII,S$GLB,, | Performed by: ORTHOPAEDIC SURGERY

## 2023-08-10 PROCEDURE — 3051F PR MOST RECENT HEMOGLOBIN A1C LEVEL 7.0 - < 8.0%: ICD-10-PCS | Mod: CPTII,S$GLB,, | Performed by: ORTHOPAEDIC SURGERY

## 2023-08-10 PROCEDURE — 3061F PR NEG MICROALBUMINURIA RESULT DOCUMENTED/REVIEW: ICD-10-PCS | Mod: CPTII,S$GLB,, | Performed by: ORTHOPAEDIC SURGERY

## 2023-08-10 PROCEDURE — 72040 X-RAY EXAM NECK SPINE 2-3 VW: CPT | Mod: 26,,, | Performed by: RADIOLOGY

## 2023-08-10 PROCEDURE — 1159F PR MEDICATION LIST DOCUMENTED IN MEDICAL RECORD: ICD-10-PCS | Mod: CPTII,S$GLB,, | Performed by: ORTHOPAEDIC SURGERY

## 2023-08-10 PROCEDURE — 3008F BODY MASS INDEX DOCD: CPT | Mod: CPTII,S$GLB,, | Performed by: ORTHOPAEDIC SURGERY

## 2023-08-10 PROCEDURE — 3066F PR DOCUMENTATION OF TREATMENT FOR NEPHROPATHY: ICD-10-PCS | Mod: CPTII,S$GLB,, | Performed by: ORTHOPAEDIC SURGERY

## 2023-08-10 PROCEDURE — 1101F PT FALLS ASSESS-DOCD LE1/YR: CPT | Mod: CPTII,S$GLB,, | Performed by: ORTHOPAEDIC SURGERY

## 2023-08-10 PROCEDURE — 72040 XR CERVICAL SPINE AP LATERAL: ICD-10-PCS | Mod: 26,,, | Performed by: RADIOLOGY

## 2023-08-10 PROCEDURE — 3061F NEG MICROALBUMINURIA REV: CPT | Mod: CPTII,S$GLB,, | Performed by: ORTHOPAEDIC SURGERY

## 2023-08-10 PROCEDURE — 3008F PR BODY MASS INDEX (BMI) DOCUMENTED: ICD-10-PCS | Mod: CPTII,S$GLB,, | Performed by: ORTHOPAEDIC SURGERY

## 2023-08-10 PROCEDURE — 4010F ACE/ARB THERAPY RXD/TAKEN: CPT | Mod: CPTII,S$GLB,, | Performed by: ORTHOPAEDIC SURGERY

## 2023-08-10 PROCEDURE — 99999 PR PBB SHADOW E&M-EST. PATIENT-LVL IV: ICD-10-PCS | Mod: PBBFAC,,, | Performed by: ORTHOPAEDIC SURGERY

## 2023-08-10 PROCEDURE — 1159F MED LIST DOCD IN RCRD: CPT | Mod: CPTII,S$GLB,, | Performed by: ORTHOPAEDIC SURGERY

## 2023-08-10 PROCEDURE — 3288F FALL RISK ASSESSMENT DOCD: CPT | Mod: CPTII,S$GLB,, | Performed by: ORTHOPAEDIC SURGERY

## 2023-08-10 PROCEDURE — 3066F NEPHROPATHY DOC TX: CPT | Mod: CPTII,S$GLB,, | Performed by: ORTHOPAEDIC SURGERY

## 2023-08-10 PROCEDURE — 1101F PR PT FALLS ASSESS DOC 0-1 FALLS W/OUT INJ PAST YR: ICD-10-PCS | Mod: CPTII,S$GLB,, | Performed by: ORTHOPAEDIC SURGERY

## 2023-08-10 PROCEDURE — 99024 PR POST-OP FOLLOW-UP VISIT: ICD-10-PCS | Mod: S$GLB,,, | Performed by: ORTHOPAEDIC SURGERY

## 2023-08-10 PROCEDURE — 72040 X-RAY EXAM NECK SPINE 2-3 VW: CPT | Mod: TC

## 2023-08-10 PROCEDURE — 1126F PR PAIN SEVERITY QUANTIFIED, NO PAIN PRESENT: ICD-10-PCS | Mod: CPTII,S$GLB,, | Performed by: ORTHOPAEDIC SURGERY

## 2023-08-17 ENCOUNTER — ANESTHESIA EVENT (OUTPATIENT)
Dept: ENDOSCOPY | Facility: HOSPITAL | Age: 68
End: 2023-08-17
Payer: MEDICARE

## 2023-08-18 ENCOUNTER — ANESTHESIA (OUTPATIENT)
Dept: ENDOSCOPY | Facility: HOSPITAL | Age: 68
End: 2023-08-18
Payer: MEDICARE

## 2023-08-18 ENCOUNTER — HOSPITAL ENCOUNTER (OUTPATIENT)
Facility: HOSPITAL | Age: 68
Discharge: HOME | End: 2023-08-18
Attending: INTERNAL MEDICINE | Admitting: INTERNAL MEDICINE
Payer: MEDICARE

## 2023-08-18 VITALS
WEIGHT: 240 LBS | DIASTOLIC BLOOD PRESSURE: 61 MMHG | SYSTOLIC BLOOD PRESSURE: 123 MMHG | BODY MASS INDEX: 39.99 KG/M2 | OXYGEN SATURATION: 100 % | HEIGHT: 65 IN | HEART RATE: 79 BPM | TEMPERATURE: 97 F | RESPIRATION RATE: 20 BRPM

## 2023-08-18 DIAGNOSIS — R19.4 CHANGE IN BOWEL HABITS: ICD-10-CM

## 2023-08-18 LAB — POCT GLUCOSE: 160 MG/DL (ref 70–110)

## 2023-08-18 PROCEDURE — 88342 CHG IMMUNOCYTOCHEMISTRY: ICD-10-PCS | Mod: 26,,, | Performed by: STUDENT IN AN ORGANIZED HEALTH CARE EDUCATION/TRAINING PROGRAM

## 2023-08-18 PROCEDURE — 63600175 PHARM REV CODE 636 W HCPCS: Performed by: NURSE ANESTHETIST, CERTIFIED REGISTERED

## 2023-08-18 PROCEDURE — 43239 PR EGD, FLEX, W/BIOPSY, SGL/MULTI: ICD-10-PCS | Mod: 51,,, | Performed by: INTERNAL MEDICINE

## 2023-08-18 PROCEDURE — E9220 PRA ENDO ANESTHESIA: ICD-10-PCS | Mod: ,,, | Performed by: NURSE ANESTHETIST, CERTIFIED REGISTERED

## 2023-08-18 PROCEDURE — 88305 TISSUE EXAM BY PATHOLOGIST: CPT | Mod: 26,,, | Performed by: STUDENT IN AN ORGANIZED HEALTH CARE EDUCATION/TRAINING PROGRAM

## 2023-08-18 PROCEDURE — 82657 ENZYME CELL ACTIVITY: CPT | Performed by: PATHOLOGY

## 2023-08-18 PROCEDURE — 88305 TISSUE EXAM BY PATHOLOGIST: CPT | Mod: 59 | Performed by: STUDENT IN AN ORGANIZED HEALTH CARE EDUCATION/TRAINING PROGRAM

## 2023-08-18 PROCEDURE — 43239 EGD BIOPSY SINGLE/MULTIPLE: CPT | Performed by: INTERNAL MEDICINE

## 2023-08-18 PROCEDURE — 82962 GLUCOSE BLOOD TEST: CPT | Performed by: INTERNAL MEDICINE

## 2023-08-18 PROCEDURE — E9220 PRA ENDO ANESTHESIA: HCPCS | Mod: ,,, | Performed by: NURSE ANESTHETIST, CERTIFIED REGISTERED

## 2023-08-18 PROCEDURE — 88342 IMHCHEM/IMCYTCHM 1ST ANTB: CPT | Performed by: STUDENT IN AN ORGANIZED HEALTH CARE EDUCATION/TRAINING PROGRAM

## 2023-08-18 PROCEDURE — 88305 TISSUE EXAM BY PATHOLOGIST: ICD-10-PCS | Mod: 26,,, | Performed by: STUDENT IN AN ORGANIZED HEALTH CARE EDUCATION/TRAINING PROGRAM

## 2023-08-18 PROCEDURE — 43239 EGD BIOPSY SINGLE/MULTIPLE: CPT | Mod: 51,,, | Performed by: INTERNAL MEDICINE

## 2023-08-18 PROCEDURE — 27201012 HC FORCEPS, HOT/COLD, DISP: Performed by: INTERNAL MEDICINE

## 2023-08-18 PROCEDURE — 25000003 PHARM REV CODE 250: Performed by: NURSE ANESTHETIST, CERTIFIED REGISTERED

## 2023-08-18 PROCEDURE — 37000008 HC ANESTHESIA 1ST 15 MINUTES: Performed by: INTERNAL MEDICINE

## 2023-08-18 PROCEDURE — 88342 IMHCHEM/IMCYTCHM 1ST ANTB: CPT | Mod: 26,,, | Performed by: STUDENT IN AN ORGANIZED HEALTH CARE EDUCATION/TRAINING PROGRAM

## 2023-08-18 PROCEDURE — 45380 COLONOSCOPY AND BIOPSY: CPT | Mod: ,,, | Performed by: INTERNAL MEDICINE

## 2023-08-18 PROCEDURE — 45380 PR COLONOSCOPY,BIOPSY: ICD-10-PCS | Mod: ,,, | Performed by: INTERNAL MEDICINE

## 2023-08-18 PROCEDURE — 45380 COLONOSCOPY AND BIOPSY: CPT | Performed by: INTERNAL MEDICINE

## 2023-08-18 PROCEDURE — 37000009 HC ANESTHESIA EA ADD 15 MINS: Performed by: INTERNAL MEDICINE

## 2023-08-18 RX ORDER — LIDOCAINE HYDROCHLORIDE 20 MG/ML
INJECTION INTRAVENOUS
Status: DISCONTINUED | OUTPATIENT
Start: 2023-08-18 | End: 2023-08-18

## 2023-08-18 RX ORDER — PROPOFOL 10 MG/ML
VIAL (ML) INTRAVENOUS CONTINUOUS PRN
Status: DISCONTINUED | OUTPATIENT
Start: 2023-08-18 | End: 2023-08-18

## 2023-08-18 RX ORDER — PROPOFOL 10 MG/ML
VIAL (ML) INTRAVENOUS
Status: DISCONTINUED | OUTPATIENT
Start: 2023-08-18 | End: 2023-08-18

## 2023-08-18 RX ORDER — SODIUM CHLORIDE 9 MG/ML
INJECTION, SOLUTION INTRAVENOUS CONTINUOUS
Status: DISCONTINUED | OUTPATIENT
Start: 2023-08-18 | End: 2023-08-18 | Stop reason: HOSPADM

## 2023-08-18 RX ADMIN — PROPOFOL 110 MG: 10 INJECTION, EMULSION INTRAVENOUS at 08:08

## 2023-08-18 RX ADMIN — PROPOFOL 225 MCG/KG/MIN: 10 INJECTION, EMULSION INTRAVENOUS at 08:08

## 2023-08-18 RX ADMIN — LIDOCAINE HYDROCHLORIDE 100 MG: 20 INJECTION INTRAVENOUS at 08:08

## 2023-08-18 RX ADMIN — PROPOFOL 40 MG: 10 INJECTION, EMULSION INTRAVENOUS at 08:08

## 2023-08-18 RX ADMIN — SODIUM CHLORIDE: 0.9 INJECTION, SOLUTION INTRAVENOUS at 08:08

## 2023-08-18 NOTE — H&P
[facility]  History & Physical    Subjective:      Chief Complaint/Reason for Admission:     EGD colonoscopy    Regine Osorio is a 68 y.o. female.    Past Medical History:   Diagnosis Date    Allergy     Breast cancer 2017    Cataract     Cervical spondylosis 07/29/2022    Diabetes mellitus, type 2     Type 2    Eczema     Fibromyalgia     Glaucoma     Hypertension     Renal cyst, right 02/06/2020    Steatosis of liver 02/06/2020     Past Surgical History:   Procedure Laterality Date    ADENOIDECTOMY      ANTERIOR CERVICAL DISCECTOMY W/ FUSION N/A 7/29/2022    Procedure: DISCECTOMY, SPINE, CERVICAL, ANTERIOR APPROACH, WITH FUSION C5-7 SPINEWAVE SNS: VOCAL CORDS/MOTORS/SSEP;  Surgeon: Rosales Scruggs MD;  Location: [facility] OR [address] [place] [address];  Service: Neurosurgery;  Laterality: N/A;    BREAST BIOPSY      BREAST SURGERY      breast ca lt side     HYSTERECTOMY      supacervical hysterectomy    LAPAROSCOPIC SUPRACERVICAL HYSTERECTOMY  2005    fibroids    MASTECTOMY Left 2017    chemo    MYOMECTOMY      TONSILLECTOMY      TYMPANOSTOMY TUBE PLACEMENT      VAGINAL DELIVERY       Family History   Problem Relation Age of Onset    Dementia Mother     Glaucoma Mother     Bipolar disorder Mother     Lung cancer Father     Bipolar disorder Son     Post-traumatic stress disorder Son     Breast cancer Maternal Aunt     Bipolar disorder Maternal Aunt     Glaucoma Maternal Uncle     Blindness Maternal Grandfather     Glaucoma Maternal Grandfather     Breast cancer Cousin 39        paternal     Breast cancer Cousin 29    Amblyopia Neg Hx     Cataracts Neg Hx     Macular degeneration Neg Hx     Retinal detachment Neg Hx     Strabismus Neg Hx     Cancer Neg Hx     Colon cancer Neg Hx     Ovarian cancer Neg Hx      Social History     Tobacco Use    Smoking status: Never    Smokeless tobacco: Never   Substance Use Topics    Alcohol use: No    Drug use: No       PTA Medications   Medication Sig    acetic acid (VOSOL) 2 %  otic solution Place 4 drops into both ears as needed.    aspirin (ECOTRIN) 81 MG EC tablet Take 81 mg by mouth once daily.    augmented betamethasone dipropionate (DIPROLENE-AF) 0.05 % cream Apply topically 2 (two) times to 4 times daily to area of back when itchy or burning.Stop using steroid topical when skin is non itchy.  Do not treat dark or red coloring.    azelastine (ASTELIN) 137 mcg (0.1 %) nasal spray 1 SPRAY IN EACH NOSTRIL TWICE DAILY  Strength: 137 mcg (0.1 %)    blood sugar diagnostic (ONETOUCH VERIO TEST STRIPS) Strp To Check BG 1x daily    blood sugar diagnostic Strp Check BG twice daily    blood-glucose meter kit Use as instructed    ciclopirox (PENLAC) 8 % Soln Apply topically nightly.    clobetasoL (TEMOVATE) 0.05 % external solution APPLY TOPICALLY TO THE SCALP TWICE DAILY AS NEEDED FOR ITCHING OR IRRITATION    fluticasone propionate (FLONASE) 50 mcg/actuation nasal spray 2 sprays to each lateral nostril once a day    glipiZIDE 5 MG TR24 Take 1 tablet (5 mg total) by mouth 2 (two) times daily.    hydroCHLOROthiazide (HYDRODIURIL) 25 MG tablet Take 1 tablet (25 mg total) by mouth once daily.    ketoconazole (NIZORAL) 2 % cream APPLY EXTERNALLY TO THE AFFECTED AREA EVERY DAY    lancets Misc To check BG 2 times daily    latanoprost 0.005 % ophthalmic solution Place 1 drop into both eyes every evening.    losartan (COZAAR) 50 MG tablet Take 1 tablet (50 mg total) by mouth once daily.    metFORMIN (GLUCOPHAGE) 1000 MG tablet Take 1 tablet (1,000 mg total) by mouth 2 (two) times daily with meals.    montelukast (SINGULAIR) 10 mg tablet TAKE 1 TABLET(10 MG) BY MOUTH EVERY EVENING    ondansetron (ZOFRAN-ODT) 8 MG TbDL Take 1 tablet (8 mg total) by mouth every 8 (eight) hours as needed (Nausea).    ONETOUCH DELICA PLUS LANCET 33 gauge Misc 2 (two) times daily.    ONETOUCH VERIO REFLECT METER Misc USE AS DIRECTED TO TEST BLOOD GLUCOSE    pantoprazole (PROTONIX) 40 MG tablet Take 1 tablet (40 mg total) by  mouth once daily.    rizatriptan (MAXALT-MLT) 10 MG disintegrating tablet Take 1 tablet (10 mg total) by mouth every 2 (two) hours as needed for Migraine. Max 30 mg/day.    ubrogepant (UBRELVY) 100 mg tablet Take 1 tablet (100 mg total) by mouth every 2 (two) hours as needed for Migraine. Max 200 mg/day.     Review of patient's allergies indicates:   Allergen Reactions    Codeine Shortness Of Breath     Pt states she had a reaction as a teenager and was taken to the ER.    Lisinopril      cough        Review of Systems   Constitutional:  Negative for fever.   Gastrointestinal:  Positive for constipation and diarrhea.       Objective:      Vital Signs (Most Recent)       Vital Signs Range (Last 24H):       Physical Exam  Neurological:      Mental Status: She is alert and oriented to person, place, and time.             Assessment:         Assessment:  1. Constipation, chronic    2. Diarrhea in adult patient    3. Nausea and vomiting, unspecified vomiting type    4. Gastroesophageal reflux disease, unspecified whether esophagitis present    5. Sleep apnea, unspecified type    6. BMI 40.0-44.9, adult          Plan:        EGD colonoscopy

## 2023-08-18 NOTE — ANESTHESIA POSTPROCEDURE EVALUATION
Anesthesia Post Evaluation    Patient: Regine Osorio    Procedure(s) Performed: Procedure(s) (LRB):  EGD (ESOPHAGOGASTRODUODENOSCOPY) (N/A)  COLONOSCOPY (N/A)    Final Anesthesia Type: general      Patient location during evaluation: PACU  Patient participation: Yes- Able to Participate  Level of consciousness: awake and alert and oriented  Pain management: adequate  Airway patency: patent    PONV status at discharge: No PONV  Anesthetic complications: no      Cardiovascular status: blood pressure returned to baseline and hemodynamically stable  Respiratory status: unassisted  Hydration status: euvolemic  Follow-up not needed.          Vitals Value Taken Time   /61 08/18/23 0951   Temp 36.3 °C (97.3 °F) 08/18/23 0929   Pulse 79 08/18/23 0951   Resp 20 08/18/23 0951   SpO2 100 % 08/18/23 0951         No case tracking events are documented in the log.      Pain/Pan Score: Pan Score: 10 (8/18/2023  9:37 AM)

## 2023-08-18 NOTE — PROVATION PATIENT INSTRUCTIONS
Discharge Summary/Instructions after an Endoscopic Procedure  Patient Name: Regine Osorio  Patient MRN: [id number]  Patient YOB: 1955 Friday, August 18, 2023  Eliseo Garcia MD  Dear patient,  As a result of recent federal legislation (The Federal Cures Act), you may   receive lab or pathology results from your procedure in your [facility]   account before your physician is able to contact you. Your physician or   their representative will relay the results to you with their   recommendations at their soonest availability.  Thank you,  RESTRICTIONS:  During your procedure today, you received medications for sedation.  These   medications may affect your judgment, balance and coordination.  Therefore,   for 24 hours, you have the following restrictions:   - DO NOT drive a car, operate machinery, make legal/financial decisions,   sign important papers or drink alcohol.    ACTIVITY:  Today: no heavy lifting, straining or running due to procedural   sedation/anesthesia.  The following day: return to full activity including work.  DIET:  Eat and drink normally unless instructed otherwise.     TREATMENT FOR COMMON SIDE EFFECTS:  - Mild abdominal pain, nausea, belching, bloating or excessive gas:  rest,   eat lightly and use a heating pad.  - Sore Throat: treat with throat lozenges and/or gargle with warm salt   water.  - Because air was used during the procedure, expelling large amounts of air   from your rectum or belching is normal.  - If a bowel prep was taken, you may not have a bowel movement for 1-3 days.    This is normal.  SYMPTOMS TO WATCH FOR AND REPORT TO YOUR PHYSICIAN:  1. Abdominal pain or bloating, other than gas cramps.  2. Chest pain.  3. Back pain.  4. Signs of infection such as: chills or fever occurring within 24 hours   after the procedure.  5. Rectal bleeding, which would show as bright red, maroon, or black stools.   (A tablespoon of blood from the rectum is not serious, especially  if   hemorrhoids are present.)  6. Vomiting.  7. Weakness or dizziness.  GO DIRECTLY TO THE NEAREST EMERGENCY ROOM IF YOU HAVE ANY OF THE FOLLOWING:      Difficulty breathing              Chills and/or fever over 101 F   Persistent vomiting and/or vomiting blood   Severe abdominal pain   Severe chest pain   Black, tarry stools   Bleeding- more than one tablespoon   Any other symptom or condition that you feel may need urgent attention  Your doctor recommends these additional instructions:  If any biopsies were taken, your doctors clinic will contact you in 1 to 2   weeks with any results.  - Discharge patient to home.   - Await pathology results.   - Telephone endoscopist for pathology results in 3 weeks.   - Repeat colonoscopy in 5 years for screening purposes.   - Return to GI clinic at the next available appointment.   - The findings and recommendations were discussed with the patient.  For questions, problems or results please call your physician - Eliseo Garcia MD at Work:  [phone].  [facility], EMERGENCY ROOM PHONE NUMBER: [phone]  IF A COMPLICATION OR EMERGENCY SITUATION ARISES AND YOU ARE UNABLE TO REACH   YOUR PHYSICIAN - GO DIRECTLY TO THE EMERGENCY ROOM.  Eliseo Garcia MD  8/18/2023 9:33:29 AM  This report has been verified and signed electronically.  Dear patient,  As a result of recent federal legislation (The Federal Cures Act), you may   receive lab or pathology results from your procedure in your [facility]   account before your physician is able to contact you. Your physician or   their representative will relay the results to you with their   recommendations at their soonest availability.  Thank you,  PROVATION

## 2023-08-18 NOTE — ANESTHESIA PREPROCEDURE EVALUATION
08/18/2023  Regine Osorio is a 68 y.o., female.  Past Medical History:   Diagnosis Date    Allergy     Breast cancer 2017    Cataract     Cervical spondylosis 07/29/2022    Diabetes mellitus, type 2     Type 2    Eczema     Fibromyalgia     Glaucoma     Hypertension     Renal cyst, right 02/06/2020    Steatosis of liver 02/06/2020     possibly      Pre-op Assessment    I have reviewed the Patient Summary Reports.     I have reviewed the Nursing Notes. I have reviewed the NPO Status.   I have reviewed the Medications.     Review of Systems  Anesthesia Hx:  No problems with previous Anesthesia    Social:  No Alcohol Use, Non-Smoker    Cardiovascular:   Hypertension    Pulmonary:   Sleep Apnea    Renal/:   Chronic Renal Disease    Hepatic/GI:   Liver Disease,    Musculoskeletal:   Arthritis     Neurological:   Neuromuscular Disease, Headaches    Endocrine:   Diabetes  Obesity / BMI > 30      Physical Exam  General: Well nourished, Cooperative, Alert and Oriented    Airway:  Mallampati: II / II  Mouth Opening: Normal  TM Distance: Normal  Tongue: Normal  Neck ROM: Normal ROM    Dental:  Intact    Chest/Lungs:  Clear to auscultation, Normal Respiratory Rate    Heart:  Rate: Normal  Rhythm: Regular Rhythm        Anesthesia Plan  Type of Anesthesia, risks & benefits discussed:    Anesthesia Type: Gen Natural Airway  Intra-op Monitoring Plan: Standard ASA Monitors  Post Op Pain Control Plan: multimodal analgesia  Induction:  IV  Informed Consent: Informed consent signed with the Patient and all parties understand the risks and agree with anesthesia plan.  All questions answered.   ASA Score: 3  Day of Surgery Review of History & Physical: H&P Update referred to the surgeon/provider.    Ready For Surgery From Anesthesia Perspective.     .

## 2023-08-18 NOTE — PROVATION PATIENT INSTRUCTIONS
Discharge Summary/Instructions after an Endoscopic Procedure  Patient Name: Regine Osorio  Patient MRN: [id number]  Patient YOB: 1955 Friday, August 18, 2023  Eliseo Garcia MD  Dear patient,  As a result of recent federal legislation (The Federal Cures Act), you may   receive lab or pathology results from your procedure in your [facility]   account before your physician is able to contact you. Your physician or   their representative will relay the results to you with their   recommendations at their soonest availability.  Thank you,  RESTRICTIONS:  During your procedure today, you received medications for sedation.  These   medications may affect your judgment, balance and coordination.  Therefore,   for 24 hours, you have the following restrictions:   - DO NOT drive a car, operate machinery, make legal/financial decisions,   sign important papers or drink alcohol.    ACTIVITY:  Today: no heavy lifting, straining or running due to procedural   sedation/anesthesia.  The following day: return to full activity including work.  DIET:  Eat and drink normally unless instructed otherwise.     TREATMENT FOR COMMON SIDE EFFECTS:  - Mild abdominal pain, nausea, belching, bloating or excessive gas:  rest,   eat lightly and use a heating pad.  - Sore Throat: treat with throat lozenges and/or gargle with warm salt   water.  - Because air was used during the procedure, expelling large amounts of air   from your rectum or belching is normal.  - If a bowel prep was taken, you may not have a bowel movement for 1-3 days.    This is normal.  SYMPTOMS TO WATCH FOR AND REPORT TO YOUR PHYSICIAN:  1. Abdominal pain or bloating, other than gas cramps.  2. Chest pain.  3. Back pain.  4. Signs of infection such as: chills or fever occurring within 24 hours   after the procedure.  5. Rectal bleeding, which would show as bright red, maroon, or black stools.   (A tablespoon of blood from the rectum is not serious, especially  if   hemorrhoids are present.)  6. Vomiting.  7. Weakness or dizziness.  GO DIRECTLY TO THE NEAREST EMERGENCY ROOM IF YOU HAVE ANY OF THE FOLLOWING:      Difficulty breathing              Chills and/or fever over 101 F   Persistent vomiting and/or vomiting blood   Severe abdominal pain   Severe chest pain   Black, tarry stools   Bleeding- more than one tablespoon   Any other symptom or condition that you feel may need urgent attention  Your doctor recommends these additional instructions:  If any biopsies were taken, your doctors clinic will contact you in 1 to 2   weeks with any results.  - Discharge patient to home.   - Follow an antireflux regimen indefinitely.   - Await pathology results.   - Telephone endoscopist for pathology results in 3 weeks.   - Return to GI clinic at the next available appointment.   - The findings and recommendations were discussed with the patient.  For questions, problems or results please call your physician - Eliseo Garcia MD at Work:  [phone].  [facility], EMERGENCY ROOM PHONE NUMBER: [phone]  IF A COMPLICATION OR EMERGENCY SITUATION ARISES AND YOU ARE UNABLE TO REACH   YOUR PHYSICIAN - GO DIRECTLY TO THE EMERGENCY ROOM.  Eliseo Garcia MD  8/18/2023 9:44:06 AM  This report has been verified and signed electronically.  Dear patient,  As a result of recent federal legislation (The Federal Cures Act), you may   receive lab or pathology results from your procedure in your [facility]   account before your physician is able to contact you. Your physician or   their representative will relay the results to you with their   recommendations at their soonest availability.  Thank you,  PROVATION

## 2023-08-22 LAB
FINAL PATHOLOGIC DIAGNOSIS: NORMAL
Lab: NORMAL

## 2023-08-23 LAB
FINAL PATHOLOGIC DIAGNOSIS: NORMAL
GROSS: NORMAL
Lab: NORMAL
SUPPLEMENTAL DIAGNOSIS: NORMAL

## 2023-08-28 ENCOUNTER — TELEPHONE (OUTPATIENT)
Dept: HEPATOLOGY | Facility: CLINIC | Age: 68
End: 2023-08-28
Payer: MEDICARE

## 2023-08-28 ENCOUNTER — OFFICE VISIT (OUTPATIENT)
Dept: HEMATOLOGY/ONCOLOGY | Facility: CLINIC | Age: 68
End: 2023-08-28
Payer: MEDICARE

## 2023-08-28 ENCOUNTER — LAB VISIT (OUTPATIENT)
Dept: LAB | Facility: HOSPITAL | Age: 68
End: 2023-08-28
Attending: INTERNAL MEDICINE
Payer: MEDICARE

## 2023-08-28 VITALS
BODY MASS INDEX: 39.97 KG/M2 | HEIGHT: 65 IN | HEART RATE: 89 BPM | RESPIRATION RATE: 14 BRPM | WEIGHT: 239.88 LBS | TEMPERATURE: 98 F | OXYGEN SATURATION: 97 % | SYSTOLIC BLOOD PRESSURE: 135 MMHG | DIASTOLIC BLOOD PRESSURE: 68 MMHG

## 2023-08-28 DIAGNOSIS — E66.9 OBESITY (BMI 30-39.9): ICD-10-CM

## 2023-08-28 DIAGNOSIS — K76.0 FATTY LIVER: ICD-10-CM

## 2023-08-28 DIAGNOSIS — I15.2 HYPERTENSION ASSOCIATED WITH TYPE 2 DIABETES MELLITUS: ICD-10-CM

## 2023-08-28 DIAGNOSIS — Z85.3 HISTORY OF BREAST CANCER IN FEMALE: ICD-10-CM

## 2023-08-28 DIAGNOSIS — E61.1 LOW IRON: ICD-10-CM

## 2023-08-28 DIAGNOSIS — E11.9 TYPE 2 DIABETES MELLITUS WITHOUT COMPLICATION, WITHOUT LONG-TERM CURRENT USE OF INSULIN: ICD-10-CM

## 2023-08-28 DIAGNOSIS — E11.59 HYPERTENSION ASSOCIATED WITH TYPE 2 DIABETES MELLITUS: ICD-10-CM

## 2023-08-28 DIAGNOSIS — C50.912 MALIGNANT NEOPLASM OF LEFT FEMALE BREAST, UNSPECIFIED ESTROGEN RECEPTOR STATUS, UNSPECIFIED SITE OF BREAST: Primary | ICD-10-CM

## 2023-08-28 LAB
ALBUMIN SERPL BCP-MCNC: 3.6 G/DL (ref 3.5–5.2)
ALP SERPL-CCNC: 75 U/L (ref 55–135)
ALT SERPL W/O P-5'-P-CCNC: 11 U/L (ref 10–44)
AST SERPL-CCNC: 11 U/L (ref 10–40)
BASOPHILS # BLD AUTO: 0.04 K/UL (ref 0–0.2)
BASOPHILS NFR BLD: 0.5 % (ref 0–1.9)
BILIRUB DIRECT SERPL-MCNC: 0.1 MG/DL (ref 0.1–0.3)
BILIRUB SERPL-MCNC: 0.3 MG/DL (ref 0.1–1)
DIFFERENTIAL METHOD: ABNORMAL
EOSINOPHIL # BLD AUTO: 0.2 K/UL (ref 0–0.5)
EOSINOPHIL NFR BLD: 2.7 % (ref 0–8)
ERYTHROCYTE [DISTWIDTH] IN BLOOD BY AUTOMATED COUNT: 14.8 % (ref 11.5–14.5)
FERRITIN SERPL-MCNC: 19 NG/ML (ref 20–300)
HCT VFR BLD AUTO: 38.4 % (ref 37–48.5)
HGB BLD-MCNC: 11.6 G/DL (ref 12–16)
IMM GRANULOCYTES # BLD AUTO: 0.04 K/UL (ref 0–0.04)
IMM GRANULOCYTES NFR BLD AUTO: 0.5 % (ref 0–0.5)
LYMPHOCYTES # BLD AUTO: 2.2 K/UL (ref 1–4.8)
LYMPHOCYTES NFR BLD: 24.5 % (ref 18–48)
MCH RBC QN AUTO: 25.1 PG (ref 27–31)
MCHC RBC AUTO-ENTMCNC: 30.2 G/DL (ref 32–36)
MCV RBC AUTO: 83 FL (ref 82–98)
MONOCYTES # BLD AUTO: 0.6 K/UL (ref 0.3–1)
MONOCYTES NFR BLD: 7.1 % (ref 4–15)
NEUTROPHILS # BLD AUTO: 5.7 K/UL (ref 1.8–7.7)
NEUTROPHILS NFR BLD: 64.7 % (ref 38–73)
NRBC BLD-RTO: 0 /100 WBC
PLATELET # BLD AUTO: 351 K/UL (ref 150–450)
PMV BLD AUTO: 10.1 FL (ref 9.2–12.9)
PROT SERPL-MCNC: 6.9 G/DL (ref 6–8.4)
RBC # BLD AUTO: 4.62 M/UL (ref 4–5.4)
WBC # BLD AUTO: 8.84 K/UL (ref 3.9–12.7)

## 2023-08-28 PROCEDURE — 3075F SYST BP GE 130 - 139MM HG: CPT | Mod: CPTII,S$GLB,, | Performed by: INTERNAL MEDICINE

## 2023-08-28 PROCEDURE — 3008F BODY MASS INDEX DOCD: CPT | Mod: CPTII,S$GLB,, | Performed by: INTERNAL MEDICINE

## 2023-08-28 PROCEDURE — 99999 PR PBB SHADOW E&M-EST. PATIENT-LVL V: CPT | Mod: PBBFAC,,, | Performed by: INTERNAL MEDICINE

## 2023-08-28 PROCEDURE — 1159F PR MEDICATION LIST DOCUMENTED IN MEDICAL RECORD: ICD-10-PCS | Mod: CPTII,S$GLB,, | Performed by: INTERNAL MEDICINE

## 2023-08-28 PROCEDURE — 3061F PR NEG MICROALBUMINURIA RESULT DOCUMENTED/REVIEW: ICD-10-PCS | Mod: CPTII,S$GLB,, | Performed by: INTERNAL MEDICINE

## 2023-08-28 PROCEDURE — 3051F HG A1C>EQUAL 7.0%<8.0%: CPT | Mod: CPTII,S$GLB,, | Performed by: INTERNAL MEDICINE

## 2023-08-28 PROCEDURE — 3066F NEPHROPATHY DOC TX: CPT | Mod: CPTII,S$GLB,, | Performed by: INTERNAL MEDICINE

## 2023-08-28 PROCEDURE — 1159F MED LIST DOCD IN RCRD: CPT | Mod: CPTII,S$GLB,, | Performed by: INTERNAL MEDICINE

## 2023-08-28 PROCEDURE — 99214 OFFICE O/P EST MOD 30 MIN: CPT | Mod: S$GLB,,, | Performed by: INTERNAL MEDICINE

## 2023-08-28 PROCEDURE — 3288F PR FALLS RISK ASSESSMENT DOCUMENTED: ICD-10-PCS | Mod: CPTII,S$GLB,, | Performed by: INTERNAL MEDICINE

## 2023-08-28 PROCEDURE — 1101F PT FALLS ASSESS-DOCD LE1/YR: CPT | Mod: CPTII,S$GLB,, | Performed by: INTERNAL MEDICINE

## 2023-08-28 PROCEDURE — 4010F ACE/ARB THERAPY RXD/TAKEN: CPT | Mod: CPTII,S$GLB,, | Performed by: INTERNAL MEDICINE

## 2023-08-28 PROCEDURE — 1160F RVW MEDS BY RX/DR IN RCRD: CPT | Mod: CPTII,S$GLB,, | Performed by: INTERNAL MEDICINE

## 2023-08-28 PROCEDURE — 3288F FALL RISK ASSESSMENT DOCD: CPT | Mod: CPTII,S$GLB,, | Performed by: INTERNAL MEDICINE

## 2023-08-28 PROCEDURE — 1160F PR REVIEW ALL MEDS BY PRESCRIBER/CLIN PHARMACIST DOCUMENTED: ICD-10-PCS | Mod: CPTII,S$GLB,, | Performed by: INTERNAL MEDICINE

## 2023-08-28 PROCEDURE — 3078F PR MOST RECENT DIASTOLIC BLOOD PRESSURE < 80 MM HG: ICD-10-PCS | Mod: CPTII,S$GLB,, | Performed by: INTERNAL MEDICINE

## 2023-08-28 PROCEDURE — 3075F PR MOST RECENT SYSTOLIC BLOOD PRESS GE 130-139MM HG: ICD-10-PCS | Mod: CPTII,S$GLB,, | Performed by: INTERNAL MEDICINE

## 2023-08-28 PROCEDURE — 3078F DIAST BP <80 MM HG: CPT | Mod: CPTII,S$GLB,, | Performed by: INTERNAL MEDICINE

## 2023-08-28 PROCEDURE — 99214 PR OFFICE/OUTPT VISIT, EST, LEVL IV, 30-39 MIN: ICD-10-PCS | Mod: S$GLB,,, | Performed by: INTERNAL MEDICINE

## 2023-08-28 PROCEDURE — 1101F PR PT FALLS ASSESS DOC 0-1 FALLS W/OUT INJ PAST YR: ICD-10-PCS | Mod: CPTII,S$GLB,, | Performed by: INTERNAL MEDICINE

## 2023-08-28 PROCEDURE — 1126F PR PAIN SEVERITY QUANTIFIED, NO PAIN PRESENT: ICD-10-PCS | Mod: CPTII,S$GLB,, | Performed by: INTERNAL MEDICINE

## 2023-08-28 PROCEDURE — 82728 ASSAY OF FERRITIN: CPT | Performed by: INTERNAL MEDICINE

## 2023-08-28 PROCEDURE — 3051F PR MOST RECENT HEMOGLOBIN A1C LEVEL 7.0 - < 8.0%: ICD-10-PCS | Mod: CPTII,S$GLB,, | Performed by: INTERNAL MEDICINE

## 2023-08-28 PROCEDURE — 1126F AMNT PAIN NOTED NONE PRSNT: CPT | Mod: CPTII,S$GLB,, | Performed by: INTERNAL MEDICINE

## 2023-08-28 PROCEDURE — 3008F PR BODY MASS INDEX (BMI) DOCUMENTED: ICD-10-PCS | Mod: CPTII,S$GLB,, | Performed by: INTERNAL MEDICINE

## 2023-08-28 PROCEDURE — 85025 COMPLETE CBC W/AUTO DIFF WBC: CPT | Performed by: INTERNAL MEDICINE

## 2023-08-28 PROCEDURE — 80076 HEPATIC FUNCTION PANEL: CPT | Performed by: NURSE PRACTITIONER

## 2023-08-28 PROCEDURE — 3066F PR DOCUMENTATION OF TREATMENT FOR NEPHROPATHY: ICD-10-PCS | Mod: CPTII,S$GLB,, | Performed by: INTERNAL MEDICINE

## 2023-08-28 PROCEDURE — 4010F PR ACE/ARB THEARPY RXD/TAKEN: ICD-10-PCS | Mod: CPTII,S$GLB,, | Performed by: INTERNAL MEDICINE

## 2023-08-28 PROCEDURE — 36415 COLL VENOUS BLD VENIPUNCTURE: CPT | Performed by: INTERNAL MEDICINE

## 2023-08-28 PROCEDURE — 3061F NEG MICROALBUMINURIA REV: CPT | Mod: CPTII,S$GLB,, | Performed by: INTERNAL MEDICINE

## 2023-08-28 PROCEDURE — 99999 PR PBB SHADOW E&M-EST. PATIENT-LVL V: ICD-10-PCS | Mod: PBBFAC,,, | Performed by: INTERNAL MEDICINE

## 2023-08-28 NOTE — PROGRESS NOTES
Subjective:       Patient ID: Regine Osorio is a 68 y.o. female.    Chief Complaint: No chief complaint on file.      HPI      Mrs. Alfred returns today for follow up.  I had last seen her on April 17, 2023.   On August 18, 2023 she underwent an EGD and colonoscopy.  She had diverticulosis and a 1 cm hiatal hernia.  Biopsies were negative for celiac disease.  A urinalysis in April had shown no hematuria.    Briefly, she is a 68-year-old female who has a history of a stage I triple negative breast cancer that was diagnosed in late 2017.  She underwent a left modified radical mastectomy in [facility] on 11/01/2017 and had a stage IA carcinoma measuring 1.2 cm in greatest diameter.  Resection margins were clear, while four sentinel lymph nodes were negative.  Postoperatively, she received four cycles of docetaxel and cyclophosphamide.  The first cycle was administered on 11/30/2017 and she completed her treatment by 02/01/2018.  She has been followed expectantly and has been MERRY since then.  We assumed her care when she relocated to [place] 3 years ago.    Her mammogram last April was read as BIRADS I, and a one year follow up was recommended.    A ferritin in late April was low at 19 ng/mL while her CBC was normal.  A repeat ferritin today is 19 ng/ml, while her CBC shows a WBC count of 8,800 per cubic mm, hemoglobin 11.6 grams/deciliter, hematocrit 38.4%, MCV 83, and platelets 351 K  Two stool samples that she submitted today were negative for occult blood.        ROS: Overall she feels OK, however, she does complain of insomnia and fatigue.  She lost her only child to a suicide 2 months ago and she is upset about it.  ECOG PS is 1.  She denies any anxiety, easy bruising, fevers, chills, night  sweats, weight loss, nausea, vomiting, diarrhea, constipation, diplopia, blurred vision, headache, chest pain, palpitations, shortness of breath, breast pain, lower abdominal pain, extremity pain, or difficulty  ambulating.  The remainder of the ten-point ROS, including general, skin, lymph, H/N, cardiorespiratory, GI, , Neuro, Endocrine, and psychiatric is negative.     Objective:      Physical Exam      She is alert, oriented to time, place, person, pleasant, well      nourished, in no acute physical distress.                                    VITAL SIGNS:  Reviewed                                      HEENT:  Normal.  There are no nasal, oral, lip, gingival, auricular, lid,    or conjunctival lesions.  Mucosae are moist and pink, and there is no        thrush.  Pupils are equal, reactive to light and accommodation.              Extraocular muscle movements are intact.  Dentition is good.  There is no frontal or maxillary tenderness.                                     NECK:  Supple without JVD, adenopathy, but she does have thyromegaly.                       LUNGS:  Clear to auscultation without wheezing, rales, or rhonchi.           CARDIOVASCULAR:  Reveals an S1, S2, no murmurs, no rubs, no gallops.         ABDOMEN:  Soft, nontender, without organomegaly.  Bowel sounds are    present.                                                                     EXTREMITIES:  No cyanosis, clubbing, or edema.                               BREASTS:  She is status post left mastectomy with a well-healed incision.    LYMPHATIC:  There is no cervical, axillary, or supraclavicular adenopathy.   SKIN:  Warm and moist, without petechiae, rashes, induration, or ecchymoses.           NEUROLOGIC:  DTRs are 0-1+ bilaterally, symmetrical, motor function is 5/5,  and cranial nerves are  within normal limits.    Assessment:       1. History of breast cancer in female, [id number], status post mastectomy and 4 cycles of docetaxel cyclophosphamide, clinically MERRY, doing well 62 months after completion of her chemotherapy        2.     Borderline low ferritin with borderline anemia  3.     Diverticulosis  Plan:         In regards to her breast  cancer, Mrs. Alfred remains in clinical remission.  I explained to her that her chance of a cure at this point is in the range of 99%.  In regards to her low ferritin and borderline anemia, she states that she tried oral iron in the past but she did not tolerate it well.  We discussed the option of further expectant observation given the negative colonoscopy versus treatment with oral or IV iron.  After a long deliberation she elected to wait for now which is not unreasonable.  I will see her with a repeat CBC and ferritin in 3 months and if her anemia worsens we will supplement iron at that time.        Her multiple questions were answered to her satisfaction.  I spent approximately 35 minutes reviewing the available records and evaluating the patient, out of which over 50% of the time was spent face to face with the patient in counseling and coordinating this patient's care.            Route Chart for Scheduling    Med Onc Chart Routing      Follow up with physician 3 months. With labs 1 day prior   Follow up with [facility]    Infusion scheduling note    Injection scheduling note    Labs CBC and ferritin   Scheduling:  Preferred lab:  Lab interval:     Imaging    Pharmacy appointment    Other referrals

## 2023-08-28 NOTE — TELEPHONE ENCOUNTER
----- Message from Soniya Fernandes NP sent at 8/28/2023  9:04 AM CDT -----  Please set recall for RTC in 1 year with [facility] prior to visit. Thanks!

## 2023-08-29 ENCOUNTER — OFFICE VISIT (OUTPATIENT)
Dept: PODIATRY | Facility: CLINIC | Age: 68
End: 2023-08-29
Payer: MEDICARE

## 2023-08-29 VITALS
HEIGHT: 65 IN | HEART RATE: 99 BPM | WEIGHT: 239.19 LBS | BODY MASS INDEX: 39.85 KG/M2 | DIASTOLIC BLOOD PRESSURE: 74 MMHG | SYSTOLIC BLOOD PRESSURE: 160 MMHG

## 2023-08-29 DIAGNOSIS — L60.0 INGROWN TOENAIL WITHOUT INFECTION: ICD-10-CM

## 2023-08-29 DIAGNOSIS — E11.9 TYPE 2 DIABETES MELLITUS WITHOUT COMPLICATION, WITHOUT LONG-TERM CURRENT USE OF INSULIN: Primary | ICD-10-CM

## 2023-08-29 DIAGNOSIS — B35.1 ONYCHOMYCOSIS DUE TO DERMATOPHYTE: ICD-10-CM

## 2023-08-29 PROCEDURE — 3008F BODY MASS INDEX DOCD: CPT | Mod: CPTII,S$GLB,, | Performed by: PODIATRIST

## 2023-08-29 PROCEDURE — 3078F PR MOST RECENT DIASTOLIC BLOOD PRESSURE < 80 MM HG: ICD-10-PCS | Mod: CPTII,S$GLB,, | Performed by: PODIATRIST

## 2023-08-29 PROCEDURE — 1159F PR MEDICATION LIST DOCUMENTED IN MEDICAL RECORD: ICD-10-PCS | Mod: CPTII,S$GLB,, | Performed by: PODIATRIST

## 2023-08-29 PROCEDURE — 4010F ACE/ARB THERAPY RXD/TAKEN: CPT | Mod: CPTII,S$GLB,, | Performed by: PODIATRIST

## 2023-08-29 PROCEDURE — 1126F PR PAIN SEVERITY QUANTIFIED, NO PAIN PRESENT: ICD-10-PCS | Mod: CPTII,S$GLB,, | Performed by: PODIATRIST

## 2023-08-29 PROCEDURE — 99999 PR PBB SHADOW E&M-EST. PATIENT-LVL IV: CPT | Mod: PBBFAC,,, | Performed by: PODIATRIST

## 2023-08-29 PROCEDURE — 3008F PR BODY MASS INDEX (BMI) DOCUMENTED: ICD-10-PCS | Mod: CPTII,S$GLB,, | Performed by: PODIATRIST

## 2023-08-29 PROCEDURE — 99999 PR PBB SHADOW E&M-EST. PATIENT-LVL IV: ICD-10-PCS | Mod: PBBFAC,,, | Performed by: PODIATRIST

## 2023-08-29 PROCEDURE — 99213 OFFICE O/P EST LOW 20 MIN: CPT | Mod: S$GLB,,, | Performed by: PODIATRIST

## 2023-08-29 PROCEDURE — 1126F AMNT PAIN NOTED NONE PRSNT: CPT | Mod: CPTII,S$GLB,, | Performed by: PODIATRIST

## 2023-08-29 PROCEDURE — 3061F PR NEG MICROALBUMINURIA RESULT DOCUMENTED/REVIEW: ICD-10-PCS | Mod: CPTII,S$GLB,, | Performed by: PODIATRIST

## 2023-08-29 PROCEDURE — 4010F PR ACE/ARB THEARPY RXD/TAKEN: ICD-10-PCS | Mod: CPTII,S$GLB,, | Performed by: PODIATRIST

## 2023-08-29 PROCEDURE — 3077F SYST BP >= 140 MM HG: CPT | Mod: CPTII,S$GLB,, | Performed by: PODIATRIST

## 2023-08-29 PROCEDURE — 3077F PR MOST RECENT SYSTOLIC BLOOD PRESSURE >= 140 MM HG: ICD-10-PCS | Mod: CPTII,S$GLB,, | Performed by: PODIATRIST

## 2023-08-29 PROCEDURE — 3061F NEG MICROALBUMINURIA REV: CPT | Mod: CPTII,S$GLB,, | Performed by: PODIATRIST

## 2023-08-29 PROCEDURE — 3066F PR DOCUMENTATION OF TREATMENT FOR NEPHROPATHY: ICD-10-PCS | Mod: CPTII,S$GLB,, | Performed by: PODIATRIST

## 2023-08-29 PROCEDURE — 3051F PR MOST RECENT HEMOGLOBIN A1C LEVEL 7.0 - < 8.0%: ICD-10-PCS | Mod: CPTII,S$GLB,, | Performed by: PODIATRIST

## 2023-08-29 PROCEDURE — 99213 PR OFFICE/OUTPT VISIT, EST, LEVL III, 20-29 MIN: ICD-10-PCS | Mod: S$GLB,,, | Performed by: PODIATRIST

## 2023-08-29 PROCEDURE — 3051F HG A1C>EQUAL 7.0%<8.0%: CPT | Mod: CPTII,S$GLB,, | Performed by: PODIATRIST

## 2023-08-29 PROCEDURE — 3078F DIAST BP <80 MM HG: CPT | Mod: CPTII,S$GLB,, | Performed by: PODIATRIST

## 2023-08-29 PROCEDURE — 3066F NEPHROPATHY DOC TX: CPT | Mod: CPTII,S$GLB,, | Performed by: PODIATRIST

## 2023-08-29 PROCEDURE — 1159F MED LIST DOCD IN RCRD: CPT | Mod: CPTII,S$GLB,, | Performed by: PODIATRIST

## 2023-09-04 ENCOUNTER — PATIENT MESSAGE (OUTPATIENT)
Dept: DERMATOLOGY | Facility: CLINIC | Age: 68
End: 2023-09-04
Payer: MEDICARE

## 2023-09-05 DIAGNOSIS — L29.9 SCALP ITCH: ICD-10-CM

## 2023-09-05 RX ORDER — CLOBETASOL PROPIONATE 0.46 MG/ML
SOLUTION TOPICAL
Qty: 50 ML | Refills: 0 | Status: SHIPPED | OUTPATIENT
Start: 2023-09-05

## 2023-09-07 ENCOUNTER — TELEPHONE (OUTPATIENT)
Dept: ENDOSCOPY | Facility: HOSPITAL | Age: 68
End: 2023-09-07

## 2023-09-07 ENCOUNTER — PATIENT MESSAGE (OUTPATIENT)
Dept: GASTROENTEROLOGY | Facility: CLINIC | Age: 68
End: 2023-09-07
Payer: MEDICARE

## 2023-09-07 NOTE — TELEPHONE ENCOUNTER
----- Message from Gina NewsFixed Route sent at 9/7/2023  1:33 PM CDT -----  Regarding: colonoscopy Results  Contact: [phone]  Pt had a colonoscopy on 8/18 and was told to call provider in 3 weeks for pathology results. Patient Requesting Call Back @ [phone]

## 2023-09-07 NOTE — PROGRESS NOTES
Pathology was benign no evidence of celiac sprue no evidence of H pylori no evidence of Crohn's or ulcerative colitis no evidence of lymphocytic or collagenous colitis.    1. Duodenum, biopsy:   - Benign duodenal mucosa without significant histopathologic alteration   - No evidence of celiac disease   - Negative for dysplasia or carcinoma     2. Stomach, biopsy:   - Antral and oxyntic mucosa with mild features of reactive gastropathy   - Immunohistochemistry for Helicobacter pylori pending; results to follow in a supplemental report   - Negative for intestinal metaplasia   - Negative for dysplasia or carcinoma     3. Colon, random, biopsy:   - Benign colonic mucosa with rare foci of mild active inflammation (see comment)   - No definitive evidence of chronicity   - Negative for granulomas or viral inclusions   - Negative for dysplasia or carcinoma     Comment:   The biopsies show rare foci of mild active inflammation predominantly within the superficial epithelium and lamina propria, with focal cryptitis. No definitive evidence of chronic mucosal injury is identified in this biopsy material.  This pattern of   injury is non-specific but raises a differential diagnosis which includes, but is not limited to, medication-induced injury (such as through use of non-steroidal anti-inflammatories), infections, or, possibly, bowel preparation effect. Correlation with   clinical and endoscopic findings should be of value.  VC     Comment: Interp By Anthony Florentino M.D., Signed on 08/23/2023 at 17:31  Supplemental Diagnosis      This addendum is being issued to report results of Helicobacter pylori immunohistochemistry, performed on block 2A (stomach biopsy) with appropriate and reactive controls:     Helicobacter pylori (IHC): Negative     The prior diagnosis is unchanged

## 2023-09-08 NOTE — PROGRESS NOTES
Subjective:      Patient ID: Regine Osorio is a 68 y.o. female.    Chief Complaint: Nail Care and Diabetes Mellitus (PCP-Gladis Melissa MD-6/30/2023)    Regine is a 68 y.o. female who presents to the clinic upon referral from Dr. Tracy aguilar. provider found  for evaluation and treatment of diabetic feet. Regine has a past medical history of Allergy, Breast cancer (2017), Cataract, Cervical spondylosis (07/29/2022), Diabetes mellitus, type 2, Eczema, Fibromyalgia, Glaucoma, Hypertension, Renal cyst, right (02/06/2020), and Steatosis of liver (02/06/2020). Patient relates no major problem with feet. Only complaints today consist of routine diabetic foot evaluation.  Significantly improving after Lamisil therapy.  PCP: Gladis Melissa MD    Date Last Seen by PCP:   Chief Complaint   Patient presents with    Nail Care    Diabetes Mellitus     PCP-Gladis Melissa MD-6/30/2023         Current shoe gear: Casual shoes    Hemoglobin A1C   Date Value Ref Range Status   06/27/2023 7.2 (H) 4.0 - 5.6 % Final     Comment:     ADA Screening Guidelines:  5.7-6.4%  Consistent with prediabetes  >or=6.5%  Consistent with diabetes    High levels of fetal hemoglobin interfere with the HbA1C  assay. Heterozygous hemoglobin variants (HbS, HgC, etc)do  not significantly interfere with this assay.   However, presence of multiple variants may affect accuracy.     03/28/2023 7.7 (H) 4.0 - 5.6 % Final     Comment:     ADA Screening Guidelines:  5.7-6.4%  Consistent with prediabetes  >or=6.5%  Consistent with diabetes    High levels of fetal hemoglobin interfere with the HbA1C  assay. Heterozygous hemoglobin variants (HbS, HgC, etc)do  not significantly interfere with this assay.   However, presence of multiple variants may affect accuracy.     12/12/2022 7.5 (H) 4.0 - 5.6 % Final     Comment:     ADA Screening Guidelines:  5.7-6.4%  Consistent with prediabetes  >or=6.5%  Consistent with diabetes    High levels of fetal hemoglobin interfere  with the HbA1C  assay. Heterozygous hemoglobin variants (HbS, HgC, etc)do  not significantly interfere with this assay.   However, presence of multiple variants may affect accuracy.             Review of Systems   Constitutional: Positive for night sweats. Negative for chills and fever.   HENT:  Negative for congestion and tinnitus.    Eyes:  Negative for double vision and visual disturbance.   Cardiovascular:  Negative for chest pain and claudication.   Respiratory:  Negative for hemoptysis and shortness of breath.    Endocrine: Negative for cold intolerance and heat intolerance.   Hematologic/Lymphatic: Negative for adenopathy and bleeding problem.   Skin:  Negative for color change and nail changes.   Musculoskeletal:  Positive for back pain and joint pain. Negative for myalgias and stiffness.   Gastrointestinal:  Negative for nausea and vomiting.   Genitourinary:  Negative for dysuria and hematuria.   Neurological:  Positive for paresthesias. Negative for numbness.   Psychiatric/Behavioral:  Negative for altered mental status and suicidal ideas.    Allergic/Immunologic: Negative for environmental allergies and persistent infections.           Objective:      Physical Exam  Vitals reviewed.   Constitutional:       Appearance: She is well-developed.   Cardiovascular:      Pulses:           Dorsalis pedis pulses are 2+ on the right side and 2+ on the left side.        Posterior tibial pulses are 2+ on the right side and 2+ on the left side.   Pulmonary:      Effort: Pulmonary effort is normal.   Musculoskeletal:         General: Normal range of motion.      Comments: Inspection and palpation of the muscles joints and bones of both lower extremities reveal that muscle strength for the anterior lateral and posterior muscle groups and intrinsic muscle groups of the foot are all 5 over 5 symmetrical.  Ankle subtalar midtarsal and digital joint range of motion are within normal limits, nonpainful, without crepitus or  effusion.  Patient exhibits a normal angle and base of gait.  Palpation of the tendons reveal no defects.   Skin:     General: Skin is warm and dry.      Capillary Refill: Capillary refill takes 2 to 3 seconds.      Comments: Skin turgor is normal bilaterally.  Skin texture is well hydrated to both lower extremities.  No lesions or rashes or wounds appreciated bilaterally.  Proximal clearing noted to bilateral pedal nails.   Neurological:      Mental Status: She is alert and oriented to person, place, and time.      Comments: Sharp dull light touch vibratory proprioceptive sensation are intact bilaterally.  Deep tendon reflexes to patellar and Achilles tendon are symmetrical 2 over 4 bilaterally.  No ankle clonus or Babinski reflexes noted bilaterally.  Coordination is normal to both feet and lower extremities.               Assessment:       Encounter Diagnoses   Name Primary?    Type 2 diabetes mellitus without complication, without long-term current use of insulin Yes    Onychomycosis due to dermatophyte     Ingrown toenail without infection          Plan:       Regine was seen today for nail care and diabetes mellitus.    Diagnoses and all orders for this visit:    Type 2 diabetes mellitus without complication, without long-term current use of insulin    Onychomycosis due to dermatophyte    Ingrown toenail without infection      I counseled the patient on her conditions, their implications and medical management.      Shoe inspection. Diabetic Foot Education. Patient reminded of the importance of good nutrition and blood sugar control to help prevent podiatric complications of diabetes. Patient instructed on proper foot hygeine. We discussed wearing proper shoe gear, daily foot inspections and Diabetic foot education in detail.    Return to clinic in 12 months or sooner if problems arise   .

## 2023-09-15 ENCOUNTER — PATIENT OUTREACH (OUTPATIENT)
Dept: ADMINISTRATIVE | Facility: OTHER | Age: 68
End: 2023-09-15
Payer: MEDICARE

## 2023-09-15 NOTE — PROGRESS NOTES
CHW - Follow Up    This Community Health Worker completed a follow up visit with patient via telephone today.  Pt/Caregiver reported: Patient stated that she does not assistance at this time   Community Health Worker provided: Chw will follow up in a few weeks   Follow up required:   Follow-up Outreach - Due: 2/6/2024

## 2023-09-18 ENCOUNTER — PATIENT MESSAGE (OUTPATIENT)
Dept: PRIMARY CARE CLINIC | Facility: CLINIC | Age: 68
End: 2023-09-18
Payer: MEDICARE

## 2023-09-18 ENCOUNTER — IMMUNIZATION (OUTPATIENT)
Dept: INTERNAL MEDICINE | Facility: CLINIC | Age: 68
End: 2023-09-18
Payer: MEDICARE

## 2023-09-18 PROCEDURE — G0008 FLU VACCINE - QUADRIVALENT - ADJUVANTED: ICD-10-PCS | Mod: S$GLB,,, | Performed by: INTERNAL MEDICINE

## 2023-09-18 PROCEDURE — 90694 FLU VACCINE - QUADRIVALENT - ADJUVANTED: ICD-10-PCS | Mod: S$GLB,,, | Performed by: INTERNAL MEDICINE

## 2023-09-18 PROCEDURE — G0008 ADMIN INFLUENZA VIRUS VAC: HCPCS | Mod: S$GLB,,, | Performed by: INTERNAL MEDICINE

## 2023-09-18 PROCEDURE — 90694 VACC AIIV4 NO PRSRV 0.5ML IM: CPT | Mod: S$GLB,,, | Performed by: INTERNAL MEDICINE

## 2023-09-26 ENCOUNTER — OFFICE VISIT (OUTPATIENT)
Dept: GASTROENTEROLOGY | Facility: CLINIC | Age: 68
End: 2023-09-26
Payer: MEDICARE

## 2023-09-26 VITALS — HEIGHT: 65 IN | WEIGHT: 236 LBS | BODY MASS INDEX: 39.32 KG/M2

## 2023-09-26 DIAGNOSIS — Z51.81 ENCOUNTER FOR MONITORING LONG-TERM PROTON PUMP INHIBITOR THERAPY: ICD-10-CM

## 2023-09-26 DIAGNOSIS — K21.9 GASTROESOPHAGEAL REFLUX DISEASE, UNSPECIFIED WHETHER ESOPHAGITIS PRESENT: ICD-10-CM

## 2023-09-26 DIAGNOSIS — D50.9 IRON DEFICIENCY ANEMIA, UNSPECIFIED IRON DEFICIENCY ANEMIA TYPE: ICD-10-CM

## 2023-09-26 DIAGNOSIS — E73.1 ACQUIRED LACTASE DEFICIENCY: Primary | ICD-10-CM

## 2023-09-26 DIAGNOSIS — R05.8 NOCTURNAL COUGH: ICD-10-CM

## 2023-09-26 DIAGNOSIS — E66.01 SEVERE OBESITY (BMI 35.0-35.9 WITH COMORBIDITY): ICD-10-CM

## 2023-09-26 DIAGNOSIS — K76.0 FATTY LIVER: ICD-10-CM

## 2023-09-26 DIAGNOSIS — Z79.899 ENCOUNTER FOR MONITORING LONG-TERM PROTON PUMP INHIBITOR THERAPY: ICD-10-CM

## 2023-09-26 DIAGNOSIS — Z72.821 POOR SLEEP HYGIENE: ICD-10-CM

## 2023-09-26 DIAGNOSIS — R11.2 NAUSEA AND VOMITING, UNSPECIFIED VOMITING TYPE: ICD-10-CM

## 2023-09-26 DIAGNOSIS — K44.9 HIATAL HERNIA: ICD-10-CM

## 2023-09-26 PROCEDURE — 3061F PR NEG MICROALBUMINURIA RESULT DOCUMENTED/REVIEW: ICD-10-PCS | Mod: CPTII,95,, | Performed by: INTERNAL MEDICINE

## 2023-09-26 PROCEDURE — 4010F ACE/ARB THERAPY RXD/TAKEN: CPT | Mod: CPTII,95,, | Performed by: INTERNAL MEDICINE

## 2023-09-26 PROCEDURE — 3066F NEPHROPATHY DOC TX: CPT | Mod: CPTII,95,, | Performed by: INTERNAL MEDICINE

## 2023-09-26 PROCEDURE — 1160F PR REVIEW ALL MEDS BY PRESCRIBER/CLIN PHARMACIST DOCUMENTED: ICD-10-PCS | Mod: CPTII,95,, | Performed by: INTERNAL MEDICINE

## 2023-09-26 PROCEDURE — 3051F PR MOST RECENT HEMOGLOBIN A1C LEVEL 7.0 - < 8.0%: ICD-10-PCS | Mod: CPTII,95,, | Performed by: INTERNAL MEDICINE

## 2023-09-26 PROCEDURE — 4010F PR ACE/ARB THEARPY RXD/TAKEN: ICD-10-PCS | Mod: CPTII,95,, | Performed by: INTERNAL MEDICINE

## 2023-09-26 PROCEDURE — 1159F PR MEDICATION LIST DOCUMENTED IN MEDICAL RECORD: ICD-10-PCS | Mod: CPTII,95,, | Performed by: INTERNAL MEDICINE

## 2023-09-26 PROCEDURE — 1126F AMNT PAIN NOTED NONE PRSNT: CPT | Mod: CPTII,95,, | Performed by: INTERNAL MEDICINE

## 2023-09-26 PROCEDURE — 99215 PR OFFICE/OUTPT VISIT, EST, LEVL V, 40-54 MIN: ICD-10-PCS | Mod: 95,,, | Performed by: INTERNAL MEDICINE

## 2023-09-26 PROCEDURE — 3008F BODY MASS INDEX DOCD: CPT | Mod: CPTII,95,, | Performed by: INTERNAL MEDICINE

## 2023-09-26 PROCEDURE — 3051F HG A1C>EQUAL 7.0%<8.0%: CPT | Mod: CPTII,95,, | Performed by: INTERNAL MEDICINE

## 2023-09-26 PROCEDURE — 1159F MED LIST DOCD IN RCRD: CPT | Mod: CPTII,95,, | Performed by: INTERNAL MEDICINE

## 2023-09-26 PROCEDURE — 3066F PR DOCUMENTATION OF TREATMENT FOR NEPHROPATHY: ICD-10-PCS | Mod: CPTII,95,, | Performed by: INTERNAL MEDICINE

## 2023-09-26 PROCEDURE — 1126F PR PAIN SEVERITY QUANTIFIED, NO PAIN PRESENT: ICD-10-PCS | Mod: CPTII,95,, | Performed by: INTERNAL MEDICINE

## 2023-09-26 PROCEDURE — 3061F NEG MICROALBUMINURIA REV: CPT | Mod: CPTII,95,, | Performed by: INTERNAL MEDICINE

## 2023-09-26 PROCEDURE — 1160F RVW MEDS BY RX/DR IN RCRD: CPT | Mod: CPTII,95,, | Performed by: INTERNAL MEDICINE

## 2023-09-26 PROCEDURE — 99215 OFFICE O/P EST HI 40 MIN: CPT | Mod: 95,,, | Performed by: INTERNAL MEDICINE

## 2023-09-26 PROCEDURE — 3008F PR BODY MASS INDEX (BMI) DOCUMENTED: ICD-10-PCS | Mod: CPTII,95,, | Performed by: INTERNAL MEDICINE

## 2023-09-26 NOTE — PROGRESS NOTES
The patient location is: At home in [place]  The chief complaint leading to consultation is: Lactase deficiency, GERD, long-term PPI, constipation and diarrhea, iron deficiency anemia, fatty liver    Visit type: audiovisual    Face to Face time with patient: 60 minutes of total time spent on the encounter, which includes face to face time and non-face to face time preparing to see the patient (eg, review of tests), Obtaining and/or reviewing separately obtained history, Documenting clinical information in the electronic or other health record, Independently interpreting results (not separately reported) and communicating results to the patient/family/caregiver, or Care coordination (not separately reported).     Each patient to whom he or she provides medical services by telemedicine is:  (1) informed of the relationship between the physician and patient and the respective role of any other health care provider with respect to management of the patient; and (2) notified that he or she may decline to receive medical services by telemedicine and may withdraw from such care at any time.    Notes:         [facility] Consultation Note    Reason for Consult:  The primary encounter diagnosis was Acquired lactase deficiency. Diagnoses of Fatty liver, Nausea and vomiting, unspecified vomiting type, Gastroesophageal reflux disease, unspecified whether esophagitis present, Hiatal hernia, Encounter for monitoring long-term proton pump inhibitor therapy, Iron deficiency anemia, unspecified iron deficiency anemia type, and Severe obesity (BMI 35.0-35.9 with comorbidity) were also pertinent to this visit.    PCP:   Gladis Melissa       Referring MD:  No referring provider defined for this encounter.    Initial History of Present Illness (HPI):  This is a 68 y.o. female here for evaluation of episodic nausea vomiting longstanding GERD symptoms history of IBS constipation diarrhea for many years patient has  recently moved from [facility] to [place] where her dad is from she would like further evaluation does take an aspirin no weight loss no blood in her stool her last colonoscopy was August 2018 we do not have report but just piece of paper that said she had diverticulosis and internal hemorrhoids.  No fever no chills no shortness of breath she has very little notice when she has to have a bowel movement it can be explosive diarrhea after she is constipated she does not take any laxatives or stool softeners no flushing no wheezing no palpitation she still has a gallbladder in place no family history of esophageal cancer or stomach cancer or pancreatitis or pancreatic cancer no personal history of pancreatitis she does have a history of breast cancer nobody in the family with colon cancer or advanced colon adenomas polyps no FAP no attenuated FAP no MA P no Jiménez syndrome nobody with celiac sprue or inflammatory bowel disease she does not think she has lactose intolerance she is not on a gluten free diet.  Patient underwent EGD colon random biopsies no celiac sprue no H pylori no inflammatory bowel disease but she does have lactase deficiency will refer her to dietary for education on lactose free diet Lactaid pills an adequate calcium and vitamin-D will do a CT enterography for further evaluation of periodic nausea vomiting and iron deficiency anemia we have discussed risks benefits and alternatives to small-bowel video capsule endoscopy and she said she would consider that at a future visit we would like to see that there was no small-bowel obstruction on CT enterography prior will have her follow-up with nurse practitioner to discuss video capsule endoscopy in the future.  It sounds like she may have sleep apnea will refer her to Sleep Medicine for further evaluation she will continue not eating a large fatty meal in lying down within 4 hours before she sleeps she is not doing that right now so that is probably  not part of the issue she will take her PPI Protonix 40 mg once daily best taken 45-60 minutes before 1st protein meal of the day breakfast.     Abdominal pain - no  Reflux - as above  Dysphagia - no   Bowel habits - as above  GI bleeding - none  NSAID usage - aspirin     Interval HPI 05/16/2023:  The patient's last visit with me was on Visit date not found.        ROS:  Constitutional: No fevers, chills, No weight loss  ENT:  As heartburn no dysphagia no odynophagia no hoarseness  CV: No chest pain, no palpitation  Pulm: No cough, No shortness of breath, no wheezing  Ophtho: No acute vision changes, glaucoma  GI: see HPI  Derm: No rash, no itching  Heme: No lymphadenopathy, No easy bruising  MSK:  Fibromyalgia  : No dysuria, No hematuria  Endo: No hot or cold intolerance  Neuro: No syncope, No seizure, no strokes  Psych: No uncontrolled anxiety, No uncontrolled depression      Interval HPI 09/26/2023:  The patient's last visit with me was on 5/16/2023.      Medical History:  has a past medical history of Allergy, Breast cancer (2017), Cataract, Cervical spondylosis (07/29/2022), Diabetes mellitus, type 2, Eczema, Fibromyalgia, Glaucoma, Hypertension, Renal cyst, right (02/06/2020), and Steatosis of liver (02/06/2020).    Surgical History:  has a past surgical history that includes Tonsillectomy; Breast surgery; Breast biopsy; Mastectomy (Left, 2017); Vaginal delivery; Myomectomy; Laparoscopic supracervical hysterectomy (2005); Hysterectomy; Adenoidectomy; Tympanostomy tube placement; Anterior cervical discectomy w/ fusion (N/A, 7/29/2022); Esophagogastroduodenoscopy (N/A, 8/18/2023); and Colonoscopy (N/A, 8/18/2023).    Family History: family history includes Bipolar disorder in her maternal aunt, mother, and son; Blindness in her maternal grandfather; Breast cancer in her maternal aunt; Breast cancer (age of onset: 29) in her cousin; Breast cancer (age of onset: 39) in her cousin; Dementia in her mother;  Glaucoma in her maternal grandfather, maternal uncle, and mother; Lung cancer in her father; Post-traumatic stress disorder in her son..     Social History:  reports that she has never smoked. She has never used smokeless tobacco. She reports that she does not drink alcohol and does not use drugs.    Review of patient's allergies indicates:   Allergen Reactions    Codeine Shortness Of Breath     Pt states she had a reaction as a teenager and was taken to the ER.    Lisinopril      cough       Medication List with Changes/Refills   Current Medications    ACETIC ACID (VOSOL) 2 % OTIC SOLUTION    Place 4 drops into both ears as needed.    AUGMENTED BETAMETHASONE DIPROPIONATE (DIPROLENE-AF) 0.05 % CREAM    Apply topically 2 (two) times to 4 times daily to area of back when itchy or burning.Stop using steroid topical when skin is non itchy.  Do not treat dark or red coloring.    AZELASTINE (ASTELIN) 137 MCG (0.1 %) NASAL SPRAY    1 SPRAY IN EACH NOSTRIL TWICE DAILY  Strength: 137 mcg (0.1 %)    BLOOD SUGAR DIAGNOSTIC (ONETOUCH VERIO TEST STRIPS) STRP    To Check BG 1x daily    BLOOD SUGAR DIAGNOSTIC STRP    Check BG twice daily    BLOOD-GLUCOSE METER KIT    Use as instructed    CICLOPIROX (PENLAC) 8 % SOLN    Apply topically nightly.    CLOBETASOL (TEMOVATE) 0.05 % EXTERNAL SOLUTION    APPLY TOPICALLY TO THE SCALP TWICE DAILY AS NEEDED FOR ITCHING OR IRRITATION    FLUTICASONE PROPIONATE (FLONASE) 50 MCG/ACTUATION NASAL SPRAY    2 sprays to each lateral nostril once a day    GLIPIZIDE 5 MG TR24    Take 1 tablet (5 mg total) by mouth 2 (two) times daily.    HYDROCHLOROTHIAZIDE (HYDRODIURIL) 25 MG TABLET    Take 1 tablet (25 mg total) by mouth once daily.    LANCETS MISC    To check BG 2 times daily    LATANOPROST 0.005 % OPHTHALMIC SOLUTION    Place 1 drop into both eyes every evening.    LOSARTAN (COZAAR) 50 MG TABLET    Take 1 tablet (50 mg total) by mouth once daily.    METFORMIN (GLUCOPHAGE) 1000 MG TABLET    Take 1  "tablet (1,000 mg total) by mouth 2 (two) times daily with meals.    MONTELUKAST (SINGULAIR) 10 MG TABLET    TAKE 1 TABLET(10 MG) BY MOUTH EVERY EVENING    ONDANSETRON (ZOFRAN-ODT) 8 MG TBDL    Take 1 tablet (8 mg total) by mouth every 8 (eight) hours as needed (Nausea).    ONETOUCH DELICA PLUS LANCET 33 GAUGE MISC    2 (two) times daily.    ONETOUCH VERIO REFLECT METER MISC    USE AS DIRECTED TO TEST BLOOD GLUCOSE    PANTOPRAZOLE (PROTONIX) 40 MG TABLET    Take 1 tablet (40 mg total) by mouth once daily.    RIZATRIPTAN (MAXALT-MLT) 10 MG DISINTEGRATING TABLET    Take 1 tablet (10 mg total) by mouth every 2 (two) hours as needed for Migraine. Max 30 mg/day.    UBROGEPANT (UBRELVY) 100 MG TABLET    Take 1 tablet (100 mg total) by mouth every 2 (two) hours as needed for Migraine. Max 200 mg/day.   Discontinued Medications    KETOCONAZOLE (NIZORAL) 2 % CREAM    APPLY EXTERNALLY TO THE AFFECTED AREA EVERY DAY         Objective Findings:    Vital Signs:  Ht 5' 5" (1.651 m)   Wt 107 kg (236 lb)   BMI 39.27 kg/m²   Body mass index is 39.27 kg/m².    Physical Exam:  Telemedicine video visit  General Appearance: Well appearing in no acute distress  Neurologic:  Alert and oriented x4  Psychiatric:  Normal speech mentation and affect    Labs:  Lab Results   Component Value Date    WBC 8.84 08/28/2023    HGB 11.6 (L) 08/28/2023    HCT 38.4 08/28/2023     08/28/2023    CHOL 163 12/12/2022    TRIG 99 12/12/2022    HDL 52 12/12/2022    ALT 11 08/28/2023    AST 11 08/28/2023     12/12/2022    K 4.4 12/12/2022    CL 99 12/12/2022    CREATININE 0.7 12/12/2022    BUN 11 12/12/2022    CO2 30 (H) 12/12/2022    TSH 1.515 07/27/2022    INR 1.0 07/22/2022    HGBA1C 7.2 (H) 06/27/2023    MICROALBUR 9.4 08/12/2018       Medical Decision Making:  Lab work reviewed  EGD colonoscopy images path personally reviewed with patient  Lactose intolerant talk given Lactaid pill talk given referral dietary talk given  Potential for Sleep " apnea talk given mechanism of GERD and coughing at night with sleep apnea hiatal hernia talk given  Given gradual weight loss talk given avoidance alcohol talk given  Referral to Sleep Medicine talk given  Subjective:       Patient ID: Regine Osorio is a 66 y.o. female.     Chief Complaint: Sleep Apnea     HPI      Regine Osorio has a history of mild Obstructive Sleep Apnea diagnosed in 9/2020 (AHI 5.2).   The last PAP titration was NA with AHI NA at NA cm H20.   Device is 3-4 month(s) old.   Current setting 5-11 cm H20.   DME is [facility].        Regine Osorio is using CPAP 93.3% of nights and averages 4 hours and 39 minutes.   Device use greater than 4 hours is 76.7%.   Patient does not have problems with the pressure setting.   Mask interface issues are experienced.   A NP mask interface is used.   The patient does experience side effects from therapy including increased headaches.   The patient experiences the following benefits of therapy:  none   There are not equipment issues.   Mean pressure 6, average peak pressure 9.3 and 90th percentile pressure 7.5.   Estimated AHI 1.1.           Scheduled Meds:  Continuous Infusions:  PRN Meds:.               Importance of PAP compliance discussed.   Care and use of equipment discussed.   Download and relevant sleep studies reviewed with patient     Discussed therapeutic goals for positive airway pressure therapy(CPAP or BiPAP).  Ideal is usage 100% of nights for at least 6 hours per night.  Minimum usage is 70% of night for at least 4 hours per night used     The patient was given open opportunity to ask questions and/or express concerns about treatment plan.   All questions/concerns were discussed.              Review of Systems      Objective:   Physical Exam    Assessment:       1. KENDRA (obstructive sleep apnea)    2. Essential hypertension    3. Fibromyalgia    4. Type 2 diabetes mellitus with hyperglycemia, without long-term current use of insulin       "  Plan:     Patient wants to discontinue CPAP.   Weight loss discuseed.   Will reassess in 1 year.      Patient advised to not sleep in the supine position by use of a "snore ball" or similar method.      The patient location is: home  The chief complaint leading to consultation is: difficulty with CPAP     Visit type: audiovisual     Face to Face time with patient: 6  16 minutes of total time spent on the encounter, which includes face to face time and non-face to face time preparing to see the patient (eg, review of tests), Obtaining and/or reviewing separately obtained history, Documenting clinical information in the electronic or other health record, Independently interpreting results (not separately reported) and communicating results to the patient/family/caregiver, or Care coordination (not separately reported).            Each patient to whom he or she provides medical services by telemedicine is:  (1) informed of the relationship between the physician and patient and the respective role of any other health care provider with respect to management of the patient; and (2) notified that he or she may decline to receive medical services by telemedicine and may withdraw from such care at any time.     Notes:               Electronically signed by Angelina Stallings MD at 3/1/2021 11:09 AM     Subjective:       Patient ID: Regine Osorio is a 66 y.o. female.     Chief Complaint: Sleep Apnea     HPI      Regine Osorio has a history of mild Obstructive Sleep Apnea diagnosed in 9/2020 (AHI 5.2).   The last PAP titration was NA with AHI NA at NA cm H20.   Device is 3-4 month(s) old.   Current setting 5-11 cm H20.   DME is [facility].        Regine Osorio is using CPAP 93.3% of nights and averages 4 hours and 39 minutes.   Device use greater than 4 hours is 76.7%.   Patient does not have problems with the pressure setting.   Mask interface issues are experienced.   A NP mask interface is used.   The patient does " "experience side effects from therapy including increased headaches.   The patient experiences the following benefits of therapy:  none   There are not equipment issues.   Mean pressure 6, average peak pressure 9.3 and 90th percentile pressure 7.5.   Estimated AHI 1.1.           Scheduled Meds:  Continuous Infusions:  PRN Meds:.               Importance of PAP compliance discussed.   Care and use of equipment discussed.   Download and relevant sleep studies reviewed with patient     Discussed therapeutic goals for positive airway pressure therapy(CPAP or BiPAP).  Ideal is usage 100% of nights for at least 6 hours per night.  Minimum usage is 70% of night for at least 4 hours per night used     The patient was given open opportunity to ask questions and/or express concerns about treatment plan.   All questions/concerns were discussed.        Review of Systems      Objective:   Physical Exam    Assessment:       1. KENDRA (obstructive sleep apnea)    2. Essential hypertension    3. Fibromyalgia    4. Type 2 diabetes mellitus with hyperglycemia, without long-term current use of insulin        Plan:     Patient wants to discontinue CPAP.   Weight loss discuseed.   Will reassess in 1 year.      Patient advised to not sleep in the supine position by use of a "snore ball" or similar method.      The patient location is: home  The chief complaint leading to consultation is: difficulty with CPAP     Visit type: audiovisual     Face to Face time with patient: 6  16 minutes of total time spent on the encounter, which includes face to face time and non-face to face time preparing to see the patient (eg, review of tests), Obtaining and/or reviewing separately obtained history, Documenting clinical information in the electronic or other health record, Independently interpreting results (not separately reported) and communicating results to the patient/family/caregiver, or Care coordination (not separately reported).            Each " patient to whom he or she provides medical services by telemedicine is:  (1) informed of the relationship between the physician and patient and the respective role of any other health care provider with respect to management of the patient; and (2) notified that he or she may decline to receive medical services by telemedicine and may withdraw from such care at any time.     Notes:               Electronically signed by Angelina Stallings MD at 3/1/2021 11:09 AM   The Olympus scope PCF-H190DL ([id number]) was                          introduced through the anus and advanced to the                          cecum, identified by appendiceal orifice and                          ileocecal valve. The colonoscopy was performed                          without difficulty. The patient tolerated the                          procedure well. The quality of the bowel                          preparation was fair made good. The ileocecal                          valve, appendiceal orifice, and rectum were                          photographed.   Findings:        Diverticula were found in the recto-sigmoid colon, sigmoid colon,        descending colon, transverse colon and ascending colon.        The perianal and digital rectal examinations were normal.        The retroflexed view of the distal rectum and anal verge was normal        and showed no anal or rectal abnormalities.        The colon (entire examined portion) appeared normal. Biopsies for        histology were taken with a cold forceps from the entire colon for        evaluation of microscopic colitis. Estimated blood loss was minimal.   Impression:            - Diverticulosis in the recto-sigmoid colon, in                          the sigmoid colon, in the descending colon, in the                          transverse colon and in the ascending colon.                          - The entire examined colon is normal. Biopsied.   Recommendation:        - Discharge patient to home.       "                    - Await pathology results.                          - Telephone endoscopist for pathology results in 3                          weeks.                          - Repeat colonoscopy in 5 years for screening                          purposes.                          - Return to GI clinic at the next available                          appointment.                          - The findings and recommendations were discussed                          with the patient.   Attending Participation:        I personally performed the entire procedure.   Eliseo Garcia MD   8/18/2023   Imodium in moderation talk given if needed for pre-emptive treatment of diarrhea if she is being driven around or out about she is been warned not to take too much Imodium maybe a half a pill once daily before she leaves the house PRN to see how her body response to it maybe an option  DISACCHARIDASE ACTIVITY PANEL:   Interpretation     *POSITIVE* In this sample, the activity of lactase was reduced and suggestive of lactase deficiency.     What is lactose intolerance?    Lactose intolerance is a condition that makes it hard for your body to digest milk and foods made with milk (called dairy products). If you have lactose intolerance and you eat dairy products, you can get diarrhea, belly pain, and gas.    Lactose intolerance can affect anyone. But it is most common among , , and black people.  In people who do not have lactose intolerance, the body makes a protein called an "enzyme" that breaks down lactose, the main form of sugar found in milk. In people who do have lactose intolerance, the body either does not make enough of the enzyme, or the enzyme does not work as well as it should. Also, some infections, such as you might get with food poisoning, can damage the enzyme. But if that happens, the problem usually goes away within a few weeks. Luckily, people with lactose intolerance can take an enzyme " supplement to help with their problem.  What are the symptoms of lactose intolerance?    The symptoms happen only after you eat dairy foods. They can include:    Cramps or belly pain (usually around or below the belly button)    Bloating (feeling like your belly is full of air)    Gas    Diarrhea (often it is bulky, foamy, and watery)    Vomiting (this happens mostly in teens)    Is there a test for lactose intolerance?    Yes, there are 2 ways to test for lactose intolerance. One is a breathing test, and one is a blood test. The breathing test is more common.  Your doctor or nurse will tell you how to prepare for your test. You will not be able to eat or drink anything for several hours before the test. Plus, you might have to change your medicines or stop smoking for a while before the test.    Lactose hydrogen breath test - For this test, you drink a liquid that has lactose in it. Then you breathe into a special machine every 30 minutes. The machine measures how much hydrogen you breathe out. People who have lactose intolerance breathe out more hydrogen than normal.  Lactose tolerance test - For this test, you drink a liquid that has lactose in it. The doctor or nurse will take blood samples from you when the test starts, and again 1 and 2 hours later. If your blood has low levels of sugar after you drink the lactose, it means you probably have lactose intolerance.    Should I see a doctor or nurse?    Yes. If you think you might have lactose intolerance, tell your doctor or nurse. He or she can ask you questions to make sure that there are no other problems.    How is lactose intolerance treated?    Treatment differs depending on how severe the problem is. But in general, treatment can include:    Eating less dairy food    Finding non-dairy sources of nutrients (such as calcium and vitamin D) and protein    Taking an enzyme supplement that will help you break down dairy foods    How do I reduce the amount of  "dairy foods I eat?    You can start by cutting down but not stopping foods you know contain dairy. Dairy foods should be consumed with meals. Dairy foods include milk, cream, ice cream, yogurt, cheese, and butter. This table shows how much lactose is in some common dairy foods.    Your doctor or nurse might suggest that you talk to a nutritionist to learn which foods have lactose. The nutritionist can also make sure that you get enough calcium and vitamin D in your diet.  If you are really sensitive to dairy foods or lactose, you will also need to read the labels on everything you eat. Milk or lactose is sometimes added to foods you might not suspect, such as cereal, instant soups, and salad dressings. Check the ingredient list of foods for anything that might suggest lactose. Look for these words:    Milk, "milk byproducts," "dry milk powder," and "dry milk solids"    Lactose  Whey (whey is milk that has gone sour)    Although some medicines are made with lactose, most people who are lactose intolerant can handle the very small amount in medicines.    Which enzyme supplement should I use?    There are many enzyme supplements to choose from, including Lactaid (tablets or liquid), Lactrase, LactAce, Dairy Ease, and Lactrol. You should take the supplement right before you start eating. If you forget, you can take it during the meal, but it might not work as well.    The important thing to know is that each product works a bit differently for each person. Plus, none of them can break down every last bit of lactose, so some people still have symptoms even with an enzyme supplement.    Should I take calcium or vitamin D supplements?    That depends on whether you completely avoid dairy foods. If you do, your doctor or nurse might recommend calcium supplements. He or she might also check your vitamin D levels to decide whether you should take supplements.    Is lactose intolerance basically a food allergy?    No. There " are people who are allergic to milk and dairy foods. But the symptoms of a dairy allergy are often different from those of lactose intolerance. In the case of an allergy, the body reacts to the protein in milk, rather than to the sugar. Plus, allergies involve the body's infection-fighting system, called the immune system. Lactose intolerance does not.       Lactase Emzyme Supplement, LACTAID®  Products: Lactaid Fast Act Chewables, Lactaid Fast Act Caplets, Lactaid Original Strength Caplets     The optimal intake of calcium and vitamin D is uncertain. In postmenopausal   women with osteoporosis, 1200 mg of calcium daily (total diet plus supplement)   and 800 international units of vitamin D daily are advised. Although the optimal   intake (diet plus supplement) has not been clearly established in premenopausal   women or in men with osteoporosis, 1000 mg of calcium (total of diet and   supplement) and 600 international units of vitamin D daily are generally   suggested.   Assessment:  1. Acquired lactase deficiency    2. Fatty liver    3. Nausea and vomiting, unspecified vomiting type    4. Gastroesophageal reflux disease, unspecified whether esophagitis present    5. Hiatal hernia    6. Encounter for monitoring long-term proton pump inhibitor therapy    7. Iron deficiency anemia, unspecified iron deficiency anemia type    8. Severe obesity (BMI 35.0-35.9 with comorbidity)         Recommendations:  To Sleep Medicine for sleep apnea retesting  CT enterography to rule out small-bowel stricture in light of her history of nausea vomiting and iron deficiency anemia with an unrevealing EGD colonoscopy  If CT enterography is unrevealing and no stricture patient would consider small-bowel video capsule endoscopy for further evaluation of iron deficiency anemia will address at next clinic visit  Iron deficiency anemia is being followed by Heme-Onc currently watching no iron replacement  Patient will continue her pantoprazole  40 mg once daily best taken 45-60 minutes before her 1st protein meal of the day breakfast if it turns out that she does not have sleep apnea on retesting or she does have sleep apnea and she is using her CPAP machine and still having nocturnal cough and reflux she is interested in doing 8-12 week trial of pantoprazole 40 mg twice daily best taken 45-60 minutes before breakfast and 45-60 minutes before dinner  Fatty liver with elevated BMI of 39.27 followed by hepatology Clinic gradual weight loss ideally 24 lb weight loss over the next 12-18 months gradually should help out fatty liver significantly patient does not drink alcohol which is very helpful  Patient screening colonoscopy is up-to-date  Return to GI clinic 2-3 months for follow-up in person if she wants to sign a consent for video capsule endoscopy  Follow up in about 3 months (around 12/26/2023).      Order summary:  Orders Placed This Encounter    CT Enterography Abd_Pelvis With Contrast    Creatinine, serum         Thank you so much for allowing me to participate in the care of Regine Mcclain ingris    Eliseo Garcia MD    DISCLAIMER: This note was prepared with [employer] voice recognition transcription software. Garbled syntax, mangled or inadvertent pronouns, and other bizarre constructions may be attributed to that software system. While efforts were made to correct any mistakes made by this voice recognition program, some errors and/or omissions may remain in the note that were missed when the note was originally created.

## 2023-09-26 NOTE — Clinical Note
Lori please schedule Regine for CT enterography across the street at the imaging center next available  Please schedule her in person clinic appointment with Mary martinez to discuss small-bowel video capsule endoscopy in person and signed consent for iron deficiency anemia  Return to GI clinic appointment in person with me 3 months telemedicine video visit after  Thank you

## 2023-09-26 NOTE — Clinical Note
Lori please refer Checo to our dietitian I am unable to put in a referral referral is for a lactase deficiency lactose intolerant education on diet an adequate calcium vitamin-D thank you

## 2023-10-02 ENCOUNTER — PATIENT OUTREACH (OUTPATIENT)
Dept: PRIMARY CARE CLINIC | Facility: CLINIC | Age: 68
End: 2023-10-02
Payer: MEDICARE

## 2023-10-02 DIAGNOSIS — E11.9 TYPE 2 DIABETES MELLITUS WITHOUT COMPLICATION, WITHOUT LONG-TERM CURRENT USE OF INSULIN: Primary | ICD-10-CM

## 2023-10-03 ENCOUNTER — TELEPHONE (OUTPATIENT)
Dept: HEMATOLOGY/ONCOLOGY | Facility: CLINIC | Age: 68
End: 2023-10-03
Payer: MEDICARE

## 2023-10-05 ENCOUNTER — TELEPHONE (OUTPATIENT)
Dept: NEUROLOGY | Facility: CLINIC | Age: 68
End: 2023-10-05
Payer: MEDICARE

## 2023-10-05 DIAGNOSIS — E73.9 LACTOSE INTOLERANCE DUE TO ACQUIRED LACTASE DEFICIENCY: Primary | ICD-10-CM

## 2023-10-05 DIAGNOSIS — E11.40 TYPE 2 DIABETES MELLITUS WITH DIABETIC NEUROPATHY, WITHOUT LONG-TERM CURRENT USE OF INSULIN: ICD-10-CM

## 2023-10-05 NOTE — TELEPHONE ENCOUNTER
Pt was returning my missed call from earlier. Pt will be scheduled for November 17 for a virtual visit.

## 2023-10-05 NOTE — TELEPHONE ENCOUNTER
Called Pt but was unable to speak with her. LVM      ----- Message from Jelena Rivera MA sent at 10/4/2023  4:04 PM CDT -----  Regarding: FW: Appt Access  Contact: [phone]    ----- Message -----  From: Ethel Brennan  Sent: 10/4/2023  11:56 AM CDT  To: Ximena Mayers Staff  Subject: Appt Access                                      SCHEDULING/REQUEST    Appt Type: EP (Virtual)     Date/Time Preference: Mid 11/2023 (Morning)    Treating Provider: Ximena    Caller Name: KRISTINE PEÑALOZA [[id number]]    Contact Preference: [phone]    Comments/Notes: Pt is calling to sched virtual f/u appt for mid November.  Pt is requesting a call before scheduling.

## 2023-10-12 ENCOUNTER — OFFICE VISIT (OUTPATIENT)
Dept: PAIN MEDICINE | Facility: OTHER | Age: 68
End: 2023-10-12
Payer: MEDICARE

## 2023-10-12 DIAGNOSIS — M79.7 FIBROMYALGIA: ICD-10-CM

## 2023-10-12 DIAGNOSIS — G89.4 CHRONIC PAIN DISORDER: Primary | ICD-10-CM

## 2023-10-12 DIAGNOSIS — Z74.09 IMPAIRED MOBILITY AND ENDURANCE: ICD-10-CM

## 2023-10-12 DIAGNOSIS — M75.00 ADHESIVE CAPSULITIS OF SHOULDER, UNSPECIFIED LATERALITY: ICD-10-CM

## 2023-10-12 DIAGNOSIS — G95.89 MYELOMALACIA OF CERVICAL CORD: ICD-10-CM

## 2023-10-12 DIAGNOSIS — G24.3 CERVICAL DYSTONIA: ICD-10-CM

## 2023-10-12 DIAGNOSIS — R52 PAIN AGGRAVATED BY ACTIVITIES OF DAILY LIVING: ICD-10-CM

## 2023-10-12 PROCEDURE — 3066F NEPHROPATHY DOC TX: CPT | Mod: CPTII,,, | Performed by: NURSE PRACTITIONER

## 2023-10-12 PROCEDURE — 1159F PR MEDICATION LIST DOCUMENTED IN MEDICAL RECORD: ICD-10-PCS | Mod: CPTII,,, | Performed by: NURSE PRACTITIONER

## 2023-10-12 PROCEDURE — 1159F MED LIST DOCD IN RCRD: CPT | Mod: CPTII,,, | Performed by: NURSE PRACTITIONER

## 2023-10-12 PROCEDURE — 3061F NEG MICROALBUMINURIA REV: CPT | Mod: CPTII,,, | Performed by: NURSE PRACTITIONER

## 2023-10-12 PROCEDURE — 3051F HG A1C>EQUAL 7.0%<8.0%: CPT | Mod: CPTII,,, | Performed by: NURSE PRACTITIONER

## 2023-10-12 PROCEDURE — 1160F PR REVIEW ALL MEDS BY PRESCRIBER/CLIN PHARMACIST DOCUMENTED: ICD-10-PCS | Mod: CPTII,,, | Performed by: NURSE PRACTITIONER

## 2023-10-12 PROCEDURE — 3061F PR NEG MICROALBUMINURIA RESULT DOCUMENTED/REVIEW: ICD-10-PCS | Mod: CPTII,,, | Performed by: NURSE PRACTITIONER

## 2023-10-12 PROCEDURE — 1160F RVW MEDS BY RX/DR IN RCRD: CPT | Mod: CPTII,,, | Performed by: NURSE PRACTITIONER

## 2023-10-12 PROCEDURE — 4010F ACE/ARB THERAPY RXD/TAKEN: CPT | Mod: CPTII,,, | Performed by: NURSE PRACTITIONER

## 2023-10-12 PROCEDURE — 3051F PR MOST RECENT HEMOGLOBIN A1C LEVEL 7.0 - < 8.0%: ICD-10-PCS | Mod: CPTII,,, | Performed by: NURSE PRACTITIONER

## 2023-10-12 PROCEDURE — 4010F PR ACE/ARB THEARPY RXD/TAKEN: ICD-10-PCS | Mod: CPTII,,, | Performed by: NURSE PRACTITIONER

## 2023-10-12 PROCEDURE — 99214 OFFICE O/P EST MOD 30 MIN: CPT | Mod: ,,, | Performed by: NURSE PRACTITIONER

## 2023-10-12 PROCEDURE — 99214 PR OFFICE/OUTPT VISIT, EST, LEVL IV, 30-39 MIN: ICD-10-PCS | Mod: ,,, | Performed by: NURSE PRACTITIONER

## 2023-10-12 PROCEDURE — 3066F PR DOCUMENTATION OF TREATMENT FOR NEPHROPATHY: ICD-10-PCS | Mod: CPTII,,, | Performed by: NURSE PRACTITIONER
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
## 2023-10-16 NOTE — PROGRESS NOTES
"DATE: 6/14/2022  PATIENT: Regine Osorio    Attending Physician: Rosales Scruggs M.D.    HISTORY:  Regine Osorio is a 67 y.o. female who returns to me today for CT review. She continues to have neck pain that radiates to R shoulder, but it has been getting better with functional restoration program. She has R hand weakness. She is here for preop, and she needs to arrange her social situation in order to figure out a date.    PMH/PSH/FamHx/SocHx:  Unchanged from prior visit    ROS:  Positive for neck and R shoulder pain  Endorses myelopathic symptoms and trouble with balance/walking. Denied perineal paresthesias, bowel or bladder incontinence    EXAM:  Ht 5' 5" (1.651 m)   Wt 103 kg (227 lb)   BMI 37.77 kg/m²     My physical examination was notable for the following findings: Normal strength and tone in all major motor groups in the bilateral upper and lower extremities except R hand  strength and 4/5 in R elbow extension and 4/5wrist flexion/extension. Normal sensation to light touch in the C5-T1 and L2-S1 dermatomes bilaterally. + b/l Peoples's (R>L)    IMAGING:  Today I independently reviewed the following images and my interpretations are as follows:    Previous AP and Lat upright C-spine films showed cervical spondylosis and DDD without fractures or listhesis.    MRI cervical showed C5-6 severe central and b/l foraminal stenosis. C6-7 moderate central stenosis. Myelomalacia C4-7.    CT cervical showed no OPLL. Spondylosis worst at C5-7.    ASSESSMENT/PLAN:  Patient has cervical stenosis with myelopathy and radiculopathy. I discussed the natural history of their diagnoses as well as surgical and nonsurgical treatment options. I educated the patient on the importance of core/back strengthening, correct posture, bending/lifting ergonomics, and low-impact aerobic exercises (walking, elliptical, and aquatherapy). We will plan C5-7 ACDF. She will call us back with a date.    I had a sit down discussion with " Antihistamine like claritin or allegra  Flonase nasal spray.   RN communicated with PCP- would still like to recheck vitamin D level in 6 weeks.    RN discusses with the patient the below response from Dr. Feliciano and she will increase her vitamin D to 5000 IU daily. Patient is going to check with her insurance and [facility] billing to make sure the vitamin D level recheck will be covered. She will call the clinic back with an update. No further questions.    Encounter closed.   the patient regarding cervical myelopathy. I discussed the known stepwise degeneration of cervical myelopathy. I discussed the risks and benefits of decompressive surgery, including the concept that surgery would be done to prevent further neurological injury/degeneration rather than to ameliorate any existing deficits.     I had a sit down discussion with the patient regarding C5-7 ACDF. We specifically discussed the risks, benefits, and alternatives to surgery. We discussed the surgical procedure including the skin incision, nerve decompression, bone fusion, allograft and/or iliac crest bone graft, and surgical implants including plates and interbody spacers as indicated: they understand the risks include but are not limited to death, paralysis, blindness, bleeding, infection, damage to arteries, veins and nerves, tracheal injury, esophageal injury, vertebral artery injury, dysphagia, spinal fluid leak, continued or worsening pain, no improvement in symptoms, non-union, and the possible need for more surgery in the future, as well as the possibility other unforseen and unknown complications. We talked about expected hospital stay and recovery period. All questions were answered; they understand and wish to proceed.     Rosales Scruggs MD  Orthopaedic Spine Surgeon  Department of Orthopaedic Surgery  [phone]

## 2023-10-20 ENCOUNTER — LAB VISIT (OUTPATIENT)
Dept: PRIMARY CARE CLINIC | Facility: CLINIC | Age: 68
End: 2023-10-20
Payer: MEDICARE

## 2023-10-20 DIAGNOSIS — E11.9 TYPE 2 DIABETES MELLITUS WITHOUT COMPLICATION, WITHOUT LONG-TERM CURRENT USE OF INSULIN: ICD-10-CM

## 2023-10-20 LAB
ESTIMATED AVG GLUCOSE: 143 MG/DL (ref 68–131)
HBA1C MFR BLD: 6.6 % (ref 4–5.6)

## 2023-10-20 PROCEDURE — 83036 HEMOGLOBIN GLYCOSYLATED A1C: CPT | Performed by: STUDENT IN AN ORGANIZED HEALTH CARE EDUCATION/TRAINING PROGRAM

## 2023-10-23 ENCOUNTER — PATIENT MESSAGE (OUTPATIENT)
Dept: NEUROLOGY | Facility: CLINIC | Age: 68
End: 2023-10-23
Payer: MEDICARE

## 2023-10-24 ENCOUNTER — OFFICE VISIT (OUTPATIENT)
Dept: PRIMARY CARE CLINIC | Facility: CLINIC | Age: 68
End: 2023-10-24
Payer: MEDICARE

## 2023-10-24 ENCOUNTER — PATIENT MESSAGE (OUTPATIENT)
Dept: OPHTHALMOLOGY | Facility: CLINIC | Age: 68
End: 2023-10-24
Payer: MEDICARE

## 2023-10-24 VITALS
HEART RATE: 105 BPM | BODY MASS INDEX: 38.69 KG/M2 | WEIGHT: 232.5 LBS | DIASTOLIC BLOOD PRESSURE: 76 MMHG | SYSTOLIC BLOOD PRESSURE: 138 MMHG

## 2023-10-24 DIAGNOSIS — E11.59 HYPERTENSION ASSOCIATED WITH TYPE 2 DIABETES MELLITUS: ICD-10-CM

## 2023-10-24 DIAGNOSIS — E11.9 TYPE 2 DIABETES MELLITUS WITHOUT COMPLICATION, WITHOUT LONG-TERM CURRENT USE OF INSULIN: ICD-10-CM

## 2023-10-24 DIAGNOSIS — J98.4 APICAL LUNG SCARRING: ICD-10-CM

## 2023-10-24 DIAGNOSIS — I15.2 HYPERTENSION ASSOCIATED WITH TYPE 2 DIABETES MELLITUS: ICD-10-CM

## 2023-10-24 DIAGNOSIS — R05.3 CHRONIC COUGH: Primary | ICD-10-CM

## 2023-10-24 DIAGNOSIS — H40.1111 PRIMARY OPEN-ANGLE GLAUCOMA, RIGHT EYE, MILD STAGE: ICD-10-CM

## 2023-10-24 DIAGNOSIS — H40.022 OPEN ANGLE WITH BORDERLINE FINDINGS AND HIGH GLAUCOMA RISK IN LEFT EYE: ICD-10-CM

## 2023-10-24 PROCEDURE — 3008F PR BODY MASS INDEX (BMI) DOCUMENTED: ICD-10-PCS | Mod: CPTII,S$GLB,, | Performed by: STUDENT IN AN ORGANIZED HEALTH CARE EDUCATION/TRAINING PROGRAM

## 2023-10-24 PROCEDURE — 3288F PR FALLS RISK ASSESSMENT DOCUMENTED: ICD-10-PCS | Mod: CPTII,S$GLB,, | Performed by: STUDENT IN AN ORGANIZED HEALTH CARE EDUCATION/TRAINING PROGRAM

## 2023-10-24 PROCEDURE — 3008F BODY MASS INDEX DOCD: CPT | Mod: CPTII,S$GLB,, | Performed by: STUDENT IN AN ORGANIZED HEALTH CARE EDUCATION/TRAINING PROGRAM

## 2023-10-24 PROCEDURE — 3078F DIAST BP <80 MM HG: CPT | Mod: CPTII,S$GLB,, | Performed by: STUDENT IN AN ORGANIZED HEALTH CARE EDUCATION/TRAINING PROGRAM

## 2023-10-24 PROCEDURE — 3061F PR NEG MICROALBUMINURIA RESULT DOCUMENTED/REVIEW: ICD-10-PCS | Mod: CPTII,S$GLB,, | Performed by: STUDENT IN AN ORGANIZED HEALTH CARE EDUCATION/TRAINING PROGRAM

## 2023-10-24 PROCEDURE — 3066F PR DOCUMENTATION OF TREATMENT FOR NEPHROPATHY: ICD-10-PCS | Mod: CPTII,S$GLB,, | Performed by: STUDENT IN AN ORGANIZED HEALTH CARE EDUCATION/TRAINING PROGRAM

## 2023-10-24 PROCEDURE — 99999 PR PBB SHADOW E&M-EST. PATIENT-LVL V: ICD-10-PCS | Mod: PBBFAC,,, | Performed by: STUDENT IN AN ORGANIZED HEALTH CARE EDUCATION/TRAINING PROGRAM

## 2023-10-24 PROCEDURE — 3078F PR MOST RECENT DIASTOLIC BLOOD PRESSURE < 80 MM HG: ICD-10-PCS | Mod: CPTII,S$GLB,, | Performed by: STUDENT IN AN ORGANIZED HEALTH CARE EDUCATION/TRAINING PROGRAM

## 2023-10-24 PROCEDURE — 3061F NEG MICROALBUMINURIA REV: CPT | Mod: CPTII,S$GLB,, | Performed by: STUDENT IN AN ORGANIZED HEALTH CARE EDUCATION/TRAINING PROGRAM

## 2023-10-24 PROCEDURE — 4010F PR ACE/ARB THEARPY RXD/TAKEN: ICD-10-PCS | Mod: CPTII,S$GLB,, | Performed by: STUDENT IN AN ORGANIZED HEALTH CARE EDUCATION/TRAINING PROGRAM

## 2023-10-24 PROCEDURE — 99214 PR OFFICE/OUTPT VISIT, EST, LEVL IV, 30-39 MIN: ICD-10-PCS | Mod: S$GLB,,, | Performed by: STUDENT IN AN ORGANIZED HEALTH CARE EDUCATION/TRAINING PROGRAM

## 2023-10-24 PROCEDURE — 3066F NEPHROPATHY DOC TX: CPT | Mod: CPTII,S$GLB,, | Performed by: STUDENT IN AN ORGANIZED HEALTH CARE EDUCATION/TRAINING PROGRAM

## 2023-10-24 PROCEDURE — 4010F ACE/ARB THERAPY RXD/TAKEN: CPT | Mod: CPTII,S$GLB,, | Performed by: STUDENT IN AN ORGANIZED HEALTH CARE EDUCATION/TRAINING PROGRAM

## 2023-10-24 PROCEDURE — 99999 PR PBB SHADOW E&M-EST. PATIENT-LVL V: CPT | Mod: PBBFAC,,, | Performed by: STUDENT IN AN ORGANIZED HEALTH CARE EDUCATION/TRAINING PROGRAM

## 2023-10-24 PROCEDURE — 3044F HG A1C LEVEL LT 7.0%: CPT | Mod: CPTII,S$GLB,, | Performed by: STUDENT IN AN ORGANIZED HEALTH CARE EDUCATION/TRAINING PROGRAM

## 2023-10-24 PROCEDURE — 1159F PR MEDICATION LIST DOCUMENTED IN MEDICAL RECORD: ICD-10-PCS | Mod: CPTII,S$GLB,, | Performed by: STUDENT IN AN ORGANIZED HEALTH CARE EDUCATION/TRAINING PROGRAM

## 2023-10-24 PROCEDURE — 3044F PR MOST RECENT HEMOGLOBIN A1C LEVEL <7.0%: ICD-10-PCS | Mod: CPTII,S$GLB,, | Performed by: STUDENT IN AN ORGANIZED HEALTH CARE EDUCATION/TRAINING PROGRAM

## 2023-10-24 PROCEDURE — 99499 UNLISTED E&M SERVICE: CPT | Mod: S$GLB,,, | Performed by: STUDENT IN AN ORGANIZED HEALTH CARE EDUCATION/TRAINING PROGRAM

## 2023-10-24 PROCEDURE — 1101F PR PT FALLS ASSESS DOC 0-1 FALLS W/OUT INJ PAST YR: ICD-10-PCS | Mod: CPTII,S$GLB,, | Performed by: STUDENT IN AN ORGANIZED HEALTH CARE EDUCATION/TRAINING PROGRAM

## 2023-10-24 PROCEDURE — 1159F MED LIST DOCD IN RCRD: CPT | Mod: CPTII,S$GLB,, | Performed by: STUDENT IN AN ORGANIZED HEALTH CARE EDUCATION/TRAINING PROGRAM

## 2023-10-24 PROCEDURE — 1101F PT FALLS ASSESS-DOCD LE1/YR: CPT | Mod: CPTII,S$GLB,, | Performed by: STUDENT IN AN ORGANIZED HEALTH CARE EDUCATION/TRAINING PROGRAM

## 2023-10-24 PROCEDURE — 3288F FALL RISK ASSESSMENT DOCD: CPT | Mod: CPTII,S$GLB,, | Performed by: STUDENT IN AN ORGANIZED HEALTH CARE EDUCATION/TRAINING PROGRAM

## 2023-10-24 PROCEDURE — 3075F PR MOST RECENT SYSTOLIC BLOOD PRESS GE 130-139MM HG: ICD-10-PCS | Mod: CPTII,S$GLB,, | Performed by: STUDENT IN AN ORGANIZED HEALTH CARE EDUCATION/TRAINING PROGRAM

## 2023-10-24 PROCEDURE — 99214 OFFICE O/P EST MOD 30 MIN: CPT | Mod: S$GLB,,, | Performed by: STUDENT IN AN ORGANIZED HEALTH CARE EDUCATION/TRAINING PROGRAM

## 2023-10-24 PROCEDURE — 3075F SYST BP GE 130 - 139MM HG: CPT | Mod: CPTII,S$GLB,, | Performed by: STUDENT IN AN ORGANIZED HEALTH CARE EDUCATION/TRAINING PROGRAM

## 2023-10-24 RX ORDER — ALBUTEROL SULFATE 90 UG/1
AEROSOL, METERED RESPIRATORY (INHALATION)
COMMUNITY
Start: 2023-10-13 | End: 2023-11-20 | Stop reason: SDUPTHER

## 2023-10-24 RX ORDER — COVID-19 VACCINE, MRNA 0.05 MG/.48ML
INJECTION, SUSPENSION INTRAMUSCULAR
COMMUNITY
Start: 2023-09-30 | End: 2024-03-25 | Stop reason: ALTCHOICE

## 2023-10-24 RX ORDER — PROMETHAZINE HYDROCHLORIDE AND DEXTROMETHORPHAN HYDROBROMIDE 6.25; 15 MG/5ML; MG/5ML
SYRUP ORAL
COMMUNITY
Start: 2023-10-13 | End: 2023-11-20 | Stop reason: SDUPTHER

## 2023-10-24 RX ORDER — LATANOPROST 50 UG/ML
1 SOLUTION/ DROPS OPHTHALMIC NIGHTLY
Qty: 7.5 ML | Refills: 3 | Status: SHIPPED | OUTPATIENT
Start: 2023-10-24 | End: 2024-10-23

## 2023-10-24 NOTE — PROGRESS NOTES
10/24/2023    Regine Osorio  [id number]    CC: follow up    HPI    This pt is well known to me and presents for follow up of chronic cough. Pmh sig for TYPE II DIABETES, obesity, chronic and HTN    Since last visit has been seen in the ED a couple a few times. Most recently one week ago for left sided chest pain from coughing so much. While there she had a cardiac work up and breathing treatment. Patient notes that the breathing treatment did help and the ED doctor said she had chronic bronchitis.     Chronic allergies: is using azelastine instead of flonase and an ear drops for itchy ears. Hx of following with an allergist, but they retired. She was then switched to ENT.  Of note, patient states she had allergy testing done which showed allergy to grass    N/V/ WYATT: follows with gastroenterology and will be having CT enterography to look for small bowel stricture.    Has not had an episode of n/v for a little while, but when she does zofran helps     Type II diabetes: well controlled last a1c 6.6  BG readings fastin-107; one time was 69  Metformin 1000 mg BID and glipizde 5 mg BID             Negative 10 point ROS outside of HPI    Social History     Socioeconomic History    Marital status:     Number of children: 1   Tobacco Use    Smoking status: Never    Smokeless tobacco: Never   Substance and Sexual Activity    Alcohol use: No    Drug use: No    Sexual activity: Not Currently   Social History Narrative        1 son (estranged)    3 grandchildren ([place])    Born and raised in [place] (moved to [facility] )     Social Determinants of Health     Financial Resource Strain: Low Risk  (2023)    Overall Financial Resource Strain (CARDIA)     Difficulty of Paying Living Expenses: Not very hard   Food Insecurity: No Food Insecurity (2023)    Hunger Vital Sign     Worried About Running Out of Food in the Last Year: Never true     Ran Out of Food in the Last Year: Never true    Transportation Needs: No Transportation Needs (2023)    PRAPARE - Transportation     Lack of Transportation (Medical): No     Lack of Transportation (Non-Medical): No   Physical Activity: Insufficiently Active (2023)    Exercise Vital Sign     Days of Exercise per Week: 1 day     Minutes of Exercise per Session: 20 min   Stress: No Stress Concern Present (2023)    [employer] - Occupational Stress Questionnaire     Feeling of Stress : Only a little   Recent Concern: Stress - Stress Concern Present (2023)    [employer] - Occupational Stress Questionnaire     Feeling of Stress : To some extent   Social Connections: Unknown (2023)    Social Connection and Isolation Panel [NHANES]     Frequency of Communication with Friends and Family: More than three times a week     Frequency of Social Gatherings with Friends and Family: Three times a week     Active Member of Clubs or Organizations: Yes     Attends Club or Organization Meetings: More than 4 times per year     Marital Status:    Housing Stability: Low Risk  (2023)    Housing Stability Vital Sign     Unable to Pay for Housing in the Last Year: No     Number of Places Lived in the Last Year: 1     Unstable Housing in the Last Year: No           Current Outpatient Medications:     acetic acid (VOSOL) 2 % otic solution, Place 4 drops into both ears as needed., Disp: , Rfl:     albuterol (PROVENTIL/VENTOLIN HFA) 90 mcg/actuation inhaler, SMARTSI-2 Puff(s) Via Inhaler Every 6 Hours PRN, Disp: , Rfl:     augmented betamethasone dipropionate (DIPROLENE-AF) 0.05 % cream, Apply topically 2 (two) times to 4 times daily to area of back when itchy or burning.Stop using steroid topical when skin is non itchy.  Do not treat dark or red coloring., Disp: 50 g, Rfl: 0    azelastine (ASTELIN) 137 mcg (0.1 %) nasal spray, 1 SPRAY IN EACH NOSTRIL TWICE DAILY Strength: 137 mcg (0.1 %),  Disp: 30 mL, Rfl: 2    blood sugar diagnostic (ONETOUCH VERIO TEST STRIPS) Strp, To Check BG 1x daily, Disp: 100 each, Rfl: 3    blood sugar diagnostic Strp, Check BG twice daily, Disp: 200 each, Rfl: 2    blood-glucose meter kit, Use as instructed, Disp: 1 each, Rfl: 0    ciclopirox (PENLAC) 8 % Soln, Apply topically nightly., Disp: 6.6 mL, Rfl: 3    clobetasoL (TEMOVATE) 0.05 % external solution, APPLY TOPICALLY TO THE SCALP TWICE DAILY AS NEEDED FOR ITCHING OR IRRITATION, Disp: 50 mL, Rfl: 0    COMIRNATY 2023-24, 12Y UP,,PF, 30 mcg/0.3 mL injection, , Disp: , Rfl:     fluticasone propionate (FLONASE) 50 mcg/actuation nasal spray, 2 sprays to each lateral nostril once a day, Disp: 48 g, Rfl: 3    glipiZIDE 5 MG TR24, Take 1 tablet (5 mg total) by mouth 2 (two) times daily., Disp: 180 tablet, Rfl: 3    hydroCHLOROthiazide (HYDRODIURIL) 25 MG tablet, Take 1 tablet (25 mg total) by mouth once daily., Disp: 90 tablet, Rfl: 3    lancets Misc, To check BG 2 times daily, Disp: 200 each, Rfl: 3    latanoprost 0.005 % ophthalmic solution, Place 1 drop into both eyes every evening., Disp: 7.5 mL, Rfl: 3    losartan (COZAAR) 50 MG tablet, Take 1 tablet (50 mg total) by mouth once daily., Disp: 90 tablet, Rfl: 3    metFORMIN (GLUCOPHAGE) 1000 MG tablet, Take 1 tablet (1,000 mg total) by mouth 2 (two) times daily with meals., Disp: 180 tablet, Rfl: 3    montelukast (SINGULAIR) 10 mg tablet, TAKE 1 TABLET(10 MG) BY MOUTH EVERY EVENING, Disp: 90 tablet, Rfl: 3    ondansetron (ZOFRAN-ODT) 8 MG TbDL, Take 1 tablet (8 mg total) by mouth every 8 (eight) hours as needed (Nausea)., Disp: 30 tablet, Rfl: 2    ONETOUCH DELICA PLUS LANCET 33 gauge Misc, 2 (two) times daily., Disp: , Rfl:     ONETOUCH VERIO REFLECT METER [facility], USE AS DIRECTED TO TEST BLOOD GLUCOSE, Disp: , Rfl:     pantoprazole (PROTONIX) 40 MG tablet, Take 1 tablet (40 mg total) by mouth once daily., Disp: 90 tablet, Rfl: 1    promethazine-dextromethorphan  (PROMETHAZINE-DM) 6.25-15 mg/5 mL Syrp, SMARTSI.5 Milliliter(s) By Mouth 4 Times Daily PRN, Disp: , Rfl:     rizatriptan (MAXALT-MLT) 10 MG disintegrating tablet, Take 1 tablet (10 mg total) by mouth every 2 (two) hours as needed for Migraine. Max 30 mg/day., Disp: 12 tablet, Rfl: 5    ubrogepant (UBRELVY) 100 mg tablet, Take 1 tablet (100 mg total) by mouth every 2 (two) hours as needed for Migraine. Max 200 mg/day., Disp: 10 tablet, Rfl: 2      Physical Exam  Vitals:    10/24/23 1656   BP: (!) 144/76   Pulse: 110       Gen: well appearing, NAD  Resp: non labored breathing, no crackles, no wheezes, CTAB  CV: RRR no murmur, gallops, rubs, no LE edema  Abd: soft nontender BS present no organomegaly    1. Chronic cough  PFT 2023 unrevealing  - Ambulatory referral/consult to Pulmonology; Future  Continue regimen prescribed by ENT    2. Apical lung scarring  CT chest 2023:  Biapical fibronodular scarring  - Ambulatory referral/consult to Pulmonology; Future    3. Type 2 diabetes mellitus without complication, without long-term current use of insulin  Last A1c 6.6 from 7.2  -DISCONTINUE glipizide  -start semaglutide (OZEMPIC) 0.25 mg or 0.5 mg (2 mg/3 mL) pen injector; Inject 0.25 mg into the skin every 7 days.  Dispense: 3 mL; Refill: 0  -continue metformin with the possibility of discontinuing    4. Hypertension  Fair control continue current regimen      RTC in 3 months for diabetes follow-up.    Gladis Melissa MD  Family Medicine

## 2023-10-25 ENCOUNTER — PATIENT MESSAGE (OUTPATIENT)
Dept: PODIATRY | Facility: CLINIC | Age: 68
End: 2023-10-25
Payer: MEDICARE

## 2023-10-31 ENCOUNTER — PATIENT OUTREACH (OUTPATIENT)
Dept: PRIMARY CARE CLINIC | Facility: CLINIC | Age: 68
End: 2023-10-31
Payer: MEDICARE

## 2023-11-01 ENCOUNTER — OFFICE VISIT (OUTPATIENT)
Dept: PODIATRY | Facility: CLINIC | Age: 68
End: 2023-11-01
Payer: MEDICARE

## 2023-11-01 ENCOUNTER — HOSPITAL ENCOUNTER (OUTPATIENT)
Dept: RADIOLOGY | Facility: HOSPITAL | Age: 68
Discharge: HOME | End: 2023-11-01
Attending: PODIATRIST
Payer: MEDICARE

## 2023-11-01 VITALS
HEIGHT: 65 IN | WEIGHT: 232.38 LBS | BODY MASS INDEX: 38.72 KG/M2 | DIASTOLIC BLOOD PRESSURE: 76 MMHG | SYSTOLIC BLOOD PRESSURE: 138 MMHG | HEART RATE: 101 BPM

## 2023-11-01 DIAGNOSIS — E11.9 TYPE 2 DIABETES MELLITUS WITHOUT COMPLICATION, WITHOUT LONG-TERM CURRENT USE OF INSULIN: ICD-10-CM

## 2023-11-01 DIAGNOSIS — L60.0 INGROWN TOENAIL WITHOUT INFECTION: ICD-10-CM

## 2023-11-01 DIAGNOSIS — M79.674 PAIN OF TOE OF RIGHT FOOT: ICD-10-CM

## 2023-11-01 DIAGNOSIS — M79.674 PAIN OF TOE OF RIGHT FOOT: Primary | ICD-10-CM

## 2023-11-01 DIAGNOSIS — B35.1 ONYCHOMYCOSIS DUE TO DERMATOPHYTE: ICD-10-CM

## 2023-11-01 PROCEDURE — 3078F DIAST BP <80 MM HG: CPT | Mod: CPTII,S$GLB,, | Performed by: PODIATRIST

## 2023-11-01 PROCEDURE — 3075F SYST BP GE 130 - 139MM HG: CPT | Mod: CPTII,S$GLB,, | Performed by: PODIATRIST

## 2023-11-01 PROCEDURE — 73630 X-RAY EXAM OF FOOT: CPT | Mod: 26,RT,, | Performed by: RADIOLOGY

## 2023-11-01 PROCEDURE — 3061F NEG MICROALBUMINURIA REV: CPT | Mod: CPTII,S$GLB,, | Performed by: PODIATRIST

## 2023-11-01 PROCEDURE — 4010F ACE/ARB THERAPY RXD/TAKEN: CPT | Mod: CPTII,S$GLB,, | Performed by: PODIATRIST

## 2023-11-01 PROCEDURE — 3044F HG A1C LEVEL LT 7.0%: CPT | Mod: CPTII,S$GLB,, | Performed by: PODIATRIST

## 2023-11-01 PROCEDURE — 99214 OFFICE O/P EST MOD 30 MIN: CPT | Mod: S$GLB,,, | Performed by: PODIATRIST

## 2023-11-01 PROCEDURE — 4010F PR ACE/ARB THEARPY RXD/TAKEN: ICD-10-PCS | Mod: CPTII,S$GLB,, | Performed by: PODIATRIST

## 2023-11-01 PROCEDURE — 3066F NEPHROPATHY DOC TX: CPT | Mod: CPTII,S$GLB,, | Performed by: PODIATRIST

## 2023-11-01 PROCEDURE — 3008F BODY MASS INDEX DOCD: CPT | Mod: CPTII,S$GLB,, | Performed by: PODIATRIST

## 2023-11-01 PROCEDURE — 3066F PR DOCUMENTATION OF TREATMENT FOR NEPHROPATHY: ICD-10-PCS | Mod: CPTII,S$GLB,, | Performed by: PODIATRIST

## 2023-11-01 PROCEDURE — 99999 PR PBB SHADOW E&M-EST. PATIENT-LVL V: ICD-10-PCS | Mod: PBBFAC,,, | Performed by: PODIATRIST

## 2023-11-01 PROCEDURE — 3078F PR MOST RECENT DIASTOLIC BLOOD PRESSURE < 80 MM HG: ICD-10-PCS | Mod: CPTII,S$GLB,, | Performed by: PODIATRIST

## 2023-11-01 PROCEDURE — 99214 PR OFFICE/OUTPT VISIT, EST, LEVL IV, 30-39 MIN: ICD-10-PCS | Mod: S$GLB,,, | Performed by: PODIATRIST

## 2023-11-01 PROCEDURE — 1125F AMNT PAIN NOTED PAIN PRSNT: CPT | Mod: CPTII,S$GLB,, | Performed by: PODIATRIST

## 2023-11-01 PROCEDURE — 1125F PR PAIN SEVERITY QUANTIFIED, PAIN PRESENT: ICD-10-PCS | Mod: CPTII,S$GLB,, | Performed by: PODIATRIST

## 2023-11-01 PROCEDURE — 3008F PR BODY MASS INDEX (BMI) DOCUMENTED: ICD-10-PCS | Mod: CPTII,S$GLB,, | Performed by: PODIATRIST

## 2023-11-01 PROCEDURE — 3044F PR MOST RECENT HEMOGLOBIN A1C LEVEL <7.0%: ICD-10-PCS | Mod: CPTII,S$GLB,, | Performed by: PODIATRIST

## 2023-11-01 PROCEDURE — 3075F PR MOST RECENT SYSTOLIC BLOOD PRESS GE 130-139MM HG: ICD-10-PCS | Mod: CPTII,S$GLB,, | Performed by: PODIATRIST

## 2023-11-01 PROCEDURE — 73630 X-RAY EXAM OF FOOT: CPT | Mod: TC,RT

## 2023-11-01 PROCEDURE — 73630 XR FOOT COMPLETE 3 VIEW RIGHT: ICD-10-PCS | Mod: 26,RT,, | Performed by: RADIOLOGY

## 2023-11-01 PROCEDURE — 99999 PR PBB SHADOW E&M-EST. PATIENT-LVL V: CPT | Mod: PBBFAC,,, | Performed by: PODIATRIST

## 2023-11-01 PROCEDURE — 3061F PR NEG MICROALBUMINURIA RESULT DOCUMENTED/REVIEW: ICD-10-PCS | Mod: CPTII,S$GLB,, | Performed by: PODIATRIST

## 2023-11-01 RX ORDER — AMMONIUM LACTATE 12 G/100G
1 CREAM TOPICAL 2 TIMES DAILY
Qty: 140 G | Refills: 11 | Status: SHIPPED | OUTPATIENT
Start: 2023-11-01

## 2023-11-07 ENCOUNTER — PATIENT MESSAGE (OUTPATIENT)
Dept: PRIMARY CARE CLINIC | Facility: CLINIC | Age: 68
End: 2023-11-07
Payer: MEDICARE

## 2023-11-07 ENCOUNTER — PATIENT MESSAGE (OUTPATIENT)
Dept: GASTROENTEROLOGY | Facility: CLINIC | Age: 68
End: 2023-11-07
Payer: MEDICARE

## 2023-11-08 ENCOUNTER — OFFICE VISIT (OUTPATIENT)
Dept: DERMATOLOGY | Facility: CLINIC | Age: 68
End: 2023-11-08
Payer: MEDICARE

## 2023-11-08 DIAGNOSIS — L40.9 PSORIASIS OF SCALP: ICD-10-CM

## 2023-11-08 DIAGNOSIS — E61.1 LOW IRON: ICD-10-CM

## 2023-11-08 DIAGNOSIS — L29.9 SCALP ITCH: Primary | ICD-10-CM

## 2023-11-08 DIAGNOSIS — L29.9 ITCH: ICD-10-CM

## 2023-11-08 DIAGNOSIS — L65.9 HAIR LOSS: ICD-10-CM

## 2023-11-08 PROCEDURE — 3066F NEPHROPATHY DOC TX: CPT | Mod: CPTII,S$GLB,, | Performed by: DERMATOLOGY

## 2023-11-08 PROCEDURE — 3061F PR NEG MICROALBUMINURIA RESULT DOCUMENTED/REVIEW: ICD-10-PCS | Mod: CPTII,S$GLB,, | Performed by: DERMATOLOGY

## 2023-11-08 PROCEDURE — 3288F FALL RISK ASSESSMENT DOCD: CPT | Mod: CPTII,S$GLB,, | Performed by: DERMATOLOGY

## 2023-11-08 PROCEDURE — 3044F HG A1C LEVEL LT 7.0%: CPT | Mod: CPTII,S$GLB,, | Performed by: DERMATOLOGY

## 2023-11-08 PROCEDURE — 1101F PR PT FALLS ASSESS DOC 0-1 FALLS W/OUT INJ PAST YR: ICD-10-PCS | Mod: CPTII,S$GLB,, | Performed by: DERMATOLOGY

## 2023-11-08 PROCEDURE — 1159F PR MEDICATION LIST DOCUMENTED IN MEDICAL RECORD: ICD-10-PCS | Mod: CPTII,S$GLB,, | Performed by: DERMATOLOGY

## 2023-11-08 PROCEDURE — 1125F PR PAIN SEVERITY QUANTIFIED, PAIN PRESENT: ICD-10-PCS | Mod: CPTII,S$GLB,, | Performed by: DERMATOLOGY

## 2023-11-08 PROCEDURE — 1160F PR REVIEW ALL MEDS BY PRESCRIBER/CLIN PHARMACIST DOCUMENTED: ICD-10-PCS | Mod: CPTII,S$GLB,, | Performed by: DERMATOLOGY

## 2023-11-08 PROCEDURE — 99999 PR PBB SHADOW E&M-EST. PATIENT-LVL III: ICD-10-PCS | Mod: PBBFAC,,, | Performed by: DERMATOLOGY

## 2023-11-08 PROCEDURE — 3066F PR DOCUMENTATION OF TREATMENT FOR NEPHROPATHY: ICD-10-PCS | Mod: CPTII,S$GLB,, | Performed by: DERMATOLOGY

## 2023-11-08 PROCEDURE — 99215 PR OFFICE/OUTPT VISIT, EST, LEVL V, 40-54 MIN: ICD-10-PCS | Mod: S$GLB,,, | Performed by: DERMATOLOGY

## 2023-11-08 PROCEDURE — 1159F MED LIST DOCD IN RCRD: CPT | Mod: CPTII,S$GLB,, | Performed by: DERMATOLOGY

## 2023-11-08 PROCEDURE — 3044F PR MOST RECENT HEMOGLOBIN A1C LEVEL <7.0%: ICD-10-PCS | Mod: CPTII,S$GLB,, | Performed by: DERMATOLOGY

## 2023-11-08 PROCEDURE — 4010F PR ACE/ARB THEARPY RXD/TAKEN: ICD-10-PCS | Mod: CPTII,S$GLB,, | Performed by: DERMATOLOGY

## 2023-11-08 PROCEDURE — 3061F NEG MICROALBUMINURIA REV: CPT | Mod: CPTII,S$GLB,, | Performed by: DERMATOLOGY

## 2023-11-08 PROCEDURE — 99999 PR PBB SHADOW E&M-EST. PATIENT-LVL III: CPT | Mod: PBBFAC,,, | Performed by: DERMATOLOGY

## 2023-11-08 PROCEDURE — 99215 OFFICE O/P EST HI 40 MIN: CPT | Mod: S$GLB,,, | Performed by: DERMATOLOGY

## 2023-11-08 PROCEDURE — 1125F AMNT PAIN NOTED PAIN PRSNT: CPT | Mod: CPTII,S$GLB,, | Performed by: DERMATOLOGY

## 2023-11-08 PROCEDURE — 1160F RVW MEDS BY RX/DR IN RCRD: CPT | Mod: CPTII,S$GLB,, | Performed by: DERMATOLOGY

## 2023-11-08 PROCEDURE — 3288F PR FALLS RISK ASSESSMENT DOCUMENTED: ICD-10-PCS | Mod: CPTII,S$GLB,, | Performed by: DERMATOLOGY

## 2023-11-08 PROCEDURE — 1101F PT FALLS ASSESS-DOCD LE1/YR: CPT | Mod: CPTII,S$GLB,, | Performed by: DERMATOLOGY

## 2023-11-08 PROCEDURE — 4010F ACE/ARB THERAPY RXD/TAKEN: CPT | Mod: CPTII,S$GLB,, | Performed by: DERMATOLOGY

## 2023-11-08 NOTE — PROGRESS NOTES
Subjective:      Patient ID:  Regine Osorio is a 68 y.o. female who presents for   Chief Complaint   Patient presents with    Psoriasis     F/u     Psoriasis - Follow-up  Symptom course: worsening  Affected locations: back and scalp  Signs / symptoms: itching and tender (Thinning hair)  Severity: mild to moderate      Review of Systems   Constitutional:  Negative for fever and chills.   HENT:  Negative for sore throat.    Respiratory:  Negative for cough.    Skin:  Positive for itching. Negative for rash and dry skin.       Objective:   Physical Exam   Constitutional: She appears well-developed and well-nourished.   Neurological: She is alert and oriented to person, place, and time.   Psychiatric: She has a normal mood and affect.        Diagram Legend     Erythematous scaling macule/papule c/w actinic keratosis       Vascular papule c/w angioma      Pigmented verrucoid papule/plaque c/w seborrheic keratosis      Yellow umbilicated papule c/w sebaceous hyperplasia      Irregularly shaped tan macule c/w lentigo     1-2 mm smooth white papules consistent with Milia      Movable subcutaneous cyst with punctum c/w epidermal inclusion cyst      Subcutaneous movable cyst c/w pilar cyst      Firm pink to brown papule c/w dermatofibroma      Pedunculated fleshy papule(s) c/w skin tag(s)      Evenly pigmented macule c/w junctional nevus     Mildly variegated pigmented, slightly irregular-bordered macule c/w mildly atypical nevus      Flesh colored to evenly pigmented papule c/w intradermal nevus       Pink pearly papule/plaque c/w basal cell carcinoma      Erythematous hyperkeratotic cursted plaque c/w SCC      Surgical scar with no sign of skin cancer recurrence      Open and closed comedones      Inflammatory papules and pustules      Verrucoid papule consistent consistent with wart     Erythematous eczematous patches and plaques     Dystrophic onycholytic nail with subungual debris c/w onychomycosis     Umbilicated  papule    Erythematous-base heme-crusted tan verrucoid plaque consistent with inflamed seborrheic keratosis     Erythematous Silvery Scaling Plaque c/w Psoriasis     See annotation  Back w sks but no rash.      Scalp wo rash.    Assessment / Plan:        Scalp itch  Cont pt's clob sol bid prn and tar.  Patient to start 2-5% crude coal tar shampoo to be used as scalp soaks for at least 10 minutes, longer if possible, per their regular shampooing schedule.  Can also be applied as directed to other parts of the body.  Be careful around eyes.  Patient to watch for recurrence or flares or worsening and to call the clinic for a follow up appointment for such.    Psoriasis of scalp  Condition is stable.  We will continue present management.  Cont clob sol and tar 3%.  Doing better.    Hair loss  New onset.  Rev'd with pt part from psoriasis but also low iron could impact this immensely.  Recommend iron infusions with heme onc as pt cannot tolerate any oral iron version regardless of base or kind.  Eat pro qd.  Discussed with patient the etiology and pathogenesis of the disease or skin lesion(s) and possible treatments and aggravators.    Reviewed with patient different treatment options and associated risks.    Itch  Watch out for nerve itch.  We may need to do lab work up to evaluate this further, especially if basic itch therapies don't work.  Paresthesia may be aggravated by certain low vitamin levels.  Discussed with patient the etiology and pathogenesis of the disease or skin lesion(s) and possible treatments and aggravators.    Can try over the counter Sarna or capsaicin cream carefully.  Can also try lidocaine topically.  Cont pt's diprolene bid prn for some temp relief.  Reviewed with patient different treatment options and associated risks.  Proper application of medications and or care for affected area(s) and condition(s) reviewed.    Needs iron addressed to rectify this.    Try benfotiamine 500 mg bid.  Or  tid.    Can try alpha lipoic acid 4578-9342 mg qd.    Low iron  Previous [facility] labs and or records and notes reviewed and considered for their impact on our clinical decision making today.  Pt to see heme onc and hopefully will start on iron infusions.  Pt to update us on course of tx and if above conditions improve with infusions of iron.  Unstable condition.           Follow up if symptoms worsen or fail to improve.

## 2023-11-08 NOTE — PATIENT INSTRUCTIONS
Alpha lipoic acid 1200-2400mg per day.    Benfotiamine 500mg up to 3 times a day, aim for twice day at minimum.    Sarna or lidocaine for back.    Consume 40g of protein a day.

## 2023-11-11 NOTE — PROGRESS NOTES
Subjective:      Patient ID: Regine Osorio is a 68 y.o. female.    Chief Complaint: Nail Problem (Right hallux with swelling )    Regine is a 68 y.o. female who presents to the clinic upon referral from Dr. Tracy aguilar. provider found  for evaluation and treatment of diabetic feet. Regine has a past medical history of Allergy, Breast cancer (2017), Cataract, Cervical spondylosis (07/29/2022), Diabetes mellitus, type 2, Eczema, Fibromyalgia, Glaucoma, Hypertension, Renal cyst, right (02/06/2020), and Steatosis of liver (02/06/2020). Patient relates no major problem with feet. Only complaints today consist of routine diabetic foot evaluation.  Significantly improving after Lamisil therapy in terms of onychomycosis.  Having an issue with right great toe ingrown nail  PCP: Gladis Melissa MD  /swelling.  Date Last Seen by PCP:   Chief Complaint   Patient presents with    Nail Problem     Right hallux with swelling          Current shoe gear: Casual shoes    Hemoglobin A1C   Date Value Ref Range Status   10/20/2023 6.6 (H) 4.0 - 5.6 % Final     Comment:     ADA Screening Guidelines:  5.7-6.4%  Consistent with prediabetes  >or=6.5%  Consistent with diabetes    High levels of fetal hemoglobin interfere with the HbA1C  assay. Heterozygous hemoglobin variants (HbS, HgC, etc)do  not significantly interfere with this assay.   However, presence of multiple variants may affect accuracy.     06/27/2023 7.2 (H) 4.0 - 5.6 % Final     Comment:     ADA Screening Guidelines:  5.7-6.4%  Consistent with prediabetes  >or=6.5%  Consistent with diabetes    High levels of fetal hemoglobin interfere with the HbA1C  assay. Heterozygous hemoglobin variants (HbS, HgC, etc)do  not significantly interfere with this assay.   However, presence of multiple variants may affect accuracy.     03/28/2023 7.7 (H) 4.0 - 5.6 % Final     Comment:     ADA Screening Guidelines:  5.7-6.4%  Consistent with prediabetes  >or=6.5%  Consistent with  diabetes    High levels of fetal hemoglobin interfere with the HbA1C  assay. Heterozygous hemoglobin variants (HbS, HgC, etc)do  not significantly interfere with this assay.   However, presence of multiple variants may affect accuracy.             Review of Systems   Constitutional: Positive for night sweats. Negative for chills and fever.   HENT:  Negative for congestion and tinnitus.    Eyes:  Negative for double vision and visual disturbance.   Cardiovascular:  Negative for chest pain and claudication.   Respiratory:  Negative for hemoptysis and shortness of breath.    Endocrine: Negative for cold intolerance and heat intolerance.   Hematologic/Lymphatic: Negative for adenopathy and bleeding problem.   Skin:  Negative for color change and nail changes.   Musculoskeletal:  Positive for back pain and joint pain. Negative for myalgias and stiffness.   Gastrointestinal:  Negative for nausea and vomiting.   Genitourinary:  Negative for dysuria and hematuria.   Neurological:  Positive for paresthesias. Negative for numbness.   Psychiatric/Behavioral:  Negative for altered mental status and suicidal ideas.    Allergic/Immunologic: Negative for environmental allergies and persistent infections.           Objective:      Physical Exam  Vitals reviewed.   Constitutional:       Appearance: She is well-developed.   Cardiovascular:      Pulses:           Dorsalis pedis pulses are 2+ on the right side and 2+ on the left side.        Posterior tibial pulses are 2+ on the right side and 2+ on the left side.   Pulmonary:      Effort: Pulmonary effort is normal.   Musculoskeletal:         General: Normal range of motion.      Comments: Inspection and palpation of the muscles joints and bones of both lower extremities reveal that muscle strength for the anterior lateral and posterior muscle groups and intrinsic muscle groups of the foot are all 5 over 5 symmetrical.  Ankle subtalar midtarsal and digital joint range of motion are  within normal limits, nonpainful, without crepitus or effusion.  Patient exhibits a normal angle and base of gait.  Palpation of the tendons reveal no defects.   Skin:     General: Skin is warm and dry.      Capillary Refill: Capillary refill takes 2 to 3 seconds.      Comments: Skin turgor is normal bilaterally.  Skin texture is well hydrated to both lower extremities.  No lesions or rashes or wounds appreciated bilaterally.  Proximal clearing noted to bilateral pedal nails.   Neurological:      Mental Status: She is alert and oriented to person, place, and time.      Comments: Sharp dull light touch vibratory proprioceptive sensation are intact bilaterally.  Deep tendon reflexes to patellar and Achilles tendon are symmetrical 2 over 4 bilaterally.  No ankle clonus or Babinski reflexes noted bilaterally.  Coordination is normal to both feet and lower extremities.               Assessment:       Encounter Diagnoses   Name Primary?    Pain of toe of right foot Yes    Type 2 diabetes mellitus without complication, without long-term current use of insulin     Onychomycosis due to dermatophyte     Ingrown toenail without infection          Plan:       Regine was seen today for nail problem.    Diagnoses and all orders for this visit:    Pain of toe of right foot  -     X-Ray Foot Complete 3 view Right; Future    Type 2 diabetes mellitus without complication, without long-term current use of insulin    Onychomycosis due to dermatophyte    Ingrown toenail without infection    Other orders  -     ammonium lactate 12 % Crea; Apply 1 application  topically 2 (two) times daily.      I counseled the patient on her conditions, their implications and medical management.      Shoe inspection. Diabetic Foot Education. Patient reminded of the importance of good nutrition and blood sugar control to help prevent podiatric complications of diabetes. Patient instructed on proper foot hygeine. We discussed wearing proper shoe gear,  daily foot inspections and Diabetic foot education in detail.    Monitor right hallux issue, may consider partial nail avulsion if area continues to worsen.

## 2023-11-20 ENCOUNTER — OFFICE VISIT (OUTPATIENT)
Dept: URGENT CARE | Facility: CLINIC | Age: 68
End: 2023-11-20
Payer: MEDICARE

## 2023-11-20 VITALS
OXYGEN SATURATION: 98 % | HEART RATE: 95 BPM | TEMPERATURE: 99 F | SYSTOLIC BLOOD PRESSURE: 125 MMHG | WEIGHT: 232 LBS | BODY MASS INDEX: 38.65 KG/M2 | HEIGHT: 65 IN | RESPIRATION RATE: 17 BRPM | DIASTOLIC BLOOD PRESSURE: 77 MMHG

## 2023-11-20 DIAGNOSIS — R06.02 SHORTNESS OF BREATH: ICD-10-CM

## 2023-11-20 DIAGNOSIS — J06.9 VIRAL URI WITH COUGH: ICD-10-CM

## 2023-11-20 DIAGNOSIS — R05.9 COUGH, UNSPECIFIED TYPE: Primary | ICD-10-CM

## 2023-11-20 LAB
CTP QC/QA: YES
CTP QC/QA: YES
POC MOLECULAR INFLUENZA A AGN: NEGATIVE
POC MOLECULAR INFLUENZA B AGN: NEGATIVE
SARS-COV-2 AG RESP QL IA.RAPID: NEGATIVE

## 2023-11-20 PROCEDURE — 71046 X-RAY EXAM CHEST 2 VIEWS: CPT | Mod: S$GLB,,, | Performed by: RADIOLOGY

## 2023-11-20 PROCEDURE — 87811 SARS-COV-2 COVID19 W/OPTIC: CPT | Mod: QW,S$GLB,, | Performed by: NURSE PRACTITIONER

## 2023-11-20 PROCEDURE — 71046 XR CHEST PA AND LATERAL: ICD-10-PCS | Mod: S$GLB,,, | Performed by: RADIOLOGY

## 2023-11-20 PROCEDURE — 99214 OFFICE O/P EST MOD 30 MIN: CPT | Mod: S$GLB,,, | Performed by: NURSE PRACTITIONER

## 2023-11-20 PROCEDURE — 87811 SARS CORONAVIRUS 2 ANTIGEN POCT, MANUAL READ: ICD-10-PCS | Mod: QW,S$GLB,, | Performed by: NURSE PRACTITIONER

## 2023-11-20 PROCEDURE — 87502 POCT INFLUENZA A/B MOLECULAR: ICD-10-PCS | Mod: QW,S$GLB,, | Performed by: NURSE PRACTITIONER

## 2023-11-20 PROCEDURE — 87502 INFLUENZA DNA AMP PROBE: CPT | Mod: QW,S$GLB,, | Performed by: NURSE PRACTITIONER

## 2023-11-20 PROCEDURE — 99214 PR OFFICE/OUTPT VISIT, EST, LEVL IV, 30-39 MIN: ICD-10-PCS | Mod: S$GLB,,, | Performed by: NURSE PRACTITIONER

## 2023-11-20 RX ORDER — PREDNISONE 20 MG/1
20 TABLET ORAL DAILY
Qty: 5 TABLET | Refills: 0 | Status: SHIPPED | OUTPATIENT
Start: 2023-11-20 | End: 2023-11-25

## 2023-11-20 RX ORDER — ALBUTEROL SULFATE 90 UG/1
AEROSOL, METERED RESPIRATORY (INHALATION)
Qty: 6.7 G | Refills: 0 | Status: SHIPPED | OUTPATIENT
Start: 2023-11-20

## 2023-11-20 RX ORDER — PROMETHAZINE HYDROCHLORIDE AND DEXTROMETHORPHAN HYDROBROMIDE 6.25; 15 MG/5ML; MG/5ML
SYRUP ORAL
Qty: 100 ML | Refills: 0 | Status: SHIPPED | OUTPATIENT
Start: 2023-11-20

## 2023-11-20 NOTE — PROGRESS NOTES
"Subjective:      Patient ID: Regine Osorio is a 68 y.o. female.    Vitals:  height is 5' 5" (1.651 m) and weight is 105.2 kg (232 lb). Her temperature is 98.6 °F (37 °C). Her blood pressure is 125/77 and her pulse is 95. Her respiration is 17 and oxygen saturation is 98%.     Chief Complaint: Cough    68-year-old female presents with a complaint of a chronic cough.  Patient reports onset of cough started approximately March 2023.  She reports recent ER visit October 13, 2023 and was diagnosed with chronic bronchitis.  Patient presents today, and states worsening cough, with shortness a breath.  She denies chest pain.  Patient reports cough may wax and wane with periods of wheezing.  Follow-up visit with her PCP, with pulmonary referral pending. Pending pulmonary visit scheduled for December 15, 2023.  Patient states she use albuterol inhaler as needed for cough, and continues to take Singulair with promethazine with DM cough syrup, reports mild relief; however she states her symptoms return rather quickly.    Cough  This is a new problem. The problem has been waxing and waning. The problem occurs constantly. The cough is Non-productive (dry). Associated symptoms include chest pain, ear congestion, headaches, nasal congestion, postnasal drip, a sore throat, shortness of breath and wheezing. Pertinent negatives include no chills, ear pain, fever, heartburn, hemoptysis, myalgias, rash, rhinorrhea, sweats or weight loss. The symptoms are aggravated by other. She has tried OTC inhaler and prescription cough suppressant for the symptoms. The treatment provided mild relief. Her past medical history is significant for bronchitis. There is no history of asthma, bronchiectasis, COPD, emphysema, environmental allergies or pneumonia.       Constitution: Positive for fatigue. Negative for activity change, appetite change, chills, fever, generalized weakness and international travel in last 60 days.   HENT:  Positive for " postnasal drip and sore throat. Negative for ear pain, sinus pain, sinus pressure, trouble swallowing and voice change.    Cardiovascular:  Positive for chest pain. Negative for palpitations.   Respiratory:  Positive for cough, shortness of breath and wheezing. Negative for chest tightness, sputum production, bloody sputum, COPD, stridor and asthma.    Gastrointestinal:  Negative for heartburn.   Musculoskeletal:  Negative for muscle ache.   Skin:  Negative for rash.   Allergic/Immunologic: Negative for environmental allergies and asthma.   Neurological:  Positive for headaches. Negative for dizziness and history of migraines.      Objective:     Physical Exam   Constitutional: She is oriented to person, place, and time. She appears well-developed. She is cooperative.  Non-toxic appearance. She does not appear ill. No distress.   HENT:   Head: Normocephalic and atraumatic.   Ears:   Right Ear: Hearing, tympanic membrane, external ear and ear canal normal. impacted cerumen  Left Ear: Hearing, tympanic membrane, external ear and ear canal normal. impacted cerumen  Nose: Rhinorrhea and congestion present. No mucosal edema or nasal deformity. No epistaxis. Right sinus exhibits no maxillary sinus tenderness and no frontal sinus tenderness. Left sinus exhibits no maxillary sinus tenderness and no frontal sinus tenderness.   Mouth/Throat: Uvula is midline, oropharynx is clear and moist and mucous membranes are normal. No trismus in the jaw. Normal dentition. No uvula swelling. No oropharyngeal exudate, posterior oropharyngeal edema or posterior oropharyngeal erythema.   Eyes: Conjunctivae and lids are normal. No scleral icterus.   Neck: Trachea normal and phonation normal. Neck supple. No edema present. No erythema present. No neck rigidity present.   Cardiovascular: Normal rate, regular rhythm, normal heart sounds and normal pulses.   Pulmonary/Chest: Effort normal. No accessory muscle usage or stridor. No respiratory  distress. She has no decreased breath sounds. She has wheezes in the right upper field and the left upper field. She has no rhonchi. She has no rales. She exhibits no tenderness.   Abdominal: Normal appearance.   Musculoskeletal: Normal range of motion.         General: No deformity. Normal range of motion.   Neurological: She is alert and oriented to person, place, and time. She exhibits normal muscle tone. Coordination normal.   Skin: Skin is warm, dry, intact, not diaphoretic and not pale.   Psychiatric: Her speech is normal and behavior is normal. Judgment and thought content normal.   Nursing note and vitals reviewed.      Assessment:     1. Cough, unspecified type    2. Shortness of breath    3. Viral URI with cough      Results for orders placed or performed in visit on 23   POCT Influenza A/B MOLECULAR   Result Value Ref Range    POC Molecular Influenza A Ag Negative Negative, Not Reported    POC Molecular Influenza B Ag Negative Negative, Not Reported     Acceptable Yes    SARS Coronavirus 2 Antigen, POCT Manual Read   Result Value Ref Range    SARS Coronavirus 2 Antigen Negative Negative     Acceptable Yes      *Note: Due to a large number of results and/or encounters for the requested time period, some results have not been displayed. A complete set of results can be found in Results Review.      Plan:   Cough, unspecified type  -     POCT Influenza A/B MOLECULAR  -     Cancel: POCT COVID-19 Rapid Screening  -     SARS Coronavirus 2 Antigen, POCT Manual Read    Shortness of breath  -     XR CHEST PA AND LATERAL; Future; Expected date: 2023    Viral URI with cough  -     predniSONE (DELTASONE) 20 MG tablet; Take 1 tablet (20 mg total) by mouth once daily. for 5 days  Dispense: 5 tablet; Refill: 0  -     promethazine-dextromethorphan (PROMETHAZINE-DM) 6.25-15 mg/5 mL Syrp; SMARTSI.5 Milliliter(s) By Mouth 4 Times Daily PRN  Dispense: 100 mL; Refill: 0  -      "albuterol (PROVENTIL/VENTOLIN HFA) 90 mcg/actuation inhaler; SMARTSI-2 Puff(s) Via Inhaler Every 6 Hours PRN  Dispense: 6.7 g; Refill: 0      URI:  If your condition worsens or fails to improve we recommend that you receive another evaluation at the ER immediately or contact your PCP to discuss your concerns or return here. You must understand that you've received an urgent care treatment only and that you may be released before all your medical problems are known or treated. You the patient will arrange for follouwp care as instructed. .  Rest and fluids are important  Can use honey with freddie to soothe your throat  Take inhaler as prescribed and needed for wheezing  Take prescription cough meds as prescribed; take prescription cough syrup at night as needed for cough.  Do not take both the prescribed cough pills and syrup at the same time.   -  Flonase (fluticasone) is a nasal spray which is available over the counter and may help with your symptoms.   -  Zyrtec D, Claritin D or Allegra D can also help with symptoms of congestion and drainage.   -  If you have hypertension avoid using the "D" which is the decongestant.  Instead you can use Coricidin HBP for cold and cough symptoms.    -  If you just have drainage you can take plain Zyrtec, Claritin or Allegra   -  If you just have a congested feeling you can take pseudoephedrine (unless you have high blood pressure) which you have to sign for behind the counter. Do not buy the phenylephrine which is on the shelf as it is not effective   -  Rest and fluids are also important.   -  Tylenol or ibuprofen can also be used as directed for pain unless you have an allergy to them or medical condition such as stomach ulcers, kidney or liver disease or blood thinners etc for which you should not be taking these type of medications.   Please follow up with your primary care doctor or specialist in the next 48-72hrs as needed and if no improvement  If you  smoke, please " stop smoking  You must understand that you've received an Urgent Care treatment only and that you may be released before all your medical problems are known or treated. You, the patient, will arrange for follow up care as instructed.  Follow up with your PCP or specialty clinic as directed in the next 1-2 weeks if not improved or as needed.  You can call [phone] to schedule an appointment with the appropriate provider.  If your condition worsens we recommend that you receive another evaluation at the emergency room immediately or contact your primary medical clinics after hours call service to discuss your concerns.  Please return here or go to the Emergency Department for any concerns or worsening of condition.    If you were prescribed a narcotic or controlled medication, do not drive or operate heavy equipment or machinery while taking these medications.    Thank you for choosing [facility]!         Additional MDM:     Heart Failure Score:   COPD = No

## 2023-11-20 NOTE — PATIENT INSTRUCTIONS
"URI:  If your condition worsens or fails to improve we recommend that you receive another evaluation at the ER immediately or contact your PCP to discuss your concerns or return here. You must understand that you've received an urgent care treatment only and that you may be released before all your medical problems are known or treated. You the patient will arrange for follouwp care as instructed. .  Rest and fluids are important  Can use honey with freddie to soothe your throat  Take inhaler as prescribed and needed for wheezing  Take prescription cough meds as prescribed; take prescription cough syrup at night as needed for cough.  Do not take both the prescribed cough pills and syrup at the same time.   -  Flonase (fluticasone) is a nasal spray which is available over the counter and may help with your symptoms.   -  Zyrtec D, Claritin D or Allegra D can also help with symptoms of congestion and drainage.   -  If you have hypertension avoid using the "D" which is the decongestant.  Instead you can use Coricidin HBP for cold and cough symptoms.    -  If you just have drainage you can take plain Zyrtec, Claritin or Allegra   -  If you just have a congested feeling you can take pseudoephedrine (unless you have high blood pressure) which you have to sign for behind the counter. Do not buy the phenylephrine which is on the shelf as it is not effective   -  Rest and fluids are also important.   -  Tylenol or ibuprofen can also be used as directed for pain unless you have an allergy to them or medical condition such as stomach ulcers, kidney or liver disease or blood thinners etc for which you should not be taking these type of medications.   Please follow up with your primary care doctor or specialist in the next 48-72hrs as needed and if no improvement  If you  smoke, please stop smoking  You must understand that you've received an Urgent Care treatment only and that you may be released before all your medical problems " are known or treated. You, the patient, will arrange for follow up care as instructed.  Follow up with your PCP or specialty clinic as directed in the next 1-2 weeks if not improved or as needed.  You can call [phone] to schedule an appointment with the appropriate provider.  If your condition worsens we recommend that you receive another evaluation at the emergency room immediately or contact your primary medical clinics after hours call service to discuss your concerns.  Please return here or go to the Emergency Department for any concerns or worsening of condition.    If you were prescribed a narcotic or controlled medication, do not drive or operate heavy equipment or machinery while taking these medications.    Thank you for choosing [facility]!    Our goal in the Urgent Care is to always provide outstanding medical care. You may receive a survey by mail or e-mail in the next week regarding your experience today. We would greatly appreciate you completing and returning the survey. Your feedback provides us with a way to recognize our staff who provide very good care, and it helps us learn how to improve when your experience was below our aspiration of excellence.      We appreciate you trusting us with your medical care. We hope you feel better soon. We will be happy to take care of you for all of your future medical needs.    Sincerely,    Ernst Ham DNP, ERIK-C

## 2023-11-24 ENCOUNTER — LAB VISIT (OUTPATIENT)
Dept: PRIMARY CARE CLINIC | Facility: CLINIC | Age: 68
End: 2023-11-24
Payer: MEDICARE

## 2023-11-24 ENCOUNTER — PATIENT MESSAGE (OUTPATIENT)
Dept: PRIMARY CARE CLINIC | Facility: CLINIC | Age: 68
End: 2023-11-24

## 2023-11-24 DIAGNOSIS — C50.912 MALIGNANT NEOPLASM OF LEFT FEMALE BREAST, UNSPECIFIED ESTROGEN RECEPTOR STATUS, UNSPECIFIED SITE OF BREAST: ICD-10-CM

## 2023-11-24 LAB
ERYTHROCYTE [DISTWIDTH] IN BLOOD BY AUTOMATED COUNT: 15.8 % (ref 11.5–14.5)
FERRITIN SERPL-MCNC: 13 NG/ML (ref 20–300)
HCT VFR BLD AUTO: 36.7 % (ref 37–48.5)
HGB BLD-MCNC: 11.3 G/DL (ref 12–16)
IMM GRANULOCYTES # BLD AUTO: 0.1 K/UL (ref 0–0.04)
MCH RBC QN AUTO: 25 PG (ref 27–31)
MCHC RBC AUTO-ENTMCNC: 30.8 G/DL (ref 32–36)
MCV RBC AUTO: 81 FL (ref 82–98)
NEUTROPHILS # BLD AUTO: 7.1 K/UL (ref 1.8–7.7)
PLATELET # BLD AUTO: 369 K/UL (ref 150–450)
PMV BLD AUTO: 10.3 FL (ref 9.2–12.9)
RBC # BLD AUTO: 4.52 M/UL (ref 4–5.4)
WBC # BLD AUTO: 10.46 K/UL (ref 3.9–12.7)

## 2023-11-24 PROCEDURE — 85027 COMPLETE CBC AUTOMATED: CPT | Performed by: INTERNAL MEDICINE

## 2023-11-24 PROCEDURE — 82728 ASSAY OF FERRITIN: CPT | Performed by: INTERNAL MEDICINE

## 2023-11-25 NOTE — PROGRESS NOTES
HPI    DLS: 7/27/2023    Pt here for 4 Month Check;  Pt states no eye pain or discomfort. Pt states no changes in vision that   she is aware of.    Meds:  Latanoprost QHS OU  AT's QAM OU    1. Glaucoma Suspect   2. Drusen OU   3. NS OU   4. Hx Breast CA         1. Glaucoma Suspect   2. Drusen OU   3. NS OU   4. Hx Breast CA       Last edited by Margie Lakhani on 11/27/2023  8:40 AM.            Assessment /Plan     For exam results, see Encounter Report.    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   First photos   2018   Treatment / Drops started   latanoprost - started 1/6/2023 for IOP 28/22           Family history    + mother / grtand father / uncle / cousins         Glaucoma meds    latanoprost ou - 1/6/2023         H/O adverse rxn to glaucoma drops    none        LASERS    none        GLAUCOMA SURGERIES    none        OTHER EYE SURGERIES    none        CDR    0.8/0.7        Tbase    18-28  od // 16-22 os        Tmax    28/22           Ttarget    ?             HVF    4 test 2019 to  2023 - full in past - now w/ early SAD  od // full os        Gonio    +3-4 ou         CCT    588/585        OCT    4  test 2019 to 2023 - RNFL -  Bord TI od // nl  os        HRT    1 test 2020 to 2020 -  MR -  nl od // nl os /// CDR 0.63 od // 0.51 os        Disc photos    2018, 2022      - Ttoday  18/18 (good resp to latanoprost down from 28/22 )  - Test done today     IOP /on latanoprost and gonio     2. Dominate familial drusen   Mostly outside the arcades ou    See photos - 2018    Pt is taking ARED's     3. NS   Mild -monitor     4. flaoter (one floater - string like)    Warned of signs of PVD and RD     Pt to call if marked increase in floaters or flashes or curtain defect    5. Breast CA     6. Social stress (11/27/2023)    Her only son committed suicide 6/2023 (he was in [facility])   Ex  // just got to burry him recently  ( had to wait on death certificate) - 11/2023 - he had 3 children ages 17-23      PLAN   IOP is high at 18-28 od and 18-22 os // + Fm Hx - mother and an uncle and possibly a grand parent   HVF inow with early SAD od // still full os   OCT - bord RNFL in one sector od - stable and nl os      cont  latatanoprost ou q hs     F/U 4 months with IOP check  and AR/MR/BAT cataract check   ? Consider slt (angle open)  as 2nd step if needed vs more drops (? Rocklatan)    VF's in 8 months

## 2023-11-27 ENCOUNTER — OFFICE VISIT (OUTPATIENT)
Dept: OPHTHALMOLOGY | Facility: CLINIC | Age: 68
End: 2023-11-27
Payer: MEDICARE

## 2023-11-27 ENCOUNTER — OFFICE VISIT (OUTPATIENT)
Dept: HEMATOLOGY/ONCOLOGY | Facility: CLINIC | Age: 68
End: 2023-11-27
Payer: MEDICARE

## 2023-11-27 VITALS
WEIGHT: 235 LBS | TEMPERATURE: 98 F | SYSTOLIC BLOOD PRESSURE: 128 MMHG | HEIGHT: 65 IN | BODY MASS INDEX: 39.15 KG/M2 | DIASTOLIC BLOOD PRESSURE: 63 MMHG | HEART RATE: 63 BPM | RESPIRATION RATE: 18 BRPM | OXYGEN SATURATION: 100 %

## 2023-11-27 DIAGNOSIS — H40.1111 PRIMARY OPEN-ANGLE GLAUCOMA, RIGHT EYE, MILD STAGE: Primary | ICD-10-CM

## 2023-11-27 DIAGNOSIS — H52.13 MYOPIA OF BOTH EYES WITH ASTIGMATISM AND PRESBYOPIA: ICD-10-CM

## 2023-11-27 DIAGNOSIS — H35.362 FAMILIAL DRUSEN OF MACULA OF BOTH EYES: ICD-10-CM

## 2023-11-27 DIAGNOSIS — H52.4 MYOPIA OF BOTH EYES WITH ASTIGMATISM AND PRESBYOPIA: ICD-10-CM

## 2023-11-27 DIAGNOSIS — H52.203 MYOPIA OF BOTH EYES WITH ASTIGMATISM AND PRESBYOPIA: ICD-10-CM

## 2023-11-27 DIAGNOSIS — C50.912 MALIGNANT NEOPLASM OF LEFT FEMALE BREAST, UNSPECIFIED ESTROGEN RECEPTOR STATUS, UNSPECIFIED SITE OF BREAST: Primary | ICD-10-CM

## 2023-11-27 DIAGNOSIS — H40.022 OPEN ANGLE WITH BORDERLINE FINDINGS AND HIGH GLAUCOMA RISK IN LEFT EYE: ICD-10-CM

## 2023-11-27 DIAGNOSIS — H25.13 NUCLEAR SCLEROTIC CATARACT OF BOTH EYES: ICD-10-CM

## 2023-11-27 DIAGNOSIS — D50.0 IRON DEFICIENCY ANEMIA DUE TO CHRONIC BLOOD LOSS: ICD-10-CM

## 2023-11-27 DIAGNOSIS — H35.361 FAMILIAL DRUSEN OF MACULA OF BOTH EYES: ICD-10-CM

## 2023-11-27 PROCEDURE — 3074F PR MOST RECENT SYSTOLIC BLOOD PRESSURE < 130 MM HG: ICD-10-PCS | Mod: CPTII,S$GLB,, | Performed by: INTERNAL MEDICINE

## 2023-11-27 PROCEDURE — 3061F PR NEG MICROALBUMINURIA RESULT DOCUMENTED/REVIEW: ICD-10-PCS | Mod: CPTII,S$GLB,, | Performed by: INTERNAL MEDICINE

## 2023-11-27 PROCEDURE — 3288F PR FALLS RISK ASSESSMENT DOCUMENTED: ICD-10-PCS | Mod: CPTII,S$GLB,, | Performed by: INTERNAL MEDICINE

## 2023-11-27 PROCEDURE — 4010F ACE/ARB THERAPY RXD/TAKEN: CPT | Mod: CPTII,S$GLB,, | Performed by: OPHTHALMOLOGY

## 2023-11-27 PROCEDURE — 99214 OFFICE O/P EST MOD 30 MIN: CPT | Mod: S$GLB,,, | Performed by: INTERNAL MEDICINE

## 2023-11-27 PROCEDURE — 3066F PR DOCUMENTATION OF TREATMENT FOR NEPHROPATHY: ICD-10-PCS | Mod: CPTII,S$GLB,, | Performed by: OPHTHALMOLOGY

## 2023-11-27 PROCEDURE — 1126F AMNT PAIN NOTED NONE PRSNT: CPT | Mod: CPTII,S$GLB,, | Performed by: INTERNAL MEDICINE

## 2023-11-27 PROCEDURE — 4010F PR ACE/ARB THEARPY RXD/TAKEN: ICD-10-PCS | Mod: CPTII,S$GLB,, | Performed by: INTERNAL MEDICINE

## 2023-11-27 PROCEDURE — 99999 PR PBB SHADOW E&M-EST. PATIENT-LVL III: CPT | Mod: PBBFAC,,, | Performed by: INTERNAL MEDICINE

## 2023-11-27 PROCEDURE — 99999 PR PBB SHADOW E&M-EST. PATIENT-LVL III: ICD-10-PCS | Mod: PBBFAC,,, | Performed by: INTERNAL MEDICINE

## 2023-11-27 PROCEDURE — 3044F HG A1C LEVEL LT 7.0%: CPT | Mod: CPTII,S$GLB,, | Performed by: INTERNAL MEDICINE

## 2023-11-27 PROCEDURE — 1126F PR PAIN SEVERITY QUANTIFIED, NO PAIN PRESENT: ICD-10-PCS | Mod: CPTII,S$GLB,, | Performed by: OPHTHALMOLOGY

## 2023-11-27 PROCEDURE — 3061F NEG MICROALBUMINURIA REV: CPT | Mod: CPTII,S$GLB,, | Performed by: OPHTHALMOLOGY

## 2023-11-27 PROCEDURE — 3288F FALL RISK ASSESSMENT DOCD: CPT | Mod: CPTII,S$GLB,, | Performed by: OPHTHALMOLOGY

## 2023-11-27 PROCEDURE — 3044F HG A1C LEVEL LT 7.0%: CPT | Mod: CPTII,S$GLB,, | Performed by: OPHTHALMOLOGY

## 2023-11-27 PROCEDURE — 1160F RVW MEDS BY RX/DR IN RCRD: CPT | Mod: CPTII,S$GLB,, | Performed by: OPHTHALMOLOGY

## 2023-11-27 PROCEDURE — 3074F SYST BP LT 130 MM HG: CPT | Mod: CPTII,S$GLB,, | Performed by: INTERNAL MEDICINE

## 2023-11-27 PROCEDURE — 4010F ACE/ARB THERAPY RXD/TAKEN: CPT | Mod: CPTII,S$GLB,, | Performed by: INTERNAL MEDICINE

## 2023-11-27 PROCEDURE — 3061F PR NEG MICROALBUMINURIA RESULT DOCUMENTED/REVIEW: ICD-10-PCS | Mod: CPTII,S$GLB,, | Performed by: OPHTHALMOLOGY

## 2023-11-27 PROCEDURE — 3078F PR MOST RECENT DIASTOLIC BLOOD PRESSURE < 80 MM HG: ICD-10-PCS | Mod: CPTII,S$GLB,, | Performed by: INTERNAL MEDICINE

## 2023-11-27 PROCEDURE — 1159F PR MEDICATION LIST DOCUMENTED IN MEDICAL RECORD: ICD-10-PCS | Mod: CPTII,S$GLB,, | Performed by: INTERNAL MEDICINE

## 2023-11-27 PROCEDURE — 1101F PR PT FALLS ASSESS DOC 0-1 FALLS W/OUT INJ PAST YR: ICD-10-PCS | Mod: CPTII,S$GLB,, | Performed by: INTERNAL MEDICINE

## 2023-11-27 PROCEDURE — 1101F PT FALLS ASSESS-DOCD LE1/YR: CPT | Mod: CPTII,S$GLB,, | Performed by: INTERNAL MEDICINE

## 2023-11-27 PROCEDURE — 99999 PR PBB SHADOW E&M-EST. PATIENT-LVL II: ICD-10-PCS | Mod: PBBFAC,,, | Performed by: OPHTHALMOLOGY

## 2023-11-27 PROCEDURE — 1159F MED LIST DOCD IN RCRD: CPT | Mod: CPTII,S$GLB,, | Performed by: OPHTHALMOLOGY

## 2023-11-27 PROCEDURE — 4010F PR ACE/ARB THEARPY RXD/TAKEN: ICD-10-PCS | Mod: CPTII,S$GLB,, | Performed by: OPHTHALMOLOGY

## 2023-11-27 PROCEDURE — 3044F PR MOST RECENT HEMOGLOBIN A1C LEVEL <7.0%: ICD-10-PCS | Mod: CPTII,S$GLB,, | Performed by: OPHTHALMOLOGY

## 2023-11-27 PROCEDURE — 3008F BODY MASS INDEX DOCD: CPT | Mod: CPTII,S$GLB,, | Performed by: INTERNAL MEDICINE

## 2023-11-27 PROCEDURE — 3288F PR FALLS RISK ASSESSMENT DOCUMENTED: ICD-10-PCS | Mod: CPTII,S$GLB,, | Performed by: OPHTHALMOLOGY

## 2023-11-27 PROCEDURE — 1101F PT FALLS ASSESS-DOCD LE1/YR: CPT | Mod: CPTII,S$GLB,, | Performed by: OPHTHALMOLOGY

## 2023-11-27 PROCEDURE — 1101F PR PT FALLS ASSESS DOC 0-1 FALLS W/OUT INJ PAST YR: ICD-10-PCS | Mod: CPTII,S$GLB,, | Performed by: OPHTHALMOLOGY

## 2023-11-27 PROCEDURE — 1160F PR REVIEW ALL MEDS BY PRESCRIBER/CLIN PHARMACIST DOCUMENTED: ICD-10-PCS | Mod: CPTII,S$GLB,, | Performed by: OPHTHALMOLOGY

## 2023-11-27 PROCEDURE — 3288F FALL RISK ASSESSMENT DOCD: CPT | Mod: CPTII,S$GLB,, | Performed by: INTERNAL MEDICINE

## 2023-11-27 PROCEDURE — 3008F PR BODY MASS INDEX (BMI) DOCUMENTED: ICD-10-PCS | Mod: CPTII,S$GLB,, | Performed by: INTERNAL MEDICINE

## 2023-11-27 PROCEDURE — 99214 PR OFFICE/OUTPT VISIT, EST, LEVL IV, 30-39 MIN: ICD-10-PCS | Mod: S$GLB,,, | Performed by: INTERNAL MEDICINE

## 2023-11-27 PROCEDURE — 99214 PR OFFICE/OUTPT VISIT, EST, LEVL IV, 30-39 MIN: ICD-10-PCS | Mod: S$GLB,,, | Performed by: OPHTHALMOLOGY

## 2023-11-27 PROCEDURE — 3066F NEPHROPATHY DOC TX: CPT | Mod: CPTII,S$GLB,, | Performed by: INTERNAL MEDICINE

## 2023-11-27 PROCEDURE — 3061F NEG MICROALBUMINURIA REV: CPT | Mod: CPTII,S$GLB,, | Performed by: INTERNAL MEDICINE

## 2023-11-27 PROCEDURE — 99999 PR PBB SHADOW E&M-EST. PATIENT-LVL II: CPT | Mod: PBBFAC,,, | Performed by: OPHTHALMOLOGY

## 2023-11-27 PROCEDURE — 1126F AMNT PAIN NOTED NONE PRSNT: CPT | Mod: CPTII,S$GLB,, | Performed by: OPHTHALMOLOGY

## 2023-11-27 PROCEDURE — 3044F PR MOST RECENT HEMOGLOBIN A1C LEVEL <7.0%: ICD-10-PCS | Mod: CPTII,S$GLB,, | Performed by: INTERNAL MEDICINE

## 2023-11-27 PROCEDURE — 3066F NEPHROPATHY DOC TX: CPT | Mod: CPTII,S$GLB,, | Performed by: OPHTHALMOLOGY

## 2023-11-27 PROCEDURE — 1159F MED LIST DOCD IN RCRD: CPT | Mod: CPTII,S$GLB,, | Performed by: INTERNAL MEDICINE

## 2023-11-27 PROCEDURE — 1159F PR MEDICATION LIST DOCUMENTED IN MEDICAL RECORD: ICD-10-PCS | Mod: CPTII,S$GLB,, | Performed by: OPHTHALMOLOGY

## 2023-11-27 PROCEDURE — 1126F PR PAIN SEVERITY QUANTIFIED, NO PAIN PRESENT: ICD-10-PCS | Mod: CPTII,S$GLB,, | Performed by: INTERNAL MEDICINE

## 2023-11-27 PROCEDURE — 3066F PR DOCUMENTATION OF TREATMENT FOR NEPHROPATHY: ICD-10-PCS | Mod: CPTII,S$GLB,, | Performed by: INTERNAL MEDICINE

## 2023-11-27 PROCEDURE — 3078F DIAST BP <80 MM HG: CPT | Mod: CPTII,S$GLB,, | Performed by: INTERNAL MEDICINE

## 2023-11-27 PROCEDURE — 99214 OFFICE O/P EST MOD 30 MIN: CPT | Mod: S$GLB,,, | Performed by: OPHTHALMOLOGY

## 2023-11-27 RX ORDER — FERROUS SULFATE 325(65) MG
TABLET ORAL
Qty: 60 TABLET | Refills: 2 | Status: SHIPPED | OUTPATIENT
Start: 2023-11-27 | End: 2024-02-26

## 2023-11-27 NOTE — PROGRESS NOTES
Subjective:       Patient ID: Regine Osorio is a 68 y.o. female.    Chief Complaint: No chief complaint on file.      HPI      Mrs. Alfred returns today for follow up.  I had last seen her on August 28, 2023.   On August 18, 2023 she underwent an EGD and colonoscopy.  She had diverticulosis and a 1 cm hiatal hernia.  Biopsies were negative for celiac disease.  A urinalysis in April had shown no hematuria.    Repeat labs from last week were as follows:  WBCs 15559 per cubic mm, hemoglobin 11.3 grams/deciliter, hematocrit 36.7%, MCV 81, and platelets 369 K.  Ferritin was 13 ng per ml.    Briefly, she is a 68-year-old female who has a history of a stage I triple negative breast cancer that was diagnosed in late 2017.  She underwent a left modified radical mastectomy in [facility] on 11/01/2017 and had a stage IA carcinoma measuring 1.2 cm in greatest diameter.  Resection margins were clear, while four sentinel lymph nodes were negative.  Postoperatively, she received four cycles of docetaxel and cyclophosphamide.  The first cycle was administered on 11/30/2017 and she completed her treatment by 02/01/2018.  She has been followed expectantly and has been MERRY since then.  We assumed her care when she relocated to [place] 3 years ago.    Her mammogram last April was read as BIRADS I, and a one year follow up was recommended.    A ferritin in late April was low at 19 ng/mL while her CBC was normal.  A repeat ferritin today is 19 ng/ml, while her CBC shows a WBC count of 8,800 per cubic mm, hemoglobin 11.6 grams/deciliter, hematocrit 38.4%, MCV 83, and platelets 351 K  Two stool samples that she submitted tin August were negative for occult blood.        ROS: Overall she feels OK, and her ECOG PS is 1.  She denies any anxiety, easy bruising, fevers, chills, night  sweats, weight loss, nausea, vomiting, diarrhea, constipation, diplopia, blurred vision, headache, chest pain, palpitations, shortness of breath, breast  pain, lower abdominal pain, extremity pain, or difficulty ambulating.  The remainder of the ten-point ROS, including general, skin, lymph, H/N, cardiorespiratory, GI, , Neuro, Endocrine, and psychiatric is negative.     Objective:      Physical Exam      She is alert, oriented to time, place, person, pleasant, well      nourished, in no acute physical distress.                                    VITAL SIGNS:  Reviewed                                      HEENT:  Normal.  There are no nasal, oral, lip, gingival, auricular, lid,    or conjunctival lesions.  Mucosae are moist and pink, and there is no        thrush.  Pupils are equal, reactive to light and accommodation.              Extraocular muscle movements are intact.  Dentition is good.  There is no frontal or maxillary tenderness.                                     NECK:  Supple without JVD, adenopathy, but she does have thyromegaly.                       LUNGS:  Clear to auscultation without wheezing, rales, or rhonchi.           CARDIOVASCULAR:  Reveals an S1, S2, no murmurs, no rubs, no gallops.         ABDOMEN:  Soft, nontender, without organomegaly.  Bowel sounds are    present.                                                                     EXTREMITIES:  No cyanosis, clubbing, or edema.                               BREASTS: Deferred.    LYMPHATIC:  There is no cervical, axillary, or supraclavicular adenopathy.   SKIN:  Warm and moist, without petechiae, rashes, induration, or ecchymoses.           NEUROLOGIC:  DTRs are 0-1+ bilaterally, symmetrical, motor function is 5/5,  and cranial nerves are  within normal limits.    Assessment:       1. History of breast cancer in female, [id number], status post mastectomy and 4 cycles of docetaxel cyclophosphamide, clinically MERRY, doing well more than 5 years after completion       2.     Low ferritin with borderline microcytic anemia  3.     Diverticulosis  Plan:         In regards to her breast cancer,   Tima remains in clinical remission.  I explained to her that her chance of a cure at this point is in the range of 99%.  In regards to her low ferritin and borderline anemia, she states that she tried over-the-counter oral iron in the past but she did not tolerate it well.  She asked me to send a prescription which I did.  She will try ferrous sulfate twice a day and if she does not tolerate it she will be switched to IV iron supplementation therapy  I will see her with a repeat CBC and ferritin in 5 weeks.  If she is unable to tolerate the oral formulation she will call me not switch to IV supplementation therapy.  Finally, we will discuss with Dr. Garcia whether further GI workup such as a video.    Her multiple questions were answered to her satisfaction.  I spent approximately 35 minutes reviewing the available records and evaluating the patient, out of which over 50% of the time was spent face to face with the patient in counseling and coordinating this patient's care.            Route Chart for Scheduling    Med Onc Chart Routing      Follow up with physician 6 weeks. See me 1st week of January with labs 1 day prior.   Follow up with [facility]    Infusion scheduling note    Injection scheduling note    Labs    Imaging    Pharmacy appointment    Other referrals

## 2023-11-28 ENCOUNTER — TELEPHONE (OUTPATIENT)
Dept: HEMATOLOGY/ONCOLOGY | Facility: CLINIC | Age: 68
End: 2023-11-28
Payer: MEDICARE

## 2023-11-28 NOTE — TELEPHONE ENCOUNTER
"----- Message from Tia Emile sent at 11/28/2023  9:50 AM CST -----  Regarding: Pt advice  Contact: Pt     Pt requesting call back in regards to ferrous sulfate (FEOSOL) 325 mg (65 mg iron) Tab tablet stating pharmacy informed her they have been trying to reach office to clarify the direction for the meds.   Please call and adv @       Confirmed contact below:   Contact Name: Regine Mcclain Taryn  Phone Number: [phone]               Additional Notes:  "Thank you for all that you do for our patients"                                           "

## 2023-11-28 NOTE — TELEPHONE ENCOUNTER
Spoke to pharmacy, prescription clarified. Patient aware that we were calling pharmacy to clarify what is needed.

## 2023-11-29 ENCOUNTER — TELEPHONE (OUTPATIENT)
Dept: PRIMARY CARE CLINIC | Facility: CLINIC | Age: 68
End: 2023-11-29
Payer: MEDICARE

## 2023-11-29 ENCOUNTER — OFFICE VISIT (OUTPATIENT)
Dept: PRIMARY CARE CLINIC | Facility: CLINIC | Age: 68
End: 2023-11-29
Payer: MEDICARE

## 2023-11-29 DIAGNOSIS — K21.9 LARYNGOPHARYNGEAL REFLUX (LPR): ICD-10-CM

## 2023-11-29 DIAGNOSIS — J40 BRONCHITIS: Primary | ICD-10-CM

## 2023-11-29 DIAGNOSIS — E11.9 TYPE 2 DIABETES MELLITUS WITHOUT COMPLICATION, WITHOUT LONG-TERM CURRENT USE OF INSULIN: ICD-10-CM

## 2023-11-29 PROCEDURE — 3061F PR NEG MICROALBUMINURIA RESULT DOCUMENTED/REVIEW: ICD-10-PCS | Mod: CPTII,95,, | Performed by: STUDENT IN AN ORGANIZED HEALTH CARE EDUCATION/TRAINING PROGRAM

## 2023-11-29 PROCEDURE — 3066F PR DOCUMENTATION OF TREATMENT FOR NEPHROPATHY: ICD-10-PCS | Mod: CPTII,95,, | Performed by: STUDENT IN AN ORGANIZED HEALTH CARE EDUCATION/TRAINING PROGRAM

## 2023-11-29 PROCEDURE — 4010F PR ACE/ARB THEARPY RXD/TAKEN: ICD-10-PCS | Mod: CPTII,95,, | Performed by: STUDENT IN AN ORGANIZED HEALTH CARE EDUCATION/TRAINING PROGRAM

## 2023-11-29 PROCEDURE — 99214 PR OFFICE/OUTPT VISIT, EST, LEVL IV, 30-39 MIN: ICD-10-PCS | Mod: 95,,, | Performed by: STUDENT IN AN ORGANIZED HEALTH CARE EDUCATION/TRAINING PROGRAM

## 2023-11-29 PROCEDURE — 3044F PR MOST RECENT HEMOGLOBIN A1C LEVEL <7.0%: ICD-10-PCS | Mod: CPTII,95,, | Performed by: STUDENT IN AN ORGANIZED HEALTH CARE EDUCATION/TRAINING PROGRAM

## 2023-11-29 PROCEDURE — 3044F HG A1C LEVEL LT 7.0%: CPT | Mod: CPTII,95,, | Performed by: STUDENT IN AN ORGANIZED HEALTH CARE EDUCATION/TRAINING PROGRAM

## 2023-11-29 PROCEDURE — 4010F ACE/ARB THERAPY RXD/TAKEN: CPT | Mod: CPTII,95,, | Performed by: STUDENT IN AN ORGANIZED HEALTH CARE EDUCATION/TRAINING PROGRAM

## 2023-11-29 PROCEDURE — 99214 OFFICE O/P EST MOD 30 MIN: CPT | Mod: 95,,, | Performed by: STUDENT IN AN ORGANIZED HEALTH CARE EDUCATION/TRAINING PROGRAM

## 2023-11-29 PROCEDURE — 3066F NEPHROPATHY DOC TX: CPT | Mod: CPTII,95,, | Performed by: STUDENT IN AN ORGANIZED HEALTH CARE EDUCATION/TRAINING PROGRAM

## 2023-11-29 PROCEDURE — 3061F NEG MICROALBUMINURIA REV: CPT | Mod: CPTII,95,, | Performed by: STUDENT IN AN ORGANIZED HEALTH CARE EDUCATION/TRAINING PROGRAM

## 2023-11-29 RX ORDER — HYDROCODONE BITARTRATE AND HOMATROPINE METHYLBROMIDE ORAL SOLUTION 5; 1.5 MG/5ML; MG/5ML
5 LIQUID ORAL EVERY 6 HOURS PRN
Qty: 120 ML | Refills: 0 | Status: SHIPPED | OUTPATIENT
Start: 2023-11-29 | End: 2024-03-04

## 2023-11-29 RX ORDER — PREDNISONE 20 MG/1
TABLET ORAL
Qty: 14 TABLET | Refills: 0 | Status: SHIPPED | OUTPATIENT
Start: 2023-11-29 | End: 2023-12-09

## 2023-11-29 RX ORDER — PANTOPRAZOLE SODIUM 40 MG/1
40 TABLET, DELAYED RELEASE ORAL DAILY
Qty: 90 TABLET | Refills: 1 | Status: SHIPPED | OUTPATIENT
Start: 2023-11-29

## 2023-11-29 NOTE — TELEPHONE ENCOUNTER
I called patient she verbalized and understanding.she also had an appointment with Dr Melissa today.

## 2023-11-29 NOTE — TELEPHONE ENCOUNTER
----- Message from Rafael Araiza sent at 11/28/2023  9:00 AM CST -----  Contact: self  [phone]  Per pt still awaiting on a call she requested Friday stated provider need to inform her on what to do if she need to be seen or not.    Please call and advise

## 2023-11-29 NOTE — PROGRESS NOTES
11/29/2023    Regine Cooper  [id number]    CC: cough  Location: [place]    Visit type: audiovisual     Face to Face time with patient: 20  total minutes of total time spent on the encounter, which includes face to face time and non-face to face time preparing to see the patient (eg, review of tests), Obtaining and/or reviewing separately obtained history, Documenting clinical information in the electronic or other health record, Independently interpreting results (not separately reported) and communicating results to the patient/family/caregiver, or Care coordination (not separately reported).         Each patient to whom he or she provides medical services by telemedicine is:  (1) informed of the relationship between the physician and patient and the respective role of any other health care provider with respect to management of the patient; and (2) notified that he or she may decline to receive medical services by telemedicine and may withdraw from such care at any time.      HPI    This pt is known to me and presents virtually for acute worsening of chronic cough. Pmh sig for bronchitis, obesity, t2dm, and chronic cough. Pt with worsening cough for greater than one week. Is coughing when trying to talk, eat and drink. Started wheezing last week so presented to urgent care and tested negative for COVID and influenza. She was prescribed prednisone which helped, but she is still coughing significantly. The inhaler and cough syrup only help for 1-2 hours.  Her whole chest hurts and she is unable to rest.          Negative 10 point ROS outside of HPI    Social History     Socioeconomic History    Marital status:     Number of children: 1   Tobacco Use    Smoking status: Never    Smokeless tobacco: Never   Substance and Sexual Activity    Alcohol use: No    Drug use: No    Sexual activity: Not Currently   Social History Narrative        1 son (estranged)    3 grandchildren ([place])    Born and  raised in [place] (moved to [facility] 2017)     Social Determinants of Health     Financial Resource Strain: Low Risk  (2023)    Overall Financial Resource Strain (CARDIA)     Difficulty of Paying Living Expenses: Not very hard   Food Insecurity: No Food Insecurity (2023)    Hunger Vital Sign     Worried About Running Out of Food in the Last Year: Never true     Ran Out of Food in the Last Year: Never true   Transportation Needs: No Transportation Needs (2023)    PRAPARE - Transportation     Lack of Transportation (Medical): No     Lack of Transportation (Non-Medical): No   Physical Activity: Insufficiently Active (2023)    Exercise Vital Sign     Days of Exercise per Week: 2 days     Minutes of Exercise per Session: 40 min   Stress: No Stress Concern Present (2023)    [employer] - Occupational Stress Questionnaire     Feeling of Stress : Only a little   Social Connections: Unknown (2023)    Social Connection and Isolation Panel [NHANES]     Frequency of Communication with Friends and Family: More than three times a week     Frequency of Social Gatherings with Friends and Family: Once a week     Active Member of Clubs or Organizations: Yes     Attends Club or Organization Meetings: More than 4 times per year     Marital Status:    Housing Stability: Low Risk  (2023)    Housing Stability Vital Sign     Unable to Pay for Housing in the Last Year: No     Number of Places Lived in the Last Year: 1     Unstable Housing in the Last Year: No           Current Outpatient Medications:     acetic acid (VOSOL) 2 % otic solution, Place 4 drops into both ears as needed., Disp: , Rfl:     albuterol (PROVENTIL/VENTOLIN HFA) 90 mcg/actuation inhaler, SMARTSI-2 Puff(s) Via Inhaler Every 6 Hours PRN, Disp: 6.7 g, Rfl: 0    ammonium lactate 12 % Crea, Apply 1 application  topically 2 (two) times daily., Disp: 140 g, Rfl: 11    augmented betamethasone  dipropionate (DIPROLENE-AF) 0.05 % cream, Apply topically 2 (two) times to 4 times daily to area of back when itchy or burning.Stop using steroid topical when skin is non itchy.  Do not treat dark or red coloring., Disp: 50 g, Rfl: 0    azelastine (ASTELIN) 137 mcg (0.1 %) nasal spray, 1 SPRAY IN EACH NOSTRIL TWICE DAILY Strength: 137 mcg (0.1 %), Disp: 30 mL, Rfl: 2    blood sugar diagnostic (ONETOUCH VERIO TEST STRIPS) Strp, To Check BG 1x daily, Disp: 100 each, Rfl: 3    blood sugar diagnostic Strp, Check BG twice daily, Disp: 200 each, Rfl: 2    blood-glucose meter kit, Use as instructed, Disp: 1 each, Rfl: 0    ciclopirox (PENLAC) 8 % Soln, Apply topically nightly., Disp: 6.6 mL, Rfl: 3    clobetasoL (TEMOVATE) 0.05 % external solution, APPLY TOPICALLY TO THE SCALP TWICE DAILY AS NEEDED FOR ITCHING OR IRRITATION, Disp: 50 mL, Rfl: 0    COMIRNATY 2023-24, 12Y UP,,PF, 30 mcg/0.3 mL injection, , Disp: , Rfl:     ferrous sulfate (FEOSOL) 325 mg (65 mg iron) Tab tablet, Take 1 hour before meals, Disp: 60 tablet, Rfl: 2    fluticasone propionate (FLONASE) 50 mcg/actuation nasal spray, 2 sprays to each lateral nostril once a day, Disp: 48 g, Rfl: 3    glipiZIDE 5 MG TR24, Take 1 tablet (5 mg total) by mouth 2 (two) times daily., Disp: 180 tablet, Rfl: 3    hydroCHLOROthiazide (HYDRODIURIL) 25 MG tablet, Take 1 tablet (25 mg total) by mouth once daily., Disp: 90 tablet, Rfl: 3    lancets Misc, To check BG 2 times daily, Disp: 200 each, Rfl: 3    latanoprost 0.005 % ophthalmic solution, Place 1 drop into both eyes every evening., Disp: 7.5 mL, Rfl: 3    losartan (COZAAR) 50 MG tablet, Take 1 tablet (50 mg total) by mouth once daily., Disp: 90 tablet, Rfl: 3    metFORMIN (GLUCOPHAGE) 1000 MG tablet, Take 1 tablet (1,000 mg total) by mouth 2 (two) times daily with meals., Disp: 180 tablet, Rfl: 3    montelukast (SINGULAIR) 10 mg tablet, TAKE 1 TABLET(10 MG) BY MOUTH EVERY EVENING, Disp: 90 tablet, Rfl: 3    ondansetron  (ZOFRAN-ODT) 8 MG TbDL, Take 1 tablet (8 mg total) by mouth every 8 (eight) hours as needed (Nausea)., Disp: 30 tablet, Rfl: 2    ONETOUCH DELICA PLUS LANCET 33 gauge Misc, 2 (two) times daily., Disp: , Rfl:     ONETOUCH VERIO REFLECT METER Misc, USE AS DIRECTED TO TEST BLOOD GLUCOSE, Disp: , Rfl:     pantoprazole (PROTONIX) 40 MG tablet, Take 1 tablet (40 mg total) by mouth once daily., Disp: 90 tablet, Rfl: 1    promethazine-dextromethorphan (PROMETHAZINE-DM) 6.25-15 mg/5 mL Syrp, SMARTSI.5 Milliliter(s) By Mouth 4 Times Daily PRN, Disp: 100 mL, Rfl: 0    rizatriptan (MAXALT-MLT) 10 MG disintegrating tablet, Take 1 tablet (10 mg total) by mouth every 2 (two) hours as needed for Migraine. Max 30 mg/day., Disp: 12 tablet, Rfl: 5    semaglutide (OZEMPIC) 0.25 mg or 0.5 mg (2 mg/3 mL) pen injector, Inject 0.25 mg into the skin every 7 days., Disp: 3 mL, Rfl: 0    ubrogepant (UBRELVY) 100 mg tablet, Take 1 tablet (100 mg total) by mouth every 2 (two) hours as needed for Migraine. Max 200 mg/day., Disp: 10 tablet, Rfl: 2      Physical Exam  Vitals unable to obtain    Gen: well appearing  Resp: speaks in short sentences due to coughing.long coughing fits  Cv: non-diaphoretic      1. Bronchitis  - hydrocodone-homatropine 5-1.5 mg/5 ml (HYDROMET) 5-1.5 mg/5 mL Syrp; Take 5 mLs by mouth every 6 (six) hours as needed (cough).  Dispense: 120 mL; Refill: 0  - predniSONE (DELTASONE) 20 MG tablet; Take 3 tablets (60 mg total) by mouth once daily for 1 day, THEN 2 tablets (40 mg total) once daily for 3 days, THEN 1 tablet (20 mg total) once daily for 4 days, THEN 0.5 tablets (10 mg total) once daily for 2 days.  Dispense: 14 tablet; Refill: 0  -has upcoming pulmonology visit    2. Laryngopharyngeal reflux (LPR)  -REFILLED  pantoprazole (PROTONIX) 40 MG tablet; Take 1 tablet (40 mg total) by mouth once daily.  Dispense: 90 tablet; Refill: 1    3. Type 2 diabetes mellitus without complication, without long-term current use of  insulin  Last A1c 6.6   Discussed hyperglycemia with repeated steroid use and temporary worsening of DM  Recommended sticking to low carb diet      RTC in 10 days     Gladis Melissa MD  Family Medicine

## 2023-12-01 ENCOUNTER — PATIENT MESSAGE (OUTPATIENT)
Dept: PODIATRY | Facility: CLINIC | Age: 68
End: 2023-12-01
Payer: MEDICARE

## 2023-12-07 ENCOUNTER — OFFICE VISIT (OUTPATIENT)
Dept: PODIATRY | Facility: CLINIC | Age: 68
End: 2023-12-07
Payer: MEDICARE

## 2023-12-07 VITALS
SYSTOLIC BLOOD PRESSURE: 129 MMHG | DIASTOLIC BLOOD PRESSURE: 71 MMHG | WEIGHT: 235 LBS | HEIGHT: 65 IN | BODY MASS INDEX: 39.15 KG/M2 | HEART RATE: 106 BPM

## 2023-12-07 DIAGNOSIS — E11.9 TYPE 2 DIABETES MELLITUS WITHOUT COMPLICATION, WITHOUT LONG-TERM CURRENT USE OF INSULIN: ICD-10-CM

## 2023-12-07 DIAGNOSIS — M79.674 PAIN OF TOE OF RIGHT FOOT: ICD-10-CM

## 2023-12-07 DIAGNOSIS — L60.0 INGROWN TOENAIL WITHOUT INFECTION: Primary | ICD-10-CM

## 2023-12-07 PROCEDURE — 3044F HG A1C LEVEL LT 7.0%: CPT | Mod: CPTII,S$GLB,, | Performed by: PODIATRIST

## 2023-12-07 PROCEDURE — 11730 PR REMOVAL OF NAIL PLATE: ICD-10-PCS | Mod: T5,S$GLB,, | Performed by: PODIATRIST

## 2023-12-07 PROCEDURE — 4010F PR ACE/ARB THEARPY RXD/TAKEN: ICD-10-PCS | Mod: CPTII,S$GLB,, | Performed by: PODIATRIST

## 2023-12-07 PROCEDURE — 1160F RVW MEDS BY RX/DR IN RCRD: CPT | Mod: CPTII,S$GLB,, | Performed by: PODIATRIST

## 2023-12-07 PROCEDURE — 1159F MED LIST DOCD IN RCRD: CPT | Mod: CPTII,S$GLB,, | Performed by: PODIATRIST

## 2023-12-07 PROCEDURE — 3008F BODY MASS INDEX DOCD: CPT | Mod: CPTII,S$GLB,, | Performed by: PODIATRIST

## 2023-12-07 PROCEDURE — 3066F NEPHROPATHY DOC TX: CPT | Mod: CPTII,S$GLB,, | Performed by: PODIATRIST

## 2023-12-07 PROCEDURE — 3061F NEG MICROALBUMINURIA REV: CPT | Mod: CPTII,S$GLB,, | Performed by: PODIATRIST

## 2023-12-07 PROCEDURE — 1159F PR MEDICATION LIST DOCUMENTED IN MEDICAL RECORD: ICD-10-PCS | Mod: CPTII,S$GLB,, | Performed by: PODIATRIST

## 2023-12-07 PROCEDURE — 3288F PR FALLS RISK ASSESSMENT DOCUMENTED: ICD-10-PCS | Mod: CPTII,S$GLB,, | Performed by: PODIATRIST

## 2023-12-07 PROCEDURE — 99213 PR OFFICE/OUTPT VISIT, EST, LEVL III, 20-29 MIN: ICD-10-PCS | Mod: 25,S$GLB,, | Performed by: PODIATRIST

## 2023-12-07 PROCEDURE — 3074F PR MOST RECENT SYSTOLIC BLOOD PRESSURE < 130 MM HG: ICD-10-PCS | Mod: CPTII,S$GLB,, | Performed by: PODIATRIST

## 2023-12-07 PROCEDURE — 1160F PR REVIEW ALL MEDS BY PRESCRIBER/CLIN PHARMACIST DOCUMENTED: ICD-10-PCS | Mod: CPTII,S$GLB,, | Performed by: PODIATRIST

## 2023-12-07 PROCEDURE — 1125F AMNT PAIN NOTED PAIN PRSNT: CPT | Mod: CPTII,S$GLB,, | Performed by: PODIATRIST

## 2023-12-07 PROCEDURE — 3288F FALL RISK ASSESSMENT DOCD: CPT | Mod: CPTII,S$GLB,, | Performed by: PODIATRIST

## 2023-12-07 PROCEDURE — 99999 PR PBB SHADOW E&M-EST. PATIENT-LVL V: ICD-10-PCS | Mod: PBBFAC,,, | Performed by: PODIATRIST

## 2023-12-07 PROCEDURE — 1101F PR PT FALLS ASSESS DOC 0-1 FALLS W/OUT INJ PAST YR: ICD-10-PCS | Mod: CPTII,S$GLB,, | Performed by: PODIATRIST

## 2023-12-07 PROCEDURE — 3044F PR MOST RECENT HEMOGLOBIN A1C LEVEL <7.0%: ICD-10-PCS | Mod: CPTII,S$GLB,, | Performed by: PODIATRIST

## 2023-12-07 PROCEDURE — 99999 PR PBB SHADOW E&M-EST. PATIENT-LVL V: CPT | Mod: PBBFAC,,, | Performed by: PODIATRIST

## 2023-12-07 PROCEDURE — 99213 OFFICE O/P EST LOW 20 MIN: CPT | Mod: 25,S$GLB,, | Performed by: PODIATRIST

## 2023-12-07 PROCEDURE — 1101F PT FALLS ASSESS-DOCD LE1/YR: CPT | Mod: CPTII,S$GLB,, | Performed by: PODIATRIST

## 2023-12-07 PROCEDURE — 3066F PR DOCUMENTATION OF TREATMENT FOR NEPHROPATHY: ICD-10-PCS | Mod: CPTII,S$GLB,, | Performed by: PODIATRIST

## 2023-12-07 PROCEDURE — 3074F SYST BP LT 130 MM HG: CPT | Mod: CPTII,S$GLB,, | Performed by: PODIATRIST

## 2023-12-07 PROCEDURE — 1125F PR PAIN SEVERITY QUANTIFIED, PAIN PRESENT: ICD-10-PCS | Mod: CPTII,S$GLB,, | Performed by: PODIATRIST

## 2023-12-07 PROCEDURE — 3008F PR BODY MASS INDEX (BMI) DOCUMENTED: ICD-10-PCS | Mod: CPTII,S$GLB,, | Performed by: PODIATRIST

## 2023-12-07 PROCEDURE — 3061F PR NEG MICROALBUMINURIA RESULT DOCUMENTED/REVIEW: ICD-10-PCS | Mod: CPTII,S$GLB,, | Performed by: PODIATRIST

## 2023-12-07 PROCEDURE — 3078F PR MOST RECENT DIASTOLIC BLOOD PRESSURE < 80 MM HG: ICD-10-PCS | Mod: CPTII,S$GLB,, | Performed by: PODIATRIST

## 2023-12-07 PROCEDURE — 11730 AVULSION NAIL PLATE SIMPLE 1: CPT | Mod: T5,S$GLB,, | Performed by: PODIATRIST

## 2023-12-07 PROCEDURE — 4010F ACE/ARB THERAPY RXD/TAKEN: CPT | Mod: CPTII,S$GLB,, | Performed by: PODIATRIST

## 2023-12-07 PROCEDURE — 3078F DIAST BP <80 MM HG: CPT | Mod: CPTII,S$GLB,, | Performed by: PODIATRIST

## 2023-12-07 RX ORDER — DOXYCYCLINE 100 MG/1
100 CAPSULE ORAL EVERY 12 HOURS
Qty: 14 CAPSULE | Refills: 0 | Status: SHIPPED | OUTPATIENT
Start: 2023-12-07 | End: 2024-01-17

## 2023-12-12 ENCOUNTER — OFFICE VISIT (OUTPATIENT)
Dept: PRIMARY CARE CLINIC | Facility: CLINIC | Age: 68
End: 2023-12-12
Payer: MEDICARE

## 2023-12-12 ENCOUNTER — HOSPITAL ENCOUNTER (OUTPATIENT)
Dept: RADIOLOGY | Facility: HOSPITAL | Age: 68
Discharge: HOME | End: 2023-12-12
Attending: INTERNAL MEDICINE
Payer: MEDICARE

## 2023-12-12 DIAGNOSIS — Z51.81 ENCOUNTER FOR MONITORING LONG-TERM PROTON PUMP INHIBITOR THERAPY: ICD-10-CM

## 2023-12-12 DIAGNOSIS — R11.2 NAUSEA AND VOMITING, UNSPECIFIED VOMITING TYPE: ICD-10-CM

## 2023-12-12 DIAGNOSIS — E73.1 ACQUIRED LACTASE DEFICIENCY: ICD-10-CM

## 2023-12-12 DIAGNOSIS — R05.3 CHRONIC COUGH: ICD-10-CM

## 2023-12-12 DIAGNOSIS — D50.9 IRON DEFICIENCY ANEMIA, UNSPECIFIED IRON DEFICIENCY ANEMIA TYPE: ICD-10-CM

## 2023-12-12 DIAGNOSIS — Z13.220 SCREENING CHOLESTEROL LEVEL: Primary | ICD-10-CM

## 2023-12-12 DIAGNOSIS — K21.9 GASTROESOPHAGEAL REFLUX DISEASE, UNSPECIFIED WHETHER ESOPHAGITIS PRESENT: ICD-10-CM

## 2023-12-12 DIAGNOSIS — E11.9 TYPE 2 DIABETES MELLITUS WITHOUT COMPLICATION, WITHOUT LONG-TERM CURRENT USE OF INSULIN: ICD-10-CM

## 2023-12-12 DIAGNOSIS — K76.0 FATTY LIVER: ICD-10-CM

## 2023-12-12 DIAGNOSIS — K44.9 HIATAL HERNIA: ICD-10-CM

## 2023-12-12 DIAGNOSIS — Z79.899 ENCOUNTER FOR MONITORING LONG-TERM PROTON PUMP INHIBITOR THERAPY: ICD-10-CM

## 2023-12-12 PROCEDURE — 4010F PR ACE/ARB THEARPY RXD/TAKEN: ICD-10-PCS | Mod: CPTII,95,, | Performed by: STUDENT IN AN ORGANIZED HEALTH CARE EDUCATION/TRAINING PROGRAM

## 2023-12-12 PROCEDURE — 3061F PR NEG MICROALBUMINURIA RESULT DOCUMENTED/REVIEW: ICD-10-PCS | Mod: CPTII,95,, | Performed by: STUDENT IN AN ORGANIZED HEALTH CARE EDUCATION/TRAINING PROGRAM

## 2023-12-12 PROCEDURE — 74177 CT ABD & PELVIS W/CONTRAST: CPT | Mod: TC

## 2023-12-12 PROCEDURE — 25500020 PHARM REV CODE 255: Performed by: INTERNAL MEDICINE

## 2023-12-12 PROCEDURE — 3066F NEPHROPATHY DOC TX: CPT | Mod: CPTII,95,, | Performed by: STUDENT IN AN ORGANIZED HEALTH CARE EDUCATION/TRAINING PROGRAM

## 2023-12-12 PROCEDURE — 74177 CT ENTEROGRAPHY ABD_PELVIS WITH CONTRAST: ICD-10-PCS | Mod: 26,,, | Performed by: RADIOLOGY

## 2023-12-12 PROCEDURE — 3066F PR DOCUMENTATION OF TREATMENT FOR NEPHROPATHY: ICD-10-PCS | Mod: CPTII,95,, | Performed by: STUDENT IN AN ORGANIZED HEALTH CARE EDUCATION/TRAINING PROGRAM

## 2023-12-12 PROCEDURE — 3044F PR MOST RECENT HEMOGLOBIN A1C LEVEL <7.0%: ICD-10-PCS | Mod: CPTII,95,, | Performed by: STUDENT IN AN ORGANIZED HEALTH CARE EDUCATION/TRAINING PROGRAM

## 2023-12-12 PROCEDURE — 99214 PR OFFICE/OUTPT VISIT, EST, LEVL IV, 30-39 MIN: ICD-10-PCS | Mod: 95,,, | Performed by: STUDENT IN AN ORGANIZED HEALTH CARE EDUCATION/TRAINING PROGRAM

## 2023-12-12 PROCEDURE — 74177 CT ABD & PELVIS W/CONTRAST: CPT | Mod: 26,,, | Performed by: RADIOLOGY

## 2023-12-12 PROCEDURE — 4010F ACE/ARB THERAPY RXD/TAKEN: CPT | Mod: CPTII,95,, | Performed by: STUDENT IN AN ORGANIZED HEALTH CARE EDUCATION/TRAINING PROGRAM

## 2023-12-12 PROCEDURE — 3044F HG A1C LEVEL LT 7.0%: CPT | Mod: CPTII,95,, | Performed by: STUDENT IN AN ORGANIZED HEALTH CARE EDUCATION/TRAINING PROGRAM

## 2023-12-12 PROCEDURE — 3061F NEG MICROALBUMINURIA REV: CPT | Mod: CPTII,95,, | Performed by: STUDENT IN AN ORGANIZED HEALTH CARE EDUCATION/TRAINING PROGRAM

## 2023-12-12 PROCEDURE — 99214 OFFICE O/P EST MOD 30 MIN: CPT | Mod: 95,,, | Performed by: STUDENT IN AN ORGANIZED HEALTH CARE EDUCATION/TRAINING PROGRAM

## 2023-12-12 RX ADMIN — IOHEXOL 100 ML: 350 INJECTION, SOLUTION INTRAVENOUS at 07:12

## 2023-12-12 NOTE — PROGRESS NOTES
12/24/2023    Regine Cooper  [id number]    CC: follow up  Location: [place]  Visit type: audiovisual     Face to Face time with patient: 15  total minutes of total time spent on the encounter, which includes face to face time and non-face to face time preparing to see the patient (eg, review of tests), Obtaining and/or reviewing separately obtained history, Documenting clinical information in the electronic or other health record, Independently interpreting results (not separately reported) and communicating results to the patient/family/caregiver, or Care coordination (not separately reported).       Each patient to whom he or she provides medical services by telemedicine is:  (1) informed of the relationship between the physician and patient and the respective role of any other health care provider with respect to management of the patient; and (2) notified that he or she may decline to receive medical services by telemedicine and may withdraw from such care at any time.        HPI  This pt is known to me and presents virtually to follow up chronic cough.    Is doing better with decreased cough. Still has a few episodes per day, but overall the longer taper of steroids was really helpfully. Has been checking her Bg and highest is 130. Patient will be establishing with pulmonology in 3 days . Rib and chest is hurting much less.    Negative 10 point ROS outside of HPI    Social History     Socioeconomic History    Marital status:     Number of children: 1   Tobacco Use    Smoking status: Never     Passive exposure: Never    Smokeless tobacco: Never   Substance and Sexual Activity    Alcohol use: No    Drug use: No    Sexual activity: Not Currently   Social History Narrative        1 son (estranged)    3 grandchildren ([place])    Born and raised in [place] (moved to [facility] 2017)     Social Determinants of Health     Financial Resource Strain: Low Risk  (11/23/2023)    Overall Financial Resource  Strain (CARDIA)     Difficulty of Paying Living Expenses: Not very hard   Food Insecurity: No Food Insecurity (2023)    Hunger Vital Sign     Worried About Running Out of Food in the Last Year: Never true     Ran Out of Food in the Last Year: Never true   Transportation Needs: No Transportation Needs (2023)    PRAPARE - Transportation     Lack of Transportation (Medical): No     Lack of Transportation (Non-Medical): No   Physical Activity: Insufficiently Active (2023)    Exercise Vital Sign     Days of Exercise per Week: 2 days     Minutes of Exercise per Session: 40 min   Stress: No Stress Concern Present (2023)    [employer] - Occupational Stress Questionnaire     Feeling of Stress : Only a little   Social Connections: Unknown (2023)    Social Connection and Isolation Panel [NHANES]     Frequency of Communication with Friends and Family: More than three times a week     Frequency of Social Gatherings with Friends and Family: Once a week     Active Member of Clubs or Organizations: Yes     Attends Club or Organization Meetings: More than 4 times per year     Marital Status:    Housing Stability: Low Risk  (2023)    Housing Stability Vital Sign     Unable to Pay for Housing in the Last Year: No     Number of Places Lived in the Last Year: 1     Unstable Housing in the Last Year: No           Current Outpatient Medications:     acetic acid (VOSOL) 2 % otic solution, Place 4 drops into both ears as needed., Disp: , Rfl:     albuterol (PROVENTIL/VENTOLIN HFA) 90 mcg/actuation inhaler, SMARTSI-2 Puff(s) Via Inhaler Every 6 Hours PRN, Disp: 6.7 g, Rfl: 0    ammonium lactate 12 % Crea, Apply 1 application  topically 2 (two) times daily., Disp: 140 g, Rfl: 11    augmented betamethasone dipropionate (DIPROLENE-AF) 0.05 % cream, Apply topically 2 (two) times to 4 times daily to area of back when itchy or burning.Stop using steroid topical when skin  is non itchy.  Do not treat dark or red coloring., Disp: 50 g, Rfl: 0    azelastine (ASTELIN) 137 mcg (0.1 %) nasal spray, 1 SPRAY IN EACH NOSTRIL TWICE DAILY Strength: 137 mcg (0.1 %), Disp: 30 mL, Rfl: 2    blood sugar diagnostic (ONETOUCH VERIO TEST STRIPS) Strp, To Check BG 1x daily, Disp: 100 each, Rfl: 3    blood sugar diagnostic Strp, Check BG twice daily, Disp: 200 each, Rfl: 2    blood-glucose meter kit, Use as instructed, Disp: 1 each, Rfl: 0    ciclopirox (PENLAC) 8 % Soln, Apply topically nightly., Disp: 6.6 mL, Rfl: 3    clobetasoL (TEMOVATE) 0.05 % external solution, APPLY TOPICALLY TO THE SCALP TWICE DAILY AS NEEDED FOR ITCHING OR IRRITATION, Disp: 50 mL, Rfl: 0    COMIRNATY 2023-24, 12Y UP,,PF, 30 mcg/0.3 mL injection, , Disp: , Rfl:     doxycycline (VIBRAMYCIN) 100 MG Cap, Take 1 capsule (100 mg total) by mouth every 12 (twelve) hours. (Patient not taking: Reported on 12/15/2023), Disp: 14 capsule, Rfl: 0    ferrous sulfate (FEOSOL) 325 mg (65 mg iron) Tab tablet, Take 1 hour before meals, Disp: 60 tablet, Rfl: 2    fluticasone propionate (FLONASE) 50 mcg/actuation nasal spray, 2 sprays to each lateral nostril once a day, Disp: 48 g, Rfl: 3    fluticasone-salmeterol diskus inhaler 250-50 mcg, Inhale 1 puff into the lungs 2 (two) times daily. Controller, Disp: 60 each, Rfl: 11    glipiZIDE 5 MG TR24, Take 1 tablet (5 mg total) by mouth 2 (two) times daily., Disp: 180 tablet, Rfl: 3    hydroCHLOROthiazide (HYDRODIURIL) 25 MG tablet, Take 1 tablet (25 mg total) by mouth once daily., Disp: 90 tablet, Rfl: 3    hydrocodone-homatropine 5-1.5 mg/5 ml (HYDROMET) 5-1.5 mg/5 mL Syrp, Take 5 mLs by mouth every 6 (six) hours as needed (cough)., Disp: 120 mL, Rfl: 0    lancets Misc, To check BG 2 times daily, Disp: 200 each, Rfl: 3    latanoprost 0.005 % ophthalmic solution, Place 1 drop into both eyes every evening., Disp: 7.5 mL, Rfl: 3    losartan (COZAAR) 50 MG tablet, Take 1 tablet (50 mg total) by mouth  once daily., Disp: 90 tablet, Rfl: 3    metFORMIN (GLUCOPHAGE) 1000 MG tablet, Take 1 tablet (1,000 mg total) by mouth 2 (two) times daily with meals., Disp: 180 tablet, Rfl: 3    montelukast (SINGULAIR) 10 mg tablet, TAKE 1 TABLET(10 MG) BY MOUTH EVERY EVENING, Disp: 90 tablet, Rfl: 3    ondansetron (ZOFRAN-ODT) 8 MG TbDL, Take 1 tablet (8 mg total) by mouth every 8 (eight) hours as needed (Nausea)., Disp: 30 tablet, Rfl: 2    ONETOUCH DELICA PLUS LANCET 33 gauge Misc, 2 (two) times daily., Disp: , Rfl:     ONETOUCH VERIO REFLECT METER Misc, USE AS DIRECTED TO TEST BLOOD GLUCOSE, Disp: , Rfl:     pantoprazole (PROTONIX) 40 MG tablet, Take 1 tablet (40 mg total) by mouth once daily., Disp: 90 tablet, Rfl: 1    promethazine-dextromethorphan (PROMETHAZINE-DM) 6.25-15 mg/5 mL Syrp, SMARTSI.5 Milliliter(s) By Mouth 4 Times Daily PRN (Patient not taking: Reported on 12/15/2023), Disp: 100 mL, Rfl: 0    rizatriptan (MAXALT-MLT) 10 MG disintegrating tablet, Take 1 tablet (10 mg total) by mouth every 2 (two) hours as needed for Migraine. Max 30 mg/day., Disp: 12 tablet, Rfl: 5    semaglutide (OZEMPIC) 0.25 mg or 0.5 mg (2 mg/3 mL) pen injector, Inject 0.25 mg into the skin every 7 days., Disp: 3 mL, Rfl: 0    ubrogepant (UBRELVY) 100 mg tablet, Take 1 tablet (100 mg total) by mouth every 2 (two) hours as needed for Migraine. Max 200 mg/day., Disp: 10 tablet, Rfl: 2      Physical Exam  Vitals unable to obtain    Gen: well appearing  Resp: speaks in full sentences. Non labored breathing  Cv: non-diaphoretic      1. Screening cholesterol level  Lipid panel ordered.    2. Type 2 diabetes mellitus without complication, without long-term current use of insulin  Fair control; continue current regimen  - Lipid Panel; Future over due    3. Chronic cough  Improved from last visit  Continue allergy medication   Will be establishing with pulmonology soon  May try switching off of losartan        RTC as needed for routine  care    Gladis Melissa MD  Family Medicine

## 2023-12-14 NOTE — PROGRESS NOTES
Subjective:      Patient ID: Regine Cooper is a 68 y.o. female.    Chief Complaint: Diabetes Mellitus (11/28/2023 - Gladis Melissa MD, PCP) and Nail Problem (Right great toe, nail is pinching and was bleeding for a few days)    Regine is a 68 y.o. female who presents to the clinic upon referral from Dr. Tracy aguilar. provider found  for evaluation and treatment of diabetic feet. Regine has a past medical history of Allergy, Breast cancer (2017), Cataract, Cervical spondylosis (07/29/2022), Diabetes mellitus, type 2, Eczema, Fibromyalgia, Glaucoma, Hypertension, Renal cyst, right (02/06/2020), and Steatosis of liver (02/06/2020). Patient relates no major problem with feet. Only complaints today consist of routine diabetic foot evaluation.  Having an issue with right great toe ingrown nail, here for possible procedure today.  PCP: Gladis Melissa MD  /swelling.  Date Last Seen by PCP:   Chief Complaint   Patient presents with    Diabetes Mellitus     11/28/2023 - Gladis Melissa MD, PCP    Nail Problem     Right great toe, nail is pinching and was bleeding for a few days         Current shoe gear: Casual shoes    Hemoglobin A1C   Date Value Ref Range Status   10/20/2023 6.6 (H) 4.0 - 5.6 % Final     Comment:     ADA Screening Guidelines:  5.7-6.4%  Consistent with prediabetes  >or=6.5%  Consistent with diabetes    High levels of fetal hemoglobin interfere with the HbA1C  assay. Heterozygous hemoglobin variants (HbS, HgC, etc)do  not significantly interfere with this assay.   However, presence of multiple variants may affect accuracy.     06/27/2023 7.2 (H) 4.0 - 5.6 % Final     Comment:     ADA Screening Guidelines:  5.7-6.4%  Consistent with prediabetes  >or=6.5%  Consistent with diabetes    High levels of fetal hemoglobin interfere with the HbA1C  assay. Heterozygous hemoglobin variants (HbS, HgC, etc)do  not significantly interfere with this assay.   However, presence of multiple variants may affect accuracy.      03/28/2023 7.7 (H) 4.0 - 5.6 % Final     Comment:     ADA Screening Guidelines:  5.7-6.4%  Consistent with prediabetes  >or=6.5%  Consistent with diabetes    High levels of fetal hemoglobin interfere with the HbA1C  assay. Heterozygous hemoglobin variants (HbS, HgC, etc)do  not significantly interfere with this assay.   However, presence of multiple variants may affect accuracy.             Review of Systems   Constitutional: Positive for night sweats. Negative for chills and fever.   HENT:  Negative for congestion and tinnitus.    Eyes:  Negative for double vision and visual disturbance.   Cardiovascular:  Negative for chest pain and claudication.   Respiratory:  Negative for hemoptysis and shortness of breath.    Endocrine: Negative for cold intolerance and heat intolerance.   Hematologic/Lymphatic: Negative for adenopathy and bleeding problem.   Skin:  Negative for color change and nail changes.   Musculoskeletal:  Positive for back pain and joint pain. Negative for myalgias and stiffness.   Gastrointestinal:  Negative for nausea and vomiting.   Genitourinary:  Negative for dysuria and hematuria.   Neurological:  Positive for paresthesias. Negative for numbness.   Psychiatric/Behavioral:  Negative for altered mental status and suicidal ideas.    Allergic/Immunologic: Negative for environmental allergies and persistent infections.           Objective:      Physical Exam  Vitals reviewed.   Constitutional:       Appearance: She is well-developed.   Cardiovascular:      Pulses:           Dorsalis pedis pulses are 2+ on the right side and 2+ on the left side.        Posterior tibial pulses are 2+ on the right side and 2+ on the left side.   Pulmonary:      Effort: Pulmonary effort is normal.   Musculoskeletal:         General: Normal range of motion.      Comments: Inspection and palpation of the muscles joints and bones of both lower extremities reveal that muscle strength for the anterior lateral and posterior  muscle groups and intrinsic muscle groups of the foot are all 5 over 5 symmetrical.  Ankle subtalar midtarsal and digital joint range of motion are within normal limits, nonpainful, without crepitus or effusion.  Patient exhibits a normal angle and base of gait.  Palpation of the tendons reveal no defects.   Skin:     General: Skin is warm and dry.      Capillary Refill: Capillary refill takes 2 to 3 seconds.      Comments: Skin turgor is normal bilaterally.  Skin texture is well hydrated to both lower extremities.  No lesions or rashes or wounds appreciated bilaterally.  Proximal clearing noted to bilateral pedal nails.   Neurological:      Mental Status: She is alert and oriented to person, place, and time.      Comments: Sharp dull light touch vibratory proprioceptive sensation are intact bilaterally.  Deep tendon reflexes to patellar and Achilles tendon are symmetrical 2 over 4 bilaterally.  No ankle clonus or Babinski reflexes noted bilaterally.  Coordination is normal to both feet and lower extremities.               Assessment:       Encounter Diagnoses   Name Primary?    Ingrown toenail without infection Yes    Pain of toe of right foot     Type 2 diabetes mellitus without complication, without long-term current use of insulin          Plan:       Regine was seen today for diabetes mellitus and nail problem.    Diagnoses and all orders for this visit:    Ingrown toenail without infection    Pain of toe of right foot    Type 2 diabetes mellitus without complication, without long-term current use of insulin    Other orders  -     doxycycline (VIBRAMYCIN) 100 MG Cap; Take 1 capsule (100 mg total) by mouth every 12 (twelve) hours.      I counseled the patient on her conditions, their implications and medical management.    Temporary Nail Removal     Performed by:  Addison Haynes DPM     Consent Done?:  Yes (Written)     Location:  Right hallux nail    Anesthesia:  Digital block  Local anesthetic: 0.5% Marcaine  without epinephrine  Anesthetic total (ml):  4  Preparation:  Skin prepped with alcohol and skin prepped with Betadine     Amount removed:  Total nail avulsion  Wedge excision of skin of nail fold: No    Nail bed sutured?: No    Nail matrix removed:  No  Removed nail replaced and anchored: No    Dressing applied:  4x4, antibiotic ointment and dressing applied  Patient tolerance:  Patient tolerated the procedure well with no immediate complications    Digital nerve block applied to the above-mentioned toe. Toe was prepped and draped in a sterile fashion and penrose drain applied. Anesthesia was confirmed and the offending portion of nail plate was freed from the nail bed and eponychium, and was then excised. The surgical site was then lavaged with copious amounts of 70% isopropyl alcohol. Penrose drain was removed and betadine ointment and dry sterile dressing applied.     Post-op care instructions were discussed and dispensed to patient.

## 2023-12-15 ENCOUNTER — OFFICE VISIT (OUTPATIENT)
Dept: GASTROENTEROLOGY | Facility: CLINIC | Age: 68
End: 2023-12-15
Payer: MEDICARE

## 2023-12-15 ENCOUNTER — OFFICE VISIT (OUTPATIENT)
Dept: PULMONOLOGY | Facility: CLINIC | Age: 68
End: 2023-12-15
Payer: MEDICARE

## 2023-12-15 VITALS
DIASTOLIC BLOOD PRESSURE: 74 MMHG | HEIGHT: 65 IN | BODY MASS INDEX: 38.32 KG/M2 | HEART RATE: 105 BPM | SYSTOLIC BLOOD PRESSURE: 132 MMHG | WEIGHT: 230 LBS

## 2023-12-15 VITALS
BODY MASS INDEX: 38.64 KG/M2 | HEIGHT: 65 IN | WEIGHT: 231.94 LBS | SYSTOLIC BLOOD PRESSURE: 128 MMHG | OXYGEN SATURATION: 98 % | DIASTOLIC BLOOD PRESSURE: 76 MMHG | HEART RATE: 93 BPM

## 2023-12-15 DIAGNOSIS — J98.4 APICAL LUNG SCARRING: ICD-10-CM

## 2023-12-15 DIAGNOSIS — E66.01 SEVERE OBESITY (BMI 35.0-35.9 WITH COMORBIDITY): ICD-10-CM

## 2023-12-15 DIAGNOSIS — R05.3 CHRONIC COUGH: ICD-10-CM

## 2023-12-15 DIAGNOSIS — D50.9 IRON DEFICIENCY ANEMIA, UNSPECIFIED IRON DEFICIENCY ANEMIA TYPE: Primary | ICD-10-CM

## 2023-12-15 DIAGNOSIS — R05.8 POST-VIRAL COUGH SYNDROME: Primary | ICD-10-CM

## 2023-12-15 PROCEDURE — 3044F PR MOST RECENT HEMOGLOBIN A1C LEVEL <7.0%: ICD-10-PCS | Mod: CPTII,S$GLB,,

## 2023-12-15 PROCEDURE — 1159F PR MEDICATION LIST DOCUMENTED IN MEDICAL RECORD: ICD-10-PCS | Mod: CPTII,S$GLB,,

## 2023-12-15 PROCEDURE — 1101F PT FALLS ASSESS-DOCD LE1/YR: CPT | Mod: CPTII,S$GLB,,

## 2023-12-15 PROCEDURE — 1126F PR PAIN SEVERITY QUANTIFIED, NO PAIN PRESENT: ICD-10-PCS | Mod: CPTII,S$GLB,,

## 2023-12-15 PROCEDURE — 3061F PR NEG MICROALBUMINURIA RESULT DOCUMENTED/REVIEW: ICD-10-PCS | Mod: CPTII,S$GLB,,

## 2023-12-15 PROCEDURE — 3066F PR DOCUMENTATION OF TREATMENT FOR NEPHROPATHY: ICD-10-PCS | Mod: CPTII,S$GLB,,

## 2023-12-15 PROCEDURE — 99999 PR PBB SHADOW E&M-EST. PATIENT-LVL V: CPT | Mod: PBBFAC,,,

## 2023-12-15 PROCEDURE — 3075F SYST BP GE 130 - 139MM HG: CPT | Mod: CPTII,S$GLB,,

## 2023-12-15 PROCEDURE — 4010F ACE/ARB THERAPY RXD/TAKEN: CPT | Mod: CPTII,S$GLB,,

## 2023-12-15 PROCEDURE — 3008F BODY MASS INDEX DOCD: CPT | Mod: CPTII,S$GLB,,

## 2023-12-15 PROCEDURE — 3288F FALL RISK ASSESSMENT DOCD: CPT | Mod: CPTII,S$GLB,, | Performed by: INTERNAL MEDICINE

## 2023-12-15 PROCEDURE — 3288F FALL RISK ASSESSMENT DOCD: CPT | Mod: CPTII,S$GLB,,

## 2023-12-15 PROCEDURE — 99214 PR OFFICE/OUTPT VISIT, EST, LEVL IV, 30-39 MIN: ICD-10-PCS | Mod: S$GLB,,,

## 2023-12-15 PROCEDURE — 1101F PR PT FALLS ASSESS DOC 0-1 FALLS W/OUT INJ PAST YR: ICD-10-PCS | Mod: CPTII,S$GLB,,

## 2023-12-15 PROCEDURE — 3288F PR FALLS RISK ASSESSMENT DOCUMENTED: ICD-10-PCS | Mod: CPTII,S$GLB,,

## 2023-12-15 PROCEDURE — 3008F PR BODY MASS INDEX (BMI) DOCUMENTED: ICD-10-PCS | Mod: CPTII,S$GLB,,

## 2023-12-15 PROCEDURE — 3074F SYST BP LT 130 MM HG: CPT | Mod: CPTII,S$GLB,, | Performed by: INTERNAL MEDICINE

## 2023-12-15 PROCEDURE — 3078F PR MOST RECENT DIASTOLIC BLOOD PRESSURE < 80 MM HG: ICD-10-PCS | Mod: CPTII,S$GLB,,

## 2023-12-15 PROCEDURE — 3008F BODY MASS INDEX DOCD: CPT | Mod: CPTII,S$GLB,, | Performed by: INTERNAL MEDICINE

## 2023-12-15 PROCEDURE — 3044F HG A1C LEVEL LT 7.0%: CPT | Mod: CPTII,S$GLB,,

## 2023-12-15 PROCEDURE — 3078F DIAST BP <80 MM HG: CPT | Mod: CPTII,S$GLB,,

## 2023-12-15 PROCEDURE — 99214 OFFICE O/P EST MOD 30 MIN: CPT | Mod: S$GLB,,,

## 2023-12-15 PROCEDURE — 1101F PT FALLS ASSESS-DOCD LE1/YR: CPT | Mod: CPTII,S$GLB,, | Performed by: INTERNAL MEDICINE

## 2023-12-15 PROCEDURE — 4010F PR ACE/ARB THEARPY RXD/TAKEN: ICD-10-PCS | Mod: CPTII,S$GLB,,

## 2023-12-15 PROCEDURE — 3061F NEG MICROALBUMINURIA REV: CPT | Mod: CPTII,S$GLB,,

## 2023-12-15 PROCEDURE — 3078F DIAST BP <80 MM HG: CPT | Mod: CPTII,S$GLB,, | Performed by: INTERNAL MEDICINE

## 2023-12-15 PROCEDURE — 3075F PR MOST RECENT SYSTOLIC BLOOD PRESS GE 130-139MM HG: ICD-10-PCS | Mod: CPTII,S$GLB,,

## 2023-12-15 PROCEDURE — 99999 PR PBB SHADOW E&M-EST. PATIENT-LVL V: ICD-10-PCS | Mod: PBBFAC,,,

## 2023-12-15 PROCEDURE — 1126F AMNT PAIN NOTED NONE PRSNT: CPT | Mod: CPTII,S$GLB,,

## 2023-12-15 PROCEDURE — 3066F NEPHROPATHY DOC TX: CPT | Mod: CPTII,S$GLB,,

## 2023-12-15 PROCEDURE — 99204 OFFICE O/P NEW MOD 45 MIN: CPT | Mod: S$GLB,,, | Performed by: INTERNAL MEDICINE

## 2023-12-15 PROCEDURE — 99999 PR PBB SHADOW E&M-EST. PATIENT-LVL V: CPT | Mod: PBBFAC,,, | Performed by: INTERNAL MEDICINE

## 2023-12-15 PROCEDURE — 1159F MED LIST DOCD IN RCRD: CPT | Mod: CPTII,S$GLB,,

## 2023-12-15 RX ORDER — FLUTICASONE PROPIONATE AND SALMETEROL 250; 50 UG/1; UG/1
1 POWDER RESPIRATORY (INHALATION) 2 TIMES DAILY
Qty: 60 EACH | Refills: 11 | Status: SHIPPED | OUTPATIENT
Start: 2023-12-15 | End: 2024-12-14

## 2023-12-15 NOTE — PROGRESS NOTES
"GENERAL GI PATIENT INTAKE:    COVID symptoms in the last 7 days (runny nose, sore throat, congestion, cough, fever): No  PCP: Gladis Melissa  If not PCP-  number given to establish [phone]: N/A    ALLERGIES REVIEWED:  Yes    CHIEF COMPLAINT:    Chief Complaint   Patient presents with    Follow-up     Video capsule       VITAL SIGNS:  /74   Pulse 105   Ht 5' 5" (1.651 m)   Wt 104.3 kg (230 lb)   BMI 38.27 kg/m²      Change in medical, surgical, family or social history: No      REVIEWED MEDICATION LIST RECONCILED INCLUDING ABOVE MEDS:  Yes     "

## 2023-12-15 NOTE — PROGRESS NOTES
Subjective:       Patient ID: Regine Cooper is a 68 y.o. female.    Chief Complaint: Cough    HPI:   Regine Cooper is a 68 y.o. female new to me who presents for evaluation of a chronic cough and ILD.      Cough- Started Feb/March w/ a URI. Productive. Several urgent care/ed visits. Several courses of steroids. Started on singulair. Finally improved after a longer course of pred. A/w TAYLOR, wheezing, msk chest pain. Denies GERD.     + seasonal allergies. Followed by ENT who just retired. On flonase, asteline and previously on zyrtec    Denies chest pain, orthopnea, PND, LE edema, palpitations, syncope/presyncope    Denies f/c/ns, weight loss, malaise, fatigue    Denies rashes, myalgias, arthralgias, hair/nail changes, eye changes, raynauds, mucosal ulcerations    Exposures:  Work-  in a hospital  Tobacco- Denies  Inhalational Agents- Denies  Mold- Denies  Pets- Denies   Birds- Denies   Hot tubs- Denies    Family History of Lung Cancer: Father, smoker  Family History of Lung Disease: Son w/ asthma  Childhood History of Lung Disease: Denies     Review of Systems    As above    Social History     Tobacco Use    Smoking status: Never     Passive exposure: Never    Smokeless tobacco: Never   Substance Use Topics    Alcohol use: No       Review of patient's allergies indicates:   Allergen Reactions    Codeine Shortness Of Breath     Pt states she had a reaction as a teenager and was taken to the ER.    Lisinopril      cough     Past Medical History:   Diagnosis Date    Allergy     Breast cancer 2017    Cataract     Cervical spondylosis 07/29/2022    Diabetes mellitus, type 2     Type 2    Eczema     Fibromyalgia     Glaucoma     Hypertension     Renal cyst, right 02/06/2020    Steatosis of liver 02/06/2020     Past Surgical History:   Procedure Laterality Date    ADENOIDECTOMY      ANTERIOR CERVICAL DISCECTOMY W/ FUSION N/A 7/29/2022    Procedure: DISCECTOMY, SPINE, CERVICAL, ANTERIOR APPROACH,  WITH FUSION C5-7 SPINEWAVE SNS: VOCAL CORDS/MOTORS/SSEP;  Surgeon: Rosales Scruggs MD;  Location: [facility] OR 2ND FLR;  Service: Neurosurgery;  Laterality: N/A;    BREAST BIOPSY      BREAST SURGERY      breast ca lt side     COLONOSCOPY N/A 2023    Procedure: COLONOSCOPY;  Surgeon: Eliseo Garcia MD;  Location: [facility] ENDO (4TH FLR);  Service: Endoscopy;  Laterality: N/A;    ESOPHAGOGASTRODUODENOSCOPY N/A 2023    Procedure: EGD (ESOPHAGOGASTRODUODENOSCOPY);  Surgeon: Eliseo Garcia MD;  Location: [facility] ENDO (4TH FLR);  Service: Endoscopy;  Laterality: N/A;  r/o h pylori-miralax extended prep-instr portal and handed-tb    HYSTERECTOMY      supacervical hysterectomy    LAPAROSCOPIC SUPRACERVICAL HYSTERECTOMY      fibroids    MASTECTOMY Left 2017    chemo    MYOMECTOMY      TONSILLECTOMY      TYMPANOSTOMY TUBE PLACEMENT      VAGINAL DELIVERY       Current Outpatient Medications on File Prior to Visit   Medication Sig    acetic acid (VOSOL) 2 % otic solution Place 4 drops into both ears as needed.    albuterol (PROVENTIL/VENTOLIN HFA) 90 mcg/actuation inhaler SMARTSI-2 Puff(s) Via Inhaler Every 6 Hours PRN    ammonium lactate 12 % Crea Apply 1 application  topically 2 (two) times daily.    augmented betamethasone dipropionate (DIPROLENE-AF) 0.05 % cream Apply topically 2 (two) times to 4 times daily to area of back when itchy or burning.Stop using steroid topical when skin is non itchy.  Do not treat dark or red coloring.    azelastine (ASTELIN) 137 mcg (0.1 %) nasal spray 1 SPRAY IN EACH NOSTRIL TWICE DAILY  Strength: 137 mcg (0.1 %)    blood sugar diagnostic (ONETOUCH VERIO TEST STRIPS) Strp To Check BG 1x daily    blood sugar diagnostic Strp Check BG twice daily    blood-glucose meter kit Use as instructed    ciclopirox (PENLAC) 8 % Soln Apply topically nightly.    clobetasoL (TEMOVATE) 0.05 % external solution APPLY TOPICALLY TO THE SCALP TWICE DAILY AS NEEDED FOR ITCHING OR IRRITATION    COMIRNATY  -, 12Y UP,,PF, 30 mcg/0.3 mL injection     ferrous sulfate (FEOSOL) 325 mg (65 mg iron) Tab tablet Take 1 hour before meals    fluticasone propionate (FLONASE) 50 mcg/actuation nasal spray 2 sprays to each lateral nostril once a day    glipiZIDE 5 MG TR24 Take 1 tablet (5 mg total) by mouth 2 (two) times daily.    hydroCHLOROthiazide (HYDRODIURIL) 25 MG tablet Take 1 tablet (25 mg total) by mouth once daily.    hydrocodone-homatropine 5-1.5 mg/5 ml (HYDROMET) 5-1.5 mg/5 mL Syrp Take 5 mLs by mouth every 6 (six) hours as needed (cough).    lancets Misc To check BG 2 times daily    latanoprost 0.005 % ophthalmic solution Place 1 drop into both eyes every evening.    losartan (COZAAR) 50 MG tablet Take 1 tablet (50 mg total) by mouth once daily.    metFORMIN (GLUCOPHAGE) 1000 MG tablet Take 1 tablet (1,000 mg total) by mouth 2 (two) times daily with meals.    montelukast (SINGULAIR) 10 mg tablet TAKE 1 TABLET(10 MG) BY MOUTH EVERY EVENING    ondansetron (ZOFRAN-ODT) 8 MG TbDL Take 1 tablet (8 mg total) by mouth every 8 (eight) hours as needed (Nausea).    ONETOUCH DELICA PLUS LANCET 33 gauge Misc 2 (two) times daily.    ONETOUCH VERIO REFLECT METER Misc USE AS DIRECTED TO TEST BLOOD GLUCOSE    pantoprazole (PROTONIX) 40 MG tablet Take 1 tablet (40 mg total) by mouth once daily.    rizatriptan (MAXALT-MLT) 10 MG disintegrating tablet Take 1 tablet (10 mg total) by mouth every 2 (two) hours as needed for Migraine. Max 30 mg/day.    semaglutide (OZEMPIC) 0.25 mg or 0.5 mg (2 mg/3 mL) pen injector Inject 0.25 mg into the skin every 7 days.    ubrogepant (UBRELVY) 100 mg tablet Take 1 tablet (100 mg total) by mouth every 2 (two) hours as needed for Migraine. Max 200 mg/day.    doxycycline (VIBRAMYCIN) 100 MG Cap Take 1 capsule (100 mg total) by mouth every 12 (twelve) hours. (Patient not taking: Reported on 12/15/2023)    promethazine-dextromethorphan (PROMETHAZINE-DM) 6.25-15 mg/5 mL Syrp SMARTSI.5 Milliliter(s)  "By Mouth 4 Times Daily PRN    [DISCONTINUED] diclofenac sodium (VOLTAREN) 1 % Gel Apply 2 g topically 3 (three) times daily.     No current facility-administered medications on file prior to visit.       Objective:      Vitals:    12/15/23 1317   BP: 128/76   BP Location: Left arm   Patient Position: Sitting   Pulse: 93   SpO2: 98%   Weight: 105.2 kg (231 lb 14.8 oz)   Height: 5' 5" (1.651 m)     Physical Exam   Constitutional: She appears well-developed and well-nourished. She is obese.   HENT:   Nose: No mucosal edema.   Mouth/Throat: Oropharynx is clear and moist. Mallampati Score: II.   Neck: No tracheal deviation present.   Cardiovascular: Normal rate and regular rhythm.   Pulmonary/Chest: Normal expansion, symmetric chest wall expansion, effort normal and breath sounds normal. No respiratory distress. She has no wheezes. She has no rhonchi. She has no rales.   Abdominal: She exhibits no distension.   Musculoskeletal:         General: No edema.      Cervical back: Neck supple.   Lymphadenopathy: No supraclavicular adenopathy is present.     She has no cervical adenopathy.   Skin: Skin is warm and dry. No cyanosis or erythema. Nails show no clubbing.   Nursing note and vitals reviewed.      Personal Diagnostic Review    Laboratory:  10/13/23  Bicarb- 24    8/28/23  Eosinophils- 0.2      CT Chest 6/15/23- Images personally reviewed. I agree w/ the radiologist who notes;  Pulmonary vasculature: Unremarkable.     Magalie/Mediastinum: No pathologic aurelio enlargement.     Airways: Mild narrowing of the trachea at the level of the thyroid.  Otherwise, trachea and bronchi are patent.     Lungs/Pleura: Biapical fibronodular scarring.  Subcentimeter pulmonary nodules within the bilateral major fissures, favored to represent intrapulmonary lymph nodes.  No suspicious pulmonary nodules.  No large focal consolidation.  Pleural effusion or pleural thickening.    PFTs   6/19/23  FVC: 2.58 (98.0 % predicted), FEV1: 2.04 (99.0 % " predicted), FEV1/FVC:  79, T.42 (86.5 % predicted), DLCO: 17.44 (77.6 % predicted)        Assessment:     Problem List Items Addressed This Visit          Pulmonary    Chronic cough    Relevant Medications    fluticasone-salmeterol diskus inhaler 250-50 mcg    Apical lung scarring     Overcall on CT Read. Minimal if any apical changes. No restriction on PFTs.         Post-viral cough syndrome - Primary     Started in 2023. Finally improved after a prolonged course of steroids. Does have allergic symptoms. Possible component of RAD.     - Start an ICS/LABA  - LTRA  - prn MIKY  - ENT follow up for sinus symptoms, cont astelin and INS         Relevant Medications    fluticasone-salmeterol diskus inhaler 250-50 mcg       Endocrine    Severe obesity (BMI 35.0-35.9 with comorbidity)     On ozempic. Rec diet/exercise

## 2023-12-15 NOTE — PROGRESS NOTES
Gastroenterology Clinic Consultation Note    Reason for Visit:  The encounter diagnosis was Iron deficiency anemia, unspecified iron deficiency anemia type.    PCP:   Gladis Melissa         Initial HPI   This is a 68 y.o. female presenting for discussion of videl capsule endoscopy to further evaluate patients iron deficiency anemia. Patient states that she has been feeling well overall. Denies melena, hematochezia. Follows with HemOnc. Last colonoscopy and EGD done in August 2023. Risk and benefits reviewed with patient. Wishes to proceed.       ROS:  Review of Systems   Constitutional:  Negative for chills, diaphoresis, fever, malaise/fatigue and weight loss.   HENT:  Negative for sore throat.    Eyes:  Negative for redness.   Gastrointestinal:  Negative for abdominal pain, blood in stool, constipation, diarrhea, heartburn, melena, nausea and vomiting.   Skin:  Negative for itching and rash.   Neurological:  Negative for dizziness, loss of consciousness and weakness.        Medical History:  has a past medical history of Allergy, Breast cancer (2017), Cataract, Cervical spondylosis (07/29/2022), Diabetes mellitus, type 2, Eczema, Fibromyalgia, Glaucoma, Hypertension, Renal cyst, right (02/06/2020), and Steatosis of liver (02/06/2020).    Surgical History:  has a past surgical history that includes Tonsillectomy; Breast surgery; Breast biopsy; Mastectomy (Left, 2017); Vaginal delivery; Myomectomy; Laparoscopic supracervical hysterectomy (2005); Hysterectomy; Adenoidectomy; Tympanostomy tube placement; Anterior cervical discectomy w/ fusion (N/A, 7/29/2022); Esophagogastroduodenoscopy (N/A, 8/18/2023); and Colonoscopy (N/A, 8/18/2023).    Family History: family history includes Bipolar disorder in her maternal aunt, mother, and son; Blindness in her maternal grandfather; Breast cancer in her maternal aunt; Breast cancer (age of onset: 29) in  her cousin; Breast cancer (age of onset: 39) in her cousin; Dementia in her mother; Glaucoma in her maternal grandfather, maternal uncle, and mother; Lung cancer in her father; Post-traumatic stress disorder in her son..       Review of patient's allergies indicates:   Allergen Reactions    Codeine Shortness Of Breath     Pt states she had a reaction as a teenager and was taken to the ER.    Lisinopril      cough       Current Outpatient Medications on File Prior to Visit   Medication Sig Dispense Refill    acetic acid (VOSOL) 2 % otic solution Place 4 drops into both ears as needed.      albuterol (PROVENTIL/VENTOLIN HFA) 90 mcg/actuation inhaler SMARTSI-2 Puff(s) Via Inhaler Every 6 Hours PRN 6.7 g 0    ammonium lactate 12 % Crea Apply 1 application  topically 2 (two) times daily. 140 g 11    augmented betamethasone dipropionate (DIPROLENE-AF) 0.05 % cream Apply topically 2 (two) times to 4 times daily to area of back when itchy or burning.Stop using steroid topical when skin is non itchy.  Do not treat dark or red coloring. 50 g 0    azelastine (ASTELIN) 137 mcg (0.1 %) nasal spray 1 SPRAY IN EACH NOSTRIL TWICE DAILY  Strength: 137 mcg (0.1 %) 30 mL 2    blood sugar diagnostic (ONETOUCH VERIO TEST STRIPS) Strp To Check BG 1x daily 100 each 3    blood sugar diagnostic Strp Check BG twice daily 200 each 2    blood-glucose meter kit Use as instructed 1 each 0    ciclopirox (PENLAC) 8 % Soln Apply topically nightly. 6.6 mL 3    clobetasoL (TEMOVATE) 0.05 % external solution APPLY TOPICALLY TO THE SCALP TWICE DAILY AS NEEDED FOR ITCHING OR IRRITATION 50 mL 0    COMIRNATY -, 12Y UP,,PF, 30 mcg/0.3 mL injection       doxycycline (VIBRAMYCIN) 100 MG Cap Take 1 capsule (100 mg total) by mouth every 12 (twelve) hours. 14 capsule 0    ferrous sulfate (FEOSOL) 325 mg (65 mg iron) Tab tablet Take 1 hour before meals 60 tablet 2    fluticasone propionate (FLONASE) 50 mcg/actuation nasal spray 2 sprays to each lateral  nostril once a day 48 g 3    glipiZIDE 5 MG TR24 Take 1 tablet (5 mg total) by mouth 2 (two) times daily. 180 tablet 3    hydroCHLOROthiazide (HYDRODIURIL) 25 MG tablet Take 1 tablet (25 mg total) by mouth once daily. 90 tablet 3    hydrocodone-homatropine 5-1.5 mg/5 ml (HYDROMET) 5-1.5 mg/5 mL Syrp Take 5 mLs by mouth every 6 (six) hours as needed (cough). 120 mL 0    lancets Misc To check BG 2 times daily 200 each 3    latanoprost 0.005 % ophthalmic solution Place 1 drop into both eyes every evening. 7.5 mL 3    losartan (COZAAR) 50 MG tablet Take 1 tablet (50 mg total) by mouth once daily. 90 tablet 3    metFORMIN (GLUCOPHAGE) 1000 MG tablet Take 1 tablet (1,000 mg total) by mouth 2 (two) times daily with meals. 180 tablet 3    montelukast (SINGULAIR) 10 mg tablet TAKE 1 TABLET(10 MG) BY MOUTH EVERY EVENING 90 tablet 3    ondansetron (ZOFRAN-ODT) 8 MG TbDL Take 1 tablet (8 mg total) by mouth every 8 (eight) hours as needed (Nausea). 30 tablet 2    ONETOUCH DELICA PLUS LANCET 33 gauge Misc 2 (two) times daily.      ONETOUCH VERIO REFLECT METER Misc USE AS DIRECTED TO TEST BLOOD GLUCOSE      pantoprazole (PROTONIX) 40 MG tablet Take 1 tablet (40 mg total) by mouth once daily. 90 tablet 1    promethazine-dextromethorphan (PROMETHAZINE-DM) 6.25-15 mg/5 mL Syrp SMARTSI.5 Milliliter(s) By Mouth 4 Times Daily  mL 0    rizatriptan (MAXALT-MLT) 10 MG disintegrating tablet Take 1 tablet (10 mg total) by mouth every 2 (two) hours as needed for Migraine. Max 30 mg/day. 12 tablet 5    semaglutide (OZEMPIC) 0.25 mg or 0.5 mg (2 mg/3 mL) pen injector Inject 0.25 mg into the skin every 7 days. 3 mL 0    ubrogepant (UBRELVY) 100 mg tablet Take 1 tablet (100 mg total) by mouth every 2 (two) hours as needed for Migraine. Max 200 mg/day. 10 tablet 2    [DISCONTINUED] diclofenac sodium (VOLTAREN) 1 % Gel Apply 2 g topically 3 (three) times daily. 100 g 0     No current facility-administered medications on file prior to  "visit.         Objective Findings:    Vital Signs:  /74   Pulse 105   Ht 5' 5" (1.651 m)   Wt 104.3 kg (230 lb)   BMI 38.27 kg/m²   Body mass index is 38.27 kg/m².    Physical Exam:  Physical Exam  Vitals reviewed.   Constitutional:       Appearance: She is normal weight. She is not ill-appearing.   HENT:      Mouth/Throat:      Mouth: Mucous membranes are moist.      Pharynx: Oropharynx is clear.   Eyes:      General: No scleral icterus.  Abdominal:      General: Bowel sounds are normal. There is no distension.      Palpations: Abdomen is soft. There is no mass.      Tenderness: There is no abdominal tenderness.      Hernia: No hernia is present.   Skin:     General: Skin is warm and dry.      Capillary Refill: Capillary refill takes less than 2 seconds.      Coloration: Skin is not jaundiced or pale.   Neurological:      Mental Status: She is alert and oriented to person, place, and time. Mental status is at baseline.             Labs:  Lab Results   Component Value Date    WBC 10.46 11/24/2023    HGB 11.3 (L) 11/24/2023    HCT 36.7 (L) 11/24/2023     11/24/2023    CHOL 163 12/12/2022    TRIG 99 12/12/2022    HDL 52 12/12/2022    ALKPHOS 75 08/28/2023    LIPASE 37 05/16/2023    ALT 11 08/28/2023    AST 11 08/28/2023     12/12/2022    K 4.4 12/12/2022    CL 99 12/12/2022    CREATININE 0.7 12/12/2022    BUN 11 12/12/2022    CO2 30 (H) 12/12/2022    TSH 1.515 07/27/2022    INR 1.0 07/22/2022    HGBA1C 6.6 (H) 10/20/2023    MICROALBUR 9.4 08/12/2018       Imaging reviewed: CT Enterography  FINDINGS:  SOFT TISSUES: Small fat-containing umbilical hernia.  Postsurgical changes of the left breast.     MEDIASTINUM: Heart is normal size. No pericardial effusion.     LUNG BASES: Unremarkable.     HEPATOBILIARY: Liver is normal size. No focal hepatic lesions. No biliary ductal dilatation. Normal gallbladder.     PANCREAS: Diffuse fatty infiltration.  No focal masses or ductal dilatation.     SPLEEN: " Normal size.     ADRENALS: No adrenal nodules.     KIDNEYS/URETERS: Kidneys are normal size and location. 1.3 cm cystic lesion in the upper pole the right kidney measuring slightly greater than fluid attenuation, grossly stable compared to CT abdomen dated 02/06/2020 and may represent proteinaceous cyst.  No nephrolithiasis or hydronephrosis. No solid mass lesions. Ureters are unremarkable.     BLADDER/PELVIC ORGANS: Unremarkable.     PERITONEUM / RETROPERITONEUM: No free air or fluid.     LYMPH NODES: No lymphadenopathy.     VESSELS: Unremarkable.     GI TRACT: The stomach and duodenum are unremarkable.  No evidence of bowel stricture, obstruction, or inflammation. Normal appendix.     BONES: No acute fractures.  Ill-defined sclerotic changes in the pubic bones bilaterally could represent early Paget's, remote trauma, or degenerative changes.  Significant facet arthropathy throughout the lower lumbar spine.     Impression:     1. No evidence of stricture throughout the GI tract.  2. Ill-defined sclerotic changes in the pubic bones bilaterally could represent early Paget's disease, remote trauma, or degenerative changes.  Recommend correlation with patient's symptoms and labs.  3. Additional findings as above.     Electronically signed by resident: Kar Barajas  Date:                                            12/13/2023  Time:                                           09:38     Electronically signed by: Kevin Elizabeth MD  Date:                                            12/13/2023  Time:                                           10:12    Endoscopy reviewed: Colonoscopy 8/18/2023  Impression:            - Diverticulosis in the recto-sigmoid colon, in                          the sigmoid colon, in the descending colon, in the                          transverse colon and in the ascending colon.                          - The entire examined colon is normal. Biopsied.   Recommendation:        - Discharge patient to  home.                          - Await pathology results.                          - Telephone endoscopist for pathology results in 3                          weeks.                          - Repeat colonoscopy in 5 years for screening                          purposes.                          - Return to GI clinic at the next available                          appointment.                          - The findings and recommendations were discussed                          with the patient.   Attending Participation:        I personally performed the entire procedure.   Eliseo Garcia MD   8/18/2023 9:33:29 AM     EGD 8/18/2023  Impression:            - Normal examined duodenum. Biopsied.                          - Normal stomach. Biopsied.                          - 1 cm hiatal hernia.   Recommendation:        - Discharge patient to home.                          - Follow an antireflux regimen indefinitely.                          - Await pathology results.                          - Telephone endoscopist for pathology results in 3                          weeks.                          - Return to GI clinic at the next available                          appointment.                          - The findings and recommendations were discussed                          with the patient.   Attending Participation:        I personally performed the entire procedure.   Eliseo Garcia MD   8/18/2023 9:44:06 AM     Assessment:  1. Iron deficiency anemia, unspecified iron deficiency anemia type             Plan:  Will proceed with video capsule endoscopy.     RTC for VCE      Thank you for allowing me to participate in this patient's care.    Sincerely,     Mary Jacinto NP  Gastroenterology Department  [facility]

## 2023-12-16 ENCOUNTER — PATIENT MESSAGE (OUTPATIENT)
Dept: GASTROENTEROLOGY | Facility: CLINIC | Age: 68
End: 2023-12-16
Payer: MEDICARE

## 2023-12-16 DIAGNOSIS — N28.1 RENAL CYST: ICD-10-CM

## 2023-12-16 DIAGNOSIS — R93.5 ABNORMAL ABDOMINAL CT SCAN: Primary | ICD-10-CM

## 2023-12-16 DIAGNOSIS — R93.5 ABNORMAL CT SCAN, PELVIS: Primary | ICD-10-CM

## 2023-12-16 NOTE — PROGRESS NOTES
Lori please refer Checo to orthopedics for further evaluation of  CT scan reading of ill-defined sclerotic changes in the pubic bones bilaterally could represent early Paget's disease, remote trauma, or degenerative changes.  Recommend correlation with patient's symptoms and labs.  Referral placed.

## 2023-12-16 NOTE — PROGRESS NOTES
Lori please schedule Regine for MRI MRCP for further evaluation of her diffusely fatty infiltrated pancreas orders placed    Please schedule patient for renal ultrasound for follow-up renal cyst orders placed

## 2023-12-17 ENCOUNTER — TELEPHONE (OUTPATIENT)
Dept: GASTROENTEROLOGY | Facility: CLINIC | Age: 68
End: 2023-12-17
Payer: MEDICARE

## 2023-12-18 ENCOUNTER — PATIENT MESSAGE (OUTPATIENT)
Dept: PRIMARY CARE CLINIC | Facility: CLINIC | Age: 68
End: 2023-12-18
Payer: MEDICARE

## 2023-12-18 ENCOUNTER — PATIENT MESSAGE (OUTPATIENT)
Dept: GASTROENTEROLOGY | Facility: CLINIC | Age: 68
End: 2023-12-18
Payer: MEDICARE

## 2023-12-18 ENCOUNTER — TELEPHONE (OUTPATIENT)
Dept: PRIMARY CARE CLINIC | Facility: CLINIC | Age: 68
End: 2023-12-18
Payer: MEDICARE

## 2023-12-18 DIAGNOSIS — E11.9 TYPE 2 DIABETES MELLITUS WITHOUT COMPLICATION, WITHOUT LONG-TERM CURRENT USE OF INSULIN: Primary | ICD-10-CM

## 2023-12-18 NOTE — TELEPHONE ENCOUNTER
----- Message from Eliseo Garcia MD sent at 12/16/2023  1:46 PM CST -----  Lori please schedule Regine for MRI MRCP for further evaluation of her diffusely fatty infiltrated pancreas orders placed    Please schedule patient for renal ultrasound for follow-up renal cyst orders placed

## 2023-12-26 DIAGNOSIS — M51.36 DDD (DEGENERATIVE DISC DISEASE), LUMBAR: Primary | ICD-10-CM

## 2023-12-28 ENCOUNTER — OFFICE VISIT (OUTPATIENT)
Dept: PODIATRY | Facility: CLINIC | Age: 68
End: 2023-12-28
Payer: MEDICARE

## 2023-12-28 ENCOUNTER — OFFICE VISIT (OUTPATIENT)
Dept: ORTHOPEDICS | Facility: CLINIC | Age: 68
End: 2023-12-28
Payer: MEDICARE

## 2023-12-28 ENCOUNTER — HOSPITAL ENCOUNTER (OUTPATIENT)
Dept: RADIOLOGY | Facility: HOSPITAL | Age: 68
Discharge: HOME | End: 2023-12-28
Attending: ORTHOPAEDIC SURGERY
Payer: MEDICARE

## 2023-12-28 VITALS
DIASTOLIC BLOOD PRESSURE: 78 MMHG | SYSTOLIC BLOOD PRESSURE: 143 MMHG | HEIGHT: 65 IN | BODY MASS INDEX: 38.64 KG/M2 | BODY MASS INDEX: 38.64 KG/M2 | WEIGHT: 231.94 LBS | HEIGHT: 65 IN | WEIGHT: 231.94 LBS | HEART RATE: 91 BPM

## 2023-12-28 DIAGNOSIS — L60.0 INGROWN TOENAIL WITHOUT INFECTION: Primary | ICD-10-CM

## 2023-12-28 DIAGNOSIS — R93.5 ABNORMAL CT SCAN, PELVIS: ICD-10-CM

## 2023-12-28 DIAGNOSIS — L60.9 DISEASE OF NAIL: ICD-10-CM

## 2023-12-28 DIAGNOSIS — M51.36 DDD (DEGENERATIVE DISC DISEASE), LUMBAR: ICD-10-CM

## 2023-12-28 PROCEDURE — 72110 X-RAY EXAM L-2 SPINE 4/>VWS: CPT | Mod: TC

## 2023-12-28 PROCEDURE — 11750 EXCISION NAIL&NAIL MATRIX: CPT | Mod: T5,S$GLB,, | Performed by: PODIATRIST

## 2023-12-28 PROCEDURE — 3077F SYST BP >= 140 MM HG: CPT | Mod: CPTII,S$GLB,, | Performed by: PODIATRIST

## 2023-12-28 PROCEDURE — 72110 XR LUMBAR SPINE AP AND LAT WITH FLEX/EXT: ICD-10-PCS | Mod: 26,,, | Performed by: RADIOLOGY

## 2023-12-28 PROCEDURE — 3008F BODY MASS INDEX DOCD: CPT | Mod: CPTII,S$GLB,, | Performed by: PODIATRIST

## 2023-12-28 PROCEDURE — 1101F PT FALLS ASSESS-DOCD LE1/YR: CPT | Mod: CPTII,S$GLB,, | Performed by: ORTHOPAEDIC SURGERY

## 2023-12-28 PROCEDURE — 99213 OFFICE O/P EST LOW 20 MIN: CPT | Mod: 25,S$GLB,, | Performed by: PODIATRIST

## 2023-12-28 PROCEDURE — 3051F HG A1C>EQUAL 7.0%<8.0%: CPT | Mod: CPTII,S$GLB,, | Performed by: PODIATRIST

## 2023-12-28 PROCEDURE — 99999 PR PBB SHADOW E&M-EST. PATIENT-LVL IV: CPT | Mod: PBBFAC,,, | Performed by: ORTHOPAEDIC SURGERY

## 2023-12-28 PROCEDURE — 1126F AMNT PAIN NOTED NONE PRSNT: CPT | Mod: CPTII,S$GLB,, | Performed by: PODIATRIST

## 2023-12-28 PROCEDURE — 99213 OFFICE O/P EST LOW 20 MIN: CPT | Mod: S$GLB,,, | Performed by: ORTHOPAEDIC SURGERY

## 2023-12-28 PROCEDURE — 1159F MED LIST DOCD IN RCRD: CPT | Mod: CPTII,S$GLB,, | Performed by: PODIATRIST

## 2023-12-28 PROCEDURE — 72110 X-RAY EXAM L-2 SPINE 4/>VWS: CPT | Mod: 26,,, | Performed by: RADIOLOGY

## 2023-12-28 PROCEDURE — 99999 PR PBB SHADOW E&M-EST. PATIENT-LVL V: CPT | Mod: PBBFAC,,, | Performed by: PODIATRIST

## 2023-12-28 PROCEDURE — 1160F RVW MEDS BY RX/DR IN RCRD: CPT | Mod: CPTII,S$GLB,, | Performed by: PODIATRIST

## 2023-12-28 PROCEDURE — 3078F DIAST BP <80 MM HG: CPT | Mod: CPTII,S$GLB,, | Performed by: PODIATRIST

## 2023-12-28 PROCEDURE — 1126F AMNT PAIN NOTED NONE PRSNT: CPT | Mod: CPTII,S$GLB,, | Performed by: ORTHOPAEDIC SURGERY

## 2023-12-28 PROCEDURE — 3288F FALL RISK ASSESSMENT DOCD: CPT | Mod: CPTII,S$GLB,, | Performed by: ORTHOPAEDIC SURGERY

## 2023-12-28 PROCEDURE — 3008F BODY MASS INDEX DOCD: CPT | Mod: CPTII,S$GLB,, | Performed by: ORTHOPAEDIC SURGERY

## 2023-12-28 RX ORDER — SULFAMETHOXAZOLE AND TRIMETHOPRIM 400; 80 MG/1; MG/1
1 TABLET ORAL 2 TIMES DAILY
Qty: 20 TABLET | Refills: 0 | Status: SHIPPED | OUTPATIENT
Start: 2023-12-28 | End: 2024-01-17

## 2023-12-28 NOTE — PROGRESS NOTES
DATE: 12/28/2023  PATIENT: Regine Cooper    Attending Physician: Rosales Scruggs MD    HISTORY:  Regine Cooper is a 68 y.o. female who returns to me today for low back pain.  She was last seen by me 8/10/2023.  Today she is doing well but notes she recently had an abdominal CT scan that showed possible Paget's disease. She reports a hx of low back and bilateral leg pain that has been attributed to spinal stenosis.    The Patient denies myelopathic symptoms such as handwriting changes or difficulty with buttons/coins/keys. Denies perineal paresthesias, bowel/bladder dysfunction.    PMH/PSH/FamHx/SocHx:  Unchanged from prior visit    ROS:  REVIEW OF SYSTEMS:  Constitution: Negative. Negative for chills, fever and night sweats.   HENT: Negative for congestion and headaches.    Eyes: Negative for blurred vision, left vision loss and right vision loss.   Cardiovascular: Negative for chest pain and syncope.   Respiratory: Negative for cough and shortness of breath.    Endocrine: Negative for polydipsia, polyphagia and polyuria.   Hematologic/Lymphatic: Negative for bleeding problem. Does not bruise/bleed easily.   Skin: Negative for dry skin, itching and rash.   Musculoskeletal: Negative for falls and muscle weakness.   Gastrointestinal: Negative for abdominal pain and bowel incontinence.   Allergic/Immunologic: Negative for hives and persistent infections.  Genitourinary: Negative for urinary retention/incontinence and nocturia.   Neurological: negative for disturbances in coordination, no myelopathic symptoms such as handwriting changes or difficulty with buttons, coins, keys or small objects. No loss of balance and seizures.   Psychiatric/Behavioral: Negative for depression. The patient does not have insomnia.   Denies perineal paresthesias, bowel or bladder incontinence    EXAM:  There were no vitals taken for this visit.    My physical examination was notable for the following findings:     Musculoskeletal and neuro  exam stable      IMAGING:    Today I personally reviewed AP, Lat and Flex/Ex  upright L-spine that demonstrate mild degenerative changes.    CT abdomen demonstrates ill-defined sclerotic changes in the pubic bones bilaterally could represent early Paget's disease, remote trauma, or degenerative changes.     There is no height or weight on file to calculate BMI.    Hemoglobin A1C   Date Value Ref Range Status   10/20/2023 6.6 (H) 4.0 - 5.6 % Final     Comment:     ADA Screening Guidelines:  5.7-6.4%  Consistent with prediabetes  >or=6.5%  Consistent with diabetes    High levels of fetal hemoglobin interfere with the HbA1C  assay. Heterozygous hemoglobin variants (HbS, HgC, etc)do  not significantly interfere with this assay.   However, presence of multiple variants may affect accuracy.     06/27/2023 7.2 (H) 4.0 - 5.6 % Final     Comment:     ADA Screening Guidelines:  5.7-6.4%  Consistent with prediabetes  >or=6.5%  Consistent with diabetes    High levels of fetal hemoglobin interfere with the HbA1C  assay. Heterozygous hemoglobin variants (HbS, HgC, etc)do  not significantly interfere with this assay.   However, presence of multiple variants may affect accuracy.     03/28/2023 7.7 (H) 4.0 - 5.6 % Final     Comment:     ADA Screening Guidelines:  5.7-6.4%  Consistent with prediabetes  >or=6.5%  Consistent with diabetes    High levels of fetal hemoglobin interfere with the HbA1C  assay. Heterozygous hemoglobin variants (HbS, HgC, etc)do  not significantly interfere with this assay.   However, presence of multiple variants may affect accuracy.           ASSESSMENT/PLAN:    There are no diagnoses linked to this encounter.    Today we discussed at length all of the different treatment options including anti-inflammatories, acetaminophen, rest, ice, heat, physical therapy including strengthening and stretching exercises, home exercises, ROM, aerobic conditioning, aqua therapy, other modalities including ultrasound,  massage, and dry needling, epidural steroid injections and finally surgical intervention.      Pt presents with abnormal CT scan. Will check alk phos levels and consider endocrine referral for possible pagets.

## 2024-01-01 ENCOUNTER — PATIENT MESSAGE (OUTPATIENT)
Dept: PRIMARY CARE CLINIC | Facility: CLINIC | Age: 69
End: 2024-01-01
Payer: MEDICARE

## 2024-01-01 DIAGNOSIS — E11.9 TYPE 2 DIABETES MELLITUS WITHOUT COMPLICATION, WITHOUT LONG-TERM CURRENT USE OF INSULIN: Primary | ICD-10-CM

## 2024-01-01 PROBLEM — R05.8 POST-VIRAL COUGH SYNDROME: Status: ACTIVE | Noted: 2024-01-01

## 2024-01-01 PROBLEM — J98.4 APICAL LUNG SCARRING: Status: ACTIVE | Noted: 2024-01-01

## 2024-01-01 NOTE — ASSESSMENT & PLAN NOTE
Started in Feb/March 2023. Finally improved after a prolonged course of steroids. Does have allergic symptoms. Possible component of RAD.     - Start an ICS/LABA  - LTRA  - prn MIKY  - ENT follow up for sinus symptoms, cont astelin and INS

## 2024-01-02 ENCOUNTER — HOSPITAL ENCOUNTER (OUTPATIENT)
Dept: RADIOLOGY | Facility: HOSPITAL | Age: 69
Discharge: HOME | End: 2024-01-02
Attending: INTERNAL MEDICINE
Payer: MEDICARE

## 2024-01-02 DIAGNOSIS — N28.1 RENAL CYST: ICD-10-CM

## 2024-01-02 DIAGNOSIS — E11.9 TYPE 2 DIABETES MELLITUS WITHOUT COMPLICATION, WITHOUT LONG-TERM CURRENT USE OF INSULIN: ICD-10-CM

## 2024-01-02 PROCEDURE — 76770 US EXAM ABDO BACK WALL COMP: CPT | Mod: TC

## 2024-01-02 PROCEDURE — 76770 US EXAM ABDO BACK WALL COMP: CPT | Mod: 26,,, | Performed by: RADIOLOGY

## 2024-01-02 NOTE — TELEPHONE ENCOUNTER
No care due was identified.  [facility] Embedded Care Due Messages. Reference number: [id number].   1/02/2024 2:22:02 PM CST

## 2024-01-02 NOTE — TELEPHONE ENCOUNTER
Care Due:                  Date            Visit Type   Department     Provider  --------------------------------------------------------------------------------                                ESTABLISHED                              PATIENT -    [facility] PRIMARY  Last Visit: 12-      [facility]      MILAGROS Melissa                               -                              PRIMARY      [facility] PRIMARY  Next Visit: 01-      CARE ([facility])   MILAGROS Melissa                                                            Last  Test          Frequency    Reason                     Performed    Due Date  --------------------------------------------------------------------------------    CMP.........  12 months..  glipiZIDE,                 12- 12-                             hydroCHLOROthiazide,                             losartan, metFORMIN......    [employer] Embedded Care Due Messages. Reference number: [id number].   1/02/2024 11:33:04 AM CST

## 2024-01-03 ENCOUNTER — E-CONSULT (OUTPATIENT)
Dept: ENDOCRINOLOGY | Facility: CLINIC | Age: 69
End: 2024-01-03
Payer: MEDICARE

## 2024-01-03 DIAGNOSIS — R93.89 ABNORMAL FINDING ON IMAGING: Primary | ICD-10-CM

## 2024-01-03 DIAGNOSIS — R93.5 ABNORMAL CT SCAN, PELVIS: Primary | ICD-10-CM

## 2024-01-03 PROCEDURE — 99451 NTRPROF PH1/NTRNET/EHR 5/>: CPT | Mod: S$GLB,,, | Performed by: INTERNAL MEDICINE

## 2024-01-03 RX ORDER — SEMAGLUTIDE 0.68 MG/ML
INJECTION, SOLUTION SUBCUTANEOUS
Qty: 3 ML | Refills: 0 | OUTPATIENT
Start: 2024-01-03

## 2024-01-03 NOTE — CONSULTS
[facility]  Response for E-Consult     Patient Name: Regine Cooper  MRN: [id number]  Primary Care Provider: Gladis Melissa MD   Requesting Provider: Fauzia Vidales,*  E-Consult to Endocrinology  Consult performed by: Franca Mustafa MD  Consult ordered by: Fauzia Vidales, PA-VICTORIA          68-year-old with incidental findings on CT consistent with Paget's.  Alk-phos and bone specific alk-phos are both normal.  At this point would not do anything further from a Paget standpoint.  Would follow yearly alk-phos and if it becomes elevated would order a NM bone scan and refer to endocrinology      CT abd 12/12/23  BONES: No acute fractures.  Ill-defined sclerotic changes in the pubic bones bilaterally could represent early Paget's, remote trauma, or degenerative changes.         Total time of Consultation: 10 minute    I did not speak to the requesting provider verbally about this.     *This eConsult is based on the clinical data available to me and is furnished without benefit of a physical examination. The eConsult will need to be interpreted in light of any clinical issues or changes in patient status not available to me at the time of filing this eConsults. Significant changes in patient condition or level of acuity should result in immediate formal consultation and reevaluation. Please alert me if you have further questions.    Thank you for this eConsult referral.     [facility]

## 2024-01-03 NOTE — TELEPHONE ENCOUNTER
Refill Decision Note   Regine Cooper  is requesting a refill authorization.  Brief Assessment and Rationale for Refill:  Quick Discontinue     Medication Therapy Plan:  E-Prescribing Status: Receipt confirmed by pharmacy (1/2/2024  2:40 PM CST)      Comments:     Note composed:10:43 AM 01/03/2024

## 2024-01-04 ENCOUNTER — LAB VISIT (OUTPATIENT)
Dept: PRIMARY CARE CLINIC | Facility: CLINIC | Age: 69
End: 2024-01-04
Payer: MEDICARE

## 2024-01-04 ENCOUNTER — PATIENT MESSAGE (OUTPATIENT)
Dept: ORTHOPEDICS | Facility: CLINIC | Age: 69
End: 2024-01-04
Payer: MEDICARE

## 2024-01-04 DIAGNOSIS — D50.0 IRON DEFICIENCY ANEMIA DUE TO CHRONIC BLOOD LOSS: ICD-10-CM

## 2024-01-04 DIAGNOSIS — E11.9 TYPE 2 DIABETES MELLITUS WITHOUT COMPLICATION, WITHOUT LONG-TERM CURRENT USE OF INSULIN: ICD-10-CM

## 2024-01-04 DIAGNOSIS — Z13.220 SCREENING CHOLESTEROL LEVEL: ICD-10-CM

## 2024-01-04 DIAGNOSIS — C50.912 MALIGNANT NEOPLASM OF LEFT FEMALE BREAST, UNSPECIFIED ESTROGEN RECEPTOR STATUS, UNSPECIFIED SITE OF BREAST: ICD-10-CM

## 2024-01-04 LAB
ALBUMIN SERPL BCP-MCNC: 4 G/DL (ref 3.5–5.2)
ALP SERPL-CCNC: 65 U/L (ref 55–135)
ALT SERPL W/O P-5'-P-CCNC: 10 U/L (ref 10–44)
ANION GAP SERPL CALC-SCNC: 18 MMOL/L (ref 8–16)
AST SERPL-CCNC: 16 U/L (ref 10–40)
BILIRUB SERPL-MCNC: 0.6 MG/DL (ref 0.1–1)
BUN SERPL-MCNC: 12 MG/DL (ref 8–23)
CALCIUM SERPL-MCNC: 9.8 MG/DL (ref 8.7–10.5)
CHLORIDE SERPL-SCNC: 96 MMOL/L (ref 95–110)
CHOLEST SERPL-MCNC: 155 MG/DL (ref 120–199)
CHOLEST/HDLC SERPL: 3.6 {RATIO} (ref 2–5)
CO2 SERPL-SCNC: 28 MMOL/L (ref 23–29)
CREAT SERPL-MCNC: 0.8 MG/DL (ref 0.5–1.4)
ERYTHROCYTE [DISTWIDTH] IN BLOOD BY AUTOMATED COUNT: 15.7 % (ref 11.5–14.5)
EST. GFR  (NO RACE VARIABLE): >60 ML/MIN/1.73 M^2
ESTIMATED AVG GLUCOSE: 154 MG/DL (ref 68–131)
FERRITIN SERPL-MCNC: 26 NG/ML (ref 20–300)
GLUCOSE SERPL-MCNC: 128 MG/DL (ref 70–110)
HBA1C MFR BLD: 7 % (ref 4–5.6)
HCT VFR BLD AUTO: 38.2 % (ref 37–48.5)
HDLC SERPL-MCNC: 43 MG/DL (ref 40–75)
HDLC SERPL: 27.7 % (ref 20–50)
HGB BLD-MCNC: 11.9 G/DL (ref 12–16)
IMM GRANULOCYTES # BLD AUTO: 0.04 K/UL (ref 0–0.04)
LDLC SERPL CALC-MCNC: 92.8 MG/DL (ref 63–159)
MCH RBC QN AUTO: 25.4 PG (ref 27–31)
MCHC RBC AUTO-ENTMCNC: 31.2 G/DL (ref 32–36)
MCV RBC AUTO: 81 FL (ref 82–98)
NEUTROPHILS # BLD AUTO: 4.8 K/UL (ref 1.8–7.7)
NONHDLC SERPL-MCNC: 112 MG/DL
PLATELET # BLD AUTO: 351 K/UL (ref 150–450)
PMV BLD AUTO: 10.5 FL (ref 9.2–12.9)
POTASSIUM SERPL-SCNC: 4.2 MMOL/L (ref 3.5–5.1)
PROT SERPL-MCNC: 7.6 G/DL (ref 6–8.4)
RBC # BLD AUTO: 4.69 M/UL (ref 4–5.4)
SODIUM SERPL-SCNC: 142 MMOL/L (ref 136–145)
TRIGL SERPL-MCNC: 96 MG/DL (ref 30–150)
WBC # BLD AUTO: 7.8 K/UL (ref 3.9–12.7)

## 2024-01-04 PROCEDURE — 80053 COMPREHEN METABOLIC PANEL: CPT | Performed by: INTERNAL MEDICINE

## 2024-01-04 PROCEDURE — 82728 ASSAY OF FERRITIN: CPT | Performed by: INTERNAL MEDICINE

## 2024-01-04 PROCEDURE — 80061 LIPID PANEL: CPT | Performed by: STUDENT IN AN ORGANIZED HEALTH CARE EDUCATION/TRAINING PROGRAM

## 2024-01-04 PROCEDURE — 85027 COMPLETE CBC AUTOMATED: CPT | Performed by: INTERNAL MEDICINE

## 2024-01-04 PROCEDURE — 83036 HEMOGLOBIN GLYCOSYLATED A1C: CPT | Performed by: STUDENT IN AN ORGANIZED HEALTH CARE EDUCATION/TRAINING PROGRAM

## 2024-01-05 ENCOUNTER — OFFICE VISIT (OUTPATIENT)
Dept: HEMATOLOGY/ONCOLOGY | Facility: CLINIC | Age: 69
End: 2024-01-05
Payer: MEDICARE

## 2024-01-05 VITALS
HEIGHT: 65 IN | OXYGEN SATURATION: 96 % | DIASTOLIC BLOOD PRESSURE: 66 MMHG | RESPIRATION RATE: 16 BRPM | SYSTOLIC BLOOD PRESSURE: 139 MMHG | WEIGHT: 229.06 LBS | BODY MASS INDEX: 38.16 KG/M2 | TEMPERATURE: 98 F | HEART RATE: 115 BPM

## 2024-01-05 DIAGNOSIS — D50.0 IRON DEFICIENCY ANEMIA DUE TO CHRONIC BLOOD LOSS: Primary | ICD-10-CM

## 2024-01-05 PROCEDURE — 99214 OFFICE O/P EST MOD 30 MIN: CPT | Mod: S$GLB,,, | Performed by: INTERNAL MEDICINE

## 2024-01-05 PROCEDURE — 99999 PR PBB SHADOW E&M-EST. PATIENT-LVL V: CPT | Mod: PBBFAC,,, | Performed by: INTERNAL MEDICINE

## 2024-01-05 NOTE — PROGRESS NOTES
Subjective:       Patient ID: Regine Cooper is a 68 y.o. female.    Chief Complaint: No chief complaint on file.      HPI      Mrs. Cooper returns today for follow up.  I had last seen her on November 27, 2023.   On August 18, 2023 she underwent an EGD and colonoscopy.  She had diverticulosis and a 1 cm hiatal hernia.  Biopsies were negative for celiac disease.  She is scheduled for video capsule swallow later this month.  Her CBC from yesterday shows a white count of 7800 per cubic mm, hemoglobin 11.9 grams/deciliter, hematocrit 38.2% and platelets 351 K  .  Ferritin was 26 ng/mL up from 13 ng/mL a month ago    Briefly, she is a 68-year-old female who has a history of a stage I triple negative breast cancer that was diagnosed in late 2017.  She underwent a left modified radical mastectomy in [facility] on 11/01/2017 and had a stage IA carcinoma measuring 1.2 cm in greatest diameter.  Resection margins were clear, while four sentinel lymph nodes were negative.  Postoperatively, she received four cycles of docetaxel and cyclophosphamide.  The first cycle was administered on 11/30/2017 and she completed her treatment by 02/01/2018.  She has been followed expectantly and has been MERRY since then.  We assumed her care when she relocated to [place] 3 years ago.    Her mammogram last April was read as BIRADS I, and a one year follow up was recommended.      Two stool samples that she submitted tin August were negative for occult blood.      A urinalysis in April had shown no hematuria.    ROS: Overall she feels OK, and her ECOG PS is 1.  She denies any anxiety, easy bruising, fevers, chills, night  sweats, weight loss, nausea, vomiting, diarrhea, constipation, diplopia, blurred vision, headache, chest pain, palpitations, shortness of breath, breast pain, lower abdominal pain, extremity pain, or difficulty ambulating.  The remainder of the ten-point ROS, including general, skin, lymph, H/N, cardiorespiratory, GI,  , Neuro, Endocrine, and psychiatric is negative.     Objective:      Physical Exam      She is alert, oriented to time, place, person, pleasant, well      nourished, in no acute physical distress.                                    VITAL SIGNS:  Reviewed                                      HEENT:  Normal.  There are no nasal, oral, lip, gingival, auricular, lid,    or conjunctival lesions.  Mucosae are moist and pink, and there is no        thrush.  Pupils are equal, reactive to light and accommodation.              Extraocular muscle movements are intact.  Dentition is good.  There is no frontal or maxillary tenderness.                                     NECK:  Supple without JVD, adenopathy, but she does have thyromegaly.                       LUNGS:  Clear to auscultation without wheezing, rales, or rhonchi.           CARDIOVASCULAR:  Reveals an S1, S2, no murmurs, no rubs, no gallops.         ABDOMEN:  Soft, nontender, without organomegaly.  Bowel sounds are    present.                                                                     EXTREMITIES:  No cyanosis, clubbing, or edema.                               BREASTS: Deferred.    LYMPHATIC:  There is no cervical, axillary, or supraclavicular adenopathy.   SKIN:  Warm and moist, without petechiae, rashes, induration, or ecchymoses.           NEUROLOGIC:  DTRs are 0-1+ bilaterally, symmetrical, motor function is 5/5,  and cranial nerves are  within normal limits.    Assessment:       1. History of breast cancer in female, [id number], status post mastectomy and 4 cycles of docetaxel cyclophosphamide, clinically MERRY, doing well more than 5 years after completion       2.     Low ferritin with borderline microcytic anemia  3.     Diverticulosis  Plan:         In regards to her breast cancer, Mrs. Alfred remains in clinical remission.  I explained to her that her chance of a cure at this point is in the range of 99%.  In regards to her low ferritin and borderline  anemia, I recommended that she remain on iron supplements for another 6 weeks.  I would like to see her ferritin above 30 ng/mL before she discontinues the oral supplementation  Finally, she needs to proceed with a video capsule swallow as recommended by Dr. Garcia     Her multiple questions were answered to her satisfaction.  I spent approximately 33 minutes reviewing the available records and evaluating the patient, out of which over 50% of the time was spent face to face with the patient in counseling and coordinating this patient's care.            Route Chart for Scheduling    Med Onc Chart Routing      Follow up with physician 6 weeks. With labs 1 day prior   Follow up with [facility]    Infusion scheduling note    Injection scheduling note    Labs CBC and ferritin   Scheduling:  Preferred lab:  Lab interval:     Imaging    Pharmacy appointment    Other referrals

## 2024-01-06 NOTE — PROGRESS NOTES
Regine your ultrasound looks good simple renal cyst    Impression:    Right renal simple cyst.    Electronically signed by resident: Tacos Jorgnesen  Date:                                            01/02/2024  Time:                                           19:04    Electronically signed by: Nicolas Diaz MD  Date:                                            01/03/2024

## 2024-01-08 NOTE — PROGRESS NOTES
Subjective:      Patient ID: Regine Cooper is a 68 y.o. female.    Chief Complaint: Ingrown Toenail (R great toe) and Diabetes Mellitus (Pcp - Gladis Melissa MD - 12/12/2023)    Regine is a 68 y.o. female who presents to the clinic upon referral from Dr. Tracy aguilar. provider found  for evaluation and treatment of diabetic feet. Regine has a past medical history of Allergy, Breast cancer (2017), Cataract, Cervical spondylosis (07/29/2022), Diabetes mellitus, type 2, Eczema, Fibromyalgia, Glaucoma, Hypertension, Renal cyst, right (02/06/2020), and Steatosis of liver (02/06/2020). Patient relates no major problem with feet. Only complaints today consist of routine diabetic foot evaluation.  Having an issue with right great toe ingrown nail, here for possible procedure today, still having pain.  Would like to have the entire nail removed permanently.  PCP: Gladis Melissa MD  /swelling.  Date Last Seen by PCP:   Chief Complaint   Patient presents with    Ingrown Toenail     R great toe    Diabetes Mellitus     Pcp - Gladis Melissa MD - 12/12/2023         Current shoe gear: Casual shoes    Hemoglobin A1C   Date Value Ref Range Status   01/04/2024 7.0 (H) 4.0 - 5.6 % Final     Comment:     ADA Screening Guidelines:  5.7-6.4%  Consistent with prediabetes  >or=6.5%  Consistent with diabetes    High levels of fetal hemoglobin interfere with the HbA1C  assay. Heterozygous hemoglobin variants (HbS, HgC, etc)do  not significantly interfere with this assay.   However, presence of multiple variants may affect accuracy.     10/20/2023 6.6 (H) 4.0 - 5.6 % Final     Comment:     ADA Screening Guidelines:  5.7-6.4%  Consistent with prediabetes  >or=6.5%  Consistent with diabetes    High levels of fetal hemoglobin interfere with the HbA1C  assay. Heterozygous hemoglobin variants (HbS, HgC, etc)do  not significantly interfere with this assay.   However, presence of multiple variants may affect accuracy.     06/27/2023 7.2 (H) 4.0 -  5.6 % Final     Comment:     ADA Screening Guidelines:  5.7-6.4%  Consistent with prediabetes  >or=6.5%  Consistent with diabetes    High levels of fetal hemoglobin interfere with the HbA1C  assay. Heterozygous hemoglobin variants (HbS, HgC, etc)do  not significantly interfere with this assay.   However, presence of multiple variants may affect accuracy.             Review of Systems   Constitutional: Positive for night sweats. Negative for chills and fever.   HENT:  Negative for congestion and tinnitus.    Eyes:  Negative for double vision and visual disturbance.   Cardiovascular:  Negative for chest pain and claudication.   Respiratory:  Negative for hemoptysis and shortness of breath.    Endocrine: Negative for cold intolerance and heat intolerance.   Hematologic/Lymphatic: Negative for adenopathy and bleeding problem.   Skin:  Negative for color change and nail changes.   Musculoskeletal:  Positive for back pain and joint pain. Negative for myalgias and stiffness.   Gastrointestinal:  Negative for nausea and vomiting.   Genitourinary:  Negative for dysuria and hematuria.   Neurological:  Positive for paresthesias. Negative for numbness.   Psychiatric/Behavioral:  Negative for altered mental status and suicidal ideas.    Allergic/Immunologic: Negative for environmental allergies and persistent infections.           Objective:      Physical Exam  Vitals reviewed.   Constitutional:       Appearance: She is well-developed.   Cardiovascular:      Pulses:           Dorsalis pedis pulses are 2+ on the right side and 2+ on the left side.        Posterior tibial pulses are 2+ on the right side and 2+ on the left side.   Pulmonary:      Effort: Pulmonary effort is normal.   Musculoskeletal:         General: Normal range of motion.      Comments: Inspection and palpation of the muscles joints and bones of both lower extremities reveal that muscle strength for the anterior lateral and posterior muscle groups and intrinsic  muscle groups of the foot are all 5 over 5 symmetrical.  Ankle subtalar midtarsal and digital joint range of motion are within normal limits, nonpainful, without crepitus or effusion.  Patient exhibits a normal angle and base of gait.  Palpation of the tendons reveal no defects.   Skin:     General: Skin is warm and dry.      Capillary Refill: Capillary refill takes 2 to 3 seconds.      Comments: Skin turgor is normal bilaterally.  Skin texture is well hydrated to both lower extremities.  No lesions or rashes or wounds appreciated bilaterally.  Proximal clearing noted to bilateral pedal nails.   Neurological:      Mental Status: She is alert and oriented to person, place, and time.      Comments: Sharp dull light touch vibratory proprioceptive sensation are intact bilaterally.  Deep tendon reflexes to patellar and Achilles tendon are symmetrical 2 over 4 bilaterally.  No ankle clonus or Babinski reflexes noted bilaterally.  Coordination is normal to both feet and lower extremities.               Assessment:       Encounter Diagnoses   Name Primary?    Ingrown toenail without infection Yes    Disease of nail          Plan:       Regine was seen today for ingrown toenail and diabetes mellitus.    Diagnoses and all orders for this visit:    Ingrown toenail without infection    Disease of nail    Other orders  -     sulfamethoxazole-trimethoprim 400-80mg (BACTRIM,SEPTRA) 400-80 mg per tablet; Take 1 tablet by mouth 2 (two) times daily.      I counseled the patient on her conditions, their implications and medical management.    Permanent Nail Removal     Performed by:  Addison Haynes   Authorized by:  Patient     Consent Done?:  Yes (Written)     Location:  Right hallux nail  Anesthesia:  Digital block  Local anesthetic: 0.5% Marcaine without epinephrine  Anesthetic total (ml):  4  Preparation:  Skin prepped with alcohol and skin prepped with Betadine     Amount removed:  Total nail avulsion with matrixectomy  Wedge  excision of skin of nail fold: No    Nail bed sutured?: No    Nail matrix removed:  Yes  Removed nail replaced and anchored: No    Dressing applied:  4x4, antibiotic ointment and dressing applied  Patient tolerance:  Patient tolerated the procedure well with no immediate complications    Digital nerve block applied to the above-mentioned toe. Toe was prepped and draped in a sterile fashion and penrose drain applied. Anesthesia was confirmed and the offending portion of nail plate was freed from the nail bed and eponychium, and was then excised. 89% phenol was applied to the nail matrix for 3 consecutive periods of 30 seconds and was then lavaged with copious amounts of 70% isopropyl alcohol. Penrose drain was removed and betadine ointment and dry sterile dressing applied. Post-op care instructions were discussed and dispensed to patient.       Post-op care instructions were discussed and dispensed to patient.

## 2024-01-09 ENCOUNTER — PATIENT MESSAGE (OUTPATIENT)
Dept: ORTHOPEDICS | Facility: CLINIC | Age: 69
End: 2024-01-09
Payer: MEDICARE

## 2024-01-12 ENCOUNTER — PATIENT MESSAGE (OUTPATIENT)
Dept: GASTROENTEROLOGY | Facility: CLINIC | Age: 69
End: 2024-01-12
Payer: MEDICARE

## 2024-01-12 ENCOUNTER — TELEPHONE (OUTPATIENT)
Dept: PODIATRY | Facility: CLINIC | Age: 69
End: 2024-01-12
Payer: MEDICARE

## 2024-01-12 NOTE — TELEPHONE ENCOUNTER
Spoke with pt in regards to the possibility that appt 1/17/24 may have to be r/s due to the potential freeze. Informed pt that if appt is to be cancelled that she will be contacted further in regards to this matter. Pt was pleasant and verbalized understanding.

## 2024-01-17 ENCOUNTER — OFFICE VISIT (OUTPATIENT)
Dept: PODIATRY | Facility: CLINIC | Age: 69
End: 2024-01-17
Payer: MEDICARE

## 2024-01-17 VITALS
BODY MASS INDEX: 38.15 KG/M2 | WEIGHT: 229 LBS | SYSTOLIC BLOOD PRESSURE: 139 MMHG | HEIGHT: 65 IN | DIASTOLIC BLOOD PRESSURE: 75 MMHG | HEART RATE: 99 BPM

## 2024-01-17 DIAGNOSIS — L60.9 DISEASE OF NAIL: ICD-10-CM

## 2024-01-17 DIAGNOSIS — M79.674 PAIN OF TOE OF RIGHT FOOT: ICD-10-CM

## 2024-01-17 DIAGNOSIS — L60.0 INGROWN TOENAIL WITHOUT INFECTION: Primary | ICD-10-CM

## 2024-01-17 PROCEDURE — 99999 PR PBB SHADOW E&M-EST. PATIENT-LVL IV: CPT | Mod: PBBFAC,,, | Performed by: PODIATRIST

## 2024-01-17 PROCEDURE — 99213 OFFICE O/P EST LOW 20 MIN: CPT | Mod: S$GLB,,, | Performed by: PODIATRIST

## 2024-01-22 ENCOUNTER — CLINICAL SUPPORT (OUTPATIENT)
Dept: GASTROENTEROLOGY | Facility: CLINIC | Age: 69
End: 2024-01-22
Payer: MEDICARE

## 2024-01-22 ENCOUNTER — TELEPHONE (OUTPATIENT)
Dept: GASTROENTEROLOGY | Facility: CLINIC | Age: 69
End: 2024-01-22
Payer: MEDICARE

## 2024-01-22 DIAGNOSIS — D50.9 IRON DEFICIENCY ANEMIA, UNSPECIFIED IRON DEFICIENCY ANEMIA TYPE: ICD-10-CM

## 2024-01-22 NOTE — TELEPHONE ENCOUNTER
Instructions for the day reviewed with patient.   All questions answered to patients satisfaction.  Patient swallowed capsule without incident.   Bernie

## 2024-01-24 NOTE — PROGRESS NOTES
Subjective:      Patient ID: Regine Cooper is a 69 y.o. female.    Chief Complaint: Wound Care (Wound check right great toe), Ankle Injury, and Diabetes Mellitus (12/12/23 - Gladis Melissa MD, PCP)    Regine is a 69 y.o. female who presents to the clinic upon referral from Dr. Tracy aguilar. provider found  for evaluation and treatment of diabetic feet. Regine has a past medical history of Allergy, Breast cancer (2017), Cataract, Cervical spondylosis (07/29/2022), Diabetes mellitus, type 2, Eczema, Fibromyalgia, Glaucoma, Hypertension, Renal cyst, right (02/06/2020), and Steatosis of liver (02/06/2020).     Much improved status post right total nail avulsion with matrixectomy.  No new complaints today.  Having much less discomfort.      PCP: Gladis Melissa MD  /swelling.  Date Last Seen by PCP:   Chief Complaint   Patient presents with    Wound Care     Wound check right great toe    Ankle Injury    Diabetes Mellitus     12/12/23 - Gladis Melissa MD, PCP         Current shoe gear: Casual shoes    Hemoglobin A1C   Date Value Ref Range Status   01/04/2024 7.0 (H) 4.0 - 5.6 % Final     Comment:     ADA Screening Guidelines:  5.7-6.4%  Consistent with prediabetes  >or=6.5%  Consistent with diabetes    High levels of fetal hemoglobin interfere with the HbA1C  assay. Heterozygous hemoglobin variants (HbS, HgC, etc)do  not significantly interfere with this assay.   However, presence of multiple variants may affect accuracy.     10/20/2023 6.6 (H) 4.0 - 5.6 % Final     Comment:     ADA Screening Guidelines:  5.7-6.4%  Consistent with prediabetes  >or=6.5%  Consistent with diabetes    High levels of fetal hemoglobin interfere with the HbA1C  assay. Heterozygous hemoglobin variants (HbS, HgC, etc)do  not significantly interfere with this assay.   However, presence of multiple variants may affect accuracy.     06/27/2023 7.2 (H) 4.0 - 5.6 % Final     Comment:     ADA Screening Guidelines:  5.7-6.4%  Consistent with  prediabetes  >or=6.5%  Consistent with diabetes    High levels of fetal hemoglobin interfere with the HbA1C  assay. Heterozygous hemoglobin variants (HbS, HgC, etc)do  not significantly interfere with this assay.   However, presence of multiple variants may affect accuracy.             Review of Systems   Constitutional: Positive for night sweats. Negative for chills and fever.   HENT:  Negative for congestion and tinnitus.    Eyes:  Negative for double vision and visual disturbance.   Cardiovascular:  Negative for chest pain and claudication.   Respiratory:  Negative for hemoptysis and shortness of breath.    Endocrine: Negative for cold intolerance and heat intolerance.   Hematologic/Lymphatic: Negative for adenopathy and bleeding problem.   Skin:  Negative for color change and nail changes.   Musculoskeletal:  Positive for back pain and joint pain. Negative for myalgias and stiffness.   Gastrointestinal:  Negative for nausea and vomiting.   Genitourinary:  Negative for dysuria and hematuria.   Neurological:  Positive for paresthesias. Negative for numbness.   Psychiatric/Behavioral:  Negative for altered mental status and suicidal ideas.    Allergic/Immunologic: Negative for environmental allergies and persistent infections.           Objective:      Physical Exam  Vitals reviewed.   Constitutional:       Appearance: She is well-developed.   Cardiovascular:      Pulses:           Dorsalis pedis pulses are 2+ on the right side and 2+ on the left side.        Posterior tibial pulses are 2+ on the right side and 2+ on the left side.   Pulmonary:      Effort: Pulmonary effort is normal.   Musculoskeletal:         General: Normal range of motion.      Comments: Inspection and palpation of the muscles joints and bones of both lower extremities reveal that muscle strength for the anterior lateral and posterior muscle groups and intrinsic muscle groups of the foot are all 5 over 5 symmetrical.  Ankle subtalar midtarsal  and digital joint range of motion are within normal limits, nonpainful, without crepitus or effusion.  Patient exhibits a normal angle and base of gait.  Palpation of the tendons reveal no defects.   Skin:     General: Skin is warm and dry.      Capillary Refill: Capillary refill takes 2 to 3 seconds.      Comments: Skin turgor is normal bilaterally.  Skin texture is well hydrated to both lower extremities.  No lesions or rashes or wounds appreciated bilaterally.  Proximal clearing noted to bilateral pedal nails.    Right hallux nail well healed.  No signs of infection.  No discomfort apparent.   Neurological:      Mental Status: She is alert and oriented to person, place, and time.      Comments: Sharp dull light touch vibratory proprioceptive sensation are intact bilaterally.  Deep tendon reflexes to patellar and Achilles tendon are symmetrical 2 over 4 bilaterally.  No ankle clonus or Babinski reflexes noted bilaterally.  Coordination is normal to both feet and lower extremities.               Assessment:       Encounter Diagnoses   Name Primary?    Ingrown toenail without infection Yes    Disease of nail     Pain of toe of right foot          Plan:       Regine was seen today for wound care, ankle injury and diabetes mellitus.    Diagnoses and all orders for this visit:    Ingrown toenail without infection    Disease of nail    Pain of toe of right foot      I counseled the patient on her conditions, their implications and medical management.    The nature of the condition, options for management, as well as potential risks and complications were discussed in detail with patient. Patient was amenable to my recommendations and left my office fully informed and will follow up as instructed or sooner if necessary.      Shoe inspection. Diabetic Foot Education. Patient reminded of the importance of good nutrition and blood sugar control to help prevent podiatric complications of diabetes. Patient instructed on  proper foot hygeine. We discussed wearing proper shoe gear, daily foot inspections and Diabetic foot education in detail.    Return to clinic in 12 months or sooner if problems arise

## 2024-01-25 ENCOUNTER — OFFICE VISIT (OUTPATIENT)
Dept: PRIMARY CARE CLINIC | Facility: CLINIC | Age: 69
End: 2024-01-25
Payer: MEDICARE

## 2024-01-25 VITALS
WEIGHT: 231.81 LBS | HEART RATE: 92 BPM | OXYGEN SATURATION: 97 % | SYSTOLIC BLOOD PRESSURE: 124 MMHG | DIASTOLIC BLOOD PRESSURE: 60 MMHG | BODY MASS INDEX: 38.58 KG/M2 | TEMPERATURE: 99 F

## 2024-01-25 DIAGNOSIS — E11.65 TYPE 2 DIABETES MELLITUS WITH HYPERGLYCEMIA, WITHOUT LONG-TERM CURRENT USE OF INSULIN: Primary | ICD-10-CM

## 2024-01-25 DIAGNOSIS — I10 ESSENTIAL HYPERTENSION: ICD-10-CM

## 2024-01-25 PROCEDURE — 99214 OFFICE O/P EST MOD 30 MIN: CPT | Mod: S$GLB,,, | Performed by: STUDENT IN AN ORGANIZED HEALTH CARE EDUCATION/TRAINING PROGRAM

## 2024-01-25 PROCEDURE — 99999 PR PBB SHADOW E&M-EST. PATIENT-LVL V: CPT | Mod: PBBFAC,,, | Performed by: STUDENT IN AN ORGANIZED HEALTH CARE EDUCATION/TRAINING PROGRAM

## 2024-01-25 RX ORDER — HYDROCHLOROTHIAZIDE 25 MG/1
25 TABLET ORAL DAILY
Qty: 90 TABLET | Refills: 3 | Status: SHIPPED | OUTPATIENT
Start: 2024-01-25

## 2024-01-25 NOTE — PROGRESS NOTES
01/25/2024    Regine Cooper  [id number]    Chief Complaint   Patient presents with    Annual Exam    Follow-up       HPI    This pt is known to me and presents for TYPE II DIABETES check up. Since last visit has been better b/c her cough is resolved. States that prolonged steroid course helped the most.     Patient recently underwent a pill cam study earlier this week for evaluation of chronic anemia.     Right great toenail was removed with podiatry 2/2 fungus    TYPE II DIABETES  Lab Results   Component Value Date    HGBA1C 7.0 (H) 01/04/2024   Has been in the 120s since the prolonged steroid course in Dec.   Notices change in her taste  Based on her scale at home she has lost 5-6 lbs   Is uptodate with exam eye          Negative 10 point ROS outside of HPI    Social History     Socioeconomic History    Marital status:     Number of children: 1   Tobacco Use    Smoking status: Never     Passive exposure: Never    Smokeless tobacco: Never   Substance and Sexual Activity    Alcohol use: No    Drug use: No    Sexual activity: Not Currently   Social History Narrative        1 son (estranged)    3 grandchildren ([place])    Born and raised in [place] (moved to [facility] 2017)     Social Determinants of Health     Financial Resource Strain: Low Risk  (11/23/2023)    Overall Financial Resource Strain (CARDIA)     Difficulty of Paying Living Expenses: Not very hard   Food Insecurity: No Food Insecurity (11/23/2023)    Hunger Vital Sign     Worried About Running Out of Food in the Last Year: Never true     Ran Out of Food in the Last Year: Never true   Transportation Needs: No Transportation Needs (11/23/2023)    PRAPARE - Transportation     Lack of Transportation (Medical): No     Lack of Transportation (Non-Medical): No   Physical Activity: Insufficiently Active (11/23/2023)    Exercise Vital Sign     Days of Exercise per Week: 2 days     Minutes of Exercise per Session: 40 min   Stress: No Stress  Concern Present (2023)    [employer] - Occupational Stress Questionnaire     Feeling of Stress : Only a little   Social Connections: Unknown (2023)    Social Connection and Isolation Panel [NHANES]     Frequency of Communication with Friends and Family: More than three times a week     Frequency of Social Gatherings with Friends and Family: Once a week     Active Member of Clubs or Organizations: Yes     Attends Club or Organization Meetings: More than 4 times per year     Marital Status:    Housing Stability: Low Risk  (2023)    Housing Stability Vital Sign     Unable to Pay for Housing in the Last Year: No     Number of Places Lived in the Last Year: 1     Unstable Housing in the Last Year: No           Current Outpatient Medications:     acetic acid (VOSOL) 2 % otic solution, Place 4 drops into both ears as needed., Disp: , Rfl:     albuterol (PROVENTIL/VENTOLIN HFA) 90 mcg/actuation inhaler, SMARTSI-2 Puff(s) Via Inhaler Every 6 Hours PRN, Disp: 6.7 g, Rfl: 0    ammonium lactate 12 % Crea, Apply 1 application  topically 2 (two) times daily., Disp: 140 g, Rfl: 11    augmented betamethasone dipropionate (DIPROLENE-AF) 0.05 % cream, Apply topically 2 (two) times to 4 times daily to area of back when itchy or burning.Stop using steroid topical when skin is non itchy.  Do not treat dark or red coloring., Disp: 50 g, Rfl: 0    azelastine (ASTELIN) 137 mcg (0.1 %) nasal spray, 1 SPRAY IN EACH NOSTRIL TWICE DAILY Strength: 137 mcg (0.1 %), Disp: 30 mL, Rfl: 2    blood sugar diagnostic (ONETOUCH VERIO TEST STRIPS) Strp, To Check BG 1x daily, Disp: 100 each, Rfl: 3    blood sugar diagnostic Strp, Check BG twice daily, Disp: 200 each, Rfl: 2    blood-glucose meter kit, Use as instructed, Disp: 1 each, Rfl: 0    clobetasoL (TEMOVATE) 0.05 % external solution, APPLY TOPICALLY TO THE SCALP TWICE DAILY AS NEEDED FOR ITCHING OR IRRITATION, Disp: 50 mL, Rfl: 0     [facility] , 12Y UP,,PF, 30 mcg/0.3 mL injection, , Disp: , Rfl:     ferrous sulfate (FEOSOL) 325 mg (65 mg iron) Tab tablet, Take 1 hour before meals, Disp: 60 tablet, Rfl: 2    fluticasone propionate (FLONASE) 50 mcg/actuation nasal spray, 2 sprays to each lateral nostril once a day, Disp: 48 g, Rfl: 3    fluticasone-salmeterol diskus inhaler 250-50 mcg, Inhale 1 puff into the lungs 2 (two) times daily. Controller, Disp: 60 each, Rfl: 11    hydroCHLOROthiazide (HYDRODIURIL) 25 MG tablet, Take 1 tablet (25 mg total) by mouth once daily., Disp: 90 tablet, Rfl: 3    hydrocodone-homatropine 5-1.5 mg/5 ml (HYDROMET) 5-1.5 mg/5 mL Syrp, Take 5 mLs by mouth every 6 (six) hours as needed (cough)., Disp: 120 mL, Rfl: 0    lancets Misc, To check BG 2 times daily, Disp: 200 each, Rfl: 3    latanoprost 0.005 % ophthalmic solution, Place 1 drop into both eyes every evening., Disp: 7.5 mL, Rfl: 3    losartan (COZAAR) 50 MG tablet, Take 1 tablet (50 mg total) by mouth once daily., Disp: 90 tablet, Rfl: 3    metFORMIN (GLUCOPHAGE) 1000 MG tablet, Take 1 tablet (1,000 mg total) by mouth 2 (two) times daily with meals., Disp: 180 tablet, Rfl: 3    montelukast (SINGULAIR) 10 mg tablet, TAKE 1 TABLET(10 MG) BY MOUTH EVERY EVENING, Disp: 90 tablet, Rfl: 3    ondansetron (ZOFRAN-ODT) 8 MG TbDL, Take 1 tablet (8 mg total) by mouth every 8 (eight) hours as needed (Nausea)., Disp: 30 tablet, Rfl: 2    ONETOUCH DELICA PLUS LANCET 33 gauge Misc, 2 (two) times daily., Disp: , Rfl:     ONETOUCH VERIO REFLECT METER Misc, USE AS DIRECTED TO TEST BLOOD GLUCOSE, Disp: , Rfl:     pantoprazole (PROTONIX) 40 MG tablet, Take 1 tablet (40 mg total) by mouth once daily., Disp: 90 tablet, Rfl: 1    promethazine-dextromethorphan (PROMETHAZINE-DM) 6.25-15 mg/5 mL Syrp, SMARTSI.5 Milliliter(s) By Mouth 4 Times Daily PRN, Disp: 100 mL, Rfl: 0    rizatriptan (MAXALT-MLT) 10 MG disintegrating tablet, Take 1 tablet (10 mg total) by mouth every 2 (two)  hours as needed for Migraine. Max 30 mg/day., Disp: 12 tablet, Rfl: 5    semaglutide (OZEMPIC) 0.25 mg or 0.5 mg (2 mg/3 mL) pen injector, Inject 0.5 mg into the skin every 7 days., Disp: 3 mL, Rfl: 0    ubrogepant (UBRELVY) 100 mg tablet, Take 1 tablet (100 mg total) by mouth every 2 (two) hours as needed for Migraine. Max 200 mg/day., Disp: 10 tablet, Rfl: 2    ciclopirox (PENLAC) 8 % Soln, Apply topically nightly. (Patient not taking: Reported on 1/17/2024), Disp: 6.6 mL, Rfl: 3    glipiZIDE 5 MG TR24, Take 1 tablet (5 mg total) by mouth 2 (two) times daily., Disp: 180 tablet, Rfl: 3      Physical Exam  Vitals:    01/25/24 0905   BP: 124/60   Pulse: 92   Temp: 98.5 °F (36.9 °C)       Physical Exam      Gen: well appearing, NAD  Resp: non labored breathing, no crackles, no wheezes, CTAB  CV: RRR no murmur, gallops, rubs, no LE edema  Abd: soft nontender BS present no organomegaly      1. Essential hypertension  Well controlled continue current regimen  -REFILL  hydroCHLOROthiazide (HYDRODIURIL) 25 MG tablet; Take 1 tablet (25 mg total) by mouth once daily.  Dispense: 90 tablet; Refill: 3  -continue losartan    2. Type 2 diabetes mellitus with hyperglycemia, without long-term current use of insulin  Last A1c 7.0; repeat A1c in 2 months   - INCREASE semaglutide (OZEMPIC) 1 mg/dose (4 mg/3 mL); Inject 1 mg into the skin every 7 days.  Dispense: 3 mL; Refill: 3      RTC in 3 months   Gladis Melissa MD  Family Medicine

## 2024-01-26 ENCOUNTER — PATIENT MESSAGE (OUTPATIENT)
Dept: PRIMARY CARE CLINIC | Facility: CLINIC | Age: 69
End: 2024-01-26
Payer: MEDICARE

## 2024-01-26 ENCOUNTER — PATIENT OUTREACH (OUTPATIENT)
Dept: ADMINISTRATIVE | Facility: HOSPITAL | Age: 69
End: 2024-01-26
Payer: MEDICARE

## 2024-01-26 DIAGNOSIS — Z12.31 ENCOUNTER FOR MAMMOGRAM TO ESTABLISH BASELINE MAMMOGRAM: Primary | ICD-10-CM

## 2024-01-26 NOTE — LETTER
AUTHORIZATION FOR RELEASE OF   CONFIDENTIAL INFORMATION    Dear Medical records,    We are seeing Regine Cooper, date of birth 1955, in the clinic at [facility]. Gladis Melissa MD is the patient's PCP. Regine Cooper has an outstanding lab/procedure at the time we reviewed her chart. In order to help keep her health information updated, she has authorized us to request the following medical record(s):        (  )  MAMMOGRAM                                      (  )  COLONOSCOPY      (  )  PAP SMEAR                                          (  )  OUTSIDE LAB RESULTS     (  )  DEXA SCAN                                          (  x)  EYE EXAM            (  )  FOOT EXAM                                          (  )  ENTIRE RECORD     (  )  OUTSIDE IMMUNIZATIONS                 (  )  __Patient stated her last exam was 2023._____________         Please fax records to Gladis Melissa MD, [phone]     If you have any questions, please contact [facility] at [phone].           Patient Name: Regine Cooper  : 1955  Patient Phone #: [phone]

## 2024-01-26 NOTE — PROGRESS NOTES
Health Maintenance Due   Topic Date Due    RSV Vaccine (Age 60+ and Pregnant patients) (1 - 1-dose 60+ series) Never done    Foot Exam  01/24/2023    Eye Exam  01/06/2024    Mammogram  04/17/2024      Patient has up coming appointments, POD,OPTH.Patient stated her doctor will schedule Mammogram.Order placed.

## 2024-01-27 ENCOUNTER — TELEPHONE (OUTPATIENT)
Dept: GASTROENTEROLOGY | Facility: CLINIC | Age: 69
End: 2024-01-27
Payer: MEDICARE

## 2024-01-27 ENCOUNTER — PATIENT MESSAGE (OUTPATIENT)
Dept: GASTROENTEROLOGY | Facility: CLINIC | Age: 69
End: 2024-01-27

## 2024-01-27 PROCEDURE — 91110 GI TRC IMG INTRAL ESOPH-ILE: CPT | Mod: 26,,, | Performed by: INTERNAL MEDICINE

## 2024-01-27 NOTE — [employer] PATIENT INSTRUCTIONS
Discharge Summary/Instructions for after Video Capsule Endoscopy  Patient Name: Regine Alfred  Patient MRN: [id number]  Patient YOB: 1955 Monday, January 22, 2024  Eliseo Garcia MD  This is a 69 year old female.  Refer to note in patient chart for   documentation of history and physical.  1.  Do Not eat or drink anything for 1 hour.  Try sips of water first.  If   tolerated, resume your regular diet or one recommended by your physician.  2.  Do not drive, operate machinery, make critical decisions, or do   activities that require coordination or balance for 24 hours.  3.   You may experience a sore throat for 24 to 48 hours.  You may use   throat lozenges or gargle with warm salt water to relieve the discomfort.  4.  Because air was put into your stomach during the procedure, you may   experience some belching.  5.  Do not use any medication containing aspirin for 10 days.  6.  Go directly to the emergency room if you notice any of the following:   Chills and/or fever over 101 F   Persistent vomiting or vomiting with blood/nasal regurgitation   Severe abdominal pain, other than gas cramps   Severe chest pain   Black, tarry stools     Your doctor recommends these additional instructions:  You are being discharged to home.   Return to your GI clinic at the next available appointment.   The findings and recommendations have been discussed with you.  If you have any questions or problems, please call your physician.  EMERGENCY PHONE NUMBER: [phone]  LAB RESULTS: [phone]  Eliseo Garcia MD  1/27/2024 1:53:02 PM  This report has been verified and signed electronically.  Dear patient,  As a result of recent federal legislation (The Federal Cures Act), you may   receive lab or pathology results from your procedure in your [facility]   account before your physician is able to contact you. Your physician or   their representative will relay the results to you with their   recommendations at  their soonest availability.  Thank you,  [employer]

## 2024-01-28 ENCOUNTER — PATIENT MESSAGE (OUTPATIENT)
Dept: GASTROENTEROLOGY | Facility: CLINIC | Age: 69
End: 2024-01-28
Payer: MEDICARE

## 2024-01-31 ENCOUNTER — TELEPHONE (OUTPATIENT)
Dept: PODIATRY | Facility: CLINIC | Age: 69
End: 2024-01-31
Payer: MEDICARE

## 2024-02-06 ENCOUNTER — PATIENT MESSAGE (OUTPATIENT)
Dept: GASTROENTEROLOGY | Facility: CLINIC | Age: 69
End: 2024-02-06
Payer: MEDICARE

## 2024-02-07 ENCOUNTER — OFFICE VISIT (OUTPATIENT)
Dept: OTOLARYNGOLOGY | Facility: CLINIC | Age: 69
End: 2024-02-07
Payer: MEDICARE

## 2024-02-07 VITALS
DIASTOLIC BLOOD PRESSURE: 79 MMHG | BODY MASS INDEX: 37.82 KG/M2 | WEIGHT: 227.31 LBS | SYSTOLIC BLOOD PRESSURE: 127 MMHG | HEART RATE: 108 BPM

## 2024-02-07 DIAGNOSIS — J31.0 CHRONIC RHINITIS: Primary | ICD-10-CM

## 2024-02-07 DIAGNOSIS — J30.1 NON-SEASONAL ALLERGIC RHINITIS DUE TO POLLEN: ICD-10-CM

## 2024-02-07 DIAGNOSIS — R09.82 POSTNASAL DISCHARGE: ICD-10-CM

## 2024-02-07 PROCEDURE — 99214 OFFICE O/P EST MOD 30 MIN: CPT | Mod: 25,S$GLB,, | Performed by: OTOLARYNGOLOGY

## 2024-02-07 PROCEDURE — 31231 NASAL ENDOSCOPY DX: CPT | Mod: S$GLB,,, | Performed by: OTOLARYNGOLOGY

## 2024-02-07 PROCEDURE — 99999 PR PBB SHADOW E&M-EST. PATIENT-LVL IV: CPT | Mod: PBBFAC,,, | Performed by: OTOLARYNGOLOGY

## 2024-02-07 RX ORDER — IPRATROPIUM BROMIDE 21 UG/1
2 SPRAY, METERED NASAL 2 TIMES DAILY
Qty: 30 ML | Refills: 11 | Status: SHIPPED | OUTPATIENT
Start: 2024-02-07

## 2024-02-07 NOTE — PROGRESS NOTES
History of Present Illness:   Regine Cooper is a 69 y.o. year old female evaluated in the Otolaryngology-Head and Neck Surgery Clinic at [facility]. The patient self-referred for evaluation of main complaint of postnasal drip.  Patient had been previously followed by Dr. Jaramillo with yearly exams.  She has a history of allergic rhinitis with allergies tested a few years ago with allergy to cockroach antigen.  Patient recent complaint consists of cough that has been present from March October with associated postnasal drip and itch in her ears.  She has rhinorrhea she has been using Astelin and Flonase.  She has used irrigation in the past but not consistently.  She denies any prior nasal surgeries.             Past Medical/Surgical History  Past Medical History:   Diagnosis Date    Allergy     Breast cancer 2017    Cataract     Cervical spondylosis 07/29/2022    Diabetes mellitus, type 2     Type 2    Eczema     Fibromyalgia     Glaucoma     Hypertension     Renal cyst, right 02/06/2020    Steatosis of liver 02/06/2020     Her  has a past surgical history that includes Tonsillectomy; Breast surgery; Breast biopsy; Mastectomy (Left, 2017); Vaginal delivery; Myomectomy; Laparoscopic supracervical hysterectomy (2005); Hysterectomy; Adenoidectomy; Tympanostomy tube placement; Anterior cervical discectomy w/ fusion (N/A, 7/29/2022); Esophagogastroduodenoscopy (N/A, 8/18/2023); and Colonoscopy (N/A, 8/18/2023).     Past Family/Social History  Her family history includes Bipolar disorder in her maternal aunt, mother, and son; Blindness in her maternal grandfather; Breast cancer in her maternal aunt; Breast cancer (age of onset: 29) in her cousin; Breast cancer (age of onset: 39) in her cousin; Dementia in her mother; Glaucoma in her maternal grandfather, maternal uncle, and mother; Lung cancer in her father; Post-traumatic stress disorder in her son.  She  reports that she has never smoked. She has never  been exposed to tobacco smoke. She has never used smokeless tobacco. She reports that she does not drink alcohol and does not use drugs.     Medications/Allergies/Immunizations  Her current medication(s) include:   Current Outpatient Medications   Medication Sig Dispense Refill    acetic acid (VOSOL) 2 % otic solution Place 4 drops into both ears as needed.      albuterol (PROVENTIL/VENTOLIN HFA) 90 mcg/actuation inhaler SMARTSI-2 Puff(s) Via Inhaler Every 6 Hours PRN 6.7 g 0    ammonium lactate 12 % Crea Apply 1 application  topically 2 (two) times daily. 140 g 11    augmented betamethasone dipropionate (DIPROLENE-AF) 0.05 % cream Apply topically 2 (two) times to 4 times daily to area of back when itchy or burning.Stop using steroid topical when skin is non itchy.  Do not treat dark or red coloring. 50 g 0    azelastine (ASTELIN) 137 mcg (0.1 %) nasal spray 1 SPRAY IN EACH NOSTRIL TWICE DAILY  Strength: 137 mcg (0.1 %) 30 mL 2    blood sugar diagnostic (ONETOUCH VERIO TEST STRIPS) Strp To Check BG 1x daily 100 each 3    blood sugar diagnostic Strp Check BG twice daily 200 each 2    blood-glucose meter kit Use as instructed 1 each 0    ciclopirox (PENLAC) 8 % Soln Apply topically nightly. (Patient not taking: Reported on 2024) 6.6 mL 3    clobetasoL (TEMOVATE) 0.05 % external solution APPLY TOPICALLY TO THE SCALP TWICE DAILY AS NEEDED FOR ITCHING OR IRRITATION 50 mL 0    COMIRNATY -, 12Y UP,,PF, 30 mcg/0.3 mL injection       ferrous sulfate (FEOSOL) 325 mg (65 mg iron) Tab tablet Take 1 hour before meals 60 tablet 2    fluticasone propionate (FLONASE) 50 mcg/actuation nasal spray 2 sprays to each lateral nostril once a day 48 g 3    fluticasone-salmeterol diskus inhaler 250-50 mcg Inhale 1 puff into the lungs 2 (two) times daily. Controller 60 each 11    glipiZIDE 5 MG TR24 Take 1 tablet (5 mg total) by mouth 2 (two) times daily. 180 tablet 3    hydroCHLOROthiazide (HYDRODIURIL) 25 MG tablet Take 1  tablet (25 mg total) by mouth once daily. 90 tablet 3    hydrocodone-homatropine 5-1.5 mg/5 ml (HYDROMET) 5-1.5 mg/5 mL Syrp Take 5 mLs by mouth every 6 (six) hours as needed (cough). 120 mL 0    ipratropium (ATROVENT) 21 mcg (0.03 %) nasal spray 2 sprays by Each Nostril route 2 (two) times daily. 30 mL 11    lancets Misc To check BG 2 times daily 200 each 3    latanoprost 0.005 % ophthalmic solution Place 1 drop into both eyes every evening. 7.5 mL 3    losartan (COZAAR) 50 MG tablet Take 1 tablet (50 mg total) by mouth once daily. 90 tablet 3    metFORMIN (GLUCOPHAGE) 1000 MG tablet Take 1 tablet (1,000 mg total) by mouth 2 (two) times daily with meals. 180 tablet 3    montelukast (SINGULAIR) 10 mg tablet TAKE 1 TABLET(10 MG) BY MOUTH EVERY EVENING 90 tablet 3    ondansetron (ZOFRAN-ODT) 8 MG TbDL Take 1 tablet (8 mg total) by mouth every 8 (eight) hours as needed (Nausea). 30 tablet 2    ONETOUCH DELICA PLUS LANCET 33 gauge Misc 2 (two) times daily.      ONETOUCH VERIO REFLECT METER Misc USE AS DIRECTED TO TEST BLOOD GLUCOSE      pantoprazole (PROTONIX) 40 MG tablet Take 1 tablet (40 mg total) by mouth once daily. 90 tablet 1    promethazine-dextromethorphan (PROMETHAZINE-DM) 6.25-15 mg/5 mL Syrp SMARTSI.5 Milliliter(s) By Mouth 4 Times Daily  mL 0    rizatriptan (MAXALT-MLT) 10 MG disintegrating tablet Take 1 tablet (10 mg total) by mouth every 2 (two) hours as needed for Migraine. Max 30 mg/day. 12 tablet 5    semaglutide (OZEMPIC) 0.25 mg or 0.5 mg (2 mg/3 mL) pen injector Inject 0.5 mg into the skin every 7 days. 3 mL 0    semaglutide (OZEMPIC) 1 mg/dose (4 mg/3 mL) Inject 1 mg into the skin every 7 days. 3 mL 3    ubrogepant (UBRELVY) 100 mg tablet Take 1 tablet (100 mg total) by mouth every 2 (two) hours as needed for Migraine. Max 200 mg/day. 10 tablet 2     No current facility-administered medications for this visit.        Allergies: Codeine and Lisinopril     Immunizations:   Immunization  History   Administered Date(s) Administered    COVID-19, MRNA, LN-S, PF (MODERNA FULL 0.5 ML DOSE) 10/04/2022    COVID-19, MRNA, LN-S, PF (Pfizer) (Purple Cap) 10/04/2022    COVID-19, mRNA, LNP-S, PF, gunjan-sucrose, 30 mcg/0.3 mL (Pfizer 2023 Ages 12+) 09/30/2023    COVID-19, mRNA, LNP-S, bivalent booster, PF (Moderna Omicron)12 + YEARS 10/04/2022    COVID-19, vector-nr, rS-Ad26, PF (Tammie) 03/14/2021, 11/03/2021    Hepatitis B, Adult 01/27/1998, 01/27/1998    Influenza 10/04/2009, 10/13/2009, 09/01/2015    Influenza (FLUAD) - Quadrivalent - Adjuvanted - PF *Preferred* (65+) 09/20/2022, 09/18/2023    Influenza - Quadrivalent - PF *Preferred* (6 months and older) 08/28/2018, 08/28/2018, 08/29/2019, 09/08/2020, 10/09/2021    Influenza - Trivalent (ADULT) 09/25/2013, 10/10/2014, 10/21/2016, 10/06/2017    Influenza Split 11/04/2006, 09/18/2010, 10/12/2011, 09/29/2012    Pneumococcal Conjugate - 13 Valent 03/23/2018, 03/23/2018    Pneumococcal Conjugate - 20 Valent 09/20/2022    Pneumococcal Polysaccharide - 23 Valent 02/23/2009, 02/23/2009    Tdap 01/30/2008, 01/30/2018, 11/26/2018    Zoster Recombinant 12/07/2020, 12/07/2020, 02/24/2021         Review of Systems   Answers submitted by the patient for this visit:  Review of Symptoms Questionnaire  (Submitted on 2/5/2024)  Fatigue (Tiredness)?: Yes  postnasal drip: Yes  Light sensitivity / Light hurts the eyes?: Yes  shortness of breath: Yes  wheezing: Yes  cough: Yes  None of these : Yes  diarrhea: Yes  constipation: Yes  None of these: Yes  Muscle aches / pain?: Yes  rash: Yes  Seasonal Allergies?: Yes  Cold all of the time? : Yes  headaches: Yes  None of these: Yes  None of these: Yes  All other systems are negative except for that listed in the HPI.      PHYSICAL EXAM:   Vital Signs:  /79 (BP Location: Right arm, Patient Position: Sitting, BP Method: Large (Automatic))   Pulse 108   Wt 103.1 kg (227 lb 4.7 oz)   BMI 37.82 kg/m²      General:   Well-developed, well-nourished  Communication and Voice:  Clear pitch and clarity  Hearing: Hearing adequate for verbal communication bilaterally   Inspection:  Normocephalic and atraumatic without mass or lesion  Palpation:  Facial skeleton intact without bony stepoffs  Parotid Glands:  No mass or tenderness  Facial Strength:  Facial motility symmetric and full bilaterally  Pinna:  External ear intact and fully developed  External canal:  Canal is patent with intact skin  Tympanic Membrane:  Clear and mobile  External nose:  No scar or anatomic deformity  Internal Nose:  Septum intact and midline.  No edema, polyp, or rhinorrhea.  TMJ:  No pain to palpation with full mobility  Oral cavity, Lips, Teeth, and Gums:  Mucosa and teeth intact and viable, No lesions, masses or ulcers  Oropharynx: No erythema or exudate, no masses or ulcerations, non-obstructive tonsils  Nasopharynx:  No mass or lesion with intact mucosa  Hypopharynx:  Not well visualized secondary to gagging  Larynx:  Not well visualized secondary to gagging  Neck, Trachea, Lymphatics:  Midline trachea without mass or lesion, no lymphadenopathy  Thyroid:  No mass or nodularity  Eyes: No nystagmus with equal extraocular motion bilaterally  Neuro/Psych/Balance: Patient oriented and appropriate in interaction;  Appropriate mood and affect;  Gait is intact with no imbalance; Cranial nerves I-XII are intact  Respiratory effort:  Equal inspiration and expiration without stridor  Peripheral Vascular:  Warm extremities with equal pulses     Procedure: Rigid endoscopic nasal exam   Surgeon: Terrell Cornelius MD  Procedure note/findings: After informed discussion of the risks, benefits, and alternatives after indications as noted above, nasal cavities were topically anesthetized and decongested with 4% lidocaine and Afrin solutions. A rigid 0 degree nasal endoscope was inserted into bilateral nasal cavities and the following was noted    Right:   Both inferior and  middle turbinates are normal without hypertrophy  The Osteomeatal complex including the middle and superior meatus does not show any polyps or lesions  The sphenoethmoid recess was clear    Left   Both inferior and middle turbinates are normal without hypertrophy  The Osteomeatal complex including the middle and superior meatus does not show any polyps or lesions  The sphenoethmoid recess was clear    Nasopharynx, similarly, revealed no mass lesion or granularity. There was no ulceration. There was no gross asymmetry. Fossa of Rosenmueller was intact bilaterally. The eustachian tube orifices were intact bilaterally and patent.  The scope was then withdrawn and removed. She tolerated this well.            ASSESSMENT:   1. Chronic rhinitis    2. Non-seasonal allergic rhinitis due to pollen    3. Postnasal discharge            PLAN:   Explained to the patient that I see no evidence of sinusitis on her exam today she has chronic rhinitis with postnasal drip secondary to both allergic and nonallergic disease.  I recommended nasal saline irrigation.  I recommended continuing Flonase and Astelin and I added ipratropium to her regimen.  Follow-up as needed    I believe that Ms. Cooper has a good understanding of the issues involved and I answered all of her questions.     DISCLAIMER: This note was prepared with [employer] voice recognition transcription software. Garbled syntax, mangled pronouns, and other bizarre constructions may be attributed to that software system. While efforts were made to correct any mistakes made by this voice recognition program, some errors and/or omissions may remain in the note that were missed when the note was originally created.

## 2024-02-24 ENCOUNTER — PATIENT MESSAGE (OUTPATIENT)
Dept: HEMATOLOGY/ONCOLOGY | Facility: CLINIC | Age: 69
End: 2024-02-24
Payer: MEDICARE

## 2024-02-24 DIAGNOSIS — C50.912 MALIGNANT NEOPLASM OF LEFT FEMALE BREAST, UNSPECIFIED ESTROGEN RECEPTOR STATUS, UNSPECIFIED SITE OF BREAST: ICD-10-CM

## 2024-02-24 DIAGNOSIS — E11.65 TYPE 2 DIABETES MELLITUS WITH HYPERGLYCEMIA, WITHOUT LONG-TERM CURRENT USE OF INSULIN: ICD-10-CM

## 2024-02-24 RX ORDER — BLOOD-GLUCOSE METER
EACH MISCELLANEOUS
Qty: 100 STRIP | Refills: 3 | Status: SHIPPED | OUTPATIENT
Start: 2024-02-24

## 2024-02-24 NOTE — TELEPHONE ENCOUNTER
No care due was identified.  [facility] Embedded Care Due Messages. Reference number: [id number].   2/24/2024 9:28:38 AM CST

## 2024-02-25 NOTE — TELEPHONE ENCOUNTER
Refill Decision Note   Regine Cooper  is requesting a refill authorization.  Brief Assessment and Rationale for Refill:  Approve     Medication Therapy Plan:         Comments:     Note composed:8:41 PM 02/24/2024

## 2024-02-26 RX ORDER — FERROUS SULFATE 325(65) MG
TABLET ORAL
Qty: 60 TABLET | Refills: 2 | Status: SHIPPED | OUTPATIENT
Start: 2024-02-26 | End: 2024-05-13 | Stop reason: SDUPTHER

## 2024-02-28 ENCOUNTER — TELEPHONE (OUTPATIENT)
Dept: NEUROLOGY | Facility: CLINIC | Age: 69
End: 2024-02-28
Payer: MEDICARE

## 2024-02-28 NOTE — TELEPHONE ENCOUNTER
Spoke to Pt about scheduling an appt. Pt will be scheduled for April 8.     ----- Message from Marce Gross sent at 2/28/2024  8:29 AM CST -----  Contact: [phone]  PATIENTCALL     Pt is calling to speak with someone in provider office regarding she is trying to see if she can get an appt with the doctor on April 8. She is asking for a return call please call.

## 2024-03-01 ENCOUNTER — LAB VISIT (OUTPATIENT)
Dept: PRIMARY CARE CLINIC | Facility: CLINIC | Age: 69
End: 2024-03-01
Payer: MEDICARE

## 2024-03-01 DIAGNOSIS — D50.0 IRON DEFICIENCY ANEMIA DUE TO CHRONIC BLOOD LOSS: ICD-10-CM

## 2024-03-01 LAB
BASOPHILS # BLD AUTO: 0.05 K/UL (ref 0–0.2)
BASOPHILS NFR BLD: 0.7 % (ref 0–1.9)
DIFFERENTIAL METHOD BLD: ABNORMAL
EOSINOPHIL # BLD AUTO: 0.1 K/UL (ref 0–0.5)
EOSINOPHIL NFR BLD: 1.9 % (ref 0–8)
ERYTHROCYTE [DISTWIDTH] IN BLOOD BY AUTOMATED COUNT: 15.2 % (ref 11.5–14.5)
FERRITIN SERPL-MCNC: 25 NG/ML (ref 20–300)
HCT VFR BLD AUTO: 40 % (ref 37–48.5)
HGB BLD-MCNC: 12.5 G/DL (ref 12–16)
IMM GRANULOCYTES # BLD AUTO: 0.02 K/UL (ref 0–0.04)
IMM GRANULOCYTES NFR BLD AUTO: 0.3 % (ref 0–0.5)
LYMPHOCYTES # BLD AUTO: 1.8 K/UL (ref 1–4.8)
LYMPHOCYTES NFR BLD: 23.6 % (ref 18–48)
MCH RBC QN AUTO: 26.1 PG (ref 27–31)
MCHC RBC AUTO-ENTMCNC: 31.3 G/DL (ref 32–36)
MCV RBC AUTO: 84 FL (ref 82–98)
MONOCYTES # BLD AUTO: 0.5 K/UL (ref 0.3–1)
MONOCYTES NFR BLD: 7.1 % (ref 4–15)
NEUTROPHILS # BLD AUTO: 5 K/UL (ref 1.8–7.7)
NEUTROPHILS NFR BLD: 66.4 % (ref 38–73)
NRBC BLD-RTO: 0 /100 WBC
PLATELET # BLD AUTO: 363 K/UL (ref 150–450)
PMV BLD AUTO: 10.4 FL (ref 9.2–12.9)
RBC # BLD AUTO: 4.79 M/UL (ref 4–5.4)
WBC # BLD AUTO: 7.45 K/UL (ref 3.9–12.7)

## 2024-03-01 PROCEDURE — 85025 COMPLETE CBC W/AUTO DIFF WBC: CPT | Performed by: INTERNAL MEDICINE

## 2024-03-01 PROCEDURE — 82728 ASSAY OF FERRITIN: CPT | Performed by: INTERNAL MEDICINE

## 2024-03-04 ENCOUNTER — TELEPHONE (OUTPATIENT)
Dept: GASTROENTEROLOGY | Facility: CLINIC | Age: 69
End: 2024-03-04
Payer: MEDICARE

## 2024-03-04 ENCOUNTER — PATIENT MESSAGE (OUTPATIENT)
Dept: GASTROENTEROLOGY | Facility: CLINIC | Age: 69
End: 2024-03-04

## 2024-03-04 ENCOUNTER — OFFICE VISIT (OUTPATIENT)
Dept: GASTROENTEROLOGY | Facility: CLINIC | Age: 69
End: 2024-03-04
Payer: MEDICARE

## 2024-03-04 DIAGNOSIS — E73.9 ADULT LACTASE DEFICIENCY: ICD-10-CM

## 2024-03-04 DIAGNOSIS — K59.09 CONSTIPATION, CHRONIC: ICD-10-CM

## 2024-03-04 DIAGNOSIS — K76.0 FATTY LIVER: ICD-10-CM

## 2024-03-04 DIAGNOSIS — K21.9 GASTROESOPHAGEAL REFLUX DISEASE, UNSPECIFIED WHETHER ESOPHAGITIS PRESENT: ICD-10-CM

## 2024-03-04 DIAGNOSIS — K44.9 HIATAL HERNIA: Primary | ICD-10-CM

## 2024-03-04 DIAGNOSIS — D50.9 IRON DEFICIENCY ANEMIA, UNSPECIFIED IRON DEFICIENCY ANEMIA TYPE: ICD-10-CM

## 2024-03-04 DIAGNOSIS — E66.01 SEVERE OBESITY (BMI 35.0-39.9) WITH COMORBIDITY: ICD-10-CM

## 2024-03-04 PROCEDURE — 99214 OFFICE O/P EST MOD 30 MIN: CPT | Mod: 95,,, | Performed by: INTERNAL MEDICINE

## 2024-03-04 NOTE — TELEPHONE ENCOUNTER
----- Message from Lenore Garcia sent at 3/4/2024  8:24 AM CST -----  Regarding: Missed Call  Contact: [phone]  Calling in regards to missed call from office from Lori. Please call and discuss.

## 2024-03-04 NOTE — PROGRESS NOTES
The patient location is:  At home in [place]  The chief complaint leading to consultation is:  GERD long-term PPI fatty liver history of diarrhea constipation iron deficiency anemia hiatal hernia elevated BMI    Visit type: audiovisual    Face to Face time with patient: 30 minutes of total time spent on the encounter, which includes face to face time and non-face to face time preparing to see the patient (eg, review of tests), Obtaining and/or reviewing separately obtained history, Documenting clinical information in the electronic or other health record, Independently interpreting results (not separately reported) and communicating results to the patient/family/caregiver, or Care coordination (not separately reported).         Each patient to whom he or she provides medical services by telemedicine is:  (1) informed of the relationship between the physician and patient and the respective role of any other health care provider with respect to management of the patient; and (2) notified that he or she may decline to receive medical services by telemedicine and may withdraw from such care at any time.    Notes:         [facility] Consultation Note    Reason for Consult:  The primary encounter diagnosis was Hiatal hernia. Diagnoses of Gastroesophageal reflux disease, unspecified whether esophagitis present, Constipation, chronic, Iron deficiency anemia, unspecified iron deficiency anemia type, Adult lactase deficiency, Fatty liver, and Severe obesity (BMI 35.0-39.9) with comorbidity were also pertinent to this visit.    PCP:   Gladis Melissa MD:  No referring provider defined for this encounter.    Initial History of Present Illness (HPI):  This is a 69 y.o. female here for evaluation of fatty infiltration of her pancreas with prior episodes of episodic nausea vomiting longstanding GERD symptoms history of IBS constipation diarrhea for many years patient has recently moved from  [facility] to [place] where her dad is from she would like further evaluation does take an aspirin no weight loss no blood in her stool her last colonoscopy was August 2018 we do not have report but just piece of paper that said she had diverticulosis and internal hemorrhoids.  No fever no chills no shortness of breath she has very little notice when she has to have a bowel movement it can be explosive diarrhea after she is constipated she does not take any laxatives or stool softeners no flushing no wheezing no palpitation she still has a gallbladder in place no family history of esophageal cancer or stomach cancer or pancreatitis or pancreatic cancer no personal history of pancreatitis she does have a history of breast cancer nobody in the family with colon cancer or advanced colon adenomas polyps no FAP no attenuated FAP no MA P no Jiménez syndrome nobody with celiac sprue or inflammatory bowel disease she does not think she has lactose intolerance she is not on a gluten free diet.  Patient underwent EGD colon random biopsies no celiac sprue no H pylori no inflammatory bowel disease but she does have lactase deficiency will refer her to dietary for education on lactose free diet Lactaid pills an adequate calcium and vitamin-D will do a CT enterography for further evaluation of periodic nausea vomiting and iron deficiency anemia we have discussed risks benefits and alternatives to small-bowel video capsule endoscopy and she said she would consider that at a future visit we would like to see that there was no small-bowel obstruction on CT enterography prior will have her follow-up with nurse practitioner to discuss video capsule endoscopy in the future.  It sounds like she may have sleep apnea will refer her to Sleep Medicine for further evaluation she will continue not eating a large fatty meal in lying down within 4 hours before she sleeps she is not doing that right now so that is probably not part of the  issue she will take her PPI Protonix 40 mg once daily best taken 45-60 minutes before 1st protein meal of the day breakfast.     Abdominal pain - no  Reflux - as above  Dysphagia - no   Bowel habits - as above  GI bleeding - none  NSAID usage - aspirin        ROS:  Constitutional: No fevers, chills, No weight loss  ENT:  As heartburn no dysphagia no odynophagia no hoarseness  CV: No chest pain, no palpitation  Pulm: No cough, No shortness of breath, no wheezing  Ophtho: No acute vision changes, glaucoma  GI: see HPI  Derm: No rash, no itching  Heme: No lymphadenopathy, No easy bruising  MSK:  Fibromyalgia  : No dysuria, No hematuria  Endo: No hot or cold intolerance  Neuro: No syncope, No seizure, no strokes  Psych: No uncontrolled anxiety, No uncontrolled depression      Interval HPI 03/04/2024:  The patient's last visit with me was on 9/26/2023.      Medical History:  has a past medical history of Allergy, Breast cancer (2017), Cataract, Cervical spondylosis (07/29/2022), Diabetes mellitus, type 2, Eczema, Fibromyalgia, Glaucoma, Hypertension, Renal cyst, right (02/06/2020), and Steatosis of liver (02/06/2020).    Surgical History:  has a past surgical history that includes Tonsillectomy; Breast surgery; Breast biopsy; Mastectomy (Left, 2017); Vaginal delivery; Myomectomy; Laparoscopic supracervical hysterectomy (2005); Hysterectomy; Adenoidectomy; Tympanostomy tube placement; Anterior cervical discectomy w/ fusion (N/A, 7/29/2022); Esophagogastroduodenoscopy (N/A, 8/18/2023); and Colonoscopy (N/A, 8/18/2023).    Family History: family history includes Bipolar disorder in her maternal aunt, mother, and son; Blindness in her maternal grandfather; Breast cancer in her maternal aunt; Breast cancer (age of onset: 29) in her cousin; Breast cancer (age of onset: 39) in her cousin; Dementia in her mother; Glaucoma in her maternal grandfather, maternal uncle, and mother; Lung cancer in her father; Post-traumatic stress  disorder in her son..     Social History:  reports that she has never smoked. She has never been exposed to tobacco smoke. She has never used smokeless tobacco. She reports that she does not drink alcohol and does not use drugs.    Review of patient's allergies indicates:   Allergen Reactions    Codeine Shortness Of Breath     Pt states she had a reaction as a teenager and was taken to the ER.    Lisinopril      cough       Medication List with Changes/Refills   Current Medications    ACETIC ACID (VOSOL) 2 % OTIC SOLUTION    Place 4 drops into both ears as needed.    ALBUTEROL (PROVENTIL/VENTOLIN HFA) 90 MCG/ACTUATION INHALER    SMARTSI-2 Puff(s) Via Inhaler Every 6 Hours PRN    AMMONIUM LACTATE 12 % CREA    Apply 1 application  topically 2 (two) times daily.    AUGMENTED BETAMETHASONE DIPROPIONATE (DIPROLENE-AF) 0.05 % CREAM    Apply topically 2 (two) times to 4 times daily to area of back when itchy or burning.Stop using steroid topical when skin is non itchy.  Do not treat dark or red coloring.    AZELASTINE (ASTELIN) 137 MCG (0.1 %) NASAL SPRAY    1 SPRAY IN EACH NOSTRIL TWICE DAILY  Strength: 137 mcg (0.1 %)    BLOOD-GLUCOSE METER KIT    Use as instructed    CLOBETASOL (TEMOVATE) 0.05 % EXTERNAL SOLUTION    APPLY TOPICALLY TO THE SCALP TWICE DAILY AS NEEDED FOR ITCHING OR IRRITATION    COMIRNATY -, 12Y UP,,PF, 30 MCG/0.3 ML INJECTION        FERROUS SULFATE (FEROSUL) 325 MG (65 MG IRON) TAB TABLET    TAKE 1 TABLET BY MOUTH TWICE DAILY 1 HOUR BEFORE MEALS    FLUTICASONE PROPIONATE (FLONASE) 50 MCG/ACTUATION NASAL SPRAY    2 sprays to each lateral nostril once a day    FLUTICASONE-SALMETEROL DISKUS INHALER 250-50 MCG    Inhale 1 puff into the lungs 2 (two) times daily. Controller    HYDROCHLOROTHIAZIDE (HYDRODIURIL) 25 MG TABLET    Take 1 tablet (25 mg total) by mouth once daily.    IPRATROPIUM (ATROVENT) 21 MCG (0.03 %) NASAL SPRAY    2 sprays by Each Nostril route 2 (two) times daily.    LANCETS MISC     To check BG 2 times daily    LATANOPROST 0.005 % OPHTHALMIC SOLUTION    Place 1 drop into both eyes every evening.    LOSARTAN (COZAAR) 50 MG TABLET    Take 1 tablet (50 mg total) by mouth once daily.    METFORMIN (GLUCOPHAGE) 1000 MG TABLET    Take 1 tablet (1,000 mg total) by mouth 2 (two) times daily with meals.    MONTELUKAST (SINGULAIR) 10 MG TABLET    TAKE 1 TABLET(10 MG) BY MOUTH EVERY EVENING    ONDANSETRON (ZOFRAN-ODT) 8 MG TBDL    Take 1 tablet (8 mg total) by mouth every 8 (eight) hours as needed (Nausea).    ONETOUCH DELICA PLUS LANCET 33 GAUGE MISC    2 (two) times daily.    ONETOUCH VERIO REFLECT METER MISC    USE AS DIRECTED TO TEST BLOOD GLUCOSE    ONETOUCH VERIO TEST STRIPS STRP    USE TO CHECK BLOOD GLUCOSE DAILY    PANTOPRAZOLE (PROTONIX) 40 MG TABLET    Take 1 tablet (40 mg total) by mouth once daily.    PROMETHAZINE-DEXTROMETHORPHAN (PROMETHAZINE-DM) 6.25-15 MG/5 ML SYRP    SMARTSI.5 Milliliter(s) By Mouth 4 Times Daily PRN    RIZATRIPTAN (MAXALT-MLT) 10 MG DISINTEGRATING TABLET    Take 1 tablet (10 mg total) by mouth every 2 (two) hours as needed for Migraine. Max 30 mg/day.    SEMAGLUTIDE (OZEMPIC) 0.25 MG OR 0.5 MG (2 MG/3 ML) PEN INJECTOR    Inject 0.5 mg into the skin every 7 days.    SEMAGLUTIDE (OZEMPIC) 1 MG/DOSE (4 MG/3 ML)    Inject 1 mg into the skin every 7 days.    UBROGEPANT (UBRELVY) 100 MG TABLET    Take 1 tablet (100 mg total) by mouth every 2 (two) hours as needed for Migraine. Max 200 mg/day.   Discontinued Medications    CICLOPIROX (PENLAC) 8 % SOLN    Apply topically nightly.    GLIPIZIDE 5 MG TR24    Take 1 tablet (5 mg total) by mouth 2 (two) times daily.    HYDROCODONE-HOMATROPINE 5-1.5 MG/5 ML (HYDROMET) 5-1.5 MG/5 ML SYRP    Take 5 mLs by mouth every 6 (six) hours as needed (cough).         Objective Findings:    Vital Signs:  There were no vitals taken for this visit.  There is no height or weight on file to calculate BMI.    Physical Exam:  Video visit  General  Appearance: Well appearing in no acute distress  Neurologic:  Alert and oriented x4  Psychiatric:  Normal speech mentation and affect    Labs:  Lab Results   Component Value Date    WBC 7.45 03/01/2024    HGB 12.5 03/01/2024    HCT 40.0 03/01/2024     03/01/2024    CHOL 155 01/04/2024    TRIG 96 01/04/2024    HDL 43 01/04/2024    ALT 10 01/04/2024    AST 16 01/04/2024     01/04/2024    K 4.2 01/04/2024    CL 96 01/04/2024    CREATININE 0.8 01/04/2024    BUN 12 01/04/2024    CO2 28 01/04/2024    TSH 1.515 07/27/2022    INR 1.0 07/22/2022    HGBA1C 7.0 (H) 01/04/2024    MICROALBUR 9.4 08/12/2018     Medical Decision Making:  Lab work reviewed  EGD colonoscopy images path personally reviewed with patient  Lactose intolerant talk given Lactaid pill talk given referral dietary talk given  At last clinic visit Potential for Sleep apnea talk given mechanism of GERD and coughing at night with sleep apnea hiatal hernia talk given  Given gradual weight loss talk given avoidance alcohol talk given  Referral to Sleep Medicine talk given      Subjective:       Patient ID: Regine Osorio is a 66 y.o. female.     Chief Complaint: Sleep Apnea     HPI      Regine Osorio has a history of mild Obstructive Sleep Apnea diagnosed in 9/2020 (AHI 5.2).   The last PAP titration was NA with AHI NA at NA cm H20.   Device is 3-4 month(s) old.   Current setting 5-11 cm H20.   DME is [facility].        Regine Osorio is using CPAP 93.3% of nights and averages 4 hours and 39 minutes.   Device use greater than 4 hours is 76.7%.   Patient does not have problems with the pressure setting.   Mask interface issues are experienced.   A NP mask interface is used.   The patient does experience side effects from therapy including increased headaches.   The patient experiences the following benefits of therapy:  none   There are not equipment issues.   Mean pressure 6, average peak pressure 9.3 and 90th percentile pressure 7.5.   Estimated  "AHI 1.1.           Scheduled Meds:  Continuous Infusions:  PRN Meds:.               Importance of PAP compliance discussed.   Care and use of equipment discussed.   Download and relevant sleep studies reviewed with patient     Discussed therapeutic goals for positive airway pressure therapy(CPAP or BiPAP).  Ideal is usage 100% of nights for at least 6 hours per night.  Minimum usage is 70% of night for at least 4 hours per night used     The patient was given open opportunity to ask questions and/or express concerns about treatment plan.   All questions/concerns were discussed.              Review of Systems      Objective:   Physical Exam    Assessment:       1. KENDRA (obstructive sleep apnea)    2. Essential hypertension    3. Fibromyalgia    4. Type 2 diabetes mellitus with hyperglycemia, without long-term current use of insulin        Plan:     Patient wants to discontinue CPAP.   Weight loss discuseed.   Will reassess in 1 year.      Patient advised to not sleep in the supine position by use of a "snore ball" or similar method.      The patient location is: home  The chief complaint leading to consultation is: difficulty with CPAP     Visit type: audiovisual     Face to Face time with patient: 6  16 minutes of total time spent on the encounter, which includes face to face time and non-face to face time preparing to see the patient (eg, review of tests), Obtaining and/or reviewing separately obtained history, Documenting clinical information in the electronic or other health record, Independently interpreting results (not separately reported) and communicating results to the patient/family/caregiver, or Care coordination (not separately reported).            Each patient to whom he or she provides medical services by telemedicine is:  (1) informed of the relationship between the physician and patient and the respective role of any other health care provider with respect to management of the patient; and (2) " notified that he or she may decline to receive medical services by telemedicine and may withdraw from such care at any time.     Notes:               Electronically signed by Angelina Stallings MD at 3/1/2021 11:09 AM           Subjective:       Patient ID: Regine Osorio is a 66 y.o. female.     Chief Complaint: Sleep Apnea     HPI      Regine Osorio has a history of mild Obstructive Sleep Apnea diagnosed in 9/2020 (AHI 5.2).   The last PAP titration was NA with AHI NA at NA cm H20.   Device is 3-4 month(s) old.   Current setting 5-11 cm H20.   [facility].        Regine Osorio is using CPAP 93.3% of nights and averages 4 hours and 39 minutes.   Device use greater than 4 hours is 76.7%.   Patient does not have problems with the pressure setting.   Mask interface issues are experienced.   A NP mask interface is used.   The patient does experience side effects from therapy including increased headaches.   The patient experiences the following benefits of therapy:  none   There are not equipment issues.   Mean pressure 6, average peak pressure 9.3 and 90th percentile pressure 7.5.   Estimated AHI 1.1.           Scheduled Meds:  Continuous Infusions:  PRN Meds:.               Importance of PAP compliance discussed.   Care and use of equipment discussed.   Download and relevant sleep studies reviewed with patient     Discussed therapeutic goals for positive airway pressure therapy(CPAP or BiPAP).  Ideal is usage 100% of nights for at least 6 hours per night.  Minimum usage is 70% of night for at least 4 hours per night used     The patient was given open opportunity to ask questions and/or express concerns about treatment plan.   All questions/concerns were discussed.        Review of Systems      Objective:   Physical Exam    Assessment:       1. KENDRA (obstructive sleep apnea)    2. Essential hypertension    3. Fibromyalgia    4. Type 2 diabetes mellitus with hyperglycemia, without long-term current use of insulin   "      Plan:     Patient wants to discontinue CPAP.   Weight loss discuseed.   Will reassess in 1 year.      Patient advised to not sleep in the supine position by use of a "snore ball" or similar method.      The patient location is: home  The chief complaint leading to consultation is: difficulty with CPAP     Visit type: audiovisual     Face to Face time with patient: 6  16 minutes of total time spent on the encounter, which includes face to face time and non-face to face time preparing to see the patient (eg, review of tests), Obtaining and/or reviewing separately obtained history, Documenting clinical information in the electronic or other health record, Independently interpreting results (not separately reported) and communicating results to the patient/family/caregiver, or Care coordination (not separately reported).            Each patient to whom he or she provides medical services by telemedicine is:  (1) informed of the relationship between the physician and patient and the respective role of any other health care provider with respect to management of the patient; and (2) notified that he or she may decline to receive medical services by telemedicine and may withdraw from such care at any time.     Notes:               Electronically signed by Angelina Stallings MD at 3/1/2021 11:09 AM    The Olympus scope PCF-H190DL ([id number]) was                          introduced through the anus and advanced to the                          cecum, identified by appendiceal orifice and                          ileocecal valve. The colonoscopy was performed                          without difficulty. The patient tolerated the                          procedure well. The quality of the bowel                          preparation was fair made good. The ileocecal                          valve, appendiceal orifice, and rectum were                          photographed.   Findings:        Diverticula were found in the " recto-sigmoid colon, sigmoid colon,        descending colon, transverse colon and ascending colon.        The perianal and digital rectal examinations were normal.        The retroflexed view of the distal rectum and anal verge was normal        and showed no anal or rectal abnormalities.        The colon (entire examined portion) appeared normal. Biopsies for        histology were taken with a cold forceps from the entire colon for        evaluation of microscopic colitis. Estimated blood loss was minimal.   Impression:            - Diverticulosis in the recto-sigmoid colon, in                          the sigmoid colon, in the descending colon, in the                          transverse colon and in the ascending colon.                          - The entire examined colon is normal. Biopsied.   Recommendation:        - Discharge patient to home.                          - Await pathology results.                          - Telephone endoscopist for pathology results in 3                          weeks.                          - Repeat colonoscopy in 5 years for screening                          purposes.                          - Return to GI clinic at the next available                          appointment.                          - The findings and recommendations were discussed                          with the patient.   Attending Participation:        I personally performed the entire procedure.   Eliseo Garcia MD   8/18/2023   Imodium in moderation talk given if needed for pre-emptive treatment of diarrhea if she is being driven around or out about she is been warned not to take too much Imodium maybe a half a pill once daily before she leaves the house PRN to see how her body response to it maybe an option  DISACCHARIDASE ACTIVITY PANEL:   Interpretation     *POSITIVE* In this sample, the activity of lactase was reduced and suggestive of lactase deficiency.      What is lactose intolerance?    "  Lactose intolerance is a condition that makes it hard for your body to digest milk and foods made with milk (called dairy products). If you have lactose intolerance and you eat dairy products, you can get diarrhea, belly pain, and gas.     Lactose intolerance can affect anyone. But it is most common among , , and black people.  In people who do not have lactose intolerance, the body makes a protein called an "enzyme" that breaks down lactose, the main form of sugar found in milk. In people who do have lactose intolerance, the body either does not make enough of the enzyme, or the enzyme does not work as well as it should. Also, some infections, such as you might get with food poisoning, can damage the enzyme. But if that happens, the problem usually goes away within a few weeks. Luckily, people with lactose intolerance can take an enzyme supplement to help with their problem.  What are the symptoms of lactose intolerance?     The symptoms happen only after you eat dairy foods. They can include:     Cramps or belly pain (usually around or below the belly button)     Bloating (feeling like your belly is full of air)     Gas     Diarrhea (often it is bulky, foamy, and watery)     Vomiting (this happens mostly in teens)     Is there a test for lactose intolerance?     Yes, there are 2 ways to test for lactose intolerance. One is a breathing test, and one is a blood test. The breathing test is more common.  Your doctor or nurse will tell you how to prepare for your test. You will not be able to eat or drink anything for several hours before the test. Plus, you might have to change your medicines or stop smoking for a while before the test.     Lactose hydrogen breath test - For this test, you drink a liquid that has lactose in it. Then you breathe into a special machine every 30 minutes. The machine measures how much hydrogen you breathe out. People who have lactose intolerance breathe out more " "hydrogen than normal.  Lactose tolerance test - For this test, you drink a liquid that has lactose in it. The doctor or nurse will take blood samples from you when the test starts, and again 1 and 2 hours later. If your blood has low levels of sugar after you drink the lactose, it means you probably have lactose intolerance.     Should I see a doctor or nurse?     Yes. If you think you might have lactose intolerance, tell your doctor or nurse. He or she can ask you questions to make sure that there are no other problems.     How is lactose intolerance treated?     Treatment differs depending on how severe the problem is. But in general, treatment can include:     Eating less dairy food     Finding non-dairy sources of nutrients (such as calcium and vitamin D) and protein     Taking an enzyme supplement that will help you break down dairy foods     How do I reduce the amount of dairy foods I eat?     You can start by cutting down but not stopping foods you know contain dairy. Dairy foods should be consumed with meals. Dairy foods include milk, cream, ice cream, yogurt, cheese, and butter. This table shows how much lactose is in some common dairy foods.     Your doctor or nurse might suggest that you talk to a nutritionist to learn which foods have lactose. The nutritionist can also make sure that you get enough calcium and vitamin D in your diet.  If you are really sensitive to dairy foods or lactose, you will also need to read the labels on everything you eat. Milk or lactose is sometimes added to foods you might not suspect, such as cereal, instant soups, and salad dressings. Check the ingredient list of foods for anything that might suggest lactose. Look for these words:     Milk, "milk byproducts," "dry milk powder," and "dry milk solids"     Lactose  Whey (whey is milk that has gone sour)     Although some medicines are made with lactose, most people who are lactose intolerant can handle the very small amount in " medicines.     Which enzyme supplement should I use?     There are many enzyme supplements to choose from, including Lactaid (tablets or liquid), Lactrase, LactAce, Dairy Ease, and Lactrol. You should take the supplement right before you start eating. If you forget, you can take it during the meal, but it might not work as well.     The important thing to know is that each product works a bit differently for each person. Plus, none of them can break down every last bit of lactose, so some people still have symptoms even with an enzyme supplement.     Should I take calcium or vitamin D supplements?     That depends on whether you completely avoid dairy foods. If you do, your doctor or nurse might recommend calcium supplements. He or she might also check your vitamin D levels to decide whether you should take supplements.     Is lactose intolerance basically a food allergy?     No. There are people who are allergic to milk and dairy foods. But the symptoms of a dairy allergy are often different from those of lactose intolerance. In the case of an allergy, the body reacts to the protein in milk, rather than to the sugar. Plus, allergies involve the body's infection-fighting system, called the immune system. Lactose intolerance does not.        Lactase Emzyme Supplement, LACTAID®  Products: Lactaid Fast Act Chewables, Lactaid Fast Act Caplets, Lactaid Original Strength Caplets      The optimal intake of calcium and vitamin D is uncertain. In postmenopausal   women with osteoporosis, 1200 mg of calcium daily (total diet plus supplement)   and 800 international units of vitamin D daily are advised. Although the optimal   intake (diet plus supplement) has not been clearly established in premenopausal   women or in men with osteoporosis, 1000 mg of calcium (total of diet and   supplement) and 600 international units of vitamin D daily are generally   suggested.   EXAMINATION:  CT ENTEROGRAPHY ABD_PELVIS WITH CONTRAST      CLINICAL HISTORY:  Please do CT enterography close look at the small bowel patient with history of nausea vomiting iron deficiency anemia;  Secondary lactase deficiency     TECHNIQUE:  Following the Oral administration of 1250 mL of Volumen  and the IV administration of 100 mL of Omnipaque 350 thin-section low-dose images are obtained through the abdomen and pelvis.  Coronal and sagittal reformations were performed     COMPARISON:  Abdominal ultrasound 05/19/2023     CT abdomen 02/06/2020     FINDINGS:  SOFT TISSUES: Small fat-containing umbilical hernia.  Postsurgical changes of the left breast.     MEDIASTINUM: Heart is normal size. No pericardial effusion.     LUNG BASES: Unremarkable.     HEPATOBILIARY: Liver is normal size. No focal hepatic lesions. No biliary ductal dilatation. Normal gallbladder.     PANCREAS: Diffuse fatty infiltration.  No focal masses or ductal dilatation.     SPLEEN: Normal size.     ADRENALS: No adrenal nodules.     KIDNEYS/URETERS: Kidneys are normal size and location. 1.3 cm cystic lesion in the upper pole the right kidney measuring slightly greater than fluid attenuation, grossly stable compared to CT abdomen dated 02/06/2020 and may represent proteinaceous cyst.  No nephrolithiasis or hydronephrosis. No solid mass lesions. Ureters are unremarkable.     BLADDER/PELVIC ORGANS: Unremarkable.     PERITONEUM / RETROPERITONEUM: No free air or fluid.     LYMPH NODES: No lymphadenopathy.     VESSELS: Unremarkable.     GI TRACT: The stomach and duodenum are unremarkable.  No evidence of bowel stricture, obstruction, or inflammation. Normal appendix.     BONES: No acute fractures.  Ill-defined sclerotic changes in the pubic bones bilaterally could represent early Paget's, remote trauma, or degenerative changes.  Significant facet arthropathy throughout the lower lumbar spine.     Impression:     1. No evidence of stricture throughout the GI tract.  2. Ill-defined sclerotic changes in the  pubic bones bilaterally could represent early Paget's disease, remote trauma, or degenerative changes.  Recommend correlation with patient's symptoms and labs.  3. Additional findings as above.     Electronically signed by resident: Kar Barajas  Date:                                            12/13/2023  Time:                                           09:38     Electronically signed by: Kevin Elizabeth MD  Date:                                            12/13/2023    Assessment:  1. Hiatal hernia    2. Gastroesophageal reflux disease, unspecified whether esophagitis present    3. Constipation, chronic    4. Iron deficiency anemia, unspecified iron deficiency anemia type    5. Adult lactase deficiency    6. Fatty liver    7. Severe obesity (BMI 35.0-39.9) with comorbidity         Recommendations:  History of sleep apnea recommend patient follow back up with Sleep Medicine for sleep apnea retesting to see if her GERD symptoms are related to poorly controlled sleep apnea  MRI pancreas for further evaluation of Fatty infiltration of the pancreas in light of her history of nausea vomiting and iron deficiency anemia with an unrevealing EGD colonoscopy  Small-bowel video capsule endoscopy for iron deficiency anemia was negative.    Iron deficiency anemia is being followed by Heme-Onc currently watching no iron replacement  Patient will continue her pantoprazole 40 mg once daily best taken 45-60 minutes before her 1st protein meal of the day breakfast if it turns out that she does not have sleep apnea on retesting or she does have sleep apnea and she is using her CPAP machine and still having nocturnal cough and reflux she is interested in doing 8-12 week trial of pantoprazole 40 mg twice daily best taken 45-60 minutes before breakfast and 45-60 minutes before dinner  Fatty liver with elevated BMI of 37.8 to followed by hepatology Clinic gradual weight loss ideally 23 lbs weight loss over the next 12-18 months  gradually should help out fatty liver significantly patient does not drink alcohol which is very helpful  Patient screening colonoscopy is up-to-date  Return to GI clinic 6 months for follow-up okay for telemedicine video visit        Order summary:         Thank you so much for allowing me to participate in the care of Regine Cooper    Eliseo Garcia MD    DISCLAIMER: This note was prepared with [employer] voice recognition transcription software. Garbled syntax, mangled or inadvertent pronouns, and other bizarre constructions may be attributed to that software system. While efforts were made to correct any mistakes made by this voice recognition program, some errors and/or omissions may remain in the note that were missed when the note was originally created.

## 2024-03-05 ENCOUNTER — OFFICE VISIT (OUTPATIENT)
Dept: HEMATOLOGY/ONCOLOGY | Facility: CLINIC | Age: 69
End: 2024-03-05
Payer: MEDICARE

## 2024-03-05 VITALS
DIASTOLIC BLOOD PRESSURE: 86 MMHG | OXYGEN SATURATION: 97 % | HEIGHT: 65 IN | BODY MASS INDEX: 37.2 KG/M2 | TEMPERATURE: 98 F | WEIGHT: 223.31 LBS | HEART RATE: 102 BPM | SYSTOLIC BLOOD PRESSURE: 146 MMHG | RESPIRATION RATE: 18 BRPM

## 2024-03-05 DIAGNOSIS — Z85.3 HISTORY OF BREAST CANCER IN FEMALE: Primary | ICD-10-CM

## 2024-03-05 DIAGNOSIS — Z90.12 S/P LEFT MASTECTOMY: ICD-10-CM

## 2024-03-05 PROCEDURE — 99999 PR PBB SHADOW E&M-EST. PATIENT-LVL III: CPT | Mod: PBBFAC,,, | Performed by: INTERNAL MEDICINE

## 2024-03-05 PROCEDURE — 99214 OFFICE O/P EST MOD 30 MIN: CPT | Mod: S$GLB,,, | Performed by: INTERNAL MEDICINE

## 2024-03-05 NOTE — PROGRESS NOTES
Subjective:       Patient ID: Regine Cooper is a 69 y.o. female.    Chief Complaint: No chief complaint on file.      HPI      Mrs. Cooper returns today for follow up.  I had last seen her on January 5, 2024.  In late January she underwent a video capsule endoscopy which was unremarkable.  She is still on ferrous sulfate 324 mg tablets and she takes 2 a day.    Most recent labs are from March 1st 2024 and they are as follows:  Ferritin is 25 ng/mL  WBCs 7400 per cubic mm, hemoglobin 12.5 grams/deciliter, hematocrit 40%, MCV 84 and platelets 363 K.     On August 18, 2023 she underwent an EGD and colonoscopy.  She had diverticulosis and a 1 cm hiatal hernia.  Biopsies were negative for celiac disease.      Briefly, she is a 69-year-old female who has a history of a stage I triple negative breast cancer that was diagnosed in late 2017.  She underwent a left modified radical mastectomy in [facility] on 11/01/2017 and had a stage IA carcinoma measuring 1.2 cm in greatest diameter.  Resection margins were clear, while four sentinel lymph nodes were negative.  Postoperatively, she received four cycles of docetaxel and cyclophosphamide.  The first cycle was administered on 11/30/2017 and she completed her treatment by 02/01/2018.  She has been followed expectantly and has been MERRY since then.  We assumed her care when she relocated to [place] 3 years ago.    Her mammogram on April 17, 2023 was read as BIRADS I, and a one year follow up was recommended.      Two stool samples that she submitted tin August were negative for occult blood.      A urinalysis in April had shown no hematuria.    ROS: Overall she feels OK, and her ECOG PS is 1.  She denies any anxiety, easy bruising, fevers, chills, night  sweats, weight loss, nausea, vomiting, diarrhea, constipation, diplopia, blurred vision, headache, chest pain, palpitations, shortness of breath, breast pain, lower abdominal pain, extremity pain, or difficulty  ambulating.  The remainder of the ten-point ROS, including general, skin, lymph, H/N, cardiorespiratory, GI, , Neuro, Endocrine, and psychiatric is negative.     Objective:      Physical Exam      She is alert, oriented to time, place, person, pleasant, well      nourished, in no acute physical distress.                                    VITAL SIGNS:  Reviewed                                      HEENT:  Normal.  There are no nasal, oral, lip, gingival, auricular, lid,    or conjunctival lesions.  Mucosae are moist and pink, and there is no        thrush.  Pupils are equal, reactive to light and accommodation.              Extraocular muscle movements are intact.  Dentition is good.  There is no frontal or maxillary tenderness.                                     NECK:  Supple without JVD, adenopathy, but she does have thyromegaly.                       LUNGS:  Clear to auscultation without wheezing, rales, or rhonchi.           CARDIOVASCULAR:  Reveals an S1, S2, no murmurs, no rubs, no gallops.         ABDOMEN:  Soft, nontender, without organomegaly.  Bowel sounds are    present.                                                                     EXTREMITIES:  No cyanosis, clubbing, or edema.                               BREASTS: Deferred.    LYMPHATIC:  There is no cervical, axillary, or supraclavicular adenopathy.   SKIN:  Warm and moist, without petechiae, rashes, induration, or ecchymoses.           NEUROLOGIC:  DTRs are 0-1+ bilaterally, symmetrical, motor function is 5/5,  and cranial nerves are  within normal limits.    Assessment:       1. History of breast cancer in female, E6vM1L1, status post mastectomy and 4 cycles of docetaxel cyclophosphamide, clinically MERRY, doing well more than 5 years after completion       2.     Low ferritin with borderline microcytic anemia  3.     Diverticulosis  Plan:         In regards to her breast cancer, Mrs. Alfred remains in clinical remission.  I explained to her  that her chance of a cure at this point is in the range of 99%.  In regards to her low ferritin and borderline anemia, I recommended that she remain on iron supplements for another 6 weeks.  I would like to see her ferritin above 30 ng/mL before she discontinues the oral supplementation    She will see me in late April with repeat labs and a yearly mammogram  Her multiple questions were answered to her satisfaction.  I spent approximately 33 minutes reviewing the available records and evaluating the patient, out of which over 50% of the time was spent face to face with the patient in counseling and coordinating this patient's care.            Route Chart for Scheduling    Med Onc Chart Routing      Follow up with physician Other. See me in late April with labs and a mammogram   Follow up with [facility]    Infusion scheduling note    Injection scheduling note    Labs CBC and ferritin   Scheduling:  Preferred lab:  Lab interval:     Imaging Mammogram      Pharmacy appointment    Other referrals

## 2024-03-08 ENCOUNTER — TELEPHONE (OUTPATIENT)
Dept: RADIOLOGY | Facility: HOSPITAL | Age: 69
End: 2024-03-08
Payer: MEDICARE

## 2024-03-08 NOTE — TELEPHONE ENCOUNTER
----- Message from Mayra Singer sent at 3/8/2024  8:11 AM CST -----  Regarding: Reschedule Appt  Contact: Pt [phone]  Pt is calling to reschedule mammo appt states this is the 2nd time she called please call

## 2024-03-12 ENCOUNTER — PATIENT MESSAGE (OUTPATIENT)
Dept: DERMATOLOGY | Facility: CLINIC | Age: 69
End: 2024-03-12
Payer: MEDICARE

## 2024-03-13 DIAGNOSIS — L29.9 ITCHING: ICD-10-CM

## 2024-03-13 RX ORDER — BETAMETHASONE DIPROPIONATE 0.5 MG/G
CREAM TOPICAL 2 TIMES DAILY
Qty: 50 G | Refills: 0 | Status: SHIPPED | OUTPATIENT
Start: 2024-03-13

## 2024-03-13 NOTE — TELEPHONE ENCOUNTER
Last seen 11/08/2023     Hair loss  New onset.  Rev'd with pt part from psoriasis but also low iron could impact this immensely.  Recommend iron infusions with heme onc as pt cannot tolerate any oral iron version regardless of base or kind.  Eat pro qd.  Discussed with patient the etiology and pathogenesis of the disease or skin lesion(s) and possible treatments and aggravators.    Reviewed with patient different treatment options and associated risks.     Itch  Watch out for nerve itch.  We may need to do lab work up to evaluate this further, especially if basic itch therapies don't work.  Paresthesia may be aggravated by certain low vitamin levels.  Discussed with patient the etiology and pathogenesis of the disease or skin lesion(s) and possible treatments and aggravators.    Can try over the counter Sarna or capsaicin cream carefully.  Can also try lidocaine topically.  Cont pt's diprolene bid prn for some temp relief.  Reviewed with patient different treatment options and associated risks.  Proper application of medications and or care for affected area(s) and condition(s) reviewed.

## 2024-03-18 ENCOUNTER — PATIENT MESSAGE (OUTPATIENT)
Dept: OTOLARYNGOLOGY | Facility: CLINIC | Age: 69
End: 2024-03-18
Payer: MEDICARE

## 2024-03-20 NOTE — PROGRESS NOTES
"  HPI     Glaucoma            Comments: 4 month IOP ck           Spots and/or Floaters            Comments: Pt states she has been noticing more floaters OD that come and   go and that they are more "string like" but denies any flashes          Comments    DLS: 11/27/23    1. Glaucoma Suspect   2. Drusen OU   3. NS OU   4. Hx Breast CA    MEDS:  Latanoprost QHS OU  AT's PRN OU          Last edited by Ivette Garrison MA on 3/25/2024  9:05 AM.            Assessment /Plan     For exam results, see Encounter Report.    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   First photos   2018   Treatment / Drops started   latanoprost - started 1/6/2023 for IOP 28/22           Family history    + mother / grtand father / uncle / cousins         Glaucoma meds    latanoprost ou - 1/6/2023         H/O adverse rxn to glaucoma drops    none        LASERS    none        GLAUCOMA SURGERIES    none        OTHER EYE SURGERIES    none        CDR    0.8/0.7        Tbase    18-28  od // 16-22 os        Tmax    28/22           Ttarget    ?             HVF    4 test 2019 to  2023 - full in past - now w/ early SAD  od // full os        Gonio    +3-4 ou         CCT    588/585        OCT    4  test 2019 to 2023 - RNFL -  Bord TI od // nl  os        HRT    1 test 2020 to 2020 -  MR -  nl od // nl os /// CDR 0.63 od // 0.51 os        Disc photos    2018, 2022      - Ttoday  18/17 (good resp to latanoprost down from 28/22 )  - Test done today     IOP /on latanoprost     2. Dominate familial drusen   Mostly outside the arcades ou    See photos - 2018    Pt is taking ARED's     3. NS   Mild -monitor     4. flaoter (one floater - " string like)    Warned of signs of PVD and RD    Pt to call if marked increase in floaters or flashes or curtain defect    5. Breast CA     6. Social stress (11/27/2023)    Her only son committed suicide 6/2023 (he was in [facility])   Ex  // just got to burry him recently  ( had to wait on death certificate) - 11/2023 - he had 3 children ages 17-23      PLAN   IOP 18/17 (3/25/2024) on latanoprost   Off gtts - 18-28 od and 18-22 os /  / + Fm Hx - mother and an uncle and possibly a grand parent   HVF inow with early SAD od // still full os   OCT - bord RNFL in one sector od - stable and nl os      cont  latatanoprost ou q hs     F/U 4 months with IOP check // HVF / DFE / OCT   ? Consider slt (angle open)  as 2nd step if needed vs more drops (? Rocklatan)

## 2024-03-23 ENCOUNTER — HOSPITAL ENCOUNTER (OUTPATIENT)
Dept: RADIOLOGY | Facility: HOSPITAL | Age: 69
Discharge: HOME | End: 2024-03-23
Attending: INTERNAL MEDICINE
Payer: MEDICARE

## 2024-03-23 DIAGNOSIS — R93.5 ABNORMAL ABDOMINAL CT SCAN: ICD-10-CM

## 2024-03-23 PROCEDURE — A9585 GADOBUTROL INJECTION: HCPCS | Performed by: INTERNAL MEDICINE

## 2024-03-23 PROCEDURE — 76376 3D RENDER W/INTRP POSTPROCES: CPT | Mod: 26,,, | Performed by: STUDENT IN AN ORGANIZED HEALTH CARE EDUCATION/TRAINING PROGRAM

## 2024-03-23 PROCEDURE — 74183 MRI ABD W/O CNTR FLWD CNTR: CPT | Mod: 26,,, | Performed by: STUDENT IN AN ORGANIZED HEALTH CARE EDUCATION/TRAINING PROGRAM

## 2024-03-23 PROCEDURE — 76376 3D RENDER W/INTRP POSTPROCES: CPT | Mod: TC

## 2024-03-23 PROCEDURE — 74183 MRI ABD W/O CNTR FLWD CNTR: CPT | Mod: TC

## 2024-03-23 PROCEDURE — 25500020 PHARM REV CODE 255: Performed by: INTERNAL MEDICINE

## 2024-03-23 RX ORDER — GADOBUTROL 604.72 MG/ML
10 INJECTION INTRAVENOUS
Status: COMPLETED | OUTPATIENT
Start: 2024-03-23 | End: 2024-03-23

## 2024-03-23 RX ADMIN — GADOBUTROL 10 ML: 604.72 INJECTION INTRAVENOUS at 09:03

## 2024-03-25 ENCOUNTER — OFFICE VISIT (OUTPATIENT)
Dept: OPHTHALMOLOGY | Facility: CLINIC | Age: 69
End: 2024-03-25
Payer: MEDICARE

## 2024-03-25 DIAGNOSIS — H52.203 MYOPIA OF BOTH EYES WITH ASTIGMATISM AND PRESBYOPIA: ICD-10-CM

## 2024-03-25 DIAGNOSIS — H35.361 FAMILIAL DRUSEN OF MACULA OF BOTH EYES: ICD-10-CM

## 2024-03-25 DIAGNOSIS — H25.13 NUCLEAR SCLEROTIC CATARACT OF BOTH EYES: ICD-10-CM

## 2024-03-25 DIAGNOSIS — H35.362 FAMILIAL DRUSEN OF MACULA OF BOTH EYES: ICD-10-CM

## 2024-03-25 DIAGNOSIS — H52.13 MYOPIA OF BOTH EYES WITH ASTIGMATISM AND PRESBYOPIA: ICD-10-CM

## 2024-03-25 DIAGNOSIS — H52.4 MYOPIA OF BOTH EYES WITH ASTIGMATISM AND PRESBYOPIA: ICD-10-CM

## 2024-03-25 DIAGNOSIS — H40.1111 PRIMARY OPEN-ANGLE GLAUCOMA, RIGHT EYE, MILD STAGE: Primary | ICD-10-CM

## 2024-03-25 DIAGNOSIS — H40.022 OPEN ANGLE WITH BORDERLINE FINDINGS AND HIGH GLAUCOMA RISK IN LEFT EYE: ICD-10-CM

## 2024-03-25 PROCEDURE — 99999 PR PBB SHADOW E&M-EST. PATIENT-LVL III: CPT | Mod: PBBFAC,,, | Performed by: OPHTHALMOLOGY

## 2024-03-25 PROCEDURE — 99214 OFFICE O/P EST MOD 30 MIN: CPT | Mod: S$GLB,,, | Performed by: OPHTHALMOLOGY

## 2024-04-01 ENCOUNTER — OFFICE VISIT (OUTPATIENT)
Dept: ALLERGY | Facility: CLINIC | Age: 69
End: 2024-04-01
Payer: MEDICARE

## 2024-04-01 ENCOUNTER — PATIENT MESSAGE (OUTPATIENT)
Dept: PRIMARY CARE CLINIC | Facility: CLINIC | Age: 69
End: 2024-04-01
Payer: MEDICARE

## 2024-04-01 VITALS
DIASTOLIC BLOOD PRESSURE: 80 MMHG | WEIGHT: 218.5 LBS | SYSTOLIC BLOOD PRESSURE: 128 MMHG | HEART RATE: 93 BPM | BODY MASS INDEX: 36.36 KG/M2

## 2024-04-01 DIAGNOSIS — Z87.898 HISTORY OF CHRONIC COUGH: ICD-10-CM

## 2024-04-01 DIAGNOSIS — E66.01 CLASS 2 SEVERE OBESITY DUE TO EXCESS CALORIES WITH SERIOUS COMORBIDITY AND BODY MASS INDEX (BMI) OF 36.0 TO 36.9 IN ADULT: ICD-10-CM

## 2024-04-01 DIAGNOSIS — I15.2 HYPERTENSION ASSOCIATED WITH TYPE 2 DIABETES MELLITUS: ICD-10-CM

## 2024-04-01 DIAGNOSIS — E11.59 HYPERTENSION ASSOCIATED WITH TYPE 2 DIABETES MELLITUS: ICD-10-CM

## 2024-04-01 DIAGNOSIS — E11.65 TYPE 2 DIABETES MELLITUS WITH HYPERGLYCEMIA, WITHOUT LONG-TERM CURRENT USE OF INSULIN: Primary | ICD-10-CM

## 2024-04-01 DIAGNOSIS — J31.0 CHRONIC RHINITIS: Primary | ICD-10-CM

## 2024-04-01 PROCEDURE — 99999 PR PBB SHADOW E&M-EST. PATIENT-LVL IV: CPT | Mod: PBBFAC,,, | Performed by: STUDENT IN AN ORGANIZED HEALTH CARE EDUCATION/TRAINING PROGRAM

## 2024-04-01 PROCEDURE — 99417 PROLNG OP E/M EACH 15 MIN: CPT | Mod: S$GLB,,, | Performed by: STUDENT IN AN ORGANIZED HEALTH CARE EDUCATION/TRAINING PROGRAM

## 2024-04-01 PROCEDURE — 99215 OFFICE O/P EST HI 40 MIN: CPT | Mod: S$GLB,,, | Performed by: STUDENT IN AN ORGANIZED HEALTH CARE EDUCATION/TRAINING PROGRAM

## 2024-04-01 NOTE — PROGRESS NOTES
ALLERGY & IMMUNOLOGY CLINIC - ESTABLISHED PATIENT      HISTORY OF PRESENT ILLNESS     Patient ID: Regine Cooper is a 69 y.o. female    CC: chronic rhinitis     HPI: Regine Cooper is a 69 y.o. female with a history of breast cancer, DM2, and HTN, presenting for chronic rhinitis.   She was last seen in [facility] allergy [facility] by Dr. Soto on 2/11/22.     She says she has had symptoms for many years. She says she gets post-nasal drip and ear itching. She says symptoms can cause throat irritation and cough. She says she had a particularly bad year last year. She says the post-nasal drip went down to her lungs, causing cough, requiring multiple courses of steroids. She had ER visits, breathing treatments, saw ENT and pulm.     She says she is tired of the post-nasal drip. She says when it gets bad, it interferes with her sleep (gathers in her throat, then she'll wake up coughing and choking).  She says ENT recommended a saline sinus rinse, but she felt it made her post-nasal drip worse and caused sneezing. So she stopped using it.     With the cough last year, she was dealing with shortness of breath and wheezing as well. She says it was a wet cough. She says one doctor called it bronchitis, another called it asthma.   She saw pulm in December, was started on advair, but says she only took it for a week because the cough got better.   She says her last round of oral steroids in December (just before she saw pulm) is what seemed to help the most.   She says she started coughing again with the saline sinus rinses this year, but now cough is better again.   She uses the albuterol prn, but hasn't needed it since January.    She says if someone is cutting their grass on the next block, she can smell it and start sneezing.   She doesn't notice a seasonality to her symptoms. Its all year long, with post-nasal drip, sneezing, ear itching.   She sometimes gets itchy runny eyes, particularly if she starts sneezing a lot.    Other than smelling grass, she hasn't noticed specific triggers.     Her ENT added ipratropium nasal spray, which is helping but not resolving symptoms.   She is taking singulair nightly. She has been on it for a year. She says she assumes it helps.  She says she had been on zyrtec for years, but says she was taken off it last year. She thinks it was helping. She does think her cough worsened after coming off of it last year.   She is using ipratropium 0.03% nasal spray 2 SEN nightly.  She uses astepro 2 SEN in the morning. She thinks it helps, more so since adding the ipratropium.   She says she hasn't used her flonase recently. She says its just too many nasal sprays.      MEDICAL HISTORY     Vaccines:   Immunization History   Administered Date(s) Administered    COVID-19, MRNA, LN-S, PF (MODERNA FULL 0.5 ML DOSE) 10/04/2022    COVID-19, MRNA, LN-S, PF (Pfizer) (Purple Cap) 10/04/2022    COVID-19, mRNA, LNP-S, PF, gunjan-sucrose, 30 mcg/0.3 mL (Pfizer 2023 Ages 12+) 09/30/2023    COVID-19, mRNA, LNP-S, bivalent booster, PF (Moderna Omicron)12 + YEARS 10/04/2022    COVID-19, vector-nr, rS-Ad26, PF (Tammie) 03/14/2021, 11/03/2021    Hepatitis B, Adult 01/27/1998, 01/27/1998    Influenza 10/04/2009, 10/13/2009, 09/01/2015    Influenza (FLUAD) - Quadrivalent - Adjuvanted - PF *Preferred* (65+) 09/20/2022, 09/18/2023    Influenza - Quadrivalent - PF *Preferred* (6 months and older) 08/28/2018, 08/28/2018, 08/29/2019, 09/08/2020, 10/09/2021    Influenza - Trivalent (ADULT) 09/25/2013, 10/10/2014, 10/21/2016, 10/06/2017    Influenza Split 11/04/2006, 09/18/2010, 10/12/2011, 09/29/2012    Pneumococcal Conjugate - 13 Valent 03/23/2018, 03/23/2018    Pneumococcal Conjugate - 20 Valent 09/20/2022    Pneumococcal Polysaccharide - 23 Valent 02/23/2009, 02/23/2009    RSVpreF (Arexvy) 02/14/2024    Tdap 01/30/2008, 01/30/2018, 11/26/2018    Zoster Recombinant 12/07/2020, 12/07/2020, 02/24/2021     Medical Hx:   Patient Active  Problem List   Diagnosis    History of breast cancer in female    Type 2 diabetes mellitus without complication, without long-term current use of insulin    Hypertension associated with type 2 diabetes mellitus    S/P left mastectomy    Spinal stenosis of lumbar region with neurogenic claudication    Migraine without aura and without status migrainosus, not intractable    Thyroid nodule    Fibromyalgia    Anemia    Unsp symptoms and signs w cognitive functions and awareness    History of antineoplastic chemotherapy    Seborrheic dermatitis of scalp    Insomnia due to medical condition    Insomnia    Obstructive sleep apnea hypopnea, mild    Cervical stenosis of spine    Impaired functional mobility, balance, gait, and endurance    Decreased range of motion of lumbar spine    Central sensitization to pain    Myelomalacia of cervical cord    Cervical spondylosis    Status post cervical discectomy    Status post cervical spinal fusion    Atherosclerosis of aorta    Neck pain    Left shoulder pain    Atopic dermatitis    Chronic rhinitis    Irritable bowel syndrome    Malignant neoplasm of unspecified site of left female breast    Obesity (BMI 30-39.9)    Suspected glaucoma of both eyes    Chronic cough    Fatty liver    Severe obesity (BMI 35.0-35.9 with comorbidity)    Iron deficiency anemia due to chronic blood loss    Apical lung scarring    Post-viral cough syndrome    Abnormal finding on imaging     Surgical Hx:   Past Surgical History:   Procedure Laterality Date    ADENOIDECTOMY      ANTERIOR CERVICAL DISCECTOMY W/ FUSION N/A 7/29/2022    Procedure: DISCECTOMY, SPINE, CERVICAL, ANTERIOR APPROACH, WITH FUSION C5-7 SPINEWAVE SNS: VOCAL CORDS/MOTORS/SSEP;  Surgeon: Rosales Scruggs MD;  Location: [facility] OR [address] [place] [address];  Service: Neurosurgery;  Laterality: N/A;    BREAST BIOPSY      BREAST SURGERY      breast ca lt side     COLONOSCOPY N/A 8/18/2023    Procedure: COLONOSCOPY;  Surgeon: Eliseo Garcia MD;  Location: [facility]  [facility] (4TH FLR);  Service: Endoscopy;  Laterality: N/A;    ESOPHAGOGASTRODUODENOSCOPY N/A 2023    Procedure: EGD (ESOPHAGOGASTRODUODENOSCOPY);  Surgeon: Eliseo Garcia MD;  Location: [facility] (4TH FLR);  Service: Endoscopy;  Laterality: N/A;  r/o h pylori-miralax extended prep-instr portal and handed-tb    HYSTERECTOMY      supacervical hysterectomy    LAPAROSCOPIC SUPRACERVICAL HYSTERECTOMY      fibroids    MASTECTOMY Left 2017    chemo    MYOMECTOMY      TONSILLECTOMY      TYMPANOSTOMY TUBE PLACEMENT      VAGINAL DELIVERY       Medications:   Current Outpatient Medications on File Prior to Visit   Medication Sig Dispense Refill    albuterol (PROVENTIL/VENTOLIN HFA) 90 mcg/actuation inhaler SMARTSI-2 Puff(s) Via Inhaler Every 6 Hours PRN 6.7 g 0    azelastine (ASTELIN) 137 mcg (0.1 %) nasal spray 1 SPRAY IN EACH NOSTRIL TWICE DAILY  Strength: 137 mcg (0.1 %) 30 mL 2    ferrous sulfate (FEROSUL) 325 mg (65 mg iron) Tab tablet TAKE 1 TABLET BY MOUTH TWICE DAILY 1 HOUR BEFORE MEALS 60 tablet 2    fluticasone propionate (FLONASE) 50 mcg/actuation nasal spray 2 sprays to each lateral nostril once a day 48 g 3    fluticasone-salmeterol diskus inhaler 250-50 mcg Inhale 1 puff into the lungs 2 (two) times daily. Controller 60 each 11    hydroCHLOROthiazide (HYDRODIURIL) 25 MG tablet Take 1 tablet (25 mg total) by mouth once daily. 90 tablet 3    ipratropium (ATROVENT) 21 mcg (0.03 %) nasal spray 2 sprays by Each Nostril route 2 (two) times daily. 30 mL 11    lancets Misc To check BG 2 times daily 200 each 3    latanoprost 0.005 % ophthalmic solution Place 1 drop into both eyes every evening. 7.5 mL 3    losartan (COZAAR) 50 MG tablet Take 1 tablet (50 mg total) by mouth once daily. 90 tablet 3    metFORMIN (GLUCOPHAGE) 1000 MG tablet Take 1 tablet (1,000 mg total) by mouth 2 (two) times daily with meals. 180 tablet 3    montelukast (SINGULAIR) 10 mg tablet TAKE 1 TABLET(10 MG) BY MOUTH EVERY EVENING 90  tablet 3    ondansetron (ZOFRAN-ODT) 8 MG TbDL Take 1 tablet (8 mg total) by mouth every 8 (eight) hours as needed (Nausea). 30 tablet 2    ONETOUCH DELICA PLUS LANCET 33 gauge Misc 2 (two) times daily.      ONETOUCH VERIO REFLECT METER Misc USE AS DIRECTED TO TEST BLOOD GLUCOSE      ONETOUCH VERIO TEST STRIPS Strp USE TO CHECK BLOOD GLUCOSE DAILY 100 strip 3    promethazine-dextromethorphan (PROMETHAZINE-DM) 6.25-15 mg/5 mL Syrp SMARTSI.5 Milliliter(s) By Mouth 4 Times Daily  mL 0    rizatriptan (MAXALT-MLT) 10 MG disintegrating tablet Take 1 tablet (10 mg total) by mouth every 2 (two) hours as needed for Migraine. Max 30 mg/day. 12 tablet 5    semaglutide (OZEMPIC) 1 mg/dose (4 mg/3 mL) Inject 1 mg into the skin every 7 days. 3 mL 3    ubrogepant (UBRELVY) 100 mg tablet Take 1 tablet (100 mg total) by mouth every 2 (two) hours as needed for Migraine. Max 200 mg/day. 10 tablet 2    ammonium lactate 12 % Crea Apply 1 application  topically 2 (two) times daily. (Patient not taking: Reported on 2024) 140 g 11    augmented betamethasone dipropionate (DIPROLENE-AF) 0.05 % cream Apply topically 2 (two) times to 4 times daily to area of back when itchy or burning.Stop using steroid topical when skin is non itchy.  Do not treat dark or red coloring. (Patient not taking: Reported on 2024) 50 g 0    clobetasoL (TEMOVATE) 0.05 % external solution APPLY TOPICALLY TO THE SCALP TWICE DAILY AS NEEDED FOR ITCHING OR IRRITATION (Patient not taking: Reported on 2024) 50 mL 0    pantoprazole (PROTONIX) 40 MG tablet Take 1 tablet (40 mg total) by mouth once daily. (Patient not taking: Reported on 2024) 90 tablet 1    [DISCONTINUED] diclofenac sodium (VOLTAREN) 1 % Gel Apply 2 g topically 3 (three) times daily. 100 g 0     No current facility-administered medications on file prior to visit.     H/o Asthma: denies, but with possible symptoms in .   H/o Rhinitis: endorses    Drug Allergies:   Review of  patient's allergies indicates:   Allergen Reactions    Codeine Shortness Of Breath     Pt states she had a reaction as a teenager and was taken to the ER.    Lisinopril      cough     Env/Occ:   Pets: no    Social Hx:   Social History     Tobacco Use    Smoking status: Never     Passive exposure: Never    Smokeless tobacco: Never   Substance Use Topics    Alcohol use: No    Drug use: No      PHYSICAL EXAM     VS: /80 (BP Location: Right arm, Patient Position: Sitting, BP Method: Medium (Automatic))   Pulse 93   Wt 99.1 kg (218 lb 7.6 oz)   BMI 36.36 kg/m²   GENERAL: Alert, NAD, well-appearing  EYES: EOMI, no conjunctival injection, no discharge, no infraorbital shiners  EARS: external auditory canals normal B/L, TM normal B/L  NOSE: NT 2+ B/L, no stringing mucus, no polyps visualized  ORAL: MMM, no ulcers, no thrush  LUNGS: CTAB, no w/r/c, no increased WOB  HEART: RRR, normal S1/S2, no m/g/r  EXTREMITIES: No LE edema  DERM: no rashes  NEURO: normal speech, normal gait, no facial asymmetry     LABORATORY STUDIES     Component      Latest Ref Rng 1/4/2024 3/1/2024   WBC      3.90 - 12.70 K/uL 7.80  7.45    RBC      4.00 - 5.40 M/uL 4.69  4.79    Hemoglobin      12.0 - 16.0 g/dL 11.9 (L)  12.5    Hematocrit      37.0 - 48.5 % 38.2  40.0    MCV      82 - 98 fL 81 (L)  84    MCH      27.0 - 31.0 pg 25.4 (L)  26.1 (L)    MCHC      32.0 - 36.0 g/dL 31.2 (L)  31.3 (L)    RDW      11.5 - 14.5 % 15.7 (H)  15.2 (H)    Platelet Count      150 - 450 K/uL 351  363    MPV      9.2 - 12.9 fL 10.5  10.4    Immature Granulocytes      0.0 - 0.5 %  0.3    Gran # (ANC)      1.8 - 7.7 K/uL 4.8  5.0    Immature Grans (Abs)      0.00 - 0.04 K/uL 0.04  0.02    Lymph #      1.0 - 4.8 K/uL  1.8    Mono #      0.3 - 1.0 K/uL  0.5    Eos #      0.0 - 0.5 K/uL  0.1    Baso #      0.00 - 0.20 K/uL  0.05    Differential Method  Automated    Sodium      136 - 145 mmol/L 142     Potassium      3.5 - 5.1 mmol/L 4.2     Chloride      95 - 110  mmol/L 96     CO2      23 - 29 mmol/L 28     Glucose      70 - 110 mg/dL 128 (H)     BUN      8 - 23 mg/dL 12     Creatinine      0.5 - 1.4 mg/dL 0.8     Calcium      8.7 - 10.5 mg/dL 9.8     PROTEIN TOTAL      6.0 - 8.4 g/dL 7.6     Albumin      3.5 - 5.2 g/dL 4.0     BILIRUBIN TOTAL      0.1 - 1.0 mg/dL 0.6     ALP      55 - 135 U/L 65     AST      10 - 40 U/L 16     ALT      10 - 44 U/L 10     Hemoglobin A1C External      4.0 - 5.6 % 7.0 (H)     Ferritin      20.0 - 300.0 ng/mL 26  25      Component      Latest Ref Rn 5/16/2023   Strongyloides Ab IgG      Negative  Negative    Anti- E. Histolytica Antibody      Negative  Negative    TTG IgA      <7.0 U/mL 0.3    IgA      40 - 350 mg/dL 201       ALLERGEN TESTING     Immunocaps:   Component      Latest Ref Rn 3/4/2021   D. farinae      <0.10 kU/L <0.10    D. farinae Class CLASS 0    D. pteronyssinus Dust Mite IgE      <0.10 kU/L <0.10    D. pteronyssinus Class CLASS 0    Addison Grass IgE      <0.10 kU/L <0.10    Addison Grass Class CLASS 0    [place] IgE      <0.10 kU/L <0.10    [place] Class CLASS 0    English Plantain IgE      <0.10 kU/L <0.10    English Plantain Class CLASS 0    [place] Tree IgE      <0.10 kU/L <0.10    [place], Class CLASS 0    Ragweed, Western IgE      <0.10 kU/L <0.10    Ragweed, Western, Class CLASS 0    A. alternata IgE      <0.10 kU/L <0.10    Altern. alternata Class CLASS 0    Aspergillus Fumigatus IgE      <0.10 kU/L <0.10    A. fumigatus Class CLASS 0    Cat Dander IgE      <0.10 kU/L <0.10    Cat Epithelium Class CLASS 0    Cockroach, IgE      <0.10 kU/L 0.16 (H)    Cockroach, IgE       CLASS 0/1    Dog Dander IgE      <0.10 kU/L <0.10    Dog Dander Class CLASS 0    [place] Grass IgE      <0.10 kU/L <0.10    [place] Class CLASS 0    Bermuda Grass IgE      <0.10 kU/L <0.10    [place] Grass Class CLASS 0    Glenn Grass IgE      <0.10 kU/L <0.10    Glenn Grass Class CLASS 0    [place]lier Tree IgE      <0.10 kU/L <0.10    Collier Class  CLASS 0    Silver Birch Tree IgE      <0.10 kU/L <0.10    Silver Birch Class CLASS 0    Pecan Andrew Tree IgE      <0.10 kU/L <0.10    Pecan, Class CLASS 0    Total IgE      0 - 100 IU/mL <35        PULMONARY FUNCTION TESTING     Date 6/16/23:  FVC:         98%ref -> + 3%  FEV1:         99%ref -> + 8%  FEV1/FVC: 79%  FEF 25-75: 103%ref  DLCO:        78%ref  Interpretation: Spirometry is normal. Spirometry remains unimproved following bronchodilator. Lung volume determination is likely normal despite the reduced RV of uncertain clinical significance. Airway mechanics show normal airway resistance and conductance. DLCO is normal.     IMAGING & OTHER DIAGNOSTICS     CXR 11/20/23:  FINDINGS:  Heart is at the upper limit of normal in size, with normal appearing pulmonary vascularity and no findings to specifically suggest current cardiac decompensation observed.  Lung zones are clear, and are free of significant airspace consolidation or volume loss.  No pleural fluid.  No hilar or mediastinal mass lesion.  No pneumothorax.  Multilevel marginal vertebral endplate spurring in the thoracic spine is incidentally noted, as is instrumentation relating to a prior C5-C7 spinal fusion procedure.  Impression:  No significant intrathoracic abnormality.    CT chest 6/15/23:  FINDINGS:  Airways: Mild narrowing of the trachea at the level of the thyroid.  Otherwise, trachea and bronchi are patent.  Lungs/Pleura: Biapical fibronodular scarring.  Subcentimeter pulmonary nodules within the bilateral major fissures, favored to represent intrapulmonary lymph nodes.  No suspicious pulmonary nodules.  No large focal consolidation.  Pleural effusion or pleural thickening.  Impression:  No acute intrathoracic findings identified.  Diffuse enlargement the thyroid with left thyroid calcification.  Consider obtaining nonemergent thyroid ultrasound for further characterization.  Findings suggestive of hepatic steatosis.  Correlate with  LFTs.  Additional findings as above.   (See report for full radiology read).     CHART REVIEW     Reviewed prior allergy note, ENT note, pulm note, labs, imaging, PFT's.      ASSESSMENT & PLAN     Regine Cooper is a 69 y.o. female with     # Chronic rhinitis: Prior allergy testing with immunocaps mostly negative, other than equivocal result to cockroach. Main complaint is constant post-nasal drip. Also with sneezing, aural pruritus, and sometimes ocular symptoms. She does find her medications helpful, particularly the ipratropium nasal spray. Discussed option of repeat allergy testing, but as patient doesn't think she would be interested in AIT and results are likely to remain the same, patient declined.   -continue ipratropium 0.03% nasal spray 2 SEN nightly. Advised she could use it 3-4 times daily as needed. Offered to increase to the higher potency version (0.06%), but patient is currently happy with the 0.03% concentration.  -continue astepro nasal spray 2 SEN daily. Advised she can use up to 2 SEN BID.  -patient stopped using flonase because she felt it was too many nasal sprays. Today, we went over how the different nasal sprays work. Counseled that flonase and astepro can work better together than either alone. Advised that if she finds it manageable, she can restart flonase 1-2 SEN BID.   -recommend she restart daily cetirizine as she found this helpful.  -continue montelukast 10 mg nightly.     # History of chronic cough, associated with shortness of breath and wheezing: Patient reports her post-nasal drip led to cough in spring 2023, which led to cough and lower respiratory through most of the year. She reports her last round of oral steroids (prescribed in 12/2023) is what finally resolved the cough. She established with pulm. She was prescribed advair, but stopped after a week because her cough improved. PFT's were essentially normal. She reports she hasn't needed her albuterol since  1/2024.  -continue albuterol prn.   -advised that if symptoms return, she restart her advair 250-50 mcg 1 puff BID.      Follow up: 6 months or sooner if needed    I spent a total of 70 minutes on the day of the visit.  This includes face to face time and non-face to face time preparing to see the patient (eg, review of tests), obtaining and/or reviewing separately obtained history, documenting clinical information in the electronic or other health record, independently interpreting results.    Isabel Vargas MD  Allergy/Immunology

## 2024-04-03 ENCOUNTER — PATIENT MESSAGE (OUTPATIENT)
Dept: ADMINISTRATIVE | Facility: HOSPITAL | Age: 69
End: 2024-04-03
Payer: MEDICARE

## 2024-04-07 ENCOUNTER — PATIENT MESSAGE (OUTPATIENT)
Dept: GASTROENTEROLOGY | Facility: CLINIC | Age: 69
End: 2024-04-07
Payer: MEDICARE

## 2024-04-07 NOTE — PROGRESS NOTES
Shiv - please schedule [facility] appointment to discuss possible upper endoscopic ultrasound for further evaluation of atrophic pancreas and intermittent diarrhea referral placed thank you.    Pancreas: Diffuse parenchymal atrophy with fatty infiltration

## 2024-04-08 ENCOUNTER — OFFICE VISIT (OUTPATIENT)
Dept: NEUROLOGY | Facility: CLINIC | Age: 69
End: 2024-04-08
Payer: MEDICARE

## 2024-04-08 ENCOUNTER — OFFICE VISIT (OUTPATIENT)
Dept: PULMONOLOGY | Facility: CLINIC | Age: 69
End: 2024-04-08
Payer: MEDICARE

## 2024-04-08 VITALS
OXYGEN SATURATION: 97 % | BODY MASS INDEX: 36.26 KG/M2 | HEART RATE: 90 BPM | SYSTOLIC BLOOD PRESSURE: 124 MMHG | DIASTOLIC BLOOD PRESSURE: 72 MMHG | HEIGHT: 65 IN | WEIGHT: 217.63 LBS

## 2024-04-08 VITALS
WEIGHT: 217.69 LBS | SYSTOLIC BLOOD PRESSURE: 121 MMHG | BODY MASS INDEX: 36.23 KG/M2 | DIASTOLIC BLOOD PRESSURE: 69 MMHG | HEART RATE: 92 BPM

## 2024-04-08 DIAGNOSIS — G43.009 MIGRAINE WITHOUT AURA AND WITHOUT STATUS MIGRAINOSUS, NOT INTRACTABLE: Primary | ICD-10-CM

## 2024-04-08 DIAGNOSIS — E11.9 TYPE 2 DIABETES MELLITUS WITHOUT COMPLICATION, WITHOUT LONG-TERM CURRENT USE OF INSULIN: ICD-10-CM

## 2024-04-08 DIAGNOSIS — I10 ESSENTIAL HYPERTENSION: ICD-10-CM

## 2024-04-08 DIAGNOSIS — R05.3 CHRONIC COUGH: Primary | ICD-10-CM

## 2024-04-08 DIAGNOSIS — E66.9 OBESITY (BMI 30-39.9): ICD-10-CM

## 2024-04-08 PROCEDURE — 1159F MED LIST DOCD IN RCRD: CPT | Mod: CPTII,S$GLB,, | Performed by: INTERNAL MEDICINE

## 2024-04-08 PROCEDURE — 3078F DIAST BP <80 MM HG: CPT | Mod: CPTII,S$GLB,, | Performed by: INTERNAL MEDICINE

## 2024-04-08 PROCEDURE — 3051F HG A1C>EQUAL 7.0%<8.0%: CPT | Mod: CPTII,S$GLB,, | Performed by: NURSE PRACTITIONER

## 2024-04-08 PROCEDURE — 3074F SYST BP LT 130 MM HG: CPT | Mod: CPTII,S$GLB,, | Performed by: INTERNAL MEDICINE

## 2024-04-08 PROCEDURE — 3008F BODY MASS INDEX DOCD: CPT | Mod: CPTII,S$GLB,, | Performed by: NURSE PRACTITIONER

## 2024-04-08 PROCEDURE — 1125F AMNT PAIN NOTED PAIN PRSNT: CPT | Mod: CPTII,S$GLB,, | Performed by: NURSE PRACTITIONER

## 2024-04-08 PROCEDURE — 1101F PT FALLS ASSESS-DOCD LE1/YR: CPT | Mod: CPTII,S$GLB,, | Performed by: NURSE PRACTITIONER

## 2024-04-08 PROCEDURE — 3074F SYST BP LT 130 MM HG: CPT | Mod: CPTII,S$GLB,, | Performed by: NURSE PRACTITIONER

## 2024-04-08 PROCEDURE — 99213 OFFICE O/P EST LOW 20 MIN: CPT | Mod: S$GLB,,, | Performed by: INTERNAL MEDICINE

## 2024-04-08 PROCEDURE — 99999 PR PBB SHADOW E&M-EST. PATIENT-LVL III: CPT | Mod: PBBFAC,,, | Performed by: INTERNAL MEDICINE

## 2024-04-08 PROCEDURE — 3008F BODY MASS INDEX DOCD: CPT | Mod: CPTII,S$GLB,, | Performed by: INTERNAL MEDICINE

## 2024-04-08 PROCEDURE — 3051F HG A1C>EQUAL 7.0%<8.0%: CPT | Mod: CPTII,S$GLB,, | Performed by: INTERNAL MEDICINE

## 2024-04-08 PROCEDURE — 3288F FALL RISK ASSESSMENT DOCD: CPT | Mod: CPTII,S$GLB,, | Performed by: NURSE PRACTITIONER

## 2024-04-08 PROCEDURE — 3078F DIAST BP <80 MM HG: CPT | Mod: CPTII,S$GLB,, | Performed by: NURSE PRACTITIONER

## 2024-04-08 PROCEDURE — 4010F ACE/ARB THERAPY RXD/TAKEN: CPT | Mod: CPTII,S$GLB,, | Performed by: NURSE PRACTITIONER

## 2024-04-08 PROCEDURE — 1160F RVW MEDS BY RX/DR IN RCRD: CPT | Mod: CPTII,S$GLB,, | Performed by: NURSE PRACTITIONER

## 2024-04-08 PROCEDURE — 4010F ACE/ARB THERAPY RXD/TAKEN: CPT | Mod: CPTII,S$GLB,, | Performed by: INTERNAL MEDICINE

## 2024-04-08 PROCEDURE — 1159F MED LIST DOCD IN RCRD: CPT | Mod: CPTII,S$GLB,, | Performed by: NURSE PRACTITIONER

## 2024-04-08 PROCEDURE — 99999 PR PBB SHADOW E&M-EST. PATIENT-LVL IV: CPT | Mod: PBBFAC,,, | Performed by: NURSE PRACTITIONER

## 2024-04-08 PROCEDURE — 1125F AMNT PAIN NOTED PAIN PRSNT: CPT | Mod: CPTII,S$GLB,, | Performed by: INTERNAL MEDICINE

## 2024-04-08 PROCEDURE — G2211 COMPLEX E/M VISIT ADD ON: HCPCS | Mod: S$GLB,,, | Performed by: NURSE PRACTITIONER

## 2024-04-08 PROCEDURE — 99214 OFFICE O/P EST MOD 30 MIN: CPT | Mod: S$GLB,,, | Performed by: NURSE PRACTITIONER

## 2024-04-08 RX ORDER — SUMATRIPTAN SUCCINATE 100 MG/1
100 TABLET ORAL
Qty: 12 TABLET | Refills: 5 | Status: SHIPPED | OUTPATIENT
Start: 2024-04-08

## 2024-04-08 RX ORDER — ONDANSETRON 8 MG/1
8 TABLET, ORALLY DISINTEGRATING ORAL EVERY 8 HOURS PRN
Qty: 30 TABLET | Refills: 2 | Status: SHIPPED | OUTPATIENT
Start: 2024-04-08

## 2024-04-08 RX ORDER — RIZATRIPTAN BENZOATE 10 MG/1
10 TABLET, ORALLY DISINTEGRATING ORAL
Qty: 12 TABLET | Refills: 5 | Status: SHIPPED | OUTPATIENT
Start: 2024-04-08
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
## 2024-04-08 NOTE — PROGRESS NOTES
Subjective:       Patient ID: Regine Cooper is a 69 y.o. female.    Chief Complaint: Cough    HPI:   Regine Cooper is a 69 y.o. female last seen by me 12/15/23 who presents in follow up.    Today:  Started on advair after her last visit. Stopped after a week because her cough resolved.     Seen in Allergy clinic as well as ENT clinic. Dx w/ chronic rhinitis w/ allergic and nonallergic component. Using flonase, astelin and nasal atrovent.    Carries her albuterol inhaler with her. Last use was a month ago.    12/15/23 HPI  Cough- Started Feb/March w/ a URI. Productive. Several urgent care/ed visits. Several courses of steroids. Started on singulair. Finally improved after a longer course of pred. A/w TAYLOR, wheezing, msk chest pain. Denies GERD.     + seasonal allergies. Followed by ENT who just retired. On flonase, asteline and previously on zyrtec    Denies chest pain, orthopnea, PND, LE edema, palpitations, syncope/presyncope    Denies f/c/ns, weight loss, malaise, fatigue    Denies rashes, myalgias, arthralgias, hair/nail changes, eye changes, raynauds, mucosal ulcerations    Exposures:  Work-  in a hospital  Tobacco- Denies  Inhalational Agents- Denies  Mold- Denies  Pets- Denies   Birds- Denies   Hot tubs- Denies    Family History of Lung Cancer: Father, smoker  Family History of Lung Disease: Son w/ asthma  Childhood History of Lung Disease: Denies     Review of Systems    As above    Social History     Tobacco Use    Smoking status: Never     Passive exposure: Never    Smokeless tobacco: Never   Substance Use Topics    Alcohol use: No       Review of patient's allergies indicates:   Allergen Reactions    Codeine Shortness Of Breath     Pt states she had a reaction as a teenager and was taken to the ER.    Lisinopril      cough     Past Medical History:   Diagnosis Date    Allergy     Breast cancer 2017    Cataract     Cervical spondylosis 07/29/2022    Diabetes mellitus, type 2      Type 2    Eczema     Fibromyalgia     Glaucoma     Hypertension     Renal cyst, right 2020    Steatosis of liver 2020     Past Surgical History:   Procedure Laterality Date    ADENOIDECTOMY      ANTERIOR CERVICAL DISCECTOMY W/ FUSION N/A 2022    Procedure: DISCECTOMY, SPINE, CERVICAL, ANTERIOR APPROACH, WITH FUSION C5-7 SPINEWAVE SNS: VOCAL CORDS/MOTORS/SSEP;  Surgeon: Rosales Scruggs MD;  Location: [facility] FLR;  Service: Neurosurgery;  Laterality: N/A;    BREAST BIOPSY      BREAST SURGERY      breast ca lt side     COLONOSCOPY N/A 2023    Procedure: COLONOSCOPY;  Surgeon: Eliseo Garcia MD;  Location: [facility] ENDO (4TH FLR);  Service: Endoscopy;  Laterality: N/A;    ESOPHAGOGASTRODUODENOSCOPY N/A 2023    Procedure: EGD (ESOPHAGOGASTRODUODENOSCOPY);  Surgeon: Eliseo Garcia MD;  Location: [facility] ENDO (4TH FLR);  Service: Endoscopy;  Laterality: N/A;  r/o h pylori-miralax extended prep-instr portal and handed-tb    HYSTERECTOMY      supacervical hysterectomy    LAPAROSCOPIC SUPRACERVICAL HYSTERECTOMY      fibroids    MASTECTOMY Left 2017    chemo    MYOMECTOMY      TONSILLECTOMY      TYMPANOSTOMY TUBE PLACEMENT      VAGINAL DELIVERY       Current Outpatient Medications on File Prior to Visit   Medication Sig    albuterol (PROVENTIL/VENTOLIN HFA) 90 mcg/actuation inhaler SMARTSI-2 Puff(s) Via Inhaler Every 6 Hours PRN    ammonium lactate 12 % Crea Apply 1 application  topically 2 (two) times daily.    augmented betamethasone dipropionate (DIPROLENE-AF) 0.05 % cream Apply topically 2 (two) times to 4 times daily to area of back when itchy or burning.Stop using steroid topical when skin is non itchy.  Do not treat dark or red coloring.    azelastine (ASTELIN) 137 mcg (0.1 %) nasal spray 1 SPRAY IN EACH NOSTRIL TWICE DAILY  Strength: 137 mcg (0.1 %)    clobetasoL (TEMOVATE) 0.05 % external solution APPLY TOPICALLY TO THE SCALP TWICE DAILY AS NEEDED FOR ITCHING OR IRRITATION     ferrous sulfate (FEROSUL) 325 mg (65 mg iron) Tab tablet TAKE 1 TABLET BY MOUTH TWICE DAILY 1 HOUR BEFORE MEALS    fluticasone propionate (FLONASE) 50 mcg/actuation nasal spray 2 sprays to each lateral nostril once a day    fluticasone-salmeterol diskus inhaler 250-50 mcg Inhale 1 puff into the lungs 2 (two) times daily. Controller    hydroCHLOROthiazide (HYDRODIURIL) 25 MG tablet Take 1 tablet (25 mg total) by mouth once daily.    ipratropium (ATROVENT) 21 mcg (0.03 %) nasal spray 2 sprays by Each Nostril route 2 (two) times daily.    lancets Misc To check BG 2 times daily    latanoprost 0.005 % ophthalmic solution Place 1 drop into both eyes every evening.    losartan (COZAAR) 50 MG tablet Take 1 tablet (50 mg total) by mouth once daily.    metFORMIN (GLUCOPHAGE) 1000 MG tablet Take 1 tablet (1,000 mg total) by mouth 2 (two) times daily with meals.    montelukast (SINGULAIR) 10 mg tablet TAKE 1 TABLET(10 MG) BY MOUTH EVERY EVENING    ondansetron (ZOFRAN-ODT) 8 MG TbDL Take 1 tablet (8 mg total) by mouth every 8 (eight) hours as needed (Nausea).    ONETOUCH DELICA PLUS LANCET 33 gauge Misc 2 (two) times daily.    ONETOUCH VERIO REFLECT METER Misc USE AS DIRECTED TO TEST BLOOD GLUCOSE    ONETOUCH VERIO TEST STRIPS Strp USE TO CHECK BLOOD GLUCOSE DAILY    pantoprazole (PROTONIX) 40 MG tablet Take 1 tablet (40 mg total) by mouth once daily.    promethazine-dextromethorphan (PROMETHAZINE-DM) 6.25-15 mg/5 mL Syrp SMARTSI.5 Milliliter(s) By Mouth 4 Times Daily PRN    rizatriptan (MAXALT-MLT) 10 MG disintegrating tablet Take 1 tablet (10 mg total) by mouth every 2 (two) hours as needed for Migraine. Max 30 mg/day.    semaglutide (OZEMPIC) 1 mg/dose (4 mg/3 mL) Inject 1 mg into the skin every 7 days.    sumatriptan (IMITREX) 100 MG tablet Take 1 tablet (100 mg total) by mouth every 2 (two) hours as needed for Migraine. Max 200 mg/day.    ubrogepant (UBRELVY) 100 mg tablet Take 1 tablet (100 mg total) by mouth every 2  "(two) hours as needed for Migraine. Max 200 mg/day. (Patient not taking: Reported on 4/8/2024)    [DISCONTINUED] diclofenac sodium (VOLTAREN) 1 % Gel Apply 2 g topically 3 (three) times daily.    [DISCONTINUED] ondansetron (ZOFRAN-ODT) 8 MG TbDL Take 1 tablet (8 mg total) by mouth every 8 (eight) hours as needed (Nausea).    [DISCONTINUED] rizatriptan (MAXALT-MLT) 10 MG disintegrating tablet Take 1 tablet (10 mg total) by mouth every 2 (two) hours as needed for Migraine. Max 30 mg/day.     No current facility-administered medications on file prior to visit.       Objective:      Vitals:    04/08/24 1000   BP: 124/72   Pulse: 90   SpO2: 97%   Weight: 98.7 kg (217 lb 9.5 oz)   Height: 5' 5" (1.651 m)       Physical Exam   Constitutional: She appears well-developed and well-nourished. She is obese.   HENT:   Nose: No mucosal edema.   Mouth/Throat: Oropharynx is clear and moist. Mallampati Score: II.   Neck: No tracheal deviation present.   Cardiovascular: Normal rate and regular rhythm.   Pulmonary/Chest: Normal expansion, symmetric chest wall expansion, effort normal and breath sounds normal. No respiratory distress. She has no wheezes. She has no rhonchi. She has no rales.   Musculoskeletal:         General: No edema.      Cervical back: Neck supple.   Skin: Skin is warm and dry. No cyanosis or erythema. Nails show no clubbing.   Nursing note and vitals reviewed.      Personal Diagnostic Review    Laboratory:  10/13/23  Bicarb- 24    8/28/23  Eosinophils- 0.2      CT Chest 6/15/23- Images personally reviewed. I agree w/ the radiologist who notes;  Pulmonary vasculature: Unremarkable.     Magalie/Mediastinum: No pathologic aurelio enlargement.     Airways: Mild narrowing of the trachea at the level of the thyroid.  Otherwise, trachea and bronchi are patent.     Lungs/Pleura: Biapical fibronodular scarring.  Subcentimeter pulmonary nodules within the bilateral major fissures, favored to represent intrapulmonary lymph nodes. "  No suspicious pulmonary nodules.  No large focal consolidation.  Pleural effusion or pleural thickening.    PFTs   23  FVC: 2.58 (98.0 % predicted), FEV1: 2.04 (99.0 % predicted), FEV1/FVC:  79, T.42 (86.5 % predicted), DLCO: 17.44 (77.6 % predicted)        Assessment:     Problem List Items Addressed This Visit          Pulmonary    Chronic cough - Primary     Suspect she has a component of RAD. PFTs normal but cough finally resolved w/ a prolonged course of steroids. Has nasal sx.     - Cont prn albuterol and singulair  - No longer needing advair. Can resume if cough resumes.  - Encouraged regular, progressive exercise            Endocrine    Obesity (BMI 30-39.9)     Ozempic, exercise            28 minutes of total time spent on the encounter, which includes face to face time and non-face to face time preparing to see the patient (eg, review of tests), Obtaining and/or reviewing separately obtained history, Documenting clinical information in the electronic or other health record, Independently interpreting results (not separately reported) and communicating results to the patient/family/caregiver, or Care coordination (not separately reported).

## 2024-04-08 NOTE — ASSESSMENT & PLAN NOTE
Suspect she has a component of RAD. PFTs normal but cough finally resolved w/ a prolonged course of steroids. Has nasal sx.     - Cont prn albuterol and singulair  - No longer needing advair. Can resume if cough resumes.  - Encouraged regular, progressive exercise

## 2024-04-22 DIAGNOSIS — C50.912 MALIGNANT NEOPLASM OF LEFT FEMALE BREAST, UNSPECIFIED ESTROGEN RECEPTOR STATUS, UNSPECIFIED SITE OF BREAST: ICD-10-CM

## 2024-04-22 DIAGNOSIS — Z85.3 HISTORY OF BREAST CANCER IN FEMALE: Primary | ICD-10-CM

## 2024-04-23 ENCOUNTER — OFFICE VISIT (OUTPATIENT)
Dept: HEMATOLOGY/ONCOLOGY | Facility: CLINIC | Age: 69
End: 2024-04-23
Payer: MEDICARE

## 2024-04-23 ENCOUNTER — HOSPITAL ENCOUNTER (OUTPATIENT)
Dept: RADIOLOGY | Facility: HOSPITAL | Age: 69
Discharge: HOME | End: 2024-04-23
Attending: INTERNAL MEDICINE
Payer: MEDICARE

## 2024-04-23 VITALS
RESPIRATION RATE: 18 BRPM | SYSTOLIC BLOOD PRESSURE: 139 MMHG | BODY MASS INDEX: 35.88 KG/M2 | WEIGHT: 215.38 LBS | TEMPERATURE: 98 F | HEIGHT: 65 IN | HEART RATE: 97 BPM | DIASTOLIC BLOOD PRESSURE: 68 MMHG | OXYGEN SATURATION: 100 %

## 2024-04-23 DIAGNOSIS — Z85.3 HISTORY OF BREAST CANCER IN FEMALE: ICD-10-CM

## 2024-04-23 DIAGNOSIS — D50.0 IRON DEFICIENCY ANEMIA DUE TO CHRONIC BLOOD LOSS: ICD-10-CM

## 2024-04-23 DIAGNOSIS — Z85.3 HISTORY OF BREAST CANCER IN FEMALE: Primary | ICD-10-CM

## 2024-04-23 PROCEDURE — 99999 PR PBB SHADOW E&M-EST. PATIENT-LVL V: CPT | Mod: PBBFAC,,, | Performed by: INTERNAL MEDICINE

## 2024-04-23 PROCEDURE — 3075F SYST BP GE 130 - 139MM HG: CPT | Mod: CPTII,S$GLB,, | Performed by: INTERNAL MEDICINE

## 2024-04-23 PROCEDURE — 3051F HG A1C>EQUAL 7.0%<8.0%: CPT | Mod: CPTII,S$GLB,, | Performed by: INTERNAL MEDICINE

## 2024-04-23 PROCEDURE — 3288F FALL RISK ASSESSMENT DOCD: CPT | Mod: CPTII,S$GLB,, | Performed by: INTERNAL MEDICINE

## 2024-04-23 PROCEDURE — 77063 BREAST TOMOSYNTHESIS BI: CPT | Mod: 26,52,, | Performed by: RADIOLOGY

## 2024-04-23 PROCEDURE — 77063 BREAST TOMOSYNTHESIS BI: CPT | Mod: TC,52

## 2024-04-23 PROCEDURE — 77067 SCR MAMMO BI INCL CAD: CPT | Mod: 26,52,, | Performed by: RADIOLOGY

## 2024-04-23 PROCEDURE — 3008F BODY MASS INDEX DOCD: CPT | Mod: CPTII,S$GLB,, | Performed by: INTERNAL MEDICINE

## 2024-04-23 PROCEDURE — 1126F AMNT PAIN NOTED NONE PRSNT: CPT | Mod: CPTII,S$GLB,, | Performed by: INTERNAL MEDICINE

## 2024-04-23 PROCEDURE — 77067 SCR MAMMO BI INCL CAD: CPT | Mod: TC,52

## 2024-04-23 PROCEDURE — 1101F PT FALLS ASSESS-DOCD LE1/YR: CPT | Mod: CPTII,S$GLB,, | Performed by: INTERNAL MEDICINE

## 2024-04-23 PROCEDURE — 99215 OFFICE O/P EST HI 40 MIN: CPT | Mod: S$GLB,,, | Performed by: INTERNAL MEDICINE

## 2024-04-23 PROCEDURE — 4010F ACE/ARB THERAPY RXD/TAKEN: CPT | Mod: CPTII,S$GLB,, | Performed by: INTERNAL MEDICINE

## 2024-04-23 PROCEDURE — 1159F MED LIST DOCD IN RCRD: CPT | Mod: CPTII,S$GLB,, | Performed by: INTERNAL MEDICINE

## 2024-04-23 PROCEDURE — 3078F DIAST BP <80 MM HG: CPT | Mod: CPTII,S$GLB,, | Performed by: INTERNAL MEDICINE

## 2024-04-23 NOTE — PROGRESS NOTES
Subjective:       Patient ID: Regine Cooper is a 69 y.o. female.    Chief Complaint: No chief complaint on file.      HPI      Mrs. Cooper returns today for follow up.  I had last seen her on MArch 5, 2024.  In late January she underwent a video capsule endoscopy which was unremarkable.  She is still on ferrous sulfate 324 mg tablets and she takes 2 a day.    CBC today shows a white count of 9900 per cubic mm, hemoglobin 12.4 grams/deciliter, hematocrit 39.8%, MCV 82 and platelets 375 K.  Her ferritin is 28ng/ml.    On August 18, 2023 she underwent an EGD and colonoscopy.  She had diverticulosis and a 1 cm hiatal hernia.  Biopsies were negative for celiac disease.      Briefly, she is a 69-year-old female who has a history of a stage I triple negative breast cancer that was diagnosed in late 2017.  She underwent a left modified radical mastectomy in [facility] on 11/01/2017 and had a stage IA carcinoma measuring 1.2 cm in greatest diameter.  Resection margins were clear, while four sentinel lymph nodes were negative.  Postoperatively, she received four cycles of docetaxel and cyclophosphamide.  The first cycle was administered on 11/30/2017 and she completed her treatment by 02/01/2018.  She has been followed expectantly and has been MERRY since then.  We assumed her care when she relocated to [place] 3 years ago.    Her mammogram earlier today was read as BIRADS I, and a one year follow up was recommended.      Two stool samples that she submitted tin August were negative for occult blood.      A urinalysis in April had shown no hematuria.    ROS: Overall she feels OK, and her ECOG PS is 1.  She denies any anxiety, easy bruising, fevers, chills, night  sweats, weight loss, nausea, vomiting, diarrhea, constipation, diplopia, blurred vision, headache, chest pain, palpitations, shortness of breath, breast pain, lower abdominal pain, extremity pain, or difficulty ambulating.  The remainder of the ten-point ROS,  including general, skin, lymph, H/N, cardiorespiratory, GI, , Neuro, Endocrine, and psychiatric is negative.     Objective:      Physical Exam      She is alert, oriented to time, place, person, pleasant, well      nourished, in no acute physical distress.                                    VITAL SIGNS:  Reviewed                                      HEENT:  Normal.  There are no nasal, oral, lip, gingival, auricular, lid,    or conjunctival lesions.  Mucosae are moist and pink, and there is no        thrush.  Pupils are equal, reactive to light and accommodation.              Extraocular muscle movements are intact.  Dentition is good.  There is no frontal or maxillary tenderness.                                     NECK:  Supple without JVD, adenopathy, but she does have thyromegaly.                       LUNGS:  Clear to auscultation without wheezing, rales, or rhonchi.           CARDIOVASCULAR:  Reveals an S1, S2, no murmurs, no rubs, no gallops.         ABDOMEN:  Soft, nontender, without organomegaly.  Bowel sounds are    present.                                                                     EXTREMITIES:  No cyanosis, clubbing, or edema.                               BREASTS: Deferred.    LYMPHATIC:  There is no cervical, axillary, or supraclavicular adenopathy.   SKIN:  Warm and moist, without petechiae, rashes, induration, or ecchymoses.           NEUROLOGIC:  DTRs are 0-1+ bilaterally, symmetrical, motor function is 5/5,  and cranial nerves are  within normal limits.    Assessment:       1. History of breast cancer in female, [id number], status post mastectomy and 4 cycles of docetaxel cyclophosphamide, clinically MERRY, doing well more than 5 years after completion       2.     Low ferritin with borderline microcytic anemia  3.     Diverticulosis  Plan:         In regards to her breast cancer, Mrs. Alfred remains in clinical remission.  I explained to her that her chance of a cure at this point is in the  range of 99%.  In regards to her low ferritin and borderline anemia, I recommended that she remain on iron supplements for another 8 weeks.  I would like to see her ferritin above 30 ng/mL before she discontinues the oral supplementation      Her multiple questions were answered to her satisfaction.  I spent approximately 33 minutes reviewing the available records and evaluating the patient, out of which over 50% of the time was spent face to face with the patient in counseling and coordinating this patient's care.            Route Chart for Scheduling    Med Onc Chart Routing      Follow up with physician 2 months. tw months with labs please   Follow up with [facility]    Infusion scheduling note    Injection scheduling note    Labs Ferritin and CBC   Scheduling:  Preferred lab:  Lab interval:     Imaging    Pharmacy appointment    Other referrals

## 2024-04-29 ENCOUNTER — PATIENT MESSAGE (OUTPATIENT)
Dept: PRIMARY CARE CLINIC | Facility: CLINIC | Age: 69
End: 2024-04-29
Payer: MEDICARE

## 2024-04-29 DIAGNOSIS — I10 ESSENTIAL HYPERTENSION: ICD-10-CM

## 2024-04-29 RX ORDER — LOSARTAN POTASSIUM 50 MG/1
50 TABLET ORAL DAILY
Qty: 90 TABLET | Refills: 3 | Status: SHIPPED | OUTPATIENT
Start: 2024-04-29

## 2024-04-29 NOTE — TELEPHONE ENCOUNTER
Care Due:                  Date            Visit Type   Department     Provider  --------------------------------------------------------------------------------                                EP -                              PRIMARY      [facility] PRIMARY  Last Visit: 01-      CARE ([facility])   MILAGROS Melissa                               -                              PRIMARY      [facility] PRIMARY  Next Visit: 05-      CARE ([facility])   MLIAGROS Melissa                                                            Last  Test          Frequency    Reason                     Performed    Due Date  --------------------------------------------------------------------------------    HBA1C.......  6 months...  metFORMIN, semaglutide...  01- 07-    [facility] Embedded Care Due Messages. Reference number: [id number].   4/29/2024 1:00:13 PM CDT

## 2024-05-06 ENCOUNTER — LAB VISIT (OUTPATIENT)
Dept: LAB | Facility: HOSPITAL | Age: 69
End: 2024-05-06
Attending: STUDENT IN AN ORGANIZED HEALTH CARE EDUCATION/TRAINING PROGRAM
Payer: MEDICARE

## 2024-05-06 DIAGNOSIS — E66.01 CLASS 2 SEVERE OBESITY DUE TO EXCESS CALORIES WITH SERIOUS COMORBIDITY AND BODY MASS INDEX (BMI) OF 36.0 TO 36.9 IN ADULT: ICD-10-CM

## 2024-05-06 DIAGNOSIS — E11.65 TYPE 2 DIABETES MELLITUS WITH HYPERGLYCEMIA, WITHOUT LONG-TERM CURRENT USE OF INSULIN: ICD-10-CM

## 2024-05-06 DIAGNOSIS — I15.2 HYPERTENSION ASSOCIATED WITH TYPE 2 DIABETES MELLITUS: ICD-10-CM

## 2024-05-06 DIAGNOSIS — E11.59 HYPERTENSION ASSOCIATED WITH TYPE 2 DIABETES MELLITUS: ICD-10-CM

## 2024-05-06 LAB
25(OH)D3+25(OH)D2 SERPL-MCNC: 65 NG/ML (ref 30–96)
ANION GAP SERPL CALC-SCNC: 12 MMOL/L (ref 8–16)
BUN SERPL-MCNC: 9 MG/DL (ref 8–23)
CALCIUM SERPL-MCNC: 9.9 MG/DL (ref 8.7–10.5)
CHLORIDE SERPL-SCNC: 103 MMOL/L (ref 95–110)
CO2 SERPL-SCNC: 28 MMOL/L (ref 23–29)
CREAT SERPL-MCNC: 0.6 MG/DL (ref 0.5–1.4)
EST. GFR  (NO RACE VARIABLE): >60 ML/MIN/1.73 M^2
ESTIMATED AVG GLUCOSE: 126 MG/DL (ref 68–131)
GLUCOSE SERPL-MCNC: 107 MG/DL (ref 70–110)
HBA1C MFR BLD: 6 % (ref 4–5.6)
POTASSIUM SERPL-SCNC: 4.1 MMOL/L (ref 3.5–5.1)
SODIUM SERPL-SCNC: 143 MMOL/L (ref 136–145)

## 2024-05-06 PROCEDURE — 83036 HEMOGLOBIN GLYCOSYLATED A1C: CPT | Performed by: STUDENT IN AN ORGANIZED HEALTH CARE EDUCATION/TRAINING PROGRAM

## 2024-05-06 PROCEDURE — 36415 COLL VENOUS BLD VENIPUNCTURE: CPT | Mod: PN | Performed by: STUDENT IN AN ORGANIZED HEALTH CARE EDUCATION/TRAINING PROGRAM

## 2024-05-06 PROCEDURE — 82306 VITAMIN D 25 HYDROXY: CPT | Performed by: STUDENT IN AN ORGANIZED HEALTH CARE EDUCATION/TRAINING PROGRAM

## 2024-05-06 PROCEDURE — 80048 BASIC METABOLIC PNL TOTAL CA: CPT | Performed by: STUDENT IN AN ORGANIZED HEALTH CARE EDUCATION/TRAINING PROGRAM

## 2024-05-07 ENCOUNTER — OFFICE VISIT (OUTPATIENT)
Dept: PRIMARY CARE CLINIC | Facility: CLINIC | Age: 69
End: 2024-05-07
Payer: MEDICARE

## 2024-05-07 VITALS
HEIGHT: 65 IN | WEIGHT: 215.63 LBS | HEART RATE: 102 BPM | BODY MASS INDEX: 35.93 KG/M2 | OXYGEN SATURATION: 100 % | DIASTOLIC BLOOD PRESSURE: 57 MMHG | SYSTOLIC BLOOD PRESSURE: 119 MMHG

## 2024-05-07 DIAGNOSIS — R35.0 URINARY FREQUENCY: Primary | ICD-10-CM

## 2024-05-07 DIAGNOSIS — E11.65 TYPE 2 DIABETES MELLITUS WITH HYPERGLYCEMIA, WITHOUT LONG-TERM CURRENT USE OF INSULIN: ICD-10-CM

## 2024-05-07 DIAGNOSIS — G95.89 MYELOMALACIA OF CERVICAL CORD: ICD-10-CM

## 2024-05-07 DIAGNOSIS — J30.1 NON-SEASONAL ALLERGIC RHINITIS DUE TO POLLEN: ICD-10-CM

## 2024-05-07 DIAGNOSIS — I15.2 HYPERTENSION ASSOCIATED WITH TYPE 2 DIABETES MELLITUS: ICD-10-CM

## 2024-05-07 DIAGNOSIS — C50.912 MALIGNANT NEOPLASM OF LEFT FEMALE BREAST, UNSPECIFIED ESTROGEN RECEPTOR STATUS, UNSPECIFIED SITE OF BREAST: ICD-10-CM

## 2024-05-07 DIAGNOSIS — E11.59 HYPERTENSION ASSOCIATED WITH TYPE 2 DIABETES MELLITUS: ICD-10-CM

## 2024-05-07 DIAGNOSIS — F32.0 MAJOR DEPRESSIVE DISORDER, SINGLE EPISODE, MILD: ICD-10-CM

## 2024-05-07 DIAGNOSIS — E11.9 TYPE 2 DIABETES MELLITUS WITHOUT COMPLICATION, WITHOUT LONG-TERM CURRENT USE OF INSULIN: ICD-10-CM

## 2024-05-07 DIAGNOSIS — I70.0 ATHEROSCLEROSIS OF AORTA: ICD-10-CM

## 2024-05-07 LAB
BILIRUB SERPL-MCNC: ABNORMAL MG/DL
BLOOD, POC UA: ABNORMAL
GLUCOSE UR QL STRIP: ABNORMAL
KETONES UR QL STRIP: ABNORMAL
LEUKOCYTE ESTERASE URINE, POC: ABNORMAL
NITRITE, POC UA: ABNORMAL
PH, POC UA: 6.5
PROTEIN, POC: ABNORMAL
SPECIFIC GRAVITY, POC UA: 1.01
UROBILINOGEN, POC UA: 1

## 2024-05-07 PROCEDURE — 1101F PT FALLS ASSESS-DOCD LE1/YR: CPT | Mod: CPTII,S$GLB,, | Performed by: STUDENT IN AN ORGANIZED HEALTH CARE EDUCATION/TRAINING PROGRAM

## 2024-05-07 PROCEDURE — 87186 SC STD MICRODIL/AGAR DIL: CPT | Performed by: STUDENT IN AN ORGANIZED HEALTH CARE EDUCATION/TRAINING PROGRAM

## 2024-05-07 PROCEDURE — 99214 OFFICE O/P EST MOD 30 MIN: CPT | Mod: S$GLB,,, | Performed by: STUDENT IN AN ORGANIZED HEALTH CARE EDUCATION/TRAINING PROGRAM

## 2024-05-07 PROCEDURE — 3008F BODY MASS INDEX DOCD: CPT | Mod: CPTII,S$GLB,, | Performed by: STUDENT IN AN ORGANIZED HEALTH CARE EDUCATION/TRAINING PROGRAM

## 2024-05-07 PROCEDURE — 3288F FALL RISK ASSESSMENT DOCD: CPT | Mod: CPTII,S$GLB,, | Performed by: STUDENT IN AN ORGANIZED HEALTH CARE EDUCATION/TRAINING PROGRAM

## 2024-05-07 PROCEDURE — 87086 URINE CULTURE/COLONY COUNT: CPT | Performed by: STUDENT IN AN ORGANIZED HEALTH CARE EDUCATION/TRAINING PROGRAM

## 2024-05-07 PROCEDURE — 3044F HG A1C LEVEL LT 7.0%: CPT | Mod: CPTII,S$GLB,, | Performed by: STUDENT IN AN ORGANIZED HEALTH CARE EDUCATION/TRAINING PROGRAM

## 2024-05-07 PROCEDURE — 87077 CULTURE AEROBIC IDENTIFY: CPT | Performed by: STUDENT IN AN ORGANIZED HEALTH CARE EDUCATION/TRAINING PROGRAM

## 2024-05-07 PROCEDURE — 3074F SYST BP LT 130 MM HG: CPT | Mod: CPTII,S$GLB,, | Performed by: STUDENT IN AN ORGANIZED HEALTH CARE EDUCATION/TRAINING PROGRAM

## 2024-05-07 PROCEDURE — 81003 URINALYSIS AUTO W/O SCOPE: CPT | Mod: QW,S$GLB,, | Performed by: STUDENT IN AN ORGANIZED HEALTH CARE EDUCATION/TRAINING PROGRAM

## 2024-05-07 PROCEDURE — 87088 URINE BACTERIA CULTURE: CPT | Performed by: STUDENT IN AN ORGANIZED HEALTH CARE EDUCATION/TRAINING PROGRAM

## 2024-05-07 PROCEDURE — 4010F ACE/ARB THERAPY RXD/TAKEN: CPT | Mod: CPTII,S$GLB,, | Performed by: STUDENT IN AN ORGANIZED HEALTH CARE EDUCATION/TRAINING PROGRAM

## 2024-05-07 PROCEDURE — 1126F AMNT PAIN NOTED NONE PRSNT: CPT | Mod: CPTII,S$GLB,, | Performed by: STUDENT IN AN ORGANIZED HEALTH CARE EDUCATION/TRAINING PROGRAM

## 2024-05-07 PROCEDURE — 3078F DIAST BP <80 MM HG: CPT | Mod: CPTII,S$GLB,, | Performed by: STUDENT IN AN ORGANIZED HEALTH CARE EDUCATION/TRAINING PROGRAM

## 2024-05-07 PROCEDURE — 1159F MED LIST DOCD IN RCRD: CPT | Mod: CPTII,S$GLB,, | Performed by: STUDENT IN AN ORGANIZED HEALTH CARE EDUCATION/TRAINING PROGRAM

## 2024-05-07 PROCEDURE — 99999 PR PBB SHADOW E&M-EST. PATIENT-LVL IV: CPT | Mod: PBBFAC,,, | Performed by: STUDENT IN AN ORGANIZED HEALTH CARE EDUCATION/TRAINING PROGRAM

## 2024-05-07 RX ORDER — HYDROXYZINE HYDROCHLORIDE 10 MG/1
10 TABLET, FILM COATED ORAL 2 TIMES DAILY
COMMUNITY
Start: 2024-04-18

## 2024-05-07 RX ORDER — BETAMETHASONE VALERATE 1 MG/ML
LOTION CUTANEOUS 2 TIMES DAILY
COMMUNITY
Start: 2024-04-18

## 2024-05-07 RX ORDER — MONTELUKAST SODIUM 10 MG/1
10 TABLET ORAL NIGHTLY
Qty: 90 TABLET | Refills: 3 | Status: SHIPPED | OUTPATIENT
Start: 2024-05-07

## 2024-05-07 RX ORDER — METFORMIN HYDROCHLORIDE 500 MG/1
500 TABLET ORAL 2 TIMES DAILY WITH MEALS
Qty: 180 TABLET | Refills: 3 | Status: SHIPPED | OUTPATIENT
Start: 2024-05-07

## 2024-05-07 NOTE — PROGRESS NOTES
05/07/2024    Regine Cooper  [id number]    Chief Complaint   Patient presents with    Follow-up     For diabetes complain of possible UTI    Urinary Tract Infection    Medication Refill       HPI    This pt is known to me and presents for routine care.Chronic conditions below.      Since last visit:  Pulmonology:  started on inhalers and nasal sprays. She is doing well with the cough at this time.   Hematology: still following for anemia and taking iron  Neurology: follows with migraines; stable; frequency has decreased  Hepatology: fatty liver; encouraged to lose 20 lbs  Gastro: being evaled for GERD and occasional n/v. Doing ok with pantoprazole  Ophthalmology: follows for glaucoma and doing well with drops     TYPE II DIABETES  A1c 6.0  Ozempic 1 mg   Denies any medication side effects   Has lost 15 lbs since Jan  Is trying to eat more protein   Exercise: gym 2x weekly with treadmill and bike    HTN  HCTZ 25 mg ; losartan 50mg       Negative 10 point ROS outside of HPI    Social History     Socioeconomic History    Marital status:     Number of children: 1   Tobacco Use    Smoking status: Never     Passive exposure: Never    Smokeless tobacco: Never   Substance and Sexual Activity    Alcohol use: No    Drug use: No    Sexual activity: Not Currently   Social History Narrative        1 son (estranged)    3 grandchildren ([place])    Born and raised in [place] (moved to [facility] 2017)     Social Determinants of Health     Financial Resource Strain: Low Risk  (11/23/2023)    Overall Financial Resource Strain (CARDIA)     Difficulty of Paying Living Expenses: Not very hard   Food Insecurity: No Food Insecurity (11/23/2023)    Hunger Vital Sign     Worried About Running Out of Food in the Last Year: Never true     Ran Out of Food in the Last Year: Never true   Transportation Needs: No Transportation Needs (11/23/2023)    PRAPARE - Transportation     Lack of Transportation (Medical): No     Lack of  Transportation (Non-Medical): No   Physical Activity: Insufficiently Active (2023)    Exercise Vital Sign     Days of Exercise per Week: 2 days     Minutes of Exercise per Session: 40 min   Stress: No Stress Concern Present (2023)    [employer] - Occupational Stress Questionnaire     Feeling of Stress : Only a little   Housing Stability: Low Risk  (2023)    Housing Stability Vital Sign     Unable to Pay for Housing in the Last Year: No     Number of Places Lived in the Last Year: 1     Unstable Housing in the Last Year: No           Current Outpatient Medications:     albuterol (PROVENTIL/VENTOLIN HFA) 90 mcg/actuation inhaler, SMARTSI-2 Puff(s) Via Inhaler Every 6 Hours PRN, Disp: 6.7 g, Rfl: 0    ammonium lactate 12 % Crea, Apply 1 application  topically 2 (two) times daily., Disp: 140 g, Rfl: 11    augmented betamethasone dipropionate (DIPROLENE-AF) 0.05 % cream, Apply topically 2 (two) times to 4 times daily to area of back when itchy or burning.Stop using steroid topical when skin is non itchy.  Do not treat dark or red coloring., Disp: 50 g, Rfl: 0    azelastine (ASTELIN) 137 mcg (0.1 %) nasal spray, 1 SPRAY IN EACH NOSTRIL TWICE DAILY Strength: 137 mcg (0.1 %), Disp: 30 mL, Rfl: 2    clobetasoL (TEMOVATE) 0.05 % external solution, APPLY TOPICALLY TO THE SCALP TWICE DAILY AS NEEDED FOR ITCHING OR IRRITATION, Disp: 50 mL, Rfl: 0    ferrous sulfate (FEROSUL) 325 mg (65 mg iron) Tab tablet, TAKE 1 TABLET BY MOUTH TWICE DAILY 1 HOUR BEFORE MEALS, Disp: 60 tablet, Rfl: 2    fluticasone propionate (FLONASE) 50 mcg/actuation nasal spray, 2 sprays to each lateral nostril once a day, Disp: 48 g, Rfl: 3    fluticasone-salmeterol diskus inhaler 250-50 mcg, Inhale 1 puff into the lungs 2 (two) times daily. Controller, Disp: 60 each, Rfl: 11    hydroCHLOROthiazide (HYDRODIURIL) 25 MG tablet, Take 1 tablet (25 mg total) by mouth once daily., Disp: 90 tablet, Rfl: 3     ipratropium (ATROVENT) 21 mcg (0.03 %) nasal spray, 2 sprays by Each Nostril route 2 (two) times daily., Disp: 30 mL, Rfl: 11    lancets Misc, To check BG 2 times daily, Disp: 200 each, Rfl: 3    latanoprost 0.005 % ophthalmic solution, Place 1 drop into both eyes every evening., Disp: 7.5 mL, Rfl: 3    losartan (COZAAR) 50 MG tablet, Take 1 tablet (50 mg total) by mouth once daily., Disp: 90 tablet, Rfl: 3    metFORMIN (GLUCOPHAGE) 1000 MG tablet, Take 1 tablet (1,000 mg total) by mouth 2 (two) times daily with meals., Disp: 180 tablet, Rfl: 3    montelukast (SINGULAIR) 10 mg tablet, TAKE 1 TABLET(10 MG) BY MOUTH EVERY EVENING, Disp: 90 tablet, Rfl: 3    ondansetron (ZOFRAN-ODT) 8 MG TbDL, Take 1 tablet (8 mg total) by mouth every 8 (eight) hours as needed (Nausea)., Disp: 30 tablet, Rfl: 2    ONETOUCH DELICA PLUS LANCET 33 gauge Misc, 2 (two) times daily., Disp: , Rfl:     ONETOUCH VERIO REFLECT METER Mis, USE AS DIRECTED TO TEST BLOOD GLUCOSE, Disp: , Rfl:     ONETOUCH VERIO TEST STRIPS Strp, USE TO CHECK BLOOD GLUCOSE DAILY, Disp: 100 strip, Rfl: 3    pantoprazole (PROTONIX) 40 MG tablet, Take 1 tablet (40 mg total) by mouth once daily., Disp: 90 tablet, Rfl: 1    promethazine-dextromethorphan (PROMETHAZINE-DM) 6.25-15 mg/5 mL Syrp, SMARTSI.5 Milliliter(s) By Mouth 4 Times Daily PRN, Disp: 100 mL, Rfl: 0    rizatriptan (MAXALT-MLT) 10 MG disintegrating tablet, Take 1 tablet (10 mg total) by mouth every 2 (two) hours as needed for Migraine. Max 30 mg/day., Disp: 12 tablet, Rfl: 5    semaglutide (OZEMPIC) 1 mg/dose (4 mg/3 mL), Inject 1 mg into the skin every 7 days., Disp: 3 mL, Rfl: 3    sumatriptan (IMITREX) 100 MG tablet, Take 1 tablet (100 mg total) by mouth every 2 (two) hours as needed for Migraine. Max 200 mg/day., Disp: 12 tablet, Rfl: 5    ubrogepant (UBRELVY) 100 mg tablet, Take 1 tablet (100 mg total) by mouth every 2 (two) hours as needed for Migraine. Max 200 mg/day. (Patient not taking: Reported  on 4/8/2024), Disp: 10 tablet, Rfl: 2      Physical Exam  Vitals:    05/07/24 0809   BP: (!) 119/57   Pulse: 102       Physical Exam      Gen: well appearing, NAD  Resp: non labored breathing, no crackles, no wheezes, CTAB  CV: RRR no murmur, gallops, rubs, no LE edema  Abd: soft nontender BS present no organomegaly      1. Non-seasonal allergic rhinitis due to pollen  - REFILLED montelukast (SINGULAIR) 10 mg tablet; Take 1 tablet (10 mg total) by mouth every evening.  Dispense: 90 tablet; Refill: 3    2. Type 2 diabetes mellitus without complication, without long-term current use of insulin  Well-controlled Last A1c 6.0  -DECREASE metFORMIN (GLUCOPHAGE) 500 MG tablet; Take 1 tablet (500 mg total) by mouth 2 (two) times daily with meals.  Dispense: 180 tablet; Refill: 3  -continue Ozempic 1 mg      4. Urinary frequency  - POCT URINALYSIS:  Negative leukocytes negative nitrites negative blood  - Urine culture    5. Major depressive disorder, single episode, mild  Resolved  6. Hypertension associated with type 2 diabetes mellitus  Soft and normotensive.  Patient asymptomatic.  Continue hydrochlorothiazide and losartan    7. Malignant neoplasm of left female breast, unspecified estrogen receptor status, unspecified site of breast  In remission.  Follows with Oncology.    8. Myelomalacia of cervical cord  Stable    9. Atherosclerosis of aorta  Patient not on statin.  LDL less than 100      RTC in 3-6 months or sooner as needed.    Gladis Melissa MD  Family Medicine

## 2024-05-08 ENCOUNTER — PATIENT MESSAGE (OUTPATIENT)
Dept: PRIMARY CARE CLINIC | Facility: CLINIC | Age: 69
End: 2024-05-08
Payer: MEDICARE

## 2024-05-08 DIAGNOSIS — E11.65 TYPE 2 DIABETES MELLITUS WITH HYPERGLYCEMIA, WITHOUT LONG-TERM CURRENT USE OF INSULIN: ICD-10-CM

## 2024-05-08 RX ORDER — SEMAGLUTIDE 1.34 MG/ML
1 INJECTION, SOLUTION SUBCUTANEOUS
Qty: 9 ML | Refills: 1 | Status: SHIPPED | OUTPATIENT
Start: 2024-05-08

## 2024-05-08 NOTE — TELEPHONE ENCOUNTER
Refill Decision Note   Regine Cooper  is requesting a refill authorization.  Brief Assessment and Rationale for Refill:  Approve     Medication Therapy Plan:        Comments:     Note composed:1:00 PM 05/08/2024

## 2024-05-08 NOTE — TELEPHONE ENCOUNTER
No care due was identified.  [facility] Embedded Care Due Messages. Reference number: [id number].   5/08/2024 7:59:11 AM CDT

## 2024-05-10 ENCOUNTER — PATIENT MESSAGE (OUTPATIENT)
Dept: HEMATOLOGY/ONCOLOGY | Facility: CLINIC | Age: 69
End: 2024-05-10
Payer: MEDICARE

## 2024-05-10 DIAGNOSIS — N30.00 ACUTE CYSTITIS WITHOUT HEMATURIA: Primary | ICD-10-CM

## 2024-05-10 LAB — BACTERIA UR CULT: ABNORMAL

## 2024-05-10 RX ORDER — NITROFURANTOIN 25; 75 MG/1; MG/1
100 CAPSULE ORAL 2 TIMES DAILY
Qty: 10 CAPSULE | Refills: 0 | Status: SHIPPED | OUTPATIENT
Start: 2024-05-10 | End: 2024-05-15

## 2024-05-13 DIAGNOSIS — C50.912 MALIGNANT NEOPLASM OF LEFT FEMALE BREAST, UNSPECIFIED ESTROGEN RECEPTOR STATUS, UNSPECIFIED SITE OF BREAST: ICD-10-CM

## 2024-05-13 RX ORDER — FERROUS SULFATE 325(65) MG
TABLET ORAL
Qty: 60 TABLET | Refills: 2 | Status: SHIPPED | OUTPATIENT
Start: 2024-05-13

## 2024-05-30 ENCOUNTER — OFFICE VISIT (OUTPATIENT)
Dept: PODIATRY | Facility: CLINIC | Age: 69
End: 2024-05-30
Payer: MEDICARE

## 2024-05-30 VITALS
WEIGHT: 211 LBS | HEIGHT: 65 IN | BODY MASS INDEX: 35.16 KG/M2 | SYSTOLIC BLOOD PRESSURE: 128 MMHG | HEART RATE: 108 BPM | DIASTOLIC BLOOD PRESSURE: 77 MMHG

## 2024-05-30 DIAGNOSIS — M79.674 PAIN OF TOE OF RIGHT FOOT: Primary | ICD-10-CM

## 2024-05-30 DIAGNOSIS — E11.65 TYPE 2 DIABETES MELLITUS WITH HYPERGLYCEMIA, WITHOUT LONG-TERM CURRENT USE OF INSULIN: ICD-10-CM

## 2024-05-30 PROCEDURE — 99213 OFFICE O/P EST LOW 20 MIN: CPT | Mod: S$GLB,,, | Performed by: PODIATRIST

## 2024-05-30 PROCEDURE — 3078F DIAST BP <80 MM HG: CPT | Mod: CPTII,S$GLB,, | Performed by: PODIATRIST

## 2024-05-30 PROCEDURE — 1159F MED LIST DOCD IN RCRD: CPT | Mod: CPTII,S$GLB,, | Performed by: PODIATRIST

## 2024-05-30 PROCEDURE — 3074F SYST BP LT 130 MM HG: CPT | Mod: CPTII,S$GLB,, | Performed by: PODIATRIST

## 2024-05-30 PROCEDURE — 3044F HG A1C LEVEL LT 7.0%: CPT | Mod: CPTII,S$GLB,, | Performed by: PODIATRIST

## 2024-05-30 PROCEDURE — 4010F ACE/ARB THERAPY RXD/TAKEN: CPT | Mod: CPTII,S$GLB,, | Performed by: PODIATRIST

## 2024-05-30 PROCEDURE — 99999 PR PBB SHADOW E&M-EST. PATIENT-LVL III: CPT | Mod: PBBFAC,,, | Performed by: PODIATRIST

## 2024-05-30 PROCEDURE — 1126F AMNT PAIN NOTED NONE PRSNT: CPT | Mod: CPTII,S$GLB,, | Performed by: PODIATRIST

## 2024-05-30 PROCEDURE — 3008F BODY MASS INDEX DOCD: CPT | Mod: CPTII,S$GLB,, | Performed by: PODIATRIST

## 2024-05-30 NOTE — TELEPHONE ENCOUNTER
----- Message from Mercedes Lim sent at 3/29/2022 11:56 AM CDT -----  Contact: KRISTINE PEÑALOZA [[id number]]  Name of Who is Calling:KRISTINE PEÑALOZA [[id number]]          What is the request in detail:Patient calling in regards to  breast prosthesis and bra prescription  that her and physician spoke about at last appointment. Please advise.          Can the clinic reply by [facility]:no          [facility][phone]        Consent: The procedure and risks were reviewed with the patient including but not limited to: burning, pigmentary changes, pain, blistering, scabbing, redness, and the possibility of needing numerous treatments. Strict photoprotection after the procedure was also discussed. Hands Incubation Time: 2 Hours Neck Incubation Time: 1 Hour Location: Face Chest Incubation Time: 1 Hours Occlusion: Yes Pdt Type: COURTNEY-U Detail Level: Simple Frequency Of Pdt: Three treatments 4 weeks apart Photosensitizer: Levulan

## 2024-06-03 ENCOUNTER — PATIENT MESSAGE (OUTPATIENT)
Dept: PRIMARY CARE CLINIC | Facility: CLINIC | Age: 69
End: 2024-06-03
Payer: MEDICARE

## 2024-06-03 ENCOUNTER — PATIENT MESSAGE (OUTPATIENT)
Dept: HEPATOLOGY | Facility: CLINIC | Age: 69
End: 2024-06-03
Payer: MEDICARE

## 2024-06-03 DIAGNOSIS — K76.0 FATTY LIVER: Primary | ICD-10-CM

## 2024-06-03 DIAGNOSIS — E66.9 OBESITY (BMI 30-39.9): ICD-10-CM

## 2024-06-03 DIAGNOSIS — E11.59 HYPERTENSION ASSOCIATED WITH TYPE 2 DIABETES MELLITUS: ICD-10-CM

## 2024-06-03 DIAGNOSIS — I15.2 HYPERTENSION ASSOCIATED WITH TYPE 2 DIABETES MELLITUS: ICD-10-CM

## 2024-06-03 DIAGNOSIS — E11.9 TYPE 2 DIABETES MELLITUS WITHOUT COMPLICATION, WITHOUT LONG-TERM CURRENT USE OF INSULIN: ICD-10-CM

## 2024-06-03 NOTE — TELEPHONE ENCOUNTER
Spoke with patient. Scheduled follow up appointment on 10/23 along with the fibroscan on the same day. Mailed appointment reminder to patient.

## 2024-06-05 DIAGNOSIS — K21.9 LARYNGOPHARYNGEAL REFLUX (LPR): ICD-10-CM

## 2024-06-05 RX ORDER — PANTOPRAZOLE SODIUM 40 MG/1
40 TABLET, DELAYED RELEASE ORAL DAILY
Qty: 90 TABLET | Refills: 3 | Status: SHIPPED | OUTPATIENT
Start: 2024-06-05

## 2024-06-05 NOTE — TELEPHONE ENCOUNTER
No care due was identified.  [facility] Embedded Care Due Messages. Reference number: [id number].   6/05/2024 12:00:18 PM CDT

## 2024-06-07 ENCOUNTER — TELEPHONE (OUTPATIENT)
Dept: HEPATOLOGY | Facility: CLINIC | Age: 69
End: 2024-06-07
Payer: MEDICARE

## 2024-06-07 NOTE — TELEPHONE ENCOUNTER
Returned call to the patient.  Rescheduled fibroscan to 10/3 same day with follow up.  Patient confirmed and agreed with the appt.  Reminder letter mailed.

## 2024-06-07 NOTE — TELEPHONE ENCOUNTER
----- Message from Wendy Acuña sent at 6/7/2024 12:39 PM CDT -----  Regarding: Reschedule Existing Appointment      Current appt date:   10/03 & 10/23    Type of appt :   Fibroscan & Ep Appt    Physician:     Soniya Trevino    Reason for rescheduling:   Pt will need to complete their Fibroscan before their f/u with Ms. Fernandes. She would like them on the same day if possible.    Caller:   Regine    Contact Preference:    [phone]

## 2024-06-09 NOTE — PROGRESS NOTES
Subjective:      Patient ID: Regine Cooper is a 69 y.o. female.    Chief Complaint: Diabetic Foot Exam (PCP-Gladis Melissa MD-5/7/2024)    Regine is a 69 y.o. female who presents to the clinic upon referral from Dr. Tracy aguilar. provider found  for evaluation and treatment of diabetic feet. Regine has a past medical history of Allergy, Breast cancer (2017), Cataract, Cervical spondylosis (07/29/2022), Diabetes mellitus, type 2, Eczema, Fibromyalgia, Glaucoma, Hypertension, Renal cyst, right (02/06/2020), and Steatosis of liver (02/06/2020).     Doing well, here for routine annual diabetic foot evaluation.      PCP: Gladis Melissa MD  /swelling.  Date Last Seen by PCP:   Chief Complaint   Patient presents with    Diabetic Foot Exam     PCP-Gladis Melissa MD-5/7/2024         Current shoe gear: Casual shoes    Hemoglobin A1C   Date Value Ref Range Status   05/06/2024 6.0 (H) 4.0 - 5.6 % Final     Comment:     ADA Screening Guidelines:  5.7-6.4%  Consistent with prediabetes  >or=6.5%  Consistent with diabetes    High levels of fetal hemoglobin interfere with the HbA1C  assay. Heterozygous hemoglobin variants (HbS, HgC, etc)do  not significantly interfere with this assay.   However, presence of multiple variants may affect accuracy.     01/04/2024 7.0 (H) 4.0 - 5.6 % Final     Comment:     ADA Screening Guidelines:  5.7-6.4%  Consistent with prediabetes  >or=6.5%  Consistent with diabetes    High levels of fetal hemoglobin interfere with the HbA1C  assay. Heterozygous hemoglobin variants (HbS, HgC, etc)do  not significantly interfere with this assay.   However, presence of multiple variants may affect accuracy.     10/20/2023 6.6 (H) 4.0 - 5.6 % Final     Comment:     ADA Screening Guidelines:  5.7-6.4%  Consistent with prediabetes  >or=6.5%  Consistent with diabetes    High levels of fetal hemoglobin interfere with the HbA1C  assay. Heterozygous hemoglobin variants (HbS, HgC, etc)do  not significantly interfere with  this assay.   However, presence of multiple variants may affect accuracy.             Review of Systems   Constitutional: Positive for night sweats. Negative for chills and fever.   HENT:  Negative for congestion and tinnitus.    Eyes:  Negative for double vision and visual disturbance.   Cardiovascular:  Negative for chest pain and claudication.   Respiratory:  Negative for hemoptysis and shortness of breath.    Endocrine: Negative for cold intolerance and heat intolerance.   Hematologic/Lymphatic: Negative for adenopathy and bleeding problem.   Skin:  Negative for color change and nail changes.   Musculoskeletal:  Positive for back pain and joint pain. Negative for myalgias and stiffness.   Gastrointestinal:  Negative for nausea and vomiting.   Genitourinary:  Negative for dysuria and hematuria.   Neurological:  Positive for paresthesias. Negative for numbness.   Psychiatric/Behavioral:  Negative for altered mental status and suicidal ideas.    Allergic/Immunologic: Negative for environmental allergies and persistent infections.           Objective:      Physical Exam  Vitals reviewed.   Constitutional:       Appearance: She is well-developed.   Cardiovascular:      Pulses:           Dorsalis pedis pulses are 2+ on the right side and 2+ on the left side.        Posterior tibial pulses are 2+ on the right side and 2+ on the left side.   Pulmonary:      Effort: Pulmonary effort is normal.   Musculoskeletal:         General: Normal range of motion.      Comments: Inspection and palpation of the muscles joints and bones of both lower extremities reveal that muscle strength for the anterior lateral and posterior muscle groups and intrinsic muscle groups of the foot are all 5 over 5 symmetrical.  Ankle subtalar midtarsal and digital joint range of motion are within normal limits, nonpainful, without crepitus or effusion.  Patient exhibits a normal angle and base of gait.  Palpation of the tendons reveal no defects.    Skin:     General: Skin is warm and dry.      Capillary Refill: Capillary refill takes 2 to 3 seconds.      Comments: Skin turgor is normal bilaterally.  Skin texture is well hydrated to both lower extremities.  No lesions or rashes or wounds appreciated bilaterally.  Proximal clearing noted to bilateral pedal nails.    Right hallux nail well healed.  No signs of infection.  No discomfort apparent.   Neurological:      Mental Status: She is alert and oriented to person, place, and time.      Comments: Sharp dull light touch vibratory proprioceptive sensation are intact bilaterally.  Deep tendon reflexes to patellar and Achilles tendon are symmetrical 2 over 4 bilaterally.  No ankle clonus or Babinski reflexes noted bilaterally.  Coordination is normal to both feet and lower extremities.               Assessment:       Encounter Diagnoses   Name Primary?    Pain of toe of right foot Yes    Type 2 diabetes mellitus with hyperglycemia, without long-term current use of insulin          Plan:       Regine was seen today for diabetic foot exam.    Diagnoses and all orders for this visit:    Pain of toe of right foot    Type 2 diabetes mellitus with hyperglycemia, without long-term current use of insulin      I counseled the patient on her conditions, their implications and medical management.    The nature of the condition, options for management, as well as potential risks and complications were discussed in detail with patient. Patient was amenable to my recommendations and left my office fully informed and will follow up as instructed or sooner if necessary.      Shoe inspection. Diabetic Foot Education. Patient reminded of the importance of good nutrition and blood sugar control to help prevent podiatric complications of diabetes. Patient instructed on proper foot hygeine. We discussed wearing proper shoe gear, daily foot inspections and Diabetic foot education in detail.    Return to clinic in 12 months or sooner  if problems arise

## 2024-06-17 ENCOUNTER — LAB VISIT (OUTPATIENT)
Dept: LAB | Facility: HOSPITAL | Age: 69
End: 2024-06-17
Attending: STUDENT IN AN ORGANIZED HEALTH CARE EDUCATION/TRAINING PROGRAM
Payer: MEDICARE

## 2024-06-17 DIAGNOSIS — R30.0 DYSURIA: Primary | ICD-10-CM

## 2024-06-17 DIAGNOSIS — R30.0 DYSURIA: ICD-10-CM

## 2024-06-17 LAB
BILIRUB UR QL STRIP: NEGATIVE
CLARITY UR REFRACT.AUTO: CLEAR
COLOR UR AUTO: YELLOW
GLUCOSE UR QL STRIP: NEGATIVE
HGB UR QL STRIP: NEGATIVE
KETONES UR QL STRIP: NEGATIVE
LEUKOCYTE ESTERASE UR QL STRIP: NEGATIVE
NITRITE UR QL STRIP: NEGATIVE
PH UR STRIP: 6 [PH] (ref 5–8)
PROT UR QL STRIP: NEGATIVE
SP GR UR STRIP: 1.01 (ref 1–1.03)
URN SPEC COLLECT METH UR: NORMAL

## 2024-06-17 PROCEDURE — 81003 URINALYSIS AUTO W/O SCOPE: CPT | Performed by: STUDENT IN AN ORGANIZED HEALTH CARE EDUCATION/TRAINING PROGRAM

## 2024-06-18 ENCOUNTER — PATIENT OUTREACH (OUTPATIENT)
Dept: ADMINISTRATIVE | Facility: HOSPITAL | Age: 69
End: 2024-06-18
Payer: MEDICARE

## 2024-06-18 DIAGNOSIS — E11.9 TYPE 2 DIABETES MELLITUS WITHOUT COMPLICATION, WITHOUT LONG-TERM CURRENT USE OF INSULIN: Primary | ICD-10-CM

## 2024-06-18 NOTE — PROGRESS NOTES
Health Maintenance Due   Topic Date Due    Eye Exam  01/06/2024    COVID-19 Vaccine (7 - 2023-24 season) 01/30/2024    Diabetes Urine Screening  06/27/2024    Lab linked, eye exam 07/02/2024.

## 2024-06-20 ENCOUNTER — PATIENT MESSAGE (OUTPATIENT)
Dept: PRIMARY CARE CLINIC | Facility: CLINIC | Age: 69
End: 2024-06-20
Payer: MEDICARE

## 2024-06-24 DIAGNOSIS — N39.0 RECURRENT UTI: Primary | ICD-10-CM

## 2024-06-25 ENCOUNTER — LAB VISIT (OUTPATIENT)
Dept: LAB | Facility: HOSPITAL | Age: 69
End: 2024-06-25
Attending: INTERNAL MEDICINE
Payer: MEDICARE

## 2024-06-25 ENCOUNTER — OFFICE VISIT (OUTPATIENT)
Dept: HEMATOLOGY/ONCOLOGY | Facility: CLINIC | Age: 69
End: 2024-06-25
Payer: MEDICARE

## 2024-06-25 VITALS
SYSTOLIC BLOOD PRESSURE: 145 MMHG | HEART RATE: 79 BPM | HEIGHT: 65 IN | TEMPERATURE: 98 F | WEIGHT: 214.75 LBS | DIASTOLIC BLOOD PRESSURE: 65 MMHG | BODY MASS INDEX: 35.78 KG/M2 | OXYGEN SATURATION: 100 %

## 2024-06-25 DIAGNOSIS — K76.0 FATTY LIVER: ICD-10-CM

## 2024-06-25 DIAGNOSIS — D50.0 IRON DEFICIENCY ANEMIA DUE TO CHRONIC BLOOD LOSS: ICD-10-CM

## 2024-06-25 DIAGNOSIS — I15.2 HYPERTENSION ASSOCIATED WITH TYPE 2 DIABETES MELLITUS: ICD-10-CM

## 2024-06-25 DIAGNOSIS — Z85.3 HISTORY OF BREAST CANCER IN FEMALE: ICD-10-CM

## 2024-06-25 DIAGNOSIS — E11.9 TYPE 2 DIABETES MELLITUS WITHOUT COMPLICATION, WITHOUT LONG-TERM CURRENT USE OF INSULIN: ICD-10-CM

## 2024-06-25 DIAGNOSIS — E11.59 HYPERTENSION ASSOCIATED WITH TYPE 2 DIABETES MELLITUS: ICD-10-CM

## 2024-06-25 DIAGNOSIS — E66.9 OBESITY (BMI 30-39.9): ICD-10-CM

## 2024-06-25 DIAGNOSIS — D50.1 IRON DEFICIENCY ANEMIA DUE TO SIDEROPENIC DYSPHAGIA: Primary | ICD-10-CM

## 2024-06-25 LAB
ALBUMIN SERPL BCP-MCNC: 3.6 G/DL (ref 3.5–5.2)
ALBUMIN/CREAT UR: NORMAL UG/MG (ref 0–30)
ALP SERPL-CCNC: 66 U/L (ref 55–135)
ALT SERPL W/O P-5'-P-CCNC: 9 U/L (ref 10–44)
AST SERPL-CCNC: 12 U/L (ref 10–40)
BASOPHILS # BLD AUTO: 0.04 K/UL (ref 0–0.2)
BASOPHILS NFR BLD: 0.6 % (ref 0–1.9)
BILIRUB DIRECT SERPL-MCNC: 0.1 MG/DL (ref 0.1–0.3)
BILIRUB SERPL-MCNC: 0.4 MG/DL (ref 0.1–1)
CREAT UR-MCNC: 55 MG/DL (ref 15–325)
DIFFERENTIAL METHOD BLD: ABNORMAL
EOSINOPHIL # BLD AUTO: 0.2 K/UL (ref 0–0.5)
EOSINOPHIL NFR BLD: 2.4 % (ref 0–8)
ERYTHROCYTE [DISTWIDTH] IN BLOOD BY AUTOMATED COUNT: 14.5 % (ref 11.5–14.5)
FERRITIN SERPL-MCNC: 25 NG/ML (ref 20–300)
HCT VFR BLD AUTO: 38.5 % (ref 37–48.5)
HGB BLD-MCNC: 12 G/DL (ref 12–16)
IMM GRANULOCYTES # BLD AUTO: 0.03 K/UL (ref 0–0.04)
IMM GRANULOCYTES NFR BLD AUTO: 0.4 % (ref 0–0.5)
LYMPHOCYTES # BLD AUTO: 1.9 K/UL (ref 1–4.8)
LYMPHOCYTES NFR BLD: 28.6 % (ref 18–48)
MCH RBC QN AUTO: 26.4 PG (ref 27–31)
MCHC RBC AUTO-ENTMCNC: 31.2 G/DL (ref 32–36)
MCV RBC AUTO: 85 FL (ref 82–98)
MICROALBUMIN UR DL<=1MG/L-MCNC: <5 UG/ML
MONOCYTES # BLD AUTO: 0.5 K/UL (ref 0.3–1)
MONOCYTES NFR BLD: 7.6 % (ref 4–15)
NEUTROPHILS # BLD AUTO: 4.1 K/UL (ref 1.8–7.7)
NEUTROPHILS NFR BLD: 60.4 % (ref 38–73)
NRBC BLD-RTO: 0 /100 WBC
PLATELET # BLD AUTO: 308 K/UL (ref 150–450)
PMV BLD AUTO: 9.5 FL (ref 9.2–12.9)
PROT SERPL-MCNC: 6.9 G/DL (ref 6–8.4)
RBC # BLD AUTO: 4.55 M/UL (ref 4–5.4)
WBC # BLD AUTO: 6.74 K/UL (ref 3.9–12.7)

## 2024-06-25 PROCEDURE — 36415 COLL VENOUS BLD VENIPUNCTURE: CPT | Performed by: INTERNAL MEDICINE

## 2024-06-25 PROCEDURE — 1126F AMNT PAIN NOTED NONE PRSNT: CPT | Mod: CPTII,S$GLB,, | Performed by: INTERNAL MEDICINE

## 2024-06-25 PROCEDURE — 82043 UR ALBUMIN QUANTITATIVE: CPT | Performed by: STUDENT IN AN ORGANIZED HEALTH CARE EDUCATION/TRAINING PROGRAM

## 2024-06-25 PROCEDURE — 3078F DIAST BP <80 MM HG: CPT | Mod: CPTII,S$GLB,, | Performed by: INTERNAL MEDICINE

## 2024-06-25 PROCEDURE — 1160F RVW MEDS BY RX/DR IN RCRD: CPT | Mod: CPTII,S$GLB,, | Performed by: INTERNAL MEDICINE

## 2024-06-25 PROCEDURE — 1101F PT FALLS ASSESS-DOCD LE1/YR: CPT | Mod: CPTII,S$GLB,, | Performed by: INTERNAL MEDICINE

## 2024-06-25 PROCEDURE — 99999 PR PBB SHADOW E&M-EST. PATIENT-LVL III: CPT | Mod: PBBFAC,,, | Performed by: INTERNAL MEDICINE

## 2024-06-25 PROCEDURE — 3044F HG A1C LEVEL LT 7.0%: CPT | Mod: CPTII,S$GLB,, | Performed by: INTERNAL MEDICINE

## 2024-06-25 PROCEDURE — 82728 ASSAY OF FERRITIN: CPT | Performed by: INTERNAL MEDICINE

## 2024-06-25 PROCEDURE — 82570 ASSAY OF URINE CREATININE: CPT | Performed by: STUDENT IN AN ORGANIZED HEALTH CARE EDUCATION/TRAINING PROGRAM

## 2024-06-25 PROCEDURE — 99214 OFFICE O/P EST MOD 30 MIN: CPT | Mod: S$GLB,,, | Performed by: INTERNAL MEDICINE

## 2024-06-25 PROCEDURE — 3008F BODY MASS INDEX DOCD: CPT | Mod: CPTII,S$GLB,, | Performed by: INTERNAL MEDICINE

## 2024-06-25 PROCEDURE — 4010F ACE/ARB THERAPY RXD/TAKEN: CPT | Mod: CPTII,S$GLB,, | Performed by: INTERNAL MEDICINE

## 2024-06-25 PROCEDURE — 3288F FALL RISK ASSESSMENT DOCD: CPT | Mod: CPTII,S$GLB,, | Performed by: INTERNAL MEDICINE

## 2024-06-25 PROCEDURE — 1159F MED LIST DOCD IN RCRD: CPT | Mod: CPTII,S$GLB,, | Performed by: INTERNAL MEDICINE

## 2024-06-25 PROCEDURE — 80076 HEPATIC FUNCTION PANEL: CPT | Performed by: NURSE PRACTITIONER

## 2024-06-25 PROCEDURE — 3077F SYST BP >= 140 MM HG: CPT | Mod: CPTII,S$GLB,, | Performed by: INTERNAL MEDICINE

## 2024-06-25 PROCEDURE — 85025 COMPLETE CBC W/AUTO DIFF WBC: CPT | Performed by: INTERNAL MEDICINE

## 2024-06-25 NOTE — PROGRESS NOTES
Subjective:       Patient ID: Regine Cooper is a 69 y.o. female.    Chief Complaint: No chief complaint on file.      HPI      Mrs. Cooper returns today for follow up.  I had last seen her on April 23, 2024.  In late January she underwent a video capsule endoscopy which was unremarkable.  She is still on ferrous sulfate 324 mg tablets and she takes 2 a day.    CBC today shows a white count of 6,700 per cubic mm, hemoglobin 12.0 grams/deciliter, hematocrit 38.5%, MCV 85, and platelets 308K.  Her ferritin is 25 ng/ml.    On August 18, 2023 she underwent an EGD and colonoscopy.  She had diverticulosis and a 1 cm hiatal hernia.  Biopsies were negative for celiac disease.      Briefly, she is a 69-year-old female who has a history of a stage I triple negative breast cancer that was diagnosed in late 2017.  She underwent a left modified radical mastectomy in [facility] on 11/01/2017 and had a stage IA carcinoma measuring 1.2 cm in greatest diameter.  Resection margins were clear, while four sentinel lymph nodes were negative.  Postoperatively, she received four cycles of docetaxel and cyclophosphamide.  The first cycle was administered on 11/30/2017 and she completed her treatment by 02/01/2018.  She has been followed expectantly and has been MERRY since then.  We assumed her care when she relocated to [place] 3 years ago.    Her mammogram last April was read as BIRADS I, and a one year follow up was recommended.      Two stool samples that she had submitted in August were negative for occult blood.      A urinalysis in April 2024 had shown no hematuria.    ROS: Overall she feels OK, and her ECOG PS is 1.  She denies any anxiety, easy bruising, fevers, chills, night  sweats, weight loss, nausea, vomiting, diarrhea, constipation, diplopia, blurred vision, headache, chest pain, palpitations, shortness of breath, breast pain, lower abdominal pain, extremity pain, or difficulty ambulating.  The remainder of the  ten-point ROS, including general, skin, lymph, H/N, cardiorespiratory, GI, , Neuro, Endocrine, and psychiatric is negative.     Objective:      Physical Exam      She is alert, oriented to time, place, person, pleasant, well      nourished, in no acute physical distress.                                    VITAL SIGNS:  Reviewed                                      HEENT:  Normal.  There are no nasal, oral, lip, gingival, auricular, lid,    or conjunctival lesions.  Mucosae are moist and pink, and there is no        thrush.  Pupils are equal, reactive to light and accommodation.              Extraocular muscle movements are intact.  Dentition is good.  There is no frontal or maxillary tenderness.                                     NECK:  Supple without JVD, adenopathy, but she does have thyromegaly.                       LUNGS:  Clear to auscultation without wheezing, rales, or rhonchi.           CARDIOVASCULAR:  Reveals an S1, S2, no murmurs, no rubs, no gallops.         ABDOMEN:  Soft, nontender, without organomegaly.  Bowel sounds are    present.                                                                     EXTREMITIES:  No cyanosis, clubbing, or edema.                               BREASTS: Deferred.    LYMPHATIC:  There is no cervical, axillary, or supraclavicular adenopathy.   SKIN:  Warm and moist, without petechiae, rashes, induration, or ecchymoses.           NEUROLOGIC:  DTRs are 0-1+ bilaterally, symmetrical, motor function is 5/5,  and cranial nerves are  within normal limits.    Assessment:       1. History of breast cancer in female, [id number], status post mastectomy and 4 cycles of docetaxel cyclophosphamide, clinically MERRY, doing well more than 5 years after completion       2.     Low ferritin with borderline microcytic anemia  3.     Diverticulosis  Plan:         In regards to her breast cancer, Mrs. Alfred remains in clinical remission.  I explained to her that her chance of a cure at this  point is in the range of 99%.  In regards to her low ferritin and borderline anemia, I recommended that she remain on iron supplements for another 12 weeks.  I would like to see her ferritin above 30 ng/mL before she discontinues the oral supplementation    She will return to see me in early September with repeat CBC and a repeat ferritin.  She will also submit 3 more stool samples in September  Her multiple questions were answered to her satisfaction.  I spent approximately 30 minutes reviewing the available records, evaluating the patient, counseling, and coordinating her care.            Route Chart for Scheduling    Med Onc Chart Routing      Follow up with physician . Mid September with labs one day prior   Follow up with [facility]    Infusion scheduling note    Injection scheduling note    Labs    Imaging    Pharmacy appointment    Other referrals

## 2024-06-28 ENCOUNTER — PATIENT MESSAGE (OUTPATIENT)
Dept: PODIATRY | Facility: CLINIC | Age: 69
End: 2024-06-28
Payer: MEDICARE

## 2024-06-29 NOTE — PROGRESS NOTES
HPI     Glaucoma            Comments: HVF and OCT review today and pt states she has ocular migraine   this morning           Comments    DLS: 3/25/24    1. Glaucoma Suspect   2. Drusen OU   3. NS OU   4. Hx Breast CA    MEDS:  Latanoprost QHS OU  Refresh AT's PRN OU          Last edited by Ivette Garrison MA on 7/2/2024  8:52 AM.            Assessment /Plan     For exam results, see Encounter Report.    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   First photos   2018   Treatment / Drops started   latanoprost - started 1/6/2023 for IOP 28/22           Family history    + mother / grtand father / uncle / cousins         Glaucoma meds    latanoprost ou - 1/6/2023         H/O adverse rxn to glaucoma drops    none        LASERS    none        GLAUCOMA SURGERIES    none        OTHER EYE SURGERIES    none        CDR    0.8/0.7        Tbase    18-28  od // 16-22 os        Tmax    28/22           Ttarget    ? About 20              HVF    5 test 2019 to  2024 - full in past - now w/ early SNS   od // full os        Gonio    +3-4 ou         CCT    588/585        OCT    5  test 2019 to 2024 - RNFL -  dec mike JOLLEY (mild prog) od // nl  os        HRT    1 test 2020 to 2020 -  MR -  nl od // nl os /// CDR 0.63 od // 0.51 os        Disc photos    2018, 2022      - Ttoday  20/19  (good resp to latanoprost down from 28/22 )  - Test done today     IOP / HVF / DFE / OCT     2. Dominate familial  drusen   Mostly outside the arcades ou    See photos - 2018    Pt is taking ARED's     3. NS   Mild -monitor     4. flaoter (one floater - string like)    Warned of signs of PVD and RD    Pt to call if marked increase in floaters or flashes or curtain defect    5. H/O migraines - since chilhood   No visula aura - but the migraines settle in/around eyes    6. Breast CA     7. Social stress (11/27/2023)    Her only son committed suicide 6/2023 (he was in [facility])   Ex  // just got to burry him recently  ( had to wait on death certificate) - 11/2023 - he had 3 children ages 17-23      PLAN   IOP 20/19  (7/2/2024) on latanoprost   Off gtts - 18-28 od and 18-22 os /  / + Fm Hx - mother and an uncle and possibly a grand parent   HVF inow with early SNS / SAD od // still full os   OCT - bord / dec RNFL in one sector od - stable and nl os      cont  latatanoprost ou q hs     F/U 4 months with IOP check //  gonio   ? Consider slt (angle open)  as 2nd step if needed vs more drops (? Rocklatan)

## 2024-07-02 ENCOUNTER — OFFICE VISIT (OUTPATIENT)
Dept: OPHTHALMOLOGY | Facility: CLINIC | Age: 69
End: 2024-07-02
Payer: MEDICARE

## 2024-07-02 ENCOUNTER — CLINICAL SUPPORT (OUTPATIENT)
Dept: OPHTHALMOLOGY | Facility: CLINIC | Age: 69
End: 2024-07-02
Payer: MEDICARE

## 2024-07-02 DIAGNOSIS — H35.362 FAMILIAL DRUSEN OF MACULA OF BOTH EYES: ICD-10-CM

## 2024-07-02 DIAGNOSIS — H40.1111 PRIMARY OPEN-ANGLE GLAUCOMA, RIGHT EYE, MILD STAGE: Primary | ICD-10-CM

## 2024-07-02 DIAGNOSIS — H52.13 MYOPIA OF BOTH EYES WITH ASTIGMATISM AND PRESBYOPIA: ICD-10-CM

## 2024-07-02 DIAGNOSIS — H40.022 OPEN ANGLE WITH BORDERLINE FINDINGS AND HIGH GLAUCOMA RISK IN LEFT EYE: ICD-10-CM

## 2024-07-02 DIAGNOSIS — H52.4 MYOPIA OF BOTH EYES WITH ASTIGMATISM AND PRESBYOPIA: ICD-10-CM

## 2024-07-02 DIAGNOSIS — H52.203 MYOPIA OF BOTH EYES WITH ASTIGMATISM AND PRESBYOPIA: ICD-10-CM

## 2024-07-02 DIAGNOSIS — H35.361 FAMILIAL DRUSEN OF MACULA OF BOTH EYES: ICD-10-CM

## 2024-07-02 DIAGNOSIS — H25.13 NUCLEAR SCLEROTIC CATARACT OF BOTH EYES: ICD-10-CM

## 2024-07-02 PROCEDURE — 1126F AMNT PAIN NOTED NONE PRSNT: CPT | Mod: CPTII,S$GLB,, | Performed by: OPHTHALMOLOGY

## 2024-07-02 PROCEDURE — 92083 EXTENDED VISUAL FIELD XM: CPT | Mod: S$GLB,,, | Performed by: OPHTHALMOLOGY

## 2024-07-02 PROCEDURE — 3044F HG A1C LEVEL LT 7.0%: CPT | Mod: CPTII,S$GLB,, | Performed by: OPHTHALMOLOGY

## 2024-07-02 PROCEDURE — 1160F RVW MEDS BY RX/DR IN RCRD: CPT | Mod: CPTII,S$GLB,, | Performed by: OPHTHALMOLOGY

## 2024-07-02 PROCEDURE — 99999 PR PBB SHADOW E&M-EST. PATIENT-LVL II: CPT | Mod: PBBFAC,,, | Performed by: OPHTHALMOLOGY

## 2024-07-02 PROCEDURE — 99214 OFFICE O/P EST MOD 30 MIN: CPT | Mod: S$GLB,,, | Performed by: OPHTHALMOLOGY

## 2024-07-02 PROCEDURE — 3066F NEPHROPATHY DOC TX: CPT | Mod: CPTII,S$GLB,, | Performed by: OPHTHALMOLOGY

## 2024-07-02 PROCEDURE — 4010F ACE/ARB THERAPY RXD/TAKEN: CPT | Mod: CPTII,S$GLB,, | Performed by: OPHTHALMOLOGY

## 2024-07-02 PROCEDURE — 92133 CPTRZD OPH DX IMG PST SGM ON: CPT | Mod: S$GLB,,, | Performed by: OPHTHALMOLOGY

## 2024-07-02 PROCEDURE — 1159F MED LIST DOCD IN RCRD: CPT | Mod: CPTII,S$GLB,, | Performed by: OPHTHALMOLOGY

## 2024-07-02 PROCEDURE — 3061F NEG MICROALBUMINURIA REV: CPT | Mod: CPTII,S$GLB,, | Performed by: OPHTHALMOLOGY

## 2024-07-02 RX ORDER — CLOBETASOL PROPIONATE 0.05 G/100ML
SHAMPOO TOPICAL DAILY
COMMUNITY
Start: 2024-06-18

## 2024-07-02 NOTE — PROGRESS NOTES
VISUAL FIELD TEST 24-2 SS-OU-DONE/AB  OU-REL-FIX-COOP-GOOD/AB    PT HAS NO KNOWN ALLERGIES TO LATEX OR ADHESIVES./AB    MRX: OD -4.25 +0.75 X 114            OS -3.00 +0.75 X 90

## 2024-07-05 ENCOUNTER — TELEPHONE (OUTPATIENT)
Dept: PAIN MEDICINE | Facility: CLINIC | Age: 69
End: 2024-07-05
Payer: MEDICARE

## 2024-07-05 ENCOUNTER — PATIENT MESSAGE (OUTPATIENT)
Dept: PAIN MEDICINE | Facility: CLINIC | Age: 69
End: 2024-07-05
Payer: MEDICARE

## 2024-07-05 NOTE — TELEPHONE ENCOUNTER
Please offer her 10am or 11am on 7/12 if she would like to come in earlier. It will need an override. Thank you!

## 2024-07-09 ENCOUNTER — PATIENT MESSAGE (OUTPATIENT)
Dept: ADMINISTRATIVE | Facility: HOSPITAL | Age: 69
End: 2024-07-09
Payer: MEDICARE

## 2024-07-11 NOTE — Clinical Note
Assessment:     1. Primary osteoarthritis of right shoulder    2. Right rotator cuff tendinitis    3. Neck pain      Plan:     Problem List Items Addressed This Visit          Musculoskeletal and Integument    Primary osteoarthritis of right shoulder - Primary    Relevant Orders    XR shoulder 2+ vw right    Ambulatory Referral to Physical Therapy    Large joint arthrocentesis: R subacromial bursa    Right rotator cuff tendinitis    Relevant Orders    Ambulatory Referral to Physical Therapy    Large joint arthrocentesis: R subacromial bursa     Other Visit Diagnoses       Neck pain        Relevant Orders    XR spine cervical complete 4 or 5 vw non injury         Findings are consistent with right rotator cuff tendinitis, osteoarthritis of right shoulder, and chronic long bicep tendon rupture.  I reviewed patient's right shoulder x-ray with him.  I discussed possible cause of his shoulder pain and prognosis.  I discussed with patient conservative treatment options.  Conservative treatment option discussed with things such as physical therapy, steroid injection, OTC medication, topical gels.  Right shoulder subacromial steroid injection was offered and administered at today's visit.  Patient tolerated this well with good pain relief.  Postinjection instructions were provided.  Physical therapy referral was given patient to work on range of motion and strengthening of the right shoulder.  I recommended patient avoid lifting heavy weights and if he lifts objects he should lift close to his body.  Patient may continue OTC medication, topical gels, ice as needed for pain control.  Patient will follow-up in 2-3 months for repeat evaluation. All patient's questions were answered to his satisfaction.  This note is created using dictation transcription.  It may contain typographical errors, grammatical errors, improperly dictated words, background noise and other errors.    Subjective:     Patient ID: Wayne Pompei is a 66  Bernie added on celiac sprue markers so sorry about that y.o. male.  Chief Complaint:  Patient is a 66-year-old male here for initial evaluation of right shoulder pain. Patient states his pain has been going on for 1 month.  Patient states 1 month ago he was trying to open up an air rifle and felt a sharp pain within his right shoulder.  Patient states since then it has been hard for him to do activities such as putting on a seatbelt, putting on a pant belt, putting pants on, and reaching across his body.  Patient states he is able to reach over his head but slowly.  Patient has not had any recent physical therapy, injections, surgeries done to his bilateral shoulders.    Allergy:  Allergies   Allergen Reactions    Infliximab Anaphylaxis     Medications:  all current active meds have been reviewed  Past Medical History:  Past Medical History:   Diagnosis Date    Arthritis     CPAP (continuous positive airway pressure) dependence     Diabetes mellitus (HCC)     Gout     Hyperlipidemia     Hypertension     Peripheral neuropathy     PONV (postoperative nausea and vomiting)     Primary osteoarthritis of right knee 6/18/2019    Rheumatoid arthritis (HCC)     Seizures (HCC)     Sleep apnea      Past Surgical History:  Past Surgical History:   Procedure Laterality Date    COLONOSCOPY      HAND SURGERY Left 02/2019    KNEE ARTHROSCOPY Right     x 2    AZ ARTHRP KNE CONDYLE&PLATU MEDIAL&LAT COMPARTMENTS Right 5/16/2022    Procedure: ARTHROPLASTY KNEE TOTAL;  Surgeon: Ciera Gonzalez MD;  Location:  MAIN OR;  Service: Orthopedics    AZ ARTHRS KNE SURG W/MENISCECTOMY MED/LAT W/SHVG Right 8/9/2019    Procedure: ARTHROSCOPY KNEE, RIGHT PARTIAL MEDIAL MENISCECTOMY,RESECTION ANTEROMEDIAL PLICA;  Surgeon: Ciera Gonzalez MD;  Location:  MAIN OR;  Service: Orthopedics     Family History:  Family History   Problem Relation Age of Onset    Diabetes Father     Heart disease Father     Hypertension Father      Social History:  Social History     Substance and Sexual Activity   Alcohol Use Yes  "   Alcohol/week: 1.0 standard drink of alcohol    Types: 1 Standard drinks or equivalent per week     Social History     Substance and Sexual Activity   Drug Use Never     Social History     Tobacco Use   Smoking Status Former    Current packs/day: 0.00    Average packs/day: 2.0 packs/day for 25.0 years (50.0 ttl pk-yrs)    Types: Cigarettes    Start date:     Quit date:     Years since quittin.5   Smokeless Tobacco Never     Review of Systems   Constitutional:  Positive for activity change. Negative for chills, fever and unexpected weight change.   HENT:  Negative for hearing loss, nosebleeds and sore throat.    Eyes:  Negative for pain, redness and visual disturbance.   Respiratory:  Negative for cough, shortness of breath and wheezing.    Cardiovascular:  Negative for chest pain, palpitations and leg swelling.   Gastrointestinal:  Negative for abdominal pain, nausea and vomiting.   Endocrine: Negative for polyphagia and polyuria.   Genitourinary:  Negative for dysuria and hematuria.   Musculoskeletal:  Positive for arthralgias (right shoulder).        See HPI   Skin:  Negative for rash and wound.   Neurological:  Negative for dizziness, numbness and headaches.   Psychiatric/Behavioral:  Negative for decreased concentration and suicidal ideas. The patient is not nervous/anxious.          Objective:  BP Readings from Last 1 Encounters:   24 128/82      Wt Readings from Last 1 Encounters:   24 117 kg (257 lb)      BMI:   Estimated body mass index is 35.84 kg/m² as calculated from the following:    Height as of this encounter: 5' 11\" (1.803 m).    Weight as of this encounter: 117 kg (257 lb).  BSA:   Estimated body surface area is 2.35 meters squared as calculated from the following:    Height as of this encounter: 5' 11\" (1.803 m).    Weight as of this encounter: 117 kg (257 lb).   Physical Exam  Vitals and nursing note reviewed.   Constitutional:       Appearance: Normal appearance. He is " well-developed.   HENT:      Head: Normocephalic and atraumatic.      Right Ear: External ear normal.      Left Ear: External ear normal.      Nose: Nose normal.   Eyes:      General: No scleral icterus.     Extraocular Movements: Extraocular movements intact.      Conjunctiva/sclera: Conjunctivae normal.   Cardiovascular:      Rate and Rhythm: Normal rate.   Pulmonary:      Effort: Pulmonary effort is normal. No respiratory distress.   Musculoskeletal:      Cervical back: Normal range of motion and neck supple.      Comments: See Ortho exam   Skin:     General: Skin is warm and dry.   Neurological:      General: No focal deficit present.      Mental Status: He is alert and oriented to person, place, and time.   Psychiatric:         Behavior: Behavior normal.       Right Shoulder Exam     Tenderness   The patient is experiencing no tenderness.    Range of Motion   Active abduction:  160   Passive abduction:  170   External rotation:  40 (with pain)   Forward flexion:  160   Internal rotation 0 degrees:  Sacrum     Muscle Strength   Abduction: 4/5   Internal rotation: 5/5   External rotation: 4/5   Biceps: 5/5     Tests   Cross arm: negative  Impingement: positive  Drop arm: negative    Other   Erythema: absent  Scars: absent  Sensation: normal  Pulse: present    Comments:  + empty can  Negative Speed test  Pain with resisted abduction  Peter deformity (chronic)      Left Shoulder Exam     Tenderness   The patient is experiencing no tenderness.     Range of Motion   Active abduction:  160   Passive abduction:  170   External rotation:  40   Forward flexion:  160   Internal rotation 0 degrees:  normal     Muscle Strength   Abduction: 5/5   Internal rotation: 5/5   External rotation: 5/5   Biceps: 5/5     Tests   Cross arm: negative  Impingement: negative  Drop arm: negative    Other   Erythema: absent  Scars: absent  Sensation: normal  Pulse: present             I have personally reviewed pertinent films in PACS and  my interpretation is right shoulder showed mild to moderate osteoarthritis of glenohumeral joint.  Soft tissue calcification over the superior aspect of the greater tuberosity.  Type II acromion process.    Large joint arthrocentesis: R subacromial bursa  Universal Protocol:  Consent: Verbal consent obtained.  Risks and benefits: risks, benefits and alternatives were discussed  Consent given by: patient  Patient understanding: patient states understanding of the procedure being performed  Site marked: the operative site was marked  Patient identity confirmed: verbally with patient  Supporting Documentation  Indications: pain   Procedure Details  Location: shoulder - R subacromial bursa  Needle size: 22 G  Ultrasound guidance: no  Approach: posterolateral  Medications administered: 5 mL lidocaine 1 %; 6 mg betamethasone acetate-betamethasone sodium phosphate 6 (3-3) mg/mL    Patient tolerance: patient tolerated the procedure well with no immediate complications  Dressing:  Sterile dressing applied        Scribe Attestation      I,:  Rickie Diallo am acting as a scribe while in the presence of the attending physician.:       I,:  Ciera Gonzalez MD personally performed the services described in this documentation    as scribed in my presence.:

## 2024-07-12 ENCOUNTER — OFFICE VISIT (OUTPATIENT)
Dept: PAIN MEDICINE | Facility: CLINIC | Age: 69
End: 2024-07-12
Payer: MEDICARE

## 2024-07-12 ENCOUNTER — TELEPHONE (OUTPATIENT)
Dept: PAIN MEDICINE | Facility: CLINIC | Age: 69
End: 2024-07-12

## 2024-07-12 ENCOUNTER — HOSPITAL ENCOUNTER (OUTPATIENT)
Dept: RADIOLOGY | Facility: HOSPITAL | Age: 69
Discharge: HOME | End: 2024-07-12
Attending: STUDENT IN AN ORGANIZED HEALTH CARE EDUCATION/TRAINING PROGRAM
Payer: MEDICARE

## 2024-07-12 VITALS
DIASTOLIC BLOOD PRESSURE: 73 MMHG | SYSTOLIC BLOOD PRESSURE: 110 MMHG | HEART RATE: 92 BPM | HEIGHT: 65 IN | WEIGHT: 214.75 LBS | BODY MASS INDEX: 35.78 KG/M2

## 2024-07-12 DIAGNOSIS — M54.6 ACUTE MIDLINE THORACIC BACK PAIN: ICD-10-CM

## 2024-07-12 DIAGNOSIS — B02.29 POST HERPETIC NEURALGIA: Primary | ICD-10-CM

## 2024-07-12 DIAGNOSIS — M54.6 ACUTE MIDLINE THORACIC BACK PAIN: Primary | ICD-10-CM

## 2024-07-12 DIAGNOSIS — B02.29 POST HERPETIC NEURALGIA: ICD-10-CM

## 2024-07-12 PROCEDURE — 72070 X-RAY EXAM THORAC SPINE 2VWS: CPT | Mod: TC

## 2024-07-12 PROCEDURE — 72070 X-RAY EXAM THORAC SPINE 2VWS: CPT | Mod: 26,,, | Performed by: RADIOLOGY

## 2024-07-12 PROCEDURE — 99999 PR PBB SHADOW E&M-EST. PATIENT-LVL III: CPT | Mod: PBBFAC,,, | Performed by: STUDENT IN AN ORGANIZED HEALTH CARE EDUCATION/TRAINING PROGRAM

## 2024-07-12 RX ORDER — TIZANIDINE 4 MG/1
4 TABLET ORAL 2 TIMES DAILY PRN
Qty: 60 TABLET | Refills: 3 | Status: SHIPPED | OUTPATIENT
Start: 2024-07-12 | End: 2025-07-07

## 2024-07-12 RX ORDER — MELOXICAM 15 MG/1
15 TABLET ORAL DAILY PRN
Qty: 30 TABLET | Refills: 2 | Status: SHIPPED | OUTPATIENT
Start: 2024-07-12 | End: 2025-07-12

## 2024-07-12 NOTE — PATIENT INSTRUCTIONS
[facility] Instructions:  -On the morning of your procedure take a marker and make an outline of the painful area.  -After the marker has dried get over-the-counter topical lidocaine (brand name is aspercreme, but generic is fine) and spread a large amount all over the area that you marked plus a couple of inches outside the border.)  -Expect to be here for about 1 hour  -If you have pain medication, you can take it when you arrive here (but most people do not require any additional pain medication).  -Ice will be available here if you need it.  -It can take 2 weeks to start noticing the full effect from the patch.  -It can be repeated in 3 months if needed.  -Tingling / redness / burning / warmth are all possible side effects from the patch but should resolve within a day or two. Patients can complain that it feels like they have a bad sunburn on their feet for a few days afterwards.  If this happens you can treat it similar to a sunburn. Try to stay off your feet as much as possible, use ice, avoid hot showers or bathes, use aloe vera and topical creams to help the pain.
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
## 2024-07-26 ENCOUNTER — TELEPHONE (OUTPATIENT)
Dept: PAIN MEDICINE | Facility: CLINIC | Age: 69
End: 2024-07-26
Payer: MEDICARE

## 2024-08-01 ENCOUNTER — PATIENT MESSAGE (OUTPATIENT)
Dept: OPHTHALMOLOGY | Facility: CLINIC | Age: 69
End: 2024-08-01
Payer: MEDICARE

## 2024-08-01 DIAGNOSIS — H40.1111 PRIMARY OPEN-ANGLE GLAUCOMA, RIGHT EYE, MILD STAGE: ICD-10-CM

## 2024-08-01 DIAGNOSIS — H40.022 OPEN ANGLE WITH BORDERLINE FINDINGS AND HIGH GLAUCOMA RISK IN LEFT EYE: ICD-10-CM

## 2024-08-01 RX ORDER — LATANOPROST 50 UG/ML
1 SOLUTION/ DROPS OPHTHALMIC NIGHTLY
Qty: 7.5 ML | Refills: 3 | Status: SHIPPED | OUTPATIENT
Start: 2024-08-01 | End: 2025-08-01

## 2024-08-16 ENCOUNTER — PATIENT MESSAGE (OUTPATIENT)
Dept: PAIN MEDICINE | Facility: CLINIC | Age: 69
End: 2024-08-16
Payer: MEDICARE

## 2024-08-16 ENCOUNTER — PATIENT MESSAGE (OUTPATIENT)
Dept: PODIATRY | Facility: CLINIC | Age: 69
End: 2024-08-16
Payer: MEDICARE

## 2024-08-20 ENCOUNTER — OFFICE VISIT (OUTPATIENT)
Dept: PODIATRY | Facility: CLINIC | Age: 69
End: 2024-08-20
Payer: MEDICARE

## 2024-08-20 VITALS
BODY MASS INDEX: 35.78 KG/M2 | HEIGHT: 65 IN | SYSTOLIC BLOOD PRESSURE: 146 MMHG | WEIGHT: 214.75 LBS | HEART RATE: 101 BPM | DIASTOLIC BLOOD PRESSURE: 75 MMHG

## 2024-08-20 DIAGNOSIS — E11.65 TYPE 2 DIABETES MELLITUS WITH HYPERGLYCEMIA, WITHOUT LONG-TERM CURRENT USE OF INSULIN: ICD-10-CM

## 2024-08-20 DIAGNOSIS — L60.0 INGROWN TOENAIL WITHOUT INFECTION: ICD-10-CM

## 2024-08-20 DIAGNOSIS — M79.674 PAIN OF TOE OF RIGHT FOOT: Primary | ICD-10-CM

## 2024-08-20 PROCEDURE — 3077F SYST BP >= 140 MM HG: CPT | Mod: CPTII,S$GLB,, | Performed by: PODIATRIST

## 2024-08-20 PROCEDURE — 1159F MED LIST DOCD IN RCRD: CPT | Mod: CPTII,S$GLB,, | Performed by: PODIATRIST

## 2024-08-20 PROCEDURE — 3061F NEG MICROALBUMINURIA REV: CPT | Mod: CPTII,S$GLB,, | Performed by: PODIATRIST

## 2024-08-20 PROCEDURE — 3008F BODY MASS INDEX DOCD: CPT | Mod: CPTII,S$GLB,, | Performed by: PODIATRIST

## 2024-08-20 PROCEDURE — 99213 OFFICE O/P EST LOW 20 MIN: CPT | Mod: 25,S$GLB,, | Performed by: PODIATRIST

## 2024-08-20 PROCEDURE — 3066F NEPHROPATHY DOC TX: CPT | Mod: CPTII,S$GLB,, | Performed by: PODIATRIST

## 2024-08-20 PROCEDURE — 3044F HG A1C LEVEL LT 7.0%: CPT | Mod: CPTII,S$GLB,, | Performed by: PODIATRIST

## 2024-08-20 PROCEDURE — 1126F AMNT PAIN NOTED NONE PRSNT: CPT | Mod: CPTII,S$GLB,, | Performed by: PODIATRIST

## 2024-08-20 PROCEDURE — 3078F DIAST BP <80 MM HG: CPT | Mod: CPTII,S$GLB,, | Performed by: PODIATRIST

## 2024-08-20 PROCEDURE — 4010F ACE/ARB THERAPY RXD/TAKEN: CPT | Mod: CPTII,S$GLB,, | Performed by: PODIATRIST

## 2024-08-20 PROCEDURE — 11750 EXCISION NAIL&NAIL MATRIX: CPT | Mod: T5,S$GLB,, | Performed by: PODIATRIST

## 2024-08-20 PROCEDURE — 99999 PR PBB SHADOW E&M-EST. PATIENT-LVL IV: CPT | Mod: PBBFAC,,, | Performed by: PODIATRIST

## 2024-08-20 RX ORDER — METFORMIN HYDROCHLORIDE 500 MG/1
1 TABLET ORAL 2 TIMES DAILY WITH MEALS
COMMUNITY
Start: 2024-05-07

## 2024-08-20 RX ORDER — TRAMADOL HYDROCHLORIDE 50 MG/1
50 TABLET ORAL EVERY 8 HOURS PRN
Qty: 30 TABLET | Refills: 0 | Status: SHIPPED | OUTPATIENT
Start: 2024-08-20

## 2024-08-20 RX ORDER — LOSARTAN POTASSIUM 50 MG/1
1 TABLET ORAL DAILY
COMMUNITY
Start: 2024-04-29

## 2024-08-20 RX ORDER — AMOXICILLIN 500 MG/1
500 TABLET, FILM COATED ORAL EVERY 12 HOURS
Qty: 14 TABLET | Refills: 0 | Status: SHIPPED | OUTPATIENT
Start: 2024-08-20 | End: 2024-08-27

## 2024-08-23 ENCOUNTER — OFFICE VISIT (OUTPATIENT)
Dept: PAIN MEDICINE | Facility: CLINIC | Age: 69
End: 2024-08-23
Payer: MEDICARE

## 2024-08-23 VITALS
WEIGHT: 214.75 LBS | BODY MASS INDEX: 35.78 KG/M2 | HEIGHT: 65 IN | DIASTOLIC BLOOD PRESSURE: 72 MMHG | HEART RATE: 86 BPM | SYSTOLIC BLOOD PRESSURE: 113 MMHG

## 2024-08-23 DIAGNOSIS — M54.6 PAIN IN THORACIC SPINE: ICD-10-CM

## 2024-08-23 DIAGNOSIS — B02.29 POST HERPETIC NEURALGIA: Primary | ICD-10-CM

## 2024-08-23 PROCEDURE — 99999 PR PBB SHADOW E&M-EST. PATIENT-LVL II: CPT | Mod: PBBFAC,,, | Performed by: STUDENT IN AN ORGANIZED HEALTH CARE EDUCATION/TRAINING PROGRAM

## 2024-08-23 NOTE — PROGRESS NOTES
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 perform  - SI distraction test:Did not perform  - Thigh Thrust: Did not perform  - SI Thrust: Did not perform    MUSKULOSKELETAL:    EXTREMITIES:   Hip Exam:  - Log Roll Did not perform  - FADIR Did not perform  - Stinchfield Did not perform  - Hip Scour Did not perform  - GTB Tenderness Did not perform    Bilateral shoulder are improved    MUSCULOSKELETAL:  No atrophy or tone abnormalities are noted in the UE or LE.  No deformities, edema, or skin discoloration are noted on visible skin. Good capillary refill.    NEURO: Bilateral upper and lower extremity coordination and muscle stretch reflexes are physiologic and symmetric.    No loss of sensation is noted.    NEUROLOGICAL EXAM:  MENTAL STATUS: A x O x 3, good concentration, speech is fluent and goal directed  MEMORY: recent and remote are intact  CN: CN2-12 grossly intact  MOTOR: 5/5 in all muscle groups  DTRs: 2+ intact symmetric patella and biceps  Sensation:    -no Loss of sensation in a left upper, left lower, right upper and right lower C-2, C-3, C-4, C-5, C-6, C-7 and C-8 bilaterally and L-1, L-2, L-3, L-4 and L-5 bilaterally distribution.  Babinski: absent on the bilateral  side(s)  Peoples: absent on the bilateral side(s)  Clonus: absent on the bilateral side(s)

## 2024-08-23 NOTE — PROCEDURES
Procedures  PROCEDURE: Quenza (Capsaicin 8% topical system) - right mid back/shoulder blade    PATIENT NAME: Regine Cooper   MRN: [id number]     DATE OF PROCEDURE: 08/23/2024    Diagnosis: B02.23 Postherpetic polyneuropathy,     CPT code: 67329 (60 minute office visit),  (Capsaicin patch per sq cm)    Patch # 1 this year.    Postprocedural Diagnosis: Same    Complications: None  Outcome: Good    Informed Consent:  The patient's condition and proposed procedures, risks (including complications of nerve damage, skin irritation, infection, and failure of pain relief), and alternatives were discussed with the patient or responsible party.  The patient's/responsible party's questions were answered.  The patient/responsible party appeared to understand and chose to proceed.  Informed consent was obtained.    Procedural Pause:  A procedural pause verifying correct patient, medical record number, allergies, medications to be administered, current vital signs, and surgical site was performed immediately prior to beginning the procedure.    Procedure in Detail:  The procedure was performed in the procedure treatment room.  After obtaining written consent, the patient was placed in a seated position. 2 patches were used for the procedure today.  The patient applied 4% lidocaine ointment to the area 1 hour prior to treatment., and the target area was outlined with a marker.  The patch(es) were applied to the target area and secured with tape.  A coban was used to further secure the patches in place.  The patient was allowed to rest in a comfortable position, and monitored by staff every few minutes to ensure comfort.  The option for ice pack was provided to the patient should they start experiencing burning.  The patch(es) remained in place for 60 minutes.  The patch(es) were then removed and the cleaning gel was applied and allowed to sit for an additional 60 seconds, after which the area was wiped clean.     The patient  was then discharged in stable condition.      DISCUSSION:  A Qutenza patch was applied today with the purpose of treating the patients nerve pain. They were informed that they may have some burning of the skin for a few days, and should not take a hot shower for 2-3 days as that may irritate the area.  They were informed that the area may feel like they had a sunburn.  Pain medications can continue to be taken as they were prior to the procedure.  They were informed that it can take about 2-4 weeks for the effects of the patch to be fully realized.      Note Electronically Signed By:  Hi Otoole  08/23/2024

## 2024-08-27 NOTE — PROGRESS NOTES
Subjective:      Patient ID: Regine Cooper is a 69 y.o. female.    Chief Complaint: Nail Problem (Right Great toenail)    Regine is a 69 y.o. female who presents to the clinic upon referral from Dr. Tracy aguilar. provider found  for evaluation and treatment of diabetic feet. Regine has a past medical history of Allergy, Breast cancer (2017), Cataract, Cervical spondylosis (07/29/2022), Diabetes mellitus, type 2, Eczema, Fibromyalgia, Glaucoma, Hypertension, Renal cyst, right (02/06/2020), and Steatosis of liver (02/06/2020). Patient relates no major problem with feet. Only complaints today consist of routine diabetic foot evaluation.  Having an issue with right great toe ingrown nail, here for possible procedure today, still having pain.  Would like to have the entire nail removed permanently.  PCP: Gladis Melissa MD  /swelling.  Date Last Seen by PCP:   Chief Complaint   Patient presents with    Nail Problem     Right Great toenail         Current shoe gear: Casual shoes    Hemoglobin A1C   Date Value Ref Range Status   05/06/2024 6.0 (H) 4.0 - 5.6 % Final     Comment:     ADA Screening Guidelines:  5.7-6.4%  Consistent with prediabetes  >or=6.5%  Consistent with diabetes    High levels of fetal hemoglobin interfere with the HbA1C  assay. Heterozygous hemoglobin variants (HbS, HgC, etc)do  not significantly interfere with this assay.   However, presence of multiple variants may affect accuracy.     01/04/2024 7.0 (H) 4.0 - 5.6 % Final     Comment:     ADA Screening Guidelines:  5.7-6.4%  Consistent with prediabetes  >or=6.5%  Consistent with diabetes    High levels of fetal hemoglobin interfere with the HbA1C  assay. Heterozygous hemoglobin variants (HbS, HgC, etc)do  not significantly interfere with this assay.   However, presence of multiple variants may affect accuracy.     10/20/2023 6.6 (H) 4.0 - 5.6 % Final     Comment:     ADA Screening Guidelines:  5.7-6.4%  Consistent with prediabetes  >or=6.5%   Consistent with diabetes    High levels of fetal hemoglobin interfere with the HbA1C  assay. Heterozygous hemoglobin variants (HbS, HgC, etc)do  not significantly interfere with this assay.   However, presence of multiple variants may affect accuracy.             Review of Systems   Constitutional: Positive for night sweats. Negative for chills and fever.   HENT:  Negative for congestion and tinnitus.    Eyes:  Negative for double vision and visual disturbance.   Cardiovascular:  Negative for chest pain and claudication.   Respiratory:  Negative for hemoptysis and shortness of breath.    Endocrine: Negative for cold intolerance and heat intolerance.   Hematologic/Lymphatic: Negative for adenopathy and bleeding problem.   Skin:  Negative for color change and nail changes.   Musculoskeletal:  Positive for back pain and joint pain. Negative for myalgias and stiffness.   Gastrointestinal:  Negative for nausea and vomiting.   Genitourinary:  Negative for dysuria and hematuria.   Neurological:  Positive for paresthesias. Negative for numbness.   Psychiatric/Behavioral:  Negative for altered mental status and suicidal ideas.    Allergic/Immunologic: Negative for environmental allergies and persistent infections.           Objective:      Physical Exam  Vitals reviewed.   Constitutional:       Appearance: She is well-developed.   Cardiovascular:      Pulses:           Dorsalis pedis pulses are 2+ on the right side and 2+ on the left side.        Posterior tibial pulses are 2+ on the right side and 2+ on the left side.   Pulmonary:      Effort: Pulmonary effort is normal.   Musculoskeletal:         General: Normal range of motion.      Comments: Inspection and palpation of the muscles joints and bones of both lower extremities reveal that muscle strength for the anterior lateral and posterior muscle groups and intrinsic muscle groups of the foot are all 5 over 5 symmetrical.  Ankle subtalar midtarsal and digital joint range  of motion are within normal limits, nonpainful, without crepitus or effusion.  Patient exhibits a normal angle and base of gait.  Palpation of the tendons reveal no defects.   Skin:     General: Skin is warm and dry.      Capillary Refill: Capillary refill takes 2 to 3 seconds.      Comments: Skin turgor is normal bilaterally.  Skin texture is well hydrated to both lower extremities.  No lesions or rashes or wounds appreciated bilaterally.  Proximal clearing noted to bilateral pedal nails.   Neurological:      Mental Status: She is alert and oriented to person, place, and time.      Comments: Sharp dull light touch vibratory proprioceptive sensation are intact bilaterally.  Deep tendon reflexes to patellar and Achilles tendon are symmetrical 2 over 4 bilaterally.  No ankle clonus or Babinski reflexes noted bilaterally.  Coordination is normal to both feet and lower extremities.             Assessment:       Encounter Diagnoses   Name Primary?    Pain of toe of right foot Yes    Type 2 diabetes mellitus with hyperglycemia, without long-term current use of insulin     Ingrown toenail without infection          Plan:       Regine was seen today for nail problem.    Diagnoses and all orders for this visit:    Pain of toe of right foot  -     traMADoL (ULTRAM) 50 mg tablet; Take 1 tablet (50 mg total) by mouth every 8 (eight) hours as needed for Pain.    Type 2 diabetes mellitus with hyperglycemia, without long-term current use of insulin    Ingrown toenail without infection    Other orders  -     amoxicillin (AMOXIL) 500 MG Tab; Take 1 tablet (500 mg total) by mouth every 12 (twelve) hours. for 7 days      I counseled the patient on her conditions, their implications and medical management.    Permanent Nail Removal     Performed by:  Addison Haynes   Authorized by:  Patient     Consent Done?:  Yes (Written)     Location:  Right hallux nail  Anesthesia:  Digital block  Local anesthetic: 0.5% Marcaine without  epinephrine  Anesthetic total (ml):  4  Preparation:  Skin prepped with alcohol and skin prepped with Betadine     Amount removed:  Total nail avulsion with matrixectomy  Wedge excision of skin of nail fold: No    Nail bed sutured?: No    Nail matrix removed:  Yes  Removed nail replaced and anchored: No    Dressing applied:  4x4, antibiotic ointment and dressing applied  Patient tolerance:  Patient tolerated the procedure well with no immediate complications    Digital nerve block applied to the above-mentioned toe. Toe was prepped and draped in a sterile fashion and penrose drain applied. Anesthesia was confirmed and the offending portion of nail plate was freed from the nail bed and eponychium, and was then excised. 89% phenol was applied to the nail matrix for 3 consecutive periods of 30 seconds and was then lavaged with copious amounts of 70% isopropyl alcohol. Penrose drain was removed and betadine ointment and dry sterile dressing applied. Post-op care instructions were discussed and dispensed to patient.       Post-op care instructions were discussed and dispensed to patient.

## 2024-08-28 ENCOUNTER — PATIENT MESSAGE (OUTPATIENT)
Dept: HEMATOLOGY/ONCOLOGY | Facility: CLINIC | Age: 69
End: 2024-08-28
Payer: MEDICARE

## 2024-08-28 DIAGNOSIS — C50.912 MALIGNANT NEOPLASM OF LEFT FEMALE BREAST, UNSPECIFIED ESTROGEN RECEPTOR STATUS, UNSPECIFIED SITE OF BREAST: ICD-10-CM

## 2024-08-29 RX ORDER — FERROUS SULFATE 325(65) MG
TABLET ORAL
Qty: 60 TABLET | Refills: 2 | Status: SHIPPED | OUTPATIENT
Start: 2024-08-29

## 2024-08-30 ENCOUNTER — PATIENT MESSAGE (OUTPATIENT)
Dept: PODIATRY | Facility: CLINIC | Age: 69
End: 2024-08-30
Payer: MEDICARE

## 2024-08-30 ENCOUNTER — PATIENT MESSAGE (OUTPATIENT)
Dept: PRIMARY CARE CLINIC | Facility: CLINIC | Age: 69
End: 2024-08-30
Payer: MEDICARE

## 2024-08-30 DIAGNOSIS — E11.65 TYPE 2 DIABETES MELLITUS WITH HYPERGLYCEMIA, WITHOUT LONG-TERM CURRENT USE OF INSULIN: Primary | ICD-10-CM

## 2024-08-30 RX ORDER — LANCETS 33 GAUGE
EACH MISCELLANEOUS
Qty: 100 EACH | Refills: 11 | Status: SHIPPED | OUTPATIENT
Start: 2024-08-30

## 2024-08-30 NOTE — TRANSFER OF CARE
"Anesthesia Transfer of Care Note    Patient: Regine Osorio    Procedure(s) Performed: Procedure(s) (LRB):  EGD (ESOPHAGOGASTRODUODENOSCOPY) (N/A)  COLONOSCOPY (N/A)    Patient location: PACU    Anesthesia Type: general    Transport from OR: Transported from OR on room air with adequate spontaneous ventilation    Post pain: adequate analgesia    Post assessment: no apparent anesthetic complications    Post vital signs: stable    Level of consciousness: responds to stimulation    Nausea/Vomiting: no nausea/vomiting    Complications: none    Transfer of care protocol was followed      Last vitals:   Visit Vitals  BP (!) 144/67 (BP Location: Left arm, Patient Position: Lying)   Pulse 95   Temp 36.4 °C (97.5 °F) (Temporal)   Resp 18   Ht 5' 5" (1.651 m)   Wt 108.9 kg (240 lb)   SpO2 97%   Breastfeeding No   BMI 39.94 kg/m²     " 3 (mild pain)

## 2024-08-30 NOTE — TELEPHONE ENCOUNTER
Care Due:                  Date            Visit Type   Department     Provider  --------------------------------------------------------------------------------                                EP -                              PRIMARY      [facility] PRIMARY  Last Visit: 05-      CARE ([facility])   MILAGROS Melissa                              EP -                              PRIMARY      [facility] PRIMARY  Next Visit: 10-      CARE ([facility])   MILAGROS Melissa                                                            Last  Test          Frequency    Reason                     Performed    Due Date  --------------------------------------------------------------------------------    HBA1C.......  6 months...  metFORMIN, semaglutide...  05- 11-    [facility] Embedded Care Due Messages. Reference number: [id number].   8/30/2024 9:36:18 AM CDT

## 2024-09-04 ENCOUNTER — OFFICE VISIT (OUTPATIENT)
Dept: PODIATRY | Facility: CLINIC | Age: 69
End: 2024-09-04
Payer: MEDICARE

## 2024-09-04 VITALS
SYSTOLIC BLOOD PRESSURE: 143 MMHG | HEIGHT: 65 IN | BODY MASS INDEX: 35.63 KG/M2 | DIASTOLIC BLOOD PRESSURE: 77 MMHG | HEART RATE: 89 BPM | WEIGHT: 213.88 LBS

## 2024-09-04 DIAGNOSIS — Z98.890 POST-OPERATIVE STATE: Primary | ICD-10-CM

## 2024-09-04 PROCEDURE — 99999 PR PBB SHADOW E&M-EST. PATIENT-LVL IV: CPT | Mod: PBBFAC,,, | Performed by: PODIATRIST

## 2024-09-04 PROCEDURE — 3044F HG A1C LEVEL LT 7.0%: CPT | Mod: CPTII,S$GLB,, | Performed by: PODIATRIST

## 2024-09-04 PROCEDURE — 3061F NEG MICROALBUMINURIA REV: CPT | Mod: CPTII,S$GLB,, | Performed by: PODIATRIST

## 2024-09-04 PROCEDURE — 3288F FALL RISK ASSESSMENT DOCD: CPT | Mod: CPTII,S$GLB,, | Performed by: PODIATRIST

## 2024-09-04 PROCEDURE — 1159F MED LIST DOCD IN RCRD: CPT | Mod: CPTII,S$GLB,, | Performed by: PODIATRIST

## 2024-09-04 PROCEDURE — 3066F NEPHROPATHY DOC TX: CPT | Mod: CPTII,S$GLB,, | Performed by: PODIATRIST

## 2024-09-04 PROCEDURE — 3078F DIAST BP <80 MM HG: CPT | Mod: CPTII,S$GLB,, | Performed by: PODIATRIST

## 2024-09-04 PROCEDURE — 3077F SYST BP >= 140 MM HG: CPT | Mod: CPTII,S$GLB,, | Performed by: PODIATRIST

## 2024-09-04 PROCEDURE — 1101F PT FALLS ASSESS-DOCD LE1/YR: CPT | Mod: CPTII,S$GLB,, | Performed by: PODIATRIST

## 2024-09-04 PROCEDURE — 4010F ACE/ARB THERAPY RXD/TAKEN: CPT | Mod: CPTII,S$GLB,, | Performed by: PODIATRIST

## 2024-09-04 PROCEDURE — 1126F AMNT PAIN NOTED NONE PRSNT: CPT | Mod: CPTII,S$GLB,, | Performed by: PODIATRIST

## 2024-09-04 PROCEDURE — 3008F BODY MASS INDEX DOCD: CPT | Mod: CPTII,S$GLB,, | Performed by: PODIATRIST

## 2024-09-04 PROCEDURE — 99213 OFFICE O/P EST LOW 20 MIN: CPT | Mod: S$GLB,,, | Performed by: PODIATRIST

## 2024-09-04 NOTE — PROGRESS NOTES
Subjective:      Patient ID: Regine Cooper is a 69 y.o. female.    Chief Complaint: Post-op Evaluation (Left great toe ingrown /Mild drainage )    Pt presents today s/p total permanent nail avulsion, left hallux 8/20/2024. Pt has been soaking, applying a band-aid but mostly leaving it open to air at home.    Review of Systems   Constitutional: Negative for chills, fever and malaise/fatigue.   HENT:  Negative for hearing loss.    Cardiovascular:  Negative for claudication.   Respiratory:  Negative for shortness of breath.    Skin:  Positive for nail changes. Negative for flushing and rash.   Musculoskeletal:  Negative for joint pain and myalgias.   Gastrointestinal:  Negative for nausea and vomiting.   Neurological:  Negative for loss of balance, numbness, paresthesias and sensory change.   Psychiatric/Behavioral:  Negative for altered mental status.            Objective:      Physical Exam  Vitals reviewed.   Cardiovascular:      Pulses:           Dorsalis pedis pulses are 2+ on the right side and 2+ on the left side.        Posterior tibial pulses are 2+ on the right side and 2+ on the left side.      Comments: No edema noted b/L  Musculoskeletal:      Comments:        Feet:      Right foot:      Protective Sensation: 5 sites tested.  5 sites sensed.      Left foot:      Protective Sensation: 5 sites tested.  5 sites sensed.   Skin:     General: Skin is warm.      Capillary Refill: Capillary refill takes 2 to 3 seconds.      Comments: Left hallux nail absent, nail bed at proximal portion macerated with small amount of blood    No purulence noted   Neurological:      Mental Status: She is alert.      Comments: Gross sensation intact b/L               Assessment:       Encounter Diagnosis   Name Primary?    Post-operative state - Left Foot Yes         Plan:       Regine was seen today for post-op evaluation.    Diagnoses and all orders for this visit:    Post-operative state - Left Foot      I counseled the  patient on her conditions, their implications and medical management.    Pt advised to d/c soaking.   Betadine and DSD applied to left hallux    Pt advised to keep nail clean and dry.   Pt will f/u with Dr SOLIS in one week.     .

## 2024-09-09 ENCOUNTER — TELEPHONE (OUTPATIENT)
Dept: HEMATOLOGY/ONCOLOGY | Facility: CLINIC | Age: 69
End: 2024-09-09
Payer: MEDICARE

## 2024-09-10 ENCOUNTER — TELEPHONE (OUTPATIENT)
Dept: HEMATOLOGY/ONCOLOGY | Facility: CLINIC | Age: 69
End: 2024-09-10
Payer: MEDICARE

## 2024-09-10 ENCOUNTER — LAB VISIT (OUTPATIENT)
Dept: LAB | Facility: HOSPITAL | Age: 69
End: 2024-09-10
Attending: INTERNAL MEDICINE
Payer: MEDICARE

## 2024-09-10 DIAGNOSIS — D50.1 IRON DEFICIENCY ANEMIA DUE TO SIDEROPENIC DYSPHAGIA: ICD-10-CM

## 2024-09-10 LAB
ALBUMIN SERPL BCP-MCNC: 3.9 G/DL (ref 3.5–5.2)
ALP SERPL-CCNC: 70 U/L (ref 55–135)
ALT SERPL W/O P-5'-P-CCNC: 8 U/L (ref 10–44)
ANION GAP SERPL CALC-SCNC: 13 MMOL/L (ref 8–16)
AST SERPL-CCNC: 11 U/L (ref 10–40)
BASOPHILS # BLD AUTO: 0.03 K/UL (ref 0–0.2)
BASOPHILS NFR BLD: 0.4 % (ref 0–1.9)
BILIRUB SERPL-MCNC: 0.4 MG/DL (ref 0.1–1)
BUN SERPL-MCNC: 17 MG/DL (ref 8–23)
CALCIUM SERPL-MCNC: 10.5 MG/DL (ref 8.7–10.5)
CHLORIDE SERPL-SCNC: 101 MMOL/L (ref 95–110)
CO2 SERPL-SCNC: 28 MMOL/L (ref 23–29)
CREAT SERPL-MCNC: 0.8 MG/DL (ref 0.5–1.4)
DIFFERENTIAL METHOD BLD: ABNORMAL
EOSINOPHIL # BLD AUTO: 0.1 K/UL (ref 0–0.5)
EOSINOPHIL NFR BLD: 1.7 % (ref 0–8)
ERYTHROCYTE [DISTWIDTH] IN BLOOD BY AUTOMATED COUNT: 14 % (ref 11.5–14.5)
EST. GFR  (NO RACE VARIABLE): >60 ML/MIN/1.73 M^2
FERRITIN SERPL-MCNC: 45 NG/ML (ref 20–300)
GLUCOSE SERPL-MCNC: 161 MG/DL (ref 70–110)
HCT VFR BLD AUTO: 40.8 % (ref 37–48.5)
HGB BLD-MCNC: 13 G/DL (ref 12–16)
IMM GRANULOCYTES # BLD AUTO: 0.04 K/UL (ref 0–0.04)
IMM GRANULOCYTES NFR BLD AUTO: 0.5 % (ref 0–0.5)
LYMPHOCYTES # BLD AUTO: 1.8 K/UL (ref 1–4.8)
LYMPHOCYTES NFR BLD: 24.4 % (ref 18–48)
MCH RBC QN AUTO: 27.2 PG (ref 27–31)
MCHC RBC AUTO-ENTMCNC: 31.9 G/DL (ref 32–36)
MCV RBC AUTO: 85 FL (ref 82–98)
MONOCYTES # BLD AUTO: 0.5 K/UL (ref 0.3–1)
MONOCYTES NFR BLD: 6.9 % (ref 4–15)
NEUTROPHILS # BLD AUTO: 5 K/UL (ref 1.8–7.7)
NEUTROPHILS NFR BLD: 66.1 % (ref 38–73)
NRBC BLD-RTO: 0 /100 WBC
PLATELET # BLD AUTO: 324 K/UL (ref 150–450)
PMV BLD AUTO: 10.4 FL (ref 9.2–12.9)
POTASSIUM SERPL-SCNC: 3.4 MMOL/L (ref 3.5–5.1)
PROT SERPL-MCNC: 7.6 G/DL (ref 6–8.4)
RBC # BLD AUTO: 4.78 M/UL (ref 4–5.4)
SODIUM SERPL-SCNC: 142 MMOL/L (ref 136–145)
WBC # BLD AUTO: 7.51 K/UL (ref 3.9–12.7)

## 2024-09-10 PROCEDURE — 85025 COMPLETE CBC W/AUTO DIFF WBC: CPT | Performed by: INTERNAL MEDICINE

## 2024-09-10 PROCEDURE — 80053 COMPREHEN METABOLIC PANEL: CPT | Performed by: INTERNAL MEDICINE

## 2024-09-10 PROCEDURE — 82728 ASSAY OF FERRITIN: CPT | Performed by: INTERNAL MEDICINE

## 2024-09-10 PROCEDURE — 36415 COLL VENOUS BLD VENIPUNCTURE: CPT | Mod: PN | Performed by: INTERNAL MEDICINE

## 2024-09-10 NOTE — TELEPHONE ENCOUNTER
Called pt will be seen virtually tomorrow for 930am.     Pt is aware and thankful for call.     ----- Message from Lise Roche sent at 9/10/2024  9:46 AM CDT -----  Regarding: Appt R/S  Reschedule Existing Appointment     Current appt date:8/11     Type of appt :ep      Physician: Héctor Lund MD     Reason for rescheduling:Weather concern     Caller:Helena Cooper     Contact Preference:  [phone]

## 2024-09-10 NOTE — TELEPHONE ENCOUNTER
----- Message from Lise Roche sent at 9/10/2024  9:46 AM CDT -----  Regarding: Appt R/S  Reschedule Existing Appointment     Current appt date:8/11     Type of appt :ep      Physician: Héctor Lund MD     Reason for rescheduling:Weather concern     Caller:Helena Cooper     Contact Preference:  [phone]

## 2024-09-10 NOTE — TELEPHONE ENCOUNTER
Spoke to pt to cancel appts on 9/11 due to inclement weather clinic closure - clinic will reach back out at a later time to r/s

## 2024-09-13 ENCOUNTER — HOSPITAL ENCOUNTER (OUTPATIENT)
Dept: RADIOLOGY | Facility: HOSPITAL | Age: 69
Discharge: HOME | End: 2024-09-13
Attending: STUDENT IN AN ORGANIZED HEALTH CARE EDUCATION/TRAINING PROGRAM
Payer: MEDICARE

## 2024-09-13 DIAGNOSIS — M54.6 PAIN IN THORACIC SPINE: ICD-10-CM

## 2024-09-13 PROCEDURE — 72146 MRI CHEST SPINE W/O DYE: CPT | Mod: TC

## 2024-09-13 PROCEDURE — 72146 MRI CHEST SPINE W/O DYE: CPT | Mod: 26,,, | Performed by: RADIOLOGY

## 2024-09-16 ENCOUNTER — OFFICE VISIT (OUTPATIENT)
Dept: PAIN MEDICINE | Facility: CLINIC | Age: 69
End: 2024-09-16
Payer: MEDICARE

## 2024-09-16 VITALS
SYSTOLIC BLOOD PRESSURE: 112 MMHG | WEIGHT: 213.88 LBS | HEART RATE: 79 BPM | DIASTOLIC BLOOD PRESSURE: 75 MMHG | BODY MASS INDEX: 35.59 KG/M2

## 2024-09-16 DIAGNOSIS — M54.6 PAIN IN THORACIC SPINE: ICD-10-CM

## 2024-09-16 DIAGNOSIS — B02.29 POST HERPETIC NEURALGIA: Primary | ICD-10-CM

## 2024-09-16 DIAGNOSIS — M54.14 THORACIC RADICULOPATHY: ICD-10-CM

## 2024-09-16 DIAGNOSIS — G95.89 MYELOMALACIA OF CERVICAL CORD: ICD-10-CM

## 2024-09-16 PROCEDURE — 3008F BODY MASS INDEX DOCD: CPT | Mod: CPTII,S$GLB,, | Performed by: STUDENT IN AN ORGANIZED HEALTH CARE EDUCATION/TRAINING PROGRAM

## 2024-09-16 PROCEDURE — 3074F SYST BP LT 130 MM HG: CPT | Mod: CPTII,S$GLB,, | Performed by: STUDENT IN AN ORGANIZED HEALTH CARE EDUCATION/TRAINING PROGRAM

## 2024-09-16 PROCEDURE — 1125F AMNT PAIN NOTED PAIN PRSNT: CPT | Mod: CPTII,S$GLB,, | Performed by: STUDENT IN AN ORGANIZED HEALTH CARE EDUCATION/TRAINING PROGRAM

## 2024-09-16 PROCEDURE — 99214 OFFICE O/P EST MOD 30 MIN: CPT | Mod: S$GLB,,, | Performed by: STUDENT IN AN ORGANIZED HEALTH CARE EDUCATION/TRAINING PROGRAM

## 2024-09-16 PROCEDURE — 4010F ACE/ARB THERAPY RXD/TAKEN: CPT | Mod: CPTII,S$GLB,, | Performed by: STUDENT IN AN ORGANIZED HEALTH CARE EDUCATION/TRAINING PROGRAM

## 2024-09-16 PROCEDURE — 3078F DIAST BP <80 MM HG: CPT | Mod: CPTII,S$GLB,, | Performed by: STUDENT IN AN ORGANIZED HEALTH CARE EDUCATION/TRAINING PROGRAM

## 2024-09-16 PROCEDURE — 3288F FALL RISK ASSESSMENT DOCD: CPT | Mod: CPTII,S$GLB,, | Performed by: STUDENT IN AN ORGANIZED HEALTH CARE EDUCATION/TRAINING PROGRAM

## 2024-09-16 PROCEDURE — 1101F PT FALLS ASSESS-DOCD LE1/YR: CPT | Mod: CPTII,S$GLB,, | Performed by: STUDENT IN AN ORGANIZED HEALTH CARE EDUCATION/TRAINING PROGRAM

## 2024-09-16 PROCEDURE — 3044F HG A1C LEVEL LT 7.0%: CPT | Mod: CPTII,S$GLB,, | Performed by: STUDENT IN AN ORGANIZED HEALTH CARE EDUCATION/TRAINING PROGRAM

## 2024-09-16 PROCEDURE — 3066F NEPHROPATHY DOC TX: CPT | Mod: CPTII,S$GLB,, | Performed by: STUDENT IN AN ORGANIZED HEALTH CARE EDUCATION/TRAINING PROGRAM

## 2024-09-16 PROCEDURE — 99999 PR PBB SHADOW E&M-EST. PATIENT-LVL II: CPT | Mod: PBBFAC,,, | Performed by: STUDENT IN AN ORGANIZED HEALTH CARE EDUCATION/TRAINING PROGRAM

## 2024-09-16 PROCEDURE — 3061F NEG MICROALBUMINURIA REV: CPT | Mod: CPTII,S$GLB,, | Performed by: STUDENT IN AN ORGANIZED HEALTH CARE EDUCATION/TRAINING PROGRAM

## 2024-09-16 NOTE — PROGRESS NOTES
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 diminished in Lateral Flexion with pain  - Lumbar ROM is diminished in Rotation with pain  - Did not perform Sustained Hip Flexion test (for discogenic pain)  - Did not perform Altered Gait, Posture  - Axial facet loading test Did not perform on the bilateral side(s)    Thoracic exam:  (+) TTP spinous process and paraspinals from roughly T5-T10.    TTP on the right over where the post-herpetic neuralgia is located    SI Joint exam:  - Did not perform SI joint tenderness to palpation  - Erik's sign Did not perform  - Yeoman's Test: Did not perform for SI joint pain indicating anterior SI ligament involvement. Did not perform for anterior thigh pain/paresthesia which indicates femoral nerve stretch.  - Gaenslen's Test:Did not perform  - Finger Chari's Sign:Did not perform  - SI compression test:Did not perform  - SI distraction test:Did not perform  - Thigh Thrust: Did not perform  - SI Thrust: Did not perform    MUSKULOSKELETAL:    EXTREMITIES:   Hip Exam:  - Log Roll Did not perform  - FADIR Did not perform  - Stinchfield Did not perform  - Hip Scour Did not perform  - GTB Tenderness Did not perform    Bilateral shoulder are improved    MUSCULOSKELETAL:  No atrophy or tone abnormalities are noted in the UE or LE.  No deformities, edema, or skin  discoloration are noted on visible skin. Good capillary refill.    NEURO: Bilateral upper and lower extremity coordination and muscle stretch reflexes are physiologic and symmetric.    No loss of sensation is noted.    NEUROLOGICAL EXAM:  MENTAL STATUS: A x O x 3, good concentration, speech is fluent and goal directed  MEMORY: recent and remote are intact  CN: CN2-12 grossly intact  MOTOR: 5/5 in all muscle groups  DTRs: 2+ intact symmetric patella and biceps  Sensation:    -no Loss of sensation in a left upper, left lower, right upper and right lower C-2, C-3, C-4, C-5, C-6, C-7 and C-8 bilaterally and L-1, L-2, L-3, L-4 and L-5 bilaterally distribution.  Babinski: absent on the bilateral side(s)  Peoples: absent on the bilateral side(s)  Clonus: absent on the bilateral side(s)

## 2024-09-17 ENCOUNTER — PATIENT MESSAGE (OUTPATIENT)
Dept: PRIMARY CARE CLINIC | Facility: CLINIC | Age: 69
End: 2024-09-17
Payer: MEDICARE

## 2024-09-17 DIAGNOSIS — E04.2 MULTIPLE THYROID NODULES: ICD-10-CM

## 2024-09-17 DIAGNOSIS — E11.65 TYPE 2 DIABETES MELLITUS WITH HYPERGLYCEMIA, WITHOUT LONG-TERM CURRENT USE OF INSULIN: Primary | ICD-10-CM

## 2024-09-19 DIAGNOSIS — Z85.3 HISTORY OF BREAST CANCER IN FEMALE: ICD-10-CM

## 2024-09-19 DIAGNOSIS — D50.1 IRON DEFICIENCY ANEMIA DUE TO SIDEROPENIC DYSPHAGIA: ICD-10-CM

## 2024-09-19 DIAGNOSIS — C50.912 MALIGNANT NEOPLASM OF LEFT FEMALE BREAST, UNSPECIFIED ESTROGEN RECEPTOR STATUS, UNSPECIFIED SITE OF BREAST: Primary | ICD-10-CM

## 2024-09-19 DIAGNOSIS — D50.0 IRON DEFICIENCY ANEMIA DUE TO CHRONIC BLOOD LOSS: ICD-10-CM

## 2024-09-30 ENCOUNTER — PATIENT MESSAGE (OUTPATIENT)
Dept: PODIATRY | Facility: CLINIC | Age: 69
End: 2024-09-30
Payer: MEDICARE

## 2024-09-30 ENCOUNTER — LAB VISIT (OUTPATIENT)
Dept: LAB | Facility: HOSPITAL | Age: 69
End: 2024-09-30
Payer: MEDICARE

## 2024-09-30 DIAGNOSIS — R11.2 NAUSEA AND VOMITING, UNSPECIFIED VOMITING TYPE: ICD-10-CM

## 2024-09-30 DIAGNOSIS — Z79.899 ENCOUNTER FOR MONITORING LONG-TERM PROTON PUMP INHIBITOR THERAPY: ICD-10-CM

## 2024-09-30 DIAGNOSIS — Z51.81 ENCOUNTER FOR MONITORING LONG-TERM PROTON PUMP INHIBITOR THERAPY: ICD-10-CM

## 2024-09-30 DIAGNOSIS — E04.2 MULTIPLE THYROID NODULES: ICD-10-CM

## 2024-09-30 DIAGNOSIS — D50.9 IRON DEFICIENCY ANEMIA, UNSPECIFIED IRON DEFICIENCY ANEMIA TYPE: ICD-10-CM

## 2024-09-30 DIAGNOSIS — K44.9 HIATAL HERNIA: ICD-10-CM

## 2024-09-30 DIAGNOSIS — E73.1 ACQUIRED LACTASE DEFICIENCY: ICD-10-CM

## 2024-09-30 DIAGNOSIS — K76.0 FATTY LIVER: ICD-10-CM

## 2024-09-30 DIAGNOSIS — K21.9 GASTROESOPHAGEAL REFLUX DISEASE, UNSPECIFIED WHETHER ESOPHAGITIS PRESENT: ICD-10-CM

## 2024-09-30 DIAGNOSIS — E11.65 TYPE 2 DIABETES MELLITUS WITH HYPERGLYCEMIA, WITHOUT LONG-TERM CURRENT USE OF INSULIN: ICD-10-CM

## 2024-09-30 LAB
CREAT SERPL-MCNC: 0.7 MG/DL (ref 0.5–1.4)
EST. GFR  (NO RACE VARIABLE): >60 ML/MIN/1.73 M^2
ESTIMATED AVG GLUCOSE: 114 MG/DL (ref 68–131)
HBA1C MFR BLD: 5.6 % (ref 4–5.6)
TSH SERPL DL<=0.005 MIU/L-ACNC: 0.88 UIU/ML (ref 0.4–4)

## 2024-09-30 PROCEDURE — 82565 ASSAY OF CREATININE: CPT | Performed by: INTERNAL MEDICINE

## 2024-09-30 PROCEDURE — 84443 ASSAY THYROID STIM HORMONE: CPT | Performed by: STUDENT IN AN ORGANIZED HEALTH CARE EDUCATION/TRAINING PROGRAM

## 2024-09-30 PROCEDURE — 83036 HEMOGLOBIN GLYCOSYLATED A1C: CPT | Performed by: STUDENT IN AN ORGANIZED HEALTH CARE EDUCATION/TRAINING PROGRAM

## 2024-10-02 ENCOUNTER — OFFICE VISIT (OUTPATIENT)
Dept: ALLERGY | Facility: CLINIC | Age: 69
End: 2024-10-02
Payer: MEDICARE

## 2024-10-02 VITALS
SYSTOLIC BLOOD PRESSURE: 111 MMHG | HEIGHT: 65 IN | BODY MASS INDEX: 34.2 KG/M2 | HEART RATE: 95 BPM | OXYGEN SATURATION: 99 % | WEIGHT: 205.25 LBS | DIASTOLIC BLOOD PRESSURE: 74 MMHG

## 2024-10-02 DIAGNOSIS — J31.0 CHRONIC RHINITIS: Primary | ICD-10-CM

## 2024-10-02 DIAGNOSIS — R05.3 CHRONIC COUGH: ICD-10-CM

## 2024-10-02 PROCEDURE — 1160F RVW MEDS BY RX/DR IN RCRD: CPT | Mod: CPTII,S$GLB,, | Performed by: STUDENT IN AN ORGANIZED HEALTH CARE EDUCATION/TRAINING PROGRAM

## 2024-10-02 PROCEDURE — 3078F DIAST BP <80 MM HG: CPT | Mod: CPTII,S$GLB,, | Performed by: STUDENT IN AN ORGANIZED HEALTH CARE EDUCATION/TRAINING PROGRAM

## 2024-10-02 PROCEDURE — 1126F AMNT PAIN NOTED NONE PRSNT: CPT | Mod: CPTII,S$GLB,, | Performed by: STUDENT IN AN ORGANIZED HEALTH CARE EDUCATION/TRAINING PROGRAM

## 2024-10-02 PROCEDURE — 4010F ACE/ARB THERAPY RXD/TAKEN: CPT | Mod: CPTII,S$GLB,, | Performed by: STUDENT IN AN ORGANIZED HEALTH CARE EDUCATION/TRAINING PROGRAM

## 2024-10-02 PROCEDURE — 99214 OFFICE O/P EST MOD 30 MIN: CPT | Mod: S$GLB,,, | Performed by: STUDENT IN AN ORGANIZED HEALTH CARE EDUCATION/TRAINING PROGRAM

## 2024-10-02 PROCEDURE — 99999 PR PBB SHADOW E&M-EST. PATIENT-LVL V: CPT | Mod: PBBFAC,,, | Performed by: STUDENT IN AN ORGANIZED HEALTH CARE EDUCATION/TRAINING PROGRAM

## 2024-10-02 PROCEDURE — 1159F MED LIST DOCD IN RCRD: CPT | Mod: CPTII,S$GLB,, | Performed by: STUDENT IN AN ORGANIZED HEALTH CARE EDUCATION/TRAINING PROGRAM

## 2024-10-02 PROCEDURE — 3061F NEG MICROALBUMINURIA REV: CPT | Mod: CPTII,S$GLB,, | Performed by: STUDENT IN AN ORGANIZED HEALTH CARE EDUCATION/TRAINING PROGRAM

## 2024-10-02 PROCEDURE — 1101F PT FALLS ASSESS-DOCD LE1/YR: CPT | Mod: CPTII,S$GLB,, | Performed by: STUDENT IN AN ORGANIZED HEALTH CARE EDUCATION/TRAINING PROGRAM

## 2024-10-02 PROCEDURE — 3074F SYST BP LT 130 MM HG: CPT | Mod: CPTII,S$GLB,, | Performed by: STUDENT IN AN ORGANIZED HEALTH CARE EDUCATION/TRAINING PROGRAM

## 2024-10-02 PROCEDURE — 3044F HG A1C LEVEL LT 7.0%: CPT | Mod: CPTII,S$GLB,, | Performed by: STUDENT IN AN ORGANIZED HEALTH CARE EDUCATION/TRAINING PROGRAM

## 2024-10-02 PROCEDURE — 3066F NEPHROPATHY DOC TX: CPT | Mod: CPTII,S$GLB,, | Performed by: STUDENT IN AN ORGANIZED HEALTH CARE EDUCATION/TRAINING PROGRAM

## 2024-10-02 PROCEDURE — 3008F BODY MASS INDEX DOCD: CPT | Mod: CPTII,S$GLB,, | Performed by: STUDENT IN AN ORGANIZED HEALTH CARE EDUCATION/TRAINING PROGRAM

## 2024-10-02 PROCEDURE — 3288F FALL RISK ASSESSMENT DOCD: CPT | Mod: CPTII,S$GLB,, | Performed by: STUDENT IN AN ORGANIZED HEALTH CARE EDUCATION/TRAINING PROGRAM

## 2024-10-02 RX ORDER — IPRATROPIUM BROMIDE 42 UG/1
2 SPRAY, METERED NASAL 3 TIMES DAILY PRN
Qty: 15 ML | Refills: 11 | Status: SHIPPED | OUTPATIENT
Start: 2024-10-02

## 2024-10-02 NOTE — PROGRESS NOTES
ALLERGY & IMMUNOLOGY CLINIC - FOLLOW UP     HISTORY OF PRESENT ILLNESS     Patient ID: Regine Cooper is a 69 y.o. female    CC: chronic rhinitis and cough     HPI: Regine Cooper is a 69 y.o. female with a history of breast cancer, DM2, and HTN, presenting for chronic rhinitis, following up for chronic rhinitis and cough.     She still deals with post-nasal drip. She goes through periods where she wakes up at 4 or 5 am choking on phlegm and has to cough it up. She says she frequently has to clear her throat. Notices grass cutting as a trigger, says she knows if gras is being cut a block away. She is still using ipratropium nasal spray at night and astepro in the morning. She hasn't retried the flonase.   She takes zyrtec prn. She takes montelukast nightly.    She says she has had to use her inhaler a couple times over the summer. She says something seems to irritate her throat, and she gets a cough. She can't tell if the cough is coming from the throat or the lungs, she thinks maybe the throat. The albuterol helps. She estimates she has needed it once or twice over the summer. No shortness of breath or wheezing.   She isn't currently on the advair.      MEDICAL HISTORY     Vaccines:   Immunization History   Administered Date(s) Administered    COVID-19, MRNA, LN-S, PF (MODERNA FULL 0.5 ML DOSE) 10/04/2022    COVID-19, MRNA, LN-S, PF (Pfizer) (Purple Cap) 10/04/2022    COVID-19, mRNA, LNP-S, PF, gunjan-sucrose, 30 mcg/0.3 mL (Pfizer Ages 12+) 09/30/2023    COVID-19, mRNA, LNP-S, bivalent booster, PF (Moderna Omicron)12 + YEARS 10/04/2022    COVID-19, vector-nr, rS-Ad26, PF (Tammie) 03/14/2021, 11/03/2021    Hepatitis B, Adult 01/27/1998, 01/27/1998    Influenza 10/04/2009, 10/13/2009, 09/01/2015    Influenza (FLUAD) - Quadrivalent - Adjuvanted - PF *Preferred* (65+) 09/20/2022, 09/18/2023    Influenza - Quadrivalent - PF *Preferred* (6 months and older) 08/28/2018, 08/28/2018, 08/29/2019, 09/08/2020, 10/09/2021     Influenza - Trivalent - Afluria, Fluzone MDV 09/25/2013, 10/10/2014, 10/21/2016, 10/06/2017    Influenza - Trivalent - Fluad - Adjuvanted - PF (65 years and older 09/13/2024    Influenza Split 11/04/2006, 09/18/2010, 10/12/2011, 09/29/2012    Pneumococcal Conjugate - 13 Valent 03/23/2018, 03/23/2018    Pneumococcal Conjugate - 20 Valent 09/20/2022    Pneumococcal Polysaccharide - 23 Valent 02/23/2009, 02/23/2009    RSVpreF (Arexvy) 02/14/2024    Tdap 01/30/2008, 01/30/2018, 11/26/2018    Zoster Recombinant 12/07/2020, 12/07/2020, 02/24/2021     Medical Hx:   Patient Active Problem List   Diagnosis    History of breast cancer in female    Type 2 diabetes mellitus without complication, without long-term current use of insulin    Hypertension associated with type 2 diabetes mellitus    S/P left mastectomy    Spinal stenosis of lumbar region with neurogenic claudication    Migraine without aura and without status migrainosus, not intractable    Thyroid nodule    Fibromyalgia    Anemia    Unsp symptoms and signs w cognitive functions and awareness    History of antineoplastic chemotherapy    Seborrheic dermatitis of scalp    Insomnia due to medical condition    Insomnia    Obstructive sleep apnea hypopnea, mild    Cervical stenosis of spine    Impaired functional mobility, balance, gait, and endurance    Decreased range of motion of lumbar spine    Central sensitization to pain    Myelomalacia of cervical cord    Cervical spondylosis    Status post cervical discectomy    Status post cervical spinal fusion    Atherosclerosis of aorta    Neck pain    Left shoulder pain    Atopic dermatitis    Chronic rhinitis    Irritable bowel syndrome    Malignant neoplasm of unspecified site of left female breast    Obesity (BMI 30-39.9)    Suspected glaucoma of both eyes    Chronic cough    Fatty liver    Severe obesity (BMI 35.0-35.9 with comorbidity)    Iron deficiency anemia due to chronic blood loss    Apical lung scarring     Post-viral cough syndrome    Abnormal finding on imaging    Major depressive disorder, single episode, mild     Surgical Hx:   Past Surgical History:   Procedure Laterality Date    ADENOIDECTOMY      ANTERIOR CERVICAL DISCECTOMY W/ FUSION N/A 2022    Procedure: DISCECTOMY, SPINE, CERVICAL, ANTERIOR APPROACH, WITH FUSION C5-7 SPINEWAVE SNS: VOCAL CORDS/MOTORS/SSEP;  Surgeon: Rosales Scruggs MD;  [facility];  Service: Neurosurgery;  Laterality: N/A;    BREAST BIOPSY      BREAST SURGERY      breast ca lt side     COLONOSCOPY N/A 2023    Procedure: COLONOSCOPY;  Surgeon: Eliseo Garcia MD;  Location: [facility] ENDO (4TH FLR);  Service: Endoscopy;  Laterality: N/A;    ESOPHAGOGASTRODUODENOSCOPY N/A 2023    Procedure: EGD (ESOPHAGOGASTRODUODENOSCOPY);  Surgeon: Eliseo Garcia MD;  [facility]  Service: Endoscopy;  Laterality: N/A;  r/o h pylori-miralax extended prep-instr portal and handed-tb    HYSTERECTOMY      supacervical hysterectomy    LAPAROSCOPIC SUPRACERVICAL HYSTERECTOMY  2005    fibroids    MASTECTOMY Left 2017    chemo    MYOMECTOMY      TONSILLECTOMY      TYMPANOSTOMY TUBE PLACEMENT      VAGINAL DELIVERY       Medications:   Current Outpatient Medications on File Prior to Visit   Medication Sig Dispense Refill    albuterol (PROVENTIL/VENTOLIN HFA) 90 mcg/actuation inhaler SMARTSI-2 Puff(s) Via Inhaler Every 6 Hours PRN 6.7 g 0    ammonium lactate 12 % Crea Apply 1 application  topically 2 (two) times daily. 140 g 11    augmented betamethasone dipropionate (DIPROLENE-AF) 0.05 % cream Apply topically 2 (two) times to 4 times daily to area of back when itchy or burning.Stop using steroid topical when skin is non itchy.  Do not treat dark or red coloring. 50 g 0    betamethasone valerate 0.1% (VALISONE) 0.1 % Lotn Apply topically 2 (two) times daily.      clobetasoL (CLOBEX) 0.05 % shampoo Apply topically Daily.      clobetasoL (TEMOVATE) 0.05 % external  solution APPLY TOPICALLY TO THE SCALP TWICE DAILY AS NEEDED FOR ITCHING OR IRRITATION 50 mL 0    ferrous sulfate (FEROSUL) 325 mg (65 mg iron) Tab tablet TAKE 1 TABLET BY MOUTH TWICE DAILY 1 HOUR BEFORE MEALS 60 tablet 2    fluticasone propionate (FLONASE) 50 mcg/actuation nasal spray 2 sprays to each lateral nostril once a day 48 g 3    fluticasone-salmeterol diskus inhaler 250-50 mcg Inhale 1 puff into the lungs 2 (two) times daily. Controller 60 each 11    hydroCHLOROthiazide (HYDRODIURIL) 25 MG tablet Take 1 tablet (25 mg total) by mouth once daily. 90 tablet 3    hydrOXYzine HCL (ATARAX) 10 MG Tab Take 10 mg by mouth 2 (two) times daily.      ipratropium (ATROVENT) 21 mcg (0.03 %) nasal spray 2 sprays by Each Nostril route 2 (two) times daily. 30 mL 11    lancets Misc To check BG 2 times daily 200 each 3    latanoprost 0.005 % ophthalmic solution Place 1 drop into both eyes every evening. 7.5 mL 3    losartan (COZAAR) 50 MG tablet Take 1 tablet (50 mg total) by mouth once daily. 90 tablet 3    losartan (COZAAR) 50 MG tablet Take 1 tablet by mouth once daily.      meloxicam (MOBIC) 15 MG tablet Take 1 tablet (15 mg total) by mouth daily as needed for Pain (back pain). Take with food 30 tablet 2    metFORMIN (GLUCOPHAGE) 500 MG tablet Take 1 tablet (500 mg total) by mouth 2 (two) times daily with meals. 180 tablet 3    metFORMIN (GLUCOPHAGE) 500 MG tablet Take 1 tablet by mouth 2 (two) times daily with meals.      montelukast (SINGULAIR) 10 mg tablet Take 1 tablet (10 mg total) by mouth every evening. 90 tablet 3    ondansetron (ZOFRAN-ODT) 8 MG TbDL Take 1 tablet (8 mg total) by mouth every 8 (eight) hours as needed (Nausea). 30 tablet 2    ONETOUCH DELICA PLUS LANCET 33 gauge Misc Check BG twice daily 100 each 11    ONETOUCH VERIO REFLECT METER Misc USE AS DIRECTED TO TEST BLOOD GLUCOSE      ONETOUCH VERIO TEST STRIPS Strp USE TO CHECK BLOOD GLUCOSE DAILY 100 strip 3    pantoprazole (PROTONIX) 40 MG tablet Take  "1 tablet (40 mg total) by mouth once daily. 90 tablet 3    promethazine-dextromethorphan (PROMETHAZINE-DM) 6.25-15 mg/5 mL Syrp SMARTSI.5 Milliliter(s) By Mouth 4 Times Daily  mL 0    rizatriptan (MAXALT-MLT) 10 MG disintegrating tablet Take 1 tablet (10 mg total) by mouth every 2 (two) hours as needed for Migraine. Max 30 mg/day. 12 tablet 5    semaglutide (OZEMPIC) 1 mg/dose (4 mg/3 mL) INJECT 1 MG INTO THE SKIN EVERY 7 DAYS 9 mL 1    sumatriptan (IMITREX) 100 MG tablet Take 1 tablet (100 mg total) by mouth every 2 (two) hours as needed for Migraine. Max 200 mg/day. 12 tablet 5    tiZANidine (ZANAFLEX) 4 MG tablet Take 1 tablet (4 mg total) by mouth 2 (two) times daily as needed (muscle pain). 60 tablet 3    traMADoL (ULTRAM) 50 mg tablet Take 1 tablet (50 mg total) by mouth every 8 (eight) hours as needed for Pain. 30 tablet 0    ubrogepant (UBRELVY) 100 mg tablet Take 1 tablet (100 mg total) by mouth every 2 (two) hours as needed for Migraine. Max 200 mg/day. 10 tablet 2    [DISCONTINUED] diclofenac sodium (VOLTAREN) 1 % Gel Apply 2 g topically 3 (three) times daily. 100 g 0     No current facility-administered medications on file prior to visit.     Drug Allergies:   Review of patient's allergies indicates:   Allergen Reactions    Codeine Shortness Of Breath     Pt states she had a reaction as a teenager and was taken to the ER.    Lisinopril      cough     Social Hx:   Social History     Tobacco Use    Smoking status: Never     Passive exposure: Never    Smokeless tobacco: Never   Substance Use Topics    Alcohol use: No    Drug use: No     Additional History Obtained at Initial Visit:  H/o Asthma: denies, but with possible symptoms in .   H/o Rhinitis: endorses  Env/Occ:   Pets: no     PHYSICAL EXAM     VS: /74 (BP Location: Right arm, Patient Position: Sitting)   Pulse 95   Ht 5' 5" (1.651 m)   Wt 93.1 kg (205 lb 4 oz)   SpO2 99%   BMI 34.16 kg/m²   GENERAL: Alert, NAD, " well-appearing  EYES: EOMI, no conjunctival injection, no discharge, no infraorbital shiners  EARS: external auditory canals normal B/L, TM normal B/L  NOSE: NT 2+ B/L, no stringing mucus, no polyps visualized  ORAL: MMM, no ulcers, no thrush  LUNGS: CTAB, no w/r/c, no increased WOB  HEART: RRR, normal S1/S2, no m/g/r  DERM: no rashes  NEURO: normal speech, normal gait, no facial asymmetry     LABORATORY STUDIES     Component      Latest Ref Rng 6/25/2024 9/10/2024 9/30/2024   WBC      3.90 - 12.70 K/uL 6.74  7.51     RBC      4.00 - 5.40 M/uL 4.55  4.78     Hemoglobin      12.0 - 16.0 g/dL 12.0  13.0     Hematocrit      37.0 - 48.5 % 38.5  40.8     MCV      82 - 98 fL 85  85     MCH      27.0 - 31.0 pg 26.4 (L)  27.2     MCHC      32.0 - 36.0 g/dL 31.2 (L)  31.9 (L)     RDW      11.5 - 14.5 % 14.5  14.0     Platelet Count      150 - 450 K/uL 308  324     MPV      9.2 - 12.9 fL 9.5  10.4     Immature Granulocytes      0.0 - 0.5 % 0.4  0.5     Gran # (ANC)      1.8 - 7.7 K/uL 4.1  5.0     Immature Grans (Abs)      0.00 - 0.04 K/uL 0.03  0.04     Lymph #      1.0 - 4.8 K/uL 1.9  1.8     Mono #      0.3 - 1.0 K/uL 0.5  0.5     Eos #      0.0 - 0.5 K/uL 0.2  0.1     Baso #      0.00 - 0.20 K/uL 0.04  0.03     Differential Method Automated  Automated     Sodium      136 - 145 mmol/L  142     Potassium      3.5 - 5.1 mmol/L  3.4 (L)     Chloride      95 - 110 mmol/L  101     CO2      23 - 29 mmol/L  28     Glucose      70 - 110 mg/dL  161 (H)     BUN      8 - 23 mg/dL  17     Creatinine      0.5 - 1.4 mg/dL  0.8     Calcium      8.7 - 10.5 mg/dL  10.5     PROTEIN TOTAL      6.0 - 8.4 g/dL  7.6     Albumin      3.5 - 5.2 g/dL  3.9     BILIRUBIN TOTAL      0.1 - 1.0 mg/dL  0.4     ALP      55 - 135 U/L  70     AST      10 - 40 U/L  11     ALT      10 - 44 U/L  8 (L)     eGFR      >60 mL/min/1.73 m^2  >60.0     Anion Gap      8 - 16 mmol/L  13     Hemoglobin A1C External      4.0 - 5.6 %   5.6    Estimated Avg Glucose       68 - 131 mg/dL   114    TSH      0.400 - 4.000 uIU/mL   0.877      Component      Latest Ref Rng 5/16/2023   Strongyloides Ab IgG      Negative  Negative    Anti- E. Histolytica Antibody      Negative  Negative    TTG IgA      <7.0 U/mL 0.3    IgA      40 - 350 mg/dL 201       ALLERGEN TESTING     Immunocaps:   Component      Latest Ref Rng 3/4/2021   D. farinae      <0.10 kU/L <0.10    D. farinae Class CLASS 0    D. pteronyssinus Dust Mite IgE      <0.10 kU/L <0.10    D. pteronyssinus Class CLASS 0    [place] Grass IgE      <0.10 kU/L <0.10    [place] Grass Class CLASS 0    [place] IgE      <0.10 kU/L <0.10    [place] Class CLASS 0    [place] Plantain IgE      <0.10 kU/L <0.10    English Plantain Class CLASS 0    [place] Tree IgE      <0.10 kU/L <0.10    [place], Class CLASS 0    Ragweed, Western IgE      <0.10 kU/L <0.10    Ragweed, Western, Class CLASS 0    A. alternata IgE      <0.10 kU/L <0.10    Altern. alternata Class CLASS 0    Aspergillus Fumigatus IgE      <0.10 kU/L <0.10    A. fumigatus Class CLASS 0    Cat Dander IgE      <0.10 kU/L <0.10    Cat Epithelium Class CLASS 0    Cockroach, IgE      <0.10 kU/L 0.16 (H)    Cockroach, IgE       CLASS 0/1    Dog Dander IgE      <0.10 kU/L <0.10    Dog Dander Class CLASS 0    [place] Grass IgE      <0.10 kU/L <0.10    [place] Class CLASS 0    [place] Grass IgE      <0.10 kU/L <0.10    [place] Grass Class CLASS 0    [place] Grass IgE      <0.10 kU/L <0.10    [place] Grass Class CLASS 0    [place] Tree IgE      <0.10 kU/L <0.10    [place] Class CLASS 0    Silver [place] IgE      <0.10 kU/L <0.10    Silver [place] Class CLASS 0    Pecan Silver Plume Tree IgE      <0.10 kU/L <0.10    [place]ec[place], Class CLASS 0    Total IgE      0 - 100 IU/mL <35        PULMONARY FUNCTION TESTING     Date 6/16/23:  FVC:         98%ref -> + 3%  FEV1:         99%ref -> + 8%  FEV1/FVC: 79%  FEF 25-75: 103%ref  DLCO:        78%ref  Interpretation: Spirometry is normal. Spirometry remains unimproved  following bronchodilator. Lung volume determination is likely normal despite the reduced RV of uncertain clinical significance. Airway mechanics show normal airway resistance and conductance. DLCO is normal.     IMAGING & OTHER DIAGNOSTICS     CXR 11/20/23:  FINDINGS:  Heart is at the upper limit of normal in size, with normal appearing pulmonary vascularity and no findings to specifically suggest current cardiac decompensation observed.  Lung zones are clear, and are free of significant airspace consolidation or volume loss.  No pleural fluid.  No hilar or mediastinal mass lesion.  No pneumothorax.  Multilevel marginal vertebral endplate spurring in the thoracic spine is incidentally noted, as is instrumentation relating to a prior C5-C7 spinal fusion procedure.  Impression:  No significant intrathoracic abnormality.    CT chest 6/15/23:  FINDINGS:  Airways: Mild narrowing of the trachea at the level of the thyroid.  Otherwise, trachea and bronchi are patent.  Lungs/Pleura: Biapical fibronodular scarring.  Subcentimeter pulmonary nodules within the bilateral major fissures, favored to represent intrapulmonary lymph nodes.  No suspicious pulmonary nodules.  No large focal consolidation.  Pleural effusion or pleural thickening.  Impression:  No acute intrathoracic findings identified.  Diffuse enlargement the thyroid with left thyroid calcification.  Consider obtaining nonemergent thyroid ultrasound for further characterization.  Findings suggestive of hepatic steatosis.  Correlate with LFTs.  Additional findings as above.   (See report for full radiology read).     CHART REVIEW     Reviewed pulm note, labs.     ASSESSMENT & PLAN     Regine Cooper is a 69 y.o. female with     # Chronic rhinitis: Prior allergy testing with immunocaps mostly negative, other than equivocal result to cockroach. Main complaint is constant post-nasal drip. Also with sneezing, aural pruritus, and sometimes ocular symptoms. She does find  her medications helpful, particularly the ipratropium nasal spray, but remains bothered by post-nasal drip. She stopped flonase in the past because she didn't find it as helpful as the other nasal sprays.   -currently using ipratropium 0.03% nasal spray nightly. Increase to ipratropium 0.06% nasal spray TID prn for rhinorrhea or post-nasal drip.   -continue astepro nasal spray 2 SEN daily. Advised she can use up to 2 SEN BID.  -again discussed option of retrying flonase to be used in addition to (not instead of) astepro. Patient will consider it.   -continue cetirizine 10 mg daily prn.   -continue montelukast 10 mg nightly.     # Chronic cough; history of shortness of breath and wheezing: She thinks her post-nasal drip might cause the cough. She dealt with shortness of breath and wheezing through most of 2023 (which she felt was triggered by the post-nasal drip and cough). She found that her last round of oral steroids (prescribed in 12/2023) is what finally improved her symptoms. She established with pulm. She was prescribed advair, but stopped after a week because her cough improved. PFT's were essentially normal. She reports she has used albuterol once to twice this summer for cough.   -continue albuterol prn.       Follow up: 6 months or sooner if needed     I spent a total of 35 minutes on the day of the visit.  This includes face to face time and non-face to face time preparing to see the patient (eg, review of tests), obtaining and/or reviewing separately obtained history, documenting clinical information in the electronic or other health record.    Isabel Vargas MD  Allergy/Immunology

## 2024-10-03 ENCOUNTER — OFFICE VISIT (OUTPATIENT)
Dept: HEPATOLOGY | Facility: CLINIC | Age: 69
End: 2024-10-03
Payer: MEDICARE

## 2024-10-03 VITALS — WEIGHT: 204.56 LBS | HEIGHT: 65 IN | BODY MASS INDEX: 34.08 KG/M2

## 2024-10-03 DIAGNOSIS — E66.9 OBESITY (BMI 30-39.9): ICD-10-CM

## 2024-10-03 DIAGNOSIS — I15.2 HYPERTENSION ASSOCIATED WITH TYPE 2 DIABETES MELLITUS: ICD-10-CM

## 2024-10-03 DIAGNOSIS — E11.9 TYPE 2 DIABETES MELLITUS WITHOUT COMPLICATION, WITHOUT LONG-TERM CURRENT USE OF INSULIN: ICD-10-CM

## 2024-10-03 DIAGNOSIS — E11.59 HYPERTENSION ASSOCIATED WITH TYPE 2 DIABETES MELLITUS: ICD-10-CM

## 2024-10-03 DIAGNOSIS — K76.0 FATTY LIVER: Primary | ICD-10-CM

## 2024-10-03 PROBLEM — Z03.89 ENCOUNTER FOR OBSERVATION FOR OTHER SUSPECTED DISEASES AND CONDITIONS RULED OUT: Status: ACTIVE | Noted: 2017-10-27

## 2024-10-03 PROCEDURE — 1126F AMNT PAIN NOTED NONE PRSNT: CPT | Mod: CPTII,S$GLB,, | Performed by: NURSE PRACTITIONER

## 2024-10-03 PROCEDURE — 99214 OFFICE O/P EST MOD 30 MIN: CPT | Mod: S$GLB,,, | Performed by: NURSE PRACTITIONER

## 2024-10-03 PROCEDURE — 3066F NEPHROPATHY DOC TX: CPT | Mod: CPTII,S$GLB,, | Performed by: NURSE PRACTITIONER

## 2024-10-03 PROCEDURE — 4010F ACE/ARB THERAPY RXD/TAKEN: CPT | Mod: CPTII,S$GLB,, | Performed by: NURSE PRACTITIONER

## 2024-10-03 PROCEDURE — 3061F NEG MICROALBUMINURIA REV: CPT | Mod: CPTII,S$GLB,, | Performed by: NURSE PRACTITIONER

## 2024-10-03 PROCEDURE — 99999 PR PBB SHADOW E&M-EST. PATIENT-LVL III: CPT | Mod: PBBFAC,,, | Performed by: NURSE PRACTITIONER

## 2024-10-03 PROCEDURE — 3288F FALL RISK ASSESSMENT DOCD: CPT | Mod: CPTII,S$GLB,, | Performed by: NURSE PRACTITIONER

## 2024-10-03 PROCEDURE — 1101F PT FALLS ASSESS-DOCD LE1/YR: CPT | Mod: CPTII,S$GLB,, | Performed by: NURSE PRACTITIONER

## 2024-10-03 PROCEDURE — 3008F BODY MASS INDEX DOCD: CPT | Mod: CPTII,S$GLB,, | Performed by: NURSE PRACTITIONER

## 2024-10-03 PROCEDURE — 1159F MED LIST DOCD IN RCRD: CPT | Mod: CPTII,S$GLB,, | Performed by: NURSE PRACTITIONER

## 2024-10-03 PROCEDURE — 3044F HG A1C LEVEL LT 7.0%: CPT | Mod: CPTII,S$GLB,, | Performed by: NURSE PRACTITIONER

## 2024-10-03 PROCEDURE — 1160F RVW MEDS BY RX/DR IN RCRD: CPT | Mod: CPTII,S$GLB,, | Performed by: NURSE PRACTITIONER

## 2024-10-03 NOTE — PROGRESS NOTES
[facility] Established Patient Visit    Reason for Visit: Fatty Liver    PCP: Gladis Melissa    HPI:  This is a 69 y.o. female with PMH noted below, here for follow up for fatty liver, referred by GI. She was last seen in clinic by myself in 7/2023.    The patient's risk factors for NAFLD/SALEH include:     Obesity                                        Yes; BMI 34.05 - On Ozempic - Net weight loss of 42 lbs since 1/2023.  Dyslipidemia                                No; Refer to Lipid panel results below:   Latest Reference Range & Units 01/04/24 09:40   Cholesterol Total 120 - 199 mg/dL 155   HDL 40 - 75 mg/dL 43   HDL/Cholesterol Ratio 20.0 - 50.0 % 27.7   Non-HDL Cholesterol mg/dL 112   Total Cholesterol/HDL Ratio 2.0 - 5.0  3.6   Triglycerides 30 - 150 mg/dL 96   LDL Cholesterol 63.0 - 159.0 mg/dL 92.8     Insulin resistance/Diabetes         Yes; Last HgbA1c was 5.6% (9/2024)    She has had intermittently elevated liver enzymes in a hepatocellular pattern since at least 12/2019. Her liver enzymes have normalized with weight loss. Abdominal US in 5/2023 showed fatty infiltration of the liver.     She has never undergone a liver biopsy.    FIB-4 Calculation: [id number] at 10/3/2024  9:10 AM   Calculated from:  Last SGOT/AST : 11 at 10/3/2024  9:10 AM  Last SGPT/ALT: 8 at 10/3/2024  9:10 AM   Platelets: 324 at 10/3/2024  9:10 AM   Age: 69 y.o.     FIB-4 below 1.30 is considered as low-risk for advanced fibrosis  FIB-4 over 2.67 is considered as high-risk for advanced fibrosis  FIB-4 values between 1.30 and 2.67 are considered as intermediate-risk of advanced fibrosis for ages 36-64.     For ages > 64 the cut-off for low-risk goes to < 2.  This is a screening tool and clinical judgement should be used in the interpretation of these results.    She has no known family history of liver disease. She does not drink alcohol or use illicit drugs. HCV and HIV negative on prior labs. She has received  2 vaccinations for Hepatitis B in .     Fibroscan to non-invasively stage her liver disease in 2023 was suggestive of severe fatty infiltration of the liver (S3), with F0-F1 fibrosis, and a low likelihood of cirrhosis. Repeat Fibroscan was attempted today, but was unsuccessful (variable liver stiffness readings).  She is well appearing, and has no signs or symptoms of hepatic decompensation including jaundice, dark urine, pruritus, abdominal distention, lower extremity edema, hematemesis, melena, or periods of confusion suggestive of hepatic encephalopathy.      PMHX:  has a past medical history of Allergy, Breast cancer (), Cataract, Cervical spondylosis (2022), Diabetes mellitus, type 2, Eczema, Fibromyalgia, Glaucoma, Hypertension, Renal cyst, right (2020), and Steatosis of liver (2020).    PSHX:  has a past surgical history that includes Tonsillectomy; Breast surgery; Breast biopsy; Mastectomy (Left, ); Vaginal delivery; Myomectomy; Laparoscopic supracervical hysterectomy (); Hysterectomy; Adenoidectomy; Tympanostomy tube placement; Anterior cervical discectomy w/ fusion (N/A, 2022); Esophagogastroduodenoscopy (N/A, 2023); and Colonoscopy (N/A, 2023).    The patient's social and family histories were reviewed by me and updated in the appropriate section of the electronic medical record.    Review of patient's allergies indicates:   Allergen Reactions    Codeine Shortness Of Breath     Pt states she had a reaction as a teenager and was taken to the ER.    Lisinopril      cough     Current Outpatient Medications on File Prior to Visit   Medication Sig Dispense Refill    albuterol (PROVENTIL/VENTOLIN HFA) 90 mcg/actuation inhaler SMARTSI-2 Puff(s) Via Inhaler Every 6 Hours PRN 6.7 g 0    ammonium lactate 12 % Crea Apply 1 application  topically 2 (two) times daily. 140 g 11    augmented betamethasone dipropionate (DIPROLENE-AF) 0.05 % cream Apply topically 2  (two) times to 4 times daily to area of back when itchy or burning.Stop using steroid topical when skin is non itchy.  Do not treat dark or red coloring. 50 g 0    betamethasone valerate 0.1% (VALISONE) 0.1 % Lotn Apply topically 2 (two) times daily.      clobetasoL (TEMOVATE) 0.05 % external solution APPLY TOPICALLY TO THE SCALP TWICE DAILY AS NEEDED FOR ITCHING OR IRRITATION 50 mL 0    hydroCHLOROthiazide (HYDRODIURIL) 25 MG tablet Take 1 tablet (25 mg total) by mouth once daily. 90 tablet 3    hydrOXYzine HCL (ATARAX) 10 MG Tab Take 10 mg by mouth 2 (two) times daily.      ipratropium (ATROVENT) 42 mcg (0.06 %) nasal spray 2 sprays by Each Nostril route 3 (three) times daily as needed (for runny nose or post-nasal drip). 15 mL 11    lancets Misc To check BG 2 times daily 200 each 3    latanoprost 0.005 % ophthalmic solution Place 1 drop into both eyes every evening. 7.5 mL 3    losartan (COZAAR) 50 MG tablet Take 1 tablet (50 mg total) by mouth once daily. 90 tablet 3    meloxicam (MOBIC) 15 MG tablet Take 1 tablet (15 mg total) by mouth daily as needed for Pain (back pain). Take with food 30 tablet 2    metFORMIN (GLUCOPHAGE) 500 MG tablet Take 1 tablet (500 mg total) by mouth 2 (two) times daily with meals. 180 tablet 3    montelukast (SINGULAIR) 10 mg tablet Take 1 tablet (10 mg total) by mouth every evening. 90 tablet 3    ondansetron (ZOFRAN-ODT) 8 MG TbDL Take 1 tablet (8 mg total) by mouth every 8 (eight) hours as needed (Nausea). 30 tablet 2    ONETOUCH DELICA PLUS LANCET 33 gauge Misc Check BG twice daily 100 each 11    ONETOUCH VERIO REFLECT METER Misc USE AS DIRECTED TO TEST BLOOD GLUCOSE      ONETOUCH VERIO TEST STRIPS Strp USE TO CHECK BLOOD GLUCOSE DAILY 100 strip 3    pantoprazole (PROTONIX) 40 MG tablet Take 1 tablet (40 mg total) by mouth once daily. 90 tablet 3    semaglutide (OZEMPIC) 1 mg/dose (4 mg/3 mL) INJECT 1 MG INTO THE SKIN EVERY 7 DAYS 9 mL 1    sumatriptan (IMITREX) 100 MG tablet Take  1 tablet (100 mg total) by mouth every 2 (two) hours as needed for Migraine. Max 200 mg/day. 12 tablet 5    tiZANidine (ZANAFLEX) 4 MG tablet Take 1 tablet (4 mg total) by mouth 2 (two) times daily as needed (muscle pain). 60 tablet 3    traMADoL (ULTRAM) 50 mg tablet Take 1 tablet (50 mg total) by mouth every 8 (eight) hours as needed for Pain. 30 tablet 0    ubrogepant (UBRELVY) 100 mg tablet Take 1 tablet (100 mg total) by mouth every 2 (two) hours as needed for Migraine. Max 200 mg/day. 10 tablet 2    [DISCONTINUED] clobetasoL (CLOBEX) 0.05 % shampoo Apply topically Daily.      ferrous sulfate (FEROSUL) 325 mg (65 mg iron) Tab tablet TAKE 1 TABLET BY MOUTH TWICE DAILY 1 HOUR BEFORE MEALS (Patient not taking: Reported on 10/3/2024) 60 tablet 2    fluticasone propionate (FLONASE) 50 mcg/actuation nasal spray 2 sprays to each lateral nostril once a day (Patient not taking: Reported on 10/3/2024) 48 g 3    fluticasone-salmeterol diskus inhaler 250-50 mcg Inhale 1 puff into the lungs 2 (two) times daily. Controller (Patient not taking: Reported on 10/3/2024) 60 each 11    rizatriptan (MAXALT-MLT) 10 MG disintegrating tablet Take 1 tablet (10 mg total) by mouth every 2 (two) hours as needed for Migraine. Max 30 mg/day. (Patient not taking: Reported on 10/3/2024) 12 tablet 5    [DISCONTINUED] diclofenac sodium (VOLTAREN) 1 % Gel Apply 2 g topically 3 (three) times daily. 100 g 0    [DISCONTINUED] losartan (COZAAR) 50 MG tablet Take 1 tablet by mouth once daily. (Patient not taking: Reported on 10/3/2024)      [DISCONTINUED] metFORMIN (GLUCOPHAGE) 500 MG tablet Take 1 tablet by mouth 2 (two) times daily with meals. (Patient not taking: Reported on 10/3/2024)      [DISCONTINUED] promethazine-dextromethorphan (PROMETHAZINE-DM) 6.25-15 mg/5 mL Syrp SMARTSI.5 Milliliter(s) By Mouth 4 Times Daily PRN (Patient not taking: Reported on 10/3/2024) 100 mL 0     No current facility-administered medications on file prior to  "visit.     SOCIAL HISTORY:   Social History     Tobacco Use   Smoking Status Never    Passive exposure: Never   Smokeless Tobacco Never     Social History     Substance and Sexual Activity   Alcohol Use No     Social History     Substance and Sexual Activity   Drug Use No     ROS:   GENERAL: Denies fever, chills, + intentional weight loss, fatigue  HEENT: Denies headaches, dizziness, vision/hearing changes  CARDIOVASCULAR: Denies chest pain, palpitations, or edema  RESPIRATORY: Denies dyspnea, cough  GI: Denies abdominal pain, rectal bleeding, nausea, vomiting. No change in bowel pattern or color  : Denies dysuria, hematuria   SKIN: Denies rash, itching   NEURO: Denies confusion, memory loss, or mood changes  PSYCH: Denies depression or anxiety  HEME/LYMPH: Denies easy bruising or bleeding    Objective Findings:    PHYSICAL EXAM:   Friendly Black or  female, in no acute distress; alert and oriented to person, place and time.  VITALS: Ht 5' 5" (1.651 m)   Wt 92.8 kg (204 lb 9.4 oz)   BMI 34.05 kg/m²   HENT: Normocephalic, without obvious abnormality.   EYES: Sclerae anicteric.   NECK: No obvious masses.  CARDIOVASCULAR: No peripheral edema.  RESPIRATORY: Normal respiratory effort.  GI: Obese abdomen.  EXTREMITIES:  No clubbing, cyanosis or edema.  SKIN: Warm and dry. No jaundice. No rashes noted to exposed skin.   NEURO:  Normal gait. No asterixis.  PSYCH:  Memory intact. Thought and speech pattern appropriate. Behavior normal. No depression or anxiety noted.    DIAGNOSTIC STUDIES:    US ABDOMEN COMPLETE 5/19/2023:    FINDINGS:    Pancreas: The visualized portions of pancreas appear normal.     Aorta: No aneurysm.     Liver: 18.7 cm, enlarged.  Diffusely increased parenchymal echogenicity consistent with steatosis. No focal lesions. HRI measures 1.4, borderline measurement..     Gallbladder: No calculi, wall thickening, or pericholecystic fluid.  Negative sonographic Lalen's sign.     Biliary " system: 2 mm common bile duct.  No intrahepatic ductal dilatation.     Inferior vena cava: Normal in appearance.     Right kidney: 11.4 cm. No hydronephrosis.     Left kidney: 9.6 cm. No hydronephrosis.     Spleen: 9.7 x 3.4 cm.  Normal in size with homogeneous echotexture.     Miscellaneous: No ascites.     Impression:     Mild hepatomegaly and increased parenchymal echogenicity with borderline HRI which may represent hepatic steatosis.  Recommend correlation with LFTs.    FIBROSCAN 7/27/2023:      Findings  Median liver stiffness score:  6.1  CAP Reading: dB/m:  309     IQR/med %:  5  Interpretation  Fibrosis interpretation is based on medial liver stiffness - Kilopascal (kPa).     Fibrosis Stage:  F 0-1  Steatosis interpretation is based on controlled attenuation parameter - (dB/m).     Steatosis Grade:  S3    EDUCATION:  Per AVS.    ASSESSMENT & PLAN:  69 y.o. Black or  female with:    1. Fatty liver  MR Elastography      2. Obesity (BMI 30-39.9)  MR Elastography      3. Type 2 diabetes mellitus without complication, without long-term current use of insulin  MR Elastography      4. Hypertension associated with type 2 diabetes mellitus  MR Elastography        - Schedule MR Elastography to non-invasively restage liver disease.  - Repeat liver function tests annually, through PCP.  - Avoid alcohol and herbal supplements/alternative remedies.  - Continue Ozempic, as prescribed.  - Recommend additional weight loss of 20 lbs, through diet and exercise.  - Recommend good control of cholesterol, blood pressure, & blood sugar levels.  - Return to clinic to be determined by MR Elastography results.    Thank you for allowing me to participate in the care of Regine Cooper       Hepatology Nurse Practitioner  [facility]    CC'ed note to:   No ref. provider found  Gladis Melissa MD

## 2024-10-04 ENCOUNTER — OFFICE VISIT (OUTPATIENT)
Dept: PRIMARY CARE CLINIC | Facility: CLINIC | Age: 69
End: 2024-10-04
Payer: MEDICARE

## 2024-10-04 VITALS
SYSTOLIC BLOOD PRESSURE: 116 MMHG | OXYGEN SATURATION: 98 % | WEIGHT: 202.38 LBS | BODY MASS INDEX: 33.72 KG/M2 | HEART RATE: 97 BPM | RESPIRATION RATE: 18 BRPM | DIASTOLIC BLOOD PRESSURE: 77 MMHG | HEIGHT: 65 IN | TEMPERATURE: 98 F

## 2024-10-04 DIAGNOSIS — E04.1 THYROID NODULE: Primary | ICD-10-CM

## 2024-10-04 DIAGNOSIS — Z00.00 ROUTINE HEALTH MAINTENANCE: ICD-10-CM

## 2024-10-04 DIAGNOSIS — E11.9 TYPE 2 DIABETES MELLITUS WITHOUT COMPLICATION, WITHOUT LONG-TERM CURRENT USE OF INSULIN: ICD-10-CM

## 2024-10-04 DIAGNOSIS — I15.2 HYPERTENSION ASSOCIATED WITH TYPE 2 DIABETES MELLITUS: ICD-10-CM

## 2024-10-04 DIAGNOSIS — E11.59 HYPERTENSION ASSOCIATED WITH TYPE 2 DIABETES MELLITUS: ICD-10-CM

## 2024-10-04 PROCEDURE — 99999 PR PBB SHADOW E&M-EST. PATIENT-LVL III: CPT | Mod: PBBFAC,,, | Performed by: STUDENT IN AN ORGANIZED HEALTH CARE EDUCATION/TRAINING PROGRAM

## 2024-10-04 NOTE — PROGRESS NOTES
10/04/2024    Regine Cooper  [id number]    Chief Complaint   Patient presents with    Follow-up       HPI    This pt is known to me and presents for routine 6 month care. Chronic conditions: HTN, TYPE II DIABETES, fatty liver, allergic rhinitis    Since last visit patient has had routine care w/ podiatry, hematology, and pain management. Yesterday, was seen by hepatology and will have an MRI abd for routine liver f/u.    TYPE II DIABETES  Last A1c 5.6  Ozempic is now cost prohibitive at $400 due to patient being in the donut hole   Still on metformin 500 mg BID  Diet: eating normally, but is trying to increase protein  Exercise: gym 1-2x per week on the treadmill    HTN: has been well controlled     HM: flu shot UTD and COVID UTD     Negative 10 point ROS outside of HPI    Social History     Socioeconomic History    Marital status:     Number of children: 1   Tobacco Use    Smoking status: Never     Passive exposure: Never    Smokeless tobacco: Never   Substance and Sexual Activity    Alcohol use: No    Drug use: No    Sexual activity: Not Currently   Social History Narrative        1 son (estranged)    3 grandchildren ([place])    Born and raised in [place] (moved to [place] 2017)     Social Drivers of Health     Financial Resource Strain: Low Risk  (11/23/2023)    Overall Financial Resource Strain (CARDIA)     Difficulty of Paying Living Expenses: Not very hard   Food Insecurity: No Food Insecurity (11/23/2023)    Hunger Vital Sign     Worried About Running Out of Food in the Last Year: Never true     Ran Out of Food in the Last Year: Never true   Transportation Needs: No Transportation Needs (11/23/2023)    PRAPARE - Transportation     Lack of Transportation (Medical): No     Lack of Transportation (Non-Medical): No   Physical Activity: Insufficiently Active (11/23/2023)    Exercise Vital Sign     Days of Exercise per Week: 2 days     Minutes of Exercise per Session: 40 min   Stress: No Stress  Concern Present (2023)    [employer] [place] [employer] - Occupational Stress Questionnaire     Feeling of Stress : Only a little   Housing Stability: Low Risk  (2023)    Housing Stability Vital Sign     Unable to Pay for Housing in the Last Year: No     Number of Places Lived in the Last Year: 1     Unstable Housing in the Last Year: No           Current Outpatient Medications:     albuterol (PROVENTIL/VENTOLIN HFA) 90 mcg/actuation inhaler, SMARTSI-2 Puff(s) Via Inhaler Every 6 Hours PRN, Disp: 6.7 g, Rfl: 0    ammonium lactate 12 % Crea, Apply 1 application  topically 2 (two) times daily., Disp: 140 g, Rfl: 11    augmented betamethasone dipropionate (DIPROLENE-AF) 0.05 % cream, Apply topically 2 (two) times to 4 times daily to area of back when itchy or burning.Stop using steroid topical when skin is non itchy.  Do not treat dark or red coloring., Disp: 50 g, Rfl: 0    betamethasone valerate 0.1% (VALISONE) 0.1 % Lotn, Apply topically 2 (two) times daily., Disp: , Rfl:     clobetasoL (TEMOVATE) 0.05 % external solution, APPLY TOPICALLY TO THE SCALP TWICE DAILY AS NEEDED FOR ITCHING OR IRRITATION, Disp: 50 mL, Rfl: 0    ferrous sulfate (FEROSUL) 325 mg (65 mg iron) Tab tablet, TAKE 1 TABLET BY MOUTH TWICE DAILY 1 HOUR BEFORE MEALS (Patient not taking: Reported on 10/3/2024), Disp: 60 tablet, Rfl: 2    fluticasone propionate (FLONASE) 50 mcg/actuation nasal spray, 2 sprays to each lateral nostril once a day (Patient not taking: Reported on 10/3/2024), Disp: 48 g, Rfl: 3    fluticasone-salmeterol diskus inhaler 250-50 mcg, Inhale 1 puff into the lungs 2 (two) times daily. Controller (Patient not taking: Reported on 10/3/2024), Disp: 60 each, Rfl: 11    hydroCHLOROthiazide (HYDRODIURIL) 25 MG tablet, Take 1 tablet (25 mg total) by mouth once daily., Disp: 90 tablet, Rfl: 3    hydrOXYzine HCL (ATARAX) 10 MG Tab, Take 10 mg by mouth 2 (two) times daily., Disp: , Rfl:     ipratropium (ATROVENT)  42 mcg (0.06 %) nasal spray, 2 sprays by Each Nostril route 3 (three) times daily as needed (for runny nose or post-nasal drip)., Disp: 15 mL, Rfl: 11    lancets Misc, To check BG 2 times daily, Disp: 200 each, Rfl: 3    latanoprost 0.005 % ophthalmic solution, Place 1 drop into both eyes every evening., Disp: 7.5 mL, Rfl: 3    losartan (COZAAR) 50 MG tablet, Take 1 tablet (50 mg total) by mouth once daily., Disp: 90 tablet, Rfl: 3    meloxicam (MOBIC) 15 MG tablet, Take 1 tablet (15 mg total) by mouth daily as needed for Pain (back pain). Take with food, Disp: 30 tablet, Rfl: 2    metFORMIN (GLUCOPHAGE) 500 MG tablet, Take 1 tablet (500 mg total) by mouth 2 (two) times daily with meals., Disp: 180 tablet, Rfl: 3    montelukast (SINGULAIR) 10 mg tablet, Take 1 tablet (10 mg total) by mouth every evening., Disp: 90 tablet, Rfl: 3    ondansetron (ZOFRAN-ODT) 8 MG TbDL, Take 1 tablet (8 mg total) by mouth every 8 (eight) hours as needed (Nausea)., Disp: 30 tablet, Rfl: 2    ONETOUCH DELICA PLUS LANCET 33 gauge Misc, Check BG twice daily, Disp: 100 each, Rfl: 11    ONETOUCH VERIO REFLECT METER [facility], USE AS DIRECTED TO TEST BLOOD GLUCOSE, Disp: , Rfl:     ONETOUCH VERIO TEST STRIPS Strp, USE TO CHECK BLOOD GLUCOSE DAILY, Disp: 100 strip, Rfl: 3    pantoprazole (PROTONIX) 40 MG tablet, Take 1 tablet (40 mg total) by mouth once daily., Disp: 90 tablet, Rfl: 3    rizatriptan (MAXALT-MLT) 10 MG disintegrating tablet, Take 1 tablet (10 mg total) by mouth every 2 (two) hours as needed for Migraine. Max 30 mg/day. (Patient not taking: Reported on 10/3/2024), Disp: 12 tablet, Rfl: 5    semaglutide (OZEMPIC) 1 mg/dose (4 mg/3 mL), INJECT 1 MG INTO THE SKIN EVERY 7 DAYS, Disp: 9 mL, Rfl: 1    sumatriptan (IMITREX) 100 MG tablet, Take 1 tablet (100 mg total) by mouth every 2 (two) hours as needed for Migraine. Max 200 mg/day., Disp: 12 tablet, Rfl: 5    tiZANidine (ZANAFLEX) 4 MG tablet, Take 1 tablet (4 mg total) by mouth 2 (two)  times daily as needed (muscle pain)., Disp: 60 tablet, Rfl: 3    traMADoL (ULTRAM) 50 mg tablet, Take 1 tablet (50 mg total) by mouth every 8 (eight) hours as needed for Pain., Disp: 30 tablet, Rfl: 0    ubrogepant (UBRELVY) 100 mg tablet, Take 1 tablet (100 mg total) by mouth every 2 (two) hours as needed for Migraine. Max 200 mg/day., Disp: 10 tablet, Rfl: 2      Physical Exam  Vitals:    10/04/24 0833   BP: 116/77   Pulse: 97   Resp: 18   Temp: 98.3 °F (36.8 °C)       Physical Exam      Gen: well appearing, NAD  Resp: non labored breathing, no crackles, no wheezes, CTAB  CV: RRR no murmur, gallops, rubs, no LE edema  Abd: soft nontender BS present no organomegaly    1. Thyroid nodule  Last completed in 7/2023   - US Thyroid; Future    2. Type 2 diabetes mellitus without complication, without long-term current use of insulin  Last A1c 5.6  Continue metformin. Can increase to 1000 mg BID if uptrending BG  HOLD ozempic until Jan 1; pt will send msg    3. Hypertension associated with type 2 diabetes mellitus  Well controlled continue current regimen    4. Routine health maintenance      RTC in 3-6  months for routine follow up    Gladis Melissa MD  Family Medicine

## 2024-10-10 ENCOUNTER — PATIENT MESSAGE (OUTPATIENT)
Dept: GASTROENTEROLOGY | Facility: CLINIC | Age: 69
End: 2024-10-10
Payer: MEDICARE

## 2024-10-23 ENCOUNTER — TELEPHONE (OUTPATIENT)
Dept: PULMONOLOGY | Facility: CLINIC | Age: 69
End: 2024-10-23
Payer: MEDICARE

## 2024-10-23 NOTE — TELEPHONE ENCOUNTER
Call was returned to patient in regards to scheduling an follow up visit. Patient states she does not feel the need to be seen soon. I informed patient that Dr. Beck does not have any available appointments. Patient will call back next month when the schedule is available. Patient is added to the waiting list. Patient verbalized understanding.

## 2024-10-23 NOTE — TELEPHONE ENCOUNTER
----- Message from Tech Jasimine sent at 10/23/2024  9:05 AM CDT -----  Regarding: Appt  Contact: [phone]  The patient is calling to schedule her annual appointment.  Please contact patient to further discuss thank you.

## 2024-11-02 NOTE — PROGRESS NOTES
HPI     Glaucoma            Comments: 4 month IOP ck today          Comments    DLS: 7/2/24    Pt states she had an episode where she could not move eyes up/down or   right/left x 2 days. This happened after she got COVID shot and denies any   vision changes or other eye symptoms. Pt spoke to someone and they stated   that it was a possible side effect of shot.     1. Glaucoma Suspect   2. Drusen OU   3. NS OU   4. Hx Breast CA    MEDS:  Latanoprost QHS OU  Refresh AT's PRN OU          Last edited by Ivette Garrison MA on 11/5/2024  1:58 PM.            Assessment /Plan     For exam results, see Encounter Report.    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   First photos   2018   Treatment / Drops started   latanoprost - started 1/6/2023 for IOP 28/22           Family history    + mother / grtand father / uncle / cousins         Glaucoma meds    latanoprost ou - 1/6/2023         H/O adverse rxn to glaucoma drops    none        LASERS    none        GLAUCOMA SURGERIES    none        OTHER EYE SURGERIES    none        CDR    0.8/0.7        Tbase    18-28  od // 16-22 os        Tmax    28/22           Ttarget    ? About 20              HVF    5 test 2019 to  2024 - full in past - now w/ early SNS   od // full os        Gonio    +3-4 ou         CCT    588/585        OCT    5  test 2019 to 2024 - RNFL -  dec mike JOLLEY (mild prog) od // nl  os        HRT    1 test 2020 to 2020 -  MR -  nl od // nl os /// CDR 0.63 od // 0.51 os        Disc photos    2018, 2022      - Ttoday  18/18  (good resp to latanoprost down from 28/22 )  - Test done today     IOP / gonio    2. Dominate familial  drusen   Mostly outside the arcades ou    See photos - 2018    Pt is taking ARED's     3. NS   Mild -monitor     4. flaoter (one floater - string like)    Warned of signs of PVD and RD    Pt to call if marked increase in floaters or flashes or curtain defect    5. H/O migraines - since chilhood   No visula aura - but the migraines settle in/around eyes    6. Breast CA     7. Social stress (11/27/2023)    Her only son committed suicide 6/2023 (he was in [facility])   Ex  // just got to burry him recently  ( had to wait on death certificate) - 11/2023 - he had 3 children ages 17-23      PLAN   IOP 18/18   (11/5//2024) on latanoprost   Off gtts - 18-28 od and 18-22 os /  / + Fm Hx - mother and an uncle and possibly a grand parent   HVF inow with early SNS / SAD od // still full os   OCT - bord / dec RNFL in one sector od - stable and nl os      cont  latatanoprost ou q hs     Had a reaction to the modern covid vaccine - her eyes - were sort of frozen in primary gaze and she could not look up down or right or left for about 48 hrs (10/2024 )     F/U 4 months with IOP check //  ? Consider slt (angle open)  as 2nd step if needed vs more drops (? Rocklatan)

## 2024-11-05 ENCOUNTER — OFFICE VISIT (OUTPATIENT)
Dept: OPHTHALMOLOGY | Facility: CLINIC | Age: 69
End: 2024-11-05
Payer: MEDICARE

## 2024-11-05 ENCOUNTER — HOSPITAL ENCOUNTER (OUTPATIENT)
Dept: RADIOLOGY | Facility: HOSPITAL | Age: 69
Discharge: HOME | End: 2024-11-05
Attending: NURSE PRACTITIONER
Payer: MEDICARE

## 2024-11-05 DIAGNOSIS — E66.9 OBESITY (BMI 30-39.9): ICD-10-CM

## 2024-11-05 DIAGNOSIS — I15.2 HYPERTENSION ASSOCIATED WITH TYPE 2 DIABETES MELLITUS: ICD-10-CM

## 2024-11-05 DIAGNOSIS — H35.362 FAMILIAL DRUSEN OF MACULA OF BOTH EYES: ICD-10-CM

## 2024-11-05 DIAGNOSIS — H52.4 MYOPIA OF BOTH EYES WITH ASTIGMATISM AND PRESBYOPIA: ICD-10-CM

## 2024-11-05 DIAGNOSIS — H52.13 MYOPIA OF BOTH EYES WITH ASTIGMATISM AND PRESBYOPIA: ICD-10-CM

## 2024-11-05 DIAGNOSIS — E11.9 TYPE 2 DIABETES MELLITUS WITHOUT COMPLICATION, WITHOUT LONG-TERM CURRENT USE OF INSULIN: ICD-10-CM

## 2024-11-05 DIAGNOSIS — H40.022 OPEN ANGLE WITH BORDERLINE FINDINGS AND HIGH GLAUCOMA RISK IN LEFT EYE: ICD-10-CM

## 2024-11-05 DIAGNOSIS — H35.361 FAMILIAL DRUSEN OF MACULA OF BOTH EYES: ICD-10-CM

## 2024-11-05 DIAGNOSIS — E11.59 HYPERTENSION ASSOCIATED WITH TYPE 2 DIABETES MELLITUS: ICD-10-CM

## 2024-11-05 DIAGNOSIS — H52.203 MYOPIA OF BOTH EYES WITH ASTIGMATISM AND PRESBYOPIA: ICD-10-CM

## 2024-11-05 DIAGNOSIS — K76.0 FATTY LIVER: ICD-10-CM

## 2024-11-05 DIAGNOSIS — H25.13 NUCLEAR SCLEROTIC CATARACT OF BOTH EYES: ICD-10-CM

## 2024-11-05 DIAGNOSIS — H40.1111 PRIMARY OPEN-ANGLE GLAUCOMA, RIGHT EYE, MILD STAGE: Primary | ICD-10-CM

## 2024-11-05 PROCEDURE — 92020 GONIOSCOPY: CPT | Mod: S$GLB,,, | Performed by: OPHTHALMOLOGY

## 2024-11-05 PROCEDURE — 99999 PR PBB SHADOW E&M-EST. PATIENT-LVL III: CPT | Mod: PBBFAC,,, | Performed by: OPHTHALMOLOGY

## 2024-11-05 PROCEDURE — 76391 MR ELASTOGRAPHY: CPT | Mod: TC

## 2024-11-05 PROCEDURE — 76391 MR ELASTOGRAPHY: CPT | Mod: 26,,, | Performed by: RADIOLOGY

## 2024-11-05 PROCEDURE — 1126F AMNT PAIN NOTED NONE PRSNT: CPT | Mod: CPTII,S$GLB,, | Performed by: OPHTHALMOLOGY

## 2024-11-05 PROCEDURE — 3061F NEG MICROALBUMINURIA REV: CPT | Mod: CPTII,S$GLB,, | Performed by: OPHTHALMOLOGY

## 2024-11-05 PROCEDURE — 3288F FALL RISK ASSESSMENT DOCD: CPT | Mod: CPTII,S$GLB,, | Performed by: OPHTHALMOLOGY

## 2024-11-05 PROCEDURE — 1159F MED LIST DOCD IN RCRD: CPT | Mod: CPTII,S$GLB,, | Performed by: OPHTHALMOLOGY

## 2024-11-05 PROCEDURE — 99214 OFFICE O/P EST MOD 30 MIN: CPT | Mod: S$GLB,,, | Performed by: OPHTHALMOLOGY

## 2024-11-05 PROCEDURE — 3066F NEPHROPATHY DOC TX: CPT | Mod: CPTII,S$GLB,, | Performed by: OPHTHALMOLOGY

## 2024-11-05 PROCEDURE — 3044F HG A1C LEVEL LT 7.0%: CPT | Mod: CPTII,S$GLB,, | Performed by: OPHTHALMOLOGY

## 2024-11-05 PROCEDURE — 1101F PT FALLS ASSESS-DOCD LE1/YR: CPT | Mod: CPTII,S$GLB,, | Performed by: OPHTHALMOLOGY

## 2024-11-05 PROCEDURE — 1160F RVW MEDS BY RX/DR IN RCRD: CPT | Mod: CPTII,S$GLB,, | Performed by: OPHTHALMOLOGY

## 2024-11-05 PROCEDURE — 4010F ACE/ARB THERAPY RXD/TAKEN: CPT | Mod: CPTII,S$GLB,, | Performed by: OPHTHALMOLOGY

## 2024-11-05 RX ORDER — FLUTICASONE PROPIONATE 50 MCG
1 SPRAY, SUSPENSION (ML) NASAL DAILY
COMMUNITY

## 2024-11-06 DIAGNOSIS — E11.59 HYPERTENSION ASSOCIATED WITH TYPE 2 DIABETES MELLITUS: ICD-10-CM

## 2024-11-06 DIAGNOSIS — I15.2 HYPERTENSION ASSOCIATED WITH TYPE 2 DIABETES MELLITUS: ICD-10-CM

## 2024-11-06 DIAGNOSIS — E66.9 OBESITY (BMI 30-39.9): ICD-10-CM

## 2024-11-06 DIAGNOSIS — K76.0 FATTY LIVER: Primary | ICD-10-CM

## 2024-11-06 DIAGNOSIS — E11.9 TYPE 2 DIABETES MELLITUS WITHOUT COMPLICATION, WITHOUT LONG-TERM CURRENT USE OF INSULIN: ICD-10-CM

## 2024-11-07 ENCOUNTER — TELEPHONE (OUTPATIENT)
Dept: PULMONOLOGY | Facility: CLINIC | Age: 69
End: 2024-11-07
Payer: MEDICARE

## 2024-11-07 ENCOUNTER — PATIENT MESSAGE (OUTPATIENT)
Dept: PULMONOLOGY | Facility: CLINIC | Age: 69
End: 2024-11-07
Payer: MEDICARE

## 2024-11-07 NOTE — TELEPHONE ENCOUNTER
Call was returned to patient in regards to scheduling a follow up visit. Patient is scheduled with Dr Beck first available appointment on 2/18/25 at 8:30 am. Patient is added to the wait list. Appointment mailed. Patient confirmed and verbalized understanding.

## 2024-11-07 NOTE — TELEPHONE ENCOUNTER
----- Message from Lenore sent at 11/7/2024 11:40 AM CST -----  Regarding: Appointment  Contact: [phone]  Calling to schedule an appointment per follow up for cough as soon as possible. Please call patient to schedule today.    [phone]

## 2024-11-12 ENCOUNTER — TELEPHONE (OUTPATIENT)
Dept: PULMONOLOGY | Facility: CLINIC | Age: 69
End: 2024-11-12
Payer: MEDICARE

## 2024-11-12 NOTE — TELEPHONE ENCOUNTER
Spoke with patient, informed her that I have received her message. Pt state that she need refill on her medication (Promethazine DM Cough Syrup). Pt states that the last time she received medication was November 2024. I advised pt that I do not see medication on her list anymore but however I will forward message to Dr Beck to review./advise. Pt verbalized that she understand.

## 2024-11-15 ENCOUNTER — OFFICE VISIT (OUTPATIENT)
Dept: PAIN MEDICINE | Facility: CLINIC | Age: 69
End: 2024-11-15
Payer: MEDICARE

## 2024-11-15 ENCOUNTER — PATIENT MESSAGE (OUTPATIENT)
Dept: PRIMARY CARE CLINIC | Facility: CLINIC | Age: 69
End: 2024-11-15

## 2024-11-15 ENCOUNTER — TELEPHONE (OUTPATIENT)
Dept: PRIMARY CARE CLINIC | Facility: CLINIC | Age: 69
End: 2024-11-15
Payer: MEDICARE

## 2024-11-15 ENCOUNTER — HOSPITAL ENCOUNTER (OUTPATIENT)
Dept: RADIOLOGY | Facility: HOSPITAL | Age: 69
Discharge: HOME | End: 2024-11-15
Attending: STUDENT IN AN ORGANIZED HEALTH CARE EDUCATION/TRAINING PROGRAM
Payer: MEDICARE

## 2024-11-15 VITALS — SYSTOLIC BLOOD PRESSURE: 117 MMHG | DIASTOLIC BLOOD PRESSURE: 72 MMHG | HEART RATE: 87 BPM

## 2024-11-15 DIAGNOSIS — B02.29 POST HERPETIC NEURALGIA: Primary | ICD-10-CM

## 2024-11-15 DIAGNOSIS — E04.1 THYROID NODULE: ICD-10-CM

## 2024-11-15 PROCEDURE — 99999 PR PBB SHADOW E&M-EST. PATIENT-LVL III: CPT | Mod: PBBFAC,,, | Performed by: STUDENT IN AN ORGANIZED HEALTH CARE EDUCATION/TRAINING PROGRAM

## 2024-11-15 PROCEDURE — 76536 US EXAM OF HEAD AND NECK: CPT | Mod: 26,,, | Performed by: INTERNAL MEDICINE

## 2024-11-15 PROCEDURE — 76536 US EXAM OF HEAD AND NECK: CPT | Mod: TC

## 2024-11-15 NOTE — TELEPHONE ENCOUNTER
----- Message from Tia sent at 11/15/2024  8:49 AM CST -----  Contact: [phone]  Caller is requesting an earlier appointment then we can schedule.  Caller is requesting a message be sent to the provider.    If this is for urgent care symptoms, did you offer other providers at this location, providers at other locations, or [facility]? (yes, no, n/a): no      If this is for the patients physical, did you offer to schedule next available and put on wait list, or to see NP or PA for their physical?  (yes, no, n/a):  no     When is the next available appointment with their provider:  Dec 6     Reason for the appointment:  Bad cough / states its long standing and provider knows     Patient preference of timeframe to be scheduled:  pt wants  to be seen today     Would the patient like a call back, or a response through their [facility] portal?:   Call back     Please call pt and advise

## 2024-11-15 NOTE — PROCEDURES
Procedures  PROCEDURE: Quenza (Capsaicin 8% topical system) - right flank    PATIENT NAME: Regine Cooper   MRN: [id number]     DATE OF PROCEDURE: 11/15/2024    Diagnosis: B02.23 Postherpetic polyneuropathy    CPT code: 50915 (60 minute office visit),  (Capsaicin patch per sq cm)    Patch # 2 this year.    Postprocedural Diagnosis: Same    Complications: None  Outcome: Good    Informed Consent:  The patient's condition and proposed procedures, risks (including complications of nerve damage, skin irritation, infection, and failure of pain relief), and alternatives were discussed with the patient or responsible party.  The patient's/responsible party's questions were answered.  The patient/responsible party appeared to understand and chose to proceed.  Informed consent was obtained.    Procedural Pause:  A procedural pause verifying correct patient, medical record number, allergies, medications to be administered, current vital signs, and surgical site was performed immediately prior to beginning the procedure.    Procedure in Detail:  The procedure was performed in the procedure treatment room.  After obtaining written consent, the patient was placed in a seated position. 2 patches were used for the procedure today.  The patient applied 4% lidocaine ointment to the area 1 hour prior to treatment., and the target area was outlined with a marker.  The patch(es) were applied to the target area and secured with tape.  A coban was used to further secure the patches in place.  The patient was allowed to rest in a comfortable position, and monitored by staff every few minutes to ensure comfort.  The option for ice pack was provided to the patient should they start experiencing burning.  The patch(es) remained in place for 60 minutes.  The patch(es) were then removed and the cleaning gel was applied and allowed to sit for an additional 60 seconds, after which the area was wiped clean.     The patient was then discharged  in stable condition.      DISCUSSION:  A Qutenza patch was applied today with the purpose of treating the patients nerve pain. They were informed that they may have some burning of the skin for a few days, and should not take a hot shower for 2-3 days as that may irritate the area.  They were informed that the area may feel like they had a sunburn.  Pain medications can continue to be taken as they were prior to the procedure.  They were informed that it can take about 2-4 weeks for the effects of the patch to be fully realized.      Note Electronically Signed By:  Hi Otoole  11/15/2024

## 2024-11-15 NOTE — PROGRESS NOTES
HPI  Patient presenting for qutenza     Patient on Anti-coagulation No    No health changes since previous encounter    Past Medical History:   Diagnosis Date    Allergy     Breast cancer 2017    Cataract     Cervical spondylosis 2022    Diabetes mellitus, type 2     Type 2    Eczema     Fibromyalgia     Glaucoma     Hypertension     Renal cyst, right 2020    Steatosis of liver 2020     Past Surgical History:   Procedure Laterality Date    ADENOIDECTOMY      ANTERIOR CERVICAL DISCECTOMY W/ FUSION N/A 2022    Procedure: DISCECTOMY, SPINE, CERVICAL, ANTERIOR APPROACH, WITH FUSION C5-7 SPINEWAVE SNS: VOCAL CORDS/MOTORS/SSEP;  Surgeon: Rosales Scruggs MD;  Location: [facility] OR [address] [place] [address];  Service: Neurosurgery;  Laterality: N/A;    BREAST BIOPSY      BREAST SURGERY      breast ca lt side     COLONOSCOPY N/A 2023    Procedure: COLONOSCOPY;  Surgeon: Eliseo Garcia MD;  [facility]: [facility]  Service: Endoscopy;  Laterality: N/A;    ESOPHAGOGASTRODUODENOSCOPY N/A 2023    Procedure: EGD (ESOPHAGOGASTRODUODENOSCOPY);  Surgeon: Eliseo Garcia MD;  Location: [facility]  Service: Endoscopy;  Laterality: N/A;  r/o h pylori-miralax extended prep-instr portal and handed-tb    HYSTERECTOMY      supacervical hysterectomy    LAPAROSCOPIC SUPRACERVICAL HYSTERECTOMY  2005    fibroids    MASTECTOMY Left 2017    chemo    MYOMECTOMY      TONSILLECTOMY      TYMPANOSTOMY TUBE PLACEMENT      VAGINAL DELIVERY       Review of patient's allergies indicates:   Allergen Reactions    Codeine Shortness Of Breath     Pt states she had a reaction as a teenager and was taken to the ER.    Lisinopril      cough      Current Outpatient Medications   Medication Sig    albuterol (PROVENTIL/VENTOLIN HFA) 90 mcg/actuation inhaler SMARTSI-2 Puff(s) Via Inhaler Every 6 Hours PRN    augmented betamethasone dipropionate (DIPROLENE-AF) 0.05 % cream Apply topically 2 (two) times to 4 times daily to area  of back when itchy or burning.Stop using steroid topical when skin is non itchy.  Do not treat dark or red coloring.    betamethasone valerate 0.1% (VALISONE) 0.1 % Lotn Apply topically 2 (two) times daily.    clobetasoL (TEMOVATE) 0.05 % external solution APPLY TOPICALLY TO THE SCALP TWICE DAILY AS NEEDED FOR ITCHING OR IRRITATION    fluticasone propionate (FLONASE) 50 mcg/actuation nasal spray 1 spray by Each Nostril route once daily. OTC Flonase    fluticasone-salmeterol diskus inhaler 250-50 mcg Inhale 1 puff into the lungs 2 (two) times daily. Controller    hydroCHLOROthiazide (HYDRODIURIL) 25 MG tablet Take 1 tablet (25 mg total) by mouth once daily.    ipratropium (ATROVENT) 42 mcg (0.06 %) nasal spray 2 sprays by Each Nostril route 3 (three) times daily as needed (for runny nose or post-nasal drip).    lancets Misc To check BG 2 times daily    latanoprost 0.005 % ophthalmic solution Place 1 drop into both eyes every evening.    losartan (COZAAR) 50 MG tablet Take 1 tablet (50 mg total) by mouth once daily.    meloxicam (MOBIC) 15 MG tablet Take 1 tablet (15 mg total) by mouth daily as needed for Pain (back pain). Take with food    metFORMIN (GLUCOPHAGE) 500 MG tablet Take 1 tablet (500 mg total) by mouth 2 (two) times daily with meals.    montelukast (SINGULAIR) 10 mg tablet Take 1 tablet (10 mg total) by mouth every evening.    ondansetron (ZOFRAN-ODT) 8 MG TbDL Take 1 tablet (8 mg total) by mouth every 8 (eight) hours as needed (Nausea).    ONETOUCH DELICA PLUS LANCET 33 gauge Misc Check BG twice daily    ONETOUCH VERIO REFLECT METER Misc USE AS DIRECTED TO TEST BLOOD GLUCOSE    ONETOUCH VERIO TEST STRIPS Strp USE TO CHECK BLOOD GLUCOSE DAILY    pantoprazole (PROTONIX) 40 MG tablet Take 1 tablet (40 mg total) by mouth once daily.    rizatriptan (MAXALT-MLT) 10 MG disintegrating tablet Take 1 tablet (10 mg total) by mouth every 2 (two) hours as needed for Migraine. Max 30 mg/day.    semaglutide (OZEMPIC) 1  mg/dose (4 mg/3 mL) INJECT 1 MG INTO THE SKIN EVERY 7 DAYS    sumatriptan (IMITREX) 100 MG tablet Take 1 tablet (100 mg total) by mouth every 2 (two) hours as needed for Migraine. Max 200 mg/day.    tiZANidine (ZANAFLEX) 4 MG tablet Take 1 tablet (4 mg total) by mouth 2 (two) times daily as needed (muscle pain).    ubrogepant (UBRELVY) 100 mg tablet Take 1 tablet (100 mg total) by mouth every 2 (two) hours as needed for Migraine. Max 200 mg/day.     No current facility-administered medications for this visit.       PMHx, PSHx, Allergies, Medications reviewed in epic    ROS negative except pain complaints in HPI    OBJECTIVE:    /72 (BP Location: Right arm)   Pulse 87     PHYSICAL EXAMINATION:    GENERAL: Well appearing, in no acute distress, alert and oriented x3.  PSYCH:  Mood and affect appropriate.  SKIN: Skin color, texture, turgor normal, no rashes or lesions which will impact the procedure.  CV: RRR with palpation of the radial artery.  PULM: No evidence of respiratory difficulty, symmetric chest rise. Clear to auscultation.  NEURO: Cranial nerves grossly intact.    Plan:    Proceed with procedure as planned    Hi Otoole  11/15/2024

## 2024-11-19 ENCOUNTER — PATIENT MESSAGE (OUTPATIENT)
Dept: HEMATOLOGY/ONCOLOGY | Facility: CLINIC | Age: 69
End: 2024-11-19
Payer: MEDICARE

## 2024-11-20 ENCOUNTER — TELEPHONE (OUTPATIENT)
Dept: PRIMARY CARE CLINIC | Facility: CLINIC | Age: 69
End: 2024-11-20
Payer: MEDICARE

## 2024-11-20 DIAGNOSIS — E04.2 MULTIPLE THYROID NODULES: Primary | ICD-10-CM

## 2024-11-20 NOTE — TELEPHONE ENCOUNTER
----- Message from Gladis Melissa MD sent at 11/20/2024 12:39 PM CST -----  Please let pt know that some of her thyroid nodules meet criteria to be biopsied. I have ordered the biopsy and she should be contacted.

## 2024-11-22 ENCOUNTER — PATIENT MESSAGE (OUTPATIENT)
Dept: PAIN MEDICINE | Facility: CLINIC | Age: 69
End: 2024-11-22
Payer: MEDICARE

## 2024-12-03 ENCOUNTER — LAB VISIT (OUTPATIENT)
Dept: LAB | Facility: HOSPITAL | Age: 69
End: 2024-12-03
Attending: INTERNAL MEDICINE
Payer: MEDICARE

## 2024-12-03 DIAGNOSIS — D50.0 IRON DEFICIENCY ANEMIA DUE TO CHRONIC BLOOD LOSS: ICD-10-CM

## 2024-12-03 DIAGNOSIS — D50.1 IRON DEFICIENCY ANEMIA DUE TO SIDEROPENIC DYSPHAGIA: ICD-10-CM

## 2024-12-03 DIAGNOSIS — C50.912 MALIGNANT NEOPLASM OF LEFT FEMALE BREAST, UNSPECIFIED ESTROGEN RECEPTOR STATUS, UNSPECIFIED SITE OF BREAST: ICD-10-CM

## 2024-12-03 DIAGNOSIS — Z85.3 HISTORY OF BREAST CANCER IN FEMALE: ICD-10-CM

## 2024-12-03 LAB
ALBUMIN SERPL BCP-MCNC: 3.6 G/DL (ref 3.5–5.2)
ALP SERPL-CCNC: 78 U/L (ref 40–150)
ALT SERPL W/O P-5'-P-CCNC: 10 U/L (ref 10–44)
ANION GAP SERPL CALC-SCNC: 12 MMOL/L (ref 8–16)
AST SERPL-CCNC: 13 U/L (ref 10–40)
BASOPHILS # BLD AUTO: 0.05 K/UL (ref 0–0.2)
BASOPHILS NFR BLD: 0.6 % (ref 0–1.9)
BILIRUB SERPL-MCNC: 0.4 MG/DL (ref 0.1–1)
BUN SERPL-MCNC: 11 MG/DL (ref 8–23)
CALCIUM SERPL-MCNC: 9.6 MG/DL (ref 8.7–10.5)
CHLORIDE SERPL-SCNC: 104 MMOL/L (ref 95–110)
CO2 SERPL-SCNC: 24 MMOL/L (ref 23–29)
CREAT SERPL-MCNC: 0.8 MG/DL (ref 0.5–1.4)
DIFFERENTIAL METHOD BLD: ABNORMAL
EOSINOPHIL # BLD AUTO: 0.2 K/UL (ref 0–0.5)
EOSINOPHIL NFR BLD: 2.2 % (ref 0–8)
ERYTHROCYTE [DISTWIDTH] IN BLOOD BY AUTOMATED COUNT: 13.8 % (ref 11.5–14.5)
EST. GFR  (NO RACE VARIABLE): >60 ML/MIN/1.73 M^2
FERRITIN SERPL-MCNC: 27 NG/ML (ref 20–300)
GLUCOSE SERPL-MCNC: 156 MG/DL (ref 70–110)
HCT VFR BLD AUTO: 39 % (ref 37–48.5)
HGB BLD-MCNC: 12.4 G/DL (ref 12–16)
IMM GRANULOCYTES # BLD AUTO: 0.03 K/UL (ref 0–0.04)
IMM GRANULOCYTES NFR BLD AUTO: 0.3 % (ref 0–0.5)
LYMPHOCYTES # BLD AUTO: 2.2 K/UL (ref 1–4.8)
LYMPHOCYTES NFR BLD: 25.7 % (ref 18–48)
MCH RBC QN AUTO: 27.6 PG (ref 27–31)
MCHC RBC AUTO-ENTMCNC: 31.8 G/DL (ref 32–36)
MCV RBC AUTO: 87 FL (ref 82–98)
MONOCYTES # BLD AUTO: 0.5 K/UL (ref 0.3–1)
MONOCYTES NFR BLD: 6.1 % (ref 4–15)
NEUTROPHILS # BLD AUTO: 5.7 K/UL (ref 1.8–7.7)
NEUTROPHILS NFR BLD: 65.1 % (ref 38–73)
NRBC BLD-RTO: 0 /100 WBC
PLATELET # BLD AUTO: 320 K/UL (ref 150–450)
PMV BLD AUTO: 10.1 FL (ref 9.2–12.9)
POTASSIUM SERPL-SCNC: 3.9 MMOL/L (ref 3.5–5.1)
PROT SERPL-MCNC: 7 G/DL (ref 6–8.4)
RBC # BLD AUTO: 4.5 M/UL (ref 4–5.4)
SODIUM SERPL-SCNC: 140 MMOL/L (ref 136–145)
WBC # BLD AUTO: 8.73 K/UL (ref 3.9–12.7)

## 2024-12-03 PROCEDURE — 85025 COMPLETE CBC W/AUTO DIFF WBC: CPT | Performed by: INTERNAL MEDICINE

## 2024-12-03 PROCEDURE — 36415 COLL VENOUS BLD VENIPUNCTURE: CPT | Mod: PN | Performed by: INTERNAL MEDICINE

## 2024-12-03 PROCEDURE — 82728 ASSAY OF FERRITIN: CPT | Performed by: INTERNAL MEDICINE

## 2024-12-03 PROCEDURE — 80053 COMPREHEN METABOLIC PANEL: CPT | Performed by: INTERNAL MEDICINE

## 2024-12-05 ENCOUNTER — OFFICE VISIT (OUTPATIENT)
Dept: HEMATOLOGY/ONCOLOGY | Facility: CLINIC | Age: 69
End: 2024-12-05
Payer: MEDICARE

## 2024-12-05 VITALS
DIASTOLIC BLOOD PRESSURE: 73 MMHG | SYSTOLIC BLOOD PRESSURE: 139 MMHG | BODY MASS INDEX: 34.97 KG/M2 | HEART RATE: 75 BPM | WEIGHT: 209.88 LBS | TEMPERATURE: 98 F | HEIGHT: 65 IN | OXYGEN SATURATION: 98 %

## 2024-12-05 DIAGNOSIS — D50.8 OTHER IRON DEFICIENCY ANEMIA: Primary | ICD-10-CM

## 2024-12-05 PROCEDURE — 1101F PT FALLS ASSESS-DOCD LE1/YR: CPT | Mod: CPTII,S$GLB,, | Performed by: INTERNAL MEDICINE

## 2024-12-05 PROCEDURE — 3008F BODY MASS INDEX DOCD: CPT | Mod: CPTII,S$GLB,, | Performed by: INTERNAL MEDICINE

## 2024-12-05 PROCEDURE — 3078F DIAST BP <80 MM HG: CPT | Mod: CPTII,S$GLB,, | Performed by: INTERNAL MEDICINE

## 2024-12-05 PROCEDURE — 1159F MED LIST DOCD IN RCRD: CPT | Mod: CPTII,S$GLB,, | Performed by: INTERNAL MEDICINE

## 2024-12-05 PROCEDURE — 3061F NEG MICROALBUMINURIA REV: CPT | Mod: CPTII,S$GLB,, | Performed by: INTERNAL MEDICINE

## 2024-12-05 PROCEDURE — 3075F SYST BP GE 130 - 139MM HG: CPT | Mod: CPTII,S$GLB,, | Performed by: INTERNAL MEDICINE

## 2024-12-05 PROCEDURE — 3066F NEPHROPATHY DOC TX: CPT | Mod: CPTII,S$GLB,, | Performed by: INTERNAL MEDICINE

## 2024-12-05 PROCEDURE — 1125F AMNT PAIN NOTED PAIN PRSNT: CPT | Mod: CPTII,S$GLB,, | Performed by: INTERNAL MEDICINE

## 2024-12-05 PROCEDURE — 3044F HG A1C LEVEL LT 7.0%: CPT | Mod: CPTII,S$GLB,, | Performed by: INTERNAL MEDICINE

## 2024-12-05 PROCEDURE — 99999 PR PBB SHADOW E&M-EST. PATIENT-LVL V: CPT | Mod: PBBFAC,,, | Performed by: INTERNAL MEDICINE

## 2024-12-05 PROCEDURE — 3288F FALL RISK ASSESSMENT DOCD: CPT | Mod: CPTII,S$GLB,, | Performed by: INTERNAL MEDICINE

## 2024-12-05 PROCEDURE — 99214 OFFICE O/P EST MOD 30 MIN: CPT | Mod: S$GLB,,, | Performed by: INTERNAL MEDICINE

## 2024-12-05 PROCEDURE — 4010F ACE/ARB THERAPY RXD/TAKEN: CPT | Mod: CPTII,S$GLB,, | Performed by: INTERNAL MEDICINE

## 2024-12-05 RX ORDER — FERROUS SULFATE 325(65) MG
TABLET, DELAYED RELEASE (ENTERIC COATED) ORAL
Qty: 60 TABLET | Refills: 1 | Status: SHIPPED | OUTPATIENT
Start: 2024-12-05

## 2024-12-05 NOTE — PROGRESS NOTES
Subjective:       Patient ID: Regine Cooper is a 69 y.o. female.    Chief Complaint: Iron deficiency anemia due to sideropenic dysphagia      HPI      Mrs. Cooper returns today for follow up.  I had last seen her on June 25, 2024.  In late January she underwent a video capsule endoscopy which was unremarkable.  She has been off the Fe supplements since her last visit.    CBC from 2 days ago shows a white count of 8,700 per cubic mm, hemoglobin 12.4 grams/deciliter, hematocrit 39.0%, MCV 87, and platelets 320K.  Her ferritin is 27 ng/ml.  Bilirubin is 0.4 mg/dL, transaminases are normal, and creatinine is 0.8 mg/dL    On August 18, 2023 she underwent an EGD and colonoscopy.  She had diverticulosis and a 1 cm hiatal hernia.  Biopsies were negative for celiac disease.      Briefly, she is a 69-year-old female who has a history of a stage I triple negative breast cancer that was diagnosed in late 2017.  She underwent a left modified radical mastectomy in [facility] on 11/01/2017 and had a stage IA carcinoma measuring 1.2 cm in greatest diameter.  Resection margins were clear, while four sentinel lymph nodes were negative.  Postoperatively, she received four cycles of docetaxel and cyclophosphamide.  The first cycle was administered on 11/30/2017 and she completed her treatment by 02/01/2018.  She has been followed expectantly and has been MERRY since then.  We assumed her care when she relocated to [place] 5 years ago.    Her mammogram last April was read as BIRADS I, and a one year follow up was recommended.      Two stool samples that she had submitted in August were negative for occult blood.      A urinalysis in April 2024 had shown no hematuria.    ROS: Overall she feels OK, and her ECOG PS is 1.  She denies any anxiety, easy bruising, fevers, chills, night  sweats, weight loss, nausea, vomiting, diarrhea, constipation, diplopia, blurred vision, headache, chest pain, palpitations, shortness of breath, breast  pain, lower abdominal pain, extremity pain, or difficulty ambulating.  The remainder of the ten-point ROS, including general, skin, lymph, H/N, cardiorespiratory, GI, , Neuro, Endocrine, and psychiatric is negative.     Objective:      Physical Exam      She is alert, oriented to time, place, person, pleasant, well      nourished, in no acute physical distress.                                    VITAL SIGNS:  Reviewed                                      HEENT:  Normal.  There are no nasal, oral, lip, gingival, auricular, lid,    or conjunctival lesions.  Mucosae are moist and pink, and there is no        thrush.  Pupils are equal, reactive to light and accommodation.              Extraocular muscle movements are intact.  Dentition is good.  There is no frontal or maxillary tenderness.                                     NECK:  Supple without JVD, adenopathy, but she does have thyromegaly.                       LUNGS:  Clear to auscultation without wheezing, rales, or rhonchi.           CARDIOVASCULAR:  Reveals an S1, S2, no murmurs, no rubs, no gallops.         ABDOMEN:  Soft, nontender, without organomegaly.  Bowel sounds are    present.                                                                     EXTREMITIES:  No cyanosis, clubbing, or edema.                               BREASTS: Deferred.    LYMPHATIC:  There is no cervical, axillary, or supraclavicular adenopathy.   SKIN:  Warm and moist, without petechiae, rashes, induration, or ecchymoses.           NEUROLOGIC:  DTRs are 0-1+ bilaterally, symmetrical, motor function is 5/5,  and cranial nerves are  within normal limits.    Assessment:       1. History of breast cancer in female, Q5sH4N1, status post mastectomy and 4 cycles of docetaxel cyclophosphamide, clinically MERRY, doing well more than 5 years after completion       2.     Low ferritin without evidence of anemia  3.     Diverticulosis  Plan:         In regards to her breast cancer, Mrs. Alfred  remains in clinical remission.  I explained to her that her chance of a cure at thisand take one a day.   I would like to see her ferritin above 30 ng/mL before she discontinues the oral supplementation    She will return to see me in 3 months with repeat CBC and a repeat ferritin.  She will also submit 3 more stool samples when she returns to Clinic.  Her mammogram will be repeated in late April 2025.  Her multiple questions were answered to her satisfaction.  I spent approximately 30 minutes reviewing the available records, evaluating the patient, counseling, and coordinating her care.            Route Chart for Scheduling    Med Onc Chart Routing      Follow up with physician 3 months. with labs one day prior   Follow up with [facility]    Infusion scheduling note    Injection scheduling note    Labs    Imaging    Pharmacy appointment    Other referrals

## 2024-12-16 ENCOUNTER — TELEPHONE (OUTPATIENT)
Dept: PRIMARY CARE CLINIC | Facility: CLINIC | Age: 69
End: 2024-12-16
Payer: MEDICARE

## 2024-12-16 NOTE — TELEPHONE ENCOUNTER
----- Message from Kortney sent at 12/16/2024 11:12 AM CST -----  Contact: Pt [phone]  [email or web address]     Patient is calling for Medical Advice regarding: Received a call from the nurse about 2-3 weeks ago. Regarding pcp wanting her to have a biopsy of her thyroid. Wanted to let pcp know, no one has called her yet to schedule this appt. She don't know who to contact.    Patient wants a call back or thru [facility]: call back    Comments:    Please advise patient replies from provider may take up to 48 hours.        Please Call and advise    Thank you    Please do NOT rep[ly to sender as this is from the call center and they answer incoming calls only.

## 2024-12-19 ENCOUNTER — OFFICE VISIT (OUTPATIENT)
Dept: PAIN MEDICINE | Facility: CLINIC | Age: 69
End: 2024-12-19
Payer: MEDICARE

## 2024-12-19 VITALS
HEART RATE: 81 BPM | SYSTOLIC BLOOD PRESSURE: 127 MMHG | BODY MASS INDEX: 34.97 KG/M2 | WEIGHT: 209.88 LBS | DIASTOLIC BLOOD PRESSURE: 78 MMHG | HEIGHT: 65 IN

## 2024-12-19 DIAGNOSIS — M54.16 LUMBAR RADICULOPATHY: ICD-10-CM

## 2024-12-19 DIAGNOSIS — M54.14 THORACIC RADICULOPATHY: ICD-10-CM

## 2024-12-19 DIAGNOSIS — G95.89 MYELOMALACIA OF CERVICAL CORD: ICD-10-CM

## 2024-12-19 DIAGNOSIS — B02.29 POST HERPETIC NEURALGIA: Primary | ICD-10-CM

## 2024-12-19 DIAGNOSIS — M47.816 LUMBAR SPONDYLOSIS: ICD-10-CM

## 2024-12-19 DIAGNOSIS — M48.062 SPINAL STENOSIS OF LUMBAR REGION WITH NEUROGENIC CLAUDICATION: ICD-10-CM

## 2024-12-19 PROCEDURE — 3066F NEPHROPATHY DOC TX: CPT | Mod: CPTII,S$GLB,, | Performed by: STUDENT IN AN ORGANIZED HEALTH CARE EDUCATION/TRAINING PROGRAM

## 2024-12-19 PROCEDURE — 3288F FALL RISK ASSESSMENT DOCD: CPT | Mod: CPTII,S$GLB,, | Performed by: STUDENT IN AN ORGANIZED HEALTH CARE EDUCATION/TRAINING PROGRAM

## 2024-12-19 PROCEDURE — 99214 OFFICE O/P EST MOD 30 MIN: CPT | Mod: S$GLB,,, | Performed by: STUDENT IN AN ORGANIZED HEALTH CARE EDUCATION/TRAINING PROGRAM

## 2024-12-19 PROCEDURE — 3044F HG A1C LEVEL LT 7.0%: CPT | Mod: CPTII,S$GLB,, | Performed by: STUDENT IN AN ORGANIZED HEALTH CARE EDUCATION/TRAINING PROGRAM

## 2024-12-19 PROCEDURE — 3074F SYST BP LT 130 MM HG: CPT | Mod: CPTII,S$GLB,, | Performed by: STUDENT IN AN ORGANIZED HEALTH CARE EDUCATION/TRAINING PROGRAM

## 2024-12-19 PROCEDURE — 1101F PT FALLS ASSESS-DOCD LE1/YR: CPT | Mod: CPTII,S$GLB,, | Performed by: STUDENT IN AN ORGANIZED HEALTH CARE EDUCATION/TRAINING PROGRAM

## 2024-12-19 PROCEDURE — 1159F MED LIST DOCD IN RCRD: CPT | Mod: CPTII,S$GLB,, | Performed by: STUDENT IN AN ORGANIZED HEALTH CARE EDUCATION/TRAINING PROGRAM

## 2024-12-19 PROCEDURE — 3078F DIAST BP <80 MM HG: CPT | Mod: CPTII,S$GLB,, | Performed by: STUDENT IN AN ORGANIZED HEALTH CARE EDUCATION/TRAINING PROGRAM

## 2024-12-19 PROCEDURE — 4010F ACE/ARB THERAPY RXD/TAKEN: CPT | Mod: CPTII,S$GLB,, | Performed by: STUDENT IN AN ORGANIZED HEALTH CARE EDUCATION/TRAINING PROGRAM

## 2024-12-19 PROCEDURE — 99999 PR PBB SHADOW E&M-EST. PATIENT-LVL IV: CPT | Mod: PBBFAC,,, | Performed by: STUDENT IN AN ORGANIZED HEALTH CARE EDUCATION/TRAINING PROGRAM

## 2024-12-19 PROCEDURE — 3061F NEG MICROALBUMINURIA REV: CPT | Mod: CPTII,S$GLB,, | Performed by: STUDENT IN AN ORGANIZED HEALTH CARE EDUCATION/TRAINING PROGRAM

## 2024-12-19 PROCEDURE — 3008F BODY MASS INDEX DOCD: CPT | Mod: CPTII,S$GLB,, | Performed by: STUDENT IN AN ORGANIZED HEALTH CARE EDUCATION/TRAINING PROGRAM

## 2024-12-19 PROCEDURE — 1125F AMNT PAIN NOTED PAIN PRSNT: CPT | Mod: CPTII,S$GLB,, | Performed by: STUDENT IN AN ORGANIZED HEALTH CARE EDUCATION/TRAINING PROGRAM

## 2024-12-19 NOTE — PROGRESS NOTES
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 Positive paravertebral tenderness  - Positive pain to palpation over the facet joints of the lumbar spine.   - Positive QL / Iliac crest / Glut tenderness  - Slump test is Negative for radicular pain  - Slump test is Negative for back pain  - Supine Straight leg raising is Did not perform for radicular pain  - Supine Straight leg raising is Did not perform for back pain  - Lumbar ROM is diminished in Flexion with pain  - Lumbar ROM is diminished in Extension with pain  - Lumbar ROM is diminished in Lateral Flexion with pain  - Lumbar ROM is diminished in Rotation with pain  - Did not perform Sustained Hip Flexion test (for discogenic pain)  - Positive Altered Gait, Posture  - Axial facet loading test Positive on the bilateral side(s)    Thoracic exam:  (+) TTP spinous process and paraspinals from roughly T5-T10.    TTP on the right over where the post-herpetic neuralgia is located    SI Joint exam:  - Did not perform SI joint tenderness to palpation  - Erik's sign Did not perform  - Yeoman's Test: Did not perform for SI joint pain indicating anterior SI ligament involvement. Did not perform for anterior thigh pain/paresthesia which indicates femoral nerve stretch.  - Gaenslen's Test:Did not perform  - Finger Chari's Sign:Did not perform  - SI compression test:Did not perform  - SI  distraction test:Did not perform  - Thigh Thrust: Did not perform  - SI Thrust: Did not perform    MUSKULOSKELETAL:    EXTREMITIES:   Hip Exam:  - Log Roll Did not perform  - FADIR Did not perform  - Stinchfield Did not perform  - Hip Scour Did not perform  - GTB Tenderness Did not perform    Bilateral shoulder are improved    MUSCULOSKELETAL:  No atrophy or tone abnormalities are noted in the UE or LE.  No deformities, edema, or skin discoloration are noted on visible skin. Good capillary refill.    NEURO: Bilateral upper and lower extremity coordination and muscle stretch reflexes are physiologic and symmetric.    No loss of sensation is noted.    NEUROLOGICAL EXAM:  MENTAL STATUS: A x O x 3, good concentration, speech is fluent and goal directed  MEMORY: recent and remote are intact  CN: CN2-12 grossly intact  MOTOR: 5/5 in all muscle groups  DTRs: 2+ intact symmetric patella and biceps  Sensation:    -no Loss of sensation in a left upper, left lower, right upper and right lower C-2, C-3, C-4, C-5, C-6, C-7 and C-8 bilaterally and L-1, L-2, L-3, L-4 and L-5 bilaterally distribution.  Babinski: absent on the bilateral side(s)  Peoples: absent on the bilateral side(s)  Clonus: absent on the bilateral side(s)

## 2024-12-31 DIAGNOSIS — E04.2 MULTINODULAR GOITER: Primary | ICD-10-CM

## 2025-01-02 ENCOUNTER — PATIENT MESSAGE (OUTPATIENT)
Dept: PRIMARY CARE CLINIC | Facility: CLINIC | Age: 70
End: 2025-01-02
Payer: MEDICARE

## 2025-01-02 DIAGNOSIS — E11.9 TYPE 2 DIABETES MELLITUS WITHOUT COMPLICATION, WITHOUT LONG-TERM CURRENT USE OF INSULIN: Primary | ICD-10-CM

## 2025-01-08 DIAGNOSIS — E11.9 TYPE 2 DIABETES MELLITUS WITHOUT COMPLICATION: ICD-10-CM

## 2025-01-15 NOTE — PATIENT INSTRUCTIONS
"Procedure: EGD/Colonoscopy    Diagnosis:  GERD nausea vomiting diarrhea constipation rule out lactose intolerance rule out celiac sprue or H pylori or microscopic colitis    Procedure Timin-12 weeks    *If within 4 weeks selected, please gt as high priority*    *If greater than 12 weeks, please select "5-12 weeks" and delay sending until 2 months prior to requested date*    Provider: Myself    Location: [address]    Additional Scheduling Information:  Sleep apnea morbid obesity    Prep Specifications:Extended/Constipation prep    Have you attached a patient to this message: yes   "
[Negative] : Heme/Lymph
[Negative] : Heme/Lymph

## 2025-01-27 ENCOUNTER — PATIENT MESSAGE (OUTPATIENT)
Dept: PRIMARY CARE CLINIC | Facility: CLINIC | Age: 70
End: 2025-01-27
Payer: MEDICARE

## 2025-01-28 ENCOUNTER — PATIENT MESSAGE (OUTPATIENT)
Dept: PRIMARY CARE CLINIC | Facility: CLINIC | Age: 70
End: 2025-01-28
Payer: MEDICARE

## 2025-01-29 ENCOUNTER — TELEPHONE (OUTPATIENT)
Dept: PRIMARY CARE CLINIC | Facility: CLINIC | Age: 70
End: 2025-01-29
Payer: MEDICARE

## 2025-01-29 DIAGNOSIS — I10 ESSENTIAL HYPERTENSION: ICD-10-CM

## 2025-01-29 RX ORDER — HYDROCHLOROTHIAZIDE 25 MG/1
25 TABLET ORAL
Qty: 90 TABLET | Refills: 2 | Status: SHIPPED | OUTPATIENT
Start: 2025-01-29

## 2025-01-29 NOTE — TELEPHONE ENCOUNTER
Provider Staff:  Action required for this patient    Requires labs      Please see care gap opportunities below in Care Due Message.    Thanks!  [facility]     Appointments      Date Provider   Last Visit   10/4/2024 Gladis Melissa MD   Next Visit   2/10/2025 Gladis Melissa MD     Refill Decision Note   Regine Cooper  is requesting a refill authorization.  Brief Assessment and Rationale for Refill:  Approve     Medication Therapy Plan:        Comments:     Note composed:10:11 AM 01/29/2025

## 2025-01-29 NOTE — TELEPHONE ENCOUNTER
----- Message from Riana sent at 1/29/2025  9:57 AM CST -----  Contact: Pt [phone]  Patient has requested a refill as well as the pharmacy .. Patient is OUT of medication.      Requesting an RX refill or new RX.    Is this a refill or new RX: Refill    RX name and strength (copy/paste from chart):  hydroCHLOROthiazide (HYDRODIURIL) 25 MG tablet    Is this a 30 day or 90 day RX: 90    Pharmacy name and phone # (copy/paste from chart):    [id number] - [facility] [address]  [address]  [facility] [address]  Phone: [phone] Fax: [phone]      Please call and advise.    Thank You

## 2025-01-29 NOTE — TELEPHONE ENCOUNTER
Care Due:                  Date            Visit Type   Department     Provider  --------------------------------------------------------------------------------                                EP -                              PRIMARY      [facility] PRIMARY  Last Visit: 10-      CARE ([facility])   MILAGROS Melissa                              EP -                              PRIMARY      [facility] PRIMARY  Next Visit: 02-      CARE ([facility])   MILAGROS Melissa                                                            Last  Test          Frequency    Reason                     Performed    Due Date  --------------------------------------------------------------------------------    HBA1C.......  6 months...  metFORMIN, semaglutide...  09- 03-    [facility] Embedded Care Due Messages. Reference number: [id number].   1/29/2025 9:54:36 AM CST

## 2025-01-31 NOTE — PROGRESS NOTES
Subjective:      Patient ID: Regine Cooper is a 70 y.o. female.    Chief Complaint:  No chief complaint on file.    History of Present Illness  Regine Cooper is here for initial evaluation of thyroid nodules.  This is their first visit with me.      Patient had e-consult for abnormal radiologic finding possibly suggestive of Pagets disease.  Alk-phos and bone specific alk-phos were ordered and resulted normal.  Recommended for PCP to monitor with annual alk-phos and reach back out to endocrine if it becomes elevated.      With regards to thyroid nodule:    Found: ***  Thyroid US: ***  FNA: ***    Signs or Symptoms:   Difficulty breathing: ***  Difficulty swallowing: ***  Voice Changes: ***  FH of thyroid cancer: ***  Personal history of radiation treatment or exposure:    Lab Results   Component Value Date    TSH 0.877 09/30/2024    FREET4 0.90 12/10/2021       *** signs or symptoms of hyper or hypothyroidism    *** weight changes  ***  bowel changes  *** heat or cold intolerance   *** hair nail or skin changes  *** cp, palpations or sob      Review of Systems    Objective:   Physical Exam    There were no vitals taken for this visit.    There is no height or weight on file to calculate BMI.    Lab Review:   Lab Results   Component Value Date    HGBA1C 5.6 09/30/2024    HGBA1C 6.0 (H) 05/06/2024    HGBA1C 7.0 (H) 01/04/2024       Lab Results   Component Value Date    CHOL 155 01/04/2024    HDL 43 01/04/2024    LDLCALC 92.8 01/04/2024    TRIG 96 01/04/2024    CHOLHDL 27.7 01/04/2024     Lab Results   Component Value Date     12/03/2024    K 3.9 12/03/2024     12/03/2024    CO2 24 12/03/2024     (H) 12/03/2024    BUN 11 12/03/2024    CREATININE 0.8 12/03/2024    CALCIUM 9.6 12/03/2024    PROT 7.0 12/03/2024    ALBUMIN 3.6 12/03/2024    BILITOT 0.4 12/03/2024    ALKPHOS 78 12/03/2024    AST 13 12/03/2024    ALT 10 12/03/2024    ANIONGAP 12 12/03/2024    ESTGFRAFRICA >60.0 07/22/2022    EGFRNONAA  >60.0 07/22/2022    TSH 0.877 09/30/2024     Vit D, 25-Hydroxy   Date Value Ref Range Status   05/06/2024 65 30 - 96 ng/mL Final     Comment:     Vitamin D deficiency.........<10 ng/mL                              Vitamin D insufficiency......10-29 ng/mL       Vitamin D sufficiency........> or equal to 30 ng/mL  Vitamin D toxicity............>100 ng/mL       Assessment and Plan   No diagnosis found.    No problem-specific Assessment & Plan notes found for this encounter.      No follow-ups on file.    Case discussed with ***.  Recommendations were discussed with the patient in detail.  The patient verbalized understanding and agrees with the plan outlined as above.

## 2025-01-31 NOTE — PROGRESS NOTES
Subjective:      Patient ID: Regine Cooper is a 70 y.o. female.    Chief Complaint:  No chief complaint on file.    History of Present Illness  eRgine Cooper is here for initial evaluation of thyroid nodules.  This is their first visit with me.       Patient had e-consult for abnormal radiologic finding possibly suggestive of Pagets disease.  Alk-phos and bone specific alk-phos were ordered and resulted normal.  Recommended for PCP to monitor with annual alk-phos and reach back out to endocrine if it becomes elevated.    Patient with extensive history of thyroid nodules who has been seen by Dr. Rowe and had right and left lobe thyroid biopsy per Dr. Bishop in 2019 resulting benign.    With regards to thyroid nodule:    Thyroid US:  11/15/2024  COMPARISON:  Thyroid ultrasound 07/11/2023     FINDINGS:  The right thyroid lobe measures 7.3 x 2.7 x 2.4 cm.  The isthmus measures 1.1 cm in thickness.  The left thyroid lobe measures 6.7 x 3.7 x 3.1 cm.  Total thyroid volume equals 65 cc, enlarged.  Homogeneous background parenchymal echotexture.  Thyroid vascularity is within normal limits.     There are multiple thyroid nodules.  The 4 largest are as follows:     *1.6 x 1.3 x 0.6 cm solid very hypoechoic nodule in the right upper pole.  TI-RADS 4.  *1.6 x 1.2 x 1.0 cm mixed cystic and solid isoechoic nodule in the left midpole.  TI-RADS 3.  *1.7 x 1.6 x 0.8 cm almost entirely solid hypoechoic nodule in the left midpole.  TI-RADS 4.  *3.3 x 3.1 x 0.7 cm solid hypoechoic nodule with a peripherally calcified component in the left lower pole.  TI-RADS 4.  No cervical lymphadenopathy in the visualized neck.     Impression:  Multinodular goiter.  All of the TI-RADS 4 nodules meet criteria for FNA, noting no significant changes from 06/15/2022.  FNA: Previous 2019    Signs or Symptoms:   Difficulty breathing: Denies  Difficulty swallowing: Reports difficulty swallowing after neck surgery  Voice Changes: Denies  FH of  thyroid cancer: Denies  Personal history of radiation treatment or exposure:    Lab Results   Component Value Date    TSH 0.877 09/30/2024    FREET4 0.90 12/10/2021     Denies signs or symptoms of hyper or hypothyroidism    Denies weight changes  Denies  bowel changes  Denies heat or cold intolerance   Denies hair nail or skin changes  Denies cp, palpations or sob    With Regards to Diabetes:    Managed by PCP     Metformin 500 mg BID  Ozempic 0.25 mg weekly    Lab Results   Component Value Date    HGBA1C 5.6 09/30/2024         Review of Systems - As Above    Objective:   Physical Exam  Constitutional:       Appearance: She is well-developed.   HENT:      Head: Normocephalic.   Eyes:      Conjunctiva/sclera: Conjunctivae normal.   Pulmonary:      Effort: Pulmonary effort is normal.   Musculoskeletal:         General: Normal range of motion.   Skin:     General: Skin is warm.   Neurological:      Mental Status: She is alert and oriented to person, place, and time.       Visit Vitals  /70   Pulse 105   Wt 100.1 kg (220 lb 10.9 oz)   SpO2 99%   BMI 36.72 kg/m²       Body mass index is 36.72 kg/m².    Lab Review:   Lab Results   Component Value Date    HGBA1C 5.6 09/30/2024    HGBA1C 6.0 (H) 05/06/2024    HGBA1C 7.0 (H) 01/04/2024       Lab Results   Component Value Date    CHOL 155 01/04/2024    HDL 43 01/04/2024    LDLCALC 92.8 01/04/2024    TRIG 96 01/04/2024    CHOLHDL 27.7 01/04/2024     Lab Results   Component Value Date     12/03/2024    K 3.9 12/03/2024     12/03/2024    CO2 24 12/03/2024     (H) 12/03/2024    BUN 11 12/03/2024    CREATININE 0.8 12/03/2024    CALCIUM 9.6 12/03/2024    PROT 7.0 12/03/2024    ALBUMIN 3.6 12/03/2024    BILITOT 0.4 12/03/2024    ALKPHOS 78 12/03/2024    AST 13 12/03/2024    ALT 10 12/03/2024    ANIONGAP 12 12/03/2024    ESTGFRAFRICA >60.0 07/22/2022    EGFRNONAA >60.0 07/22/2022    TSH 0.877 09/30/2024     Vit D, 25-Hydroxy   Date Value Ref Range Status    05/06/2024 65 30 - 96 ng/mL Final     Comment:     Vitamin D deficiency.........<10 ng/mL                              Vitamin D insufficiency......10-29 ng/mL       Vitamin D sufficiency........> or equal to 30 ng/mL  Vitamin D toxicity............>100 ng/mL       Assessment and Plan     1. Thyroid nodule        2. Type 2 diabetes mellitus without complication, without long-term current use of insulin        3. Obesity (BMI 30-39.9)            Thyroid nodule  -- I have reviewed management options including observation, FNA or surgery.  -- Patient elected for FNA procedure today and explained in depth.  -- Discussed indications for repeat FNA as well as surgical indications (abnormal FNA, compressive symptoms or interval change).  -- Discussed possible results of FNA - Benign, Malignant, FLUS, or insufficient cells.  Discussed results auto released to patients, but advised the ordering provider will also contact to discuss.   -- If FNA is negative then plan RTO in 1 year with TSH and thyroid US prior.  -- All of the patients questions were answered.    Type 2 diabetes mellitus without complication, without long-term current use of insulin  Managed by PCP.    Lab Results   Component Value Date    HGBA1C 5.6 09/30/2024       Obesity (BMI 30-39.9)  On Ozempic for positive benefit profile including possible weight loss.      No follow-ups on file.    Case discussed with Dr Moulton.  Recommendations were discussed with the patient in detail.  The patient verbalized understanding and agrees with the plan outlined as above.

## 2025-02-03 ENCOUNTER — HOSPITAL ENCOUNTER (OUTPATIENT)
Dept: ENDOCRINOLOGY | Facility: CLINIC | Age: 70
Discharge: HOME | End: 2025-02-03
Attending: STUDENT IN AN ORGANIZED HEALTH CARE EDUCATION/TRAINING PROGRAM
Payer: MEDICARE

## 2025-02-03 ENCOUNTER — OFFICE VISIT (OUTPATIENT)
Dept: SURGERY | Facility: CLINIC | Age: 70
End: 2025-02-03
Payer: MEDICARE

## 2025-02-03 ENCOUNTER — OFFICE VISIT (OUTPATIENT)
Dept: ENDOCRINOLOGY | Facility: CLINIC | Age: 70
End: 2025-02-03
Payer: MEDICARE

## 2025-02-03 VITALS
BODY MASS INDEX: 36.72 KG/M2 | DIASTOLIC BLOOD PRESSURE: 70 MMHG | HEART RATE: 105 BPM | SYSTOLIC BLOOD PRESSURE: 130 MMHG | WEIGHT: 220.69 LBS | OXYGEN SATURATION: 99 %

## 2025-02-03 DIAGNOSIS — E04.1 THYROID NODULE: Primary | ICD-10-CM

## 2025-02-03 DIAGNOSIS — E11.9 TYPE 2 DIABETES MELLITUS WITHOUT COMPLICATION, WITHOUT LONG-TERM CURRENT USE OF INSULIN: ICD-10-CM

## 2025-02-03 DIAGNOSIS — E04.2 MULTINODULAR GOITER: Primary | ICD-10-CM

## 2025-02-03 DIAGNOSIS — E11.59 HYPERTENSION ASSOCIATED WITH TYPE 2 DIABETES MELLITUS: ICD-10-CM

## 2025-02-03 DIAGNOSIS — E66.9 OBESITY (BMI 30-39.9): ICD-10-CM

## 2025-02-03 DIAGNOSIS — I15.2 HYPERTENSION ASSOCIATED WITH TYPE 2 DIABETES MELLITUS: ICD-10-CM

## 2025-02-03 DIAGNOSIS — E66.01 SEVERE OBESITY (BMI 35.0-35.9 WITH COMORBIDITY): ICD-10-CM

## 2025-02-03 PROCEDURE — 3078F DIAST BP <80 MM HG: CPT | Mod: CPTII,S$GLB,, | Performed by: INTERNAL MEDICINE

## 2025-02-03 PROCEDURE — 4010F ACE/ARB THERAPY RXD/TAKEN: CPT | Mod: CPTII,S$GLB,, | Performed by: SURGERY

## 2025-02-03 PROCEDURE — G2211 COMPLEX E/M VISIT ADD ON: HCPCS | Mod: GC,S$GLB,, | Performed by: INTERNAL MEDICINE

## 2025-02-03 PROCEDURE — 3288F FALL RISK ASSESSMENT DOCD: CPT | Mod: CPTII,S$GLB,, | Performed by: INTERNAL MEDICINE

## 2025-02-03 PROCEDURE — 99999 PR PBB SHADOW E&M-EST. PATIENT-LVL I: CPT | Mod: PBBFAC,,, | Performed by: SURGERY

## 2025-02-03 PROCEDURE — 99999 PR PBB SHADOW E&M-EST. PATIENT-LVL IV: CPT | Mod: PBBFAC,,, | Performed by: INTERNAL MEDICINE

## 2025-02-03 PROCEDURE — 4010F ACE/ARB THERAPY RXD/TAKEN: CPT | Mod: CPTII,S$GLB,, | Performed by: INTERNAL MEDICINE

## 2025-02-03 PROCEDURE — 1101F PT FALLS ASSESS-DOCD LE1/YR: CPT | Mod: CPTII,S$GLB,, | Performed by: INTERNAL MEDICINE

## 2025-02-03 PROCEDURE — 10005 FNA BX W/US GDN 1ST LES: CPT | Mod: S$GLB,,, | Performed by: INTERNAL MEDICINE

## 2025-02-03 PROCEDURE — 99213 OFFICE O/P EST LOW 20 MIN: CPT | Mod: S$GLB,,, | Performed by: SURGERY

## 2025-02-03 PROCEDURE — 88173 CYTOPATH EVAL FNA REPORT: CPT | Mod: 26,,, | Performed by: PATHOLOGY

## 2025-02-03 PROCEDURE — 3008F BODY MASS INDEX DOCD: CPT | Mod: CPTII,S$GLB,, | Performed by: INTERNAL MEDICINE

## 2025-02-03 PROCEDURE — 88173 CYTOPATH EVAL FNA REPORT: CPT | Performed by: PATHOLOGY

## 2025-02-03 PROCEDURE — 1159F MED LIST DOCD IN RCRD: CPT | Mod: CPTII,S$GLB,, | Performed by: SURGERY

## 2025-02-03 PROCEDURE — 99214 OFFICE O/P EST MOD 30 MIN: CPT | Mod: GC,S$GLB,, | Performed by: INTERNAL MEDICINE

## 2025-02-03 PROCEDURE — 3075F SYST BP GE 130 - 139MM HG: CPT | Mod: CPTII,S$GLB,, | Performed by: INTERNAL MEDICINE

## 2025-02-03 PROCEDURE — 1160F RVW MEDS BY RX/DR IN RCRD: CPT | Mod: CPTII,S$GLB,, | Performed by: SURGERY

## 2025-02-03 PROCEDURE — 1125F AMNT PAIN NOTED PAIN PRSNT: CPT | Mod: CPTII,S$GLB,, | Performed by: INTERNAL MEDICINE

## 2025-02-03 NOTE — ASSESSMENT & PLAN NOTE
-- I have reviewed management options including observation, FNA or surgery.  -- Patient elected for FNA procedure today and explained in depth.  -- Discussed indications for repeat FNA as well as surgical indications (abnormal FNA, compressive symptoms or interval change).  -- Discussed possible results of FNA - Benign, Malignant, FLUS, or insufficient cells.  Discussed results auto released to patients, but advised the ordering provider will also contact to discuss.   -- If FNA is negative then plan RTO in 1 year with TSH and thyroid US prior.  -- All of the patients questions were answered.

## 2025-02-03 NOTE — PROGRESS NOTES
Interval History:    Patient is a female 70 years of age known to me.  She was originally seen in May 2019 when she had recently relocated from [place] and at that time underwent bilateral FNA's with the following findings:    FINAL PATHOLOGIC DIAGNOSIS  THYROID, RIGHT, FINE NEEDLE ASPIRATION:  [place] [facility] Thyroid Cytology Category: Benign.  Benign follicular cells, thin watery colloid, and acute and chronic inflammatory cells, compatible with  hyperplastic/adenomatoid nodule.  No cytologic features of papillary thyroid carcinoma.      FINAL PATHOLOGIC DIAGNOSIS  THYROID, LEFT, FINE NEEDLE ASPIRATION:  [place] [facility] Thyroid Cytology Category: Benign.  Benign follicular cells, macrophages, thin watery colloid, blood, acute and chronic inflammatory cells,  compatible with hyperplastic/adenomatoid nodule.  No cytologic features of papillary thyroid carcinoma.    At that time it was recommended that patient under US in one year to continue surveillance.     She underwent thyroid ultrasound in 6/15/22, 7/11/23 and most recently on 11/15/24 with the following findings:    FINDINGS:  The right thyroid lobe measures 7.3 x 2.7 x 2.4 cm.    The isthmus measures 1.1 cm in thickness.    The left thyroid lobe measures 6.7 x 3.7 x 3.1 cm.    Total thyroid volume equals 65 cc, enlarged.  Homogeneous background parenchymal echotexture.  Thyroid vascularity is within normal limits.     There are multiple thyroid nodules.  The 4 largest are as follows:     *1.6 x 1.3 x 0.6 cm solid very hypoechoic nodule in the right upper pole.  TI-RADS 4.  *1.6 x 1.2 x 1.0 cm mixed cystic and solid isoechoic nodule in the left midpole.  TI-RADS 3.  *1.7 x 1.6 x 0.8 cm almost entirely solid hypoechoic nodule in the left midpole.  TI-RADS 4.  *3.3 x 3.1 x 0.7 cm solid hypoechoic nodule with a peripherally calcified component in the left lower pole.  TI-RADS 4.  No cervical lymphadenopathy in the visualized neck.     Impression:      Multinodular goiter.  All of the TI-RADS 4 nodules meet criteria for FNA, noting no significant changes from 06/15/2022.         Based on recent imaging she presents today to our [facility] for FNA. She denies any new issues.   She underwent cervical spine surgery(anterior approach) in 2022.  Since that surgery she notes some intermittent mild dysphagia with pills.  No other issues noted.    On exam, neck is mildly uncomfortable in the area of the FNA from today.  Well healed incision from cervical spine procedure.    A/P: Patient presenting with long standing history of MNG and previously benign nodules.  FNA performed today of largest left nodule.    Will follow-up the results.  If benign will plan repeat ultrasound in 18-24months.  As long as stable, will not plan to repeat FNA.  All questions answered and voiced her understanding.      Consult Note  Endocrine Surgery    Visit Diagnosis: Thyroid nodule [E04.1]    SUBJECTIVE:     Regine Alfred is a 64 y.o. female seen at the request of Dr. Azikiwe K. Lombard today in the Endocrine Surgery Clinic for evaluation of thyroid nodule(s). Her history dates back several years when thyroid nodules were noted(no prior biopsies).  Most recently the patient relocated here from [place] and established with a new PCP.  Subsequent evaluation included neck ultrasound. Regine Alfred complains of nothing currently other than choking with water intermittently(once a month) . The patient denies hot/cold intolerance, fatigue, unexplained weight changes, difficulty with shortness of breath especially with postural changes and palpable neck mass. She does not have a history of head and neck radiation therapy. She does have a family history of thyroid disease(granmother-maternal with goiter s/p thyroidectomy and mother with thyroid medication requirement). She does not have a family history of other known endocrinopathies. She is not taking thyroid hormone supplementation(though  patient with hypothyroidism on meds for approximately 2 years in her 20's). She has not undergone radioactive iodine treatment.      Review of patient's allergies indicates:   Allergen Reactions    Codeine Shortness Of Breath     Pt states she had a reaction as a teenager and was taken to the ER.    Lisinopril      cough    Nortriptyline      sleepy       Current Outpatient Medications   Medication Sig Dispense Refill    aspirin 81 MG Chew       cholecalciferol, vitamin D3, (VITAMIN D3) 2,000 unit Cap Take 1 capsule by mouth once daily.       gabapentin (NEURONTIN) 600 MG tablet TAKE 1 TABLET(600 MG) BY MOUTH THREE TIMES DAILY 90 tablet 0    glipiZIDE (GLUCOTROL) 5 MG TR24 TAKE 2 TABLETS(10 MG) BY MOUTH DAILY WITH BREAKFAST 60 tablet 5    hydroCHLOROthiazide (HYDRODIURIL) 25 MG tablet Take 1 tablet (25 mg total) by mouth once daily. 90 tablet 1    ipratropium (ATROVENT) 0.03 % nasal spray 2 sprays by Nasal route 2 (two) times daily. 30 mL 5    losartan (COZAAR) 50 MG tablet Take 1 tablet (50 mg total) by mouth once daily. 90 tablet 0    metFORMIN (GLUCOPHAGE) 1000 MG tablet Take 1 tablet (1,000 mg total) by mouth 2 (two) times daily with meals. 180 tablet 1    naproxen sodium (ANAPROX) 550 MG tablet Take 1 tablet (550 mg total) by mouth 2 (two) times daily as needed. 40 tablet 5    sumatriptan (IMITREX) 100 MG tablet Take 1 tab at onset of migraine, may repeat dose in 2 hours if needed. Max 2 tabs/day. Max 3 days/week. 9 tablet 11     No current facility-administered medications for this visit.        Past Medical History:   Diagnosis Date    Breast cancer 2017    Cataract     Diabetes mellitus, type 2     Type 2    Fibromyalgia     Glaucoma     Hypertension      Past Surgical History:   Procedure Laterality Date    BREAST BIOPSY      BREAST SURGERY      breast ca lt side     LAPAROSCOPIC SUPRACERVICAL HYSTERECTOMY  2005    fibroids    MASTECTOMY Left 2017    chemo    MYOMECTOMY      TONSILLECTOMY      VAGINAL  "DELIVERY       Social History     Tobacco Use    Smoking status: Never Smoker    Smokeless tobacco: Never Used   Substance Use Topics    Alcohol use: No     Frequency: Never    Drug use: No          Review of Systems:    Review of Systems  Constitutional: negative for chills, fatigue, fevers, malaise and night sweats  Eyes: negative for icterus and irritation  Respiratory: negative for cough and dyspnea on exertion  Cardiovascular: negative for chest pain and palpitations  Gastrointestinal: negative for constipation, diarrhea and dysphagia  Genitourinary:negative for dysuria, hematuria and nocturia  Integument/breast: negative for rash and skin color change  Hematologic/lymphatic: negative for bleeding and easy bruising  Neurological: negative for gait problems, headaches and seizures  Behavioral/Psych: negative for anxiety and depression  Endocrine: negative    ENT ROS: negative      OBJECTIVE:     Vital Signs:  BP (!) 149/86 (BP Location: Right arm, Patient Position: Sitting, BP Method: Large (Automatic))   Pulse 98   Temp 97.8 °F (36.6 °C)   Resp 18   Ht 5' 5" (1.651 m)   Wt 119.1 kg (262 lb 9.1 oz)   BMI 43.69 kg/m²    Body mass index is 43.69 kg/m².      Physical Exam:    General:  no distress, see vitals for BMI    Eyes:  conjunctivae/corneas clear   Neck: trachea midline and symmetric, no adenopathy    Thyroid:  thyroid enlarged and nodular   Lung: clear to auscultation bilaterally   Heart:  regular rate and rhythm   Abdomen: soft, non-tender; bowel sounds normal; no masses,  no organomegaly   Skin/Extremities: warm and well-perfused   Pulses: 2+ and symmetric   Neuro: normal without focal findings and mental status, speech normal, alert and oriented x3       Laboratory/Radiologic Studies    Studies:  Date:       Results:    Ultrasound:  4/1/2019 FINDINGS:  The thyroid is enlarged in size.    Right lobe of the thyroid measures 7 x 2.2 x 3.1 cm.  Hypoechoic solid nodule measuring 1.6 x 0.7 x 1.2 cm.  It " meets TIRAD 4 criteria and FNA is recommended.  Two additional subcentimeter nodules which do not meet criteria for FNA or follow-up.    Left lobe of the thyroid measures 7.1 x 2.2 x 3 cm.  There is a 2.4 x 1.3 x 2.4 cm mixed solid and cystic nodule with macrocalcifications and irregular/lobulated margins which is TIRADS 4 and FNA is recommended.  1.3 x 1.5 x 1.5 cm isoechoic solid nodule with punctate calcifications 1.7 x 1 x 1.5 cm solid isoechoic nodule.  Both nodules meet TIRADS 4 criteria and follow up is recommended.    Isthmus: Measures 0.8 cm in thickness. Hypoechoic solid nodule measuring 1.3 x 0.6 x 1.2 cm.  This nodule meets TIRADS 4 criteria and follow up ultrasound is recommended    Cervical lymph nodes demonstrate normal morphology and size           Component Value Date    TSH 2.045 11/29/2018    Free T4 0.94 11/29/2018    Vit D, 25-Hydroxy 61 11/29/2018    Sodium 141 11/29/2018    Potassium 3.7 11/29/2018    Chloride 102 11/29/2018    CO2 25 11/29/2018    Glucose 151 (H) 11/29/2018    BUN, Bld 13 11/29/2018    Creatinine 0.8 11/29/2018    Creatinine 274.1 08/12/2018    Calcium 10.1 11/29/2018    Anion Gap 14 11/29/2018    eGFR if African American >60.0 11/29/2018    eGFR if non African American >60.0 11/29/2018       ASSESSMENT/PLAN:       Assessment    Ms. Alfred is a 64 y.o. female who presents with evidence of Thyroid nodule [E04.1].    Plan    During this visit, lab and imaging results were reviewed with the patient. Thyroid anatomy and physiology were reviewed and she was given a copy of our patient education booklet, Understanding Thyroid Disease. The above testing and examination support the diagnosis of multinodular thyroid goiter.     Thyroid FNA of the dominant nodules is recommended. Potential complications of this option was reviewed with the patient. Further recommendations determined following final pathology.

## 2025-02-04 ENCOUNTER — TELEPHONE (OUTPATIENT)
Dept: HEMATOLOGY/ONCOLOGY | Facility: CLINIC | Age: 70
End: 2025-02-04
Payer: MEDICARE

## 2025-02-04 LAB
ADEQUACY: NORMAL
FINAL PATHOLOGIC DIAGNOSIS: NORMAL
Lab: NORMAL

## 2025-02-04 NOTE — TELEPHONE ENCOUNTER
----- Message from Pepe sent at 2/4/2025  8:48 AM CST -----  Regarding: Consult/Advisory  Contact: [phone]  Consult/Advisory     Name Of Caller: Regine Alfred        Contact Preference:  [phone]     Nature of call: Pt is calling to see why she was scheduled with someone else and not Dr. Campbell

## 2025-02-04 NOTE — PROGRESS NOTES
I have seen the patient, reviewed the nurse practitioner's history and physical, assessment, plan, and progress note. I have personally interviewed and examined the patient at bedside and agree with the findings.     Will repeat FNA of the largest nodule on the left. The rest of the nodules appear mostly cystic/spongiform and are DIANE low risk. None of these are significantly changed in size since 2022.    James Moulton MD  Endocrinology Staff

## 2025-02-05 ENCOUNTER — PATIENT MESSAGE (OUTPATIENT)
Dept: ENDOCRINOLOGY | Facility: CLINIC | Age: 70
End: 2025-02-05
Payer: MEDICARE

## 2025-02-05 ENCOUNTER — TELEPHONE (OUTPATIENT)
Dept: HEMATOLOGY/ONCOLOGY | Facility: CLINIC | Age: 70
End: 2025-02-05
Payer: MEDICARE

## 2025-02-05 DIAGNOSIS — Z12.31 ENCOUNTER FOR SCREENING MAMMOGRAM FOR MALIGNANT NEOPLASM OF BREAST: ICD-10-CM

## 2025-02-05 DIAGNOSIS — C50.912 MALIGNANT NEOPLASM OF LEFT FEMALE BREAST, UNSPECIFIED ESTROGEN RECEPTOR STATUS, UNSPECIFIED SITE OF BREAST: Primary | ICD-10-CM

## 2025-02-05 DIAGNOSIS — Z85.3 HISTORY OF BREAST CANCER IN FEMALE: ICD-10-CM

## 2025-02-07 ENCOUNTER — LAB VISIT (OUTPATIENT)
Dept: LAB | Facility: HOSPITAL | Age: 70
End: 2025-02-07
Payer: MEDICARE

## 2025-02-07 ENCOUNTER — PATIENT MESSAGE (OUTPATIENT)
Dept: PRIMARY CARE CLINIC | Facility: CLINIC | Age: 70
End: 2025-02-07
Payer: MEDICARE

## 2025-02-07 ENCOUNTER — TELEPHONE (OUTPATIENT)
Dept: PRIMARY CARE CLINIC | Facility: CLINIC | Age: 70
End: 2025-02-07
Payer: MEDICARE

## 2025-02-07 DIAGNOSIS — E11.9 TYPE 2 DIABETES MELLITUS WITHOUT COMPLICATION, WITHOUT LONG-TERM CURRENT USE OF INSULIN: Primary | ICD-10-CM

## 2025-02-07 DIAGNOSIS — E11.9 TYPE 2 DIABETES MELLITUS WITHOUT COMPLICATION, WITHOUT LONG-TERM CURRENT USE OF INSULIN: ICD-10-CM

## 2025-02-07 DIAGNOSIS — E11.9 TYPE 2 DIABETES MELLITUS WITHOUT COMPLICATION: ICD-10-CM

## 2025-02-07 LAB
CHOLEST SERPL-MCNC: 163 MG/DL (ref 120–199)
CHOLEST/HDLC SERPL: 2.8 {RATIO} (ref 2–5)
HDLC SERPL-MCNC: 59 MG/DL (ref 40–75)
HDLC SERPL: 36.2 % (ref 20–50)
LDLC SERPL CALC-MCNC: 90.4 MG/DL (ref 63–159)
NONHDLC SERPL-MCNC: 104 MG/DL
TRIGL SERPL-MCNC: 68 MG/DL (ref 30–150)

## 2025-02-07 PROCEDURE — 80061 LIPID PANEL: CPT | Performed by: STUDENT IN AN ORGANIZED HEALTH CARE EDUCATION/TRAINING PROGRAM

## 2025-02-07 PROCEDURE — 36415 COLL VENOUS BLD VENIPUNCTURE: CPT | Mod: PN | Performed by: STUDENT IN AN ORGANIZED HEALTH CARE EDUCATION/TRAINING PROGRAM

## 2025-02-07 PROCEDURE — 83036 HEMOGLOBIN GLYCOSYLATED A1C: CPT | Performed by: STUDENT IN AN ORGANIZED HEALTH CARE EDUCATION/TRAINING PROGRAM

## 2025-02-07 NOTE — TELEPHONE ENCOUNTER
----- Message from Edithyessenia sent at 2/7/2025  9:02 AM CST -----  Contact: [phone]  .1MEDICALADVICE     Patient is calling for Medical Advice regarding:Pt said she is at the lab and needs a call back cause one of her orders was not in but they did draw it and she needs that order in     How long has patient had these symptoms:    Pharmacy name and phone#:    Patient wants a call back or thru [facility]: call back     Comments:    Please advise patient replies from provider may take up to 48 hours.

## 2025-02-08 LAB
ESTIMATED AVG GLUCOSE: 151 MG/DL (ref 68–131)
HBA1C MFR BLD: 6.9 % (ref 4–5.6)

## 2025-02-10 ENCOUNTER — OFFICE VISIT (OUTPATIENT)
Dept: PRIMARY CARE CLINIC | Facility: CLINIC | Age: 70
End: 2025-02-10
Payer: MEDICARE

## 2025-02-10 VITALS
OXYGEN SATURATION: 99 % | DIASTOLIC BLOOD PRESSURE: 72 MMHG | WEIGHT: 221.31 LBS | HEIGHT: 65 IN | BODY MASS INDEX: 36.87 KG/M2 | HEART RATE: 99 BPM | SYSTOLIC BLOOD PRESSURE: 120 MMHG

## 2025-02-10 DIAGNOSIS — E11.9 TYPE 2 DIABETES MELLITUS WITHOUT COMPLICATION, WITHOUT LONG-TERM CURRENT USE OF INSULIN: Primary | ICD-10-CM

## 2025-02-10 DIAGNOSIS — G95.89 MYELOMALACIA OF CERVICAL CORD: ICD-10-CM

## 2025-02-10 DIAGNOSIS — R05.3 CHRONIC COUGH: ICD-10-CM

## 2025-02-10 DIAGNOSIS — I15.2 HYPERTENSION ASSOCIATED WITH TYPE 2 DIABETES MELLITUS: ICD-10-CM

## 2025-02-10 DIAGNOSIS — C50.912 MALIGNANT NEOPLASM OF LEFT FEMALE BREAST, UNSPECIFIED ESTROGEN RECEPTOR STATUS, UNSPECIFIED SITE OF BREAST: ICD-10-CM

## 2025-02-10 DIAGNOSIS — E11.59 HYPERTENSION ASSOCIATED WITH TYPE 2 DIABETES MELLITUS: ICD-10-CM

## 2025-02-10 PROCEDURE — 99999 PR PBB SHADOW E&M-EST. PATIENT-LVL IV: CPT | Mod: PBBFAC,,, | Performed by: STUDENT IN AN ORGANIZED HEALTH CARE EDUCATION/TRAINING PROGRAM

## 2025-02-10 RX ORDER — PROMETHAZINE HYDROCHLORIDE AND DEXTROMETHORPHAN HYDROBROMIDE 6.25; 15 MG/5ML; MG/5ML
5 SYRUP ORAL EVERY 8 HOURS PRN
Qty: 240 ML | Refills: 1 | Status: SHIPPED | OUTPATIENT
Start: 2025-02-10

## 2025-02-10 NOTE — PROGRESS NOTES
02/10/2025    Regine Cooper  [id number]    Chief Complaint   Patient presents with    Follow-up       HPI  History of Present Illness    CHIEF COMPLAINT:  Regine presents today for follow up    DIABETES:  She discontinued Ozempic for three months due to cost and restarted glipizide 2/2 increased BG. She resumed Ozempic in January. Recent home glucose readings show an upward trend: 127, 111, 108, 104, and 88 mg/dL, with one unexplained low reading of 67 mg/dL.    RESPIRATORY AND ALLERGY:  She experienced breathing difficulty and coughing in late October/early November after exposure to cane fields. She continues to experience post-nasal drip with associated coughing, choking, and productive sputum, particularly in the morning. She takes Montelukast daily for allergies and uses three nasal sprays.    THYROID:  She underwent thyroid ultrasound and biopsy last week which was benign .          Negative 10 point ROS outside of HPI    Social History     Socioeconomic History    Marital status:     Number of children: 1   Tobacco Use    Smoking status: Never     Passive exposure: Never    Smokeless tobacco: Never   Substance and Sexual Activity    Alcohol use: No    Drug use: No    Sexual activity: Not Currently   Social History Narrative        1 son (estranged)    3 grandchildren ([place])    Born and raised in [place] (moved to [facility] 2017)     Social Drivers of Health     Financial Resource Strain: Low Risk  (1/27/2025)    Overall Financial Resource Strain (CARDIA)     Difficulty of Paying Living Expenses: Not very hard   Food Insecurity: No Food Insecurity (1/27/2025)    Hunger Vital Sign     Worried About Running Out of Food in the Last Year: Never true     Ran Out of Food in the Last Year: Never true   Transportation Needs: No Transportation Needs (11/23/2023)    PRAPARE - Transportation     Lack of Transportation (Medical): No     Lack of Transportation (Non-Medical): No   Physical Activity:  Insufficiently Active (2025)    Exercise Vital Sign     Days of Exercise per Week: 1 day     Minutes of Exercise per Session: 40 min   Stress: No Stress Concern Present (2025)    [employer] - Occupational Stress Questionnaire     Feeling of Stress : Only a little   Housing Stability: Low Risk  (2023)    Housing Stability Vital Sign     Unable to Pay for Housing in the Last Year: No     Number of Places Lived in the Last Year: 1     Unstable Housing in the Last Year: No           Current Outpatient Medications:     albuterol (PROVENTIL/VENTOLIN HFA) 90 mcg/actuation inhaler, SMARTSI-2 Puff(s) Via Inhaler Every 6 Hours PRN, Disp: 6.7 g, Rfl: 0    augmented betamethasone dipropionate (DIPROLENE-AF) 0.05 % cream, Apply topically 2 (two) times to 4 times daily to area of back when itchy or burning.Stop using steroid topical when skin is non itchy.  Do not treat dark or red coloring., Disp: 50 g, Rfl: 0    betamethasone valerate 0.1% (VALISONE) 0.1 % Lotn, Apply topically 2 (two) times daily., Disp: , Rfl:     clobetasoL (TEMOVATE) 0.05 % external solution, APPLY TOPICALLY TO THE SCALP TWICE DAILY AS NEEDED FOR ITCHING OR IRRITATION, Disp: 50 mL, Rfl: 0    ferrous sulfate 325 (65 FE) MG EC tablet, Take with orange juice one hour before meals, Disp: 60 tablet, Rfl: 1    fluticasone propionate (FLONASE) 50 mcg/actuation nasal spray, 1 spray by Each Nostril route once daily. OTC Flonase, Disp: , Rfl:     hydroCHLOROthiazide (HYDRODIURIL) 25 MG tablet, TAKE 1 TABLET(25 MG) BY MOUTH EVERY DAY, Disp: 90 tablet, Rfl: 2    ipratropium (ATROVENT) 42 mcg (0.06 %) nasal spray, 2 sprays by Each Nostril route 3 (three) times daily as needed (for runny nose or post-nasal drip)., Disp: 15 mL, Rfl: 11    lancets [facility], To check BG 2 times daily, Disp: 200 each, Rfl: 3    latanoprost 0.005 % ophthalmic solution, Place 1 drop into both eyes every evening., Disp: 7.5 mL, Rfl: 3    losartan  (COZAAR) 50 MG tablet, Take 1 tablet (50 mg total) by mouth once daily., Disp: 90 tablet, Rfl: 3    meloxicam (MOBIC) 15 MG tablet, Take 1 tablet (15 mg total) by mouth daily as needed for Pain (back pain). Take with food, Disp: 30 tablet, Rfl: 2    metFORMIN (GLUCOPHAGE) 500 MG tablet, Take 1 tablet (500 mg total) by mouth 2 (two) times daily with meals., Disp: 180 tablet, Rfl: 3    montelukast (SINGULAIR) 10 mg tablet, Take 1 tablet (10 mg total) by mouth every evening., Disp: 90 tablet, Rfl: 3    ondansetron (ZOFRAN-ODT) 8 MG TbDL, Take 1 tablet (8 mg total) by mouth every 8 (eight) hours as needed (Nausea)., Disp: 30 tablet, Rfl: 2    ONETOUCH DELICA PLUS LANCET 33 gauge Misc, Check BG twice daily, Disp: 100 each, Rfl: 11    ONETOUCH VERIO REFLECT METER Misc, USE AS DIRECTED TO TEST BLOOD GLUCOSE, Disp: , Rfl:     ONETOUCH VERIO TEST STRIPS Strp, USE TO CHECK BLOOD GLUCOSE DAILY, Disp: 100 strip, Rfl: 3    pantoprazole (PROTONIX) 40 MG tablet, Take 1 tablet (40 mg total) by mouth once daily., Disp: 90 tablet, Rfl: 3    rizatriptan (MAXALT-MLT) 10 MG disintegrating tablet, Take 1 tablet (10 mg total) by mouth every 2 (two) hours as needed for Migraine. Max 30 mg/day., Disp: 12 tablet, Rfl: 5    sumatriptan (IMITREX) 100 MG tablet, Take 1 tablet (100 mg total) by mouth every 2 (two) hours as needed for Migraine. Max 200 mg/day., Disp: 12 tablet, Rfl: 5    tiZANidine (ZANAFLEX) 4 MG tablet, Take 1 tablet (4 mg total) by mouth 2 (two) times daily as needed (muscle pain)., Disp: 60 tablet, Rfl: 3    fluticasone-salmeterol diskus inhaler 250-50 mcg, Inhale 1 puff into the lungs 2 (two) times daily. Controller, Disp: 60 each, Rfl: 11    promethazine-dextromethorphan (PROMETHAZINE-DM) 6.25-15 mg/5 mL Syrp, Take 5 mLs by mouth every 8 (eight) hours as needed (cough)., Disp: 240 mL, Rfl: 1    semaglutide (OZEMPIC) 0.25 mg or 0.5 mg (2 mg/3 mL) pen injector, Inject 0.5 mg into the skin every 7 days., Disp: 3 mL, Rfl:  2      Physical Exam  Vitals:    02/10/25 1313   BP: 120/72   Pulse: 99       Physical Exam      Gen: well appearing, NAD  Resp: non labored breathing, no crackles, no wheezes, CTAB  CV: RRR no murmur, gallops, rubs, no LE edema  Abd: soft nontender BS present no organomegaly      Problem List Items Addressed This Visit       Type 2 diabetes mellitus without complication, without long-term current use of insulin - Primary    Hypertension associated with type 2 diabetes mellitus    Chronic cough     Other Visit Diagnoses       Essential hypertension                1. Type 2 diabetes mellitus without complication, without long-term current use of insulin  A1c 6.9 from 5.6  - increase semaglutide (OZEMPIC) 0.25 mg or 0.5 mg (2 mg/3 mL) pen injector; Inject 0.5 mg into the skin every 7 days.  Dispense: 3 mL; Refill: 2    2. Chronic cough  - promethazine-dextromethorphan (PROMETHAZINE-DM) 6.25-15 mg/5 mL Syrp; Take 5 mLs by mouth every 8 (eight) hours as needed (cough).  Dispense: 240 mL; Refill: 1    3. Hypertension associated with type 2 diabetes mellitus  Well controlled continue current regimen    4. Malignant neoplasm of left female breast, unspecified estrogen receptor status, unspecified site of breast  Follows w oncology. In remission  Mammo scheduled    5. Myelomalacia of cervical cord  Follows with pain management      RTC in 3 months for TYPE II DIABETES check    Gladis Melissa MD  Family Medicine

## 2025-02-26 ENCOUNTER — PATIENT MESSAGE (OUTPATIENT)
Dept: PRIMARY CARE CLINIC | Facility: CLINIC | Age: 70
End: 2025-02-26
Payer: MEDICARE

## 2025-02-27 ENCOUNTER — TELEPHONE (OUTPATIENT)
Dept: HEMATOLOGY/ONCOLOGY | Facility: CLINIC | Age: 70
End: 2025-02-27
Payer: MEDICARE

## 2025-03-02 NOTE — PROGRESS NOTES
HPI    DLS: 11/5/24    Doing well no changes x last visit    1. Glaucoma Suspect   2. Drusen OU   3. NS OU   4. Hx Breast CA    MEDS:  Latanoprost QHS OU  Refresh AT's PRN OU  Last edited by Brenda Patiño on 3/3/2025  9:36 AM.            Assessment /Plan     For exam results, see Encounter Report.    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   First photos   2018   Treatment / Drops started   latanoprost - started 1/6/2023 for IOP 28/22           Family history    + mother / grtand father / uncle / cousins         Glaucoma meds    latanoprost ou - 1/6/2023         H/O adverse rxn to glaucoma drops    none        LASERS    none        GLAUCOMA SURGERIES    none        OTHER EYE SURGERIES    none        CDR    0.8/0.7        Tbase    18-28  od // 16-22 os        Tmax    28/22           Ttarget    ? About 20              HVF    5 test 2019 to  2024 - full in past - now w/ early SNS   od // full os        Gonio    +3-4 ou         CCT    588/585        OCT    5  test 2019 to 2024 - RNFL -  dec TI mike (mild prog) od // nl  os        HRT    1 test 2020 to 2020 -  MR -  nl od // nl os /// CDR 0.63 od // 0.51 os        Disc photos    2018, 2022      - Ttoday  18/16 (good resp to latanoprost down from 28/22 )  - Test done today     IOP /     2. Dominate familial drusen   Mostly outside the arcades ou    See photos - 2018    Pt is taking ARED's     3. NS   Mild -monitor     4. flaoter (one floater - string like)    Warned of signs of PVD and RD    Pt to call if marked increase in floaters or flashes or curtain defect    5. H/O migraines - since chilhood   No visula aura - but the migraines settle  in/around eyes    6. Breast CA     7. Social stress (11/27/2023)    Her only son committed suicide 6/2023 (he was in [facility])   Ex  // just got to burry him recently  ( had to wait on death certificate) - 11/2023 - he had 3 children ages 17-23      PLAN   IOP 18/16   (13/3/2025)   on latanoprost   Off gtts - 18-28 od and 18-22 os /  / + Fm Hx - mother and an uncle and possibly a grand parent   HVF inow with early SNS / SAD od // still full os   OCT - bord / dec RNFL in one sector od - stable and nl os      cont  latatanoprost ou q hs     Had a reaction to the moderna covid vaccine - her eyes - were sort of frozen in primary gaze and she could not look up down or right or left for about 48 hrs (10/2024 )     F/U 4 months with IOP check // HVF / DFE / OCT   ? Consider slt (angle open)  as 2nd step if needed vs more drops (? Rocklatan)

## 2025-03-03 ENCOUNTER — OFFICE VISIT (OUTPATIENT)
Dept: OPHTHALMOLOGY | Facility: CLINIC | Age: 70
End: 2025-03-03
Payer: MEDICARE

## 2025-03-03 ENCOUNTER — PATIENT MESSAGE (OUTPATIENT)
Dept: NEUROLOGY | Facility: CLINIC | Age: 70
End: 2025-03-03
Payer: MEDICARE

## 2025-03-03 ENCOUNTER — PATIENT MESSAGE (OUTPATIENT)
Dept: OPHTHALMOLOGY | Facility: CLINIC | Age: 70
End: 2025-03-03

## 2025-03-03 DIAGNOSIS — H35.362 FAMILIAL DRUSEN OF MACULA OF BOTH EYES: ICD-10-CM

## 2025-03-03 DIAGNOSIS — H40.022 OPEN ANGLE WITH BORDERLINE FINDINGS AND HIGH GLAUCOMA RISK IN LEFT EYE: ICD-10-CM

## 2025-03-03 DIAGNOSIS — H35.361 FAMILIAL DRUSEN OF MACULA OF BOTH EYES: ICD-10-CM

## 2025-03-03 DIAGNOSIS — H52.4 MYOPIA OF BOTH EYES WITH ASTIGMATISM AND PRESBYOPIA: ICD-10-CM

## 2025-03-03 DIAGNOSIS — H52.13 MYOPIA OF BOTH EYES WITH ASTIGMATISM AND PRESBYOPIA: ICD-10-CM

## 2025-03-03 DIAGNOSIS — H25.13 NUCLEAR SCLEROTIC CATARACT OF BOTH EYES: ICD-10-CM

## 2025-03-03 DIAGNOSIS — H52.203 MYOPIA OF BOTH EYES WITH ASTIGMATISM AND PRESBYOPIA: ICD-10-CM

## 2025-03-03 DIAGNOSIS — H40.1111 PRIMARY OPEN-ANGLE GLAUCOMA, RIGHT EYE, MILD STAGE: Primary | ICD-10-CM

## 2025-03-03 PROCEDURE — 3044F HG A1C LEVEL LT 7.0%: CPT | Mod: CPTII,S$GLB,, | Performed by: OPHTHALMOLOGY

## 2025-03-03 PROCEDURE — 1160F RVW MEDS BY RX/DR IN RCRD: CPT | Mod: CPTII,S$GLB,, | Performed by: OPHTHALMOLOGY

## 2025-03-03 PROCEDURE — 99999 PR PBB SHADOW E&M-EST. PATIENT-LVL III: CPT | Mod: PBBFAC,,, | Performed by: OPHTHALMOLOGY

## 2025-03-03 PROCEDURE — 99214 OFFICE O/P EST MOD 30 MIN: CPT | Mod: S$GLB,,, | Performed by: OPHTHALMOLOGY

## 2025-03-03 PROCEDURE — 1159F MED LIST DOCD IN RCRD: CPT | Mod: CPTII,S$GLB,, | Performed by: OPHTHALMOLOGY

## 2025-03-03 PROCEDURE — 4010F ACE/ARB THERAPY RXD/TAKEN: CPT | Mod: CPTII,S$GLB,, | Performed by: OPHTHALMOLOGY

## 2025-03-10 ENCOUNTER — PATIENT MESSAGE (OUTPATIENT)
Dept: PRIMARY CARE CLINIC | Facility: CLINIC | Age: 70
End: 2025-03-10
Payer: MEDICARE

## 2025-03-10 DIAGNOSIS — E11.9 TYPE 2 DIABETES MELLITUS WITHOUT COMPLICATION, WITHOUT LONG-TERM CURRENT USE OF INSULIN: Primary | ICD-10-CM

## 2025-03-11 ENCOUNTER — TELEPHONE (OUTPATIENT)
Dept: PRIMARY CARE CLINIC | Facility: CLINIC | Age: 70
End: 2025-03-11
Payer: MEDICARE

## 2025-03-11 NOTE — TELEPHONE ENCOUNTER
Patient states she discussed with you an increase in Ozempic as she has been on same dose x 5 weeks. Patient states she would need a new RX for new dose.

## 2025-03-11 NOTE — TELEPHONE ENCOUNTER
----- Message from Tia sent at 3/11/2025  1:37 PM CDT -----  Contact: Contact Information [phone] .1mEDICALADVICE Patient is calling for Medical Advice regarding:pt is calling to fu on message that was sent in [facility] regarding increasing a dosage on script Ozempic .5 mg, pt would like to know if its time to increase dosage to 1 mg , if so pt is requesting that script be put inHow long has patient had these symptoms:Pharmacy name and phone#:[id number] - [place], [place] - [address] [facility] [id number] [facility] [id number]: [phone] Fax: [phone] wants a call back or thru [facility]:Call back Please call pt and advise

## 2025-03-20 ENCOUNTER — RESULTS FOLLOW-UP (OUTPATIENT)
Dept: PAIN MEDICINE | Facility: CLINIC | Age: 70
End: 2025-03-20

## 2025-03-20 ENCOUNTER — HOSPITAL ENCOUNTER (OUTPATIENT)
Dept: RADIOLOGY | Facility: HOSPITAL | Age: 70
Discharge: HOME | End: 2025-03-20
Attending: STUDENT IN AN ORGANIZED HEALTH CARE EDUCATION/TRAINING PROGRAM
Payer: MEDICARE

## 2025-03-20 ENCOUNTER — OFFICE VISIT (OUTPATIENT)
Dept: PAIN MEDICINE | Facility: CLINIC | Age: 70
End: 2025-03-20
Payer: MEDICARE

## 2025-03-20 VITALS
BODY MASS INDEX: 36.8 KG/M2 | HEART RATE: 108 BPM | SYSTOLIC BLOOD PRESSURE: 113 MMHG | HEIGHT: 65 IN | DIASTOLIC BLOOD PRESSURE: 74 MMHG | WEIGHT: 220.88 LBS

## 2025-03-20 DIAGNOSIS — M48.062 SPINAL STENOSIS OF LUMBAR REGION WITH NEUROGENIC CLAUDICATION: ICD-10-CM

## 2025-03-20 DIAGNOSIS — M17.0 PRIMARY OSTEOARTHRITIS OF BOTH KNEES: ICD-10-CM

## 2025-03-20 DIAGNOSIS — G95.89 MYELOMALACIA OF CERVICAL CORD: ICD-10-CM

## 2025-03-20 DIAGNOSIS — M17.0 PRIMARY OSTEOARTHRITIS OF BOTH KNEES: Primary | ICD-10-CM

## 2025-03-20 DIAGNOSIS — B02.29 POST HERPETIC NEURALGIA: ICD-10-CM

## 2025-03-20 DIAGNOSIS — M54.16 LUMBAR RADICULOPATHY: ICD-10-CM

## 2025-03-20 DIAGNOSIS — M47.816 LUMBAR SPONDYLOSIS: ICD-10-CM

## 2025-03-20 DIAGNOSIS — M54.14 THORACIC RADICULOPATHY: ICD-10-CM

## 2025-03-20 PROCEDURE — 3074F SYST BP LT 130 MM HG: CPT | Mod: CPTII,S$GLB,, | Performed by: STUDENT IN AN ORGANIZED HEALTH CARE EDUCATION/TRAINING PROGRAM

## 2025-03-20 PROCEDURE — 99214 OFFICE O/P EST MOD 30 MIN: CPT | Mod: S$GLB,,, | Performed by: STUDENT IN AN ORGANIZED HEALTH CARE EDUCATION/TRAINING PROGRAM

## 2025-03-20 PROCEDURE — 73565 X-RAY EXAM OF KNEES: CPT | Mod: TC

## 2025-03-20 PROCEDURE — 73565 X-RAY EXAM OF KNEES: CPT | Mod: 26,,, | Performed by: INTERNAL MEDICINE

## 2025-03-20 PROCEDURE — 3044F HG A1C LEVEL LT 7.0%: CPT | Mod: CPTII,S$GLB,, | Performed by: STUDENT IN AN ORGANIZED HEALTH CARE EDUCATION/TRAINING PROGRAM

## 2025-03-20 PROCEDURE — 1125F AMNT PAIN NOTED PAIN PRSNT: CPT | Mod: CPTII,S$GLB,, | Performed by: STUDENT IN AN ORGANIZED HEALTH CARE EDUCATION/TRAINING PROGRAM

## 2025-03-20 PROCEDURE — 4010F ACE/ARB THERAPY RXD/TAKEN: CPT | Mod: CPTII,S$GLB,, | Performed by: STUDENT IN AN ORGANIZED HEALTH CARE EDUCATION/TRAINING PROGRAM

## 2025-03-20 PROCEDURE — 3078F DIAST BP <80 MM HG: CPT | Mod: CPTII,S$GLB,, | Performed by: STUDENT IN AN ORGANIZED HEALTH CARE EDUCATION/TRAINING PROGRAM

## 2025-03-20 PROCEDURE — 1159F MED LIST DOCD IN RCRD: CPT | Mod: CPTII,S$GLB,, | Performed by: STUDENT IN AN ORGANIZED HEALTH CARE EDUCATION/TRAINING PROGRAM

## 2025-03-20 PROCEDURE — 3008F BODY MASS INDEX DOCD: CPT | Mod: CPTII,S$GLB,, | Performed by: STUDENT IN AN ORGANIZED HEALTH CARE EDUCATION/TRAINING PROGRAM

## 2025-03-20 PROCEDURE — 99999 PR PBB SHADOW E&M-EST. PATIENT-LVL IV: CPT | Mod: PBBFAC,,, | Performed by: STUDENT IN AN ORGANIZED HEALTH CARE EDUCATION/TRAINING PROGRAM

## 2025-03-20 NOTE — PROGRESS NOTES
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 headaches, syncope, paralysis, seizures or tremors.+ migraines   All other reviewed and negative other than HPI.    OBJECTIVE:    /74 (BP Location: Left arm, Patient Position: Sitting)   Pulse 108   Ht 5' 5" (1.651 m)   Wt 100.2 kg (220 lb 14.4 oz)   BMI 36.76 kg/m²     PHYSICAL EXAMINATION:    GENERAL: Well appearing, in no acute distress, alert and oriented x3.  PSYCH:  Mood and affect appropriate.  SKIN: Skin color, texture, turgor normal, no rashes or lesions.  HEAD/FACE:  Normocephalic, atraumatic. Cranial nerves grossly intact.     NECK:   - Negative pain to palpation over the cervical paraspinous muscles.   - Spurling  Negative.    - Negative pain with neck flexion, rotation, or lateral flexion.     CV: RRR with palpation of the radial artery.  PULM: CTAB. No evidence of respiratory difficulty, symmetric chest rise.  GI:  Soft and non-tender.    BACK:   - No obvious deformity or signs of trauma, Normal lumbar lordotic curve  - Positive spinous process tenderness in the thoracic spine  - Positive paravertebral tenderness  - Positive pain to palpation over the facet joints of the lumbar spine.   - Positive QL / Iliac crest / Glut tenderness  - Slump test is Negative for radicular pain  - Slump test is Negative for back pain  - Supine Straight leg " raising is Did not perform for radicular pain  - Supine Straight leg raising is Did not perform for back pain  - Lumbar ROM is diminished in Flexion with pain  - Lumbar ROM is diminished in Extension with pain  - Lumbar ROM is diminished in Lateral Flexion with pain  - Lumbar ROM is diminished in Rotation with pain  - Did not perform Sustained Hip Flexion test (for discogenic pain)  - Positive Altered Gait, Posture  - Axial facet loading test Positive on the bilateral side(s)    Thoracic exam:  (-) TTP spinous process and paraspinals from roughly T5-T10.    TTP on the right over where the post-herpetic neuralgia is located    SI Joint exam:  - Did not perform SI joint tenderness to palpation  - Erik's sign Did not perform  - Yeoman's Test: Did not perform for SI joint pain indicating anterior SI ligament involvement. Did not perform for anterior thigh pain/paresthesia which indicates femoral nerve stretch.  - Gaenslen's Test:Did not perform  - Finger Chari's Sign:Did not perform  - SI compression test:Did not perform  - SI distraction test:Did not perform  - Thigh Thrust: Did not perform  - SI Thrust: Did not perform    MUSKULOSKELETAL:    EXTREMITIES:   Hip Exam:  - Log Roll Did not perform  - FADIR Did not perform  - Stinchfield Did not perform  - Hip Scour Did not perform  - GTB Tenderness Did not perform    Bilateral shoulder are improved    MUSCULOSKELETAL:  No atrophy or tone abnormalities are noted in the UE or LE.  No deformities, edema, or skin discoloration are noted on visible skin. Good capillary refill.    NEURO: Bilateral upper and lower extremity coordination and muscle stretch reflexes are physiologic and symmetric.    No loss of sensation is noted.    NEUROLOGICAL EXAM:  MENTAL STATUS: A x O x 3, good concentration, speech is fluent and goal directed  MEMORY: recent and remote are intact  CN: CN2-12 grossly intact  MOTOR: 5/5 in all muscle groups  DTRs: 2+ intact symmetric patella and  biceps  Sensation:    -no Loss of sensation in a left upper, left lower, right upper and right lower C-2, C-3, C-4, C-5, C-6, C-7 and C-8 bilaterally and L-1, L-2, L-3, L-4 and L-5 bilaterally distribution.  Babinski: absent on the bilateral side(s)  Peoples: absent on the bilateral side(s)  Clonus: absent on the bilateral side(s)

## 2025-03-24 DIAGNOSIS — Z00.00 ENCOUNTER FOR MEDICARE ANNUAL WELLNESS EXAM: ICD-10-CM

## 2025-04-01 ENCOUNTER — OFFICE VISIT (OUTPATIENT)
Dept: OBSTETRICS AND GYNECOLOGY | Facility: CLINIC | Age: 70
End: 2025-04-01
Payer: MEDICARE

## 2025-04-01 VITALS
SYSTOLIC BLOOD PRESSURE: 114 MMHG | HEART RATE: 98 BPM | WEIGHT: 218 LBS | DIASTOLIC BLOOD PRESSURE: 73 MMHG | BODY MASS INDEX: 36.32 KG/M2 | HEIGHT: 65 IN

## 2025-04-01 DIAGNOSIS — N39.0 URINARY TRACT INFECTION WITHOUT HEMATURIA, SITE UNSPECIFIED: Primary | ICD-10-CM

## 2025-04-01 DIAGNOSIS — N95.8 GENITOURINARY SYNDROME OF MENOPAUSE: ICD-10-CM

## 2025-04-01 LAB
BILIRUB UR QL STRIP.AUTO: NEGATIVE
CLARITY UR: CLEAR
COLOR UR AUTO: YELLOW
GLUCOSE UR QL STRIP: NEGATIVE
HGB UR QL STRIP: NEGATIVE
KETONES UR QL STRIP: NEGATIVE
LEUKOCYTE ESTERASE UR QL STRIP: NEGATIVE
NITRITE UR QL STRIP: NEGATIVE
PH UR STRIP: 6 [PH]
PROT UR QL STRIP: NEGATIVE
SP GR UR STRIP: 1.02
UROBILINOGEN UR STRIP-ACNC: NEGATIVE EU/DL

## 2025-04-01 PROCEDURE — 87086 URINE CULTURE/COLONY COUNT: CPT

## 2025-04-01 PROCEDURE — 99999 PR PBB SHADOW E&M-EST. PATIENT-LVL III: CPT | Mod: PBBFAC,,,

## 2025-04-01 PROCEDURE — 81003 URINALYSIS AUTO W/O SCOPE: CPT

## 2025-04-01 RX ORDER — PRASTERONE 6.5 MG/1
6.5 INSERT VAGINAL
Qty: 12 EACH | Refills: 3 | Status: SHIPPED | OUTPATIENT
Start: 2025-04-03

## 2025-04-01 RX ORDER — CLOBETASOL PROPIONATE 0.05 G/100ML
SHAMPOO TOPICAL
COMMUNITY
Start: 2025-01-27

## 2025-04-01 RX ORDER — CLOTRIMAZOLE AND BETAMETHASONE DIPROPIONATE 10; .64 MG/G; MG/G
CREAM TOPICAL 2 TIMES DAILY
Qty: 45 G | Refills: 1 | Status: SHIPPED | OUTPATIENT
Start: 2025-04-01

## 2025-04-01 NOTE — PROGRESS NOTES
"CC: Vaginal cyst    Regine Cooper is a 70 y.o. female  presents for GYN problem visit.   Pt is postmenopausal since age 48 , She complains of vaginal abscesses from hair follicles and UTI symptoms. She gets symptoms frequently and will have frequency, burning, odor and itching on vulvar. Has only had a few positive cultures.    She denies hematuria, dysuria, constipation, diarrhea, fevers, chills, nausea or emesis.  Denies further issues, problems, or complaints.      /73   Pulse 98   Ht 5' 5" (1.651 m)   Wt 98.9 kg (218 lb)   BMI 36.28 kg/m²     ROS:  Constitutional: no weight loss, weight gain, fever, fatigue  Eyes:  No vision changes, glasses/contacts  ENT/Mouth: No ulcers, sinus problems, ears ringing, headache  Cardiovascular: No inability to lie flat, chest pain, exercise intolerance, swelling, heart palpitations  Respiratory: No wheezing, coughing blood, shortness of breath, or cough  Gastrointestinal: No diarrhea, bloody stool, nausea/vomiting, constipation, gas, hemorrhoids  Genitourinary: No blood in urine, painful urination, urgency of urination, frequency of urination, incomplete emptying, incontinence, abnormal bleeding, painful periods, heavy periods, vaginal discharge, vaginal odor, painful intercourse, sexual problems, bleeding after intercourse. Vaginal cyst  Musculoskeletal: No muscle weakness  Breast: No painful breasts, nipple discharge, masses, rash, ulcers   Neurological: No passing out, seizures, numbness, headache  Endocrine: No diabetes, hypothyroid, hyperthyroid, hot flashes, hair loss, abnormal hair growth, acne  Psychiatric: No depression, crying  Hematologic: No bruises, bleeding, swollen lymph nodes, anemia.    OBJECTIVE:  Physical Exam:   Constitutional: Vital signs are normal. She appears well-developed and well-nourished.        Pulmonary/Chest: Effort normal.        Abdominal: Soft. There is no abdominal tenderness.     Genitourinary:    Inguinal canal, urethra, " vagina, uterus, right adnexa and left adnexa normal.   Rectum:      No external hemorrhoid.      Pelvic exam was performed with patient in the lithotomy position.   The external female genitalia was normal.   No external genitalia lesions identified,Genitalia hair distrobution normal .     Labial bartholins normal.There is no rash, tenderness or lesion on the right labia. There is no rash, tenderness or lesion on the left labia. Cervix is normal. Right adnexum displays no mass, no tenderness and no fullness. Left adnexum displays no mass, no tenderness and no fullness. Vagina exhibits no lesion. No vaginal discharge, tenderness or bleeding in the vagina.    No foreign body in the vagina.      No signs of injury in the vagina.   Vagina was moist.Vaginal atrophy noted. Cervix exhibits no motion tenderness, no lesion, no discharge, no friability, no tenderness and no polyp. Uterus consistancy normal and Uerus contour normal  Uterus is not enlarged, not tender and not hosting fibroids. Normal urethral meatus.              Neurological: She is alert.     Psychiatric: She has a normal mood and affect. Her behavior is normal. Mood and judgment normal.         ASSESSMENT:   Urinary tract infection without hematuria, site unspecified  -     Urinalysis  -     Urine Culture High Risk    Genitourinary syndrome of menopause    Other orders  -     prasterone, dhea, (INTRAROSA) 6.5 mg Inst; Place 6.5 mg vaginally twice a week. Place 1 suppository vaginal 2 times a week  Dispense: 12 each; Refill: 3  -     clotrimazole-betamethasone 1-0.05% (LOTRISONE) cream; Apply topically 2 (two) times daily. For no longer than 7 days.  Dispense: 45 g; Refill: 1    PLAN:   - Vulvar exam  - UA + culture for UTI symptoms  - Lotrisone for itchy skin  - Vaginal estrogen for GSM and vaginal dryness  - Recommended good genital hygiene practices (I.e. using unscented body washes, avoiding douching, foods high in sugar, tight fitting clothing), drinking  plenty of water, voiding after sex, & changing and showering immediately after exercise.  - Follow up 3 months

## 2025-04-02 ENCOUNTER — OFFICE VISIT (OUTPATIENT)
Dept: ALLERGY | Facility: CLINIC | Age: 70
End: 2025-04-02
Payer: MEDICARE

## 2025-04-02 VITALS
WEIGHT: 219.13 LBS | HEART RATE: 93 BPM | HEIGHT: 65 IN | OXYGEN SATURATION: 97 % | SYSTOLIC BLOOD PRESSURE: 105 MMHG | DIASTOLIC BLOOD PRESSURE: 60 MMHG | BODY MASS INDEX: 36.51 KG/M2

## 2025-04-02 DIAGNOSIS — J31.0 CHRONIC RHINITIS: Primary | ICD-10-CM

## 2025-04-02 DIAGNOSIS — J45.909 ASTHMA, UNSPECIFIED ASTHMA SEVERITY, UNSPECIFIED WHETHER COMPLICATED, UNSPECIFIED WHETHER PERSISTENT: ICD-10-CM

## 2025-04-02 PROCEDURE — 3074F SYST BP LT 130 MM HG: CPT | Mod: CPTII,S$GLB,, | Performed by: STUDENT IN AN ORGANIZED HEALTH CARE EDUCATION/TRAINING PROGRAM

## 2025-04-02 PROCEDURE — 3044F HG A1C LEVEL LT 7.0%: CPT | Mod: CPTII,S$GLB,, | Performed by: STUDENT IN AN ORGANIZED HEALTH CARE EDUCATION/TRAINING PROGRAM

## 2025-04-02 PROCEDURE — 1160F RVW MEDS BY RX/DR IN RCRD: CPT | Mod: CPTII,S$GLB,, | Performed by: STUDENT IN AN ORGANIZED HEALTH CARE EDUCATION/TRAINING PROGRAM

## 2025-04-02 PROCEDURE — G2211 COMPLEX E/M VISIT ADD ON: HCPCS | Mod: S$GLB,,, | Performed by: STUDENT IN AN ORGANIZED HEALTH CARE EDUCATION/TRAINING PROGRAM

## 2025-04-02 PROCEDURE — 1159F MED LIST DOCD IN RCRD: CPT | Mod: CPTII,S$GLB,, | Performed by: STUDENT IN AN ORGANIZED HEALTH CARE EDUCATION/TRAINING PROGRAM

## 2025-04-02 PROCEDURE — 99215 OFFICE O/P EST HI 40 MIN: CPT | Mod: S$GLB,,, | Performed by: STUDENT IN AN ORGANIZED HEALTH CARE EDUCATION/TRAINING PROGRAM

## 2025-04-02 PROCEDURE — 3078F DIAST BP <80 MM HG: CPT | Mod: CPTII,S$GLB,, | Performed by: STUDENT IN AN ORGANIZED HEALTH CARE EDUCATION/TRAINING PROGRAM

## 2025-04-02 PROCEDURE — 3008F BODY MASS INDEX DOCD: CPT | Mod: CPTII,S$GLB,, | Performed by: STUDENT IN AN ORGANIZED HEALTH CARE EDUCATION/TRAINING PROGRAM

## 2025-04-02 PROCEDURE — 1125F AMNT PAIN NOTED PAIN PRSNT: CPT | Mod: CPTII,S$GLB,, | Performed by: STUDENT IN AN ORGANIZED HEALTH CARE EDUCATION/TRAINING PROGRAM

## 2025-04-02 PROCEDURE — 99999 PR PBB SHADOW E&M-EST. PATIENT-LVL V: CPT | Mod: PBBFAC,,, | Performed by: STUDENT IN AN ORGANIZED HEALTH CARE EDUCATION/TRAINING PROGRAM

## 2025-04-02 PROCEDURE — 4010F ACE/ARB THERAPY RXD/TAKEN: CPT | Mod: CPTII,S$GLB,, | Performed by: STUDENT IN AN ORGANIZED HEALTH CARE EDUCATION/TRAINING PROGRAM

## 2025-04-02 NOTE — PROGRESS NOTES
ALLERGY & IMMUNOLOGY CLINIC - FOLLOW UP     HISTORY OF PRESENT ILLNESS     Patient ID: Regine Cooper is a 70 y.o. female    CC: chronic rhinitis     HPI: Regine Cooper is a 70 y.o. female with a history of breast cancer, DM2, HTN, and asthma, following up for chronic rhinitis.     She has been dealing with rhinitis, cough, ear itching. She says she had a bad spell in Nov (set off by driving [place] some [place]).   She says her symptoms got bad last month as well, likely due to some windy days that we had.   She says symptoms are improved compared to last month, but she is starting to get symptoms again with the wind.  Sometimes she wakes uip with mucus in her throat. Wakes up coughing it up.     She is using the ipratropium 2 SEN in the morning.   She is using flonase 2 SEN nightly.  She says that she isn't using the astepro bc it is just too much to use all 3 nasal sprays.   She remains on montelukast at night.   When she gets tickle and cough, she takes promethazine-DM.   She takes zyrtec prn, but hasn't taken it in a long time.     She also has the albuterol inhaler, last needed in Nov. Needs it once or twice per year.   She says she hasn't had success getting a follow up with pulm (had tried to when she had the symptoms in Nov).   No shortness of breath or wheezing since Nov.     Triggers include wind, being around grass being cut.      MEDICAL HISTORY     Vaccines:   Immunization History   Administered Date(s) Administered    COVID-19, MRNA, LN-S, PF (MODERNA FULL 0.5 ML DOSE) 10/04/2022    COVID-19, MRNA, LN-S, PF (Pfizer) (Purple Cap) 10/04/2022    COVID-19, mRNA, LNP-S, PF, gunjan-sucrose, 30 mcg/0.3 mL (Pfizer Ages 12+) 09/30/2023    COVID-19, mRNA, LNP-S, bivalent booster, PF (Moderna Omicron)12 + YEARS 10/04/2022    COVID-19, vector-nr, rS-Ad26, PF (Tammie) 03/14/2021, 11/03/2021    Hepatitis B, Adult 01/27/1998, 01/27/1998    Influenza 10/04/2009, 10/13/2009, 09/01/2015    Influenza (FLUAD) -  Quadrivalent - Adjuvanted - PF *Preferred* (65+) 09/20/2022, 09/18/2023    Influenza - Quadrivalent - PF *Preferred* (6 months and older) 08/28/2018, 08/28/2018, 08/29/2019, 09/08/2020, 10/09/2021    Influenza - Trivalent - Afluria, Fluzone MDV 09/25/2013, 10/10/2014, 10/21/2016, 10/06/2017    Influenza - Trivalent - Fluad - Adjuvanted - PF (65 years and older 09/13/2024    Influenza Split 11/04/2006, 09/18/2010, 10/12/2011, 09/29/2012    Pneumococcal Conjugate - 13 Valent 03/23/2018, 03/23/2018    Pneumococcal Conjugate - 20 Valent 09/20/2022    Pneumococcal Polysaccharide - 23 Valent 02/23/2009, 02/23/2009    RSVpreF (Arexvy) 02/14/2024    Tdap 01/30/2008, 01/30/2018, 11/26/2018    Zoster Recombinant 12/07/2020, 12/07/2020, 02/24/2021     Medical Hx:   Patient Active Problem List   Diagnosis    History of breast cancer in female    Type 2 diabetes mellitus without complication, without long-term current use of insulin    Hypertension associated with type 2 diabetes mellitus    S/P left mastectomy    Spinal stenosis of lumbar region with neurogenic claudication    Migraine without aura and without status migrainosus, not intractable    Thyroid nodule    Fibromyalgia    Anemia    Unsp symptoms and signs w cognitive functions and awareness    History of antineoplastic chemotherapy    Seborrheic dermatitis of scalp    Insomnia due to medical condition    Insomnia    Obstructive sleep apnea hypopnea, mild    Cervical stenosis of spine    Impaired functional mobility, balance, gait, and endurance    Decreased range of motion of lumbar spine    Central sensitization to pain    Myelomalacia of cervical cord    Cervical spondylosis    Status post cervical discectomy    Status post cervical spinal fusion    Atherosclerosis of aorta    Neck pain    Left shoulder pain    Atopic dermatitis    Chronic rhinitis    Irritable bowel syndrome    Malignant neoplasm of unspecified site of left female breast    Suspected glaucoma of  both eyes    Chronic cough    Fatty liver    Obesity (BMI 30-39.9)    Iron deficiency anemia due to chronic blood loss    Apical lung scarring    Post-viral cough syndrome    Abnormal finding on imaging    Major depressive disorder, single episode, mild    Encounter for observation for other suspected diseases and conditions ruled out    Severe obesity (BMI 35.0-35.9 with comorbidity)     Surgical Hx:   Past Surgical History:   Procedure Laterality Date    ADENOIDECTOMY      ANTERIOR CERVICAL DISCECTOMY W/ FUSION N/A 2022    Procedure: DISCECTOMY, SPINE, CERVICAL, ANTERIOR APPROACH, WITH FUSION C5-7 SPINEWAVE SNS: VOCAL CORDS/MOTORS/SSEP;  Surgeon: Rosales Scruggs MD;  [facility];  Service: Neurosurgery;  Laterality: N/A;    BREAST BIOPSY      BREAST SURGERY      breast ca lt side     COLONOSCOPY N/A 2023    Procedure: COLONOSCOPY;  Surgeon: Eliseo Garcia MD;  Location: [facility] (4TH FLR);  Service: Endoscopy;  Laterality: N/A;    ESOPHAGOGASTRODUODENOSCOPY N/A 2023    Procedure: EGD (ESOPHAGOGASTRODUODENOSCOPY);  Surgeon: Eliseo Garcia MD;  Location: [facility] (4TH FLR);  Service: Endoscopy;  Laterality: N/A;  r/o h pylori-miralax extended prep-instr portal and handed-tb    HYSTERECTOMY      supacervical hysterectomy    LAPAROSCOPIC SUPRACERVICAL HYSTERECTOMY  2005    fibroids    MASTECTOMY Left 2017    chemo    MYOMECTOMY      TONSILLECTOMY      TYMPANOSTOMY TUBE PLACEMENT      VAGINAL DELIVERY       Medications:   Current Outpatient Medications on File Prior to Visit   Medication Sig Dispense Refill    albuterol (PROVENTIL/VENTOLIN HFA) 90 mcg/actuation inhaler SMARTSI-2 Puff(s) Via Inhaler Every 6 Hours PRN 6.7 g 0    augmented betamethasone dipropionate (DIPROLENE-AF) 0.05 % cream Apply topically 2 (two) times to 4 times daily to area of back when itchy or burning.Stop using steroid topical when skin is non itchy.  Do not treat dark or red coloring. 50 g 0     betamethasone valerate 0.1% (VALISONE) 0.1 % Lotn Apply topically 2 (two) times daily.      clobetasoL (CLOBEX) 0.05 % shampoo Apply topically.      clobetasoL (TEMOVATE) 0.05 % external solution APPLY TOPICALLY TO THE SCALP TWICE DAILY AS NEEDED FOR ITCHING OR IRRITATION 50 mL 0    ferrous sulfate 325 (65 FE) MG EC tablet Take with orange juice one hour before meals 60 tablet 1    fluticasone propionate (FLONASE) 50 mcg/actuation nasal spray 1 spray by Each Nostril route once daily. OTC Flonase      hydroCHLOROthiazide (HYDRODIURIL) 25 MG tablet TAKE 1 TABLET(25 MG) BY MOUTH EVERY DAY 90 tablet 2    ipratropium (ATROVENT) 42 mcg (0.06 %) nasal spray 2 sprays by Each Nostril route 3 (three) times daily as needed (for runny nose or post-nasal drip). 15 mL 11    lancets Mis To check BG 2 times daily 200 each 3    latanoprost 0.005 % ophthalmic solution Place 1 drop into both eyes every evening. 7.5 mL 3    losartan (COZAAR) 50 MG tablet Take 1 tablet (50 mg total) by mouth once daily. 90 tablet 3    meloxicam (MOBIC) 15 MG tablet Take 1 tablet (15 mg total) by mouth daily as needed for Pain (back pain). Take with food 30 tablet 2    metFORMIN (GLUCOPHAGE) 500 MG tablet Take 1 tablet (500 mg total) by mouth 2 (two) times daily with meals. 180 tablet 3    montelukast (SINGULAIR) 10 mg tablet Take 1 tablet (10 mg total) by mouth every evening. 90 tablet 3    ondansetron (ZOFRAN-ODT) 8 MG TbDL Take 1 tablet (8 mg total) by mouth every 8 (eight) hours as needed (Nausea). 30 tablet 2    ONETOUCH DELICA PLUS LANCET 33 gauge Misc Check BG twice daily 100 each 11    ONETOUCH VERIO REFLECT METER Misc USE AS DIRECTED TO TEST BLOOD GLUCOSE      ONETOUCH VERIO TEST STRIPS Strp USE TO CHECK BLOOD GLUCOSE DAILY 100 strip 3    pantoprazole (PROTONIX) 40 MG tablet Take 1 tablet (40 mg total) by mouth once daily. 90 tablet 3    [START ON 4/3/2025] prasterone, dhea, (INTRAROSA) 6.5 mg Inst Place 6.5 mg vaginally twice a week. Place 1  suppository vaginal 2 times a week 12 each 3    promethazine-dextromethorphan (PROMETHAZINE-DM) 6.25-15 mg/5 mL Syrp Take 5 mLs by mouth every 8 (eight) hours as needed (cough). 240 mL 1    rizatriptan (MAXALT-MLT) 10 MG disintegrating tablet Take 1 tablet (10 mg total) by mouth every 2 (two) hours as needed for Migraine. Max 30 mg/day. 12 tablet 5    semaglutide (OZEMPIC) 1 mg/dose (4 mg/3 mL) Inject 1 mg into the skin every 7 days. 3 mL 11    sumatriptan (IMITREX) 100 MG tablet Take 1 tablet (100 mg total) by mouth every 2 (two) hours as needed for Migraine. Max 200 mg/day. 12 tablet 5    tiZANidine (ZANAFLEX) 4 MG tablet Take 1 tablet (4 mg total) by mouth 2 (two) times daily as needed (muscle pain). 60 tablet 3    clotrimazole-betamethasone 1-0.05% (LOTRISONE) cream Apply topically 2 (two) times daily. For no longer than 7 days. (Patient not taking: Reported on 4/2/2025) 45 g 1    fluticasone-salmeterol diskus inhaler 250-50 mcg Inhale 1 puff into the lungs 2 (two) times daily. Controller 60 each 11    semaglutide (OZEMPIC) 0.25 mg or 0.5 mg (2 mg/3 mL) pen injector Inject 0.5 mg into the skin every 7 days. (Patient not taking: Reported on 4/2/2025) 3 mL 2    [DISCONTINUED] diclofenac sodium (VOLTAREN) 1 % Gel Apply 2 g topically 3 (three) times daily. 100 g 0     No current facility-administered medications on file prior to visit.     Drug Allergies:   Review of patient's allergies indicates:   Allergen Reactions    Codeine Shortness Of Breath     Pt states she had a reaction as a teenager and was taken to the ER.    Lisinopril      cough     Social Hx:   Social History     Tobacco Use    Smoking status: Never     Passive exposure: Never    Smokeless tobacco: Never   Substance Use Topics    Alcohol use: No    Drug use: No     Additional History Obtained at Initial Visit:  H/o Asthma: denies, but with possible symptoms in 2023.   H/o Rhinitis: endorses  Env/Occ:   Pets: no     PHYSICAL EXAM     VS: /60 (BP  "Location: Right arm, Patient Position: Sitting)   Pulse 93   Ht 5' 5" (1.651 m)   Wt 99.4 kg (219 lb 2.2 oz)   SpO2 97%   BMI 36.47 kg/m²   GENERAL: Alert, NAD, well-appearing  EYES: EOMI, no conjunctival injection, no discharge, no infraorbital shiners  EARS: external auditory canals normal B/L, TM normal B/L  NOSE: NT 2+ B/L, no stringing mucus, no polyps visualized  ORAL: MMM, no ulcers, no thrush  LUNGS: CTAB, no w/r/c, no increased WOB  HEART: RRR, normal S1/S2, no m/g/r  DERM: no rashes  NEURO: normal speech, no facial asymmetry     LABORATORY STUDIES     Component      Latest Ref Rn 9/10/2024 9/30/2024 12/3/2024 2/7/2025   WBC      3.90 - 12.70 K/uL 7.51   8.73     RBC      4.00 - 5.40 M/uL 4.78   4.50     Hemoglobin      12.0 - 16.0 g/dL 13.0   12.4     Hematocrit      37.0 - 48.5 % 40.8   39.0     MCV      82 - 98 fL 85   87     MCH      27.0 - 31.0 pg 27.2   27.6     MCHC      32.0 - 36.0 g/dL 31.9 (L)   31.8 (L)     RDW      11.5 - 14.5 % 14.0   13.8     Platelet Count      150 - 450 K/uL 324   320     MPV      9.2 - 12.9 fL 10.4   10.1     Immature Granulocytes      0.0 - 0.5 % 0.5   0.3     Gran # (ANC)      1.8 - 7.7 K/uL 5.0   5.7     Immature Grans (Abs)      0.00 - 0.04 K/uL 0.04   0.03     Lymph #      1.0 - 4.8 K/uL 1.8   2.2     Mono #      0.3 - 1.0 K/uL 0.5   0.5     Eos #      0.0 - 0.5 K/uL 0.1   0.2     Baso #      0.00 - 0.20 K/uL 0.03   0.05     Differential Method Automated   Automated     Sodium      136 - 145 mmol/L 142   140     Potassium      3.5 - 5.1 mmol/L 3.4 (L)   3.9     Chloride      95 - 110 mmol/L 101   104     CO2      23 - 29 mmol/L 28   24     Glucose      70 - 110 mg/dL 161 (H)   156 (H)     BUN      8 - 23 mg/dL 17   11     Creatinine      0.5 - 1.4 mg/dL 0.8  0.7  0.8     Calcium      8.7 - 10.5 mg/dL 10.5   9.6     PROTEIN TOTAL      6.0 - 8.4 g/dL 7.6   7.0     Albumin      3.5 - 5.2 g/dL 3.9   3.6     BILIRUBIN TOTAL      0.1 - 1.0 mg/dL 0.4   0.4     ALP      " 40 - 150 U/L 70   78     AST      10 - 40 U/L 11   13     ALT      10 - 44 U/L 8 (L)   10     eGFR      >60 mL/min/1.73 m^2 >60.0  >60.0  >60.0     Anion Gap      8 - 16 mmol/L 13   12     Hemoglobin A1C External      4.0 - 5.6 %  5.6   6.9 (H)    Estimated Avg Glucose      68 - 131 mg/dL  114   151 (H)    TSH      0.400 - 4.000 uIU/mL  0.877         Component      Latest Ref Rn 5/16/2023   Strongyloides Ab IgG      Negative  Negative    Anti- E. Histolytica Antibody      Negative  Negative    TTG IgA      <7.0 U/mL 0.3    IgA      40 - 350 mg/dL 201       ALLERGEN TESTING     Immunocaps:   Component      Latest Ref Rn 3/4/2021   D. farinae      <0.10 kU/L <0.10    D. farinae Class CLASS 0    D. pteronyssinus Dust Mite IgE      <0.10 kU/L <0.10    D. pteronyssinus Class CLASS 0    [place] Grass IgE      <0.10 kU/L <0.10    [place] Grass Class CLASS 0    Christian IgE      <0.10 kU/L <0.10    Christian Class CLASS 0    English Plantain IgE      <0.10 kU/L <0.10    English Plantain Class CLASS 0    [place] Tree IgE      <0.10 kU/L <0.10    [place], Class CLASS 0    Ragweed, Western IgE      <0.10 kU/L <0.10    Ragweed, Western, Class CLASS 0    A. alternata IgE      <0.10 kU/L <0.10    Altern. alternata Class CLASS 0    Aspergillus Fumigatus IgE      <0.10 kU/L <0.10    A. fumigatus Class CLASS 0    Cat Dander IgE      <0.10 kU/L <0.10    Cat Epithelium Class CLASS 0    Cockroach, IgE      <0.10 kU/L 0.16 (H)    Cockroach, IgE       CLASS 0/1    Dog Dander IgE      <0.10 kU/L <0.10    Dog Dander Class CLASS 0    [place] Grass IgE      <0.10 kU/L <0.10    [place] Class CLASS 0    [place] Grass IgE      <0.10 kU/L <0.10    [place] Grass Class CLASS 0    [place] Grass IgE      <0.10 kU/L <0.10    [place]enn Grass Class CLASS 0    [place] Tree IgE      <0.10 kU/L <0.10    [place] Class CLASS 0    Silver [place] IgE      <0.10 kU/L <0.10    [place] Class CLASS 0    [place] Tree IgE      <0.10 kU/L <0.10    Pec[place],  Class CLASS 0    Total IgE      0 - 100 IU/mL <35        PULMONARY FUNCTION TESTING     Date 6/16/23:  FVC:         98%ref -> + 3%  FEV1:         99%ref -> + 8%  FEV1/FVC: 79%  FEF 25-75: 103%ref  DLCO:        78%ref  Interpretation: Spirometry is normal. Spirometry remains unimproved following bronchodilator. Lung volume determination is likely normal despite the reduced RV of uncertain clinical significance. Airway mechanics show normal airway resistance and conductance. DLCO is normal.     IMAGING & OTHER DIAGNOSTICS     CXR 11/20/23:  FINDINGS:  Heart is at the upper limit of normal in size, with normal appearing pulmonary vascularity and no findings to specifically suggest current cardiac decompensation observed.  Lung zones are clear, and are free of significant airspace consolidation or volume loss.  No pleural fluid.  No hilar or mediastinal mass lesion.  No pneumothorax.  Multilevel marginal vertebral endplate spurring in the thoracic spine is incidentally noted, as is instrumentation relating to a prior C5-C7 spinal fusion procedure.  Impression:  No significant intrathoracic abnormality.    CT chest 6/15/23:  FINDINGS:  Airways: Mild narrowing of the trachea at the level of the thyroid.  Otherwise, trachea and bronchi are patent.  Lungs/Pleura: Biapical fibronodular scarring.  Subcentimeter pulmonary nodules within the bilateral major fissures, favored to represent intrapulmonary lymph nodes.  No suspicious pulmonary nodules.  No large focal consolidation.  Pleural effusion or pleural thickening.  Impression:  No acute intrathoracic findings identified.  Diffuse enlargement the thyroid with left thyroid calcification.  Consider obtaining nonemergent thyroid ultrasound for further characterization.  Findings suggestive of hepatic steatosis.  Correlate with LFTs.  Additional findings as above.   (See report for full radiology read).     CHART REVIEW     Reviewed labs.      ASSESSMENT & PLAN     Regine  Allison is a 70 y.o. female with     # Chronic rhinitis: Prior allergy testing with immunocaps mostly negative, other than equivocal result to cockroach. She does find her medications helpful, particularly the ipratropium nasal spray, but symptoms sometimes become difficult to control (has noticed windy days and cut grass as triggers). Discussed option of repeating environmental allergy testing (skin prick testing vs repeat immunocaps), patient would like to consider and let me know if/which test she prefers.   -currently using ipratropium 0.06% nasal spray 2 SEN daily. Advised she can increase to TID prn (or at least BID).   -continue flonase 2 SEN nightly.  -she isn't currently using her astepro (as she finds 3 nasal sprays to be too much), but okay to add back in to regimen if she chooses.   -continue cetirizine 10 mg daily prn.   -continue montelukast 10 mg nightly.     # Asthma: Cough, shortness of breath, wheezing. Helped by albuterol. Symptoms overall improved since 2023 (when these symptoms were at their worst). She estimates she requires albuterol once or twice per year. Spirometry in 2023 was without obstruction.   -continue albuterol prn.       Follow up: 6 months or sooner if needed     I spent a total of 40 minutes on the day of the visit.  This includes face to face time and non-face to face time preparing to see the patient (eg, review of tests), obtaining and/or reviewing separately obtained history, documenting clinical information in the electronic or other health record.  Patient has chronic conditions requiring long-term follow-up with me.    Isabel Vargas MD  Allergy/Immunology

## 2025-04-07 ENCOUNTER — RESULTS FOLLOW-UP (OUTPATIENT)
Dept: OBSTETRICS AND GYNECOLOGY | Facility: CLINIC | Age: 70
End: 2025-04-07

## 2025-04-09 DIAGNOSIS — E11.65 TYPE 2 DIABETES MELLITUS WITH HYPERGLYCEMIA, WITHOUT LONG-TERM CURRENT USE OF INSULIN: ICD-10-CM

## 2025-04-09 RX ORDER — BLOOD-GLUCOSE METER
EACH MISCELLANEOUS
Qty: 100 STRIP | Refills: 3 | Status: SHIPPED | OUTPATIENT
Start: 2025-04-09

## 2025-04-09 NOTE — TELEPHONE ENCOUNTER
No care due was identified.  [facility] Embedded Care Due Messages. Reference number: [id number].   4/09/2025 3:04:54 PM CDT

## 2025-04-09 NOTE — TELEPHONE ENCOUNTER
Refill Decision Note   Regine Cooper  is requesting a refill authorization.  Brief Assessment and Rationale for Refill:  Approve     Medication Therapy Plan:         Comments:     Note composed:3:29 PM 04/09/2025

## 2025-04-16 ENCOUNTER — PATIENT MESSAGE (OUTPATIENT)
Dept: PRIMARY CARE CLINIC | Facility: CLINIC | Age: 70
End: 2025-04-16
Payer: MEDICARE

## 2025-04-16 ENCOUNTER — PATIENT MESSAGE (OUTPATIENT)
Dept: PAIN MEDICINE | Facility: CLINIC | Age: 70
End: 2025-04-16
Payer: MEDICARE

## 2025-04-16 DIAGNOSIS — J30.1 NON-SEASONAL ALLERGIC RHINITIS DUE TO POLLEN: ICD-10-CM

## 2025-04-16 DIAGNOSIS — E11.9 TYPE 2 DIABETES MELLITUS WITHOUT COMPLICATION, WITHOUT LONG-TERM CURRENT USE OF INSULIN: ICD-10-CM

## 2025-04-16 DIAGNOSIS — B02.29 POST HERPETIC NEURALGIA: ICD-10-CM

## 2025-04-16 DIAGNOSIS — M54.6 ACUTE MIDLINE THORACIC BACK PAIN: ICD-10-CM

## 2025-04-16 DIAGNOSIS — I10 ESSENTIAL HYPERTENSION: ICD-10-CM

## 2025-04-16 RX ORDER — MELOXICAM 15 MG/1
15 TABLET ORAL DAILY PRN
Qty: 90 TABLET | Refills: 3 | Status: SHIPPED | OUTPATIENT
Start: 2025-04-16 | End: 2026-04-16

## 2025-04-16 RX ORDER — LOSARTAN POTASSIUM 50 MG/1
50 TABLET ORAL DAILY
Qty: 90 TABLET | Refills: 3 | Status: SHIPPED | OUTPATIENT
Start: 2025-04-16

## 2025-04-16 RX ORDER — MONTELUKAST SODIUM 10 MG/1
10 TABLET ORAL NIGHTLY
Qty: 90 TABLET | Refills: 3 | Status: SHIPPED | OUTPATIENT
Start: 2025-04-16

## 2025-04-16 RX ORDER — METFORMIN HYDROCHLORIDE 500 MG/1
500 TABLET ORAL 2 TIMES DAILY WITH MEALS
Qty: 180 TABLET | Refills: 3 | Status: SHIPPED | OUTPATIENT
Start: 2025-04-16

## 2025-04-16 NOTE — TELEPHONE ENCOUNTER
No care due was identified.  [facility] Embedded Care Due Messages. Reference number: [id number].   4/16/2025 10:43:04 AM CDT

## 2025-04-21 ENCOUNTER — PATIENT MESSAGE (OUTPATIENT)
Dept: HEMATOLOGY/ONCOLOGY | Facility: CLINIC | Age: 70
End: 2025-04-21
Payer: MEDICARE

## 2025-04-22 ENCOUNTER — TELEPHONE (OUTPATIENT)
Dept: HEMATOLOGY/ONCOLOGY | Facility: CLINIC | Age: 70
End: 2025-04-22
Payer: MEDICARE

## 2025-04-22 NOTE — TELEPHONE ENCOUNTER
Called patient and left message to confirm the in office appointment on 4/29/25 and to complete the questionnaire.

## 2025-04-29 ENCOUNTER — HOSPITAL ENCOUNTER (OUTPATIENT)
Dept: RADIOLOGY | Facility: HOSPITAL | Age: 70
Discharge: HOME | End: 2025-04-29
Attending: INTERNAL MEDICINE
Payer: MEDICARE

## 2025-04-29 ENCOUNTER — OFFICE VISIT (OUTPATIENT)
Dept: HEMATOLOGY/ONCOLOGY | Facility: CLINIC | Age: 70
End: 2025-04-29
Payer: MEDICARE

## 2025-04-29 VITALS
WEIGHT: 217.63 LBS | BODY MASS INDEX: 36.26 KG/M2 | RESPIRATION RATE: 18 BRPM | HEIGHT: 65 IN | HEART RATE: 89 BPM | SYSTOLIC BLOOD PRESSURE: 144 MMHG | TEMPERATURE: 99 F | OXYGEN SATURATION: 97 % | DIASTOLIC BLOOD PRESSURE: 66 MMHG

## 2025-04-29 DIAGNOSIS — Z80.42 FAMILY HISTORY OF PROSTATE CANCER: ICD-10-CM

## 2025-04-29 DIAGNOSIS — Z12.31 ENCOUNTER FOR SCREENING MAMMOGRAM FOR MALIGNANT NEOPLASM OF BREAST: ICD-10-CM

## 2025-04-29 DIAGNOSIS — Z13.71 ENCOUNTER FOR NONPROCREATIVE GENETIC COUNSELING AND TESTING: Primary | ICD-10-CM

## 2025-04-29 DIAGNOSIS — Z90.12 S/P LEFT MASTECTOMY: ICD-10-CM

## 2025-04-29 DIAGNOSIS — Z80.3 FAMILY HISTORY OF BREAST CANCER: ICD-10-CM

## 2025-04-29 DIAGNOSIS — Z85.3 PERSONAL HISTORY OF BREAST CANCER: ICD-10-CM

## 2025-04-29 DIAGNOSIS — Z85.3 HISTORY OF BREAST CANCER IN FEMALE: ICD-10-CM

## 2025-04-29 DIAGNOSIS — Z71.83 ENCOUNTER FOR NONPROCREATIVE GENETIC COUNSELING AND TESTING: Primary | ICD-10-CM

## 2025-04-29 DIAGNOSIS — C50.912 MALIGNANT NEOPLASM OF LEFT FEMALE BREAST, UNSPECIFIED ESTROGEN RECEPTOR STATUS, UNSPECIFIED SITE OF BREAST: ICD-10-CM

## 2025-04-29 DIAGNOSIS — D50.0 IRON DEFICIENCY ANEMIA DUE TO CHRONIC BLOOD LOSS: Primary | ICD-10-CM

## 2025-04-29 PROCEDURE — 99214 OFFICE O/P EST MOD 30 MIN: CPT | Mod: S$GLB,,, | Performed by: INTERNAL MEDICINE

## 2025-04-29 PROCEDURE — 3078F DIAST BP <80 MM HG: CPT | Mod: CPTII,S$GLB,, | Performed by: INTERNAL MEDICINE

## 2025-04-29 PROCEDURE — 99999 PR PBB SHADOW E&M-EST. PATIENT-LVL II: CPT | Mod: PBBFAC,,,

## 2025-04-29 PROCEDURE — 3288F FALL RISK ASSESSMENT DOCD: CPT | Mod: CPTII,S$GLB,, | Performed by: INTERNAL MEDICINE

## 2025-04-29 PROCEDURE — 3077F SYST BP >= 140 MM HG: CPT | Mod: CPTII,S$GLB,, | Performed by: INTERNAL MEDICINE

## 2025-04-29 PROCEDURE — 3008F BODY MASS INDEX DOCD: CPT | Mod: CPTII,S$GLB,, | Performed by: INTERNAL MEDICINE

## 2025-04-29 PROCEDURE — 1159F MED LIST DOCD IN RCRD: CPT | Mod: CPTII,S$GLB,, | Performed by: INTERNAL MEDICINE

## 2025-04-29 PROCEDURE — 77067 SCR MAMMO BI INCL CAD: CPT | Mod: 26,52,, | Performed by: RADIOLOGY

## 2025-04-29 PROCEDURE — 77063 BREAST TOMOSYNTHESIS BI: CPT | Mod: TC,52

## 2025-04-29 PROCEDURE — 1101F PT FALLS ASSESS-DOCD LE1/YR: CPT | Mod: CPTII,S$GLB,, | Performed by: INTERNAL MEDICINE

## 2025-04-29 PROCEDURE — 3044F HG A1C LEVEL LT 7.0%: CPT | Mod: CPTII,S$GLB,, | Performed by: INTERNAL MEDICINE

## 2025-04-29 PROCEDURE — 1126F AMNT PAIN NOTED NONE PRSNT: CPT | Mod: CPTII,S$GLB,, | Performed by: INTERNAL MEDICINE

## 2025-04-29 PROCEDURE — 4010F ACE/ARB THERAPY RXD/TAKEN: CPT | Mod: CPTII,S$GLB,, | Performed by: INTERNAL MEDICINE

## 2025-04-29 PROCEDURE — 77063 BREAST TOMOSYNTHESIS BI: CPT | Mod: 26,52,, | Performed by: RADIOLOGY

## 2025-04-29 PROCEDURE — 99999 PR PBB SHADOW E&M-EST. PATIENT-LVL V: CPT | Mod: PBBFAC,,, | Performed by: INTERNAL MEDICINE

## 2025-04-29 NOTE — PROGRESS NOTES
Cancer Genetics  Hereditary and High-Risk Clinic  Department of Hematology and Oncology  [facility]    Date of Service:  25  Visit Provider:  Elijah Copeland, [facility] [place] [facility], [facility] [place]  Collaborating Physician:  Subhash Bermudez MD    Patient ID  Name: Regine Cooper    : 1955    MRN: [id number]      Referring Provider:   Chio Galdamez, Provider  [address] [place] [address]    Visit Information  The patient location is:  [facility].    The chief complaint leading to consultation is:  As below.    Visit type: in-person    Face-to-face time with patient: 55 minutes.    74 minutes in total were spent on this encounter, which includes face-to-face time and non-face-to-face time preparing to see the patient (e.g., review of tests), obtaining and/or reviewing separately obtained history, documenting clinical information in the electronic or other health record, independently interpreting results (not separately reported) and communicating results to the patient/family/caregiver, or coordinating care (not separately reported).      CHRISTOPHER Cooper is a 70 y.o. yo female who was referred to genetic counseling due to her personal history of triple negative breast cancer, for which she meets NCCN guidelines for testing (triple negative breast cancer). During the visit, Regine shared that she previously had genetic testing which was negative, however, she is unsure exactly what testing was ordered (possibly only BRCA1/2 sequencing) and she is unable to obtain a copy of the report. She would like to proceed with testing given the possibility only the BRCA genes were tested and that [facility] offers RNA analysis. A sample will be submitted to [facility] on 25 for the xG panel, along with insurance information. Results will be available within 2-3 weeks of sample submission.    SUBJECTIVE      Chief Complaint: Genetic evaluation (personal  "history of triple negative breast cancer)    History of Present Illness (HPI):  Regine Cooper ("Regine"), 70 y.o., assigned female sex at birth, is established with the [facility] of [facility]ology and On[facility] but new to me.  She was referred by Dr. Clarisa Sanchez ([facility]) for hereditary cancer risk assessment given her history of triple negative breast cancer.    Age:  70 y.o.   Race and ethnicity:  Black or , Not  or /a  Weight:  219 lb  Height:  5'5"  Previous germline cancer genetic testing:  Yes - done in [place] at [facility] around 2017     Cancer History  Breast cancer diagnosed on screening mammogram in October 2017  Underwent core biopsy that showed Triple negative invasive ductal carcinoma    Underwent left simple mastectomy, axillary SLNB on 11/1/17 for stage IA invasive ductal carcinoma ([id number]) with 0/5 lymph nodes positive  Underwent chemotherapy with TC x4 cycles and completed on 2/1/2018   Follow up screening bilateral mammogram on 6/23/2018, which was negative for any new suspicious changes.    Masses/tumors  Personal history of thyroid nodules    Breast History  Most recent mammogram: 4/29/25  Mammographic breast density:  scattered fibroglandular densities    Breast MRI: No  Breast biopsy history and findings:    2017: Triple negative IDC  2022: Skin, R breast  If there are multiple lesions the this may represent diffuse fixed drug reaction or lichen planus or the pigmented variant lichen planus pigmentosis  If a single lesion is present it may represent a benign lichenoid keratosis or a fixed drug reaction  Thoracic radiation therapy history:  No    Gynecologic History  Age at menarche:  11y  Age at first live childbirth:  22y   Menopausal status:  postmenopausal  Reproductive organs:  s/p hysterectomy in 2005, ovaries, fallopian tubes, and cervix remain   Fibroids     Hormone Use  Estrogen/Progesterone: N/A  OCPs: 15 years total   Testosterone: " N/A    Dermatologic History  No issues reported  Abnormal moles/lesions: growths on back   Skin cancer screening: No    Focused Social History  Tobacco Use: Low Risk  (2025)    Patient History     Smoking Tobacco Use: Never     Smokeless Tobacco Use: Never     Passive Exposure: Never     Review of Systems   Patient's Distress Score today was 1/10 (with 10 being the worst).  Patient attributes this to general stress, especially about social security. Patient denies experiencing suicidal or homicidal ideations (SI/HI).     FAMILY HISTORY      ONCOLOGY PEDIGREE  Ashkenazi Caodaism:  No  Consanguinity:  No  Hereditary cancer genetic testing in blood relatives:  No    Family Cancer Pedigree:  Pedigree Image    Regine reported her  family history of cancer as follows:   Maternal uncle ( at 81y) diagnosed with prostate cancer  Maternal aunt () diagnosed with breast cancer  Father ( at 72y) diagnosed with lung cancer at 72y  Paternal aunt ( at 23y) diagnosed with a tumor (abdominal mass) exact type unknown   Paternal second cousin once removed diagnosed with breast cancer at 38y     A family history of birth defects, intellectual disability, SIDS, sudden early death, multiple miscarriages and consanguinity were denied. Please refer to above pedigree for further details. A larger copy is available for review in the Media tab.     COUNSELING      Pre-test cancer genetic counseling was conducted.        Causes of Cancer:  Cancer occurs when cells grow out of control. Some genes help protect against cancer by controlling the growth of cells. However, mutations (problems) in these genes can prevent them from working properly, increasing the risk of developing cancer.  Sporadic Cancer: Most people who get cancer have sporadic cancer. Sporadic cancer is caused by mutations that occur during the lifetime. These mutations may be caused by aging, environmental exposures (ex. UV radiation,  carcinogens), lifestyle (ex. smoking, drinking alcohol, sunbathing, poor diet), other medical conditions (ex. hepatitis, HPV, ulcerative colitis), or other factors.   Hereditary Cancer: A small percentage (5-10%) of people who develop cancer were born with a mutation already in one of the cancer protection genes.  Familial Cancer: Cancer can also cluster in families that do not have an identifiable mutation. This may be due to shared environmental or lifestyle factors or genetic risk factors that have not been identified or cannot be detected using current technology or panels.     The likelihood of having a hereditary cancer risk depends on many factors including who in the family had cancer, what type of cancer they had, their age at diagnosis, cancer specifics (such as MMR status of an endometrial or colon cancer or type of breast cancer), and previous genetic testing in the family. Typically, the chance is higher for families that have multiple people with the same or related cancers, an individual with multiple cancers, younger ages of cancer, and certain types of cancer (such as pancreatic or triple negative breast cancer).      Possible Results:    Positive (pathogenic or likely pathogenic variant): A genetic variant was found that is suspected or known to impact the function of the gene. The impact of a positive result on an individual's risk of cancer varies based on the gene, specific variant, individual's sex assigned at birth, personal cancer history, other health history (such as surgical history), and family history. A positive result can impact screening and risk management recommendations. However, there are not always available guidelines for management based on a specific gene variant. Family history and personal risk factors should always be considered. Sometimes, a positive result can also have treatment or reproductive implications.   Negative: No clinically significant variants were reported in  the tested genes. A negative result does not indicate that an individual cannot develop cancer or even that the individual is at average risk. An individual may still be at an increased risk for cancer based on personal risk factors or family history. Additionally, there could be a hereditary cancer predisposition that was not included in a chosen panel or is not detected with current technology.   Variant of Uncertain Significance (VUS): A variant was found. However, the lab does not have enough information to determine if the variant is benign (harmless) or pathogenic (impacts the function of the gene). The laboratory may update (reclassify) the variant over time as more information becomes available. When reclassified, most variants of uncertain significance are reclassified to benign/likely benign. Typically, it is not recommended to  based on the presence of a VUS. The chance of finding a VUS varies based on the test performed. Generally, the chance of finding a VUS increases with the number of genes tested and decreases with the amount of testing of that gene (genes that are tested more frequently or for a longer period of time have a lower VUS rate).     Genetic Mutation Inheritance:  When an individual has a gene mutation, their first-degree relatives (parents, children, and siblings) each have a 50% chance of carrying the same mutation. Other, more distant blood relatives can also be at risk of carrying the same mutation. At-risk relatives of an individual with a mutation should consider genetic testing to help determine their risk for cancer.     Genetic Discrimination: The Genetic Information Nondiscrimination Act of 2008 (LIDIA) is a [place].S. federal law that provides some protections against the use of an individual's genetic information by their health insurer and by their employer. Title I of LIDIA prohibits most health insurers (except for insurance obtained through a job with the [employer]  or the Federal Employees Health Benefits Plan) from utilizing an individual's genetic information to make decisions regarding insurance eligibility or premium charges. Title II of LIDIA prohibits covered entities from requesting or requiring the genetic information of employees and applicants and from using said information to make employment decisions. This does not apply to employers with fewer than 15 employees or to the .  LIDIA also does not protect individuals from genetic discrimination by any other type of policy or entity, including but not limited to life insurance, disability insurance, long-term care insurance,  benefits, and  Health Services benefits.    Genetic Testing Options:   Various genetic testing panel options were discussed along with associated benefits, limitations, and risks.   There are several issues to consider regarding multi-gene testing:  Insurance coverage can vary depending on the genetic test panel(s) ordered.  There are limited data and a lack of clear guidelines regarding degree of cancer risk associated with some of the genes assessed and how to communicate and manage risk for carriers of these genes; this issue is compounded by the low incidence rates of hereditary disease; multi-gene tests include moderate-penetrance genes, and they often also include low-penetrance genes for which there are little available data regarding degree of cancer risk and guidelines for risk management.  Increased likelihood of detecting a genetic variant of unknown significance (VUS).  Increased chance of finding genotypically distinct cell lines (i.e., genetic mosaicism) with next-generation sequencing; clones of non-cancerous cell containing certain genetic mutations have been found in healthy adults undergoing multi-gene testing; this phenomenon can often be attributed to clonal hematopoiesis, a condition in which a hematopoietic stem cell begins making blood cells with the  same acquired mutation.    The option for DNA only vs DNA+RNA was discussed. Adding RNA has a small chance of helping to clarify a VUS or detecting genetic variants that DNA only cannot (deep intronic variants). However, it must be conducted on a blood sample (not saliva).     Genetic Testing Guidelines: Regine's reported personal history meets the genetic testing criteria established by the National Comprehensive Cancer Network Genetic/Familial High-Risk Assessment: Breast, Ovarian, and Pancreatic version 3.2025  Therefore, Regine was offered germline hereditary cancer genetic testing. Regine decided to proceed with testing after a discussion regarding various genetic testing panels that could be performed as well as associated risks. Regine has provided informed consent.     ASSESSMENT / PLAN      Genetic Testing Logistics:  An outside laboratory performs this genetic testing. Genetic testing typically takes 2-3 weeks to after the sample has been received.  There is a potential for the patient to have out-of-pocket costs related to genetic testing.  Post-test genetic counseling can be conducted once the genetic testing results are available.    Genetic Test Information:  Testing lab: [facility]   Test panel: xG   ICD-10 code(s): Z85.3, Z80.3, Z80.42   Verbal informed consent: Obtained   Specimen type: Blood  (Patient denies blood disorders that would necessitate a skin fibroblast specimen)   Specimen collection by: [facility]   Specimen collection date: 4/29/25   Results expected by: Approximately 2-3 weeks after the genetic testing lab receives the specimen   Results disclosure plan: Post-test visit if positive or complex result; otherwise, results will be communicated by phone call     Follow-up: Post-test genetic counseling will be conducted once the genetic testing results are available.    Regine appeared to have a good understanding of the information. Questions were  encouraged and answered to the patient's satisfaction, and she verbalized understanding of the information and agreement with the plan.   Regine is welcome to contact me if she has any questions, concerns, or updates to her family history.     This assessment is based on the history and reports provided, as well as the current scientific knowledge regarding cancer genetics.     Elijah Copleand, MGC, [facility]  Certified Genetic Counselor  Department of Hematology and Oncology  [facility]    CC:  Dr. Clarisa Barroso *

## 2025-04-29 NOTE — PROGRESS NOTES
Subjective:       Patient ID: Regine Cooper is a 70 y.o. female.    Chief Complaint: Other iron deficiency anemia      HPI      Mrs. Cooper returns today for follow up.  I had last seen her on December 4, 2024.    CBC from 2 days ago shows a white count of 8,700 per cubic mm, hemoglobin 12.4 grams/deciliter, hematocrit 39.0%, MCV 87, and platelets 320K.  Her ferritin is 27 ng/ml.  Bilirubin is 0.4 mg/dL, transaminases are normal, and creatinine is 0.8 mg/dL    On August 18, 2023 she underwent an EGD and colonoscopy.  She had diverticulosis and a 1 cm hiatal hernia.  Biopsies were negative for celiac disease.      Briefly, she is a 69-year-old female who has a history of a stage I triple negative breast cancer that was diagnosed in late 2017.  She underwent a left modified radical mastectomy in [facility] on 11/01/2017 and had a stage IA carcinoma measuring 1.2 cm in greatest diameter.  Resection margins were clear, while four sentinel lymph nodes were negative.  Postoperatively, she received four cycles of docetaxel and cyclophosphamide.  The first cycle was administered on 11/30/2017 and she completed her treatment by 02/01/2018.  She has been followed expectantly and has been MERRY since then.  We assumed her care when she relocated to [place] 5 years ago.    Her mammogram today was read as BIRADS I, and a one year follow up was recommended.    Her labs today are as follows:  Ferritin is 31 ng/mL  LFTs are normal  WBCs 9600 per cubic mm, hemoglobin 12.8 grams/dL, hematocrit 40.6%, MCV 83 and platelets 339 K.    Two stool samples that she had submitted in August were negative for occult blood.      A urinalysis in April 2024 had shown no hematuria.  A colonoscopy in August 2023 had shown diverticular disease.    ROS: Overall she feels OK, and her ECOG PS is 1.  She denies any anxiety, easy bruising, fevers, chills, night  sweats, weight loss, nausea, vomiting, diarrhea, constipation, diplopia, blurred  vision, headache, chest pain, palpitations, shortness of breath, breast pain, lower abdominal pain, extremity pain, or difficulty ambulating.  The remainder of the ten-point ROS, including general, skin, lymph, H/N, cardiorespiratory, GI, , Neuro, Endocrine, and psychiatric is negative.     Objective:      Physical Exam      She is alert, oriented to time, place, person, pleasant, well      nourished, in no acute physical distress.                                    VITAL SIGNS:  Reviewed                                      HEENT:  Normal.  There are no nasal, oral, lip, gingival, auricular, lid,    or conjunctival lesions.  Mucosae are moist and pink, and there is no        thrush.  Pupils are equal, reactive to light and accommodation.              Extraocular muscle movements are intact.  Dentition is good.  There is no frontal or maxillary tenderness.                                     NECK:  Supple without JVD, adenopathy, but she does have thyromegaly.                       LUNGS:  Clear to auscultation without wheezing, rales, or rhonchi.           CARDIOVASCULAR:  Reveals an S1, S2, no murmurs, no rubs, no gallops.         ABDOMEN:  Soft, nontender, without organomegaly.  Bowel sounds are    present.                                                                     EXTREMITIES:  No cyanosis, clubbing, or edema.                               BREASTS:  She is status post left mastectomy with no evidence of chest wall reoccurrence.  There are no masses in the right breast.    LYMPHATIC:  There is no cervical, axillary, or supraclavicular adenopathy.   SKIN:  Warm and moist, without petechiae, rashes, induration, or ecchymoses.           NEUROLOGIC:  DTRs are 0-1+ bilaterally, symmetrical, motor function is 5/5,  and cranial nerves are  within normal limits.    Assessment:       1. History of breast cancer in female, [id number], status post mastectomy and 4 cycles of docetaxel cyclophosphamide, clinically  MERRY, doing well more than 5 years after completion       2.     Low ferritin without evidence of anemia  3.     Diverticulosis  Plan:         In regards to her breast cancer, Mrs. Alfred remains in clinical remission.  I explained to her that her chance of a cure at this point is 99 %.     She may discontinue the oral iron supplementation therapy and will return to be seen in 3 months with repeat CBC and a repeat ferritin.  She will also submit 3 more stool samples when she returns to Clinic.  Her mammogram will be repeated in late April 2026.  Her multiple questions were answered to her satisfaction.  I spent 30 minutes reviewing the available records, evaluating the patient, counseling, and coordinating her care.          Route Chart for Scheduling    Med Onc Chart Routing      Follow up with physician    Follow up with [facility] 3 months. With labs   Infusion scheduling note    Injection scheduling note    Labs    Imaging    Pharmacy appointment    Other referrals

## 2025-04-30 ENCOUNTER — PATIENT MESSAGE (OUTPATIENT)
Dept: HEMATOLOGY/ONCOLOGY | Facility: CLINIC | Age: 70
End: 2025-04-30
Payer: MEDICARE

## 2025-05-02 ENCOUNTER — PATIENT MESSAGE (OUTPATIENT)
Dept: PRIMARY CARE CLINIC | Facility: CLINIC | Age: 70
End: 2025-05-02
Payer: MEDICARE

## 2025-05-05 ENCOUNTER — PATIENT MESSAGE (OUTPATIENT)
Dept: OBSTETRICS AND GYNECOLOGY | Facility: CLINIC | Age: 70
End: 2025-05-05

## 2025-05-05 ENCOUNTER — OFFICE VISIT (OUTPATIENT)
Dept: OBSTETRICS AND GYNECOLOGY | Facility: CLINIC | Age: 70
End: 2025-05-05
Payer: MEDICARE

## 2025-05-05 VITALS
SYSTOLIC BLOOD PRESSURE: 117 MMHG | WEIGHT: 217.19 LBS | BODY MASS INDEX: 36.19 KG/M2 | HEART RATE: 108 BPM | HEIGHT: 65 IN | DIASTOLIC BLOOD PRESSURE: 72 MMHG

## 2025-05-05 DIAGNOSIS — N76.4 VULVAR ABSCESS: ICD-10-CM

## 2025-05-05 DIAGNOSIS — L73.9 FOLLICULITIS OF PERINEUM: Primary | ICD-10-CM

## 2025-05-05 PROBLEM — R93.89 ABNORMAL FINDING ON IMAGING: Status: RESOLVED | Noted: 2024-01-03 | Resolved: 2025-05-05

## 2025-05-05 PROBLEM — Z03.89 ENCOUNTER FOR OBSERVATION FOR OTHER SUSPECTED DISEASES AND CONDITIONS RULED OUT: Status: RESOLVED | Noted: 2017-10-27 | Resolved: 2025-05-05

## 2025-05-05 PROCEDURE — 1101F PT FALLS ASSESS-DOCD LE1/YR: CPT | Mod: CPTII,S$GLB,,

## 2025-05-05 PROCEDURE — 3008F BODY MASS INDEX DOCD: CPT | Mod: CPTII,S$GLB,,

## 2025-05-05 PROCEDURE — 4010F ACE/ARB THERAPY RXD/TAKEN: CPT | Mod: CPTII,S$GLB,,

## 2025-05-05 PROCEDURE — 3078F DIAST BP <80 MM HG: CPT | Mod: CPTII,S$GLB,,

## 2025-05-05 PROCEDURE — 99213 OFFICE O/P EST LOW 20 MIN: CPT | Mod: S$GLB,,,

## 2025-05-05 PROCEDURE — 3288F FALL RISK ASSESSMENT DOCD: CPT | Mod: CPTII,S$GLB,,

## 2025-05-05 PROCEDURE — 1159F MED LIST DOCD IN RCRD: CPT | Mod: CPTII,S$GLB,,

## 2025-05-05 PROCEDURE — 3074F SYST BP LT 130 MM HG: CPT | Mod: CPTII,S$GLB,,

## 2025-05-05 PROCEDURE — 99999 PR PBB SHADOW E&M-EST. PATIENT-LVL III: CPT | Mod: PBBFAC,,,

## 2025-05-05 PROCEDURE — 87529 HSV DNA AMP PROBE: CPT

## 2025-05-05 PROCEDURE — 1160F RVW MEDS BY RX/DR IN RCRD: CPT | Mod: CPTII,S$GLB,,

## 2025-05-05 PROCEDURE — 3044F HG A1C LEVEL LT 7.0%: CPT | Mod: CPTII,S$GLB,,

## 2025-05-05 PROCEDURE — 1125F AMNT PAIN NOTED PAIN PRSNT: CPT | Mod: CPTII,S$GLB,,

## 2025-05-05 RX ORDER — FLUCONAZOLE 150 MG/1
TABLET ORAL
Qty: 1 TABLET | Refills: 0 | Status: SHIPPED | OUTPATIENT
Start: 2025-05-05

## 2025-05-05 RX ORDER — SULFAMETHOXAZOLE AND TRIMETHOPRIM 800; 160 MG/1; MG/1
1 TABLET ORAL 2 TIMES DAILY
Qty: 10 TABLET | Refills: 0 | Status: SHIPPED | OUTPATIENT
Start: 2025-05-05 | End: 2025-05-10

## 2025-05-05 RX ORDER — BACITRACIN ZINC 500 UNIT/G
OINTMENT (GRAM) TOPICAL
Qty: 14 G | Refills: 0 | Status: SHIPPED | OUTPATIENT
Start: 2025-05-05 | End: 2025-05-05

## 2025-05-05 RX ORDER — LIDOCAINE HYDROCHLORIDE AND HYDROCORTISONE ACETATE 30; 5 MG/G; MG/G
CREAM TOPICAL
Qty: 15 G | Refills: 0 | Status: SHIPPED | OUTPATIENT
Start: 2025-05-05

## 2025-05-05 NOTE — PROGRESS NOTES
"CC: Follicular cyst    Regine Cooper is a 70 y.o. female  presents for GYN problem visit.   Pt is postmenopausal s/p hysterectomy , She complains of an infected ingrown hair follicle on perineum. Noticed a couple of days ago and has now opened w/ discharge. Site is tender and firm.   She denies hematuria, dysuria, constipation, diarrhea, fevers, chills, nausea or emesis.  Denies further issues, problems, or complaints.      /72   Pulse 108   Ht 5' 5" (1.651 m)   Wt 98.5 kg (217 lb 3.2 oz)   BMI 36.14 kg/m²     ROS:  Constitutional: no weight loss, weight gain, fever, fatigue  Eyes:  No vision changes, glasses/contacts  ENT/Mouth: No ulcers, sinus problems, ears ringing, headache  Cardiovascular: No inability to lie flat, chest pain, exercise intolerance, swelling, heart palpitations  Respiratory: No wheezing, coughing blood, shortness of breath, or cough  Gastrointestinal: No diarrhea, bloody stool, nausea/vomiting, constipation, gas, hemorrhoids  Genitourinary: No blood in urine, painful urination, urgency of urination, frequency of urination, incomplete emptying, incontinence, abnormal bleeding, painful periods, heavy periods, vaginal discharge, vaginal odor, painful intercourse, sexual problems, bleeding after intercourse. Inflammed hair follicle on perineum  Musculoskeletal: No muscle weakness  Breast: No painful breasts, nipple discharge, masses, rash, ulcers   Neurological: No passing out, seizures, numbness, headache  Endocrine: No diabetes, hypothyroid, hyperthyroid, hot flashes, hair loss, abnormal hair growth, acne  Psychiatric: No depression, crying  Hematologic: No bruises, bleeding, swollen lymph nodes, anemia.    OBJECTIVE:  Physical Exam:   Constitutional: Vital signs are normal. She appears well-developed and well-nourished.        Pulmonary/Chest: Effort normal.        Abdominal: Soft. There is no abdominal tenderness. Hernia confirmed negative in the right inguinal area and " confirmed negative in the left inguinal area.     Genitourinary:    Inguinal canal normal.   Rectum:      No external hemorrhoid.            Pelvic exam was performed with patient in the lithotomy position.   The external female genitalia was normal.   External genitalia lesions present. Genitalia hair distrobution normal .     Labial bartholins normal.There is no rash, tenderness or lesion on the right labia. There is no rash, tenderness or lesion on the left labia.               Neurological: She is alert.     Psychiatric: She has a normal mood and affect. Her behavior is normal. Mood and judgment normal.         ASSESSMENT:   Folliculitis of perineum  -     Discontinue: bacitracin 500 unit/gram Oint; Apply to affected area twice daily  Dispense: 14 g; Refill: 0  -     LIDOcaine HCl-hydrocortison ac (LIDAMANTLE-HC) 3-0.5 % topical cream; Apply a pea sized amount to the affected area 2-3 times a day as needed  Dispense: 15 g; Refill: 0  -     HSV by Rapid PCR [facility]; Tissue; Skin; Future; Expected date: 05/05/2025  -     sulfamethoxazole-trimethoprim 800-160mg (BACTRIM DS) 800-160 mg Tab; Take 1 tablet by mouth 2 (two) times daily. for 5 days  Dispense: 10 tablet; Refill: 0  -     fluconazole (DIFLUCAN) 150 MG Tab; Take one AFTER antibiotic therapy if symptoms of yeast infection develop  Dispense: 1 tablet; Refill: 0    Vulvar abscess  -     sulfamethoxazole-trimethoprim 800-160mg (BACTRIM DS) 800-160 mg Tab; Take 1 tablet by mouth 2 (two) times daily. for 5 days  Dispense: 10 tablet; Refill: 0        PLAN:   - HSV culture  - Bactrim DS x5 days  - Recommend warm soaks/epsom soaks.    - RTC in one week if cyst persists and does not decrease in size.     Female chaperone present for entire procedure

## 2025-05-06 DIAGNOSIS — E11.9 TYPE 2 DIABETES MELLITUS WITHOUT COMPLICATION, WITHOUT LONG-TERM CURRENT USE OF INSULIN: Primary | ICD-10-CM

## 2025-05-08 ENCOUNTER — LAB VISIT (OUTPATIENT)
Dept: LAB | Facility: HOSPITAL | Age: 70
End: 2025-05-08
Payer: MEDICARE

## 2025-05-08 DIAGNOSIS — E11.9 TYPE 2 DIABETES MELLITUS WITHOUT COMPLICATION, WITHOUT LONG-TERM CURRENT USE OF INSULIN: ICD-10-CM

## 2025-05-08 LAB
EAG (OHS): 128 MG/DL (ref 68–131)
HBA1C MFR BLD: 6.1 % (ref 4–5.6)
HSV1 DNA SPEC QL NAA+PROBE: NEGATIVE
HSV2 DNA SPEC QL NAA+PROBE: NEGATIVE
SPECIMEN SOURCE: NORMAL

## 2025-05-08 PROCEDURE — 36415 COLL VENOUS BLD VENIPUNCTURE: CPT | Mod: PN

## 2025-05-08 PROCEDURE — 83036 HEMOGLOBIN GLYCOSYLATED A1C: CPT

## 2025-05-09 ENCOUNTER — RESULTS FOLLOW-UP (OUTPATIENT)
Dept: OBSTETRICS AND GYNECOLOGY | Facility: CLINIC | Age: 70
End: 2025-05-09

## 2025-05-12 ENCOUNTER — OFFICE VISIT (OUTPATIENT)
Dept: PRIMARY CARE CLINIC | Facility: CLINIC | Age: 70
End: 2025-05-12
Payer: MEDICARE

## 2025-05-12 VITALS
DIASTOLIC BLOOD PRESSURE: 72 MMHG | HEIGHT: 65 IN | OXYGEN SATURATION: 99 % | HEART RATE: 83 BPM | BODY MASS INDEX: 36.36 KG/M2 | WEIGHT: 218.25 LBS | SYSTOLIC BLOOD PRESSURE: 124 MMHG | TEMPERATURE: 98 F

## 2025-05-12 DIAGNOSIS — E11.9 TYPE 2 DIABETES MELLITUS WITHOUT COMPLICATION, WITHOUT LONG-TERM CURRENT USE OF INSULIN: Primary | ICD-10-CM

## 2025-05-12 DIAGNOSIS — I10 ESSENTIAL HYPERTENSION: ICD-10-CM

## 2025-05-12 DIAGNOSIS — R06.2 WHEEZING: ICD-10-CM

## 2025-05-12 PROCEDURE — 1126F AMNT PAIN NOTED NONE PRSNT: CPT | Mod: CPTII,S$GLB,, | Performed by: STUDENT IN AN ORGANIZED HEALTH CARE EDUCATION/TRAINING PROGRAM

## 2025-05-12 PROCEDURE — 3044F HG A1C LEVEL LT 7.0%: CPT | Mod: CPTII,S$GLB,, | Performed by: STUDENT IN AN ORGANIZED HEALTH CARE EDUCATION/TRAINING PROGRAM

## 2025-05-12 PROCEDURE — 4010F ACE/ARB THERAPY RXD/TAKEN: CPT | Mod: CPTII,S$GLB,, | Performed by: STUDENT IN AN ORGANIZED HEALTH CARE EDUCATION/TRAINING PROGRAM

## 2025-05-12 PROCEDURE — 1101F PT FALLS ASSESS-DOCD LE1/YR: CPT | Mod: CPTII,S$GLB,, | Performed by: STUDENT IN AN ORGANIZED HEALTH CARE EDUCATION/TRAINING PROGRAM

## 2025-05-12 PROCEDURE — 99214 OFFICE O/P EST MOD 30 MIN: CPT | Mod: S$GLB,,, | Performed by: STUDENT IN AN ORGANIZED HEALTH CARE EDUCATION/TRAINING PROGRAM

## 2025-05-12 PROCEDURE — 3078F DIAST BP <80 MM HG: CPT | Mod: CPTII,S$GLB,, | Performed by: STUDENT IN AN ORGANIZED HEALTH CARE EDUCATION/TRAINING PROGRAM

## 2025-05-12 PROCEDURE — 99999 PR PBB SHADOW E&M-EST. PATIENT-LVL V: CPT | Mod: PBBFAC,,, | Performed by: STUDENT IN AN ORGANIZED HEALTH CARE EDUCATION/TRAINING PROGRAM

## 2025-05-12 PROCEDURE — 1159F MED LIST DOCD IN RCRD: CPT | Mod: CPTII,S$GLB,, | Performed by: STUDENT IN AN ORGANIZED HEALTH CARE EDUCATION/TRAINING PROGRAM

## 2025-05-12 PROCEDURE — 3074F SYST BP LT 130 MM HG: CPT | Mod: CPTII,S$GLB,, | Performed by: STUDENT IN AN ORGANIZED HEALTH CARE EDUCATION/TRAINING PROGRAM

## 2025-05-12 PROCEDURE — 3288F FALL RISK ASSESSMENT DOCD: CPT | Mod: CPTII,S$GLB,, | Performed by: STUDENT IN AN ORGANIZED HEALTH CARE EDUCATION/TRAINING PROGRAM

## 2025-05-12 PROCEDURE — 3008F BODY MASS INDEX DOCD: CPT | Mod: CPTII,S$GLB,, | Performed by: STUDENT IN AN ORGANIZED HEALTH CARE EDUCATION/TRAINING PROGRAM

## 2025-05-12 RX ORDER — ALBUTEROL SULFATE 90 UG/1
INHALANT RESPIRATORY (INHALATION)
Qty: 6.7 G | Refills: 5 | Status: SHIPPED | OUTPATIENT
Start: 2025-05-12

## 2025-05-13 ENCOUNTER — PATIENT MESSAGE (OUTPATIENT)
Dept: PAIN MEDICINE | Facility: CLINIC | Age: 70
End: 2025-05-13
Payer: MEDICARE

## 2025-05-13 DIAGNOSIS — B02.29 POST HERPETIC NEURALGIA: Primary | ICD-10-CM

## 2025-05-13 NOTE — PROGRESS NOTES
05/13/2025    Regine Thorntonx  [id number]    Chief Complaint   Patient presents with    Follow-up     3mo F/U  Pt have no complaints        HPI    History of Present Illness    CHIEF COMPLAINT:  Regine presents today for albuterol refill and HbA1c check    DIABETES:  She reports well-controlled blood sugar readings, with recent values of 99 yesterday and 108 on May 1st. She continues Ozempic 1 mg and Metformin 1000 mg daily (500 mg morning and evening) with effective blood sugar control. She reports eating less overall and incorporating salads into her diet, though expresses difficulty maintaining a daily salad-based meal plan.    EXERCISE AND MSK:  She attends gym 1-2 times weekly, using treadmill and recumbent bike. She has bone spurs in her knees causing stiffness that affects mobility, making it difficult to bend and walk. The stiffness improves with movement after brief periods of standing. Exercise frequency is sometimes limited to once weekly due to knee discomfort.    GENITOURINARY:  She presents with improving but unresolved genital abscess due to folliculitis. She is completing sulfa-based antibiotics today and performing sitz baths with Epsom salts at home. She has history of recurrent bladder infections and UTI-like symptoms, which she previously attempted to manage with AZO and cranberry juice without success.    MEDICAL HISTORY:  She has glaucoma with ophthalmology follow-up every 3-4 months and chronic anemia since childhood.    ALLERGY:  Previous blood allergy testing was negative for allergens.      ROS:  General: -fever, -chills, -fatigue, -weight gain, -weight loss  Eyes: -vision changes, -redness, -discharge  ENT: -ear pain, -nasal congestion, -sore throat  Cardiovascular: -chest pain, -palpitations, -lower extremity edema  Respiratory: +cough, -shortness of breath  Gastrointestinal: -abdominal pain, -nausea, -vomiting, -diarrhea, -constipation, -blood in stool, +loss of appetite  Genitourinary:  -dysuria, -hematuria, -frequency  Musculoskeletal: -joint pain, -muscle pain, +joint stiffness, +difficulty walking  Skin: -rash, +lesion  Neurological: -headache, -dizziness, -numbness, -tingling  Psychiatric: -anxiety, -depression, -sleep difficulty  Allergic: +allergic reactions             Negative 10 point ROS outside of HPI    Social History     Socioeconomic History    Marital status:     Number of children: 1   Tobacco Use    Smoking status: Never     Passive exposure: Never    Smokeless tobacco: Never   Substance and Sexual Activity    Alcohol use: No    Drug use: No    Sexual activity: Not Currently   Social History Narrative        1 son (estranged)    3 grandchildren ([place])    Born and raised in [place] (moved to [facility] 2017)     Social Drivers of Health     Financial Resource Strain: Low Risk  (1/27/2025)    Overall Financial Resource Strain (CARDIA)     Difficulty of Paying Living Expenses: Not very hard   Food Insecurity: No Food Insecurity (1/27/2025)    Hunger Vital Sign     Worried About Running Out of Food in the Last Year: Never true     Ran Out of Food in the Last Year: Never true   Transportation Needs: No Transportation Needs (11/23/2023)    PRAPARE - Transportation     Lack of Transportation (Medical): No     Lack of Transportation (Non-Medical): No   Physical Activity: Insufficiently Active (1/27/2025)    Exercise Vital Sign     Days of Exercise per Week: 1 day     Minutes of Exercise per Session: 40 min   Stress: No Stress Concern Present (1/27/2025)    [place] [employer] - Occupational Stress Questionnaire     Feeling of Stress : Only a little   Housing Stability: Low Risk  (11/23/2023)    Housing Stability Vital Sign     Unable to Pay for Housing in the Last Year: No     Number of Places Lived in the Last Year: 1     Unstable Housing in the Last Year: No         Current Medications[1]      Physical Exam  Vitals:    05/12/25 0952   BP: 124/72    Pulse: 83   Temp: 98.1 °F (36.7 °C)       Physical Exam      Gen: well appearing, NAD  Resp: non labored breathing, no crackles, no wheezes, CTAB  CV: RRR no murmur, gallops, rubs, no LE edema  Abd: soft nontender BS present no organomegaly      Problem List Items Addressed This Visit       Type 2 diabetes mellitus without complication, without long-term current use of insulin - Primary     Other Visit Diagnoses         Viral URI with cough          Essential hypertension                1. Wheezing  Stable; refilled  - albuterol (PROVENTIL/VENTOLIN HFA) 90 mcg/actuation inhaler; SMARTSI-2 Puff(s) Via Inhaler Every 6 Hours PRN  Dispense: 6.7 g; Refill: 5    2. Type 2 diabetes mellitus without complication, without long-term current use of insulin  A1c 6.1 from 6.9  Continue metformin and Ozempic 1 mg    3. Essential hypertension  Well-controlled continue current regimen      RTC in 3 months    Gladis Melissa MD  Family Medicine           [1]   Current Outpatient Medications:     clobetasoL (CLOBEX) 0.05 % shampoo, Apply topically., Disp: , Rfl:     clobetasoL (TEMOVATE) 0.05 % external solution, APPLY TOPICALLY TO THE SCALP TWICE DAILY AS NEEDED FOR ITCHING OR IRRITATION, Disp: 50 mL, Rfl: 0    fluticasone propionate (FLONASE) 50 mcg/actuation nasal spray, 1 spray by Each Nostril route once daily. OTC Flonase, Disp: , Rfl:     hydroCHLOROthiazide (HYDRODIURIL) 25 MG tablet, TAKE 1 TABLET(25 MG) BY MOUTH EVERY DAY, Disp: 90 tablet, Rfl: 2    lancets Misc, To check BG 2 times daily, Disp: 200 each, Rfl: 3    latanoprost 0.005 % ophthalmic solution, Place 1 drop into both eyes every evening., Disp: 7.5 mL, Rfl: 3    losartan (COZAAR) 50 MG tablet, Take 1 tablet (50 mg total) by mouth once daily., Disp: 90 tablet, Rfl: 3    meloxicam (MOBIC) 15 MG tablet, Take 1 tablet (15 mg total) by mouth daily as needed for Pain (back pain). Take with food, Disp: 90 tablet, Rfl: 3    metFORMIN (GLUCOPHAGE) 500 MG tablet, Take 1  tablet (500 mg total) by mouth 2 (two) times daily with meals., Disp: 180 tablet, Rfl: 3    montelukast (SINGULAIR) 10 mg tablet, Take 1 tablet (10 mg total) by mouth every evening., Disp: 90 tablet, Rfl: 3    ondansetron (ZOFRAN-ODT) 8 MG TbDL, Take 1 tablet (8 mg total) by mouth every 8 (eight) hours as needed (Nausea)., Disp: 30 tablet, Rfl: 2    ONETOUCH DELICA PLUS LANCET 33 gauge Misc, Check BG twice daily, Disp: 100 each, Rfl: 11    ONETOUCH VERIO REFLECT METER Misc, USE AS DIRECTED TO TEST BLOOD GLUCOSE, Disp: , Rfl:     ONETOUCH VERIO TEST STRIPS Strp, USE TO CHECK BLOOD GLUCOSE DAILY, Disp: 100 strip, Rfl: 3    pantoprazole (PROTONIX) 40 MG tablet, Take 1 tablet (40 mg total) by mouth once daily., Disp: 90 tablet, Rfl: 3    prasterone, dhea, (INTRAROSA) 6.5 mg Inst, Place 6.5 mg vaginally twice a week. Place 1 suppository vaginal 2 times a week, Disp: 12 each, Rfl: 3    promethazine-dextromethorphan (PROMETHAZINE-DM) 6.25-15 mg/5 mL Syrp, Take 5 mLs by mouth every 8 (eight) hours as needed (cough)., Disp: 240 mL, Rfl: 1    rizatriptan (MAXALT-MLT) 10 MG disintegrating tablet, Take 1 tablet (10 mg total) by mouth every 2 (two) hours as needed for Migraine. Max 30 mg/day., Disp: 12 tablet, Rfl: 5    semaglutide (OZEMPIC) 1 mg/dose (4 mg/3 mL), Inject 1 mg into the skin every 7 days., Disp: 3 mL, Rfl: 11    sumatriptan (IMITREX) 100 MG tablet, Take 1 tablet (100 mg total) by mouth every 2 (two) hours as needed for Migraine. Max 200 mg/day., Disp: 12 tablet, Rfl: 5    tiZANidine (ZANAFLEX) 4 MG tablet, Take 1 tablet (4 mg total) by mouth 2 (two) times daily as needed (muscle pain)., Disp: 60 tablet, Rfl: 3    albuterol (PROVENTIL/VENTOLIN HFA) 90 mcg/actuation inhaler, SMARTSI-2 Puff(s) Via Inhaler Every 6 Hours PRN, Disp: 6.7 g, Rfl: 5    fluconazole (DIFLUCAN) 150 MG Tab, Take one AFTER antibiotic therapy if symptoms of yeast infection develop, Disp: 1 tablet, Rfl: 0    fluticasone-salmeterol diskus  inhaler 250-50 mcg, Inhale 1 puff into the lungs 2 (two) times daily. Controller, Disp: 60 each, Rfl: 11    ipratropium (ATROVENT) 42 mcg (0.06 %) nasal spray, 2 sprays by Each Nostril route 3 (three) times daily as needed (for runny nose or post-nasal drip)., Disp: 15 mL, Rfl: 11    LIDOcaine HCl-hydrocortison ac (LIDAMANTLE-HC) 3-0.5 % topical cream, Apply a pea sized amount to the affected area 2-3 times a day as needed (Patient not taking: Reported on 5/12/2025), Disp: 15 g, Rfl: 0

## 2025-05-14 ENCOUNTER — PATIENT MESSAGE (OUTPATIENT)
Dept: ALLERGY | Facility: CLINIC | Age: 70
End: 2025-05-14
Payer: MEDICARE

## 2025-05-20 ENCOUNTER — OFFICE VISIT (OUTPATIENT)
Dept: PRIMARY CARE CLINIC | Facility: CLINIC | Age: 70
End: 2025-05-20
Payer: MEDICARE

## 2025-05-20 VITALS
OXYGEN SATURATION: 99 % | DIASTOLIC BLOOD PRESSURE: 76 MMHG | HEART RATE: 91 BPM | WEIGHT: 217.13 LBS | HEIGHT: 65 IN | SYSTOLIC BLOOD PRESSURE: 138 MMHG | BODY MASS INDEX: 36.18 KG/M2

## 2025-05-20 DIAGNOSIS — L73.9 FOLLICULITIS: Primary | ICD-10-CM

## 2025-05-20 PROCEDURE — 3075F SYST BP GE 130 - 139MM HG: CPT | Mod: CPTII,S$GLB,, | Performed by: FAMILY MEDICINE

## 2025-05-20 PROCEDURE — 1101F PT FALLS ASSESS-DOCD LE1/YR: CPT | Mod: CPTII,S$GLB,, | Performed by: FAMILY MEDICINE

## 2025-05-20 PROCEDURE — 3044F HG A1C LEVEL LT 7.0%: CPT | Mod: CPTII,S$GLB,, | Performed by: FAMILY MEDICINE

## 2025-05-20 PROCEDURE — 4010F ACE/ARB THERAPY RXD/TAKEN: CPT | Mod: CPTII,S$GLB,, | Performed by: FAMILY MEDICINE

## 2025-05-20 PROCEDURE — 99213 OFFICE O/P EST LOW 20 MIN: CPT | Mod: S$GLB,,, | Performed by: FAMILY MEDICINE

## 2025-05-20 PROCEDURE — 99999 PR PBB SHADOW E&M-EST. PATIENT-LVL V: CPT | Mod: PBBFAC,,, | Performed by: FAMILY MEDICINE

## 2025-05-20 PROCEDURE — 1159F MED LIST DOCD IN RCRD: CPT | Mod: CPTII,S$GLB,, | Performed by: FAMILY MEDICINE

## 2025-05-20 PROCEDURE — 3288F FALL RISK ASSESSMENT DOCD: CPT | Mod: CPTII,S$GLB,, | Performed by: FAMILY MEDICINE

## 2025-05-20 PROCEDURE — 1126F AMNT PAIN NOTED NONE PRSNT: CPT | Mod: CPTII,S$GLB,, | Performed by: FAMILY MEDICINE

## 2025-05-20 PROCEDURE — 3078F DIAST BP <80 MM HG: CPT | Mod: CPTII,S$GLB,, | Performed by: FAMILY MEDICINE

## 2025-05-20 PROCEDURE — 3008F BODY MASS INDEX DOCD: CPT | Mod: CPTII,S$GLB,, | Performed by: FAMILY MEDICINE

## 2025-05-20 RX ORDER — MUPIROCIN 20 MG/G
OINTMENT TOPICAL 3 TIMES DAILY
Qty: 30 G | Refills: 1 | Status: SHIPPED | OUTPATIENT
Start: 2025-05-20 | End: 2025-05-27

## 2025-05-22 NOTE — PROGRESS NOTES
Clinic Note  2025      Subjective:       Patient ID:  Regine is a 70 y.o. female being seen for:      History of Present Illness    CHIEF COMPLAINT:  Regine presents today for a rash on right leg.    HISTORY OF PRESENT ILLNESS:  She developed what was initially thought to be a bug bite on her right leg last Friday. The rash has since spread down the leg and around the sides. The affected area is sometimes hot and significantly itchy. Pants irritate the rash, requiring her to wear shorter pants. Multiple treatments including anti-itch spray, cortisone cream, clobetasol, and bacitracin have been attempted without significant improvement.    CURRENT TREATMENT:  She uses plain Epsom salt sitz baths for vaginal area folliculitis.    MEDICAL HISTORY:  She has a history of shingles with subsequent post-herpetic neuropathy and diabetes.      ROS:  General: -fever, -chills, -fatigue, -weight gain, -weight loss  Eyes: -vision changes, -redness, -discharge  ENT: -ear pain, -nasal congestion, -sore throat  Cardiovascular: -chest pain, -palpitations, -lower extremity edema  Respiratory: -cough, -shortness of breath  Gastrointestinal: -abdominal pain, -nausea, -vomiting, -diarrhea, -constipation, -blood in stool  Genitourinary: -dysuria, -hematuria, -frequency  Musculoskeletal: -joint pain, -muscle pain  Skin: +rash, +lesion, +skin redness, +lumps/masses  Neurological: -headache, -dizziness, -numbness, -tingling, +nerve pain  Psychiatric: -anxiety, -depression, -sleep difficulty           Medication List with Changes/Refills   New Medications    MUPIROCIN (BACTROBAN) 2 % OINTMENT    Apply topically 3 (three) times daily. for 7 days   Current Medications    ALBUTEROL (PROVENTIL/VENTOLIN HFA) 90 MCG/ACTUATION INHALER    SMARTSI-2 Puff(s) Via Inhaler Every 6 Hours PRN    CLOBETASOL (CLOBEX) 0.05 % SHAMPOO    Apply topically.    CLOBETASOL (TEMOVATE) 0.05 % EXTERNAL SOLUTION    APPLY TOPICALLY TO THE SCALP TWICE DAILY AS  NEEDED FOR ITCHING OR IRRITATION    FLUCONAZOLE (DIFLUCAN) 150 MG TAB    Take one AFTER antibiotic therapy if symptoms of yeast infection develop    FLUTICASONE PROPIONATE (FLONASE) 50 MCG/ACTUATION NASAL SPRAY    1 spray by Each Nostril route once daily. OTC Flonase    FLUTICASONE-SALMETEROL DISKUS INHALER 250-50 MCG    Inhale 1 puff into the lungs 2 (two) times daily. Controller    HYDROCHLOROTHIAZIDE (HYDRODIURIL) 25 MG TABLET    TAKE 1 TABLET(25 MG) BY MOUTH EVERY DAY    IPRATROPIUM (ATROVENT) 42 MCG (0.06 %) NASAL SPRAY    2 sprays by Each Nostril route 3 (three) times daily as needed (for runny nose or post-nasal drip).    LANCETS MISC    To check BG 2 times daily    LATANOPROST 0.005 % OPHTHALMIC SOLUTION    Place 1 drop into both eyes every evening.    LIDOCAINE HCL-HYDROCORTISON AC (LIDAMANTLE-HC) 3-0.5 % TOPICAL CREAM    Apply a pea sized amount to the affected area 2-3 times a day as needed    LOSARTAN (COZAAR) 50 MG TABLET    Take 1 tablet (50 mg total) by mouth once daily.    MELOXICAM (MOBIC) 15 MG TABLET    Take 1 tablet (15 mg total) by mouth daily as needed for Pain (back pain). Take with food    METFORMIN (GLUCOPHAGE) 500 MG TABLET    Take 1 tablet (500 mg total) by mouth 2 (two) times daily with meals.    MONTELUKAST (SINGULAIR) 10 MG TABLET    Take 1 tablet (10 mg total) by mouth every evening.    ONDANSETRON (ZOFRAN-ODT) 8 MG TBDL    Take 1 tablet (8 mg total) by mouth every 8 (eight) hours as needed (Nausea).    ONETOUCH DELICA PLUS LANCET 33 GAUGE MISC    Check BG twice daily    ONETOUCH VERIO REFLECT METER MISC    USE AS DIRECTED TO TEST BLOOD GLUCOSE    ONETOUCH VERIO TEST STRIPS STRP    USE TO CHECK BLOOD GLUCOSE DAILY    PANTOPRAZOLE (PROTONIX) 40 MG TABLET    Take 1 tablet (40 mg total) by mouth once daily.    PRASTERONE, DHEA, (INTRAROSA) 6.5 MG INST    Place 6.5 mg vaginally twice a week. Place 1 suppository vaginal 2 times a week    PROMETHAZINE-DEXTROMETHORPHAN (PROMETHAZINE-DM)  "6.25-15 MG/5 ML SYRP    Take 5 mLs by mouth every 8 (eight) hours as needed (cough).    RIZATRIPTAN (MAXALT-MLT) 10 MG DISINTEGRATING TABLET    Take 1 tablet (10 mg total) by mouth every 2 (two) hours as needed for Migraine. Max 30 mg/day.    SEMAGLUTIDE (OZEMPIC) 1 MG/DOSE (4 MG/3 ML)    Inject 1 mg into the skin every 7 days.    SUMATRIPTAN (IMITREX) 100 MG TABLET    Take 1 tablet (100 mg total) by mouth every 2 (two) hours as needed for Migraine. Max 200 mg/day.    TIZANIDINE (ZANAFLEX) 4 MG TABLET    Take 1 tablet (4 mg total) by mouth 2 (two) times daily as needed (muscle pain).       Problem List[1]        Objective:      /76 (BP Location: Right arm, Patient Position: Sitting)   Pulse 91   Ht 5' 5" (1.651 m)   Wt 98.5 kg (217 lb 2.5 oz)   SpO2 99%   BMI 36.14 kg/m²       Physical Exam    General: No acute distress. Well-developed. Well-nourished.  Eyes: EOMI. Sclerae anicteric.  HENT: Normocephalic. Atraumatic. Nares patent. Moist oral mucosa.  Cardiovascular: Regular rate. Regular rhythm. No murmurs. No rubs. No gallops. Normal S1, S2.  Respiratory: Normal respiratory effort. Clear to auscultation bilaterally. No rales. No rhonchi. No wheezing.  Musculoskeletal: No  obvious deformity.  Extremities: No lower extremity edema.  Neurological: Alert & oriented x3. No slurred speech. Normal gait.  Psychiatric: Normal mood. Normal affect. Good insight. Good judgment.  Skin: Warm. Dry. Red bumps on hair follicles of R shin               Assessment and Plan:     Assessment & Plan    - Assessed rash on right leg, likely folliculitis based on presentation of red bumps on hair follicles.  - Ruled out fungal infection due to lack of recent swimming activity.  - Initiated treatment for bacterial folliculitis with topical antibiotic (Bactroban) and oral antihistamine for itching relief.    ## FOLLICULITIS:  - Monitored the patient's rash on the right leg that started as a small area and spread down the leg and " around the sides.  - The rash is characterized by red bumps on the hair follicles, is itchy, and sometimes feels hot.  - Regine has been using various topical treatments without relief.  - Assessed the condition as suspected folliculitis, possibly due to staph infection.  - Prescribed Bactroban (mupirocin) ointment to be applied to affected area 3 times daily for at least 7 days.  - Increased hydroxyzine from 10 mg to 20 mg (take two 10 mg tablets), up to 3 times daily as needed for itching; advised that it may cause drowsiness.  - Advised patient to apply cold compress (e.g., ice pack with towel) to affected area for itch relief.  - Cautioned against using topical steroids as they may worsen the infection and instructed to avoid scratching the rash to prevent further spread.    ## FOLLOW-UP:  - Scheduled follow-up on Thursday, May 29th at 8:20 AM to reassess the rash.         Follow Up:   No follow-ups on file.    Other Orders Placed This Visit:  Orders Placed This Encounter    mupirocin (BACTROBAN) 2 % ointment         Kirit Mcclendon MD        This note is dictated on [employer] word recognition program and This note was generated with the assistance of ambient listening technology. Verbal consent was obtained by the patient and accompanying visitor(s) for the recording of patient appointment to facilitate this note. I attest to having reviewed and edited the generated note for accuracy, though some syntax or spelling errors may persist. Please contact the author of this note for any clarification.  .  There are word recognition mistakes that are occasionally missed on review.         [1]   Patient Active Problem List  Diagnosis    History of breast cancer in female    Type 2 diabetes mellitus without complication, without long-term current use of insulin    Hypertension associated with type 2 diabetes mellitus    S/P left mastectomy    Spinal stenosis of lumbar region with neurogenic claudication    Migraine  without aura and without status migrainosus, not intractable    Thyroid nodule    Fibromyalgia    Anemia    Unsp symptoms and signs w cognitive functions and awareness    History of antineoplastic chemotherapy    Seborrheic dermatitis of scalp    Insomnia due to medical condition    Insomnia    Obstructive sleep apnea hypopnea, mild    Cervical stenosis of spine    Impaired functional mobility, balance, gait, and endurance    Decreased range of motion of lumbar spine    Central sensitization to pain    Myelomalacia of cervical cord    Cervical spondylosis    Status post cervical discectomy    Status post cervical spinal fusion    Atherosclerosis of aorta    Neck pain    Left shoulder pain    Atopic dermatitis    Chronic rhinitis    Irritable bowel syndrome    Malignant neoplasm of unspecified site of left female breast    Suspected glaucoma of both eyes    Chronic cough    Fatty liver    Obesity (BMI 30-39.9)    Iron deficiency anemia due to chronic blood loss    Apical lung scarring    Post-viral cough syndrome    Major depressive disorder, single episode, mild    Severe obesity (BMI 35.0-35.9 with comorbidity)

## 2025-05-29 ENCOUNTER — OFFICE VISIT (OUTPATIENT)
Dept: PRIMARY CARE CLINIC | Facility: CLINIC | Age: 70
End: 2025-05-29
Payer: MEDICARE

## 2025-05-29 VITALS
DIASTOLIC BLOOD PRESSURE: 80 MMHG | OXYGEN SATURATION: 97 % | WEIGHT: 214.75 LBS | TEMPERATURE: 98 F | BODY MASS INDEX: 35.78 KG/M2 | HEIGHT: 65 IN | HEART RATE: 116 BPM | SYSTOLIC BLOOD PRESSURE: 126 MMHG

## 2025-05-29 DIAGNOSIS — I10 ESSENTIAL HYPERTENSION: ICD-10-CM

## 2025-05-29 DIAGNOSIS — L73.9 FOLLICULITIS: Primary | ICD-10-CM

## 2025-05-29 DIAGNOSIS — R00.0 TACHYCARDIA: ICD-10-CM

## 2025-05-29 PROCEDURE — 3044F HG A1C LEVEL LT 7.0%: CPT | Mod: CPTII,S$GLB,, | Performed by: STUDENT IN AN ORGANIZED HEALTH CARE EDUCATION/TRAINING PROGRAM

## 2025-05-29 PROCEDURE — 1126F AMNT PAIN NOTED NONE PRSNT: CPT | Mod: CPTII,S$GLB,, | Performed by: STUDENT IN AN ORGANIZED HEALTH CARE EDUCATION/TRAINING PROGRAM

## 2025-05-29 PROCEDURE — 3288F FALL RISK ASSESSMENT DOCD: CPT | Mod: CPTII,S$GLB,, | Performed by: STUDENT IN AN ORGANIZED HEALTH CARE EDUCATION/TRAINING PROGRAM

## 2025-05-29 PROCEDURE — 99214 OFFICE O/P EST MOD 30 MIN: CPT | Mod: S$GLB,,, | Performed by: STUDENT IN AN ORGANIZED HEALTH CARE EDUCATION/TRAINING PROGRAM

## 2025-05-29 PROCEDURE — 3074F SYST BP LT 130 MM HG: CPT | Mod: CPTII,S$GLB,, | Performed by: STUDENT IN AN ORGANIZED HEALTH CARE EDUCATION/TRAINING PROGRAM

## 2025-05-29 PROCEDURE — 3008F BODY MASS INDEX DOCD: CPT | Mod: CPTII,S$GLB,, | Performed by: STUDENT IN AN ORGANIZED HEALTH CARE EDUCATION/TRAINING PROGRAM

## 2025-05-29 PROCEDURE — 1159F MED LIST DOCD IN RCRD: CPT | Mod: CPTII,S$GLB,, | Performed by: STUDENT IN AN ORGANIZED HEALTH CARE EDUCATION/TRAINING PROGRAM

## 2025-05-29 PROCEDURE — 1101F PT FALLS ASSESS-DOCD LE1/YR: CPT | Mod: CPTII,S$GLB,, | Performed by: STUDENT IN AN ORGANIZED HEALTH CARE EDUCATION/TRAINING PROGRAM

## 2025-05-29 PROCEDURE — 3079F DIAST BP 80-89 MM HG: CPT | Mod: CPTII,S$GLB,, | Performed by: STUDENT IN AN ORGANIZED HEALTH CARE EDUCATION/TRAINING PROGRAM

## 2025-05-29 PROCEDURE — 99999 PR PBB SHADOW E&M-EST. PATIENT-LVL V: CPT | Mod: PBBFAC,,, | Performed by: STUDENT IN AN ORGANIZED HEALTH CARE EDUCATION/TRAINING PROGRAM

## 2025-05-29 PROCEDURE — 4010F ACE/ARB THERAPY RXD/TAKEN: CPT | Mod: CPTII,S$GLB,, | Performed by: STUDENT IN AN ORGANIZED HEALTH CARE EDUCATION/TRAINING PROGRAM

## 2025-05-29 NOTE — PROGRESS NOTES
05/29/2025    Regine Cooper  [id number]    Chief Complaint   Patient presents with    Rash     Pt seen Dr. Mcclendon last week regarding rash. Pt presents today with same rash that she reports has stopped spreading but is still itchy.        HPI    This pt is known to me and presents for short interval f/u for folliculitis on right shin. Chronic conditions: HTN, TYPE II DIABETES, GERD, asthma.     Has been using mupirocin and thinks that rash is improving. No other rashes      Negative 10 point ROS outside of HPI    Social History     Socioeconomic History    Marital status:     Number of children: 1   Tobacco Use    Smoking status: Never     Passive exposure: Never    Smokeless tobacco: Never   Substance and Sexual Activity    Alcohol use: No    Drug use: No    Sexual activity: Not Currently   Social History Narrative        1 son (estranged)    3 grandchildren ([place])    Born and raised in [place] (moved to [facility] 2017)     Social Drivers of Health     Financial Resource Strain: Low Risk  (1/27/2025)    Overall Financial Resource Strain (CARDIA)     Difficulty of Paying Living Expenses: Not very hard   Food Insecurity: No Food Insecurity (1/27/2025)    Hunger Vital Sign     Worried About Running Out of Food in the Last Year: Never true     Ran Out of Food in the Last Year: Never true   Transportation Needs: No Transportation Needs (11/23/2023)    PRAPARE - Transportation     Lack of Transportation (Medical): No     Lack of Transportation (Non-Medical): No   Physical Activity: Insufficiently Active (1/27/2025)    Exercise Vital Sign     Days of Exercise per Week: 1 day     Minutes of Exercise per Session: 40 min   Stress: No Stress Concern Present (1/27/2025)    [employer]izen [employer] [place] [employer] - Occupational Stress Questionnaire     Feeling of Stress : Only a little   Housing Stability: Low Risk  (11/23/2023)    Housing Stability Vital Sign     Unable to Pay for Housing in the Last Year:  No     Number of Places Lived in the Last Year: 1     Unstable Housing in the Last Year: No         Current Medications[1]      Physical Exam  Vitals:    25 0831   BP: 126/80   Pulse: (!) 116   Temp: 98.1 °F (36.7 °C)       Physical Exam      Gen: well appearing, NAD  Resp: non labored breathing, no crackles, no wheezes, CTAB  CV: tachy regular rhythm no murmur, gallops, rubs, no LE edema          1. Folliculitis  Improving  Recommended continuing mupirocin  If no further improvement would recommend keflex    2. Essential hypertension  Well controlled continue current regimen    3. Tachycardia  Recent dose of otc cold medication. asymptomatic      RTC as previously scheduled     Gladis Melissa MD  Family Medicine           [1]   Current Outpatient Medications:     albuterol (PROVENTIL/VENTOLIN HFA) 90 mcg/actuation inhaler, SMARTSI-2 Puff(s) Via Inhaler Every 6 Hours PRN, Disp: 6.7 g, Rfl: 5    clobetasoL (CLOBEX) 0.05 % shampoo, Apply topically., Disp: , Rfl:     clobetasoL (TEMOVATE) 0.05 % external solution, APPLY TOPICALLY TO THE SCALP TWICE DAILY AS NEEDED FOR ITCHING OR IRRITATION, Disp: 50 mL, Rfl: 0    fluticasone propionate (FLONASE) 50 mcg/actuation nasal spray, 1 spray by Each Nostril route once daily. OTC Flonase, Disp: , Rfl:     fluticasone-salmeterol diskus inhaler 250-50 mcg, Inhale 1 puff into the lungs 2 (two) times daily. Controller, Disp: 60 each, Rfl: 11    hydroCHLOROthiazide (HYDRODIURIL) 25 MG tablet, TAKE 1 TABLET(25 MG) BY MOUTH EVERY DAY, Disp: 90 tablet, Rfl: 2    ipratropium (ATROVENT) 42 mcg (0.06 %) nasal spray, 2 sprays by Each Nostril route 3 (three) times daily as needed (for runny nose or post-nasal drip)., Disp: 15 mL, Rfl: 11    lancets Misc, To check BG 2 times daily, Disp: 200 each, Rfl: 3    latanoprost 0.005 % ophthalmic solution, Place 1 drop into both eyes every evening., Disp: 7.5 mL, Rfl: 3    losartan (COZAAR) 50 MG tablet, Take 1 tablet (50 mg total) by mouth  once daily., Disp: 90 tablet, Rfl: 3    meloxicam (MOBIC) 15 MG tablet, Take 1 tablet (15 mg total) by mouth daily as needed for Pain (back pain). Take with food, Disp: 90 tablet, Rfl: 3    metFORMIN (GLUCOPHAGE) 500 MG tablet, Take 1 tablet (500 mg total) by mouth 2 (two) times daily with meals., Disp: 180 tablet, Rfl: 3    montelukast (SINGULAIR) 10 mg tablet, Take 1 tablet (10 mg total) by mouth every evening., Disp: 90 tablet, Rfl: 3    ondansetron (ZOFRAN-ODT) 8 MG TbDL, Take 1 tablet (8 mg total) by mouth every 8 (eight) hours as needed (Nausea)., Disp: 30 tablet, Rfl: 2    ONETOUCH DELICA PLUS LANCET 33 gauge Misc, Check BG twice daily, Disp: 100 each, Rfl: 11    ONETOUCH VERIO REFLECT METER Misc, USE AS DIRECTED TO TEST BLOOD GLUCOSE, Disp: , Rfl:     ONETOUCH VERIO TEST STRIPS Strp, USE TO CHECK BLOOD GLUCOSE DAILY, Disp: 100 strip, Rfl: 3    pantoprazole (PROTONIX) 40 MG tablet, Take 1 tablet (40 mg total) by mouth once daily., Disp: 90 tablet, Rfl: 3    prasterone, dhea, (INTRAROSA) 6.5 mg Inst, Place 6.5 mg vaginally twice a week. Place 1 suppository vaginal 2 times a week, Disp: 12 each, Rfl: 3    promethazine-dextromethorphan (PROMETHAZINE-DM) 6.25-15 mg/5 mL Syrp, Take 5 mLs by mouth every 8 (eight) hours as needed (cough)., Disp: 240 mL, Rfl: 1    rizatriptan (MAXALT-MLT) 10 MG disintegrating tablet, Take 1 tablet (10 mg total) by mouth every 2 (two) hours as needed for Migraine. Max 30 mg/day., Disp: 12 tablet, Rfl: 5    semaglutide (OZEMPIC) 1 mg/dose (4 mg/3 mL), Inject 1 mg into the skin every 7 days., Disp: 3 mL, Rfl: 11    sumatriptan (IMITREX) 100 MG tablet, Take 1 tablet (100 mg total) by mouth every 2 (two) hours as needed for Migraine. Max 200 mg/day., Disp: 12 tablet, Rfl: 5    tiZANidine (ZANAFLEX) 4 MG tablet, Take 1 tablet (4 mg total) by mouth 2 (two) times daily as needed (muscle pain)., Disp: 60 tablet, Rfl: 3    fluconazole (DIFLUCAN) 150 MG Tab, Take one AFTER antibiotic therapy if  symptoms of yeast infection develop (Patient not taking: Reported on 5/29/2025), Disp: 1 tablet, Rfl: 0    LIDOcaine HCl-hydrocortison ac (LIDAMANTLE-HC) 3-0.5 % topical cream, Apply a pea sized amount to the affected area 2-3 times a day as needed (Patient not taking: Reported on 5/20/2025), Disp: 15 g, Rfl: 0

## 2025-06-03 ENCOUNTER — PATIENT MESSAGE (OUTPATIENT)
Dept: PAIN MEDICINE | Facility: CLINIC | Age: 70
End: 2025-06-03
Payer: MEDICARE

## 2025-06-09 ENCOUNTER — PATIENT MESSAGE (OUTPATIENT)
Dept: ADMINISTRATIVE | Facility: CLINIC | Age: 70
End: 2025-06-09
Payer: MEDICARE

## 2025-06-11 ENCOUNTER — TELEPHONE (OUTPATIENT)
Dept: ALLERGY | Facility: CLINIC | Age: 70
End: 2025-06-11
Payer: MEDICARE

## 2025-06-11 NOTE — TELEPHONE ENCOUNTER
Copied from CRM #[id number]. Topic: Appointments - Information Request  >> Jun 11, 2025  4:27 PM Kari wrote:  Pt called regarding a missed phone call from Jessica Dawson from Dr Vargas's office      Please call back at [phone]

## 2025-06-16 ENCOUNTER — OFFICE VISIT (OUTPATIENT)
Dept: PRIMARY CARE CLINIC | Facility: CLINIC | Age: 70
End: 2025-06-16
Payer: MEDICARE

## 2025-06-16 ENCOUNTER — TELEPHONE (OUTPATIENT)
Dept: ALLERGY | Facility: CLINIC | Age: 70
End: 2025-06-16
Payer: MEDICARE

## 2025-06-16 VITALS
RESPIRATION RATE: 20 BRPM | HEIGHT: 65 IN | WEIGHT: 212.88 LBS | SYSTOLIC BLOOD PRESSURE: 120 MMHG | TEMPERATURE: 99 F | DIASTOLIC BLOOD PRESSURE: 79 MMHG | HEART RATE: 75 BPM | BODY MASS INDEX: 35.47 KG/M2 | OXYGEN SATURATION: 98 %

## 2025-06-16 DIAGNOSIS — J40 BRONCHITIS: ICD-10-CM

## 2025-06-16 DIAGNOSIS — J45.21 MILD INTERMITTENT ASTHMA WITH ACUTE EXACERBATION: Primary | ICD-10-CM

## 2025-06-16 PROCEDURE — 3008F BODY MASS INDEX DOCD: CPT | Mod: CPTII,S$GLB,, | Performed by: STUDENT IN AN ORGANIZED HEALTH CARE EDUCATION/TRAINING PROGRAM

## 2025-06-16 PROCEDURE — 1159F MED LIST DOCD IN RCRD: CPT | Mod: CPTII,S$GLB,, | Performed by: STUDENT IN AN ORGANIZED HEALTH CARE EDUCATION/TRAINING PROGRAM

## 2025-06-16 PROCEDURE — 1125F AMNT PAIN NOTED PAIN PRSNT: CPT | Mod: CPTII,S$GLB,, | Performed by: STUDENT IN AN ORGANIZED HEALTH CARE EDUCATION/TRAINING PROGRAM

## 2025-06-16 PROCEDURE — 1101F PT FALLS ASSESS-DOCD LE1/YR: CPT | Mod: CPTII,S$GLB,, | Performed by: STUDENT IN AN ORGANIZED HEALTH CARE EDUCATION/TRAINING PROGRAM

## 2025-06-16 PROCEDURE — 3044F HG A1C LEVEL LT 7.0%: CPT | Mod: CPTII,S$GLB,, | Performed by: STUDENT IN AN ORGANIZED HEALTH CARE EDUCATION/TRAINING PROGRAM

## 2025-06-16 PROCEDURE — 3074F SYST BP LT 130 MM HG: CPT | Mod: CPTII,S$GLB,, | Performed by: STUDENT IN AN ORGANIZED HEALTH CARE EDUCATION/TRAINING PROGRAM

## 2025-06-16 PROCEDURE — 3078F DIAST BP <80 MM HG: CPT | Mod: CPTII,S$GLB,, | Performed by: STUDENT IN AN ORGANIZED HEALTH CARE EDUCATION/TRAINING PROGRAM

## 2025-06-16 PROCEDURE — 4010F ACE/ARB THERAPY RXD/TAKEN: CPT | Mod: CPTII,S$GLB,, | Performed by: STUDENT IN AN ORGANIZED HEALTH CARE EDUCATION/TRAINING PROGRAM

## 2025-06-16 PROCEDURE — 99999 PR PBB SHADOW E&M-EST. PATIENT-LVL V: CPT | Mod: PBBFAC,,, | Performed by: STUDENT IN AN ORGANIZED HEALTH CARE EDUCATION/TRAINING PROGRAM

## 2025-06-16 PROCEDURE — 99214 OFFICE O/P EST MOD 30 MIN: CPT | Mod: S$GLB,,, | Performed by: STUDENT IN AN ORGANIZED HEALTH CARE EDUCATION/TRAINING PROGRAM

## 2025-06-16 PROCEDURE — 3288F FALL RISK ASSESSMENT DOCD: CPT | Mod: CPTII,S$GLB,, | Performed by: STUDENT IN AN ORGANIZED HEALTH CARE EDUCATION/TRAINING PROGRAM

## 2025-06-16 RX ORDER — METHYLPREDNISOLONE 4 MG/1
TABLET ORAL
Qty: 21 EACH | Refills: 0 | Status: SHIPPED | OUTPATIENT
Start: 2025-06-16 | End: 2025-07-07

## 2025-06-16 RX ORDER — IPRATROPIUM BROMIDE AND ALBUTEROL SULFATE 2.5; .5 MG/3ML; MG/3ML
3 SOLUTION RESPIRATORY (INHALATION)
Status: SHIPPED | OUTPATIENT
Start: 2025-06-16

## 2025-06-16 RX ORDER — AMOXICILLIN AND CLAVULANATE POTASSIUM 875; 125 MG/1; MG/1
1 TABLET, FILM COATED ORAL 2 TIMES DAILY
Qty: 10 TABLET | Refills: 0 | Status: SHIPPED | OUTPATIENT
Start: 2025-06-16 | End: 2025-06-21

## 2025-06-16 NOTE — PROGRESS NOTES
06/16/2025    Regine Cooper  [id number]    Chief Complaint   Patient presents with    Cough     Wet cough times 3 days       HPI    This pt is known to me and presents for acute concern. Chronic conditions: HTN, TYPE II DIABETES, obesity, chronic cough    Recently with cold that persisted and started into sinus drainage and now persistent cough. Albuterol inhaler is only helpful for a little bit; using every 6 hours.       Negative 10 point ROS outside of HPI    Social History     Socioeconomic History    Marital status:     Number of children: 1   Tobacco Use    Smoking status: Never     Passive exposure: Never    Smokeless tobacco: Never   Substance and Sexual Activity    Alcohol use: No    Drug use: No    Sexual activity: Not Currently   Social History Narrative        1 son (estranged)    3 grandchildren ([place])    Born and raised in [place] (moved to [facility] 2017)     Social Drivers of Health     Financial Resource Strain: Low Risk  (1/27/2025)    Overall Financial Resource Strain (CARDIA)     Difficulty of Paying Living Expenses: Not very hard   Food Insecurity: No Food Insecurity (1/27/2025)    Hunger Vital Sign     Worried About Running Out of Food in the Last Year: Never true     Ran Out of Food in the Last Year: Never true   Transportation Needs: No Transportation Needs (11/23/2023)    PRAPARE - Transportation     Lack of Transportation (Medical): No     Lack of Transportation (Non-Medical): No   Physical Activity: Insufficiently Active (1/27/2025)    Exercise Vital Sign     Days of Exercise per Week: 1 day     Minutes of Exercise per Session: 40 min   Stress: No Stress Concern Present (1/27/2025)    [employer] [place] [employer] - Occupational Stress Questionnaire     Feeling of Stress : Only a little   Housing Stability: Low Risk  (11/23/2023)    Housing Stability Vital Sign     Unable to Pay for Housing in the Last Year: No     Number of Places Lived in the Last Year: 1      Unstable Housing in the Last Year: No         Current Medications[1]      Physical Exam  Vitals:    25 0805   BP: 120/79   Pulse: 75   Resp: 20   Temp: 98.5 °F (36.9 °C)       Physical Exam      Gen: well appearing, NAD, persistent dry cough  Resp: non labored breathing, no crackles, no wheezes, CTAB  CV: RRR no murmur, gallops, rubs, no LE edema        1. Mild intermittent asthma with acute exacerbation  - albuterol-ipratropium 2.5 mg-0.5 mg/3 mL nebulizer solution 3 mL  - methylPREDNISolone (MEDROL DOSEPACK) 4 mg tablet; use as directed  Dispense: 21 each; Refill: 0    2. Bronchitis  - amoxicillin-clavulanate 875-125mg (AUGMENTIN) 875-125 mg per tablet; Take 1 tablet by mouth 2 (two) times daily. for 5 days  Dispense: 10 tablet; Refill: 0  - methylPREDNISolone (MEDROL DOSEPACK) 4 mg tablet; use as directed  Dispense: 21 each; Refill: 0        RTC as previously scheduled for routine TYPE II DIABETES     Gladis Melissa MD  Family Medicine           [1]   Current Outpatient Medications:     albuterol (PROVENTIL/VENTOLIN HFA) 90 mcg/actuation inhaler, SMARTSI-2 Puff(s) Via Inhaler Every 6 Hours PRN, Disp: 6.7 g, Rfl: 5    clobetasoL (CLOBEX) 0.05 % shampoo, Apply topically., Disp: , Rfl:     clobetasoL (TEMOVATE) 0.05 % external solution, APPLY TOPICALLY TO THE SCALP TWICE DAILY AS NEEDED FOR ITCHING OR IRRITATION, Disp: 50 mL, Rfl: 0    fluconazole (DIFLUCAN) 150 MG Tab, Take one AFTER antibiotic therapy if symptoms of yeast infection develop (Patient not taking: Reported on 2025), Disp: 1 tablet, Rfl: 0    fluticasone propionate (FLONASE) 50 mcg/actuation nasal spray, 1 spray by Each Nostril route once daily. OTC Flonase, Disp: , Rfl:     fluticasone-salmeterol diskus inhaler 250-50 mcg, Inhale 1 puff into the lungs 2 (two) times daily. Controller, Disp: 60 each, Rfl: 11    hydroCHLOROthiazide (HYDRODIURIL) 25 MG tablet, TAKE 1 TABLET(25 MG) BY MOUTH EVERY DAY, Disp: 90 tablet, Rfl: 2     ipratropium (ATROVENT) 42 mcg (0.06 %) nasal spray, 2 sprays by Each Nostril route 3 (three) times daily as needed (for runny nose or post-nasal drip)., Disp: 15 mL, Rfl: 11    lancets Misc, To check BG 2 times daily, Disp: 200 each, Rfl: 3    latanoprost 0.005 % ophthalmic solution, Place 1 drop into both eyes every evening., Disp: 7.5 mL, Rfl: 3    LIDOcaine HCl-hydrocortison ac (LIDAMANTLE-HC) 3-0.5 % topical cream, Apply a pea sized amount to the affected area 2-3 times a day as needed (Patient not taking: Reported on 5/20/2025), Disp: 15 g, Rfl: 0    losartan (COZAAR) 50 MG tablet, Take 1 tablet (50 mg total) by mouth once daily., Disp: 90 tablet, Rfl: 3    meloxicam (MOBIC) 15 MG tablet, Take 1 tablet (15 mg total) by mouth daily as needed for Pain (back pain). Take with food, Disp: 90 tablet, Rfl: 3    metFORMIN (GLUCOPHAGE) 500 MG tablet, Take 1 tablet (500 mg total) by mouth 2 (two) times daily with meals., Disp: 180 tablet, Rfl: 3    montelukast (SINGULAIR) 10 mg tablet, Take 1 tablet (10 mg total) by mouth every evening., Disp: 90 tablet, Rfl: 3    ondansetron (ZOFRAN-ODT) 8 MG TbDL, Take 1 tablet (8 mg total) by mouth every 8 (eight) hours as needed (Nausea)., Disp: 30 tablet, Rfl: 2    ONETOUCH DELICA PLUS LANCET 33 gauge Misc, Check BG twice daily, Disp: 100 each, Rfl: 11    ONETOUCH VERIO REFLECT METER Misc, USE AS DIRECTED TO TEST BLOOD GLUCOSE, Disp: , Rfl:     ONETOUCH VERIO TEST STRIPS Strp, USE TO CHECK BLOOD GLUCOSE DAILY, Disp: 100 strip, Rfl: 3    pantoprazole (PROTONIX) 40 MG tablet, Take 1 tablet (40 mg total) by mouth once daily., Disp: 90 tablet, Rfl: 3    prasterone, dhea, (INTRAROSA) 6.5 mg Inst, Place 6.5 mg vaginally twice a week. Place 1 suppository vaginal 2 times a week, Disp: 12 each, Rfl: 3    promethazine-dextromethorphan (PROMETHAZINE-DM) 6.25-15 mg/5 mL Syrp, Take 5 mLs by mouth every 8 (eight) hours as needed (cough)., Disp: 240 mL, Rfl: 1    rizatriptan (MAXALT-MLT) 10 MG  disintegrating tablet, Take 1 tablet (10 mg total) by mouth every 2 (two) hours as needed for Migraine. Max 30 mg/day., Disp: 12 tablet, Rfl: 5    semaglutide (OZEMPIC) 1 mg/dose (4 mg/3 mL), Inject 1 mg into the skin every 7 days., Disp: 3 mL, Rfl: 11    sumatriptan (IMITREX) 100 MG tablet, Take 1 tablet (100 mg total) by mouth every 2 (two) hours as needed for Migraine. Max 200 mg/day., Disp: 12 tablet, Rfl: 5    tiZANidine (ZANAFLEX) 4 MG tablet, Take 1 tablet (4 mg total) by mouth 2 (two) times daily as needed (muscle pain)., Disp: 60 tablet, Rfl: 3

## 2025-06-16 NOTE — TELEPHONE ENCOUNTER
Copied from CRM #[id number]. Topic: Appointments - Appointment Scheduling  >> Jun 16, 2025  9:09 AM Kari wrote:  Pt called regarding a missed phone call from Heidy in Dr Vargas's office      Please call back at [phone]

## 2025-06-16 NOTE — TELEPHONE ENCOUNTER
Spoke with patient to stop any Antihistamines like promethazine need to be held prior to skin testing (ideally for at least 5 days).  Azelastine nasal spray (AKA astelin or astepro) is an antihistamine nasal spray so needs to be held (ideally for at least 3 days).    Patient verbalized understanding on instructions given.

## 2025-06-17 ENCOUNTER — OFFICE VISIT (OUTPATIENT)
Dept: NEUROLOGY | Facility: CLINIC | Age: 70
End: 2025-06-17
Payer: MEDICARE

## 2025-06-17 ENCOUNTER — OFFICE VISIT (OUTPATIENT)
Dept: ALLERGY | Facility: CLINIC | Age: 70
End: 2025-06-17
Payer: MEDICARE

## 2025-06-17 VITALS
WEIGHT: 212.31 LBS | HEART RATE: 111 BPM | SYSTOLIC BLOOD PRESSURE: 125 MMHG | BODY MASS INDEX: 35.33 KG/M2 | DIASTOLIC BLOOD PRESSURE: 77 MMHG

## 2025-06-17 VITALS — WEIGHT: 212.31 LBS | HEIGHT: 65 IN | BODY MASS INDEX: 35.37 KG/M2

## 2025-06-17 DIAGNOSIS — J31.0 CHRONIC RHINITIS: Primary | ICD-10-CM

## 2025-06-17 DIAGNOSIS — E11.9 TYPE 2 DIABETES MELLITUS WITHOUT COMPLICATION, WITHOUT LONG-TERM CURRENT USE OF INSULIN: ICD-10-CM

## 2025-06-17 DIAGNOSIS — J45.909 ASTHMA, UNSPECIFIED ASTHMA SEVERITY, UNSPECIFIED WHETHER COMPLICATED, UNSPECIFIED WHETHER PERSISTENT: ICD-10-CM

## 2025-06-17 DIAGNOSIS — G43.009 MIGRAINE WITHOUT AURA AND WITHOUT STATUS MIGRAINOSUS, NOT INTRACTABLE: Primary | ICD-10-CM

## 2025-06-17 DIAGNOSIS — I10 ESSENTIAL HYPERTENSION: ICD-10-CM

## 2025-06-17 PROCEDURE — 3044F HG A1C LEVEL LT 7.0%: CPT | Mod: CPTII,S$GLB,, | Performed by: STUDENT IN AN ORGANIZED HEALTH CARE EDUCATION/TRAINING PROGRAM

## 2025-06-17 PROCEDURE — 99214 OFFICE O/P EST MOD 30 MIN: CPT | Mod: 25,S$GLB,, | Performed by: STUDENT IN AN ORGANIZED HEALTH CARE EDUCATION/TRAINING PROGRAM

## 2025-06-17 PROCEDURE — 1126F AMNT PAIN NOTED NONE PRSNT: CPT | Mod: CPTII,S$GLB,, | Performed by: STUDENT IN AN ORGANIZED HEALTH CARE EDUCATION/TRAINING PROGRAM

## 2025-06-17 PROCEDURE — 1159F MED LIST DOCD IN RCRD: CPT | Mod: CPTII,S$GLB,, | Performed by: STUDENT IN AN ORGANIZED HEALTH CARE EDUCATION/TRAINING PROGRAM

## 2025-06-17 PROCEDURE — 3008F BODY MASS INDEX DOCD: CPT | Mod: CPTII,S$GLB,, | Performed by: STUDENT IN AN ORGANIZED HEALTH CARE EDUCATION/TRAINING PROGRAM

## 2025-06-17 PROCEDURE — 1160F RVW MEDS BY RX/DR IN RCRD: CPT | Mod: CPTII,S$GLB,, | Performed by: STUDENT IN AN ORGANIZED HEALTH CARE EDUCATION/TRAINING PROGRAM

## 2025-06-17 PROCEDURE — 99999 PR PBB SHADOW E&M-EST. PATIENT-LVL IV: CPT | Mod: PBBFAC,,, | Performed by: STUDENT IN AN ORGANIZED HEALTH CARE EDUCATION/TRAINING PROGRAM

## 2025-06-17 PROCEDURE — 95004 PERQ TESTS W/ALRGNC XTRCS: CPT | Mod: S$GLB,,, | Performed by: STUDENT IN AN ORGANIZED HEALTH CARE EDUCATION/TRAINING PROGRAM

## 2025-06-17 PROCEDURE — 4010F ACE/ARB THERAPY RXD/TAKEN: CPT | Mod: CPTII,S$GLB,, | Performed by: STUDENT IN AN ORGANIZED HEALTH CARE EDUCATION/TRAINING PROGRAM

## 2025-06-17 PROCEDURE — 99999 PR PBB SHADOW E&M-EST. PATIENT-LVL IV: CPT | Mod: PBBFAC,,, | Performed by: NURSE PRACTITIONER

## 2025-06-17 RX ORDER — ONDANSETRON 8 MG/1
8 TABLET, ORALLY DISINTEGRATING ORAL EVERY 8 HOURS PRN
Qty: 30 TABLET | Refills: 2 | Status: SHIPPED | OUTPATIENT
Start: 2025-06-17

## 2025-06-17 RX ORDER — RIZATRIPTAN BENZOATE 10 MG/1
10 TABLET, ORALLY DISINTEGRATING ORAL
Qty: 18 TABLET | Refills: 3 | Status: SHIPPED | OUTPATIENT
Start: 2025-06-17
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
## 2025-06-17 NOTE — PROGRESS NOTES
ALLERGY & IMMUNOLOGY CLINIC - FOLLOW UP     HISTORY OF PRESENT ILLNESS     Patient ID: Regine Cooper is a 70 y.o. female    CC: chronic rhinitis, skin testing, asthma    HPI: Regine Cooper is a 70 y.o. female with a history of breast cancer, DM2, and HTN, following up for asthma, chronic rhinitis, and to undergo skin prick testing.     She is doing better now, but in late May (memorial weekend), a lot of grass was being mowed in her neighborhood. She had sneezing, allergy symptoms. And then by the Tuesday, she started feeling bad. Had to stay in bed. She saw her doctor Thursday or Friday of that week, was told she had a cold. She continued to have her normal post-nasal drip. But she started coughing more, non-stop.   She says the coughing got so bad, that she went in for a breathing treatment yesterday. Was also prescribed steroids and antibiotics. Feeling much better today. She says the breathing treatment helped a lot.   She used her albuterol this am for coughing.  She reports her last asthma exacerbation prior to this was in the fall.   She is taking singulair every day.   She has held antihistamines for the skin testing today.     MEDICAL HISTORY     Vaccines:   Immunization History   Administered Date(s) Administered    COVID-19, MRNA, LN-S, PF (MODERNA FULL 0.5 ML DOSE) 10/04/2022    COVID-19, MRNA, LN-S, PF (Pfizer) (Purple Cap) 10/04/2022    COVID-19, mRNA, LNP-S, PF, gunjan-sucrose, 30 mcg/0.3 mL (Pfizer Ages 12+) 09/30/2023    COVID-19, mRNA, LNP-S, bivalent booster, PF (Moderna Omicron)12 + YEARS 10/04/2022    COVID-19, vector-nr, rS-Ad26, PF (Tammie) 03/14/2021, 11/03/2021    Hepatitis B, Adult 01/27/1998, 01/27/1998    Influenza 10/04/2009, 10/13/2009, 09/01/2015    Influenza (FLUAD) - Quadrivalent - Adjuvanted - PF *Preferred* (65+) 09/20/2022, 09/18/2023    Influenza - Quadrivalent - PF *Preferred* (6 months and older) 08/28/2018, 08/28/2018, 08/29/2019, 09/08/2020, 10/09/2021    Influenza -  Trivalent - Afluria, Fluzone MDV 09/25/2013, 10/10/2014, 10/21/2016, 10/06/2017    Influenza - Trivalent - Fluad - Adjuvanted - PF (65 years and older 09/13/2024    Influenza Split 11/04/2006, 09/18/2010, 10/12/2011, 09/29/2012    Pneumococcal Conjugate - 13 Valent 03/23/2018, 03/23/2018    Pneumococcal Conjugate - 20 Valent 09/20/2022    Pneumococcal Polysaccharide - 23 Valent 02/23/2009, 02/23/2009    RSVpreF (Arexvy) 02/14/2024    Tdap 01/30/2008, 01/30/2018, 11/26/2018    Zoster Recombinant 12/07/2020, 12/07/2020, 02/24/2021     Medical Hx:   Patient Active Problem List   Diagnosis    History of breast cancer in female    Type 2 diabetes mellitus without complication, without long-term current use of insulin    Hypertension associated with type 2 diabetes mellitus    S/P left mastectomy    Spinal stenosis of lumbar region with neurogenic claudication    Migraine without aura and without status migrainosus, not intractable    Thyroid nodule    Fibromyalgia    Anemia    Unsp symptoms and signs w cognitive functions and awareness    History of antineoplastic chemotherapy    Seborrheic dermatitis of scalp    Insomnia due to medical condition    Insomnia    Obstructive sleep apnea hypopnea, mild    Cervical stenosis of spine    Impaired functional mobility, balance, gait, and endurance    Decreased range of motion of lumbar spine    Central sensitization to pain    Myelomalacia of cervical cord    Cervical spondylosis    Status post cervical discectomy    Status post cervical spinal fusion    Atherosclerosis of aorta    Neck pain    Left shoulder pain    Atopic dermatitis    Chronic rhinitis    Irritable bowel syndrome    Malignant neoplasm of unspecified site of left female breast    Suspected glaucoma of both eyes    Chronic cough    Fatty liver    Obesity (BMI 30-39.9)    Iron deficiency anemia due to chronic blood loss    Apical lung scarring    Post-viral cough syndrome    Major depressive disorder, single  episode, mild    Severe obesity (BMI 35.0-35.9 with comorbidity)     Surgical Hx:   Past Surgical History:   Procedure Laterality Date    ADENOIDECTOMY      ANTERIOR CERVICAL DISCECTOMY W/ FUSION N/A 2022    Procedure: DISCECTOMY, SPINE, CERVICAL, ANTERIOR APPROACH, WITH FUSION C5-7 SPINEWAVE SNS: VOCAL CORDS/MOTORS/SSEP;  Surgeon: Rosales Scruggs MD;  [facility];  Service: Neurosurgery;  Laterality: N/A;    BREAST BIOPSY      BREAST SURGERY      breast ca lt side     COLONOSCOPY N/A 2023    Procedure: COLONOSCOPY;  Surgeon: Eliseo Garcia MD;  Location: [facility] ENDO (4TH FLR);  Service: Endoscopy;  Laterality: N/A;    ESOPHAGOGASTRODUODENOSCOPY N/A 2023    Procedure: EGD (ESOPHAGOGASTRODUODENOSCOPY);  Surgeon: Eliseo Garcia MD;  [facility]  Service: Endoscopy;  Laterality: N/A;  r/o h pylori-miralax extended prep-instr portal and handed-tb    HYSTERECTOMY      supacervical hysterectomy    LAPAROSCOPIC SUPRACERVICAL HYSTERECTOMY  2005    fibroids    MASTECTOMY Left 2017    chemo    MYOMECTOMY      TONSILLECTOMY      TYMPANOSTOMY TUBE PLACEMENT      VAGINAL DELIVERY       Medications:   Current Outpatient Medications on File Prior to Visit   Medication Sig Dispense Refill    albuterol (PROVENTIL/VENTOLIN HFA) 90 mcg/actuation inhaler SMARTSI-2 Puff(s) Via Inhaler Every 6 Hours PRN 6.7 g 5    amoxicillin-clavulanate 875-125mg (AUGMENTIN) 875-125 mg per tablet Take 1 tablet by mouth 2 (two) times daily. for 5 days 10 tablet 0    clobetasoL (CLOBEX) 0.05 % shampoo Apply topically.      clobetasoL (TEMOVATE) 0.05 % external solution APPLY TOPICALLY TO THE SCALP TWICE DAILY AS NEEDED FOR ITCHING OR IRRITATION 50 mL 0    fluconazole (DIFLUCAN) 150 MG Tab Take one AFTER antibiotic therapy if symptoms of yeast infection develop 1 tablet 0    fluticasone propionate (FLONASE) 50 mcg/actuation nasal spray 1 spray by Each Nostril route once daily. OTC Flonase       hydroCHLOROthiazide (HYDRODIURIL) 25 MG tablet TAKE 1 TABLET(25 MG) BY MOUTH EVERY DAY 90 tablet 2    ipratropium (ATROVENT) 42 mcg (0.06 %) nasal spray 2 sprays by Each Nostril route 3 (three) times daily as needed (for runny nose or post-nasal drip). 15 mL 11    lancets Misc To check BG 2 times daily 200 each 3    latanoprost 0.005 % ophthalmic solution Place 1 drop into both eyes every evening. 7.5 mL 3    losartan (COZAAR) 50 MG tablet Take 1 tablet (50 mg total) by mouth once daily. 90 tablet 3    meloxicam (MOBIC) 15 MG tablet Take 1 tablet (15 mg total) by mouth daily as needed for Pain (back pain). Take with food 90 tablet 3    metFORMIN (GLUCOPHAGE) 500 MG tablet Take 1 tablet (500 mg total) by mouth 2 (two) times daily with meals. 180 tablet 3    methylPREDNISolone (MEDROL DOSEPACK) 4 mg tablet use as directed 21 each 0    montelukast (SINGULAIR) 10 mg tablet Take 1 tablet (10 mg total) by mouth every evening. 90 tablet 3    ondansetron (ZOFRAN-ODT) 8 MG TbDL Take 1 tablet (8 mg total) by mouth every 8 (eight) hours as needed (Nausea). 30 tablet 2    ONETOUCH DELICA PLUS LANCET 33 gauge Misc Check BG twice daily 100 each 11    ONETOUCH VERIO REFLECT METER Misc USE AS DIRECTED TO TEST BLOOD GLUCOSE      ONETOUCH VERIO TEST STRIPS Strp USE TO CHECK BLOOD GLUCOSE DAILY 100 strip 3    pantoprazole (PROTONIX) 40 MG tablet Take 1 tablet (40 mg total) by mouth once daily. 90 tablet 3    prasterone, dhea, (INTRAROSA) 6.5 mg Inst Place 6.5 mg vaginally twice a week. Place 1 suppository vaginal 2 times a week 12 each 3    promethazine-dextromethorphan (PROMETHAZINE-DM) 6.25-15 mg/5 mL Syrp Take 5 mLs by mouth every 8 (eight) hours as needed (cough). 240 mL 1    rizatriptan (MAXALT-MLT) 10 MG disintegrating tablet Take 1 tablet (10 mg total) by mouth every 2 (two) hours as needed for Migraine. Max 30 mg/day. 18 tablet 3    semaglutide (OZEMPIC) 1 mg/dose (4 mg/3 mL) Inject 1 mg into the skin every 7 days. 3 mL 11     "tiZANidine (ZANAFLEX) 4 MG tablet Take 1 tablet (4 mg total) by mouth 2 (two) times daily as needed (muscle pain). 60 tablet 3    fluticasone-salmeterol diskus inhaler 250-50 mcg Inhale 1 puff into the lungs 2 (two) times daily. Controller 60 each 11    LIDOcaine HCl-hydrocortison ac (LIDAMANTLE-HC) 3-0.5 % topical cream Apply a pea sized amount to the affected area 2-3 times a day as needed (Patient not taking: Reported on 5/12/2025) 15 g 0    [DISCONTINUED] diclofenac sodium (VOLTAREN) 1 % Gel Apply 2 g topically 3 (three) times daily. 100 g 0    [DISCONTINUED] ondansetron (ZOFRAN-ODT) 8 MG TbDL Take 1 tablet (8 mg total) by mouth every 8 (eight) hours as needed (Nausea). 30 tablet 2    [DISCONTINUED] rizatriptan (MAXALT-MLT) 10 MG disintegrating tablet Take 1 tablet (10 mg total) by mouth every 2 (two) hours as needed for Migraine. Max 30 mg/day. 12 tablet 5    [DISCONTINUED] sumatriptan (IMITREX) 100 MG tablet Take 1 tablet (100 mg total) by mouth every 2 (two) hours as needed for Migraine. Max 200 mg/day. 12 tablet 5     Current Facility-Administered Medications on File Prior to Visit   Medication Dose Route Frequency Provider Last Rate Last Admin    albuterol-ipratropium 2.5 mg-0.5 mg/3 mL nebulizer solution 3 mL  3 mL Nebulization 1 time in Clinic/HOD          Drug Allergies:   Review of patient's allergies indicates:   Allergen Reactions    Codeine Shortness Of Breath     Pt states she had a reaction as a teenager and was taken to the ER.    Codeine phosphate Other (See Comments)     vicodin is ok    Lisinopril      cough     Social Hx:   Social History     Tobacco Use    Smoking status: Never     Passive exposure: Never    Smokeless tobacco: Never   Substance Use Topics    Alcohol use: No    Drug use: No     Additional History Obtained at Initial Visit:  H/o Asthma: denies, but with possible symptoms in 2023.   H/o Rhinitis: endorses  Env/Occ:   Pets: no     PHYSICAL EXAM     VS: Ht 5' 5" (1.651 m)   Wt " 96.3 kg (212 lb 4.9 oz)   BMI 35.33 kg/m²   GENERAL: Alert, NAD, well-appearing  EYES: EOMI, no conjunctival injection, no discharge, no infraorbital shiners  LUNGS: normal effort, no increased WOB  DERM: no rashes     LABORATORY STUDIES     Component      Latest Ref Rng 4/29/2025 5/8/2025   WBC      3.90 - 12.70 K/uL 9.69     RBC      4.00 - 5.40 M/uL 4.88     Hemoglobin      12.0 - 16.0 gm/dL 12.8     Hematocrit      37.0 - 48.5 % 40.6     MCV      82 - 98 fL 83     MCH      27.0 - 31.0 pg 26.2 (L)     MCHC      32.0 - 36.0 g/dL 31.5 (L)     RDW      11.5 - 14.5 % 14.1     Platelet Count      150 - 450 K/uL 339     MPV      9.2 - 12.9 fL 9.5     nRBC      <=0 /100 WBC 0     Immature Granulocytes      0.0 - 0.5 % 0.3     Gran # (ANC)      1.8 - 7.7 K/uL 6.50     Lymph #      1 - 4.8 K/uL 2.03     Mono #      0.3 - 1 K/uL 0.70     Eos #      <=0.5 K/uL 0.38     Baso #      <=0.2 K/uL 0.05     Immature Grans (Abs)      0.00 - 0.04 K/uL 0.03     Sodium      136 - 145 mmol/L 143     Potassium      3.5 - 5.1 mmol/L 3.7     Chloride      95 - 110 mmol/L 102     CO2      23 - 29 mmol/L 26     Glucose      70 - 110 mg/dL 130 (H)     BUN      8 - 23 mg/dL 12     Creatinine      0.5 - 1.4 mg/dL 0.8     Calcium      8.7 - 10.5 mg/dL 10.1     PROTEIN TOTAL      6.0 - 8.4 gm/dL 7.7     Albumin      3.5 - 5.2 g/dL 3.8     BILIRUBIN TOTAL      0.1 - 1.0 mg/dL 0.4     ALP      40 - 150 unit/L 73     AST      11 - 45 unit/L 10 (L)     ALT      10 - 44 unit/L 12     Anion Gap      8 - 16 mmol/L 15     eGFR      >60 mL/min/1.73/m2 >60     Hemoglobin A1C External      4.0 - 5.6 %  6.1 (H)      Component      Latest Ref Rng 5/16/2023   Strongyloides Ab IgG      Negative  Negative    Anti- E. Histolytica Antibody      Negative  Negative    TTG IgA      <7.0 U/mL 0.3    IgA      40 - 350 mg/dL 201       ALLERGEN TESTING     Skin Testing:  Date: 6/17/25  Verbal informed consent obtained after reviewing the risks, benefits and details of  the procedure.    Inhalant Skin Testing Results:    Indoor Allergens    1. Cat: Negative  2. Cockroach: Negative  3. Dog: Negative  4. Dust Mite (DF): Negative  5. Dust Mite (DP): Negative     Trees  6. Cesar, white: 1-2+  7. Birch, mixed: Negative  8. Box Elder: Negative   9. Cedar, Red: Negative  10. [place], Eastern: Negative  11. Jamieson, Bald: Negative   12. Elm, American: Negative  13. Hackberry: Negative  14. Maple, Red: Negative  15. [place], Red: Negative  16. Oak, Mixed: Negative   17. Pecan: Negative  18. Pine White: Negative   19. Sweetgum: Negative  20. [place], American: Negative  21. Wray, Black: Negative   22. Ball Ground, Black: Negative    Weed Pollen  23. Lambs Quarters: Negative  24. Beard Elder, Rough: Negative  25. Mugwort: Negative  26. Pigweed, Rough: Negative  27. Plantain, English: Negative  28. Ragweed, Mixed: Negative  29. Russian Thistle: Negative  30. Nataliya/Dock, Mixed: Negative    Grass Pollen  31. Bahia: Negative  32. B[place]: Negative  33. Glenn: Negative  34. [place]ison: 1-2+    Mold Spores  35. Acremonium: Negative  36. Alternaria alternata: Negative  37. Aspergillus fumigatus: Negative  38. Aurebasidum pullulams: Negative  39. Bipolaris sorokiniana: Negative  40.       Botrytis cinerea: Negative  41.  Candida albicans: Negative  42. Chaetomium globosum: Negative  43. Cladosporium cladosporioides: Negative  44. Drechslera specifera: Negative  45. Epicoccum nigrum: Negative  46. Fusarium spp: Negative  47. Gliocladium: Negative  48. Helminthosporium: Negative  49. Mucor spp: Negative  50. Neurospora spp: Negative  51. Penicillum spp: Negative  52. Phoma herbarum: Negative  53. Rhizopus spp: Negative  54. Rhodotorula rubra: Negative  55. Smuts, Mixed: Negative  56. Stemphtllium: Negative  57. Trichoderma: Negative  58. Thichophyton rubrum: Negative    Controls  59. Saline: Negative  60. Histamine: 3+    Rating   Prick  Negative   No reaction  1+     Erythema only   2+   Erythema,  w/wheal < 3mm  3+     Erythema w/wheal >3mm  4+   Erythema, wheal w/pseudopods    Interpretation: Appropriate positive and negative controls. Patient had equivocal response to boni tree pollen and ana grass pollen. All other aeroallergens tested were negative.    Immunocaps:   Component      Latest Ref Rng 3/4/2021   D. farinae      <0.10 kU/L <0.10    D. farinae Class CLASS 0    D. pteronyssinus Dust Mite IgE      <0.10 kU/L <0.10    D. pteronyssinus Class CLASS 0    Ana Grass IgE      <0.10 kU/L <0.10    Ana Grass Class CLASS 0    [place] IgE      <0.10 kU/L <0.10    [place] Class CLASS 0    English Plantain IgE      <0.10 kU/L <0.10    English Plantain Class CLASS 0    [place] Tree IgE      <0.10 kU/L <0.10    [place], Class CLASS 0    Ragweed, Western IgE      <0.10 kU/L <0.10    Ragweed, Western, Class CLASS 0    A. alternata IgE      <0.10 kU/L <0.10    Altern. alternata Class CLASS 0    Aspergillus Fumigatus IgE      <0.10 kU/L <0.10    A. fumigatus Class CLASS 0    Cat Dander IgE      <0.10 kU/L <0.10    Cat Epithelium Class CLASS 0    Cockroach, IgE      <0.10 kU/L 0.16 (H)    Cockroach, IgE       CLASS 0/1    Dog Dander IgE      <0.10 kU/L <0.10    Dog Dander Class CLASS 0    B[place]ia Grass IgE      <0.10 kU/L <0.10    [place]ia Class CLASS 0    [place]erm[place] Grass IgE      <0.10 kU/L <0.10    [place] Grass Class CLASS 0    Glenn Grass IgE      <0.10 kU/L <0.10    Glenn Grass Class CLASS 0    [place] Tree IgE      <0.10 kU/L <0.10    [place] Class CLASS 0    Silver Muddy IgE      <0.10 kU/L <0.10    Silver Birch Class CLASS 0    Pecan [place] Tree IgE      <0.10 kU/L <0.10    Pecan, Class CLASS 0    Total IgE      0 - 100 IU/mL <35        PULMONARY FUNCTION TESTING     Date 6/16/23:  FVC:         98%ref -> + 3%  FEV1:         99%ref -> + 8%  FEV1/FVC: 79%  FEF 25-75: 103%ref  DLCO:        78%ref  Interpretation: Spirometry is normal. Spirometry remains unimproved following bronchodilator. Lung  volume determination is likely normal despite the reduced RV of uncertain clinical significance. Airway mechanics show normal airway resistance and conductance. DLCO is normal.     IMAGING & OTHER DIAGNOSTICS     CXR 11/20/23:  FINDINGS:  Heart is at the upper limit of normal in size, with normal appearing pulmonary vascularity and no findings to specifically suggest current cardiac decompensation observed.  Lung zones are clear, and are free of significant airspace consolidation or volume loss.  No pleural fluid.  No hilar or mediastinal mass lesion.  No pneumothorax.  Multilevel marginal vertebral endplate spurring in the thoracic spine is incidentally noted, as is instrumentation relating to a prior C5-C7 spinal fusion procedure.  Impression:  No significant intrathoracic abnormality.    CT chest 6/15/23:  FINDINGS:  Airways: Mild narrowing of the trachea at the level of the thyroid.  Otherwise, trachea and bronchi are patent.  Lungs/Pleura: Biapical fibronodular scarring.  Subcentimeter pulmonary nodules within the bilateral major fissures, favored to represent intrapulmonary lymph nodes.  No suspicious pulmonary nodules.  No large focal consolidation.  Pleural effusion or pleural thickening.  Impression:  No acute intrathoracic findings identified.  Diffuse enlargement the thyroid with left thyroid calcification.  Consider obtaining nonemergent thyroid ultrasound for further characterization.  Findings suggestive of hepatic steatosis.  Correlate with LFTs.  Additional findings as above.   (See report for full radiology read).     CHART REVIEW     Reviewed pcp note, labs.      ASSESSMENT & PLAN     Regine Cooper is a 70 y.o. female with     # Chronic rhinitis: Allergy testing mostly negative (between immunocaps and skin testing, equivocal results to a tree pollen, grass pollen, and cockroach). She does find her medications helpful, particularly the ipratropium nasal spray, but symptoms sometimes become  difficult to control (has noticed windy days and cut grass as triggers). Given lack of clear results with allergy testing, I think it is likely that she has local allergic rhinitis.   -currently using ipratropium 0.06% nasal spray 2 SEN daily. Advised she can increase to TID prn (or at least BID).   -continue flonase 2 SEN nightly.  -resume cetirizine 10 mg daily prn.   -continue montelukast 10 mg nightly.     # Asthma: Spirometry in 2023 was without obstruction. Cough is main symptom, also with history of shortness of breath and wheezing. Helped by albuterol. Symptoms overall improved since 2023 (when these symptoms were at their worst). She estimates she requires albuterol/has exacerbations once or twice per year. She is receiving treatment for exacerbation currently, but symptoms much improved today.  -continue albuterol prn.   -continue montelukast 10 mg nightly.   -discussed option of adding controller inhaler, but patient would like to hold off for now. Would readdress if exacerbations become more frequent.       Follow up: October     Isabel Vargas MD  Allergy/Immunology

## 2025-06-19 ENCOUNTER — PATIENT MESSAGE (OUTPATIENT)
Dept: OPHTHALMOLOGY | Facility: CLINIC | Age: 70
End: 2025-06-19
Payer: MEDICARE

## 2025-06-19 DIAGNOSIS — H40.1111 PRIMARY OPEN-ANGLE GLAUCOMA, RIGHT EYE, MILD STAGE: ICD-10-CM

## 2025-06-19 DIAGNOSIS — H40.022 OPEN ANGLE WITH BORDERLINE FINDINGS AND HIGH GLAUCOMA RISK IN LEFT EYE: ICD-10-CM

## 2025-06-19 RX ORDER — LATANOPROST 50 UG/ML
1 SOLUTION/ DROPS OPHTHALMIC NIGHTLY
Qty: 7.5 ML | Refills: 3 | Status: SHIPPED | OUTPATIENT
Start: 2025-06-19 | End: 2026-06-19

## 2025-06-23 ENCOUNTER — PATIENT MESSAGE (OUTPATIENT)
Dept: NEUROLOGY | Facility: CLINIC | Age: 70
End: 2025-06-23
Payer: MEDICARE

## 2025-06-23 DIAGNOSIS — G43.019 MIGRAINE WITHOUT AURA, INTRACTABLE, WITHOUT STATUS MIGRAINOSUS: Primary | ICD-10-CM

## 2025-06-24 RX ORDER — ONDANSETRON 4 MG/1
4 TABLET, ORALLY DISINTEGRATING ORAL EVERY 8 HOURS PRN
Qty: 30 TABLET | Refills: 2 | Status: SHIPPED | OUTPATIENT
Start: 2025-06-24 | End: 2025-06-26 | Stop reason: SDUPTHER

## 2025-06-25 ENCOUNTER — TELEPHONE (OUTPATIENT)
Dept: PRIMARY CARE CLINIC | Facility: CLINIC | Age: 70
End: 2025-06-25
Payer: MEDICARE

## 2025-06-25 DIAGNOSIS — E11.9 TYPE 2 DIABETES MELLITUS WITHOUT COMPLICATION: ICD-10-CM

## 2025-06-25 NOTE — TELEPHONE ENCOUNTER
Copied from CRM #[id number]. Topic: General Inquiry - Patient Advice  >> Jun 24, 2025  2:32 PM Tia wrote:  .1MEDICALADVICE     Patient is calling for Medical Advice regarding:[facility] is calling requesting  phone call from provider s staff in regards to a ADA guideline statin recommendation     Call back : [phone]    How long has patient had these symptoms:    Pharmacy name and phone#:    Patient wants a call back or thru [facility], provide patient's call back phone number:Call back

## 2025-06-26 ENCOUNTER — TELEPHONE (OUTPATIENT)
Dept: PAIN MEDICINE | Facility: CLINIC | Age: 70
End: 2025-06-26

## 2025-06-26 ENCOUNTER — OFFICE VISIT (OUTPATIENT)
Dept: PAIN MEDICINE | Facility: CLINIC | Age: 70
End: 2025-06-26
Payer: MEDICARE

## 2025-06-26 VITALS
WEIGHT: 209 LBS | HEIGHT: 65 IN | BODY MASS INDEX: 34.82 KG/M2 | SYSTOLIC BLOOD PRESSURE: 120 MMHG | HEART RATE: 93 BPM | DIASTOLIC BLOOD PRESSURE: 74 MMHG

## 2025-06-26 DIAGNOSIS — M17.0 PRIMARY OSTEOARTHRITIS OF BOTH KNEES: ICD-10-CM

## 2025-06-26 DIAGNOSIS — B02.29 POST HERPETIC NEURALGIA: Primary | ICD-10-CM

## 2025-06-26 DIAGNOSIS — M47.816 LUMBAR SPONDYLOSIS: ICD-10-CM

## 2025-06-26 DIAGNOSIS — M54.6 PAIN IN THORACIC SPINE: ICD-10-CM

## 2025-06-26 DIAGNOSIS — G43.019 MIGRAINE WITHOUT AURA, INTRACTABLE, WITHOUT STATUS MIGRAINOSUS: ICD-10-CM

## 2025-06-26 DIAGNOSIS — M54.16 LUMBAR RADICULOPATHY: ICD-10-CM

## 2025-06-26 DIAGNOSIS — R11.0 NAUSEA: Primary | ICD-10-CM

## 2025-06-26 DIAGNOSIS — M17.0 PRIMARY OSTEOARTHRITIS OF BOTH KNEES: Primary | ICD-10-CM

## 2025-06-26 PROCEDURE — 3078F DIAST BP <80 MM HG: CPT | Mod: CPTII,S$GLB,, | Performed by: STUDENT IN AN ORGANIZED HEALTH CARE EDUCATION/TRAINING PROGRAM

## 2025-06-26 PROCEDURE — 1159F MED LIST DOCD IN RCRD: CPT | Mod: CPTII,S$GLB,, | Performed by: STUDENT IN AN ORGANIZED HEALTH CARE EDUCATION/TRAINING PROGRAM

## 2025-06-26 PROCEDURE — 3288F FALL RISK ASSESSMENT DOCD: CPT | Mod: CPTII,S$GLB,, | Performed by: STUDENT IN AN ORGANIZED HEALTH CARE EDUCATION/TRAINING PROGRAM

## 2025-06-26 PROCEDURE — 4010F ACE/ARB THERAPY RXD/TAKEN: CPT | Mod: CPTII,S$GLB,, | Performed by: STUDENT IN AN ORGANIZED HEALTH CARE EDUCATION/TRAINING PROGRAM

## 2025-06-26 PROCEDURE — 1125F AMNT PAIN NOTED PAIN PRSNT: CPT | Mod: CPTII,S$GLB,, | Performed by: STUDENT IN AN ORGANIZED HEALTH CARE EDUCATION/TRAINING PROGRAM

## 2025-06-26 PROCEDURE — 3044F HG A1C LEVEL LT 7.0%: CPT | Mod: CPTII,S$GLB,, | Performed by: STUDENT IN AN ORGANIZED HEALTH CARE EDUCATION/TRAINING PROGRAM

## 2025-06-26 PROCEDURE — 1101F PT FALLS ASSESS-DOCD LE1/YR: CPT | Mod: CPTII,S$GLB,, | Performed by: STUDENT IN AN ORGANIZED HEALTH CARE EDUCATION/TRAINING PROGRAM

## 2025-06-26 PROCEDURE — 3008F BODY MASS INDEX DOCD: CPT | Mod: CPTII,S$GLB,, | Performed by: STUDENT IN AN ORGANIZED HEALTH CARE EDUCATION/TRAINING PROGRAM

## 2025-06-26 PROCEDURE — 99999 PR PBB SHADOW E&M-EST. PATIENT-LVL IV: CPT | Mod: PBBFAC,,, | Performed by: STUDENT IN AN ORGANIZED HEALTH CARE EDUCATION/TRAINING PROGRAM

## 2025-06-26 PROCEDURE — 3074F SYST BP LT 130 MM HG: CPT | Mod: CPTII,S$GLB,, | Performed by: STUDENT IN AN ORGANIZED HEALTH CARE EDUCATION/TRAINING PROGRAM

## 2025-06-26 PROCEDURE — 99214 OFFICE O/P EST MOD 30 MIN: CPT | Mod: S$GLB,,, | Performed by: STUDENT IN AN ORGANIZED HEALTH CARE EDUCATION/TRAINING PROGRAM

## 2025-06-26 RX ORDER — ONDANSETRON 4 MG/1
4 TABLET, ORALLY DISINTEGRATING ORAL EVERY 8 HOURS PRN
Qty: 30 TABLET | Refills: 2 | Status: SHIPPED | OUTPATIENT
Start: 2025-06-26

## 2025-06-26 NOTE — PROGRESS NOTES
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 TO CHECK BLOOD GLUCOSE DAILY    pantoprazole (PROTONIX) 40 MG tablet Take 1 tablet (40 mg total) by mouth once daily.    prasterone, dhea, (INTRAROSA) 6.5 mg Inst Place 6.5 mg vaginally twice a week. Place 1 suppository vaginal 2 times a week    promethazine-dextromethorphan (PROMETHAZINE-DM) 6.25-15 mg/5 mL Syrp Take 5 mLs by mouth every 8 (eight) hours as needed (cough).    rizatriptan (MAXALT-MLT) 10 MG disintegrating tablet Take 1 tablet (10 mg total) by mouth every 2 (two) hours as needed for Migraine. Max 30 mg/day.    semaglutide (OZEMPIC) 1 mg/dose (4 mg/3 mL) Inject 1 mg into the skin every 7 days.    tiZANidine (ZANAFLEX) 4 MG tablet Take 1 tablet (4 mg total) by mouth 2 (two) times daily as needed (muscle pain).    fluticasone-salmeterol diskus inhaler 250-50 mcg Inhale 1 puff into the lungs 2 (two) times daily. Controller     Current Facility-Administered Medications   Medication    albuterol-ipratropium 2.5 mg-0.5 mg/3 mL nebulizer solution 3 mL       REVIEW OF SYSTEMS:    GENERAL:  No weight loss, malaise or fevers.  HEENT:   No recent changes in vision or hearing  NECK:  Negative for lumps, no difficulty with swallowing.  RESPIRATORY:  Negative for cough, wheezing or shortness of breath, patient denies any recent  "URI.  CARDIOVASCULAR:  Negative for chest pain, leg swelling or palpitations.  GI:  Negative for abdominal discomfort, blood in stools or black stools or change in bowel habits.  MUSCULOSKELETAL:  See HPI.  SKIN:  Negative for lesions, rash, and itching. + eczema   PSYCH:  No mood disorder or recent psychosocial stressors.  Patients sleep is not disturbed secondary to pain.  HEMATOLOGY/LYMPHOLOGY:  Negative for prolonged bleeding, bruising easily or swollen nodes.  Patient is not currently taking any anti-coagulants  NEURO:   No history of headaches, syncope, paralysis, seizures or tremors.+ migraines   All other reviewed and negative other than HPI.    OBJECTIVE:    /74 (BP Location: Right arm, Patient Position: Sitting)   Pulse 93   Ht 5' 5" (1.651 m)   Wt 94.8 kg (208 lb 15.9 oz)   BMI 34.78 kg/m²     PHYSICAL EXAMINATION:    GENERAL: Well appearing, in no acute distress, alert and oriented x3.  PSYCH:  Mood and affect appropriate.  SKIN: Skin color, texture, turgor normal, no rashes or lesions.  HEAD/FACE:  Normocephalic, atraumatic. Cranial nerves grossly intact.     NECK:   - Negative pain to palpation over the cervical paraspinous muscles.   - Spurling  Negative.    - Negative pain with neck flexion, rotation, or lateral flexion.     CV: RRR with palpation of the radial artery.  PULM: CTAB. No evidence of respiratory difficulty, symmetric chest rise.  GI:  Soft and non-tender.    BACK:   - No obvious deformity or signs of trauma, Normal lumbar lordotic curve  - Positive spinous process tenderness in the thoracic and lumbar spine  - Positive paravertebral tenderness  - Positive pain to palpation over the facet joints of the lumbar spine.   - Positive QL / Iliac crest / Glut tenderness  - Slump test is Negative for radicular pain  - Slump test is Negative for back pain  - Supine Straight leg raising is Did not perform for radicular pain  - Supine Straight leg raising is Did not perform for back " pain  - Lumbar ROM is diminished in Flexion with pain  - Lumbar ROM is diminished in Extension with pain  - Lumbar ROM is diminished in Lateral Flexion with pain  - Lumbar ROM is diminished in Rotation with pain  - Did not perform Sustained Hip Flexion test (for discogenic pain)  - Positive Altered Gait, Posture  - Axial facet loading test Positive on the bilateral side(s)    Thoracic exam:  (-) TTP spinous process and paraspinals from roughly T5-T10.    TTP on the right over where the post-herpetic neuralgia is located    SI Joint exam:  - Did not perform SI joint tenderness to palpation  - Erik's sign Did not perform  - Yeoman's Test: Did not perform for SI joint pain indicating anterior SI ligament involvement. Did not perform for anterior thigh pain/paresthesia which indicates femoral nerve stretch.  - Gaenslen's Test:Did not perform  - Finger Chari's Sign:Did not perform  - SI compression test:Did not perform  - SI distraction test:Did not perform  - Thigh Thrust: Did not perform  - SI Thrust: Did not perform    MUSKULOSKELETAL:    EXTREMITIES:   Hip Exam:  - Log Roll Did not perform  - FADIR Did not perform  - Stinchfield Did not perform  - Hip Scour Did not perform  - GTB Tenderness Did not perform    Bilateral shoulder not tested today    Knee exam:  (+) TTP medial joint line b/l and the left inferior patella  (+) crepitus  (+) edema      NEUROLOGICAL EXAM:  MENTAL STATUS: A x O x 3, good concentration, speech is fluent and goal directed  MEMORY: recent and remote are intact  CN: CN2-12 grossly intact  MOTOR: 5/5 in all muscle groups  DTRs: 2+ intact symmetric patella and biceps  Sensation:    -no Loss of sensation in a left upper, left lower, right upper and right lower C-2, C-3, C-4, C-5, C-6, C-7 and C-8 bilaterally and L-1, L-2, L-3, L-4 and L-5 bilaterally distribution.  Babinski: absent on the bilateral side(s)  Peoples: absent on the bilateral side(s)  Clonus: absent on the bilateral side(s)

## 2025-06-30 ENCOUNTER — TELEPHONE (OUTPATIENT)
Dept: OBSTETRICS AND GYNECOLOGY | Facility: CLINIC | Age: 70
End: 2025-06-30
Payer: MEDICARE

## 2025-07-01 ENCOUNTER — OFFICE VISIT (OUTPATIENT)
Dept: OBSTETRICS AND GYNECOLOGY | Facility: CLINIC | Age: 70
End: 2025-07-01
Payer: MEDICARE

## 2025-07-01 VITALS
SYSTOLIC BLOOD PRESSURE: 103 MMHG | WEIGHT: 212 LBS | HEART RATE: 67 BPM | BODY MASS INDEX: 35.32 KG/M2 | DIASTOLIC BLOOD PRESSURE: 67 MMHG | HEIGHT: 65 IN

## 2025-07-01 DIAGNOSIS — N95.8 GENITOURINARY SYNDROME OF MENOPAUSE: Primary | ICD-10-CM

## 2025-07-01 DIAGNOSIS — N95.2 POSTMENOPAUSAL ATROPHIC VAGINITIS: ICD-10-CM

## 2025-07-01 PROCEDURE — 3008F BODY MASS INDEX DOCD: CPT | Mod: CPTII,S$GLB,,

## 2025-07-01 PROCEDURE — 1159F MED LIST DOCD IN RCRD: CPT | Mod: CPTII,S$GLB,,

## 2025-07-01 PROCEDURE — 99213 OFFICE O/P EST LOW 20 MIN: CPT | Mod: S$GLB,,,

## 2025-07-01 PROCEDURE — 1160F RVW MEDS BY RX/DR IN RCRD: CPT | Mod: CPTII,S$GLB,,

## 2025-07-01 PROCEDURE — 1126F AMNT PAIN NOTED NONE PRSNT: CPT | Mod: CPTII,S$GLB,,

## 2025-07-01 PROCEDURE — 3288F FALL RISK ASSESSMENT DOCD: CPT | Mod: CPTII,S$GLB,,

## 2025-07-01 PROCEDURE — 3078F DIAST BP <80 MM HG: CPT | Mod: CPTII,S$GLB,,

## 2025-07-01 PROCEDURE — 4010F ACE/ARB THERAPY RXD/TAKEN: CPT | Mod: CPTII,S$GLB,,

## 2025-07-01 PROCEDURE — 99999 PR PBB SHADOW E&M-EST. PATIENT-LVL IV: CPT | Mod: PBBFAC,,,

## 2025-07-01 PROCEDURE — 3044F HG A1C LEVEL LT 7.0%: CPT | Mod: CPTII,S$GLB,,

## 2025-07-01 PROCEDURE — 81515 NFCT DS BV&VAGINITIS DNA ALG: CPT

## 2025-07-01 PROCEDURE — 1101F PT FALLS ASSESS-DOCD LE1/YR: CPT | Mod: CPTII,S$GLB,,

## 2025-07-01 PROCEDURE — 3074F SYST BP LT 130 MM HG: CPT | Mod: CPTII,S$GLB,,

## 2025-07-01 RX ORDER — CONJUGATED ESTROGENS 0.62 MG/G
1 CREAM VAGINAL
Qty: 30 G | Refills: 3 | Status: SHIPPED | OUTPATIENT
Start: 2025-07-03

## 2025-07-01 NOTE — PROGRESS NOTES
"CC: Follow Up    Regine Cooper is a 70 y.o. female  presents for a follow up visit.   Pt is postmenopausal s/p hysterectomy ,   She was seen 3 months ago for an ulcerative lesion on her leg, suspected to be folliculitis. Area has since healed spontaneously and with the help of regular sitz baths. She does still experience vaginal discharge and spotting, suspected to be from previous lesion. She has used intrarosa but it is expensive. She denies hematuria, dysuria, constipation, diarrhea, fevers, chills, nausea or emesis.  Denies further issues, problems, or complaints.      /67   Pulse 67   Ht 5' 5" (1.651 m)   Wt 96.2 kg (212 lb)   BMI 35.28 kg/m²     ROS:  Constitutional: no weight loss, weight gain, fever, fatigue  Eyes:  No vision changes, glasses/contacts  ENT/Mouth: No ulcers, sinus problems, ears ringing, headache  Cardiovascular: No inability to lie flat, chest pain, exercise intolerance, swelling, heart palpitations  Respiratory: No wheezing, coughing blood, shortness of breath, or cough  Gastrointestinal: No diarrhea, bloody stool, nausea/vomiting, constipation, gas, hemorrhoids  Genitourinary: No blood in urine, painful urination, urgency of urination, frequency of urination, incomplete emptying, incontinence, abnormal bleeding, painful periods, heavy periods, vaginal discharge, vaginal odor, painful intercourse, sexual problems, bleeding after intercourse. Vaginal discharge  Musculoskeletal: No muscle weakness  Breast: No painful breasts, nipple discharge, masses, rash, ulcers   Neurological: No passing out, seizures, numbness, headache  Endocrine: No diabetes, hypothyroid, hyperthyroid, hot flashes, hair loss, abnormal hair growth, acne  Psychiatric: No depression, crying  Hematologic: No bruises, bleeding, swollen lymph nodes, anemia.    OBJECTIVE:  Physical Exam:   Constitutional: She is oriented to person, place, and time. Vital signs are normal. She appears well-developed and " well-nourished. No distress.        Pulmonary/Chest: Effort normal.        Abdominal: Soft. There is no abdominal tenderness. Hernia confirmed negative in the right inguinal area and confirmed negative in the left inguinal area.     Genitourinary:    Inguinal canal, urethra, right adnexa and left adnexa normal.   Rectum:      No external hemorrhoid.            Pelvic exam was performed with patient in the lithotomy position.   The external female genitalia was normal.   No external genitalia lesions identified,Genitalia hair distrobution normal .     Labial bartholins normal.There is no rash, tenderness or lesion on the right labia. There is no rash, tenderness or lesion on the left labia. Cervix is normal. Right adnexum displays no mass, no tenderness and no fullness. Left adnexum displays no mass, no tenderness and no fullness. Vagina exhibits no lesion. No erythema, vaginal discharge, tenderness or bleeding in the vagina.    No foreign body in the vagina.      No signs of injury in the vagina.   Vaginal atrophy noted. Cervix exhibits no motion tenderness, no lesion, no discharge, no friability, no tenderness and no polyp. Uterus is absent. Normal urethral meatus.              Neurological: She is alert and oriented to person, place, and time.    Skin: Skin is warm and dry. She is not diaphoretic.    Psychiatric: She has a normal mood and affect. Her behavior is normal. Mood and judgment normal.         ASSESSMENT:   Genitourinary syndrome of menopause  -     conjugated estrogens (PREMARIN) vaginal cream; Place 1 g vaginally twice a week.  Dispense: 30 g; Refill: 3    Postmenopausal atrophic vaginitis  -     Vaginosis Screen by DNA Probe      PLAN:   - Switch to Premarin cream d/t cost. Start using twice weekly, apply pea sized amount to urethra and into vagina. Discussed safety and efficacy of vaginal estrogen cream in patients with history of breast cancer. Reference discussed and included below.   - Affirm f/  vaginal discharge.   - RTC PRN or for Annual    Female chaperone present for entire procedure     Vaginal Estrogen Therapy Use and Survival in Females With Breast Cancer.Amanda L, Karlie AM, Lorie CAC, Andrzej B, Hunter C, Anusha Ú, Velia SA, Akbar P, Lynsey EATON.    RUSS Oncol. 2024 Jan 1;10(1):103-108. doi: [email or web address].  PMID: [id number]

## 2025-07-03 ENCOUNTER — CLINICAL SUPPORT (OUTPATIENT)
Dept: PAIN MEDICINE | Facility: CLINIC | Age: 70
End: 2025-07-03
Payer: MEDICARE

## 2025-07-03 VITALS
SYSTOLIC BLOOD PRESSURE: 124 MMHG | HEIGHT: 65 IN | DIASTOLIC BLOOD PRESSURE: 74 MMHG | HEART RATE: 89 BPM | BODY MASS INDEX: 34.89 KG/M2 | WEIGHT: 209.44 LBS

## 2025-07-03 DIAGNOSIS — M17.0 PRIMARY OSTEOARTHRITIS OF BOTH KNEES: Primary | ICD-10-CM

## 2025-07-03 PROCEDURE — 99999 PR PBB SHADOW E&M-EST. PATIENT-LVL II: CPT | Mod: PBBFAC,,, | Performed by: STUDENT IN AN ORGANIZED HEALTH CARE EDUCATION/TRAINING PROGRAM

## 2025-07-03 RX ORDER — TRIAMCINOLONE ACETONIDE 40 MG/ML
40 INJECTION, SUSPENSION INTRA-ARTICULAR; INTRAMUSCULAR
Status: COMPLETED | OUTPATIENT
Start: 2025-07-03 | End: 2025-07-03

## 2025-07-03 RX ADMIN — TRIAMCINOLONE ACETONIDE 40 MG: 40 INJECTION, SUSPENSION INTRA-ARTICULAR; INTRAMUSCULAR at 02:07

## 2025-07-03 NOTE — PROCEDURES
"Procedures  PROCEDURE: bilateral Knee Injection with Ultrasound Guidance    Patient Name: Regine Cooper  MRN: [id number]    PROCEDURE DATE: 7/3/2025    Diagnosis: Chronic Knee Pain  CPT code:  38461 (Arthrocentesis, aspiration and/or injection, large joint or bursa (e.g., shoulder, hip, knee, subacromial bursa); with ultrasound guidance)  Postprocedural Diagnosis: Same  Needle Type: - 27G 1.5" needle    Solution injected: A 6 ml mixture of 0.25% Bupivacaine and 20mg Kenalog to each knee    Estimated Blood Loss - <2ml  Drains: None  Specimens Removed: None  Urine Output - Not Measured  Complications: None  Outcome: Good    Informed Consent:  The patient's condition and proposed procedures, risks (including complications of nerve damage,  bleeding, infection, and failure of pain relief), and alternatives were discussed with the patient or responsible party.  The patient's/responsible party's questions were answered.  The patient/responsible party appeared to understand and chose to proceed.  Informed consent was obtained.    Procedure in Detail:  The procedure was performed in the procedure treatment room.  After obtaining consent, the patient was placed in a supine position with the above noted knee in slight degree of flexion. The site for the injection was palpated, identified with ultrasound imaging and marked.    The skin overlying the target site of injection was prepped and draped in an aseptic fashion.      Procedural Pause:  A procedural pause verifying correct patient, medical record number, allergies, medications to be administered, current vital signs, and surgical site was performed immediately prior to beginning the procedure.    The skin and subcutaneous tissue overlying the target site of the injection was anesthetized using 3  ml of 1% lidocaine MPF with a 25-gauge, 1½ -inch needle.      The above noted needle was then inserted horizontally, proximal to the superior and lateral edge of the patella, " between the suprapatellar fat pad and prefemoral fat pad.  The needle was then slowly advanced until it slid into the suprapatellar synovial recess.  After negative aspiration for heme, the above noted solution was injected slowly.  The needle was then retracted and a sterile bandage was placed over the injection site.    The patient tolerated the procedure well and without complications. After meeting discharge criteria, the patient was discharged home safely.    The above noted procedure was repeated on the opposite side.    DISCUSSION:  Today we performed a ultrasound guided knee injection.  The patient was informed that it may take several days for the steroid medication to reach its full efficacy and they should continue their regular pain medications as prescribed.  The patient was advised to relax and avoid any heavy lifting or excessive bending for 24 hours.   She was advised that she may return to her usual activities after 24 hours if she is otherwise feeling well.  The patient was advised not to bathe or soak in water for 24 hours but that showering would be acceptable.  The patient was instructed that if she experienced fever or chills, new weakness, new sensory changes, any changes in bowel or bladder habits, worsening back pain, new headache, neck stiffness, or other new symptoms, that she should contact the pain clinic immediately or dial 911 if unable to reach the pain clinic.      Note Electronically Signed By:  Hi Otoole

## 2025-07-03 NOTE — PROGRESS NOTES
HPI  Patient presenting for bilateal knee CSI     Patient on Anti-coagulation No    No health changes since previous encounter    Past Medical History:   Diagnosis Date    Allergy     Breast cancer 2017    Cataract     Cervical spondylosis 2022    Diabetes mellitus, type 2     Type 2    Eczema     Fibromyalgia     Glaucoma     Hypertension     Renal cyst, right 2020    Steatosis of liver 2020     Past Surgical History:   Procedure Laterality Date    ADENOIDECTOMY      ANTERIOR CERVICAL DISCECTOMY W/ FUSION N/A 2022    Procedure: DISCECTOMY, SPINE, CERVICAL, ANTERIOR APPROACH, WITH FUSION C5-7 SPINEWAVE SNS: VOCAL CORDS/MOTORS/SSEP;  Surgeon: Rosales Scruggs MD;  Location: [facility] OR [address] [place] [address];  Service: Neurosurgery;  Laterality: N/A;    BREAST BIOPSY      BREAST SURGERY      breast ca lt side     COLONOSCOPY N/A 2023    Procedure: COLONOSCOPY;  Surgeon: Eliseo Garcia MD;  Location: [facility] (4TH FLR);  Service: Endoscopy;  Laterality: N/A;    ESOPHAGOGASTRODUODENOSCOPY N/A 2023    Procedure: EGD (ESOPHAGOGASTRODUODENOSCOPY);  Surgeon: Eliseo Garcia MD;  [facility]  Service: Endoscopy;  Laterality: N/A;  r/o h pylori-miralax extended prep-instr portal and handed-tb    HYSTERECTOMY      supacervical hysterectomy    LAPAROSCOPIC SUPRACERVICAL HYSTERECTOMY  2005    fibroids    MASTECTOMY Left 2017    chemo    MYOMECTOMY      TONSILLECTOMY      TYMPANOSTOMY TUBE PLACEMENT      VAGINAL DELIVERY       Review of patient's allergies indicates:   Allergen Reactions    Codeine Shortness Of Breath     Pt states she had a reaction as a teenager and was taken to the ER.    Codeine phosphate Other (See Comments)     vicodin is ok    Lisinopril      cough      Current Outpatient Medications   Medication Sig    albuterol (PROVENTIL/VENTOLIN HFA) 90 mcg/actuation inhaler SMARTSI-2 Puff(s) Via Inhaler Every 6 Hours PRN    clobetasoL (CLOBEX) 0.05 % shampoo Apply  topically.    clobetasoL (TEMOVATE) 0.05 % external solution APPLY TOPICALLY TO THE SCALP TWICE DAILY AS NEEDED FOR ITCHING OR IRRITATION    conjugated estrogens (PREMARIN) vaginal cream Place 1 g vaginally twice a week.    fluconazole (DIFLUCAN) 150 MG Tab Take one AFTER antibiotic therapy if symptoms of yeast infection develop    fluticasone propionate (FLONASE) 50 mcg/actuation nasal spray 1 spray by Each Nostril route once daily. OTC Flonase    hydroCHLOROthiazide (HYDRODIURIL) 25 MG tablet TAKE 1 TABLET(25 MG) BY MOUTH EVERY DAY    ipratropium (ATROVENT) 42 mcg (0.06 %) nasal spray 2 sprays by Each Nostril route 3 (three) times daily as needed (for runny nose or post-nasal drip).    lancets Misc To check BG 2 times daily    latanoprost 0.005 % ophthalmic solution Place 1 drop into both eyes every evening.    LIDOcaine HCl-hydrocortison ac (LIDAMANTLE-HC) 3-0.5 % topical cream Apply a pea sized amount to the affected area 2-3 times a day as needed    losartan (COZAAR) 50 MG tablet Take 1 tablet (50 mg total) by mouth once daily.    meloxicam (MOBIC) 15 MG tablet Take 1 tablet (15 mg total) by mouth daily as needed for Pain (back pain). Take with food    metFORMIN (GLUCOPHAGE) 500 MG tablet Take 1 tablet (500 mg total) by mouth 2 (two) times daily with meals.    methylPREDNISolone (MEDROL DOSEPACK) 4 mg tablet use as directed    montelukast (SINGULAIR) 10 mg tablet Take 1 tablet (10 mg total) by mouth every evening.    ondansetron (ZOFRAN-ODT) 4 MG TbDL Take 1 tablet (4 mg total) by mouth every 8 (eight) hours as needed (nausea).    ondansetron (ZOFRAN-ODT) 8 MG TbDL Take 1 tablet (8 mg total) by mouth every 8 (eight) hours as needed (Nausea).    ONETOUCH DELICA PLUS LANCET 33 gauge Misc Check BG twice daily    ONETOUCH VERIO REFLECT METER Misc USE AS DIRECTED TO TEST BLOOD GLUCOSE    ONETOUCH VERIO TEST STRIPS Strp USE TO CHECK BLOOD GLUCOSE DAILY    pantoprazole (PROTONIX) 40 MG tablet Take 1 tablet (40 mg  "total) by mouth once daily.    prasterone, dhea, (INTRAROSA) 6.5 mg Inst Place 6.5 mg vaginally twice a week. Place 1 suppository vaginal 2 times a week    promethazine-dextromethorphan (PROMETHAZINE-DM) 6.25-15 mg/5 mL Syrp Take 5 mLs by mouth every 8 (eight) hours as needed (cough).    rizatriptan (MAXALT-MLT) 10 MG disintegrating tablet Take 1 tablet (10 mg total) by mouth every 2 (two) hours as needed for Migraine. Max 30 mg/day.    semaglutide (OZEMPIC) 1 mg/dose (4 mg/3 mL) Inject 1 mg into the skin every 7 days.    tiZANidine (ZANAFLEX) 4 MG tablet Take 1 tablet (4 mg total) by mouth 2 (two) times daily as needed (muscle pain).     Current Facility-Administered Medications   Medication    albuterol-ipratropium 2.5 mg-0.5 mg/3 mL nebulizer solution 3 mL       PMHx, PSHx, Allergies, Medications reviewed in epic    ROS negative except pain complaints in HPI    OBJECTIVE:    /74 (BP Location: Right arm, Patient Position: Sitting)   Pulse 89   Ht 5' 5" (1.651 m)   Wt 95 kg (209 lb 7 oz)   BMI 34.85 kg/m²     PHYSICAL EXAMINATION:    GENERAL: Well appearing, in no acute distress, alert and oriented x3.  PSYCH:  Mood and affect appropriate.  SKIN: Skin color, texture, turgor normal, no rashes or lesions which will impact the procedure.  CV: RRR with palpation of the radial artery.  PULM: No evidence of respiratory difficulty, symmetric chest rise. Clear to auscultation.  NEURO: Cranial nerves grossly intact.    Plan:    Proceed with procedure as planned    Hi Otoole  07/03/2025            "

## 2025-07-05 ENCOUNTER — PATIENT MESSAGE (OUTPATIENT)
Dept: HEMATOLOGY/ONCOLOGY | Facility: CLINIC | Age: 70
End: 2025-07-05
Payer: MEDICARE

## 2025-07-05 LAB
BACTERIAL VAGINOSIS DNA (OHS): NOT DETECTED
CANDIDA GLABRATA/KRUSEI DNA (OHS): NOT DETECTED
CANDIDA SPECIES DNA (OHS): NOT DETECTED
TRICHOMONAS VAGINALIS DNA (OHS): NOT DETECTED

## 2025-07-13 ENCOUNTER — PATIENT MESSAGE (OUTPATIENT)
Dept: PRIMARY CARE CLINIC | Facility: CLINIC | Age: 70
End: 2025-07-13
Payer: MEDICARE

## 2025-07-13 DIAGNOSIS — K21.9 LARYNGOPHARYNGEAL REFLUX (LPR): ICD-10-CM

## 2025-07-14 RX ORDER — PANTOPRAZOLE SODIUM 40 MG/1
40 TABLET, DELAYED RELEASE ORAL DAILY
Qty: 90 TABLET | Refills: 3 | Status: SHIPPED | OUTPATIENT
Start: 2025-07-14

## 2025-07-14 NOTE — TELEPHONE ENCOUNTER
No care due was identified.  [facility] Embedded Care Due Messages. Reference number: [id number].   7/14/2025 8:58:18 AM CDT

## 2025-07-16 ENCOUNTER — PATIENT MESSAGE (OUTPATIENT)
Dept: PODIATRY | Facility: CLINIC | Age: 70
End: 2025-07-16

## 2025-07-16 ENCOUNTER — OFFICE VISIT (OUTPATIENT)
Dept: PODIATRY | Facility: CLINIC | Age: 70
End: 2025-07-16
Payer: MEDICARE

## 2025-07-16 VITALS
HEART RATE: 100 BPM | HEIGHT: 65 IN | BODY MASS INDEX: 35.43 KG/M2 | DIASTOLIC BLOOD PRESSURE: 70 MMHG | SYSTOLIC BLOOD PRESSURE: 117 MMHG | WEIGHT: 212.63 LBS

## 2025-07-16 DIAGNOSIS — M79.674 PAIN OF TOE OF RIGHT FOOT: ICD-10-CM

## 2025-07-16 DIAGNOSIS — M19.071 PRIMARY OSTEOARTHRITIS OF RIGHT FOOT: ICD-10-CM

## 2025-07-16 DIAGNOSIS — E11.65 TYPE 2 DIABETES MELLITUS WITH HYPERGLYCEMIA, WITHOUT LONG-TERM CURRENT USE OF INSULIN: ICD-10-CM

## 2025-07-16 DIAGNOSIS — Z98.890 POST-OPERATIVE STATE: Primary | ICD-10-CM

## 2025-07-16 PROCEDURE — 99999 PR PBB SHADOW E&M-EST. PATIENT-LVL V: CPT | Mod: PBBFAC,,, | Performed by: PODIATRIST

## 2025-07-16 RX ORDER — CLOBETASOL PROPIONATE 0.5 MG/G
CREAM TOPICAL
COMMUNITY
Start: 2025-07-13

## 2025-07-16 RX ORDER — BACITRACIN ZINC 500 UNIT/G
OINTMENT (GRAM) TOPICAL
COMMUNITY
Start: 2025-05-05

## 2025-07-16 RX ORDER — MUPIROCIN 20 MG/G
OINTMENT TOPICAL 3 TIMES DAILY
COMMUNITY
Start: 2025-06-05

## 2025-07-16 NOTE — PROGRESS NOTES
Subjective:      Patient ID: Regine Cooper is a 70 y.o. female.    Chief Complaint: Annual Exam and Diabetes Mellitus (PCP- 06/16/2025/Gladis Melissa MD)    Regine is a 70 y.o. female who presents to the clinic upon referral from Dr. Tracy aguilar. provider found  for evaluation and treatment of diabetic feet. Regine has a past medical history of Allergy, Breast cancer (2017), Cataract, Cervical spondylosis (07/29/2022), Diabetes mellitus, type 2, Eczema, Fibromyalgia, Glaucoma, Hypertension, Renal cyst, right (02/06/2020), and Steatosis of liver (02/06/2020).     Doing well, here for routine annual diabetic foot evaluation.    Issues with right great toe joint pain and stiffness at times.    PCP: Gladis Melissa MD  /swelling.  Date Last Seen by PCP:   Chief Complaint   Patient presents with    Annual Exam    Diabetes Mellitus     PCP- 06/16/2025  Gladis Melissa MD         Current shoe gear: Casual shoes    Hemoglobin A1C   Date Value Ref Range Status   02/07/2025 6.9 (H) 4.0 - 5.6 % Final     Comment:     ADA Screening Guidelines:  5.7-6.4%  Consistent with prediabetes  >or=6.5%  Consistent with diabetes    High levels of fetal hemoglobin interfere with the HbA1C  assay. Heterozygous hemoglobin variants (HbS, HgC, etc)do  not significantly interfere with this assay.   However, presence of multiple variants may affect accuracy.     09/30/2024 5.6 4.0 - 5.6 % Final     Comment:     ADA Screening Guidelines:  5.7-6.4%  Consistent with prediabetes  >or=6.5%  Consistent with diabetes    High levels of fetal hemoglobin interfere with the HbA1C  assay. Heterozygous hemoglobin variants (HbS, HgC, etc)do  not significantly interfere with this assay.   However, presence of multiple variants may affect accuracy.     05/06/2024 6.0 (H) 4.0 - 5.6 % Final     Comment:     ADA Screening Guidelines:  5.7-6.4%  Consistent with prediabetes  >or=6.5%  Consistent with diabetes    High levels of fetal hemoglobin interfere with the  HbA1C  assay. Heterozygous hemoglobin variants (HbS, HgC, etc)do  not significantly interfere with this assay.   However, presence of multiple variants may affect accuracy.       Hemoglobin A1c   Date Value Ref Range Status   05/08/2025 6.1 (H) 4.0 - 5.6 % Final     Comment:     ADA Screening Guidelines:  5.7-6.4%  Consistent with prediabetes  >=6.5%  Consistent with diabetes    High levels of fetal hemoglobin interfere with the HbA1C  assay. Heterozygous hemoglobin variants (HbS, HgC, etc)do  not significantly interfere with this assay.   However, presence of multiple variants may affect accuracy.           Review of Systems   Constitutional: Positive for night sweats. Negative for chills and fever.   HENT:  Negative for congestion and tinnitus.    Eyes:  Negative for double vision and visual disturbance.   Cardiovascular:  Negative for chest pain and claudication.   Respiratory:  Negative for hemoptysis and shortness of breath.    Endocrine: Negative for cold intolerance and heat intolerance.   Hematologic/Lymphatic: Negative for adenopathy and bleeding problem.   Skin:  Negative for color change and nail changes.   Musculoskeletal:  Positive for back pain and joint pain. Negative for myalgias and stiffness.   Gastrointestinal:  Negative for nausea and vomiting.   Genitourinary:  Negative for dysuria and hematuria.   Neurological:  Positive for paresthesias. Negative for numbness.   Psychiatric/Behavioral:  Negative for altered mental status and suicidal ideas.    Allergic/Immunologic: Negative for environmental allergies and persistent infections.           Objective:      Physical Exam  Vitals reviewed.   Constitutional:       Appearance: She is well-developed.   Cardiovascular:      Pulses:           Dorsalis pedis pulses are 2+ on the right side and 2+ on the left side.        Posterior tibial pulses are 2+ on the right side and 2+ on the left side.   Pulmonary:      Effort: Pulmonary effort is normal.    Musculoskeletal:         General: Normal range of motion.      Comments: Inspection and palpation of the muscles joints and bones of both lower extremities reveal that muscle strength for the anterior lateral and posterior muscle groups and intrinsic muscle groups of the foot are all 5 over 5 symmetrical.  Ankle subtalar midtarsal and digital joint range of motion are within normal limits, nonpainful, without crepitus or effusion.  Patient exhibits a normal angle and base of gait.  Palpation of the tendons reveal no defects.   Skin:     General: Skin is warm and dry.      Capillary Refill: Capillary refill takes 2 to 3 seconds.      Comments: Skin turgor is normal bilaterally.  Skin texture is well hydrated to both lower extremities.  No lesions or rashes or wounds appreciated bilaterally.  Proximal clearing noted to bilateral pedal nails.    Right hallux nail well healed.  No signs of infection.  No discomfort apparent.   Neurological:      Mental Status: She is alert and oriented to person, place, and time.      Comments: Sharp dull light touch vibratory proprioceptive sensation are intact bilaterally.  Deep tendon reflexes to patellar and Achilles tendon are symmetrical 2 over 4 bilaterally.  No ankle clonus or Babinski reflexes noted bilaterally.  Coordination is normal to both feet and lower extremities.               Assessment:       Encounter Diagnoses   Name Primary?    Post-operative state - Right Foot Yes    Pain of toe of right foot     Type 2 diabetes mellitus with hyperglycemia, without long-term current use of insulin     Primary osteoarthritis of right foot          Plan:       Regine was seen today for annual exam and diabetes mellitus.    Diagnoses and all orders for this visit:    Post-operative state - Right Foot    Pain of toe of right foot    Type 2 diabetes mellitus with hyperglycemia, without long-term current use of insulin    Primary osteoarthritis of right foot      I counseled the  patient on her conditions, their implications and medical management.    The nature of the condition, options for management, as well as potential risks and complications were discussed in detail with patient. Patient was amenable to my recommendations and left my office fully informed and will follow up as instructed or sooner if necessary.      I recommended patient be fitted for orthoses.  I explained that orthoses may improve function of the foot, reduce pain, decrease pronation, increase efficiency of muscle function of the foot and ankle and prevent surgery.  Alternative forms of biomechanical control of the foot and ankle were discussed with the patient.      Shoe inspection. Diabetic Foot Education. Patient reminded of the importance of good nutrition and blood sugar control to help prevent podiatric complications of diabetes. Patient instructed on proper foot hygeine. We discussed wearing proper shoe gear, daily foot inspections and Diabetic foot education in detail.    Return to clinic in 12 months or sooner if problems arise

## 2025-07-19 NOTE — PROGRESS NOTES
HPI     Glaucoma            Comments: 4 mo f/u          Comments    69 yo female here today states VA stable ou since last visit, no pain ou ,   and floaters without flashes ou as before.    Latanoprost ou qhs  Refresh ou prn          Last edited by Page Bryant on 7/21/2025  9:22 AM.            Assessment /Plan     For exam results, see Encounter Report.    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   First photos   2018   Treatment / Drops started   latanoprost - started 1/6/2023 for IOP 28/22           Family history    + mother / grtand father / uncle / cousins         Glaucoma meds    latanoprost ou - 1/6/2023         H/O adverse rxn to glaucoma drops    none        LASERS    none        GLAUCOMA SURGERIES    none        OTHER EYE SURGERIES    none        CDR    0.8/0.7        Tbase    18-28  od // 16-22 os        Tmax    28/22           Ttarget    ? About 20              HVF    6 test 2019 to  2025 - near full  od // near full os        Gonio    +3-4 ou         CCT    588/585        OCT    3  test 2019 to 2025 - RNFL -  dec mike JOLLEY (mild prog) od // nl  os        HRT    1 test 2020 to 2020 -  MR -  nl od // nl os /// CDR 0.63 od // 0.51 os        Disc photos    2018, 2022      - Ttoday  15/15 (good resp to latanoprost down from 28/22 )  - Test done today     IOP / HVF / / OCT     2. Dominate familial drusen   Mostly outside the arcades ou    See photos - 2018    Pt  is taking ARED's     3. NS   Mild -monitor     4. antoni (one floater - string like)    Warned of signs of PVD and RD    Pt to call if marked increase in floaters or flashes or curtain defect    5. H/O migraines - since chilhood   No visula aura - but the migraines settle in/around eyes    6. Breast CA     7. Social stress (11/27/2023)    Her only son committed suicide 6/2023 (he was in [facility])   Ex  // just got to burry him recently  ( had to wait on death certificate) - 11/2023 - he had 3 children ages 17-23      PLAN   Date- 7/21/2025   IOP 15/15  on latanoprost   Off gtts - 18-28 od and 18-22 os /  / + Fm Hx - mother and an uncle and possibly a grand parent   HVF inow with early SNS / SAD od // still full os   OCT - bord / dec RNFL in one sector od - stable and nl os      cont  latatanoprost ou q hs     Had a reaction to the moderna covid vaccine - her eyes - were sort of frozen in primary gaze and she could not look up down or right or left for about 48 hrs (10/2024 )     F/U 4 months with IOP check // gonio   ? Consider slt (angle open)  as 2nd step if needed vs more drops (? Rocklatan)

## 2025-07-21 ENCOUNTER — CLINICAL SUPPORT (OUTPATIENT)
Dept: OPHTHALMOLOGY | Facility: CLINIC | Age: 70
End: 2025-07-21
Payer: MEDICARE

## 2025-07-21 ENCOUNTER — OFFICE VISIT (OUTPATIENT)
Dept: OPHTHALMOLOGY | Facility: CLINIC | Age: 70
End: 2025-07-21
Payer: MEDICARE

## 2025-07-21 DIAGNOSIS — H35.361 FAMILIAL DRUSEN OF MACULA OF BOTH EYES: ICD-10-CM

## 2025-07-21 DIAGNOSIS — H40.1111 PRIMARY OPEN-ANGLE GLAUCOMA, RIGHT EYE, MILD STAGE: Primary | ICD-10-CM

## 2025-07-21 DIAGNOSIS — H40.022 OPEN ANGLE WITH BORDERLINE FINDINGS AND HIGH GLAUCOMA RISK IN LEFT EYE: ICD-10-CM

## 2025-07-21 DIAGNOSIS — H25.13 NUCLEAR SCLEROTIC CATARACT OF BOTH EYES: ICD-10-CM

## 2025-07-21 DIAGNOSIS — H35.362 FAMILIAL DRUSEN OF MACULA OF BOTH EYES: ICD-10-CM

## 2025-07-21 DIAGNOSIS — H52.13 MYOPIA OF BOTH EYES WITH ASTIGMATISM AND PRESBYOPIA: ICD-10-CM

## 2025-07-21 DIAGNOSIS — H52.203 MYOPIA OF BOTH EYES WITH ASTIGMATISM AND PRESBYOPIA: ICD-10-CM

## 2025-07-21 DIAGNOSIS — H52.4 MYOPIA OF BOTH EYES WITH ASTIGMATISM AND PRESBYOPIA: ICD-10-CM

## 2025-07-21 PROCEDURE — 1159F MED LIST DOCD IN RCRD: CPT | Mod: CPTII,S$GLB,, | Performed by: OPHTHALMOLOGY

## 2025-07-21 PROCEDURE — 1126F AMNT PAIN NOTED NONE PRSNT: CPT | Mod: CPTII,S$GLB,, | Performed by: OPHTHALMOLOGY

## 2025-07-21 PROCEDURE — 99999 PR PBB SHADOW E&M-EST. PATIENT-LVL IV: CPT | Mod: PBBFAC,,, | Performed by: OPHTHALMOLOGY

## 2025-07-21 PROCEDURE — 1101F PT FALLS ASSESS-DOCD LE1/YR: CPT | Mod: CPTII,S$GLB,, | Performed by: OPHTHALMOLOGY

## 2025-07-21 PROCEDURE — 92083 EXTENDED VISUAL FIELD XM: CPT | Mod: S$GLB,,, | Performed by: OPHTHALMOLOGY

## 2025-07-21 PROCEDURE — 3044F HG A1C LEVEL LT 7.0%: CPT | Mod: CPTII,S$GLB,, | Performed by: OPHTHALMOLOGY

## 2025-07-21 PROCEDURE — 99214 OFFICE O/P EST MOD 30 MIN: CPT | Mod: S$GLB,,, | Performed by: OPHTHALMOLOGY

## 2025-07-21 PROCEDURE — 1160F RVW MEDS BY RX/DR IN RCRD: CPT | Mod: CPTII,S$GLB,, | Performed by: OPHTHALMOLOGY

## 2025-07-21 PROCEDURE — 3288F FALL RISK ASSESSMENT DOCD: CPT | Mod: CPTII,S$GLB,, | Performed by: OPHTHALMOLOGY

## 2025-07-21 PROCEDURE — 92133 CPTRZD OPH DX IMG PST SGM ON: CPT | Mod: S$GLB,,, | Performed by: OPHTHALMOLOGY

## 2025-07-21 PROCEDURE — 4010F ACE/ARB THERAPY RXD/TAKEN: CPT | Mod: CPTII,S$GLB,, | Performed by: OPHTHALMOLOGY

## 2025-07-21 NOTE — PROGRESS NOTES
hvf/oct done ou rel/fix od/good os/good coop./good checked chart for allergies od/-4.25 + 0.75 x 114 os/-3.00 + 0.75 x 90-JG

## 2025-08-18 ENCOUNTER — LAB VISIT (OUTPATIENT)
Dept: LAB | Facility: HOSPITAL | Age: 70
End: 2025-08-18
Attending: INTERNAL MEDICINE
Payer: MEDICARE

## 2025-08-18 DIAGNOSIS — D50.0 IRON DEFICIENCY ANEMIA DUE TO CHRONIC BLOOD LOSS: ICD-10-CM

## 2025-08-18 LAB
ABSOLUTE EOSINOPHIL (OHS): 0.19 K/UL
ABSOLUTE MONOCYTE (OHS): 0.58 K/UL (ref 0.3–1)
ABSOLUTE NEUTROPHIL COUNT (OHS): 4.83 K/UL (ref 1.8–7.7)
BASOPHILS # BLD AUTO: 0.04 K/UL
BASOPHILS NFR BLD AUTO: 0.5 %
ERYTHROCYTE [DISTWIDTH] IN BLOOD BY AUTOMATED COUNT: 14.4 % (ref 11.5–14.5)
FERRITIN SERPL-MCNC: 41 NG/ML (ref 20–300)
HCT VFR BLD AUTO: 40.7 % (ref 37–48.5)
HGB BLD-MCNC: 12.5 GM/DL (ref 12–16)
IMM GRANULOCYTES # BLD AUTO: 0.03 K/UL (ref 0–0.04)
IMM GRANULOCYTES NFR BLD AUTO: 0.4 % (ref 0–0.5)
LYMPHOCYTES # BLD AUTO: 1.98 K/UL (ref 1–4.8)
MCH RBC QN AUTO: 26 PG (ref 27–31)
MCHC RBC AUTO-ENTMCNC: 30.7 G/DL (ref 32–36)
MCV RBC AUTO: 85 FL (ref 82–98)
NUCLEATED RBC (/100WBC) (OHS): 0 /100 WBC
PLATELET # BLD AUTO: 330 K/UL (ref 150–450)
PMV BLD AUTO: 10.5 FL (ref 9.2–12.9)
RBC # BLD AUTO: 4.81 M/UL (ref 4–5.4)
RELATIVE EOSINOPHIL (OHS): 2.5 %
RELATIVE LYMPHOCYTE (OHS): 25.9 % (ref 18–48)
RELATIVE MONOCYTE (OHS): 7.6 % (ref 4–15)
RELATIVE NEUTROPHIL (OHS): 63.1 % (ref 38–73)
WBC # BLD AUTO: 7.65 K/UL (ref 3.9–12.7)

## 2025-08-18 PROCEDURE — 82728 ASSAY OF FERRITIN: CPT

## 2025-08-18 PROCEDURE — 36415 COLL VENOUS BLD VENIPUNCTURE: CPT | Mod: PN

## 2025-08-18 PROCEDURE — 85025 COMPLETE CBC W/AUTO DIFF WBC: CPT

## 2025-08-19 ENCOUNTER — OFFICE VISIT (OUTPATIENT)
Dept: HEMATOLOGY/ONCOLOGY | Facility: CLINIC | Age: 70
End: 2025-08-19
Payer: MEDICARE

## 2025-08-19 VITALS
DIASTOLIC BLOOD PRESSURE: 62 MMHG | HEART RATE: 80 BPM | OXYGEN SATURATION: 97 % | HEIGHT: 65 IN | TEMPERATURE: 99 F | BODY MASS INDEX: 35.01 KG/M2 | WEIGHT: 210.13 LBS | RESPIRATION RATE: 18 BRPM | SYSTOLIC BLOOD PRESSURE: 133 MMHG

## 2025-08-19 DIAGNOSIS — Z90.12 S/P LEFT MASTECTOMY: Primary | ICD-10-CM

## 2025-08-19 DIAGNOSIS — C50.612 MALIGNANT NEOPLASM OF AXILLARY TAIL OF LEFT BREAST IN FEMALE, ESTROGEN RECEPTOR NEGATIVE: ICD-10-CM

## 2025-08-19 DIAGNOSIS — D50.8 OTHER IRON DEFICIENCY ANEMIA: ICD-10-CM

## 2025-08-19 DIAGNOSIS — Z17.1 MALIGNANT NEOPLASM OF AXILLARY TAIL OF LEFT BREAST IN FEMALE, ESTROGEN RECEPTOR NEGATIVE: ICD-10-CM

## 2025-08-19 PROCEDURE — 99999 PR PBB SHADOW E&M-EST. PATIENT-LVL V: CPT | Mod: PBBFAC,,, | Performed by: INTERNAL MEDICINE

## 2025-08-19 PROCEDURE — 1126F AMNT PAIN NOTED NONE PRSNT: CPT | Mod: CPTII,S$GLB,, | Performed by: INTERNAL MEDICINE

## 2025-08-19 PROCEDURE — 3078F DIAST BP <80 MM HG: CPT | Mod: CPTII,S$GLB,, | Performed by: INTERNAL MEDICINE

## 2025-08-19 PROCEDURE — 3288F FALL RISK ASSESSMENT DOCD: CPT | Mod: CPTII,S$GLB,, | Performed by: INTERNAL MEDICINE

## 2025-08-19 PROCEDURE — 1101F PT FALLS ASSESS-DOCD LE1/YR: CPT | Mod: CPTII,S$GLB,, | Performed by: INTERNAL MEDICINE

## 2025-08-19 PROCEDURE — 3008F BODY MASS INDEX DOCD: CPT | Mod: CPTII,S$GLB,, | Performed by: INTERNAL MEDICINE

## 2025-08-19 PROCEDURE — 1159F MED LIST DOCD IN RCRD: CPT | Mod: CPTII,S$GLB,, | Performed by: INTERNAL MEDICINE

## 2025-08-19 PROCEDURE — 4010F ACE/ARB THERAPY RXD/TAKEN: CPT | Mod: CPTII,S$GLB,, | Performed by: INTERNAL MEDICINE

## 2025-08-19 PROCEDURE — 3075F SYST BP GE 130 - 139MM HG: CPT | Mod: CPTII,S$GLB,, | Performed by: INTERNAL MEDICINE

## 2025-08-19 PROCEDURE — 99214 OFFICE O/P EST MOD 30 MIN: CPT | Mod: S$GLB,,, | Performed by: INTERNAL MEDICINE

## 2025-08-19 PROCEDURE — 3044F HG A1C LEVEL LT 7.0%: CPT | Mod: CPTII,S$GLB,, | Performed by: INTERNAL MEDICINE

## 2025-08-22 ENCOUNTER — TELEPHONE (OUTPATIENT)
Dept: HEPATOLOGY | Facility: CLINIC | Age: 70
End: 2025-08-22
Payer: MEDICARE

## 2025-09-02 ENCOUNTER — PATIENT MESSAGE (OUTPATIENT)
Dept: PRIMARY CARE CLINIC | Facility: CLINIC | Age: 70
End: 2025-09-02
Payer: MEDICARE

## 2025-09-04 ENCOUNTER — TELEPHONE (OUTPATIENT)
Dept: PAIN MEDICINE | Facility: CLINIC | Age: 70
End: 2025-09-04

## 2025-09-04 ENCOUNTER — TELEPHONE (OUTPATIENT)
Dept: PRIMARY CARE CLINIC | Facility: CLINIC | Age: 70
End: 2025-09-04
Payer: MEDICARE

## 2025-09-04 ENCOUNTER — OFFICE VISIT (OUTPATIENT)
Dept: PAIN MEDICINE | Facility: CLINIC | Age: 70
End: 2025-09-04
Payer: MEDICARE

## 2025-09-04 VITALS
DIASTOLIC BLOOD PRESSURE: 70 MMHG | SYSTOLIC BLOOD PRESSURE: 106 MMHG | HEIGHT: 65 IN | BODY MASS INDEX: 34.82 KG/M2 | WEIGHT: 209 LBS | HEART RATE: 101 BPM

## 2025-09-04 DIAGNOSIS — M17.0 PRIMARY OSTEOARTHRITIS OF BOTH KNEES: Primary | ICD-10-CM

## 2025-09-04 DIAGNOSIS — M75.00 ADHESIVE CAPSULITIS OF SHOULDER, UNSPECIFIED LATERALITY: ICD-10-CM

## 2025-09-04 DIAGNOSIS — B02.29 POST HERPETIC NEURALGIA: ICD-10-CM

## 2025-09-04 DIAGNOSIS — M47.816 LUMBAR SPONDYLOSIS: ICD-10-CM

## 2025-09-04 DIAGNOSIS — M48.062 SPINAL STENOSIS OF LUMBAR REGION WITH NEUROGENIC CLAUDICATION: ICD-10-CM

## 2025-09-04 PROCEDURE — 1125F AMNT PAIN NOTED PAIN PRSNT: CPT | Mod: CPTII,S$GLB,, | Performed by: STUDENT IN AN ORGANIZED HEALTH CARE EDUCATION/TRAINING PROGRAM

## 2025-09-04 PROCEDURE — 99999 PR PBB SHADOW E&M-EST. PATIENT-LVL III: CPT | Mod: PBBFAC,,, | Performed by: STUDENT IN AN ORGANIZED HEALTH CARE EDUCATION/TRAINING PROGRAM

## 2025-09-04 PROCEDURE — 3008F BODY MASS INDEX DOCD: CPT | Mod: CPTII,S$GLB,, | Performed by: STUDENT IN AN ORGANIZED HEALTH CARE EDUCATION/TRAINING PROGRAM

## 2025-09-04 PROCEDURE — 3074F SYST BP LT 130 MM HG: CPT | Mod: CPTII,S$GLB,, | Performed by: STUDENT IN AN ORGANIZED HEALTH CARE EDUCATION/TRAINING PROGRAM

## 2025-09-04 PROCEDURE — 1159F MED LIST DOCD IN RCRD: CPT | Mod: CPTII,S$GLB,, | Performed by: STUDENT IN AN ORGANIZED HEALTH CARE EDUCATION/TRAINING PROGRAM

## 2025-09-04 PROCEDURE — 3078F DIAST BP <80 MM HG: CPT | Mod: CPTII,S$GLB,, | Performed by: STUDENT IN AN ORGANIZED HEALTH CARE EDUCATION/TRAINING PROGRAM

## 2025-09-04 PROCEDURE — 1101F PT FALLS ASSESS-DOCD LE1/YR: CPT | Mod: CPTII,S$GLB,, | Performed by: STUDENT IN AN ORGANIZED HEALTH CARE EDUCATION/TRAINING PROGRAM

## 2025-09-04 PROCEDURE — 4010F ACE/ARB THERAPY RXD/TAKEN: CPT | Mod: CPTII,S$GLB,, | Performed by: STUDENT IN AN ORGANIZED HEALTH CARE EDUCATION/TRAINING PROGRAM

## 2025-09-04 PROCEDURE — 99214 OFFICE O/P EST MOD 30 MIN: CPT | Mod: S$GLB,,, | Performed by: STUDENT IN AN ORGANIZED HEALTH CARE EDUCATION/TRAINING PROGRAM

## 2025-09-04 PROCEDURE — 3044F HG A1C LEVEL LT 7.0%: CPT | Mod: CPTII,S$GLB,, | Performed by: STUDENT IN AN ORGANIZED HEALTH CARE EDUCATION/TRAINING PROGRAM

## 2025-09-04 PROCEDURE — 3288F FALL RISK ASSESSMENT DOCD: CPT | Mod: CPTII,S$GLB,, | Performed by: STUDENT IN AN ORGANIZED HEALTH CARE EDUCATION/TRAINING PROGRAM

## (undated) DEVICE — DRESSING TELFA N ADH 3X8

## (undated) DEVICE — NDL SPINAL 18GX3.5 SPINOCAN

## (undated) DEVICE — CHLORAPREP 3ML APPLICATOR TINT

## (undated) DEVICE — DRAPE TOP 53X102IN

## (undated) DEVICE — GAUZE SPONGE PEANUT STRL

## (undated) DEVICE — SEE MEDLINE ITEM 156905

## (undated) DEVICE — BUR BONE CUT MICRO TPS 3X3.8MM

## (undated) DEVICE — DRAPE C ARM 42 X 120 10/BX

## (undated) DEVICE — KIT SURGIFLO HEMOSTATIC MATRIX

## (undated) DEVICE — TUBE FRAZIER 5MM 2FT SOFT TIP

## (undated) DEVICE — CLOSURE SKIN STERI STRIP 1/2X4

## (undated) DEVICE — SUT VICRYL 3-0 27 SH

## (undated) DEVICE — BURR RND FLUT SFT TOUCH 2.0MM

## (undated) DEVICE — SUT MONOCRYL 5-0 P-3 UND 18

## (undated) DEVICE — SUT MCRYL PLUS 4-0 PS2 27IN

## (undated) DEVICE — SUT SILK 2-0 BLK BR PS-2 18

## (undated) DEVICE — DRESSING TRANS 4X4 TEGADERM

## (undated) DEVICE — DRAPE T THYROID STERILE

## (undated) DEVICE — CORD BIPOLAR 12 FOOT

## (undated) DEVICE — MARKER SKIN STND TIP BLUE BARR

## (undated) DEVICE — ADHESIVE DERMABOND ADVANCED

## (undated) DEVICE — SUT SILK 3-0 SH 18IN BLACK

## (undated) DEVICE — DRESSING SURGICAL 1/2X1/2

## (undated) DEVICE — ELECTRODE REM PLYHSV RETURN 9

## (undated) DEVICE — SEE MEDLINE ITEM 146292